# Patient Record
Sex: FEMALE | Race: WHITE | Employment: OTHER | URBAN - METROPOLITAN AREA
[De-identification: names, ages, dates, MRNs, and addresses within clinical notes are randomized per-mention and may not be internally consistent; named-entity substitution may affect disease eponyms.]

---

## 2020-01-17 ENCOUNTER — HOSPITAL ENCOUNTER (OUTPATIENT)
Age: 60
Discharge: HOME | DRG: 811 | End: 2020-01-17
Attending: INTERNAL MEDICINE | Admitting: INTERNAL MEDICINE
Payer: COMMERCIAL

## 2020-01-17 VITALS
HEIGHT: 67 IN | DIASTOLIC BLOOD PRESSURE: 64 MMHG | RESPIRATION RATE: 17 BRPM | OXYGEN SATURATION: 96 % | BODY MASS INDEX: 40.66 KG/M2 | SYSTOLIC BLOOD PRESSURE: 182 MMHG | TEMPERATURE: 97.7 F | HEART RATE: 68 BPM | WEIGHT: 259.04 LBS

## 2020-01-17 PROBLEM — D64.9 ANEMIA: Status: ACTIVE | Noted: 2020-01-17

## 2020-01-17 LAB
ABO/RH: NORMAL
ANTIBODY SCREEN: NORMAL
BLOOD BANK DISPENSE STATUS: NORMAL
BLOOD BANK PRODUCT CODE: NORMAL
BPU ID: NORMAL
DESCRIPTION BLOOD BANK: NORMAL
HCT VFR BLD CALC: 26.3 % (ref 36–48)
HEMOGLOBIN: 7.8 G/DL (ref 12–16)

## 2020-01-17 PROCEDURE — 6370000000 HC RX 637 (ALT 250 FOR IP): Performed by: INTERNAL MEDICINE

## 2020-01-17 PROCEDURE — 2580000003 HC RX 258: Performed by: INTERNAL MEDICINE

## 2020-01-17 PROCEDURE — 86901 BLOOD TYPING SEROLOGIC RH(D): CPT

## 2020-01-17 PROCEDURE — 86850 RBC ANTIBODY SCREEN: CPT

## 2020-01-17 PROCEDURE — 85018 HEMOGLOBIN: CPT

## 2020-01-17 PROCEDURE — 36415 COLL VENOUS BLD VENIPUNCTURE: CPT

## 2020-01-17 PROCEDURE — P9016 RBC LEUKOCYTES REDUCED: HCPCS

## 2020-01-17 PROCEDURE — 85014 HEMATOCRIT: CPT

## 2020-01-17 PROCEDURE — 36430 TRANSFUSION BLD/BLD COMPNT: CPT

## 2020-01-17 PROCEDURE — 86900 BLOOD TYPING SEROLOGIC ABO: CPT

## 2020-01-17 PROCEDURE — 86923 COMPATIBILITY TEST ELECTRIC: CPT

## 2020-01-17 PROCEDURE — 1200000000 HC SEMI PRIVATE

## 2020-01-17 RX ORDER — ACETAMINOPHEN 325 MG/1
650 TABLET ORAL ONCE
Status: COMPLETED | OUTPATIENT
Start: 2020-01-17 | End: 2020-01-17

## 2020-01-17 RX ORDER — 0.9 % SODIUM CHLORIDE 0.9 %
20 INTRAVENOUS SOLUTION INTRAVENOUS ONCE
Status: COMPLETED | OUTPATIENT
Start: 2020-01-17 | End: 2020-01-17

## 2020-01-17 RX ORDER — DIPHENHYDRAMINE HCL 25 MG
25 TABLET ORAL ONCE
Status: COMPLETED | OUTPATIENT
Start: 2020-01-17 | End: 2020-01-17

## 2020-01-17 RX ADMIN — ACETAMINOPHEN 650 MG: 325 TABLET ORAL at 16:36

## 2020-01-17 RX ADMIN — SODIUM CHLORIDE 20 ML: 9 INJECTION, SOLUTION INTRAVENOUS at 15:09

## 2020-01-17 RX ADMIN — DIPHENHYDRAMINE HCL 25 MG: 25 TABLET ORAL at 16:36

## 2020-01-17 ASSESSMENT — PAIN DESCRIPTION - LOCATION: LOCATION: LEG

## 2020-01-17 ASSESSMENT — PAIN DESCRIPTION - FREQUENCY: FREQUENCY: CONTINUOUS

## 2020-01-17 ASSESSMENT — PAIN SCALES - GENERAL
PAINLEVEL_OUTOF10: 0
PAINLEVEL_OUTOF10: 2

## 2020-01-17 ASSESSMENT — PAIN - FUNCTIONAL ASSESSMENT: PAIN_FUNCTIONAL_ASSESSMENT: ACTIVITIES ARE NOT PREVENTED

## 2020-01-17 ASSESSMENT — PAIN DESCRIPTION - PROGRESSION
CLINICAL_PROGRESSION: NOT CHANGED
CLINICAL_PROGRESSION: NOT CHANGED

## 2020-01-17 ASSESSMENT — PAIN DESCRIPTION - DESCRIPTORS: DESCRIPTORS: ACHING

## 2020-01-17 ASSESSMENT — PAIN DESCRIPTION - PAIN TYPE: TYPE: CHRONIC PAIN

## 2020-01-17 ASSESSMENT — PAIN DESCRIPTION - ONSET: ONSET: ON-GOING

## 2020-01-17 ASSESSMENT — PAIN DESCRIPTION - ORIENTATION: ORIENTATION: RIGHT;LEFT

## 2020-01-17 NOTE — PROGRESS NOTES
Verified allergies with patient, states her allergy to vicodin is the hydrocodone only, acetaminophen does not bother her. Blood consent signed and placed in chart.  Electronically signed by Ruben Rubio RN on 1/17/2020 at 3:40 PM

## 2020-01-17 NOTE — H&P
Recurrent anemia. Her EGD, colonoscopy, capsule endoscopy in the past have revealed small intestine AVMs. Her hemoglobin had dropped dramatically on her prior visit and recurrent iron deficiency was confirmed. She got IV iron with feraheme today in the office. Will get 2nd dose next week. Her hemoglobin is only 6 today. Discussed with Dr. Brittany Pedroza. She needs to get a unit of blood. She will be in an bedded outpatient. She is told that she needs to do limited activity. No lifting or walking far or anything exertional until she gets her blood. She expresses understanding and agreement.

## 2020-01-17 NOTE — PROGRESS NOTES
Pt arrived to room 4106. A&o x4, UAL. Has an IV in place from iron infusion done this morning. Pt states she is to get 1 unit PRBC. Perfect serve sent to Dr. Choco Burch for admission orders.  Electronically signed by Darryn Vidal RN on 1/17/2020 at 2:06 PM

## 2020-01-19 NOTE — DISCHARGE SUMMARY
[facility] [address]                               DISCHARGE SUMMARY    PATIENT NAME: Karlie Benitez                        :        1960  MED REC NO:   [id number]                          ROOM:       4106  ACCOUNT NO:   [de-identified]                           ADMIT DATE: 2020  PROVIDER:     Shantal Glaser MD                  DISCHARGE DATE:  2020      DISCHARGE DIAGNOSES:  Recurrent anemia. DISCHARGE MEDICINES:  Please see the med reconciliation form in the  chart. CONDITION AT DISCHARGE:  Stable. HISTORY OF PRESENT ILLNESS AND HOSPITAL COURSE:  The patient is a  pleasant 64-year-old female with recurrent iron-deficiency anemia likely  due to small bleeding from small intestinal AVMs who presented to our  office for followup and was found to have a hemoglobin of 6. She was  therefore admitted and given 1 unit of blood. Her followup hemoglobin  was 7.8. She was then discharged home to continue to receive IV iron at  home or in our office.           Nemo Paz MD    D: 2020 22:26:26       T: 2020 1:09:06     [email or web address]  Job#: [id number]     Doc#: [id number]    CC:

## 2021-10-11 ENCOUNTER — APPOINTMENT (OUTPATIENT)
Dept: GENERAL RADIOLOGY | Age: 61
DRG: 291 | End: 2021-10-11
Payer: MEDICARE

## 2021-10-11 ENCOUNTER — HOSPITAL ENCOUNTER (INPATIENT)
Age: 61
LOS: 2 days | Discharge: HOME HEALTH | DRG: 291 | End: 2021-10-13
Attending: EMERGENCY MEDICINE | Admitting: INTERNAL MEDICINE
Payer: MEDICARE

## 2021-10-11 DIAGNOSIS — R09.02 HYPOXIA: Primary | ICD-10-CM

## 2021-10-11 DIAGNOSIS — I50.20 SYSTOLIC CONGESTIVE HEART FAILURE, UNSPECIFIED HF CHRONICITY (HCC): ICD-10-CM

## 2021-10-11 DIAGNOSIS — R06.89 HYPERCAPNIA: ICD-10-CM

## 2021-10-11 PROBLEM — I50.23 ACUTE ON CHRONIC SYSTOLIC (CONGESTIVE) HEART FAILURE (HCC): Status: ACTIVE | Noted: 2021-10-11

## 2021-10-11 LAB
ALBUMIN SERPL-MCNC: 4 G/DL (ref 3.4–5)
ALP BLD-CCNC: 119 U/L (ref 40–129)
ALT SERPL-CCNC: 37 U/L (ref 10–40)
ANION GAP SERPL CALCULATED.3IONS-SCNC: 13 MMOL/L (ref 3–16)
AST SERPL-CCNC: 36 U/L (ref 15–37)
BASE EXCESS VENOUS: -1.4 MMOL/L
BASOPHILS ABSOLUTE: 0.1 K/UL (ref 0–0.2)
BASOPHILS RELATIVE PERCENT: 0.9 %
BILIRUB SERPL-MCNC: 0.6 MG/DL (ref 0–1)
BILIRUBIN DIRECT: <0.2 MG/DL (ref 0–0.3)
BILIRUBIN, INDIRECT: NORMAL MG/DL (ref 0–1)
BUN BLDV-MCNC: 17 MG/DL (ref 7–20)
CALCIUM SERPL-MCNC: 9.4 MG/DL (ref 8.3–10.6)
CARBOXYHEMOGLOBIN: 1.3 %
CHLORIDE BLD-SCNC: 102 MMOL/L (ref 99–110)
CO2: 23 MMOL/L (ref 21–32)
CREAT SERPL-MCNC: 1.7 MG/DL (ref 0.6–1.2)
EOSINOPHILS ABSOLUTE: 0.4 K/UL (ref 0–0.6)
EOSINOPHILS RELATIVE PERCENT: 4.3 %
GFR AFRICAN AMERICAN: 37
GFR NON-AFRICAN AMERICAN: 31
GLUCOSE BLD-MCNC: 202 MG/DL (ref 70–99)
HCO3 VENOUS: 26 MMOL/L (ref 23–29)
HCT VFR BLD CALC: 38.8 % (ref 36–48)
HEMOGLOBIN: 12.4 G/DL (ref 12–16)
LIPASE: 20 U/L (ref 13–60)
LYMPHOCYTES ABSOLUTE: 1.2 K/UL (ref 1–5.1)
LYMPHOCYTES RELATIVE PERCENT: 11.7 %
MCH RBC QN AUTO: 26.7 PG (ref 26–34)
MCHC RBC AUTO-ENTMCNC: 31.9 G/DL (ref 31–36)
MCV RBC AUTO: 83.6 FL (ref 80–100)
METHEMOGLOBIN VENOUS: 0.3 %
MONOCYTES ABSOLUTE: 0.5 K/UL (ref 0–1.3)
MONOCYTES RELATIVE PERCENT: 5 %
NEUTROPHILS ABSOLUTE: 7.9 K/UL (ref 1.7–7.7)
NEUTROPHILS RELATIVE PERCENT: 78.1 %
O2 SAT, VEN: 98 %
O2 THERAPY: ABNORMAL
PCO2, VEN: 56.6 MMHG (ref 40–50)
PDW BLD-RTO: 17.1 % (ref 12.4–15.4)
PH VENOUS: 7.28 (ref 7.35–7.45)
PLATELET # BLD: 304 K/UL (ref 135–450)
PMV BLD AUTO: 10.7 FL (ref 5–10.5)
PO2, VEN: 112 MMHG
POTASSIUM REFLEX MAGNESIUM: 4.6 MMOL/L (ref 3.5–5.1)
PRO-BNP: 8241 PG/ML (ref 0–124)
RBC # BLD: 4.65 M/UL (ref 4–5.2)
SARS-COV-2, NAAT: NOT DETECTED
SODIUM BLD-SCNC: 138 MMOL/L (ref 136–145)
TCO2 CALC VENOUS: 28 MMOL/L
TOTAL PROTEIN: 7.1 G/DL (ref 6.4–8.2)
TROPONIN: <0.01 NG/ML
WBC # BLD: 10.1 K/UL (ref 4–11)

## 2021-10-11 PROCEDURE — 94660 CPAP INITIATION&MGMT: CPT

## 2021-10-11 PROCEDURE — 94640 AIRWAY INHALATION TREATMENT: CPT

## 2021-10-11 PROCEDURE — 1200000000 HC SEMI PRIVATE

## 2021-10-11 PROCEDURE — 85025 COMPLETE CBC W/AUTO DIFF WBC: CPT

## 2021-10-11 PROCEDURE — 6370000000 HC RX 637 (ALT 250 FOR IP): Performed by: EMERGENCY MEDICINE

## 2021-10-11 PROCEDURE — 71045 X-RAY EXAM CHEST 1 VIEW: CPT

## 2021-10-11 PROCEDURE — 80076 HEPATIC FUNCTION PANEL: CPT

## 2021-10-11 PROCEDURE — 84484 ASSAY OF TROPONIN QUANT: CPT

## 2021-10-11 PROCEDURE — 82803 BLOOD GASES ANY COMBINATION: CPT

## 2021-10-11 PROCEDURE — 2700000000 HC OXYGEN THERAPY PER DAY

## 2021-10-11 PROCEDURE — 99282 EMERGENCY DEPT VISIT SF MDM: CPT

## 2021-10-11 PROCEDURE — 6360000002 HC RX W HCPCS: Performed by: EMERGENCY MEDICINE

## 2021-10-11 PROCEDURE — 93005 ELECTROCARDIOGRAM TRACING: CPT | Performed by: EMERGENCY MEDICINE

## 2021-10-11 PROCEDURE — 94761 N-INVAS EAR/PLS OXIMETRY MLT: CPT

## 2021-10-11 PROCEDURE — 83880 ASSAY OF NATRIURETIC PEPTIDE: CPT

## 2021-10-11 PROCEDURE — 80048 BASIC METABOLIC PNL TOTAL CA: CPT

## 2021-10-11 PROCEDURE — 87635 SARS-COV-2 COVID-19 AMP PRB: CPT

## 2021-10-11 PROCEDURE — 83690 ASSAY OF LIPASE: CPT

## 2021-10-11 RX ORDER — METHYLPREDNISOLONE SODIUM SUCCINATE 125 MG/2ML
60 INJECTION, POWDER, LYOPHILIZED, FOR SOLUTION INTRAMUSCULAR; INTRAVENOUS ONCE
Status: COMPLETED | OUTPATIENT
Start: 2021-10-11 | End: 2021-10-11

## 2021-10-11 RX ORDER — IPRATROPIUM BROMIDE AND ALBUTEROL SULFATE 2.5; .5 MG/3ML; MG/3ML
1 SOLUTION RESPIRATORY (INHALATION) ONCE
Status: COMPLETED | OUTPATIENT
Start: 2021-10-11 | End: 2021-10-11

## 2021-10-11 RX ORDER — FUROSEMIDE 10 MG/ML
40 INJECTION INTRAMUSCULAR; INTRAVENOUS ONCE
Status: COMPLETED | OUTPATIENT
Start: 2021-10-11 | End: 2021-10-11

## 2021-10-11 RX ADMIN — METHYLPREDNISOLONE SODIUM SUCCINATE 60 MG: 125 INJECTION, POWDER, FOR SOLUTION INTRAMUSCULAR; INTRAVENOUS at 22:49

## 2021-10-11 RX ADMIN — IPRATROPIUM BROMIDE AND ALBUTEROL SULFATE 1 AMPULE: .5; 3 SOLUTION RESPIRATORY (INHALATION) at 21:18

## 2021-10-11 RX ADMIN — FUROSEMIDE 40 MG: 10 INJECTION, SOLUTION INTRAMUSCULAR; INTRAVENOUS at 23:10

## 2021-10-12 PROBLEM — J96.01 ACUTE RESPIRATORY FAILURE WITH HYPOXIA AND HYPERCARBIA (HCC): Status: ACTIVE | Noted: 2021-10-12

## 2021-10-12 PROBLEM — E78.5 HYPERLIPIDEMIA: Status: ACTIVE | Noted: 2021-10-12

## 2021-10-12 PROBLEM — E66.01 MORBID OBESITY DUE TO EXCESS CALORIES (HCC): Status: ACTIVE | Noted: 2021-10-12

## 2021-10-12 PROBLEM — J96.02 ACUTE RESPIRATORY FAILURE WITH HYPOXIA AND HYPERCARBIA (HCC): Status: ACTIVE | Noted: 2021-10-12

## 2021-10-12 PROBLEM — J81.0 ACUTE PULMONARY EDEMA (HCC): Status: ACTIVE | Noted: 2021-10-12

## 2021-10-12 LAB
ANION GAP SERPL CALCULATED.3IONS-SCNC: 15 MMOL/L (ref 3–16)
BASOPHILS ABSOLUTE: 0 K/UL (ref 0–0.2)
BASOPHILS RELATIVE PERCENT: 0.3 %
BUN BLDV-MCNC: 20 MG/DL (ref 7–20)
CALCIUM SERPL-MCNC: 9.5 MG/DL (ref 8.3–10.6)
CHLORIDE BLD-SCNC: 100 MMOL/L (ref 99–110)
CO2: 23 MMOL/L (ref 21–32)
CREAT SERPL-MCNC: 1.5 MG/DL (ref 0.6–1.2)
EKG ATRIAL RATE: 74 BPM
EKG DIAGNOSIS: NORMAL
EKG P AXIS: 75 DEGREES
EKG P-R INTERVAL: 216 MS
EKG Q-T INTERVAL: 476 MS
EKG QRS DURATION: 186 MS
EKG QTC CALCULATION (BAZETT): 528 MS
EKG R AXIS: -73 DEGREES
EKG T AXIS: 92 DEGREES
EKG VENTRICULAR RATE: 74 BPM
EOSINOPHILS ABSOLUTE: 0 K/UL (ref 0–0.6)
EOSINOPHILS RELATIVE PERCENT: 0.2 %
GFR AFRICAN AMERICAN: 43
GFR NON-AFRICAN AMERICAN: 35
GLUCOSE BLD-MCNC: 135 MG/DL (ref 70–99)
GLUCOSE BLD-MCNC: 156 MG/DL (ref 70–99)
GLUCOSE BLD-MCNC: 161 MG/DL (ref 70–99)
GLUCOSE BLD-MCNC: 175 MG/DL (ref 70–99)
GLUCOSE BLD-MCNC: 179 MG/DL (ref 70–99)
HCT VFR BLD CALC: 36.6 % (ref 36–48)
HEMOGLOBIN: 11.9 G/DL (ref 12–16)
LV EF: 28 %
LVEF MODALITY: NORMAL
LYMPHOCYTES ABSOLUTE: 0.4 K/UL (ref 1–5.1)
LYMPHOCYTES RELATIVE PERCENT: 6.1 %
MCH RBC QN AUTO: 26.8 PG (ref 26–34)
MCHC RBC AUTO-ENTMCNC: 32.6 G/DL (ref 31–36)
MCV RBC AUTO: 82.1 FL (ref 80–100)
MONOCYTES ABSOLUTE: 0.1 K/UL (ref 0–1.3)
MONOCYTES RELATIVE PERCENT: 1.2 %
NEUTROPHILS ABSOLUTE: 6.1 K/UL (ref 1.7–7.7)
NEUTROPHILS RELATIVE PERCENT: 92.2 %
PDW BLD-RTO: 16.8 % (ref 12.4–15.4)
PERFORMED ON: ABNORMAL
PLATELET # BLD: 255 K/UL (ref 135–450)
PMV BLD AUTO: 10.7 FL (ref 5–10.5)
POTASSIUM REFLEX MAGNESIUM: 4 MMOL/L (ref 3.5–5.1)
RBC # BLD: 4.46 M/UL (ref 4–5.2)
SODIUM BLD-SCNC: 138 MMOL/L (ref 136–145)
WBC # BLD: 6.6 K/UL (ref 4–11)

## 2021-10-12 PROCEDURE — 6360000002 HC RX W HCPCS: Performed by: INTERNAL MEDICINE

## 2021-10-12 PROCEDURE — 36415 COLL VENOUS BLD VENIPUNCTURE: CPT

## 2021-10-12 PROCEDURE — 85025 COMPLETE CBC W/AUTO DIFF WBC: CPT

## 2021-10-12 PROCEDURE — 93010 ELECTROCARDIOGRAM REPORT: CPT | Performed by: INTERNAL MEDICINE

## 2021-10-12 PROCEDURE — 2700000000 HC OXYGEN THERAPY PER DAY

## 2021-10-12 PROCEDURE — 93306 TTE W/DOPPLER COMPLETE: CPT

## 2021-10-12 PROCEDURE — 1200000000 HC SEMI PRIVATE

## 2021-10-12 PROCEDURE — 6370000000 HC RX 637 (ALT 250 FOR IP): Performed by: INTERNAL MEDICINE

## 2021-10-12 PROCEDURE — 94660 CPAP INITIATION&MGMT: CPT

## 2021-10-12 PROCEDURE — 94640 AIRWAY INHALATION TREATMENT: CPT

## 2021-10-12 PROCEDURE — 2580000003 HC RX 258: Performed by: INTERNAL MEDICINE

## 2021-10-12 PROCEDURE — 94761 N-INVAS EAR/PLS OXIMETRY MLT: CPT

## 2021-10-12 PROCEDURE — 6370000000 HC RX 637 (ALT 250 FOR IP): Performed by: FAMILY MEDICINE

## 2021-10-12 PROCEDURE — 80048 BASIC METABOLIC PNL TOTAL CA: CPT

## 2021-10-12 RX ORDER — GABAPENTIN 600 MG/1
600 TABLET ORAL 3 TIMES DAILY
Status: ON HOLD | COMMUNITY
Start: 2021-05-01 | End: 2021-10-12 | Stop reason: SDUPTHER

## 2021-10-12 RX ORDER — SODIUM CHLORIDE 0.9 % (FLUSH) 0.9 %
10 SYRINGE (ML) INJECTION PRN
Status: DISCONTINUED | OUTPATIENT
Start: 2021-10-12 | End: 2021-10-13 | Stop reason: HOSPADM

## 2021-10-12 RX ORDER — FUROSEMIDE 10 MG/ML
40 INJECTION INTRAMUSCULAR; INTRAVENOUS 2 TIMES DAILY
Status: DISCONTINUED | OUTPATIENT
Start: 2021-10-12 | End: 2021-10-13 | Stop reason: HOSPADM

## 2021-10-12 RX ORDER — ACETAMINOPHEN 325 MG/1
650 TABLET ORAL EVERY 4 HOURS PRN
Status: DISCONTINUED | OUTPATIENT
Start: 2021-10-12 | End: 2021-10-13 | Stop reason: HOSPADM

## 2021-10-12 RX ORDER — CLOPIDOGREL BISULFATE 75 MG/1
75 TABLET ORAL DAILY
Status: DISCONTINUED | OUTPATIENT
Start: 2021-10-12 | End: 2021-10-12

## 2021-10-12 RX ORDER — GABAPENTIN 300 MG/1
300 CAPSULE ORAL NIGHTLY
Status: DISCONTINUED | OUTPATIENT
Start: 2021-10-12 | End: 2021-10-12

## 2021-10-12 RX ORDER — ONDANSETRON 2 MG/ML
4 INJECTION INTRAMUSCULAR; INTRAVENOUS EVERY 4 HOURS PRN
Status: DISCONTINUED | OUTPATIENT
Start: 2021-10-12 | End: 2021-10-13 | Stop reason: HOSPADM

## 2021-10-12 RX ORDER — PANTOPRAZOLE SODIUM 40 MG/1
40 TABLET, DELAYED RELEASE ORAL DAILY
Status: DISCONTINUED | OUTPATIENT
Start: 2021-10-12 | End: 2021-10-13 | Stop reason: HOSPADM

## 2021-10-12 RX ORDER — GABAPENTIN 300 MG/1
600 CAPSULE ORAL 3 TIMES DAILY
Status: DISCONTINUED | OUTPATIENT
Start: 2021-10-12 | End: 2021-10-13 | Stop reason: HOSPADM

## 2021-10-12 RX ORDER — ATORVASTATIN CALCIUM 40 MG/1
40 TABLET, FILM COATED ORAL DAILY
Status: DISCONTINUED | OUTPATIENT
Start: 2021-10-12 | End: 2021-10-13 | Stop reason: HOSPADM

## 2021-10-12 RX ORDER — SODIUM CHLORIDE 0.9 % (FLUSH) 0.9 %
10 SYRINGE (ML) INJECTION EVERY 12 HOURS SCHEDULED
Status: DISCONTINUED | OUTPATIENT
Start: 2021-10-12 | End: 2021-10-13 | Stop reason: HOSPADM

## 2021-10-12 RX ORDER — BUDESONIDE AND FORMOTEROL FUMARATE DIHYDRATE 160; 4.5 UG/1; UG/1
2 AEROSOL RESPIRATORY (INHALATION) DAILY
Status: DISCONTINUED | OUTPATIENT
Start: 2021-10-12 | End: 2021-10-12

## 2021-10-12 RX ORDER — SODIUM CHLORIDE 9 MG/ML
25 INJECTION, SOLUTION INTRAVENOUS PRN
Status: DISCONTINUED | OUTPATIENT
Start: 2021-10-12 | End: 2021-10-13 | Stop reason: HOSPADM

## 2021-10-12 RX ORDER — DEXTROSE MONOHYDRATE 50 MG/ML
100 INJECTION, SOLUTION INTRAVENOUS PRN
Status: DISCONTINUED | OUTPATIENT
Start: 2021-10-12 | End: 2021-10-13 | Stop reason: HOSPADM

## 2021-10-12 RX ORDER — CARVEDILOL 25 MG/1
25 TABLET ORAL 2 TIMES DAILY WITH MEALS
Status: DISCONTINUED | OUTPATIENT
Start: 2021-10-12 | End: 2021-10-13 | Stop reason: HOSPADM

## 2021-10-12 RX ORDER — ACETAMINOPHEN 650 MG/1
650 SUPPOSITORY RECTAL EVERY 4 HOURS PRN
Status: DISCONTINUED | OUTPATIENT
Start: 2021-10-12 | End: 2021-10-13 | Stop reason: HOSPADM

## 2021-10-12 RX ORDER — POLYETHYLENE GLYCOL 3350 17 G/17G
17 POWDER, FOR SOLUTION ORAL DAILY PRN
Status: DISCONTINUED | OUTPATIENT
Start: 2021-10-12 | End: 2021-10-13 | Stop reason: HOSPADM

## 2021-10-12 RX ORDER — NICOTINE POLACRILEX 4 MG
15 LOZENGE BUCCAL PRN
Status: DISCONTINUED | OUTPATIENT
Start: 2021-10-12 | End: 2021-10-13 | Stop reason: HOSPADM

## 2021-10-12 RX ORDER — AMIODARONE HYDROCHLORIDE 200 MG/1
200 TABLET ORAL NIGHTLY
COMMUNITY
End: 2022-05-10

## 2021-10-12 RX ORDER — ALBUTEROL SULFATE 0.63 MG/3ML
1 SOLUTION RESPIRATORY (INHALATION) EVERY 6 HOURS PRN
COMMUNITY
End: 2021-12-01

## 2021-10-12 RX ORDER — DEXTROSE MONOHYDRATE 25 G/50ML
12.5 INJECTION, SOLUTION INTRAVENOUS PRN
Status: DISCONTINUED | OUTPATIENT
Start: 2021-10-12 | End: 2021-10-13 | Stop reason: HOSPADM

## 2021-10-12 RX ORDER — ALBUTEROL SULFATE 90 UG/1
2 AEROSOL, METERED RESPIRATORY (INHALATION) 4 TIMES DAILY
Status: DISCONTINUED | OUTPATIENT
Start: 2021-10-12 | End: 2021-10-13 | Stop reason: HOSPADM

## 2021-10-12 RX ORDER — SACUBITRIL AND VALSARTAN 24; 26 MG/1; MG/1
1 TABLET, FILM COATED ORAL 2 TIMES DAILY
COMMUNITY
End: 2021-12-01 | Stop reason: ALTCHOICE

## 2021-10-12 RX ORDER — ASPIRIN 81 MG/1
81 TABLET, CHEWABLE ORAL DAILY
Status: DISCONTINUED | OUTPATIENT
Start: 2021-10-12 | End: 2021-10-13 | Stop reason: HOSPADM

## 2021-10-12 RX ADMIN — GABAPENTIN 300 MG: 300 CAPSULE ORAL at 01:37

## 2021-10-12 RX ADMIN — SODIUM CHLORIDE, PRESERVATIVE FREE 10 ML: 5 INJECTION INTRAVENOUS at 08:24

## 2021-10-12 RX ADMIN — ALBUTEROL SULFATE 2 PUFF: 90 AEROSOL, METERED RESPIRATORY (INHALATION) at 07:59

## 2021-10-12 RX ADMIN — FUROSEMIDE 40 MG: 10 INJECTION, SOLUTION INTRAMUSCULAR; INTRAVENOUS at 08:28

## 2021-10-12 RX ADMIN — ALBUTEROL SULFATE 2 PUFF: 90 AEROSOL, METERED RESPIRATORY (INHALATION) at 16:17

## 2021-10-12 RX ADMIN — INSULIN LISPRO 1 UNITS: 100 INJECTION, SOLUTION INTRAVENOUS; SUBCUTANEOUS at 20:56

## 2021-10-12 RX ADMIN — FUROSEMIDE 40 MG: 10 INJECTION, SOLUTION INTRAMUSCULAR; INTRAVENOUS at 17:28

## 2021-10-12 RX ADMIN — ASPIRIN 81 MG: 81 TABLET, CHEWABLE ORAL at 08:23

## 2021-10-12 RX ADMIN — ALBUTEROL SULFATE 2 PUFF: 90 AEROSOL, METERED RESPIRATORY (INHALATION) at 20:23

## 2021-10-12 RX ADMIN — SODIUM CHLORIDE, PRESERVATIVE FREE 10 ML: 5 INJECTION INTRAVENOUS at 20:56

## 2021-10-12 RX ADMIN — ALBUTEROL SULFATE 2 PUFF: 90 AEROSOL, METERED RESPIRATORY (INHALATION) at 12:20

## 2021-10-12 RX ADMIN — GABAPENTIN 600 MG: 300 CAPSULE ORAL at 20:56

## 2021-10-12 RX ADMIN — ENOXAPARIN SODIUM 40 MG: 100 INJECTION SUBCUTANEOUS at 08:25

## 2021-10-12 RX ADMIN — INSULIN LISPRO 2 UNITS: 100 INJECTION, SOLUTION INTRAVENOUS; SUBCUTANEOUS at 08:27

## 2021-10-12 RX ADMIN — PANTOPRAZOLE SODIUM 40 MG: 40 TABLET, DELAYED RELEASE ORAL at 05:05

## 2021-10-12 RX ADMIN — CARVEDILOL 25 MG: 25 TABLET, FILM COATED ORAL at 17:28

## 2021-10-12 RX ADMIN — TIOTROPIUM BROMIDE INHALATION SPRAY 2 PUFF: 3.12 SPRAY, METERED RESPIRATORY (INHALATION) at 07:59

## 2021-10-12 RX ADMIN — ATORVASTATIN CALCIUM 40 MG: 40 TABLET, FILM COATED ORAL at 08:23

## 2021-10-12 RX ADMIN — CARVEDILOL 25 MG: 25 TABLET, FILM COATED ORAL at 08:23

## 2021-10-12 ASSESSMENT — ENCOUNTER SYMPTOMS
CONSTIPATION: 0
VOMITING: 0
EYE DISCHARGE: 0
COUGH: 0
EYE ITCHING: 0
SHORTNESS OF BREATH: 1
COLOR CHANGE: 0
ABDOMINAL PAIN: 0

## 2021-10-12 ASSESSMENT — PAIN SCALES - GENERAL
PAINLEVEL_OUTOF10: 0

## 2021-10-12 NOTE — ED PROVIDER NOTES
[address] [facility]      Pt Name: Marge Pulido  MRN: [id number]  [place] 1960  Date of evaluation: 10/11/2021  Provider: Doe Montes MD    CHIEF COMPLAINT       Chief Complaint   Patient presents with    Shortness of Breath       HISTORY OF PRESENT ILLNESS    Marge Pulido is a 62 y.o. female who presents to the emergency department with shortness of breath. Patient endorses shortness of breath for the last 24 hours. States it started suddenly. Spontaneously. Positive for congestive heart failure and COPD. Positive for 10 pound weight gain. Positive for orthopnea and leg swelling. No chest pain. Denies fevers or chills. Dry cough. Patient presented on CPAP per EMS. Placed on BiPAP with good response. History otherwise limited secondary to patient's degree of respiratory distress on arrival.    Nursing Notes were reviewed. Including nursing noted for FM, Surgical History, Past Medical History, Social History, vitals, and allergies; agree with all. REVIEW OF SYSTEMS       Review of Systems   Constitutional: Negative for diaphoresis and unexpected weight change. HENT: Negative for congestion and dental problem. Eyes: Negative for discharge and itching. Respiratory: Positive for shortness of breath. Negative for cough. Cardiovascular: Positive for leg swelling. Negative for chest pain. Gastrointestinal: Negative for abdominal pain, constipation and vomiting. Endocrine: Negative for cold intolerance and heat intolerance. Genitourinary: Negative for vaginal bleeding, vaginal discharge and vaginal pain. Musculoskeletal: Negative for neck pain and neck stiffness. Skin: Negative for color change and pallor. Neurological: Negative for tremors and weakness. Psychiatric/Behavioral: Negative for agitation and behavioral problems. Except as noted above the remainder of the review of systems was reviewed and negative. PAST MEDICAL HISTORY     Past Medical History:   Diagnosis Date    Asthma     Cardiomyopathy ([facility]     CHF (congestive heart failure) (HCC)     COPD [facility]ch[facility]     Diabetes mellitus ([facility]     Essential hypertension     Hyperlipidemia     Obesity        SURGICAL HISTORY       Past Surgical History:   Procedure Laterality Date    CARPAL TUNNEL RELEASE      CHOLECYSTECTOMY      INCONTINENCE SURGERY         CURRENT MEDICATIONS       Previous Medications    ALBUTEROL (PROVENTIL HFA) 108 (90 BASE) MCG/ACT INHALER    Inhale 2 puffs into the lungs 4 times daily. ASPIRIN 81 MG TABLET    Take 81 mg by mouth daily. ATORVASTATIN (LIPITOR) 40 MG TABLET    Take 40 mg by mouth daily. BUDESONIDE-FORMOTEROL (SYMBICORT) 160-4.5 MCG/ACT AERO    Inhale 2 puffs into the lungs daily. CARVEDILOL (COREG) 12.5 MG TABLET    Take 1 tablet by mouth 2 times daily (with meals). CLOPIDOGREL (PLAVIX) 75 MG TABLET    Take 1 tablet by mouth daily. FERROUS SULFATE 325 (65 FE) MG TABLET    Take 325 mg by mouth 2 times daily     FUROSEMIDE (LASIX) 80 MG TABLET    Take 80 mg by mouth 2 times daily. GABAPENTIN (NEURONTIN) 300 MG CAPSULE    Take 300 mg by mouth nightly. HYDROCORTISONE (ANUMED-HC) 25 MG SUPPOSITORY    Place 25 mg rectally as needed for Hemorrhoids. MAGNESIUM OXIDE (MAG-OX) 400 MG TABLET    Take 400 mg by mouth daily. PANTOPRAZOLE (PROTONIX) 40 MG TABLET    Take 40 mg by mouth daily. POTASSIUM CHLORIDE SA (KLOR-CON M20) 20 MEQ TABLET    Take 20 mEq by mouth 2 times daily. TIOTROPIUM (SPIRIVA HANDIHALER) 18 MCG INHALATION CAPSULE    Inhale 18 mcg into the lungs daily.          ALLERGIES     Ace inhibitors and Vicodin [hydrocodone-acetaminophen]    FAMILY HISTORY        Family History   Problem Relation Age of Onset    High Blood Pressure Mother     Diabetes Mother     Cancer Mother         thyroid    Stroke Father        SOCIAL HISTORY       Social History     Socioeconomic History    Marital status:      Spouse name: Not on file    Number of children: Not on file    Years of education: Not on file    Highest education level: Not on file   Occupational History    Not on file   Tobacco Use    Smoking status: Former Smoker     Packs/day: 0.10     Types: Cigarettes    Smokeless tobacco: Never Used   Substance and Sexual Activity    Alcohol use: No     Alcohol/week: 0.0 standard drinks    Drug use: No    Sexual activity: Yes     Partners: Male     Comment:    Other Topics Concern    Not on file   Social History Narrative    Not on file     Social Determinants of Health     Financial Resource Strain:     Difficulty of Paying Living Expenses:    Food Insecurity:     Worried About Running Out of Food in the Last Year:     [address] in the Last Year:    Transportation Needs:     Lack of Transportation (Medical):  Lack of Transportation (Non-Medical):    Physical Activity:     Days of Exercise per Week:     Minutes of Exercise per Session:    Stress:     Feeling of Stress :    Social Connections:     Frequency of Communication with Friends and Family:     Frequency of Social Gatherings with Friends and Family:     Attends [employer] Services:     Active Member of Clubs or Organizations:     Attends Club or Organization Meetings:     Marital Status:    Intimate Partner Violence:     Fear of Current or Ex-Partner:     Emotionally Abused:     Physically Abused:     Sexually Abused:        PHYSICAL EXAM       Vitals:    10/11/21 2118 10/11/21 2120 10/11/21 2302 10/12/21 0047   BP:  139/75 (!) 113/58    Pulse:  84 52    Resp: 20 20 17 18   SpO2: 100% 99% 100% 100%   Weight:  271 lb 2.7 oz (123 kg)       Physical Exam  Vitals and nursing note reviewed. Constitutional:       General: She is not in acute distress. Appearance: She is well-developed. She is ill-appearing. She is not toxic-appearing or diaphoretic.    HENT: Head: Normocephalic and atraumatic. Right Ear: External ear normal.      Left Ear: External ear normal.   Eyes:      General:         Right eye: No discharge. Left eye: No discharge. Conjunctiva/sclera: Conjunctivae normal.      Pupils: Pupils are equal, round, and reactive to light. Cardiovascular:      Rate and Rhythm: Normal rate and regular rhythm. Heart sounds: No murmur heard. Pulmonary:      Effort: Respiratory distress present. Breath sounds: Rales present. No wheezing. Abdominal:      General: Bowel sounds are normal. There is no distension. Palpations: Abdomen is soft. There is no mass. Tenderness: There is no abdominal tenderness. There is no guarding or rebound. Genitourinary:     Comments: Deferred  Musculoskeletal:         General: No deformity. Normal range of motion. Cervical back: Normal range of motion and neck supple. Right lower leg: Edema present. Left lower leg: Edema present. Skin:     General: Skin is warm. Findings: No erythema or rash. Neurological:      Mental Status: She is alert and oriented to person, place, and time. She is not disoriented. Cranial Nerves: No cranial nerve deficit. Motor: No atrophy or abnormal muscle tone. Coordination: Coordination normal.   Psychiatric:         Behavior: Behavior normal.         Thought Content: Thought content normal.         DIAGNOSTIC RESULTS     RADIOLOGY:   Non-plain film images such as CT, Ultrasoundand MRI are read by the radiologist. Plain radiographic images are visualized and preliminarily interpreted by the emergency physician with the below findings:       Impression   Interstitial pulmonary edema.  Otherwise no acute abnormality.      ED BEDSIDE ULTRASOUND:   Performed by ED Physician - none    LABS:  Labs Reviewed   CBC WITH AUTO DIFFERENTIAL - Abnormal; Notable for the following components:       Result Value    RDW 17.1 (*)     MPV 10.7 (*) Neutrophils Absolute 7.9 (*)     All other components within normal limits    Narrative:     Performed at:  [facility]  [address] [facility] [place] [address]   Phone [phone]   BASIC METABOLIC PANEL W/ REFLEX TO MG FOR LOW K - Abnormal; Notable for the following components:    Glucose 202 (*)     CREATININE 1.7 (*)     GFR Non- 31 (*)     GFR  37 (*)     All other components within normal limits    Narrative:     Performed at:  [facility]  [address] [place] [facility] [place] [address]   Phone [phone]   BRAIN NATRIURETIC PEPTIDE - Abnormal; Notable for the following components:    Pro-BNP 8,241 (*)     All other components within normal limits    Narrative:     Performed at:  [facility]  [address] [place] [facility] [place] [address]   Phone [phone]   BLOOD GAS, VENOUS - Abnormal; Notable for the following components:    pH, [place] 7.276 (*)     pCO2, [place] 56.6 (*)     All other components within normal limits    Narrative:     Performed at:  [facility]  [address] [place] [facility] [place] [address]   Phone [phone] RAPID    Narrative:     Performed at:  [facility]  [address] [place] [facility] [place] [address]   Phone [phone]   LIPASE    Narrative:     Performed at:  [facility]  [address] [place] [facility] [place] [address]   Phone [phone]   HEPATIC FUNCTION PANEL    Narrative:     Performed at:  [facility]  [address] [place] [facility] [place] [address]   Phone [phone]   TROPONIN    Narrative:     Performed at:  [facility]  [address] [place] [facility] [place] [address]   Phone [phone]   BASIC METABOLIC PANEL W/ REFLEX TO MG FOR LOW K   CBC WITH AUTO DIFFERENTIAL       All other labs were withinnormal range or not returned as of this dictation. EMERGENCY DEPARTMENT COURSE and DIFFERENTIAL DIAGNOSIS/MDM:     PMH, Surgical Hx, FH, Social Hx reviewed by myself (ETOH usage, Tobacco usage, Drug usage reviewed by myself, no pertinent Hx)- No Pertinent Hx     Old records were reviewed by me    81-year-old female with acute epoxy hypercapnic respiratory distress. On BiPAP with good response. Steroids and nebs given. Lasix initiated. Admission for further inpatient evaluation. CRITICAL CARE TIME   Total Critical Caretime was 39 minutes, excluding separately reportable procedures. There was a high probability of clinically significant/life threatening deterioration in the patient's condition which required my urgent intervention. PROCEDURES:  Unlessotherwise noted below, none    FINAL IMPRESSION      1. Hypoxia    2. Systolic congestive heart failure, unspecified HF chronicity ([address]    3.  Hypercapnia          DISPOSITION/PLAN   DISPOSITION      Admission    (Please note that portions ofthis note were completed with a voice recognition program.  Efforts were made to edit the dictations but occasionally words are mis-transcribed.)    Filipe Carpio MD(electronically signed)  Attending Emergency Physician        Filipe Carpio MD  10/12/21 4433

## 2021-10-12 NOTE — CARE COORDINATION
INITIAL CASE MANAGEMENT ASSESSMENT    Reviewed chart, met with patient to assess possible discharge needs. Explained Case Management role/services. Living Situation: Updated address, lives alone in basement level apartment, 2 steps into building, 5 steps w/ railing down to apartment    ADLs: Independent, at baseline patient says she does mostly stays in bed/chair all day; daughter assists with grocery shopping, patient endorses difficulty with cleaning     DME: Cane, wheeled walker, raised toilet seat, shower chair w/ back, BIPAP    PT/OT Recs: May need eval; at baseline patient says she mostly stays put in bed/chair all day     Active Services: None     Transportation: Active , daughter can transport home     Medications: Uses [facility] on [address] -- no issues    PCP: Jannet Ibanez MD      HD/PD: n/a    PLAN/COMMENTS: Patient will return home at discharge. Agreeable to home care RN/PT/OT. Home care list given. Patient [facility], referral made. Referral made to [facility] for assistance cleaning. The Plan for Transition of Care is related to the following treatment goals: strengthening    The patient was provided with a choice of provider and agrees   with the discharge plan. [x] Yes [] No    Freedom of choice list was provided with basic dialogue that supports the patient's individualized plan of care/goals, treatment preferences and shares the quality data associated with the providers. [x] Yes [] No    CM provided contact information for patient or family to call with any questions. CM will follow and assist as needed.   Electronically signed by Gurpreet Macedo RN Case Management [phone] on 10/12/2021 at 11:05 AM

## 2021-10-12 NOTE — H&P
Hospital Medicine  History and Physical    PCP: Key Ramírez MD  Patient Name: Ginna Wen    Date of Service: Pt seen/examined on 10/11/21 and admitted to Inpatient with expected LOS greater than two midnights due to medical therapy    CHIEF COMPLAINT:  Pt c/o shortness of breath  HISTORY OF PRESENT ILLNESS: Pt is an 62y.o. year-old female with a history of hypertension, hyperlipidemia, COPD, DMII, CHF and morbid obesity. She presents to the ER for evaluation following a 24 hour period of increasing shortness of breath. She states that her shortness of breath is worse with exertion and improved with rest. She reports a recent 10 lb weight gain. On evaluation she was found to have an acute on chronic systolic CHF. She is being admitted for further evaluation and treatment. Associated signs and symptoms do not include fever or chills, nausea, vomiting, abdominal pain, hemoptysis, hematochezia, diarrhea, constipation or urinary symptoms. Past Medical History:        Diagnosis Date    Asthma     [facility]omyopathy [facility] [address]     CHF (congestive heart failure) (HCC)     COPD (chronic obstructive pulmonary disease) (HCC)     Diabetes mellitus ([facility] [address]     Essential hypertension     Hyperlipidemia     Obesity        Past Surgical History:        Procedure Laterality Date    CARPAL TUNNEL RELEASE      CHOLECYSTECTOMY      INCONTINENCE SURGERY         Allergies:  Ace inhibitors, Diclofenac, Losartan, and Vicodin [hydrocodone-acetaminophen]    Medications Prior to Admission:    Prior to Admission medications    Medication Sig Start Date End Date Taking?  Authorizing Provider   amiodarone (CORDARONE) 200 MG tablet Take 200 mg by mouth daily nightly   Yes Historical Provider, MD   sacubitril-valsartan (ENTRESTO) 24-26 MG per tablet Take 1 tablet by mouth 2 times daily   Yes Historical Provider, MD   ferrous sulfate 325 (65 FE) MG tablet Take 325 mg by mouth 3 times daily    Yes Historical Provider, MD   aspirin 81 MG tablet Take 325 mg by mouth daily    Yes Historical Provider, MD   atorvastatin (LIPITOR) 40 MG tablet Take 40 mg by mouth daily. Yes Historical Provider, MD   magnesium oxide (MAG-OX) 400 MG tablet Take 500 mg by mouth 2 times daily    Yes Historical Provider, MD   potassium chloride SA (KLOR-CON M20) 20 MEQ tablet Take 20 mEq by mouth 2 times daily. Yes Historical Provider, MD   furosemide (LASIX) 80 MG tablet Take 80 mg by mouth 2 times daily. 4/11/14  Yes Historical Provider, MD   pantoprazole (PROTONIX) 40 MG tablet Take 40 mg by mouth daily. Yes Historical Provider, MD   hydrocortisone (ANUMED-HC) 25 MG suppository Place 25 mg rectally as needed for Hemorrhoids. Yes Historical Provider, MD   carvedilol (COREG) 12.5 MG tablet Take 1 tablet by mouth 2 times daily (with meals). Patient taking differently: Take 25 mg by mouth 2 times daily (with meals). 12/13/13  Yes Marlon Chin MD   gabapentin (NEURONTIN) 600 MG tablet Take 600 mg by mouth 3 times daily. Yes Historical Provider, MD   albuterol (PROVENTIL HFA) 108 (90 BASE) MCG/ACT inhaler Inhale 2 puffs into the lungs 4 times daily. Yes Historical Provider, MD   tiotropium (SPIRIVA HANDIHALER) 18 MCG inhalation capsule Inhale 18 mcg into the lungs daily. Yes Historical Provider, MD       Family History:       Problem Relation Age of Onset    High Blood Pressure Mother     Diabetes Mother     Cancer Mother         thyroid    Stroke Father      Social History:   TOBACCO:   reports that she has quit smoking. Her smoking use included cigarettes. She smoked 0.10 packs per day. She has never used smokeless tobacco.  ETOH:   reports no history of alcohol use.   OCCUPATION:      REVIEW OF SYSTEMS:  A full review of systems was performed and is negative except for that which appears in the HPI    Physical Exam:    Vitals: /64   Pulse 58   Temp 98.4 °F (36.9 °C)   Resp 14   Wt 260 lb 2.3 oz (118 kg)   SpO2 100%   BMI 40.74 kg/m² General appearance: Morbidly Obese, ill but not toxic appearing 62y.o. year-old female who is alert, appears stated age and is cooperative  HEENT: Head: Normocephalic, no lesions, without obvious abnormality. Eye: Normal external eye, conjunctiva, lids cornea, PEERL. Ears: Normal external ears. Non-tender. Nose: Normal external nose, mucus membranes and septum. Pharynx: Dental Hygiene adequate. Normal buccal mucosa. Normal pharynx. Neck: no adenopathy, no carotid bruit, no JVD, supple, symmetrical, trachea midline and thyroid not enlarged, symmetric, no tenderness/mass/nodules  Lungs: scattered rales and rhonchi on auscultation bilaterally and no use of accessory muscles. Heart: regular rate and rhythm, S1, S2 normal, no murmur, click, rub or gallop and normal apical impulse  Abdomen: soft, non-tender; bowel sounds normal; no masses, no organomegaly  Extremities: extremities atraumatic, no cyanosis, +edema bilateral lower extremities. Homans sign is negative, no sign of DVT. Capillary Refill: Acceptable < 3 seconds   Peripheral Pulses: +3 easily felt, not easily obliterated with pressures   Skin: Skin color, texture, turgor normal. No rashes or lesions on exposed skin  Neurologic: Neurovascularly intact without any focal sensory/motor deficits. Cranial nerves: II-XII intact, grossly non-focal. Gait was not tested.   Mental Status: Alert and oriented, thought content appropriate, normal insight    CBC:   Recent Labs     10/11/21  2131   WBC 10.1   HGB 12.4        BMP:    Recent Labs     10/11/21  2131      K 4.6      CO2 23   BUN 17   CREATININE 1.7*   GLUCOSE 202*     Troponin:   Recent Labs     10/11/21  2131   Kole Dross <0.01     PT/INR:  No results found for: PTINR  U/A:    Lab Results   Component Value Date    LEUKOCYTESUR NEGATIVE 01/17/2013    RBCUA 10-15 12/18/2012    SPECGRAV 1.015 01/17/2013    UROBILINOGEN 0.2 01/17/2013    BILIRUBINUR NEGATIVE 01/17/2013    BLOODU NEGATIVE 01/17/2013    GLUCOSEU NEGATIVE 01/17/2013    PROTEINU NEGATIVE 01/17/2013         RAD:   I have independently reviewed and interpreted the imaging studies below and based my recommendations to the patient on those findings. XR CHEST PORTABLE    Result Date: 10/11/2021  EXAMINATION: ONE XRAY VIEW OF THE CHEST 10/11/2021 8:19 pm COMPARISON: 01/05/2013 HISTORY: ORDERING SYSTEM PROVIDED HISTORY: SOB TECHNOLOGIST PROVIDED HISTORY: Reason for exam:->SOB Reason for Exam: Shortness of Breath Acuity: Acute Type of Exam: Initial FINDINGS: Left chest cardiac device is present. Mild cardiomegaly. Pulmonary vascular redistribution. No lung infiltrate or consolidation. No definite pneumothorax or pleural effusion. Interstitial pulmonary edema. Otherwise no acute abnormality. Assessment:   Principal Problem:    Acute on chronic systolic (congestive) heart failure (HCC)  Active Problems:    Essential hypertension    COPD (chronic obstructive pulmonary disease) (HCC)    DMII (diabetes mellitus, type 2) (HCC)    Hyperlipidemia    Morbid obesity due to excess calories (HCC)    Acute pulmonary edema (HCC)    Acute respiratory failure with hypercarbia (HCC)  Resolved Problems:    * No resolved hospital problems. *      Plan:       CHF - Acute on chronic systolic dysfunction with Acute pulmonary edema. EF 25%. An Echocardiogram has been ordered to reassess cardiac function. Diurese with IV Lasix. Monitor strict I&Os and daily weights. A low sodium fluid restricted diet has been ordered. Pt on ACEI, as EF is <40%. COPD (chronic obstructive pulmonary disease) - without acute exacerbation. She has been placed on a BiPap. Will provide Nebulizer treatments as needed and continue home medications. Patient will be monitored closely, and deep breathing and coughing will be encouraged while awake. Acute respiratory failure with hypercarbia - Placed on BiPap.  Will provide O2 as needed to maintain an O2 saturation of 92% or greater    Diabetes mellitus II - SSI and carb control diet    Essential (primary) hypertension - continue home meds and monitor blood pressure    Hyperlipidemia - No current evidence of Rhabdomyolysis or other adverse effects. Continue statin therapy while in the hospital    Morbid obesity due to excess calories (Body mass index is 40.74 kg/m². ) - Complicating assessment and treatment. Placing patient at risk for multiple co-morbidities as well as early death and contributing to the patient's presentation.  on weight loss when appropriate. DVT Prophylaxis: Lovenox  Diet: ADULT DIET; Regular; Low Sodium (2 gm); 1500 ml  Code Status: Full Code  (Advanced care planning has been discussed with patient and/or responsible family member and is reflected in the code status.  Further orders associated with this have been entered if appropriate)    Disposition: Anticipate that patient will remain in the hospital for 2 to 3+ days depending on further evaluation and clinical course    Please note that over 50 minutes was spent in evaluating the patient, review of records and results, discussion with staff/family, etc.    Kassi Herrera MD

## 2021-10-12 NOTE — PROGRESS NOTES
Patient brought in medication list. Medication reconciliation done with RN, patient, patient's med list, and patient's chart history at bedside.

## 2021-10-12 NOTE — PROGRESS NOTES
Pt arrived via stretcher from ED to room 2232. Heart monitor connected and verified with [facility]. VS, assessment, and admission complete. 4 eyes assessment complete. Pt oriented to unit and room. Call light and bedside table in reach. All questions answered. Pt resting quietly in bed with no complaints or voiced needs at this time. Will continue to monitor.

## 2021-10-12 NOTE — ED NOTES
ED SBAR report provider to ENRIQUE Olivo. Patient to be transported to Room 5270 via stretcher by RN. Patient transported with bedside cardiac monitor and with IV medications infusing. IV site clean, dry, and intact. MEWS score and pain assessed as 0/10 and documented. Patient's score on the BATISTA Fall scale reviewed with receiving RN. Updated patient on plan of care.        Josiah Laurent RN  10/12/21 9914

## 2021-10-12 NOTE — CARE COORDINATION
[facility]    Referral received from CM to follow for home care services. I will follow for needs, and speak with patient to verify demos.           Nikki Alexandra RN, BSN CTN  [facility] [phone]

## 2021-10-12 NOTE — PROGRESS NOTES
4 Eyes Skin Assessment     NAME:  Tika Travis  YOB: 1960  MEDICAL RECORD NUMBER:  [id number]    The patient is being assess for  Admission    I agree that 2 RN's have performed a thorough Head to Toe Skin Assessment on the patient. ALL assessment sites listed below have been assessed. Areas assessed by both nurses:    Head, Face, Ears, Shoulders, Back, Chest, Arms, Elbows, Hands, Sacrum. Buttock, Coccyx, Ischium and Legs. Feet and Heels        Does the Patient have a Wound?  No noted wound(s)       Filemon Prevention initiated:  Yes   Wound Care Orders initiated:  No    Pressure Injury (Stage 3,4, Unstageable, DTI, NWPT, and Complex wounds) if present place consult order under [de-identified] No    New and Established Ostomies if present place consult order under : No      Nurse 1 eSignature: Electronically signed by Ron Gibbons RN on 10/12/21 at 1:18 AM EDT    **SHARE this note so that the co-signing nurse is able to place an eSignature**    Nurse 2 eSignature: Electronically signed by Papo Pierce RN on 10/12/21 at 6:49 AM EDT

## 2021-10-12 NOTE — ED NOTES
Bed: A-17  Expected date:   Expected time:   Means of arrival:   Comments:  Resp distress     Tali Astudillo RN  10/11/21 3868

## 2021-10-12 NOTE — PROGRESS NOTES
Hospitalist Progress Note    CC: Acute on chronic systolic (congestive) heart failure ([facility] [address]      Admit date: 10/11/2021  Days in hospital:  1    Subjective/interval history: Pt S/E. No new complaints. She is walking around her room. O2 status: .5L o2    ROS:   Pertinent items are noted in HPI. Objective:    /72   Pulse 63   Temp 97.4 °F (36.3 °C) (Oral)   Resp 18   Ht 5' 7\" (1.702 m)   Wt 257 lb 4.4 oz (116.7 kg)   SpO2 97%   BMI 40.30 kg/m²     Gen: alert, NAD ,morbidly obese  HEENT: NC/AT, moist mucous membranes, no oropharyngeal erythema or exudate  Neck: supple, trachea midline, no anterior cervical or SC LAD  Heart: Normal s1/s2, RRR, no murmurs, gallops, or rubs. Lungs: clear bilaterally, no wheezing, no rales, no rhonchi, no use of accessory muscles  Abd: bowel sounds present, soft, nontender, nondistended, no masses  Extrem: No clubbing, cyanosis, no edema  Skin: no rashes or lesions  Psych: A & O x3, affect appropriate  Neuro: grossly intact, moves all four extremities spontaneously.   Cap refill: +2 sec    Medications:  Scheduled Meds:   furosemide  40 mg IntraVENous BID    tiotropium  2 puff Inhalation Daily    pantoprazole  40 mg Oral Daily    gabapentin  300 mg Oral Nightly    carvedilol  25 mg Oral BID WC    atorvastatin  40 mg Oral Daily    aspirin  81 mg Oral Daily    albuterol sulfate HFA  2 puff Inhalation 4x daily    sodium chloride flush  10 mL IntraVENous 2 times per day    enoxaparin  40 mg SubCUTAneous Daily    insulin lispro  0-12 Units SubCUTAneous TID     insulin lispro  0-6 Units SubCUTAneous Nightly       PRN Meds:  sodium chloride flush, sodium chloride, ondansetron, polyethylene glycol, acetaminophen **OR** acetaminophen, glucose, dextrose, glucagon (rDNA), dextrose    IV:   sodium chloride      dextrose           Intake/Output Summary (Last 24 hours) at 10/12/2021 1022  Last data filed at 10/12/2021 0958  Gross per 24 hour on chronic systolic CHF exacerbation   - last ECHO: lvef 25%  - repeat echo pending  - proBNP 8200   - lasix 40 mg IV BID  - cont BB, ACEi/ARB  - daily wts  - I/Os  - consult CHF RN    Acute hypoxic respiratory failure, POA - improving  - with criteria: < 90% on RA, accessory muscle use, RR> 18, due to pulmonary edema  - supplemental oxygen as necessary to keep SaO2 > 90%    AIDA on ckd III  - crt baseline 1.2-1.3; 1.7 on admission -> 1.5  - Avoid nephrotoxins  - check: UACS, Mejia/UCrt, uric acid, TSH, U osmol  - follow renal function tests; if no improvement will get renal US     Chronic conditions - continue home meds unless otherwise stated  Asthma  Anemia   Cad  Copd  Dm  htn                                                                                                                                                                                                     Code status:  full  DVT prophylaxis: [x] Lovenox  [] SQ Heparin  [] SCDs because of  [] warfarin/oral direct thrombin inhibitor [] Encourage ambulation      Disposition:  [] Home [] Rehab [] Psych [] SNF  [] LTAC  [] Transfer to ICU  [] Transfer to PCU [] Other: in pt    Electronically signed by Cheryl Meehan DO on 10/12/2021 at 10:22 AM

## 2021-10-13 VITALS
DIASTOLIC BLOOD PRESSURE: 77 MMHG | TEMPERATURE: 97.6 F | HEART RATE: 57 BPM | SYSTOLIC BLOOD PRESSURE: 160 MMHG | BODY MASS INDEX: 40.03 KG/M2 | OXYGEN SATURATION: 97 % | HEIGHT: 67 IN | RESPIRATION RATE: 16 BRPM | WEIGHT: 255.07 LBS

## 2021-10-13 LAB
ANION GAP SERPL CALCULATED.3IONS-SCNC: 15 MMOL/L (ref 3–16)
BASOPHILS ABSOLUTE: 0 K/UL (ref 0–0.2)
BASOPHILS RELATIVE PERCENT: 0.4 %
BUN BLDV-MCNC: 24 MG/DL (ref 7–20)
CALCIUM SERPL-MCNC: 9.3 MG/DL (ref 8.3–10.6)
CHLORIDE BLD-SCNC: 100 MMOL/L (ref 99–110)
CO2: 23 MMOL/L (ref 21–32)
CREAT SERPL-MCNC: 1.5 MG/DL (ref 0.6–1.2)
EOSINOPHILS ABSOLUTE: 0.2 K/UL (ref 0–0.6)
EOSINOPHILS RELATIVE PERCENT: 2.4 %
GFR AFRICAN AMERICAN: 43
GFR NON-AFRICAN AMERICAN: 35
GLUCOSE BLD-MCNC: 115 MG/DL (ref 70–99)
GLUCOSE BLD-MCNC: 117 MG/DL (ref 70–99)
GLUCOSE BLD-MCNC: 184 MG/DL (ref 70–99)
HCT VFR BLD CALC: 39.2 % (ref 36–48)
HEMOGLOBIN: 12.1 G/DL (ref 12–16)
LYMPHOCYTES ABSOLUTE: 0.8 K/UL (ref 1–5.1)
LYMPHOCYTES RELATIVE PERCENT: 10.8 %
MCH RBC QN AUTO: 26.9 PG (ref 26–34)
MCHC RBC AUTO-ENTMCNC: 30.9 G/DL (ref 31–36)
MCV RBC AUTO: 87 FL (ref 80–100)
MONOCYTES ABSOLUTE: 0.6 K/UL (ref 0–1.3)
MONOCYTES RELATIVE PERCENT: 7.6 %
NEUTROPHILS ABSOLUTE: 6.2 K/UL (ref 1.7–7.7)
NEUTROPHILS RELATIVE PERCENT: 78.8 %
PDW BLD-RTO: 17.2 % (ref 12.4–15.4)
PERFORMED ON: ABNORMAL
PERFORMED ON: ABNORMAL
PLATELET # BLD: 200 K/UL (ref 135–450)
PMV BLD AUTO: 10.4 FL (ref 5–10.5)
POTASSIUM REFLEX MAGNESIUM: 4 MMOL/L (ref 3.5–5.1)
RBC # BLD: 4.51 M/UL (ref 4–5.2)
SODIUM BLD-SCNC: 138 MMOL/L (ref 136–145)
WBC # BLD: 7.8 K/UL (ref 4–11)

## 2021-10-13 PROCEDURE — 85025 COMPLETE CBC W/AUTO DIFF WBC: CPT

## 2021-10-13 PROCEDURE — 90686 IIV4 VACC NO PRSV 0.5 ML IM: CPT | Performed by: FAMILY MEDICINE

## 2021-10-13 PROCEDURE — 6360000002 HC RX W HCPCS: Performed by: FAMILY MEDICINE

## 2021-10-13 PROCEDURE — G0008 ADMIN INFLUENZA VIRUS VAC: HCPCS | Performed by: FAMILY MEDICINE

## 2021-10-13 PROCEDURE — 6370000000 HC RX 637 (ALT 250 FOR IP): Performed by: FAMILY MEDICINE

## 2021-10-13 PROCEDURE — 36415 COLL VENOUS BLD VENIPUNCTURE: CPT

## 2021-10-13 PROCEDURE — 94660 CPAP INITIATION&MGMT: CPT

## 2021-10-13 PROCEDURE — 80048 BASIC METABOLIC PNL TOTAL CA: CPT

## 2021-10-13 PROCEDURE — 6370000000 HC RX 637 (ALT 250 FOR IP): Performed by: INTERNAL MEDICINE

## 2021-10-13 PROCEDURE — 94640 AIRWAY INHALATION TREATMENT: CPT

## 2021-10-13 PROCEDURE — 6360000002 HC RX W HCPCS: Performed by: INTERNAL MEDICINE

## 2021-10-13 PROCEDURE — 2580000003 HC RX 258: Performed by: INTERNAL MEDICINE

## 2021-10-13 PROCEDURE — 94761 N-INVAS EAR/PLS OXIMETRY MLT: CPT

## 2021-10-13 RX ORDER — LANOLIN ALCOHOL/MO/W.PET/CERES
400 CREAM (GRAM) TOPICAL
Status: DISCONTINUED | OUTPATIENT
Start: 2021-10-13 | End: 2021-10-13 | Stop reason: HOSPADM

## 2021-10-13 RX ORDER — CARVEDILOL 25 MG/1
25 TABLET ORAL 2 TIMES DAILY
Qty: 60 TABLET | Refills: 3 | Status: SHIPPED | OUTPATIENT
Start: 2021-10-13 | End: 2021-12-01 | Stop reason: DRUGHIGH

## 2021-10-13 RX ORDER — AMIODARONE HYDROCHLORIDE 200 MG/1
200 TABLET ORAL DAILY
Status: DISCONTINUED | OUTPATIENT
Start: 2021-10-13 | End: 2021-10-13 | Stop reason: HOSPADM

## 2021-10-13 RX ORDER — POTASSIUM CHLORIDE 20 MEQ/1
40 TABLET, EXTENDED RELEASE ORAL
Status: DISCONTINUED | OUTPATIENT
Start: 2021-10-14 | End: 2021-10-13 | Stop reason: HOSPADM

## 2021-10-13 RX ORDER — CARVEDILOL 25 MG/1
25 TABLET ORAL 2 TIMES DAILY WITH MEALS
Qty: 60 TABLET | Refills: 3 | Status: SHIPPED | OUTPATIENT
Start: 2021-10-13 | End: 2021-10-13 | Stop reason: HOSPADM

## 2021-10-13 RX ADMIN — FUROSEMIDE 40 MG: 10 INJECTION, SOLUTION INTRAMUSCULAR; INTRAVENOUS at 08:49

## 2021-10-13 RX ADMIN — GABAPENTIN 600 MG: 300 CAPSULE ORAL at 13:12

## 2021-10-13 RX ADMIN — SODIUM CHLORIDE, PRESERVATIVE FREE 10 ML: 5 INJECTION INTRAVENOUS at 08:59

## 2021-10-13 RX ADMIN — INSULIN LISPRO 2 UNITS: 100 INJECTION, SOLUTION INTRAVENOUS; SUBCUTANEOUS at 12:15

## 2021-10-13 RX ADMIN — ALBUTEROL SULFATE 2 PUFF: 90 AEROSOL, METERED RESPIRATORY (INHALATION) at 00:45

## 2021-10-13 RX ADMIN — PANTOPRAZOLE SODIUM 40 MG: 40 TABLET, DELAYED RELEASE ORAL at 06:30

## 2021-10-13 RX ADMIN — CARVEDILOL 25 MG: 25 TABLET, FILM COATED ORAL at 08:49

## 2021-10-13 RX ADMIN — ENOXAPARIN SODIUM 40 MG: 100 INJECTION SUBCUTANEOUS at 08:49

## 2021-10-13 RX ADMIN — ASPIRIN 81 MG: 81 TABLET, CHEWABLE ORAL at 08:49

## 2021-10-13 RX ADMIN — ALBUTEROL SULFATE 2 PUFF: 90 AEROSOL, METERED RESPIRATORY (INHALATION) at 12:09

## 2021-10-13 RX ADMIN — GABAPENTIN 600 MG: 300 CAPSULE ORAL at 08:49

## 2021-10-13 RX ADMIN — ALBUTEROL SULFATE 2 PUFF: 90 AEROSOL, METERED RESPIRATORY (INHALATION) at 08:45

## 2021-10-13 RX ADMIN — TIOTROPIUM BROMIDE INHALATION SPRAY 2 PUFF: 3.12 SPRAY, METERED RESPIRATORY (INHALATION) at 08:45

## 2021-10-13 RX ADMIN — ALBUTEROL SULFATE 2 PUFF: 90 AEROSOL, METERED RESPIRATORY (INHALATION) at 16:32

## 2021-10-13 RX ADMIN — SACUBITRIL AND VALSARTAN 1 TABLET: 24; 26 TABLET, FILM COATED ORAL at 11:46

## 2021-10-13 RX ADMIN — Medication 400 MG: at 11:46

## 2021-10-13 RX ADMIN — AMIODARONE HYDROCHLORIDE 200 MG: 200 TABLET ORAL at 11:46

## 2021-10-13 RX ADMIN — ATORVASTATIN CALCIUM 40 MG: 40 TABLET, FILM COATED ORAL at 08:49

## 2021-10-13 RX ADMIN — INFLUENZA A VIRUS A/VICTORIA/2570/2019 IVR-215 (H1N1) ANTIGEN (PROPIOLACTONE INACTIVATED), INFLUENZA A VIRUS A/CAMBODIA/E0826360/2020 IVR-224 (H3N2) ANTIGEN (PROPIOLACTONE INACTIVATED), INFLUENZA B VIRUS B/VICTORIA/705/2018 BVR-11 ANTIGEN (PROPIOLACTONE INACTIVATED), INFLUENZA B VIRUS B/PHUKET/3073/2013 BVR-1B ANTIGEN (PROPIOLACTONE INACTIVATED) 0.5 ML: 15; 15; 15; 15 INJECTION, SUSPENSION INTRAMUSCULAR at 16:10

## 2021-10-13 ASSESSMENT — PAIN SCALES - GENERAL
PAINLEVEL_OUTOF10: 0
PAINLEVEL_OUTOF10: 0

## 2021-10-13 NOTE — PLAN OF CARE
Problem: Falls - Risk of:  Goal: Will remain free from falls  Description: Will remain free from falls  10/13/2021 0023 by Tanvir Sandoval RN  Outcome: Met This Shift  Note: Fall risk assessment completed every shift. All precautions in place. Pt has call light within reach at all times. Room clear of clutter. Pt aware to call for assistance when getting up. Bed alarm on. No falls this shift.    Goal: Absence of physical injury  Description: Absence of physical injury  10/13/2021 0023 by Tanvir Sandoval RN  Outcome: Met This Shift

## 2021-10-13 NOTE — PROGRESS NOTES
Discharge order noted. Pt will have 60 minutes of documented heart failure education. She has a follow appointment arranged with Dr. Page Conti, her usual cardiologist tomorrow, 10/14/21 [facility]. [facility] rec request sent to pharmacy.   Her weight at D/C is 255lbs

## 2021-10-13 NOTE — CARE COORDINATION
[facility]    DC order noted, all docs needed have been faxed to [facility] for home care services. Home care to see patient by 10/15/21.     Alee Sahu RN, BSN CTN  [facility] [phone]

## 2021-10-13 NOTE — PROGRESS NOTES
Data- discharge order received, pt verbalized agreement to discharge, disposition to previous residence, referral for [facility] made patient aware. Action- discharge instructions prepared and given to patient, pt verbalized understanding. Medication information packet given r/t CHANGED prescriptions emphasizing name/purpose/side effects, pt verbalized understanding. Discharge instruction summary: Diet- low sodium 1500ml FR, Activity- as tolerated, Primary Care Physician as follows: Myranda Espino -[phone] f/u appointment to be scheduled as needed,patient has cardiology appt 10/14 and is aware of appointment. immunizations reviewed and flu vaccine given see MAR, prescription medications filled at [facility]. CHF Education reviewed. Pt/ Family has had a total of 60 minutes CHF education this admission encounter. Response- Pt belongings gathered, IV removed. Disposition is home with home healthcare referral.  transported with all belongings, taken to lobby via w/c w/ staff no complications.

## 2021-10-13 NOTE — CARE COORDINATION
CASE MANAGEMENT DISCHARGE SUMMARY:  Met with patient. Patient ready to discharge today. Patient agreeable to home care. [email or web address], able to pull orders via Epic. Rn made aware of discharge plan. No additional needs to note.     DISCHARGE DATE: 10/13/2021    DISCHARGED TO: Home with home care     [address] [facility]             PHONE NUMBER: [phone]    TRANSPORTATION: Daughter              TIME: later today     [facility]             NUMBER: [phone]    Electronically signed by Devonte Mann RN Case Management [phone] on 10/13/2021 at 3:36 PM

## 2021-10-13 NOTE — PROGRESS NOTES
BiPAP taken off at this time per request after being up to BR. Is on RA. Notified Diego Setting in RT. Kevin Edward RN

## 2021-10-13 NOTE — DISCHARGE INSTR - COC
Continuity of Care Form    Patient Name: Irina Maddox   :  1960  MRN:  [id number]    Admit date:  10/11/2021  Discharge date:  10/13/2021    Code Status Order: Full Code   Advance Directives:      Admitting Physician:  Mei Maxwell MD  PCP: Amina Obregon MD    Discharging Nurse: [facility] CAMPUS Unit/Room#: T3O-8751/5270-01  Discharging Unit Phone Number: [phone]    Emergency Contact:   Extended Emergency Contact Information  Primary Emergency Contact: Alexsandraelisabeth Alexandro [address] Phone: [phone]  Relation: Child  Secondary Emergency Contact: Madelaine Fernandes  [place] Phone: [phone]  Relation: Child    Past Surgical History:  Past Surgical History:   Procedure Laterality Date    CARPAL TUNNEL RELEASE      CHOLECYSTECTOMY      INCONTINENCE SURGERY         Immunization History:   Immunization History   Administered Date(s) Administered    COVID-19, Moderna, PF, 100mcg/0.5mL 2021, 2021    Influenza Whole 2013       Active Problems:  Patient Active Problem List   Diagnosis Code    Essential hypertension I10    Cardiomyopathy ([facility] I42.9    CHF (congestive heart failure) ([facility] I50.9    COPD (chronic obstructive pulmonary disease) ([facility] J44.9    Chest pain R07.9    NEGIN (obstructive sleep apnea) G47.33    DMII (diabetes mellitus, type 2) ([facility]) E11.9    Iron deficiency anemia D50.9    Intestinal malabsorption K90.9    Iron deficiency anemia due to chronic blood loss D50.0    Treatment Z51.89    Anemia D64.9    Acute on chronic systolic (congestive) heart failure (HCC) I50.23    Hyperlipidemia E78.5    Morbid obesity due to excess calories ([facility]) E66.01    Acute pulmonary edema ([facility]) J81.0    Acute respiratory failure with hypercarbia ([facility]) J96.01, J96.02       Isolation/Infection:   Isolation            No Isolation          Patient Infection Status       Infection Onset Added Last Indicated Last Indicated By Review Planned Expiration Resolved Resolved By    None active    Resolved    COVID-19 Rule Out 10/11/21 10/11/21 10/11/21 COVID-19, Rapid (Ordered)   10/11/21 Rule-Out Test Resulted            Nurse Assessment:  Last Vital Signs: BP (!) 160/77   Pulse 57   Temp 97.6 °F (36.4 °C) (Axillary)   Resp 16   Ht 5' 7\" (1.702 m)   Wt 255 lb 1.2 oz (115.7 kg)   SpO2 98%   BMI 39.95 kg/m²     Last documented pain score (0-10 scale): Pain Level: 0  Last Weight:   Wt Readings from Last 1 Encounters:   10/13/21 255 lb 1.2 oz (115.7 kg)     Mental Status:  oriented and alert    IV Access:  - None    Nursing Mobility/ADLs:  Walking   Independent  Transfer  Independent  Bathing  assist  Dressing  Independent  [address]  Independent  Med Delivery   whole    Wound Care Documentation and Therapy:        Elimination:  Continence:   · Bowel: Yes  · Bladder: Yes  Urinary Catheter: None   Colostomy/Ileostomy/Ileal Conduit: No       Date of Last BM: 10/13/21    Intake/Output Summary (Last 24 hours) at 10/13/2021 1447  Last data filed at 10/13/2021 1416  Gross per 24 hour   Intake 1050 ml   Output 2475 ml   Net -1425 ml     I/O last 3 completed shifts: In: 1100 [P.O.:1090; I.V.:10]  Out: 2825 [Urine:2825]    Safety Concerns: At Risk for Falls    Impairments/Disabilities:      None    Nutrition Therapy:  Current Nutrition Therapy:   - Oral Diet:  Low Sodium (2gm)    Routes of Feeding: Oral  Liquids:  Thin Liquids  Daily Fluid Restriction: yes - amount 1500  Last Modified Barium Swallow with Video (Video Swallowing Test): not done    Treatments at the Time of Hospital Discharge:   Respiratory Treatments: See MAR  Oxygen Therapy:  Bipap at night   Ventilator:    - No ventilator support  - BiPAP   IPAP: 10 cmH20, CPAP/EPAP: 5 cmH2O only when sleeping and device from home    Rehab Therapies: Physical Therapy, Occupational Therapy and Nurse  Weight Bearing Status/Restrictions: No weight bearing restirctions  Other Medical Equipment (for information only, NOT a DME order):  bath bench and bedside commode  Other Treatments: [facility]: LEVEL 3 [address] to establish plan of care for patient over 60 day period   Nursing  Initial home SN evaluation visit to occur within 24-48 hours for:  1)  medication management  2)  VS and clinical assessment  3)  S&S chronic disease exacerbation education + when to contact MD/NP  4)  care coordination  Medication Reconciliation during 1st SN visit  PT/OT/Speech   Evaluations in home within 24-48 hours of discharge to include DME and home safety   Frontload therapy 5 days, then 3x a week   OT to evaluate if patient has [address] needs for personal care    evaluation within 24-48 hours to evaluate resources & insurance for potential [place], [place], LTC, and Medicaid options   Palliative Care referral within 5 days of hospital discharge   PCP Visit scheduled within 3 - 7 days of hospital discharge [facility] (If patient is agreeable and meets guidelines)    Patient's personal belongings (please select all that are sent with patient):  sent with all belongings     RN SIGNATURE:  Electronically signed by Elmer Lomax RN on 10/13/21 at 3:58 PM EDT    CASE MANAGEMENT/SOCIAL WORK SECTION    Inpatient Status Date: 10/11/2021    Readmission Risk Assessment Score:  Readmission Risk              Risk of Unplanned Readmission:  16           Discharging to Facility/ Agency   · Name:  [facility]    · Address: [address] [place] [address], [address] [place] [place] [address]  · Phone: [phone]  · Fax: [phone]       / signature: Electronically signed by Jose C Rolle RN Case Management on 10/13/2021 at 3:28 PM      PHYSICIAN SECTION    Prognosis: Fair    Condition at Discharge: Stable    Rehab Potential (if transferring to Rehab):  Fair    Recommended Labs or Other Treatments After Discharge: pt, ot, rn    Physician Certification: I certify the above information and transfer of Dirk Breen  is necessary for the continuing treatment of the diagnosis listed and that she requires Home Care for greater 30 days.      Update Admission H&P: No change in H&P    PHYSICIAN SIGNATURE:  Electronically signed by Aly Morel DO on 10/13/21 at 2:47 PM EDT

## 2021-10-13 NOTE — PLAN OF CARE
Problem: Falls - Risk of:  Goal: Will remain free from falls  Outcome: Met This Shift  Note: Fall risk assessment completed every shift. All precautions in place. Pt has call light within reach at all times. Room clear of clutter. Pt aware to call for assistance when getting up. Bed alarm on. No falls this shift.       Problem: Falls - Risk of:  Goal: Absence of physical injury  Outcome: Met This Shift     Problem: OXYGENATION/RESPIRATORY FUNCTION  Goal: Patient will maintain patent airway  Outcome: Ongoing     Problem: OXYGENATION/RESPIRATORY FUNCTION  Goal: Patient will achieve/maintain normal respiratory rate/effort  Outcome: Ongoing     Problem: HEMODYNAMIC STATUS  Goal: Patient has stable vital signs and fluid balance  Outcome: Ongoing     Problem: FLUID AND ELECTROLYTE IMBALANCE  Goal: Fluid and electrolyte balance are achieved/maintained  Outcome: Ongoing

## 2021-10-13 NOTE — CONSULTS
[facility]  HEART FAILURE PROGRAM      NAME:  [facility]i [facility] RECORD NUMBER:  [id number]  AGE: 62 y.o.    GENDER: female  : 1960  TODAY'S DATE:  10/13/2021    Subjective:     VISIT TYPE: evaluation     ADMITTING PHYSICIAN:  Tonja Bush MD    PAST MEDICAL HISTORY        Diagnosis Date    Asthma     Cardiomyopathy [facility])     [facility]     COPD (chronic obstructive pulmonary disease) [facility]     Diabetes mellitus ([facility]     Essential hypertension     Hyperlipidemia     Obesity        SOCIAL HISTORY    Social History     Tobacco Use    Smoking status: Former Smoker     Packs/day: 0.10     Types: Cigarettes    Smokeless tobacco: Never Used   Substance Use Topics    Alcohol use: No     Alcohol/week: 0.0 standard drinks    Drug use: No       ALLERGIES    Allergies   Allergen Reactions    Ace Inhibitors     Diclofenac     Losartan     Vicodin [Hydrocodone-Acetaminophen]        MEDICATIONS  Scheduled Meds:   amiodarone  200 mg Oral Daily    magnesium oxide  400 mg Oral Daily with breakfast    sacubitril-valsartan  1 tablet Oral BID    [START ON 10/14/2021] potassium chloride  40 mEq Oral Daily with breakfast    furosemide  40 mg IntraVENous BID    tiotropium  2 puff Inhalation Daily    pantoprazole  40 mg Oral Daily    carvedilol  25 mg Oral BID WC    atorvastatin  40 mg Oral Daily    aspirin  81 mg Oral Daily    albuterol sulfate HFA  2 puff Inhalation 4x daily    sodium chloride flush  10 mL IntraVENous 2 times per day    enoxaparin  40 mg SubCUTAneous Daily    insulin lispro  0-12 Units SubCUTAneous TID WC    insulin lispro  0-6 Units SubCUTAneous Nightly    gabapentin  600 mg Oral TID    influenza virus vaccine  0.5 mL IntraMUSCular Prior to discharge       ADMIT DATE: 10/11/2021      Objective:     ADMISSION DIAGNOSIS:   Hypercapnia [R06.89]  Hypoxia [R09.02]  Acute on chronic systolic (congestive) heart failure ([facility] [Z73.84]  Systolic congestive heart failure, unspecified HF chronicity (HCC) [I50.20]     BP (!) 160/77   Pulse 57   Temp 97.6 °F (36.4 °C) (Axillary)   Resp 16   Ht 5' 7\" (1.702 m)   Wt 255 lb 1.2 oz (115.7 kg)   SpO2 98%   BMI 39.95 kg/m²     ADMIT:  Weight: 271 lb 2.7 oz (123 kg)    TODAY: Weight: 255 lb 1.2 oz (115.7 kg)    Wt Readings from Last 10 Encounters:   10/13/21 255 lb 1.2 oz (115.7 kg)   01/17/20 259 lb 0.7 oz (117.5 kg)   05/26/17 281 lb (127.5 kg)   02/14/17 267 lb (121.1 kg)   02/06/17 271 lb (122.9 kg)   02/06/17 271 lb (122.9 kg)   01/25/17 270 lb (122.5 kg)   10/26/16 268 lb (121.6 kg)   06/29/16 295 lb (133.8 kg)   06/29/16 295 lb (133.8 kg)          Intake/Output Summary (Last 24 hours) at 10/13/2021 1247  Last data filed at 10/13/2021 0859  Gross per 24 hour   Intake 620 ml   Output 1275 ml   Net -655 ml       LABS  BMP:   Lab Results   Component Value Date     10/13/2021    K 4.0 10/13/2021     10/13/2021    CO2 23 10/13/2021    BUN 24 10/13/2021    LABALBU 4.0 10/11/2021    CREATININE 1.5 10/13/2021    CALCIUM 9.3 10/13/2021    GFRAA 43 10/13/2021    GFRAA >60 01/17/2013    LABGLOM 35 10/13/2021    GLUCOSE 115 10/13/2021    GLUCOSE 154 06/05/2015     CBC:   Recent Labs     10/11/21  2131 10/12/21  0540 10/13/21  0420   WBC 10.1 6.6 7.8   HGB 12.4 11.9* 12.1   HCT 38.8 36.6 39.2   MCV 83.6 82.1 87.0    255 200     BNP:   Lab Results   Component Value Date    .0 01/05/2013    .6 12/15/2012       ECHOCARDIOGRAM:      Summary   Left ventricular cavity size is severely dilated. Ejection fraction is visually estimated to be 25-30%. Grade II diastolic dysfunction with elevated LV filling pressures. Moderate to Severe mitral regurgitation. The left atrium is severely dilated. Normal right ventricular size and function. Pacemaker / ICD lead is visualized in the right atrium. There is a trivial pericardial effusion noted.       Signature ------------------------------------------------------------------   Electronically signed by Neisha Mittal MD (Interpreting   physician) on 10/12/2021 at 03:48 PM    Assessment:     CONSULTS:   [address] CONSULT TO CARDIOLOGY    Patient has a CARDIOLOGY CONSULT: Yes      Patient taking an ACEI/ARB:  Yes       Patient taking a BETA BLOCKER:  Yes    SCALE AVAILABLE:  Yes     EDUCATION STATUS: Patient   [x]  Provided both written and verbal education on Heart Failure signs/symptoms. [x]  Provided instructions on daily medications. [x]  Provided instructions to monitor and record weight daily. [x]  Provided instructions to call if weight increases 3 lbs in one day or 5 lbs in one week. [x]  Received verbal acknowledgment/understanding of Heart Failure related causes. [x]  Provided instructions on how to maintain a low sodium diet. []  Provided recommendations for smoking cessation programs  [x]  Provided recommendations on activity and exercise    [x]  Other:     Met with patient in room, introduced myself and explained my role in heart failure education. Lunch arrived shortly after I arrived, and I offered to come back, but she said to go ahead--this wouldn't take lone. She felt she has been managing her heart failure well--\"I haven't been the the hospital in a long time\"  She said she was in her usualsateof health, but had sat up at  her  desk  All day, and noticed sweeling in her legs which resolved over night, and the next day, she became sob after using aerosol deodorant. She assumed this was the problem, but did not resolve, and she came to the hospital.  She states that she does weigh herself each morning but does not record the number--I reviewed the 3/5 rule--which she stated she was familiar with.   I reviewed the \"Zones of heart failure, and remnded her that Yellow zone symptoms require action to get back into the green--if  Ignored, those symptoms would only get worse, and she's be in the Red zone and on her way back. She stated she would not let that happen. I asked her about her diet and fluid restriction--she said she  did well with that, and she seldom if ever ate out, and steamed her veggies, and baked her meat and watched her sodium intake. She was anxious to end our conversation. According to MD pt admitted to not following a fluid restriction at all, and in fact consumed well over the 64 ounce recommendation. I reminded her to watch for s/s of a flare up, I instructed her to call her [facility] cardiology office or the Heart Failure Resource line if she gets into trouble. CURRENT DIET: ADULT DIET; Regular; 5 carb choices (75 gm/meal); Low Sodium (2 gm); 1500 ml    EDUCATIONAL PACKETS PROVIDED- PRINTED FROM [employer]. Titles and material given:  Yes   [x]  What is Heart Failure? [x]  Heart Failure: Warning Signs of a Flare-Up  [x]  Heart Failure: Making Changes to Your Diet  [x]  Heart Failure: Medications to Help Your Heart   []  Other:   PATIENT/CAREGIVER TEACHING:    Level of patient/caregiver understanding able to:   [x] Verbalize understanding   [x] Demonstrate understanding       [x] Teach back        [x] Needs reinforcement     []  Other:      TEACHING TIME:  20 minutes       Plan:       DISCHARGE PLAN:  Placement for patient upon discharge: home with support   Hospice Care:  no  Code Status: Full Code  Discharge appointment scheduled: Yes     RECOMMENDATIONS:   [x]  Encourage to call Heart Failure Resource Line with any questions or concerns. [x]  Educate further Ms. Vizcarra on fluid restriction 48 oz- 64 oz during inpatient stay so she can understand how to measure intake at home. [x]  Continue to educate on S/S of Heart Failure.   [x]  Emphasize daily weights, diet, and if changes, to call Heart Failure Resource Line  []  Other:            Electronically signed by Braulio Peacock RN, BSN CHFN  on 10/13/2021 at 12:47 PM

## 2021-10-13 NOTE — DISCHARGE SUMMARY
Hospital Medicine Discharge Summary    Patient: Royce Christensen     Gender: female  : 1960   Age: 62 y.o. MRN: [id number]    Code Status: Full Code     Primary Care Provider: Sylvia Weber MD    Admit Date: 10/11/2021   Discharge Date:   10/13/2021    Admitting Physician: Sarbjit Zuleta MD  Discharge Physician: Suma Baig DO     Discharge Diagnoses: Active Hospital Problems    Diagnosis Date Noted    Hyperlipidemia [E78.5] 10/12/2021    Morbid obesity due to excess calories ([facility] [E66.01] 10/12/2021    Acute pulmonary edema ([facility] [J81.0] 10/12/2021    Acute respiratory failure with hypercarbia (HCC) [J96.01, J96.02] 10/12/2021    Acute on chronic systolic (congestive) heart failure (HCC) [I50.23] 10/11/2021    DMII (diabetes mellitus, type 2) ([facility] [E11.9] 2013    COPD (chronic obstructive pulmonary disease) ([facility] [J44.9]     Essential hypertension [I10]        Hospital Course: 62y.o. year-old female with a history of hypertension, hyperlipidemia, COPD, DMII, CHF and morbid obesity. She presented to the ER for evaluation following a 24 hour period of increasing shortness of breath. She states that her shortness of breath is worse with exertion and improved with rest. She reports a recent 10 lb weight gain. On evaluation she was found to have an acute on chronic systolic CHF    Work up completed, and improved with treatment as below. was discharged today in stable condition.      Acute on chronic systolic and diastolic CHF exacerbation   - last ECHO: lvef 25%  - repeat echo with the same lvef and ddII, mod - severe mr  - proBNP 8200   - lasix changed to po, as below  - cont BB, entresto  - -2.9 L total  - consult CHF RN  - consult cardio      Acute hypoxic respiratory failure, POA - improved, now on room air  - with criteria: < 90% on RA, accessory muscle use, RR> 18, due to pulmonary edema  - supplemental oxygen as necessary to keep SaO2 > 90%     AIDA on ckd III - improved  - .6 12/15/2012    ALKPHOS 119 10/11/2021    ALT 37 10/11/2021    AST 36 10/11/2021    BILITOT 0.6 10/11/2021    BILIDIR <0.2 10/11/2021    LABALBU 4.0 10/11/2021    LDLCALC 69 01/07/2013    TRIG 160 01/07/2013     Lab Results   Component Value Date    INR 0.9 01/05/2013    INR 0.90 12/15/2012       Radiology:  XR CHEST PORTABLE   Final Result   Interstitial pulmonary edema. Otherwise no acute abnormality. Discharge Medications:   Current Discharge Medication List        Current Discharge Medication List      CONTINUE these medications which have CHANGED    Details   carvedilol (COREG) 25 MG tablet Take 1 tablet by mouth 2 times daily (with meals)  Qty: 60 tablet, Refills: 3           Current Discharge Medication List      CONTINUE these medications which have NOT CHANGED    Details   amiodarone (CORDARONE) 200 MG tablet Take 200 mg by mouth daily nightly      sacubitril-valsartan (ENTRESTO) 24-26 MG per tablet Take 1 tablet by mouth 2 times daily      albuterol (ACCUNEB) 0.63 MG/3ML nebulizer solution Take 1 ampule by nebulization every 6 hours as needed for Wheezing      ferrous sulfate 325 (65 FE) MG tablet Take 325 mg by mouth 3 times daily     Associated Diagnoses: Iron deficiency anemia due to chronic blood loss      aspirin 81 MG tablet Take 325 mg by mouth daily       atorvastatin (LIPITOR) 40 MG tablet Take 40 mg by mouth daily. magnesium oxide (MAG-OX) 400 MG tablet Take 500 mg by mouth 2 times daily       potassium chloride SA (KLOR-CON M20) 20 MEQ tablet Take 20 mEq by mouth 2 times daily. furosemide (LASIX) 80 MG tablet Take 80 mg by mouth 2 times daily. pantoprazole (PROTONIX) 40 MG tablet Take 40 mg by mouth daily. hydrocortisone (ANUMED-HC) 25 MG suppository Place 25 mg rectally as needed for Hemorrhoids. gabapentin (NEURONTIN) 600 MG tablet Take 600 mg by mouth 3 times daily.        albuterol (PROVENTIL HFA) 108 (90 BASE) MCG/ACT inhaler Inhale 2 puffs into the lungs 4 times daily. tiotropium (SPIRIVA HANDIHALER) 18 MCG inhalation capsule Inhale 18 mcg into the lungs daily. Current Discharge Medication List      STOP taking these medications       clopidogrel (PLAVIX) 75 MG tablet Comments:   Reason for Stopping:         budesonide-formoterol (SYMBICORT) 160-4.5 MCG/ACT AERO Comments:   Reason for Stopping: Follow-up appointments:  two weeks    Provider Follow-up:    pcp    Condition at Discharge:  Stable    The patient was seen and examined on day of discharge and this discharge summary is in conjunction with any daily progress note from day of discharge. Time Spent on discharge is 45 minutes  in the examination, evaluation, counseling and review of medications and discharge plan. Signed:    Francisco Burger DO   10/13/2021      Thank you Danielle Odell MD for the opportunity to be involved in this patient's care. If you have any questions or concerns please feel free to contact me at [phone].

## 2021-10-16 NOTE — CONSULTS
Pharmacy Heart Failure Medication Reconciliation Note    Pt discharged from [facility] today after admission for heart failrue. Jose Sanchez has a diagnosis of systolic heart failure (last EF = 25-30% on 10/12/21). Pertinent Labs:  BMP:   Lab Results   Component Value Date     10/13/2021    K 4.0 10/13/2021     10/13/2021    CO2 23 10/13/2021    BUN 24 10/13/2021    CREATININE 1.5 10/13/2021     BNP:   Lab Results   Component Value Date    PROBNP 8,241 10/11/2021       Patient taking an ACEI / ARB / Entresto:  Yes     Patient taking a BETA BLOCKER:  Yes  Patient taking a LOOP DIURETIC: Yes  Patient taking a ALDOSTERONE RECEPTOR ANTAGONIST: No: AIDA, can assess as outpatient. Patient has a diagnosis of Atrial Fibrillation: No. If yes, patient is on appropriate anticoagulation: No: N/A    Patient has a diagnosis of type 2 diabetes:  Yes.  If yes, patient prescribed the following anti-hyperglycemic medication at discharge: none - controlled      Corrections to discharge medications include:  none    Discharge Medications:         Medication List      CHANGE how you take these medications    carvedilol 25 MG tablet  Commonly known as: Coreg  Take 1 tablet by mouth 2 times daily  What changed:   · medication strength  · how much to take  · when to take this        CONTINUE taking these medications    amiodarone 200 MG tablet  Commonly known as: CORDARONE     Anumed-HC 25 MG suppository  Generic drug: hydrocortisone     aspirin 81 MG tablet     atorvastatin 40 MG tablet  Commonly known as: LIPITOR     Entresto 24-26 MG per tablet  Generic drug: sacubitril-valsartan     ferrous sulfate 325 (65 Fe) MG tablet  Commonly known as: IRON 325     furosemide 80 MG tablet  Commonly known as: LASIX     gabapentin 600 MG tablet  Commonly known as: NEURONTIN     Klor-Con M20 20 MEQ extended release tablet  Generic drug: potassium chloride     magnesium oxide 400 MG tablet  Commonly known as: MAG-OX     pantoprazole 40 MG tablet  Commonly known as: PROTONIX     * albuterol 0.63 MG/3ML nebulizer solution  Commonly known as: ACCUNEB     * Proventil  (90 Base) MCG/ACT inhaler  Generic drug: albuterol sulfate HFA     Spiriva HandiHaler 18 MCG inhalation capsule  Generic drug: tiotropium         * This list has 2 medication(s) that are the same as other medications prescribed for you. Read the directions carefully, and ask your doctor or other care provider to review them with you. Where to Get Your Medications      These medications were sent to [address] [place] [address].  Rima Cadet [phone] Claribel Richardson [phone]  [address] [place] [address]    Phone: [phone]   · carvedilol 25 MG tablet         Brenda Liao, PharmD  Heart Failure Discharge Medication Reconciliation Program  [phone]

## 2021-12-01 ENCOUNTER — APPOINTMENT (OUTPATIENT)
Dept: GENERAL RADIOLOGY | Age: 61
DRG: 291 | End: 2021-12-01
Payer: MEDICARE

## 2021-12-01 ENCOUNTER — APPOINTMENT (OUTPATIENT)
Dept: ULTRASOUND IMAGING | Age: 61
DRG: 291 | End: 2021-12-01
Payer: MEDICARE

## 2021-12-01 ENCOUNTER — HOSPITAL ENCOUNTER (INPATIENT)
Age: 61
LOS: 5 days | Discharge: HOME HEALTH | DRG: 291 | End: 2021-12-06
Attending: HOSPITALIST | Admitting: HOSPITALIST
Payer: MEDICARE

## 2021-12-01 DIAGNOSIS — I50.33 ACUTE ON CHRONIC DIASTOLIC CONGESTIVE HEART FAILURE (HCC): ICD-10-CM

## 2021-12-01 DIAGNOSIS — J96.01 ACUTE RESPIRATORY FAILURE WITH HYPOXIA (HCC): Primary | ICD-10-CM

## 2021-12-01 PROBLEM — I50.9 CONGESTIVE HEART FAILURE WITH LEFT VENTRICULAR DYSFUNCTION (HCC): Status: ACTIVE | Noted: 2021-12-01

## 2021-12-01 LAB
A/G RATIO: 1.5 (ref 1.1–2.2)
ALBUMIN SERPL-MCNC: 4.3 G/DL (ref 3.4–5)
ALP BLD-CCNC: 96 U/L (ref 40–129)
ALT SERPL-CCNC: 16 U/L (ref 10–40)
ANION GAP SERPL CALCULATED.3IONS-SCNC: 15 MMOL/L (ref 3–16)
AST SERPL-CCNC: 13 U/L (ref 15–37)
BACTERIA: ABNORMAL /HPF
BASOPHILS ABSOLUTE: 0.1 K/UL (ref 0–0.2)
BASOPHILS RELATIVE PERCENT: 1.2 %
BILIRUB SERPL-MCNC: 0.8 MG/DL (ref 0–1)
BILIRUBIN URINE: NEGATIVE
BLOOD, URINE: ABNORMAL
BUN BLDV-MCNC: 12 MG/DL (ref 7–20)
CALCIUM SERPL-MCNC: 9.3 MG/DL (ref 8.3–10.6)
CHLORIDE BLD-SCNC: 93 MMOL/L (ref 99–110)
CLARITY: ABNORMAL
CO2: 22 MMOL/L (ref 21–32)
COLOR: YELLOW
CREAT SERPL-MCNC: 1.7 MG/DL (ref 0.6–1.2)
EOSINOPHILS ABSOLUTE: 0.2 K/UL (ref 0–0.6)
EOSINOPHILS RELATIVE PERCENT: 2.9 %
EPITHELIAL CELLS, UA: ABNORMAL /HPF (ref 0–5)
GFR AFRICAN AMERICAN: 37
GFR NON-AFRICAN AMERICAN: 31
GLUCOSE BLD-MCNC: 128 MG/DL (ref 70–99)
GLUCOSE BLD-MCNC: 244 MG/DL (ref 70–99)
GLUCOSE URINE: NEGATIVE MG/DL
HCT VFR BLD CALC: 37.4 % (ref 36–48)
HEMOGLOBIN: 11.9 G/DL (ref 12–16)
KETONES, URINE: NEGATIVE MG/DL
LACTIC ACID: 1.1 MMOL/L (ref 0.4–2)
LACTIC ACID: 1.6 MMOL/L (ref 0.4–2)
LEUKOCYTE ESTERASE, URINE: ABNORMAL
LYMPHOCYTES ABSOLUTE: 0.7 K/UL (ref 1–5.1)
LYMPHOCYTES RELATIVE PERCENT: 10 %
MCH RBC QN AUTO: 25.2 PG (ref 26–34)
MCHC RBC AUTO-ENTMCNC: 31.8 G/DL (ref 31–36)
MCV RBC AUTO: 79.2 FL (ref 80–100)
MICROSCOPIC EXAMINATION: YES
MONOCYTES ABSOLUTE: 0.6 K/UL (ref 0–1.3)
MONOCYTES RELATIVE PERCENT: 7.6 %
NEUTROPHILS ABSOLUTE: 5.8 K/UL (ref 1.7–7.7)
NEUTROPHILS RELATIVE PERCENT: 78.3 %
NITRITE, URINE: NEGATIVE
PDW BLD-RTO: 17.5 % (ref 12.4–15.4)
PERFORMED ON: ABNORMAL
PH UA: 7.5 (ref 5–8)
PLATELET # BLD: 427 K/UL (ref 135–450)
PMV BLD AUTO: 10.1 FL (ref 5–10.5)
POTASSIUM SERPL-SCNC: 4.7 MMOL/L (ref 3.5–5.1)
PRO-BNP: ABNORMAL PG/ML (ref 0–124)
PROTEIN UA: ABNORMAL MG/DL
RBC # BLD: 4.72 M/UL (ref 4–5.2)
RBC UA: ABNORMAL /HPF (ref 0–4)
SARS-COV-2, NAAT: NOT DETECTED
SODIUM BLD-SCNC: 130 MMOL/L (ref 136–145)
SPECIFIC GRAVITY UA: 1.01 (ref 1–1.03)
TOTAL PROTEIN: 7.2 G/DL (ref 6.4–8.2)
TROPONIN: <0.01 NG/ML
URINE REFLEX TO CULTURE: YES
URINE TYPE: ABNORMAL
UROBILINOGEN, URINE: 0.2 E.U./DL
WBC # BLD: 7.4 K/UL (ref 4–11)
WBC UA: >100 /HPF (ref 0–5)

## 2021-12-01 PROCEDURE — 51702 INSERT TEMP BLADDER CATH: CPT

## 2021-12-01 PROCEDURE — 83880 ASSAY OF NATRIURETIC PEPTIDE: CPT

## 2021-12-01 PROCEDURE — 6370000000 HC RX 637 (ALT 250 FOR IP): Performed by: HOSPITALIST

## 2021-12-01 PROCEDURE — 84484 ASSAY OF TROPONIN QUANT: CPT

## 2021-12-01 PROCEDURE — 71045 X-RAY EXAM CHEST 1 VIEW: CPT

## 2021-12-01 PROCEDURE — 76770 US EXAM ABDO BACK WALL COMP: CPT

## 2021-12-01 PROCEDURE — 83605 ASSAY OF LACTIC ACID: CPT

## 2021-12-01 PROCEDURE — 87635 SARS-COV-2 COVID-19 AMP PRB: CPT

## 2021-12-01 PROCEDURE — 36415 COLL VENOUS BLD VENIPUNCTURE: CPT

## 2021-12-01 PROCEDURE — 93005 ELECTROCARDIOGRAM TRACING: CPT | Performed by: HOSPITALIST

## 2021-12-01 PROCEDURE — 2060000000 HC ICU INTERMEDIATE R&B

## 2021-12-01 PROCEDURE — 96374 THER/PROPH/DIAG INJ IV PUSH: CPT

## 2021-12-01 PROCEDURE — 85025 COMPLETE CBC W/AUTO DIFF WBC: CPT

## 2021-12-01 PROCEDURE — 87040 BLOOD CULTURE FOR BACTERIA: CPT

## 2021-12-01 PROCEDURE — 81001 URINALYSIS AUTO W/SCOPE: CPT

## 2021-12-01 PROCEDURE — 94640 AIRWAY INHALATION TREATMENT: CPT

## 2021-12-01 PROCEDURE — 80053 COMPREHEN METABOLIC PANEL: CPT

## 2021-12-01 PROCEDURE — 6360000002 HC RX W HCPCS: Performed by: NURSE PRACTITIONER

## 2021-12-01 PROCEDURE — 99285 EMERGENCY DEPT VISIT HI MDM: CPT

## 2021-12-01 PROCEDURE — 87086 URINE CULTURE/COLONY COUNT: CPT

## 2021-12-01 PROCEDURE — 96375 TX/PRO/DX INJ NEW DRUG ADDON: CPT

## 2021-12-01 PROCEDURE — 94761 N-INVAS EAR/PLS OXIMETRY MLT: CPT

## 2021-12-01 PROCEDURE — 2700000000 HC OXYGEN THERAPY PER DAY

## 2021-12-01 PROCEDURE — 6370000000 HC RX 637 (ALT 250 FOR IP): Performed by: NURSE PRACTITIONER

## 2021-12-01 PROCEDURE — 2580000003 HC RX 258: Performed by: HOSPITALIST

## 2021-12-01 PROCEDURE — 6360000002 HC RX W HCPCS: Performed by: HOSPITALIST

## 2021-12-01 RX ORDER — CARVEDILOL 6.25 MG/1
12.5 TABLET ORAL 2 TIMES DAILY
Status: DISCONTINUED | OUTPATIENT
Start: 2021-12-01 | End: 2021-12-06 | Stop reason: HOSPADM

## 2021-12-01 RX ORDER — CARVEDILOL 6.25 MG/1
25 TABLET ORAL 2 TIMES DAILY
Status: DISCONTINUED | OUTPATIENT
Start: 2021-12-01 | End: 2021-12-01

## 2021-12-01 RX ORDER — ATORVASTATIN CALCIUM 40 MG/1
40 TABLET, FILM COATED ORAL DAILY
Status: DISCONTINUED | OUTPATIENT
Start: 2021-12-01 | End: 2021-12-06 | Stop reason: HOSPADM

## 2021-12-01 RX ORDER — SODIUM CHLORIDE 9 MG/ML
25 INJECTION, SOLUTION INTRAVENOUS PRN
Status: DISCONTINUED | OUTPATIENT
Start: 2021-12-01 | End: 2021-12-06 | Stop reason: HOSPADM

## 2021-12-01 RX ORDER — ACETAMINOPHEN 650 MG/1
650 SUPPOSITORY RECTAL EVERY 6 HOURS PRN
Status: DISCONTINUED | OUTPATIENT
Start: 2021-12-01 | End: 2021-12-06 | Stop reason: HOSPADM

## 2021-12-01 RX ORDER — SODIUM CHLORIDE 0.9 % (FLUSH) 0.9 %
5-40 SYRINGE (ML) INJECTION PRN
Status: DISCONTINUED | OUTPATIENT
Start: 2021-12-01 | End: 2021-12-06 | Stop reason: HOSPADM

## 2021-12-01 RX ORDER — SULFAMETHOXAZOLE AND TRIMETHOPRIM 800; 160 MG/1; MG/1
1 TABLET ORAL 2 TIMES DAILY
COMMUNITY
Start: 2021-11-30 | End: 2021-12-10

## 2021-12-01 RX ORDER — ACETAMINOPHEN 325 MG/1
650 TABLET ORAL EVERY 6 HOURS PRN
Status: DISCONTINUED | OUTPATIENT
Start: 2021-12-01 | End: 2021-12-06 | Stop reason: HOSPADM

## 2021-12-01 RX ORDER — ONDANSETRON 2 MG/ML
4 INJECTION INTRAMUSCULAR; INTRAVENOUS EVERY 6 HOURS PRN
Status: DISCONTINUED | OUTPATIENT
Start: 2021-12-01 | End: 2021-12-06 | Stop reason: HOSPADM

## 2021-12-01 RX ORDER — FUROSEMIDE 10 MG/ML
60 INJECTION INTRAMUSCULAR; INTRAVENOUS 3 TIMES DAILY
Status: DISCONTINUED | OUTPATIENT
Start: 2021-12-01 | End: 2021-12-01

## 2021-12-01 RX ORDER — PANTOPRAZOLE SODIUM 40 MG/1
40 TABLET, DELAYED RELEASE ORAL DAILY
Status: DISCONTINUED | OUTPATIENT
Start: 2021-12-02 | End: 2021-12-06 | Stop reason: HOSPADM

## 2021-12-01 RX ORDER — GABAPENTIN 300 MG/1
600 CAPSULE ORAL 3 TIMES DAILY
Status: DISCONTINUED | OUTPATIENT
Start: 2021-12-01 | End: 2021-12-06 | Stop reason: HOSPADM

## 2021-12-01 RX ORDER — CARVEDILOL 12.5 MG/1
12.5 TABLET ORAL 2 TIMES DAILY WITH MEALS
Status: ON HOLD | COMMUNITY
End: 2021-12-16 | Stop reason: HOSPADM

## 2021-12-01 RX ORDER — ASPIRIN 325 MG
325 TABLET ORAL DAILY
Status: DISCONTINUED | OUTPATIENT
Start: 2021-12-02 | End: 2021-12-06 | Stop reason: HOSPADM

## 2021-12-01 RX ORDER — IPRATROPIUM BROMIDE AND ALBUTEROL SULFATE 2.5; .5 MG/3ML; MG/3ML
1 SOLUTION RESPIRATORY (INHALATION) ONCE
Status: COMPLETED | OUTPATIENT
Start: 2021-12-01 | End: 2021-12-01

## 2021-12-01 RX ORDER — HYDROCORTISONE ACETATE 25 MG/1
25 SUPPOSITORY RECTAL PRN
Status: DISCONTINUED | OUTPATIENT
Start: 2021-12-01 | End: 2021-12-06 | Stop reason: HOSPADM

## 2021-12-01 RX ORDER — AMIODARONE HYDROCHLORIDE 200 MG/1
200 TABLET ORAL DAILY
Status: DISCONTINUED | OUTPATIENT
Start: 2021-12-01 | End: 2021-12-06 | Stop reason: HOSPADM

## 2021-12-01 RX ORDER — ONDANSETRON 4 MG/1
4 TABLET, ORALLY DISINTEGRATING ORAL EVERY 8 HOURS PRN
Status: DISCONTINUED | OUTPATIENT
Start: 2021-12-01 | End: 2021-12-06 | Stop reason: HOSPADM

## 2021-12-01 RX ORDER — ALBUTEROL SULFATE 2.5 MG/3ML
2.5 SOLUTION RESPIRATORY (INHALATION) EVERY 6 HOURS PRN
Status: DISCONTINUED | OUTPATIENT
Start: 2021-12-01 | End: 2021-12-02

## 2021-12-01 RX ORDER — ALBUTEROL SULFATE 90 UG/1
2 AEROSOL, METERED RESPIRATORY (INHALATION) 4 TIMES DAILY
Status: DISCONTINUED | OUTPATIENT
Start: 2021-12-01 | End: 2021-12-04

## 2021-12-01 RX ORDER — FUROSEMIDE 10 MG/ML
100 INJECTION INTRAMUSCULAR; INTRAVENOUS ONCE
Status: COMPLETED | OUTPATIENT
Start: 2021-12-01 | End: 2021-12-01

## 2021-12-01 RX ORDER — FUROSEMIDE 10 MG/ML
40 INJECTION INTRAMUSCULAR; INTRAVENOUS 3 TIMES DAILY
Status: DISCONTINUED | OUTPATIENT
Start: 2021-12-01 | End: 2021-12-02

## 2021-12-01 RX ORDER — POLYETHYLENE GLYCOL 3350 17 G/17G
17 POWDER, FOR SOLUTION ORAL DAILY PRN
Status: DISCONTINUED | OUTPATIENT
Start: 2021-12-01 | End: 2021-12-06 | Stop reason: HOSPADM

## 2021-12-01 RX ORDER — METHYLPREDNISOLONE SODIUM SUCCINATE 125 MG/2ML
125 INJECTION, POWDER, LYOPHILIZED, FOR SOLUTION INTRAMUSCULAR; INTRAVENOUS ONCE
Status: COMPLETED | OUTPATIENT
Start: 2021-12-01 | End: 2021-12-01

## 2021-12-01 RX ORDER — LANOLIN ALCOHOL/MO/W.PET/CERES
400 CREAM (GRAM) TOPICAL 2 TIMES DAILY
Status: DISCONTINUED | OUTPATIENT
Start: 2021-12-01 | End: 2021-12-06 | Stop reason: HOSPADM

## 2021-12-01 RX ORDER — SODIUM CHLORIDE 0.9 % (FLUSH) 0.9 %
5-40 SYRINGE (ML) INJECTION EVERY 12 HOURS SCHEDULED
Status: DISCONTINUED | OUTPATIENT
Start: 2021-12-01 | End: 2021-12-06 | Stop reason: HOSPADM

## 2021-12-01 RX ADMIN — Medication 400 MG: at 20:54

## 2021-12-01 RX ADMIN — METHYLPREDNISOLONE SODIUM SUCCINATE 125 MG: 125 INJECTION, POWDER, FOR SOLUTION INTRAMUSCULAR; INTRAVENOUS at 12:54

## 2021-12-01 RX ADMIN — AMIODARONE HYDROCHLORIDE 200 MG: 200 TABLET ORAL at 19:04

## 2021-12-01 RX ADMIN — CARVEDILOL 12.5 MG: 6.25 TABLET, FILM COATED ORAL at 20:54

## 2021-12-01 RX ADMIN — GABAPENTIN 600 MG: 300 CAPSULE ORAL at 20:54

## 2021-12-01 RX ADMIN — SODIUM CHLORIDE, PRESERVATIVE FREE 10 ML: 5 INJECTION INTRAVENOUS at 20:55

## 2021-12-01 RX ADMIN — ATORVASTATIN CALCIUM 40 MG: 40 TABLET, FILM COATED ORAL at 19:04

## 2021-12-01 RX ADMIN — FUROSEMIDE 40 MG: 10 INJECTION, SOLUTION INTRAMUSCULAR; INTRAVENOUS at 20:55

## 2021-12-01 RX ADMIN — IPRATROPIUM BROMIDE AND ALBUTEROL SULFATE 1 AMPULE: .5; 3 SOLUTION RESPIRATORY (INHALATION) at 10:32

## 2021-12-01 RX ADMIN — ALBUTEROL SULFATE 2 PUFF: 90 AEROSOL, METERED RESPIRATORY (INHALATION) at 16:41

## 2021-12-01 RX ADMIN — FUROSEMIDE 100 MG: 10 INJECTION, SOLUTION INTRAMUSCULAR; INTRAVENOUS at 12:53

## 2021-12-01 ASSESSMENT — PAIN SCALES - GENERAL
PAINLEVEL_OUTOF10: 8
PAINLEVEL_OUTOF10: 0

## 2021-12-01 NOTE — ED PROVIDER NOTES
[address]  [address] [address]  Dept: [phone]  Loc: [address] ENCOUNTER        This patient was not seen or evaluated by the attending physician. I evaluated this patient, the attending physician was available for consultation. CHIEF COMPLAINT    Chief Complaint   Patient presents with    Shortness of Breath    Fever       HPI    Deneen Lopez is a 62 y.o. female who presents the emergency department via EMS with complaints of feeling short of breath this morning with a temperature at home of 102. She states she took aspirin called EMS. She does not wear oxygen at home. She has a history of COPD. She stopped cigarette smoking many years ago. She also has a history of congestive heart failure. She had been on 160 mg of Lasix every day. She had a recent hospitalization at [facility] and her nephrologist took her off of that and she is only taking 20 mg of Lasix now. She states she has not skipped any doses. She is reporting a nonproductive cough. She denies chest pain, lightheadedness, dizziness, chills or myalgias. She is vaccinated with a booster against Covid. EMS reports a SPO2 in the low 80s and she was in respiratory distress on arrival into her home. On arrival to the emergency department she was on a nonrebreather. We removed the nonrebreather and placed her on nasal cannula oxygen. REVIEW OF SYSTEMS    Cardiac: no chest pain, no palpitations, no syncope  Respiratory: see HPI, + cough  Neurologic: no syncope or LOC  GI: no vomiting, no diarrhea  General: + fever  All other systems reviewed and are negative.     PAST MEDICAL & SURGICAL HISTORY    Past Medical History:   Diagnosis Date    Asthma     [facility]     [facility]congestive [facility] failure) [facility]     COPD [facility]chronic obstructive pulmonary disease) [facility]     Diabetes mellitus ([facility]     Essential hypertension     Hyperlipidemia     Obesity      Past Surgical History:   Procedure Laterality Date    CARPAL TUNNEL RELEASE      CHOLECYSTECTOMY      INCONTINENCE SURGERY         CURRENT MEDICATIONS  (may include discharge medications prescribed in the ED)  Current Outpatient Rx   Medication Sig Dispense Refill    carvedilol (COREG) 25 MG tablet Take 1 tablet by mouth 2 times daily 60 tablet 3    amiodarone (CORDARONE) 200 MG tablet Take 200 mg by mouth daily nightly      sacubitril-valsartan (ENTRESTO) 24-26 MG per tablet Take 1 tablet by mouth 2 times daily      albuterol (ACCUNEB) 0.63 MG/3ML nebulizer solution Take 1 ampule by nebulization every 6 hours as needed for Wheezing      ferrous sulfate 325 (65 FE) MG tablet Take 325 mg by mouth 3 times daily       aspirin 81 MG tablet Take 325 mg by mouth daily       atorvastatin (LIPITOR) 40 MG tablet Take 40 mg by mouth daily.  magnesium oxide (MAG-OX) 400 MG tablet Take 500 mg by mouth 2 times daily       potassium chloride SA (KLOR-CON M20) 20 MEQ tablet Take 20 mEq by mouth 2 times daily.  furosemide (LASIX) 80 MG tablet Take 80 mg by mouth 2 times daily.  pantoprazole (PROTONIX) 40 MG tablet Take 40 mg by mouth daily.  hydrocortisone (ANUMED-HC) 25 MG suppository Place 25 mg rectally as needed for Hemorrhoids.  gabapentin (NEURONTIN) 600 MG tablet Take 600 mg by mouth 3 times daily.  albuterol (PROVENTIL HFA) 108 (90 BASE) MCG/ACT inhaler Inhale 2 puffs into the lungs 4 times daily.  tiotropium (SPIRIVA HANDIHALER) 18 MCG inhalation capsule Inhale 18 mcg into the lungs daily. ALLERGIES    Allergies   Allergen Reactions    Ace Inhibitors     Diclofenac     Losartan     Vicodin [Hydrocodone-Acetaminophen]        SOCIAL & FAMILY HISTORY    Social History     Socioeconomic History    Marital status:       Spouse name: None    Number of children: None    Years of education: None    Highest education level: None   Occupational History    None   Tobacco Use    Smoking status: Former Smoker     Packs/day: 0.10     Types: Cigarettes    Smokeless tobacco: Never Used   Substance and Sexual Activity    Alcohol use: No     Alcohol/week: 0.0 standard drinks    Drug use: No    Sexual activity: Yes     Partners: Male     Comment:    Other Topics Concern    None   Social History Narrative    None     Social Determinants of Health     Financial Resource Strain:     Difficulty of Paying Living Expenses: Not on file   Food Insecurity:     Worried About Running Out of Food in the Last Year: Not on file    Yin of Food in the Last Year: Not on file   Transportation Needs:     Lack of Transportation (Medical): Not on file    Lack of Transportation (Non-Medical):  Not on file   Physical Activity:     Days of Exercise per Week: Not on file    Minutes of Exercise per Session: Not on file   Stress:     Feeling of Stress : Not on file   Social Connections:     Frequency of Communication with Friends and Family: Not on file    Frequency of Social Gatherings with Friends and Family: Not on file    Attends Congregational Services: Not on file    Active Member of [address] [place] [address] or Organizations: Not on file    Attends Club or Organization Meetings: Not on file    Marital Status: Not on file   Intimate Partner Violence:     Fear of Current or Ex-Partner: Not on file    Emotionally Abused: Not on file    Physically Abused: Not on file    Sexually Abused: Not on file   Housing Stability:     Unable to Pay for Housing in the Last Year: Not on file    Number of [place] in the Last Year: Not on file    Unstable Housing in the Last Year: Not on file     Family History   Problem Relation Age of Onset    High Blood Pressure Mother     Diabetes Mother     Cancer Mother         thyroid    Stroke Father        PHYSICAL EXAM    VITAL SIGNS: BP (!) 114/44   Pulse 59   Temp 99.3 °F (37.4 °C) (Oral)   Resp 18   Wt 244 lb 4.3 oz (110.8 kg)   SpO2 100%   BMI 38.26 kg/m²    Constitutional:  Well developed, well nourished, respiratory distress on arrival  HENT:  Atraumatic, dry, lips are cracked and bleeding, mucus membranes  Neck: supple, no JVD   Respiratory: Respiratory rate 32/min with suprasternal notch retractions and pausing to speak in between words. Cough is wet, weak and nonproductive. Crackles throughout. Occasional wheeze. Cardiovascular: Regular rhythm and rate, S1-S2. No murmur  Vascular: Radial and DP pulses 2+ and equal bilaterally  GI:  Soft, nontender, normal bowel sounds  Musculoskeletal: Trace pretibial edema bilaterally, no lower extremity asymmetry, no calf tenderness, no thigh tenderness, no acute deformities  Integument:  Skin is warm and dry, no petechiae   Neurologic:  Alert & oriented x3, no slurred speech  Psych: Pleasant affect, no hallucinations, anxious    EKG     Reviewed by myself and interpreted by Dr. Chana Marie. Ventricular rate 70 bpm, OK interval 198 ms, QRS duration 178 ms,  ms  No ST elevation or depression. Interpretation is a normal sinus rhythm with left axis deviation and left bundle branch block. Today's tracing was compared to the one on October 11, 2021 with no significant changes noted. RADIOLOGY/PROCEDURES    XR CHEST PORTABLE   Final Result   Borderline cardiomegaly with mild vascular congestion.            Labs Reviewed   CBC WITH AUTO DIFFERENTIAL - Abnormal; Notable for the following components:       Result Value    Hemoglobin 11.9 (*)     MCV 79.2 (*)     MCH 25.2 (*)     RDW 17.5 (*)     Lymphocytes Absolute 0.7 (*)     All other components within normal limits    Narrative:     Performed at:  [address] [address]   Phone [phone]   COMPREHENSIVE METABOLIC PANEL - Abnormal; Notable for the following components:    Sodium 130 (*)     Chloride 93 (*)     Glucose 128 (*) CREATININE 1.7 (*)     GFR Non- 31 (*)     GFR  37 (*)     AST 13 (*)     All other components within normal limits    Narrative:     Performed at:  [facility]  [address] [address]   Phone [phone]   BRAIN NATRIURETIC PEPTIDE - Abnormal; Notable for the following components:    Pro-BNP 37,369 (*)     All other components within normal limits    Narrative:     Performed at:  [facility]  [address] [address]   Phone [phone], RAPID    Narrative:     Performed at:  [facility]  [address] [facility] [address]   Phone [phone]   CULTURE, BLOOD 1   CULTURE, BLOOD 2   TROPONIN    Narrative:     Performed at:  [facility]  [address] [address]   Phone [phone]   LACTIC ACID, PLASMA    Narrative:     Performed at:  [facility]  [address] [address]   Phone [phone]   URINE RT REFLEX TO CULTURE   LACTIC ACID, PLASMA       ED [address]    Pertinent Labs & Imaging studies reviewed and interpreted. (See chart for details)    See chart for details of medications given during the ED stay. Vitals:    12/01/21 1006 12/01/21 1032 12/01/21 1046 12/01/21 1101   BP: 130/61  (!) 116/47 (!) 114/44   Pulse: 71  61 59   Resp: 19 18 16 18   Temp: 99.3 °F (37.4 °C)      TempSrc: Oral      SpO2: 98% 97% 97% 100%   Weight:         Medications   ipratropium-albuterol (DUONEB) nebulizer solution 1 ampule (1 ampule Inhalation Given 12/1/21 1032)   methylPREDNISolone sodium (SOLU-MEDROL) injection 125 mg (125 mg IntraVENous Given 12/1/21 1254)   furosemide (LASIX) injection 100 mg (100 mg IntraVENous Given 12/1/21 1253)     I have seen and evaluated this patient.   My attending physician was available for consultation. Differential diagnosis includes but is not limited to ACS, myocardial infarction, congestive heart failure, PE, COPD exacerbation, Covid, pneumonia, other. She arrives in respiratory distress requiring high amounts of oxygen. She is reporting a temperature at home this morning of 102. She is afebrile on arrival here. She is not tachycardic and she is normotensive. When she got here we removed her 100% nonrebreather that was applied by EMS since she has a history of COPD and we applied an nasal cannula oxygen. She was giving Solu-Medrol and a nebulizer treatment. She has a white count of 7.4 with hemoglobin of 11.9. Sodium is 130 with a potassium of 4.7, chloride 93, BUN 12 with a creatinine of 1.7. Lactic acid is 1.6.    proBNP is over 37,000. Troponin less than 0.01. No acute changes on EKG. Glucose is 128. I ordered for her to have 100 mg of IV Lasix x1 dose. At the time I did not know that the nephrologist had cut her Lasix from 180 mg to 20 mg at the time. Strict I's and O's was ordered. Blood cultures x2 were drawn. She is Covid negative. Chest x-ray is interpreted by radiology and reviewed by myself showed  Borderline cardiomegaly with mild vascular congestion. Patient was reevaluated multiple times and she is currently on 6 L of nasal cannula oxygen. She states that she is feeling much better. 10/11/2021 Echocardiogram    Summary   Left ventricular cavity size is severely dilated. Ejection fraction is visually estimated to be 25-30%. Grade II diastolic dysfunction with elevated LV filling pressures. Moderate to Severe mitral regurgitation. The left atrium is severely dilated. Normal right ventricular size and function. Pacemaker / ICD lead is visualized in the right atrium. There is a trivial pericardial effusion noted. I will recommend hospitalization for this patient. Perfect serve to the hospitalist service.   The patient agrees with the plan of care. CRITICAL CARE NOTE:  There was a high probability of clinically significant life-threatening deterioration of the patient's condition requiring my urgent intervention. Total critical care time is 35 minutes. This includes multiple reevaluations, vital sign monitoring, pulse oximetry monitoring, telemetry monitoring, clinical response to the IV medications, reviewing the nursing notes, consultation time, dictation/documentation time, and interpretation of the labwork. (This time excludes time spent performing procedures). FINAL IMPRESSION    1. Acute respiratory failure with hypoxia ([address]    2.  Acute on chronic diastolic congestive heart failure ([address]        PLAN  Admission to the hospital    (Please note that this note was completed with a voice recognition program.  Every attempt was made to edit the dictations, but inevitably there remain words that are mis-transcribed.)        AME Hines CNP  12/01/21 5771

## 2021-12-01 NOTE — CARE COORDINATION
[facility]  Patient is active with [facility], Will follow for discharge to home with orders to resume care.     Patty Funez RN, BSN CTN  [facility] [phone]

## 2021-12-01 NOTE — ED PROVIDER NOTES
ED Attending EKG Interpretation Note     Date of evaluation: 12/1/2021    This patient was seen by the advance practice provider. I have not seen or examined the patient. EKG Indication: Shortness of breath  EKG Interpretation: Rate 70, rhythm sinus with a left little branch block, Axis left. He had borderline 198, QRS prolonged 178, QTc prolonged 479. Comparison to prior EKG from October 11, 2021 shows at that time her ME was prolonged 216 with first-degree AV block noted and otherwise no significant change.       Wing Singh MD  12/01/21 1400

## 2021-12-01 NOTE — ED TRIAGE NOTES
Shortness of breath started about 2300 11/31. HX COPD and CHF. Does not wear oxygen at home Patient currently %96 on 3.5 L.

## 2021-12-01 NOTE — ED NOTES
Bed: E-41  Expected date:   Expected time:   Means of arrival: [facility]  Comments:  Resp Distress     Ashish Lanier RN  12/01/21 1000

## 2021-12-01 NOTE — ED NOTES
Report called to [facility] RN PCU. All questions answered. Waiting for bed to be cleaned.       Alena Abbott RN  12/01/21 0056

## 2021-12-02 LAB
ALBUMIN SERPL-MCNC: 3.7 G/DL (ref 3.4–5)
ALP BLD-CCNC: 76 U/L (ref 40–129)
ALT SERPL-CCNC: 13 U/L (ref 10–40)
ANION GAP SERPL CALCULATED.3IONS-SCNC: 11 MMOL/L (ref 3–16)
AST SERPL-CCNC: 10 U/L (ref 15–37)
BASOPHILS ABSOLUTE: 0 K/UL (ref 0–0.2)
BASOPHILS RELATIVE PERCENT: 0.2 %
BILIRUB SERPL-MCNC: 0.6 MG/DL (ref 0–1)
BILIRUBIN DIRECT: <0.2 MG/DL (ref 0–0.3)
BILIRUBIN, INDIRECT: ABNORMAL MG/DL (ref 0–1)
BUN BLDV-MCNC: 20 MG/DL (ref 7–20)
CALCIUM SERPL-MCNC: 9.2 MG/DL (ref 8.3–10.6)
CHLORIDE BLD-SCNC: 96 MMOL/L (ref 99–110)
CO2: 23 MMOL/L (ref 21–32)
CREAT SERPL-MCNC: 1.9 MG/DL (ref 0.6–1.2)
EKG ATRIAL RATE: 70 BPM
EKG DIAGNOSIS: NORMAL
EKG P AXIS: 25 DEGREES
EKG P-R INTERVAL: 198 MS
EKG Q-T INTERVAL: 444 MS
EKG QRS DURATION: 178 MS
EKG QTC CALCULATION (BAZETT): 479 MS
EKG R AXIS: -54 DEGREES
EKG T AXIS: 110 DEGREES
EKG VENTRICULAR RATE: 70 BPM
EOSINOPHILS ABSOLUTE: 0 K/UL (ref 0–0.6)
EOSINOPHILS RELATIVE PERCENT: 0 %
GFR AFRICAN AMERICAN: 33
GFR NON-AFRICAN AMERICAN: 27
GLUCOSE BLD-MCNC: 167 MG/DL (ref 70–99)
GLUCOSE BLD-MCNC: 170 MG/DL (ref 70–99)
HCT VFR BLD CALC: 31.6 % (ref 36–48)
HEMOGLOBIN: 10.1 G/DL (ref 12–16)
LYMPHOCYTES ABSOLUTE: 0.4 K/UL (ref 1–5.1)
LYMPHOCYTES RELATIVE PERCENT: 17.9 %
MCH RBC QN AUTO: 24.9 PG (ref 26–34)
MCHC RBC AUTO-ENTMCNC: 31.8 G/DL (ref 31–36)
MCV RBC AUTO: 78.3 FL (ref 80–100)
MONOCYTES ABSOLUTE: 0.1 K/UL (ref 0–1.3)
MONOCYTES RELATIVE PERCENT: 3.5 %
NEUTROPHILS ABSOLUTE: 1.8 K/UL (ref 1.7–7.7)
NEUTROPHILS RELATIVE PERCENT: 78.4 %
PDW BLD-RTO: 17.4 % (ref 12.4–15.4)
PERFORMED ON: ABNORMAL
PLATELET # BLD: 288 K/UL (ref 135–450)
PMV BLD AUTO: 9.6 FL (ref 5–10.5)
POTASSIUM REFLEX MAGNESIUM: 4.1 MMOL/L (ref 3.5–5.1)
RBC # BLD: 4.04 M/UL (ref 4–5.2)
SODIUM BLD-SCNC: 130 MMOL/L (ref 136–145)
TOTAL PROTEIN: 6.4 G/DL (ref 6.4–8.2)
URINE CULTURE, ROUTINE: NORMAL
WBC # BLD: 2.4 K/UL (ref 4–11)

## 2021-12-02 PROCEDURE — 2060000000 HC ICU INTERMEDIATE R&B

## 2021-12-02 PROCEDURE — 93010 ELECTROCARDIOGRAM REPORT: CPT | Performed by: INTERNAL MEDICINE

## 2021-12-02 PROCEDURE — 6360000002 HC RX W HCPCS: Performed by: HOSPITALIST

## 2021-12-02 PROCEDURE — 2580000003 HC RX 258: Performed by: STUDENT IN AN ORGANIZED HEALTH CARE EDUCATION/TRAINING PROGRAM

## 2021-12-02 PROCEDURE — 6370000000 HC RX 637 (ALT 250 FOR IP): Performed by: HOSPITALIST

## 2021-12-02 PROCEDURE — 85025 COMPLETE CBC W/AUTO DIFF WBC: CPT

## 2021-12-02 PROCEDURE — 36415 COLL VENOUS BLD VENIPUNCTURE: CPT

## 2021-12-02 PROCEDURE — 6360000002 HC RX W HCPCS: Performed by: INTERNAL MEDICINE

## 2021-12-02 PROCEDURE — 6360000002 HC RX W HCPCS

## 2021-12-02 PROCEDURE — 6370000000 HC RX 637 (ALT 250 FOR IP): Performed by: INTERNAL MEDICINE

## 2021-12-02 PROCEDURE — 80076 HEPATIC FUNCTION PANEL: CPT

## 2021-12-02 PROCEDURE — 2700000000 HC OXYGEN THERAPY PER DAY

## 2021-12-02 PROCEDURE — 2580000003 HC RX 258: Performed by: HOSPITALIST

## 2021-12-02 PROCEDURE — 6360000002 HC RX W HCPCS: Performed by: STUDENT IN AN ORGANIZED HEALTH CARE EDUCATION/TRAINING PROGRAM

## 2021-12-02 PROCEDURE — 80048 BASIC METABOLIC PNL TOTAL CA: CPT

## 2021-12-02 PROCEDURE — 94761 N-INVAS EAR/PLS OXIMETRY MLT: CPT

## 2021-12-02 PROCEDURE — 94640 AIRWAY INHALATION TREATMENT: CPT

## 2021-12-02 RX ORDER — ALBUTEROL SULFATE 2.5 MG/3ML
2.5 SOLUTION RESPIRATORY (INHALATION) EVERY 4 HOURS PRN
Status: DISCONTINUED | OUTPATIENT
Start: 2021-12-02 | End: 2021-12-04

## 2021-12-02 RX ORDER — FUROSEMIDE 10 MG/ML
40 INJECTION INTRAMUSCULAR; INTRAVENOUS 2 TIMES DAILY
Status: DISCONTINUED | OUTPATIENT
Start: 2021-12-02 | End: 2021-12-03

## 2021-12-02 RX ORDER — ALBUTEROL SULFATE 2.5 MG/3ML
SOLUTION RESPIRATORY (INHALATION)
Status: COMPLETED
Start: 2021-12-02 | End: 2021-12-02

## 2021-12-02 RX ADMIN — GABAPENTIN 600 MG: 300 CAPSULE ORAL at 09:20

## 2021-12-02 RX ADMIN — CARVEDILOL 12.5 MG: 6.25 TABLET, FILM COATED ORAL at 09:20

## 2021-12-02 RX ADMIN — FUROSEMIDE 40 MG: 10 INJECTION, SOLUTION INTRAMUSCULAR; INTRAVENOUS at 18:31

## 2021-12-02 RX ADMIN — ALBUTEROL SULFATE 2 PUFF: 90 AEROSOL, METERED RESPIRATORY (INHALATION) at 12:16

## 2021-12-02 RX ADMIN — Medication 400 MG: at 09:19

## 2021-12-02 RX ADMIN — GABAPENTIN 600 MG: 300 CAPSULE ORAL at 15:27

## 2021-12-02 RX ADMIN — ALBUTEROL SULFATE 2 PUFF: 90 AEROSOL, METERED RESPIRATORY (INHALATION) at 08:40

## 2021-12-02 RX ADMIN — ASPIRIN 325 MG ORAL TABLET 325 MG: 325 PILL ORAL at 09:20

## 2021-12-02 RX ADMIN — Medication 400 MG: at 21:29

## 2021-12-02 RX ADMIN — SODIUM CHLORIDE 25 ML: 9 INJECTION, SOLUTION INTRAVENOUS at 10:16

## 2021-12-02 RX ADMIN — ALBUTEROL SULFATE 2.5 MG: 2.5 SOLUTION RESPIRATORY (INHALATION) at 08:45

## 2021-12-02 RX ADMIN — SODIUM CHLORIDE, PRESERVATIVE FREE 10 ML: 5 INJECTION INTRAVENOUS at 21:32

## 2021-12-02 RX ADMIN — ALBUTEROL SULFATE 2 PUFF: 90 AEROSOL, METERED RESPIRATORY (INHALATION) at 15:45

## 2021-12-02 RX ADMIN — ALBUTEROL SULFATE 2 PUFF: 90 AEROSOL, METERED RESPIRATORY (INHALATION) at 01:19

## 2021-12-02 RX ADMIN — ATORVASTATIN CALCIUM 40 MG: 40 TABLET, FILM COATED ORAL at 21:28

## 2021-12-02 RX ADMIN — ALBUTEROL SULFATE 2.5 MG: 2.5 SOLUTION RESPIRATORY (INHALATION) at 20:45

## 2021-12-02 RX ADMIN — TIOTROPIUM BROMIDE INHALATION SPRAY 2 PUFF: 3.12 SPRAY, METERED RESPIRATORY (INHALATION) at 08:42

## 2021-12-02 RX ADMIN — PANTOPRAZOLE SODIUM 40 MG: 40 TABLET, DELAYED RELEASE ORAL at 09:20

## 2021-12-02 RX ADMIN — FUROSEMIDE 40 MG: 10 INJECTION, SOLUTION INTRAMUSCULAR; INTRAVENOUS at 09:20

## 2021-12-02 RX ADMIN — CEFTRIAXONE 1000 MG: 1 INJECTION, POWDER, FOR SOLUTION INTRAMUSCULAR; INTRAVENOUS at 10:17

## 2021-12-02 RX ADMIN — ENOXAPARIN SODIUM 40 MG: 100 INJECTION SUBCUTANEOUS at 09:21

## 2021-12-02 RX ADMIN — GABAPENTIN 600 MG: 300 CAPSULE ORAL at 21:29

## 2021-12-02 RX ADMIN — AMIODARONE HYDROCHLORIDE 200 MG: 200 TABLET ORAL at 21:28

## 2021-12-02 RX ADMIN — SODIUM CHLORIDE, PRESERVATIVE FREE 10 ML: 5 INJECTION INTRAVENOUS at 09:21

## 2021-12-02 RX ADMIN — CARVEDILOL 12.5 MG: 6.25 TABLET, FILM COATED ORAL at 21:29

## 2021-12-02 ASSESSMENT — PAIN SCALES - GENERAL
PAINLEVEL_OUTOF10: 0
PAINLEVEL_OUTOF10: 2
PAINLEVEL_OUTOF10: 0
PAINLEVEL_OUTOF10: 0

## 2021-12-02 ASSESSMENT — PAIN DESCRIPTION - ORIENTATION: ORIENTATION: LOWER

## 2021-12-02 ASSESSMENT — PAIN DESCRIPTION - FREQUENCY: FREQUENCY: CONTINUOUS

## 2021-12-02 ASSESSMENT — PAIN DESCRIPTION - ONSET: ONSET: ON-GOING

## 2021-12-02 ASSESSMENT — PAIN DESCRIPTION - DESCRIPTORS: DESCRIPTORS: SHARP

## 2021-12-02 ASSESSMENT — PAIN DESCRIPTION - LOCATION: LOCATION: BACK

## 2021-12-02 ASSESSMENT — PAIN DESCRIPTION - PAIN TYPE: TYPE: CHRONIC PAIN

## 2021-12-02 NOTE — H&P
Hospital Medicine History & Physical      PCP: Gurdeep Myers MD    Date of Admission: 12/1/2021    Date of Service: Pt seen/examined on 12/1/2021and Admitted to Inpatient with expected LOS greater than two midnights due to medical therapy. Chief Complaint:  SOB      History Of Present Illness:     62 y.o. female with history of chf presents after noticing increased sob first noted 2 evenings ago. She denies urinary hesitancy but notes a sensation of incomplete voiding. She denies chest pain, cough, fever, chills, lower extremity edema. She noted worsening sob at night, however denies orthopnea. She woke this am with a fever of 102, worsening sob, prompting er consultation    Past Medical History:          Diagnosis Date    Asthma     [facility]omy[facility] ([facility]     CHF [facility]congestive heart failure) ([facility]     COPD (chronic obstructive pulmonary disease) ([facility]     Diabetes mellitus ([facility]     Essential hypertension     Hyperlipidemia     Obesity        Past Surgical History:          Procedure Laterality Date    CARPAL TUNNEL RELEASE      CHOLECYSTECTOMY      INCONTINENCE SURGERY         Medications Prior to Admission:      Prior to Admission medications    Medication Sig Start Date End Date Taking?  Authorizing Provider   carvedilol (COREG) 25 MG tablet Take 12.5 mg by mouth 2 times daily (with meals)   Yes Historical Provider, MD   sulfamethoxazole-trimethoprim (BACTRIM DS;SEPTRA DS) 800-160 MG per tablet Take 1 tablet by mouth 2 times daily 11/30/21  Yes Historical Provider, MD   amiodarone (CORDARONE) 200 MG tablet Take 200 mg by mouth nightly nightly    Yes Historical Provider, MD   aspirin 325 MG tablet Take 325 mg by mouth daily    Yes Historical Provider, MD   atorvastatin (LIPITOR) 40 MG tablet Take 40 mg by mouth nightly    Yes Historical Provider, MD   magnesium oxide (MAG-OX) 400 MG tablet Take 400 mg by mouth 2 times daily    Yes Historical Provider, MD   potassium chloride SA (KLOR-CON M20) 20 MEQ tablet Take 20 mEq by mouth daily    Yes Historical Provider, MD   furosemide (LASIX) 20 MG tablet Take 20 mg by mouth daily  4/11/14  Yes Historical Provider, MD   pantoprazole (PROTONIX) 40 MG tablet Take 40 mg by mouth daily. Yes Historical Provider, MD   hydrocortisone (ANUMED-HC) 25 MG suppository Place 25 mg rectally as needed for Hemorrhoids. Yes Historical Provider, MD   gabapentin (NEURONTIN) 600 MG tablet Take 600 mg by mouth 3 times daily. Yes Historical Provider, MD   albuterol (PROVENTIL HFA) 108 (90 BASE) MCG/ACT inhaler Inhale 2 puffs into the lungs 4 times daily. Yes Historical Provider, MD   tiotropium (SPIRIVA HANDIHALER) 18 MCG inhalation capsule Inhale 18 mcg into the lungs daily. Yes Historical Provider, MD   albuterol (PROVENTIL) (5 MG/ML) 0.5% nebulizer solution Inhale 2.5 mg into the lungs 4 times daily    Historical Provider, MD       Allergies:  Ace inhibitors, Diclofenac, Losartan, and Vicodin [hydrocodone-acetaminophen]    Social History:           TOBACCO:   reports that she has quit smoking. Her smoking use included cigarettes. She smoked 0.10 packs per day. She has never used smokeless tobacco.  ETOH:   reports no history of alcohol use. Family History:               Problem Relation Age of Onset    High Blood Pressure Mother     Diabetes Mother     Cancer Mother         thyroid    Stroke Father        REVIEW OF SYSTEMS:   Pertinent positives as noted in the HPI. All other systems reviewed and negative. PHYSICAL EXAM PERFORMED:    /62   Pulse 61   Temp 98 °F (36.7 °C) (Oral)   Resp 18   Wt 244 lb 4.3 oz (110.8 kg)   SpO2 96%   BMI 38.26 kg/m²     General appearance:  No apparent distress, appears stated age and cooperative. HEENT:  Normal cephalic, atraumatic without obvious deformity. Pupils equal, round,  Extra ocular muscles intact. Conjunctivae/corneas clear. Neck: Supple, with full range of motion. No jugular venous distention. Trachea midline. Respiratory:  Normal respiratory effort. No use of accessory muscles, no intercostal retractions  Cardiovascular:  Regular rate and rhythm    Abdomen: Soft, non-tender, non-distended    Musculoskeletal:  No clubbing, cyanosis or edema bilaterally. No calf tenderness  Skin: Skin color, texture, turgor normal.   Neurologic:  grossly non-focal.  Psychiatric:  Alert and oriented, thought content appropriate, normal insight  Capillary Refill: Brisk,< 3 seconds   Peripheral Pulses: +2 palpable, equal bilaterally       Labs:     Recent Labs     12/01/21  1046   WBC 7.4   HGB 11.9*   HCT 37.4        Recent Labs     12/01/21  1046   *   K 4.7   CL 93*   CO2 22   BUN 12   CREATININE 1.7*   CALCIUM 9.3     Recent Labs     12/01/21  1046   AST 13*   ALT 16   BILITOT 0.8   ALKPHOS 96     No results for input(s): INR in the last 72 hours. Recent Labs     12/01/21  1046 12/01/21  1635   TROPONINI <0.01 <0.01       Urinalysis:      Lab Results   Component Value Date    NITRU Negative 12/01/2021    WBCUA >100 12/01/2021    BACTERIA Rare 12/01/2021    RBCUA 11-20 12/01/2021    BLOODU MODERATE 12/01/2021    SPECGRAV 1.010 12/01/2021    GLUCOSEU Negative 12/01/2021       Radiology:             XR CHEST PORTABLE   Final Result   Borderline cardiomegaly with mild vascular congestion. ASSESSMENT:    Active Hospital Problems    Diagnosis Date Noted    Congestive heart failure with left ventricular dysfunction ([facility] [I50.9] 12/01/2021         PLAN:    CHF exac  - diurese    CKD  - will obtain Nephro consult, apparently had been on up to 180mg lasix prior to recent admission at [facility]  - will check renal usn     UTI  - rocephin  DVT Prophylaxis: lovenox  Diet: ADULT DIET; Regular; Low Sodium (2 gm); 1500 ml  Code Status: Full Gillian Bhatia MD    Thank you [place] MD for the opportunity to be involved in this patient's care.  If you have any questions or concerns please feel free to contact me at [phone].

## 2021-12-02 NOTE — PLAN OF CARE
Problem: Falls - Risk of:  Goal: Will remain free from falls  Description: Will remain free from falls  Outcome: Ongoing  Goal: Absence of physical injury  Description: Absence of physical injury  Outcome: Ongoing     Problem: OXYGENATION/RESPIRATORY FUNCTION  Goal: Patient will maintain patent airway  Outcome: Ongoing  Goal: Patient will achieve/maintain normal respiratory rate/effort  Description: Respiratory rate and effort will be within normal limits for the patient  Outcome: Ongoing     Problem: HEMODYNAMIC STATUS  Goal: Patient has stable vital signs and fluid balance  Outcome: Ongoing     Problem: FLUID AND ELECTROLYTE IMBALANCE  Goal: Fluid and electrolyte balance are achieved/maintained  Outcome: Ongoing     Problem: ACTIVITY INTOLERANCE/IMPAIRED MOBILITY  Goal: Mobility/activity is maintained at optimum level for patient  Outcome: Ongoing     Problem: Pain:  Goal: Pain level will decrease  Description: Pain level will decrease  Outcome: Ongoing  Goal: Control of acute pain  Description: Control of acute pain  Outcome: Ongoing  Goal: Control of chronic pain  Description: Control of chronic pain  Outcome: Ongoing     Problem: Skin Integrity:  Goal: Will show no infection signs and symptoms  Description: Will show no infection signs and symptoms  Outcome: Ongoing  Goal: Absence of new skin breakdown  Description: Absence of new skin breakdown  Outcome: Ongoing

## 2021-12-02 NOTE — DISCHARGE INSTR - COC
Continuity of Care Form    Patient Name: Sandra Birmingham   :  1960  MRN:  [id number]    Admit date:  2021  Discharge date:  ***    Code Status Order: Full Code   Advance Directives:      Admitting Physician:  Ila Roe MD  PCP: Toño Coreas MD    Discharging Nurse: [facility] Unit/Room#: V7N-9431/0125-28  Discharging Unit Phone Number: ***    Emergency Contact:   Extended Emergency Contact Information  Primary Emergency Contact: [address] Phone: [phone]  Relation: Child  Secondary Emergency Contact: [facility] Phone: [phone]  Relation: Child    Past Surgical History:  Past Surgical History:   Procedure Laterality Date    CARPAL TUNNEL RELEASE      CHOLECYSTECTOMY      INCONTINENCE SURGERY         Immunization History:   Immunization History   Administered Date(s) Administered    Influenza Whole 2013    Influenza, Quadv, IM, PF (6 mo and older Fluzone, Flulaval, Fluarix, and 3 yrs and older Afluria) 10/13/2021       Active Problems:  Patient Active Problem List   Diagnosis Code    Essential hypertension I10    Cardiomyopathy ([facility] [address] I42.9    CHF (congestive heart failure) ([facility]) I50.9    COPD (chronic obstructive pulmonary disease) ([facility] [address] J44.9    Chest pain R07.9    NEGIN (obstructive sleep apnea) G47.33    DMII (diabetes mellitus, type 2) ([facility]) E11.9    Iron deficiency anemia D50.9    Intestinal malabsorption K90.9    Iron deficiency anemia due to chronic blood loss D50.0    Treatment Z51.89    Anemia D64.9    Acute on chronic systolic (congestive) heart failure ([facility]) I50.23    Hyperlipidemia E78.5    Morbid obesity due to excess calories ([facility]) E66.01    Acute pulmonary edema ([facility]) J81.0    Acute respiratory failure with hypercarbia ([facility]) J96.01, J96.02    Congestive heart failure with left ventricular dysfunction ([facility]) I50.9       Isolation/Infection:   Isolation            No Isolation          Patient Infection Status Infection Onset Added Last Indicated Last Indicated By Review Planned Expiration Resolved Resolved By    None active    Resolved    COVID-19 (Rule Out) 12/01/21 12/01/21 12/01/21 COVID-19, Rapid (Ordered)   12/01/21 Rule-Out Test Resulted    COVID-19 (Rule Out) 10/11/21 10/11/21 10/11/21 COVID-19, Rapid (Ordered)   10/11/21 Rule-Out Test Resulted            Nurse Assessment:  Last Vital Signs: BP (!) 124/50   Pulse 63   Temp 97.2 °F (36.2 °C) (Oral)   Resp 20   Ht 5' 7\" (1.702 m)   Wt 246 lb 4.1 oz (111.7 kg)   SpO2 93%   BMI 38.57 kg/m²     Last documented pain score (0-10 scale): Pain Level: 2  Last Weight:   Wt Readings from Last 1 Encounters:   12/02/21 246 lb 4.1 oz (111.7 kg)     Mental Status:  {IP PT MENTAL STATUS:20030}    IV Access:  { MATILDA IV ACCESS:554522616}    Nursing Mobility/ADLs:  Walking   {[facility] DME ZNXV:478820580}  Transfer  {[facility] DME KZQT:962057687}  Bathing  {[facility] DME VEYX:770129649}  Dressing  {[facility] DME GGZN:579874537}  Toileting  {[facility] DME FUEZ:657366152}  Feeding  {[facility] DME UUWB:044940152}  Med Admin  {[facility] DME KHJB:035875860}  Med Delivery   { MATILDA MED Delivery:301162412}    Wound Care Documentation and Therapy:        Elimination:  Continence:    Bowel: {YES / FO:90356}  Bladder: {YES / BY:87262}  Urinary Catheter: {Urinary Catheter:346630873}   Colostomy/Ileostomy/Ileal Conduit: {YES / BA:14857}       Date of Last BM: ***    Intake/Output Summary (Last 24 hours) at 12/2/2021 1102  Last data filed at 12/2/2021 1018  Gross per 24 hour   Intake 240 ml   Output 1600 ml   Net -1360 ml     I/O last 3 completed shifts:  In: -   Out: 1600 [Urine:1600]    Safety Concerns:     [address] MATILDA Safety Concerns:510443314}    Impairments/Disabilities:      [address] GREY Impairments/Disabilities:248947119}    Nutrition Therapy:  Current Nutrition Therapy:   [address] MATILDA Diet List:811025588}    Routes of Feeding: {CHP DME Other Feedings:683771800}  Liquids: {Slp liquid thickness:82029}  Daily Fluid Restriction: {CHP DME Yes amt example:850634728}  Last Modified Barium Swallow with Video (Video Swallowing Test): {Done Not Done [facility]:027450822}    Treatments at the Time of Hospital Discharge:   Respiratory Treatments: ***  Oxygen Therapy:  {Therapy; copd oxygen:07726}  Ventilator:    {MH CC Vent ESFV:051668635}    Rehab Therapies: Physical Therapy , Nurse, MSW  Weight Bearing Status/Restrictions: [address] [facility] Weight Bearin}  Other Medical Equipment (for information only, NOT a DME order):  {EQUIPMENT:310977220}  Other Treatments: HOME HEALTH CARE: LEVEL 3 [address] to establish plan of care for patient over 60 day period   Nursing  Initial home SN evaluation visit to occur within 24-48 hours for:  1)  medication management  2)  VS and clinical assessment  3)  S&S chronic disease exacerbation education + when to contact MD/NP  4)  care coordination  Medication Reconciliation during 1st SN visit  PT/OT/Speech   Evaluations in home within 24-48 hours of discharge to include DME and home safety   Frontload therapy 5 days, then 3x a week   OT to evaluate if patient has [address] needs for personal care    evaluation within 24-48 hours to evaluate resources & insurance for potential [place], [place], LTC, and Medicaid options   Palliative Care referral within 5 days of hospital discharge   PCP Visit scheduled within 3 - 7 days of hospital discharge    [address] (If patient is agreeable and meets guidelines)            Heart Failure Instructions for Daily Management  Patient was treated for acute on chronic systolic heart failure. she  will require the following:    Please weigh daily on the same scale and approximately the same time of day. Report weight gain of 3 pounds/day or 5 pounds/week to : [facility] [phone] and Serene Guerra [phone]. Please use hospital discharge weight as baseline reference.    Please monitor for signs and symptoms of and report to MD:  Worsening Heart Failure: sudden weight gain, shortness of breath, lower extremity or general edema/swelling, abdominal bloating/swelling, inability to lie flat, intolerance to usual activity, or cough (especially at night). Report these finding even if no increase in weight. Dehydration:  having difficulty or a decrease in urination, dizziness, worsening fatigue, or new onset/worsening of generalized weakness. Please continue a LOW SODIUM diet and LIMIT fluid intake to 48 - 64 ounces ( 1.5 - 2 liters) per day. Call [facility] [phone], Meggan Gonzalez [phone], and [facility] MD and/or [address] @ [phone] with any questions or concerns. Please continue heart failure education to patient and family/support system. See After Visit Summary for hospital follow up appointment details. Consider spiritual care referral for support and/or completion of advance directives [phone]. Consider: having the facility MD complete required 7 day follow up, [place] [address] telehealth program if patient agreeable and able to participate, and palliative care consult for ongoing goals of care, end of life, and/or chronic disease management discussions.        Patient's personal belongings (please select all that are sent with patient):  {DEVORA DME Belongings:088523831}    RN SIGNATURE:  {Esignature:338790996}    CASE MANAGEMENT/SOCIAL WORK SECTION    Inpatient Status Date: ***    Readmission Risk Assessment Score:  Readmission Risk              Risk of Unplanned Readmission:  17           Discharging to Facility/ Agency   Name:     [facility]  Address:  [address] [place] [address],  [address], [address] [address]  Phone:     [phone]  Fax:         [phone]      / signature: {Esignature:120347634}    PHYSICIAN SECTION    Prognosis: {Prognosis:0269273327}    Condition at Discharge: [address] Patient Condition:255278417}    Rehab Potential (if transferring to Rehab): {Prognosis:3379754258}    Recommended Labs or Other Treatments After Discharge: ***    Physician Certification: I certify the above information and transfer of Sherita Chan  is necessary for the continuing treatment of the diagnosis listed and that she requires {Admit to Appropriate Level of Care:88668} for {GREATER/LESS:475052194} 30 days.      Update Admission H&P: {CHP DME Changes in EEWON:541540112}    PHYSICIAN SIGNATURE:  {Esignature:632291907}

## 2021-12-02 NOTE — PROGRESS NOTES
Pt here from ED via stretcher. O2 and Heart monitor placed. SR on tele. VSS. Pt oriented to unit and room. History obtained. Orders reviewed with pt and POC disussed. Dinner tray ordered for pt. Dr Felice Blackwood at bedside to assess pt. Call light in reach. Bed alarm placed on. Denies other needs. Will monitor.    Electronically signed by Lorenzo Morin RN on 12/1/2021 at 7:32 PM

## 2021-12-02 NOTE — PROGRESS NOTES
Physician Progress Note      Willow Peacock  CSN #:                  [id number]  :                       1960  ADMIT DATE:       2021 9:59 AM  DISCH DATE:  RESPONDING  PROVIDER #:        Luiz Alvarez MD          QUERY TEXT:    Pt admitted with acute on chronic CHF. ECHO from 10/2021 shows EF 25-30%. If   possible, please document in progress notes and discharge summary further   specificity regarding the type of CHF:    The medical record reflects the following:  Risk Factors: HTN, cardiomyopathy  Clinical Indicators: BNP 37,369; CXR shows  mild vascular congestion  Treatment: Coreg, IV Lasix    Thank you,  Antoine Tovar, RN, BSN, CCDS  [email or web address]  Options provided:  -- Acute on Chronic Systolic CHF/HFrEF  -- Acute on Chronic Systolic and Diastolic CHF  -- Other - I will add my own diagnosis  -- Disagree - Not applicable / Not valid  -- Disagree - Clinically unable to determine / Unknown  -- Refer to Clinical Documentation Reviewer    PROVIDER RESPONSE TEXT:    This patient is in acute on chronic systolic and diastolic CHF. Query created by: Blaze Herbert on 2021 9:27 AM      QUERY TEXT:    Patient admitted with acute on chronic CHF. Per ED provider, sat in low 80s   on EMS arrival and placed on NRB. Changed to nasal cannula upon arrival to   hospital with sat 96% on 3.5 L. Per ED provider notes respiratory rate 32/min   with suprasternal notch retractions and pausing to speak in between words. Noted documentation of acute respiratory failure in ED notes but not in H&P.      If possible, please document in progress notes and discharge summary:    The medical record reflects the following:  Risk Factors: acute on chronic CHF  Clinical Indicators: per ED provider:  sat in low 80s on EMS arrival, placed   on NRB;  sat 96% on 3.5 L;  respiratory rate 32/min with suprasternal notch   retractions and pausing to speak in between words  Treatment: supplemental O2, monitoring O2 sat    Thank you,  Savanna Gibbons, RN, BSN, CCDS  [email or web address]  Options provided:  -- Acute respiratory failure present on admission  -- Acute respiratory failure ruled out  -- Other - I will add my own diagnosis  -- Disagree - Not applicable / Not valid  -- Disagree - Clinically unable to determine / Unknown  -- Refer to Clinical Documentation Reviewer    PROVIDER RESPONSE TEXT:    Acute respiratory failure was present on admission.     Query created by: Elio Parr on 12/2/2021 9:27 AM      Electronically signed by:  Keith Yu MD 12/2/2021 12:33 PM

## 2021-12-02 NOTE — CARE COORDINATION
DISCHARGE PLAN: Pt plans to return home upon d/c. Pt would like to resume PT, Rn and SW services through [facility]. Dgtr, Edson Dodd to transport pt home at d/c.  ___________________________________  INITIAL ASSESSMENT  Spoke w/pt to address barriers to dc. HOME: pt reported that she resides alone in an apartment. [address] the building and [address] with railing down to the apartment. Disease Specific: UTI/respiratory failure    COVID Vaccination: Yes plus booster    DME/O2: w/c, bsc, shower chair, walker and cane. Pt denied the need for any additional DME at this time. ACTIVE SERVICES: Pt reported that she remains in a w/c most of the day and reported that her dgtr assists with general cleaning, grocery shopping, emptying pts bedside commode 1x a day and will remain in the apartment while pt is bathing. Pt stated that she remains independent with bathing but will only bathe while her dgtr is in the apartment. Pt stated that she is in the process of getting set up with [employer] and is hopeful to get establish with [employer] for additional support in the home. Pt is active with [facility] and stated that she has PT,RN and SW services. Pt stated that she would like to resume services through [facility]. Pt denied the need for any additional services at this time and was adamant that she will not go to a SNF at d/c. TRANSPORTATION: Pt was driving until November of this year. Pt stated that her dgtr, Edson Dodd now transports her as needed. Edson Dodd will transport pt home at d/c. PHARMACY: Denies difficulty obtaining/taking meds. Pt has all meds filled at [facility] on [address]. PCP: Fortunato Musa    DEMOGRAPHICS: Verified address/phone number as correct    INSURANCE:  Medicare  PRESCRIPTION COVERAGE: Medicaid    HD/PD: No    THERAPY RECS Ordered-will await recommendations. Discharge planning team will remain available for needs. Please consult for any specifics not addressed in this note.     Beckie Goodman, LINDAW  [phone]  Electronically signed by Giacomo Sargent on 12/2/2021 at 11:01 AM

## 2021-12-02 NOTE — RT PROTOCOL NOTE
RT Inhaler-Nebulizer Bronchodilator Protocol Note    There is a bronchodilator order in the chart from a provider indicating to follow the RT Bronchodilator Protocol and there is an Initiate RT Inhaler-Nebulizer Bronchodilator Protocol order as well (see protocol at bottom of note). CXR Findings:  XR CHEST PORTABLE    Result Date: 12/1/2021  Borderline cardiomegaly with mild vascular congestion. The findings from the last RT Protocol Assessment were as follows:   History Pulmonary Disease: Chronic pulmonary disease  Respiratory Pattern: Dyspnea on exertion or RR 21-25 bpm  Breath Sounds: Slightly diminished and/or crackles  Cough: Strong, spontaneous, non-productive  Indication for Bronchodilator Therapy: On home bronchodilators, Decreased or absent breath sounds (home regimen )  Bronchodilator Assessment Score: 6    Aerosolized bronchodilator medication orders have been revised according to the RT Inhaler-Nebulizer Bronchodilator Protocol below. Respiratory Therapist to perform RT Therapy Protocol Assessment initially then follow the protocol. Repeat RT Therapy Protocol Assessment PRN for score 0-3 or on second treatment, BID, and PRN for scores above 3. No Indications  adjust the frequency to every 6 hours PRN wheezing or bronchospasm, if no treatments needed after 48 hours then discontinue using Per Protocol order mode. If indication present, adjust the RT bronchodilator orders based on the Bronchodilator Assessment Score as indicated below. Use Inhaler orders unless patient has one or more of the following: on home nebulizer, not able to hold breath for 10 seconds, is not alert and oriented, cannot activate and use MDI correctly, or respiratory rate 25 breaths per minute or more, then use the equivalent nebulizer order(s) with same Frequency and PRN reasons based on the score. If a patient is on this medication at home then do not decrease Frequency below that used at home.     0-3  enter or revise RT bronchodilator order(s) to equivalent RT Bronchodilator order with Frequency of every 4 hours PRN for wheezing or increased work of breathing using Per Protocol order mode. 4-6  enter or revise RT Bronchodilator order(s) to two equivalent RT bronchodilator orders with one order with BID Frequency and one order with Frequency of every 4 hours PRN wheezing or increased work of breathing using Per Protocol order mode. 7-10  enter or revise RT Bronchodilator order(s) to two equivalent RT bronchodilator orders with one order with TID Frequency and one order with Frequency of every 4 hours PRN wheezing or increased work of breathing using Per Protocol order mode. 11-13  enter or revise RT Bronchodilator order(s) to one equivalent RT bronchodilator order with QID Frequency and an Albuterol order with Frequency of every 4 hours PRN wheezing or increased work of breathing using Per Protocol order mode. Greater than 13  enter or revise RT Bronchodilator order(s) to one equivalent RT bronchodilator order with every 4 hours Frequency and an Albuterol order with Frequency of every 2 hours PRN wheezing or increased work of breathing using Per Protocol order mode. RT to enter RT Home Evaluation for COPD & MDI Assessment order using Per Protocol order mode.     Electronically signed by Cr Meek RCP on 12/2/2021 at 3:51 PM

## 2021-12-02 NOTE — CARE COORDINATION
[facility]  Patient is active with [facility], Will follow for discharge to home with orders to resume care.   Quin Olson LPN  CTN with  [facility],  [address] [place] [address], [address] [place] Cell [phone], Fax [phone]

## 2021-12-02 NOTE — CONSULTS
[address] [address]                                  CONSULTATION    PATIENT NAME: Ryley Land                        :        1960  MED REC NO:   [id number]                          ROOM:       7307  ACCOUNT NO:   [de-identified]                           ADMIT DATE: 2021  PROVIDER:     Kami Lombardi MD    CONSULT DATE:  2021    REASON FOR CONSULTATION:  Acute kidney injury. HISTORY OF PRESENTING ILLNESS:  A 71-year-old female with past medical  history significant for coronary artery disease, hypertension, chronic  kidney disease, congestive heart failure, recently discharged from the  hospital after being treated for acute kidney injury, came to [facility]  ER complaining of shortness of breath, dyspnea on exertion with  orthopnea and PND. At the time of presentation found to have a fever of  102. The patient was diagnosed with urinary tract infection and  possible congestive heart failure. Admitted for further workup and  management. Renal consultation has been called for acute-on-chronic  kidney disease. At the time of consultation, the patient is feeling weak, tired,  decreased level of energy, denies any chest pain but admits improvement  in shortness of breath and dyspnea on exertion. Admits poor appetite  and some weight loss. PAST MEDICAL HISTORY:  1.  Obesity. 2.  Hypertension. 3.  Hypercholesterolemia. 4.  Diabetes mellitus type 2.  5.  Coronary artery disease. 6.  Congestive heart failure. 7.  Chronic kidney disease stage III. PAST SURGICAL HISTORY:  1.  Status post cholecystectomy. 2.  Status post carpal tunnel surgery. OUTPATIENT MEDICATIONS:  Include Coreg, Bactrim, Cordarone, Lipitor,  potassium, Lasix, Protonix, Proventil, Neurontin. ALLERGIES:  ACE INHIBITOR, DICLOFENAC, LOSARTAN, and VICODIN. SOCIAL HISTORY:  Does not smoke or drink.   Used to smoke, quit sometime  ago. FAMILY HISTORY:  Positive for diabetes and hypertension. REVIEW OF SYSTEMS:  Denies any headache. No hearing problem. No vision  problem. Has some nausea but no vomiting or diarrhea. Denies any chest  pain but has some shortness of breath and dyspnea on exertion. Complaining of fever, chills and rigors. Feeling a bit anxious and  depressed. PHYSICAL EXAMINATION:  GENERAL:  Lying in bed, looks comfortable. VITAL SIGNS:  Blood pressure 124/50, pulse 63, temperature 97.2. HEENT:  Pupils reactive to the light. CHEST:  Decreased breath sounds in both bases. Few scattered rhonchi. CVS:  S1, S2 audible. Regular rate and rhythm. ABDOMEN:  Soft, nontender. Positive bowel sounds. EXTREMITIES:  1+ edema. CNS:  Nonfocal.    LABORATORY DATA:  Sodium 130, potassium 4.1, chloride 96, bicarb 23, BUN  20, creatinine 1.9. , hemoglobin 10.1. IMPRESSION:  1. Acute kidney injury, most likely secondary to (i) urinary tract  infection; (ii) congestive heart failure; (iii) Bactrim (?). 2.  Chronic kidney disease stage III. 3.  Diabetes mellitus type 2.  4.  Urinary tract infection. PLAN:  1. Daily weight. 2.  Strict I's and O's.  3.  Continue with antibiotic. 4.  Judicious use of diuretics, dose adjusted. 5.  Medication reviewed and adjusted. We will follow the patient from a renal standpoint. Thank you very much for the consultation.         Lenora Polo MD    D: 12/02/2021 9:30:44       T: 12/02/2021 13:35:40     [email or web address]  Job#: [id number]     Doc#: [id number]    CC:

## 2021-12-02 NOTE — PROGRESS NOTES
Department of Internal Medicine  Nephrology Progress Note    SUBJECTIVE:  We are following this patient for AIDA  . Patient progress reviewed. REVIEW OF SYSTEMS:  Improved SOB/HEATH      Physical Exam:    VITALS:  BP (!) 124/50   Pulse 63   Temp 97.2 °F (36.2 °C) (Oral)   Resp 20   Ht 5' 7\" (1.702 m)   Wt 246 lb 4.1 oz (111.7 kg)   SpO2 93%   BMI 38.57 kg/m²   24HR INTAKE/OUTPUT:    Intake/Output Summary (Last 24 hours) at 12/2/2021 1144  Last data filed at 12/2/2021 1127  Gross per 24 hour   Intake 313.75 ml   Output 1600 ml   Net -1286.25 ml       Constitutional:  Doing well  Respiratory:  Decrease BS At bases   Gastrointestinal:  No tenderness.   Normal Bowel Sounds  Cardiovascular:  S1, S2 RRR   Edema:  +  edema    DATA:    CBC:  Lab Results   Component Value Date    WBC 2.4 12/02/2021    RBC 4.04 12/02/2021    RBC 5.12 05/26/2017    HGB 10.1 12/02/2021    HCT 31.6 12/02/2021    MCV 78.3 12/02/2021    MCH 24.9 12/02/2021    MCHC 31.8 12/02/2021    RDW 17.4 12/02/2021     12/02/2021    MPV 9.6 12/02/2021     CMP:  Lab Results   Component Value Date     12/02/2021    K 4.1 12/02/2021    CL 96 12/02/2021    CO2 23 12/02/2021    BUN 20 12/02/2021    CREATININE 1.9 12/02/2021    GFRAA 33 12/02/2021    GFRAA >60 01/17/2013    AGRATIO 1.5 12/01/2021    LABGLOM 27 12/02/2021    GLUCOSE 167 12/02/2021    GLUCOSE 154 06/05/2015    PROT 6.4 12/02/2021    PROT 7.6 06/05/2015    CALCIUM 9.2 12/02/2021    BILITOT 0.6 12/02/2021    ALKPHOS 76 12/02/2021    AST 10 12/02/2021    ALT 13 12/02/2021      Hepatic Function Panel:   Lab Results   Component Value Date    ALKPHOS 76 12/02/2021    ALT 13 12/02/2021    AST 10 12/02/2021    PROT 6.4 12/02/2021    PROT 7.6 06/05/2015    BILITOT 0.6 12/02/2021    BILIDIR <0.2 12/02/2021    IBILI see below 12/02/2021      Phosphorus: No results found for: PHOS    ASSESSMENT:  Active Problems:    Congestive heart failure with left ventricular dysfunction (HCC)  Resolved Problems:    * No resolved hospital problems. *      PLAN:  1. AIDA Sec to UTI/Cradio renal.   Daily wt  ,Strict I/O advised  Off bactrim . 2. CHF  On iv  diuretics . Dose adjusted . 3. HTN controlled   4. UTI on abx    5.  Rudy Franklin MD, FACP

## 2021-12-02 NOTE — ACP (ADVANCE CARE PLANNING)
Advance Care Planning     Advance Care Planning Activator (Inpatient)  Conversation Note      Date of ACP Conversation: 12/2/2021     Conversation Conducted with: Patient with Decision Making Capacity    ACP Activator: [address] Maker:     Current Designated Health Care Decision Maker:     Primary Decision Maker: Georgia Oliveira - Andrsé - [phone]    Primary Decision Maker: Adam Green - [phone]    Today we documented Decision Maker(s) consistent with Legal Next of Kin hierarchy. Care Preferences    Ventilation: \"If you were in your present state of health and suddenly became very ill and were unable to breathe on your own, what would your preference be about the use of a ventilator (breathing machine) if it were available to you? \"      Would the patient desire the use of ventilator (breathing machine)?: yes    \"If your health worsens and it becomes clear that your chance of recovery is unlikely, what would your preference be about the use of a ventilator (breathing machine) if it were available to you? \"     Would the patient desire the use of ventilator (breathing machine)?: No      Resuscitation  \"CPR works best to restart the heart when there is a sudden event, like a heart attack, in someone who is otherwise healthy. Unfortunately, CPR does not typically restart the heart for people who have serious health conditions or who are very sick. \"    \"In the event your heart stopped as a result of an underlying serious health condition, would you want attempts to be made to restart your heart (answer \"yes\" for attempt to resuscitate) or would you prefer a natural death (answer \"no\" for do not attempt to resuscitate)? \" yes       [] Yes   [x] No   Educated Patient / [facility] regarding differences between Advance Directives and portable DNR orders.     Length of ACP Conversation in minutes:  5  Conversation Outcomes:  [x] ACP discussion completed  [] Existing advance directive reviewed with patient; no changes to patient's previously recorded wishes  [] New Advance Directive completed  [] Portable Do Not Rescitate prepared for Provider review and signature  [] POLST/POST/MOLST/MOST prepared for Provider review and signature      Follow-up plan:    [] Schedule follow-up conversation to continue planning  [x] Referred individual to Provider for additional questions/concerns   [] Advised patient/agent/surrogate to review completed ACP document and update if needed with changes in condition, patient preferences or care setting    [x] This note routed to one or more involved healthcare providers    Electronically signed by Mustapha Darden on 12/2/2021 at 11:04 AM

## 2021-12-02 NOTE — PROGRESS NOTES
Progress Note  Admit Date: 12/1/2021       CC:   Chief Complaint   Patient presents with    Shortness of Breath    Fever       Overnight Events: No acute overnight events. Pt feels better compared to yesterday. Has never noticed edema of her extremities. Today she is not dyspneic but is a bit fatigued. Endorses having minor urinary symptoms. Interval History:   62 y.o. female with history of chf presents after noticing increased sob first noted 2 evenings ago. She denies urinary hesitancy but notes a sensation of incomplete voiding. She denies chest pain, cough, fever, chills, lower extremity edema. She noted worsening sob at night, however denies orthopnea. She woke this am with a fever of 102, worsening sob, prompting er consultation    Review of Systems  General appearance: alert, appears stated age and cooperative  Skin: Skin color, texture, normal. No rashes or lesions  HEENT: No nose bleed, headache, vision problems  CV: No chest pain, tightness, pressure,   Respiratory: + SOB, HEATH. No Orthopnea, PND  GI: No abdominal pain, black stool, bloating  Limbs: No c/o edema, pain, swelling, intermittent claudication, joint pains  Neuro: No dizziness, lightheadedness, syncope, gait problems, memory problems  Psych: grossly normal. No SI/depression.     Scheduled Medications:    albuterol sulfate HFA  2 puff Inhalation 4x daily    amiodarone  200 mg Oral Daily    aspirin  325 mg Oral Daily    atorvastatin  40 mg Oral Daily    gabapentin  600 mg Oral TID    magnesium oxide  400 mg Oral BID    pantoprazole  40 mg Oral Daily    sodium chloride flush  5-40 mL IntraVENous 2 times per day    enoxaparin  40 mg SubCUTAneous Daily    furosemide  40 mg IntraVENous TID    carvedilol  12.5 mg Oral BID    tiotropium  2 puff Inhalation Daily      PRN Medications: albuterol, hydrocortisone, sodium chloride flush, sodium chloride, ondansetron **OR** ondansetron, polyethylene glycol, acetaminophen **OR** acetaminophen  Diet: ADULT DIET; Regular; Low Sodium (2 gm); 1500 ml    Continuous Infusions:   sodium chloride         PHYSICAL EXAM:  BP (!) 116/53   Pulse (!) 46   Temp 98 °F (36.7 °C) (Oral)   Resp 18   Ht 5' 7\" (1.702 m)   Wt 246 lb 4.1 oz (111.7 kg)   SpO2 96%   BMI 38.57 kg/m²   Recent Labs     12/01/21  2114   POCGLU 244*       Intake/Output Summary (Last 24 hours) at 12/2/2021 5914  Last data filed at 12/1/2021 2120  Gross per 24 hour   Intake    Output 1600 ml   Net -1600 ml       General appearance: No apparent distress, appears stated age and cooperative. HEENT: Pupils equal, round, and reactive to light. Conjunctivae/corneas clear. Neck: Supple, with full range of motion. No jugular venous distention. Trachea midline. Respiratory:  Normal respiratory effort. Minor crackles in LLL  Cardiovascular: Regular rate and rhythm with normal S1/S2 without murmurs, rubs or gallops. Abdomen: Soft, non-tender, non-distended with normal bowel sounds. Musculoskeletal: No clubbing, cyanosis or edema bilaterally. Full range of motion without deformity. Skin: Skin color, texture, turgor normal.  No rashes or lesions. Neurologic:  Neurovascularly intact without any focal sensory/motor deficits.  Cranial nerves: II-XII intact, grossly non-focal.  Psychiatric: Alert and oriented, thought content appropriate, normal insight    LABS:  Recent Labs     12/01/21  1046 12/02/21  0454   WBC 7.4 2.4*   HGB 11.9* 10.1*   HCT 37.4 31.6*    288                                                                    Recent Labs     12/01/21  1046 12/02/21  0454   * 130*   K 4.7 4.1   CL 93* 96*   CO2 22 23   BUN 12 20   CREATININE 1.7* 1.9*   GLUCOSE 128* 167*     Recent Labs     12/01/21  1046 12/02/21  0454   AST 13* 10*   ALT 16 13   BILITOT 0.8 0.6   ALKPHOS 96 76     Recent Labs     12/01/21  1046 12/01/21  1635 12/01/21  1941   TROPONINI <0.01 <0.01 <0.01     No results for input(s): BNP in the last 72 hours. No results for input(s): CHOL, HDL in the last 72 hours. Invalid input(s): LDLCALCU  No results for input(s): INR in the last 72 hours. Assessment & Plan:      Acute on chronic systolic CHF exacerbation - TTE 10/12/2021 with EF 25-30%, mod to severe MR  - Lasix 40 mg IV BID  - Continue Coreg 12.5mg BID    Cardiorenal syndrome  - Pt's baseline Cr 1.5 - 1.7. Today 1.9  - Nephrology consulted  - Continue diuresis    Acute cystitis - Started on Bactrim as outpt on 11/30.   - Started Rocephin today  - f/u UCx    Paroxysmal VT  - Treated with amiodarone  - Pt has PPM    DVT Prophylaxis: lovenox  Diet: ADULT DIET; Regular; Low Sodium (2 gm); 1500 ml  Code Status: Full Code    The patient and / or the family were informed of the results of any tests, a time was given to answer questions, a plan was proposed and they agreed with plan.     Disposition: Inpt pending clinical improvement    Full Code      Puja Fabian MD   Internal Medicine  12/2/2021 8:21 AM  Reach via [employer]

## 2021-12-03 LAB
ALBUMIN SERPL-MCNC: 3.6 G/DL (ref 3.4–5)
ANION GAP SERPL CALCULATED.3IONS-SCNC: 15 MMOL/L (ref 3–16)
BUN BLDV-MCNC: 24 MG/DL (ref 7–20)
CALCIUM SERPL-MCNC: 9.2 MG/DL (ref 8.3–10.6)
CHLORIDE BLD-SCNC: 97 MMOL/L (ref 99–110)
CO2: 24 MMOL/L (ref 21–32)
CREAT SERPL-MCNC: 1.9 MG/DL (ref 0.6–1.2)
GFR AFRICAN AMERICAN: 33
GFR NON-AFRICAN AMERICAN: 27
GLUCOSE BLD-MCNC: 126 MG/DL (ref 70–99)
HCT VFR BLD CALC: 30.3 % (ref 36–48)
HEMOGLOBIN: 9.8 G/DL (ref 12–16)
MAGNESIUM: 2.2 MG/DL (ref 1.8–2.4)
MCH RBC QN AUTO: 25.1 PG (ref 26–34)
MCHC RBC AUTO-ENTMCNC: 32.5 G/DL (ref 31–36)
MCV RBC AUTO: 77 FL (ref 80–100)
PDW BLD-RTO: 16.9 % (ref 12.4–15.4)
PHOSPHORUS: 3.9 MG/DL (ref 2.5–4.9)
PLATELET # BLD: 261 K/UL (ref 135–450)
PMV BLD AUTO: 9.4 FL (ref 5–10.5)
POTASSIUM SERPL-SCNC: 4.3 MMOL/L (ref 3.5–5.1)
PRO-BNP: ABNORMAL PG/ML (ref 0–124)
RBC # BLD: 3.93 M/UL (ref 4–5.2)
SODIUM BLD-SCNC: 136 MMOL/L (ref 136–145)
WBC # BLD: 5.8 K/UL (ref 4–11)

## 2021-12-03 PROCEDURE — 80069 RENAL FUNCTION PANEL: CPT

## 2021-12-03 PROCEDURE — 6360000002 HC RX W HCPCS

## 2021-12-03 PROCEDURE — 83735 ASSAY OF MAGNESIUM: CPT

## 2021-12-03 PROCEDURE — 2580000003 HC RX 258: Performed by: HOSPITALIST

## 2021-12-03 PROCEDURE — 85027 COMPLETE CBC AUTOMATED: CPT

## 2021-12-03 PROCEDURE — 94761 N-INVAS EAR/PLS OXIMETRY MLT: CPT

## 2021-12-03 PROCEDURE — 6360000002 HC RX W HCPCS: Performed by: HOSPITALIST

## 2021-12-03 PROCEDURE — 94640 AIRWAY INHALATION TREATMENT: CPT

## 2021-12-03 PROCEDURE — 94660 CPAP INITIATION&MGMT: CPT

## 2021-12-03 PROCEDURE — 2580000003 HC RX 258: Performed by: STUDENT IN AN ORGANIZED HEALTH CARE EDUCATION/TRAINING PROGRAM

## 2021-12-03 PROCEDURE — 6360000002 HC RX W HCPCS: Performed by: INTERNAL MEDICINE

## 2021-12-03 PROCEDURE — 83880 ASSAY OF NATRIURETIC PEPTIDE: CPT

## 2021-12-03 PROCEDURE — 2060000000 HC ICU INTERMEDIATE R&B

## 2021-12-03 PROCEDURE — 6360000002 HC RX W HCPCS: Performed by: STUDENT IN AN ORGANIZED HEALTH CARE EDUCATION/TRAINING PROGRAM

## 2021-12-03 PROCEDURE — 36415 COLL VENOUS BLD VENIPUNCTURE: CPT

## 2021-12-03 PROCEDURE — 6370000000 HC RX 637 (ALT 250 FOR IP): Performed by: INTERNAL MEDICINE

## 2021-12-03 PROCEDURE — 6370000000 HC RX 637 (ALT 250 FOR IP): Performed by: HOSPITALIST

## 2021-12-03 RX ORDER — ALBUTEROL SULFATE 2.5 MG/3ML
SOLUTION RESPIRATORY (INHALATION)
Status: COMPLETED
Start: 2021-12-03 | End: 2021-12-03

## 2021-12-03 RX ORDER — TORSEMIDE 20 MG/1
20 TABLET ORAL DAILY
Status: DISCONTINUED | OUTPATIENT
Start: 2021-12-04 | End: 2021-12-06 | Stop reason: HOSPADM

## 2021-12-03 RX ADMIN — SODIUM CHLORIDE, PRESERVATIVE FREE 10 ML: 5 INJECTION INTRAVENOUS at 20:56

## 2021-12-03 RX ADMIN — ALBUTEROL SULFATE 2 PUFF: 90 AEROSOL, METERED RESPIRATORY (INHALATION) at 15:55

## 2021-12-03 RX ADMIN — ALBUTEROL SULFATE 2.5 MG: 2.5 SOLUTION RESPIRATORY (INHALATION) at 09:31

## 2021-12-03 RX ADMIN — Medication 400 MG: at 10:04

## 2021-12-03 RX ADMIN — CEFTRIAXONE 1000 MG: 1 INJECTION, POWDER, FOR SOLUTION INTRAMUSCULAR; INTRAVENOUS at 10:10

## 2021-12-03 RX ADMIN — GABAPENTIN 600 MG: 300 CAPSULE ORAL at 10:04

## 2021-12-03 RX ADMIN — Medication 400 MG: at 20:56

## 2021-12-03 RX ADMIN — TIOTROPIUM BROMIDE INHALATION SPRAY 2 PUFF: 3.12 SPRAY, METERED RESPIRATORY (INHALATION) at 09:43

## 2021-12-03 RX ADMIN — CARVEDILOL 12.5 MG: 6.25 TABLET, FILM COATED ORAL at 10:04

## 2021-12-03 RX ADMIN — GABAPENTIN 600 MG: 300 CAPSULE ORAL at 20:56

## 2021-12-03 RX ADMIN — CARVEDILOL 12.5 MG: 6.25 TABLET, FILM COATED ORAL at 20:56

## 2021-12-03 RX ADMIN — SODIUM CHLORIDE, PRESERVATIVE FREE 10 ML: 5 INJECTION INTRAVENOUS at 10:04

## 2021-12-03 RX ADMIN — ALBUTEROL SULFATE 2.5 MG: 2.5 SOLUTION RESPIRATORY (INHALATION) at 19:42

## 2021-12-03 RX ADMIN — FUROSEMIDE 40 MG: 10 INJECTION, SOLUTION INTRAMUSCULAR; INTRAVENOUS at 10:03

## 2021-12-03 RX ADMIN — AMIODARONE HYDROCHLORIDE 200 MG: 200 TABLET ORAL at 20:56

## 2021-12-03 RX ADMIN — ASPIRIN 325 MG ORAL TABLET 325 MG: 325 PILL ORAL at 10:04

## 2021-12-03 RX ADMIN — GABAPENTIN 600 MG: 300 CAPSULE ORAL at 15:00

## 2021-12-03 RX ADMIN — ATORVASTATIN CALCIUM 40 MG: 40 TABLET, FILM COATED ORAL at 20:56

## 2021-12-03 RX ADMIN — SODIUM CHLORIDE 25 ML: 9 INJECTION, SOLUTION INTRAVENOUS at 10:10

## 2021-12-03 RX ADMIN — ALBUTEROL SULFATE 2 PUFF: 90 AEROSOL, METERED RESPIRATORY (INHALATION) at 09:41

## 2021-12-03 RX ADMIN — ENOXAPARIN SODIUM 40 MG: 100 INJECTION SUBCUTANEOUS at 10:04

## 2021-12-03 RX ADMIN — PANTOPRAZOLE SODIUM 40 MG: 40 TABLET, DELAYED RELEASE ORAL at 10:04

## 2021-12-03 RX ADMIN — ALBUTEROL SULFATE 2 PUFF: 90 AEROSOL, METERED RESPIRATORY (INHALATION) at 12:27

## 2021-12-03 ASSESSMENT — PAIN SCALES - GENERAL
PAINLEVEL_OUTOF10: 2
PAINLEVEL_OUTOF10: 0

## 2021-12-03 NOTE — PROGRESS NOTES
Progress Note  Admit Date: 12/1/2021       CC:   Chief Complaint   Patient presents with    Shortness of Breath    Fever       Overnight Events: Pt was complaining of discomfort with vann. Removed early this morning. Has not urinated yet. No c/o fevers, chills, dyspnea, chest pain, edema. Feels well. Interval History:   62 y.o. female with history of chf presents after noticing increased sob first noted 2 evenings ago. She denies urinary hesitancy but notes a sensation of incomplete voiding. She denies chest pain, cough, fever, chills, lower extremity edema. She noted worsening sob at night, however denies orthopnea. She woke this am with a fever of 102, worsening sob, prompting er consultation    Review of Systems  General appearance: alert, appears stated age and cooperative  Skin: Skin color, texture, normal. No rashes or lesions  HEENT: No nose bleed, headache, vision problems  CV: No chest pain, tightness, pressure,   Respiratory: + SOB, HEATH. No Orthopnea, PND  GI: No abdominal pain, black stool, bloating  Limbs: No c/o edema, pain, swelling, intermittent claudication, joint pains  Neuro: No dizziness, lightheadedness, syncope, gait problems, memory problems  Psych: grossly normal. No SI/depression.     Scheduled Medications:    cefTRIAXone (ROCEPHIN) IV  1,000 mg IntraVENous Q24H    furosemide  40 mg IntraVENous BID    albuterol sulfate HFA  2 puff Inhalation 4x daily    amiodarone  200 mg Oral Daily    aspirin  325 mg Oral Daily    atorvastatin  40 mg Oral Daily    gabapentin  600 mg Oral TID    magnesium oxide  400 mg Oral BID    pantoprazole  40 mg Oral Daily    sodium chloride flush  5-40 mL IntraVENous 2 times per day    enoxaparin  40 mg SubCUTAneous Daily    carvedilol  12.5 mg Oral BID    tiotropium  2 puff Inhalation Daily      PRN Medications: albuterol, hydrocortisone, sodium chloride flush, sodium chloride, ondansetron **OR** ondansetron, polyethylene glycol, acetaminophen **OR** acetaminophen  Diet: ADULT DIET; Regular; Low Sodium (2 gm); 1500 ml    Continuous Infusions:   sodium chloride 25 mL (12/03/21 1010)       PHYSICAL EXAM:  BP (!) 102/57   Pulse 61   Temp 97.6 °F (36.4 °C) (Oral)   Resp 16   Ht 5' 7\" (1.702 m)   Wt 254 lb 6.6 oz (115.4 kg)   SpO2 98%   BMI 39.85 kg/m²   Recent Labs     12/01/21  2114 12/02/21  0812   POCGLU 244* 170*       Intake/Output Summary (Last 24 hours) at 12/3/2021 1021  Last data filed at 12/3/2021 4265  Gross per 24 hour   Intake 553.75 ml   Output 3000 ml   Net -2446.25 ml       General appearance: No apparent distress, appears stated age and cooperative. HEENT: Pupils equal, round, and reactive to light. Conjunctivae/corneas clear. Neck: Supple, with full range of motion. No jugular venous distention. Trachea midline. Respiratory:  Normal respiratory effort. Soft bibasilar crackles on expiration  Cardiovascular: Regular rate and rhythm with normal S1/S2 without murmurs, rubs or gallops. Abdomen: Soft, non-tender, non-distended with normal bowel sounds. Musculoskeletal: No clubbing, cyanosis or edema bilaterally. Full range of motion without deformity. Skin: Skin color, texture, turgor normal.  No rashes or lesions. Neurologic:  Neurovascularly intact without any focal sensory/motor deficits.  Cranial nerves: II-XII intact, grossly non-focal.  Psychiatric: Alert and oriented, thought content appropriate, normal insight    LABS:  Recent Labs     12/01/21  1046 12/02/21  0454 12/03/21  0500   WBC 7.4 2.4* 5.8   HGB 11.9* 10.1* 9.8*   HCT 37.4 31.6* 30.3*    288 261                                                                    Recent Labs     12/01/21  1046 12/02/21  0454 12/03/21  0500   * 130* 136   K 4.7 4.1 4.3   CL 93* 96* 97*   CO2 22 23 24   BUN 12 20 24*   CREATININE 1.7* 1.9* 1.9*   GLUCOSE 128* 167* 126*     Recent Labs     12/01/21  1046 12/02/21  0454   AST 13* 10*   ALT 16 13   BILITOT 0.8 0.6   ALKPHOS 96 68     Recent Labs     12/01/21  1046 12/01/21  1635 12/01/21  1941   TROPONINI <0.01 <0.01 <0.01     No results for input(s): BNP in the last 72 hours. No results for input(s): CHOL, HDL in the last 72 hours. Invalid input(s): LDLCALCU  No results for input(s): INR in the last 72 hours. Assessment & Plan:      Acute on chronic systolic CHF exacerbation - TTE 10/12/2021 with EF 25-30%, mod to severe MR  - Lasix 40 mg IV BID  - Continue Coreg 12.5mg BID  - ProBNP down since admission to 31k, however still significantly above 'baseline' of around 8k. Would like to see come down a bit prior to dischage. Pt still with minor crackles at bases of lungs. She had excellent diuresis yesterday without change to her kidney profile. Will continue diuresis for today with likely discharge on oral Lasix tomorrow. - f/u Pro-BNP tomorrow morning     Cardiorenal syndrome  - Pt's baseline Cr 1.5 - 1.7. Today 1.9  - Nephrology consulted  - Continue diuresis    Acute cystitis - Started on Bactrim as outpt on 11/30.   - Continue Rocephin (Day 2/3)  - UCx grew 50k mixed simon. Will continue rocephin for today and tomorrow d/t initial symptoms and will not need abx on discharge    Paroxysmal VT  - Treated with amiodarone  - Pt has PPM    DVT Prophylaxis: lovenox  Diet: ADULT DIET; Regular; Low Sodium (2 gm); 1500 ml    The patient and / or the family were informed of the results of any tests, a time was given to answer questions, a plan was proposed and they agreed with plan.     Disposition: Likely discharge tomorrow    Full Code      Ashutosh Mendez MD   Internal Medicine  12/3/2021 10:21 AM  Reach via [employer]

## 2021-12-03 NOTE — PROGRESS NOTES
Patient complaining of burning and pressure of vann catheter. Patient currently has a UTI and hen repositioning patient states it is relieved at times. Vann care given again and patient states it helped with burning. Will continue to monitor symptoms and let day shift nurse aware. Vann possibly need to be discontinued if continues to have issues. 4120- Patient requested Vann be removed. Catheter out at this time and patient feels relief. Patient able to pivot to bedside commode and is continent.

## 2021-12-04 LAB
ALBUMIN SERPL-MCNC: 3.4 G/DL (ref 3.4–5)
ANION GAP SERPL CALCULATED.3IONS-SCNC: 14 MMOL/L (ref 3–16)
BUN BLDV-MCNC: 31 MG/DL (ref 7–20)
CALCIUM SERPL-MCNC: 8.7 MG/DL (ref 8.3–10.6)
CHLORIDE BLD-SCNC: 97 MMOL/L (ref 99–110)
CO2: 25 MMOL/L (ref 21–32)
CREAT SERPL-MCNC: 2.1 MG/DL (ref 0.6–1.2)
GFR AFRICAN AMERICAN: 29
GFR NON-AFRICAN AMERICAN: 24
GLUCOSE BLD-MCNC: 85 MG/DL (ref 70–99)
PHOSPHORUS: 4 MG/DL (ref 2.5–4.9)
POTASSIUM SERPL-SCNC: 4.3 MMOL/L (ref 3.5–5.1)
PRO-BNP: ABNORMAL PG/ML (ref 0–124)
SODIUM BLD-SCNC: 136 MMOL/L (ref 136–145)

## 2021-12-04 PROCEDURE — 6370000000 HC RX 637 (ALT 250 FOR IP): Performed by: HOSPITALIST

## 2021-12-04 PROCEDURE — 6360000002 HC RX W HCPCS: Performed by: HOSPITALIST

## 2021-12-04 PROCEDURE — 6370000000 HC RX 637 (ALT 250 FOR IP): Performed by: INTERNAL MEDICINE

## 2021-12-04 PROCEDURE — 2580000003 HC RX 258: Performed by: HOSPITALIST

## 2021-12-04 PROCEDURE — 94760 N-INVAS EAR/PLS OXIMETRY 1: CPT

## 2021-12-04 PROCEDURE — 2060000000 HC ICU INTERMEDIATE R&B

## 2021-12-04 PROCEDURE — 2580000003 HC RX 258: Performed by: STUDENT IN AN ORGANIZED HEALTH CARE EDUCATION/TRAINING PROGRAM

## 2021-12-04 PROCEDURE — 2700000000 HC OXYGEN THERAPY PER DAY

## 2021-12-04 PROCEDURE — 6360000002 HC RX W HCPCS: Performed by: STUDENT IN AN ORGANIZED HEALTH CARE EDUCATION/TRAINING PROGRAM

## 2021-12-04 PROCEDURE — 80069 RENAL FUNCTION PANEL: CPT

## 2021-12-04 PROCEDURE — 36415 COLL VENOUS BLD VENIPUNCTURE: CPT

## 2021-12-04 PROCEDURE — 94640 AIRWAY INHALATION TREATMENT: CPT

## 2021-12-04 PROCEDURE — 83880 ASSAY OF NATRIURETIC PEPTIDE: CPT

## 2021-12-04 RX ORDER — ALBUTEROL SULFATE 90 UG/1
2 AEROSOL, METERED RESPIRATORY (INHALATION) EVERY 4 HOURS PRN
Status: DISCONTINUED | OUTPATIENT
Start: 2021-12-04 | End: 2021-12-06 | Stop reason: HOSPADM

## 2021-12-04 RX ORDER — ALBUTEROL SULFATE 2.5 MG/3ML
2.5 SOLUTION RESPIRATORY (INHALATION) 4 TIMES DAILY
Status: DISCONTINUED | OUTPATIENT
Start: 2021-12-04 | End: 2021-12-06 | Stop reason: HOSPADM

## 2021-12-04 RX ADMIN — ALBUTEROL SULFATE 2.5 MG: 2.5 SOLUTION RESPIRATORY (INHALATION) at 16:06

## 2021-12-04 RX ADMIN — ENOXAPARIN SODIUM 40 MG: 100 INJECTION SUBCUTANEOUS at 09:03

## 2021-12-04 RX ADMIN — TIOTROPIUM BROMIDE INHALATION SPRAY 2 PUFF: 3.12 SPRAY, METERED RESPIRATORY (INHALATION) at 09:21

## 2021-12-04 RX ADMIN — ATORVASTATIN CALCIUM 40 MG: 40 TABLET, FILM COATED ORAL at 21:28

## 2021-12-04 RX ADMIN — ALBUTEROL SULFATE 2.5 MG: 2.5 SOLUTION RESPIRATORY (INHALATION) at 00:50

## 2021-12-04 RX ADMIN — CARVEDILOL 12.5 MG: 6.25 TABLET, FILM COATED ORAL at 09:03

## 2021-12-04 RX ADMIN — SODIUM CHLORIDE, PRESERVATIVE FREE 10 ML: 5 INJECTION INTRAVENOUS at 10:00

## 2021-12-04 RX ADMIN — CEFTRIAXONE 1000 MG: 1 INJECTION, POWDER, FOR SOLUTION INTRAMUSCULAR; INTRAVENOUS at 10:00

## 2021-12-04 RX ADMIN — GABAPENTIN 600 MG: 300 CAPSULE ORAL at 21:28

## 2021-12-04 RX ADMIN — AMIODARONE HYDROCHLORIDE 200 MG: 200 TABLET ORAL at 21:28

## 2021-12-04 RX ADMIN — SODIUM CHLORIDE, PRESERVATIVE FREE 10 ML: 5 INJECTION INTRAVENOUS at 21:28

## 2021-12-04 RX ADMIN — TORSEMIDE 20 MG: 20 TABLET ORAL at 09:03

## 2021-12-04 RX ADMIN — PANTOPRAZOLE SODIUM 40 MG: 40 TABLET, DELAYED RELEASE ORAL at 09:03

## 2021-12-04 RX ADMIN — GABAPENTIN 600 MG: 300 CAPSULE ORAL at 14:31

## 2021-12-04 RX ADMIN — ASPIRIN 325 MG ORAL TABLET 325 MG: 325 PILL ORAL at 09:03

## 2021-12-04 RX ADMIN — Medication 400 MG: at 21:28

## 2021-12-04 RX ADMIN — Medication 400 MG: at 09:03

## 2021-12-04 RX ADMIN — ALBUTEROL SULFATE 2.5 MG: 2.5 SOLUTION RESPIRATORY (INHALATION) at 09:21

## 2021-12-04 RX ADMIN — CARVEDILOL 12.5 MG: 6.25 TABLET, FILM COATED ORAL at 21:28

## 2021-12-04 RX ADMIN — GABAPENTIN 600 MG: 300 CAPSULE ORAL at 09:03

## 2021-12-04 NOTE — PROGRESS NOTES
Department of Internal Medicine  Nephrology Progress Note    HISTORY OF PRESENTING ILLNESS:  A 62-year-old female with past medical  history significant for coronary artery disease, hypertension, chronic  kidney disease, congestive heart failure, recently discharged from the  hospital after being treated for acute kidney injury, came to [facility]  ER complaining of shortness of breath, dyspnea on exertion with  orthopnea and PND. At the time of presentation found to have a fever of  102. The patient was diagnosed with urinary tract infection and  possible congestive heart failure. Admitted for further workup and  management. Renal consultation has been called for acute-on-chronic  kidney disease.     At the time of consultation, the patient is feeling weak, tired,  decreased level of energy, denies any chest pain but admits improvement  in shortness of breath and dyspnea on exertion. Admits poor appetite  and some weight loss. HPI:  Breathing stable. No CP.  ROS:  In bed. [address]:  medications reviewed. Physical Exam:    VITALS:  BP (!) 109/59   Pulse 54   Temp 97.2 °F (36.2 °C) (Oral)   Resp 18   Ht 5' 7\" (1.702 m)   Wt 252 lb 3.3 oz (114.4 kg)   SpO2 95%   BMI 39.50 kg/m²   24HR INTAKE/OUTPUT:      Intake/Output Summary (Last 24 hours) at 12/4/2021 1826  Last data filed at 12/4/2021 1400  Gross per 24 hour   Intake 336.78 ml   Output 600 ml   Net -263.22 ml       Constitutional:  Looks comfortable   Respiratory:  Decrease BS at bases   Gastrointestinal:  No  tenderness.   Normal Bowel Sounds  Cardiovascular:  S1, S2 RRR   Edema:  + edema    DATA:    CBC:  Lab Results   Component Value Date    WBC 5.8 12/03/2021    RBC 3.93 12/03/2021    RBC 5.12 05/26/2017    HGB 9.8 12/03/2021    HCT 30.3 12/03/2021    MCV 77.0 12/03/2021    MCH 25.1 12/03/2021    MCHC 32.5 12/03/2021    RDW 16.9 12/03/2021     12/03/2021    MPV 9.4 12/03/2021     CMP:  Lab Results   Component Value Date     12/04/2021    K 4.3 12/04/2021    K 4.1 12/02/2021    CL 97 12/04/2021    CO2 25 12/04/2021    BUN 31 12/04/2021    CREATININE 2.1 12/04/2021    GFRAA 29 12/04/2021    GFRAA >60 01/17/2013    AGRATIO 1.5 12/01/2021    LABGLOM 24 12/04/2021    GLUCOSE 85 12/04/2021    GLUCOSE 154 06/05/2015    PROT 6.4 12/02/2021    PROT 7.6 06/05/2015    CALCIUM 8.7 12/04/2021    BILITOT 0.6 12/02/2021    ALKPHOS 76 12/02/2021    AST 10 12/02/2021    ALT 13 12/02/2021      Hepatic Function Panel:   Lab Results   Component Value Date    ALKPHOS 76 12/02/2021    ALT 13 12/02/2021    AST 10 12/02/2021    PROT 6.4 12/02/2021    PROT 7.6 06/05/2015    BILITOT 0.6 12/02/2021    BILIDIR <0.2 12/02/2021    IBILI see below 12/02/2021      Phosphorus:   Lab Results   Component Value Date    PHOS 4.0 12/04/2021       ASSESSMENT/PLAN:    1. AIDA on CKD   - due to UTI/CRS   - off bactrim   - unclear significance of slightly higher Cr    2. ACSHF    - on demadex    3. HTN controlled     4.  UTI  - on ceftriaxone

## 2021-12-04 NOTE — PLAN OF CARE
Problem: Falls - Risk of:  Goal: Will remain free from falls  Description: Will remain free from falls  12/4/2021 0339 by Giancarlo Orozco RN  Outcome: Ongoing  12/3/2021 1817 by Rabia Lloyd RN  Outcome: Ongoing  Goal: Absence of physical injury  Description: Absence of physical injury  12/4/2021 0339 by Giancarlo Orozco RN  Outcome: Ongoing  12/3/2021 1817 by Rabia Lloyd RN  Outcome: Ongoing     Problem: OXYGENATION/RESPIRATORY FUNCTION  Goal: Patient will maintain patent airway  12/4/2021 0339 by Giancarlo Orozco RN  Outcome: Ongoing  12/3/2021 1817 by Rabia Lloyd RN  Outcome: Ongoing  Goal: Patient will achieve/maintain normal respiratory rate/effort  Description: Respiratory rate and effort will be within normal limits for the patient  12/4/2021 0339 by Giancarlo Orozco RN  Outcome: Ongoing  12/3/2021 1817 by Rabia Lloyd RN  Outcome: Ongoing     Problem: HEMODYNAMIC STATUS  Goal: Patient has stable vital signs and fluid balance  12/4/2021 0339 by Giancarlo Orozco RN  Outcome: Ongoing  12/3/2021 1817 by Rabia Lloyd RN  Outcome: Ongoing     Problem: FLUID AND ELECTROLYTE IMBALANCE  Goal: Fluid and electrolyte balance are achieved/maintained  12/4/2021 0339 by Giancarlo Orozco RN  Outcome: Ongoing  12/3/2021 1817 by Rabia Lloyd RN  Outcome: Ongoing     Problem: ACTIVITY INTOLERANCE/IMPAIRED MOBILITY  Goal: Mobility/activity is maintained at optimum level for patient  12/4/2021 8925 by Giancarlo Orozco RN  Outcome: Ongoing  12/3/2021 1817 by Rabia Lloyd RN  Outcome: Ongoing     Problem: Pain:  Goal: Pain level will decrease  Description: Pain level will decrease  12/4/2021 0339 by Giancarlo Orozco RN  Outcome: Ongoing  12/3/2021 1817 by Rabia Lloyd RN  Outcome: Ongoing  Goal: Control of acute pain  Description: Control of acute pain  12/4/2021 0339 by Giancarlo Orozco RN  Outcome: Ongoing  12/3/2021 1817 by Rabia Lloyd RN  Outcome: Ongoing  Goal: Control of chronic pain  Description: Control of chronic pain  12/4/2021 0339 by Giancarlo Orozco RN  Outcome: Ongoing  12/3/2021 1817 by Rabia Lloyd RN  Outcome: Ongoing     Problem: Skin Integrity:  Goal: Will show no infection signs and symptoms  Description: Will show no infection signs and symptoms  12/4/2021 0339 by Giancarlo Orozco RN  Outcome: Ongoing  12/3/2021 1817 by Rabia Lloyd RN  Outcome: Ongoing  Goal: Absence of new skin breakdown  Description: Absence of new skin breakdown  12/4/2021 0339 by Giancarlo Orozco RN  Outcome: Ongoing  12/3/2021 1817 by Rabia Lloyd RN  Outcome: Ongoing

## 2021-12-04 NOTE — PLAN OF CARE
Problem: Falls - Risk of:  Goal: Will remain free from falls  Description: Will remain free from falls  12/4/2021 1036 by Jackie Tai RN  Outcome: Ongoing     Problem: OXYGENATION/RESPIRATORY FUNCTION  Goal: Patient will maintain patent airway  12/4/2021 1036 by Jackie Tai RN  Outcome: Ongoing     Problem: HEMODYNAMIC STATUS  Goal: Patient has stable vital signs and fluid balance  12/4/2021 1036 by Jackie Tai RN  Outcome: Ongoing     Problem: ACTIVITY INTOLERANCE/IMPAIRED MOBILITY  Goal: Mobility/activity is maintained at optimum level for patient  12/4/2021 1036 by Jackie Tai RN  Outcome: Ongoing

## 2021-12-04 NOTE — PROGRESS NOTES
Infusions:   sodium chloride 25 mL (12/03/21 1010)       PHYSICAL EXAM:  BP (!) 144/89   Pulse 51   Temp 97.8 °F (36.6 °C) (Oral)   Resp 16   Ht 5' 7\" (1.702 m)   Wt 252 lb 3.3 oz (114.4 kg)   SpO2 95%   BMI 39.50 kg/m²   Recent Labs     12/01/21  2114 12/02/21  0812   POCGLU 244* 170*       Intake/Output Summary (Last 24 hours) at 12/4/2021 0858  Last data filed at 12/3/2021 1357  Gross per 24 hour   Intake    Output 500 ml   Net -500 ml       General appearance: Sitting up ion bed, No apparent distress, appears stated age and cooperative. HEENT: Pupils equal, round, and reactive to light. Conjunctivae/corneas clear. Neck: Supple, with full range of motion. No jugular venous distention. Trachea midline. Respiratory:  Normal respiratory effort. No crackles today  Cardiovascular: Regular rate and rhythm with normal S1/S2 without murmurs, rubs or gallops. Abdomen: Soft, non-tender, non-distended with normal bowel sounds. Musculoskeletal: No clubbing, cyanosis or edema bilaterally. Full range of motion without deformity. Skin: Skin color, texture, turgor normal.  No rashes or lesions. Neurologic:  Neurovascularly intact without any focal sensory/motor deficits.  Cranial nerves: II-XII intact, grossly non-focal.  Psychiatric: Alert and oriented, thought content appropriate, normal insight    LABS:  Recent Labs     12/01/21  1046 12/02/21  0454 12/03/21  0500   WBC 7.4 2.4* 5.8   HGB 11.9* 10.1* 9.8*   HCT 37.4 31.6* 30.3*    288 261                                                                    Recent Labs     12/02/21  0454 12/03/21  0500 12/04/21  0639   * 136 136   K 4.1 4.3 4.3   CL 96* 97* 97*   CO2 23 24 25   BUN 20 24* 31*   CREATININE 1.9* 1.9* 2.1*   GLUCOSE 167* 126* 85     Recent Labs     12/01/21  1046 12/02/21  0454   AST 13* 10*   ALT 16 13   BILITOT 0.8 0.6   ALKPHOS 96 76     Recent Labs     12/01/21  1046 12/01/21  1635 12/01/21  1941   TROPONINI <0.01 <0.01 <0.01     No results for input(s): BNP in the last 72 hours. No results for input(s): CHOL, HDL in the last 72 hours. Invalid input(s): LDLCALCU  No results for input(s): INR in the last 72 hours. Assessment & Plan:      Acute on chronic systolic CHF exacerbation - TTE 10/12/2021 with EF 25-30%, mod to severe MR  - Lasix 40 mg IV BID  - Continue Coreg 12.5mg BID  - ProBNP down since admission to 31k, however still significantly [facility] [address]. Would like to see come down a bit prior to dischage. Pt still with minor crackles at bases of lungs. Will continue diuresis for today with likely discharge on oral Lasix when cleared by Nephrology  - f/u Pro-BNP tomorrow morning     Cardiorenal syndrome  - Pt's baseline Cr 1.5 - 1.7. Today 2.1  - Nephrology consulted  - Continue diuresis    Acute cystitis - Started on Bactrim as outpt on 11/30.   - Continue Rocephin (Day 3/3)  - UCx grew 50k mixed simon. Will continue rocephin for today and tomorrow d/t initial symptoms and will not need abx on discharge    Paroxysmal VT  - Treated with amiodarone  - Pt has PPM    DVT Prophylaxis: lovenox  Diet: ADULT DIET; Regular; Low Sodium (2 gm); 1500 ml    The patient and / or the family were informed of the results of any tests, a time was given to answer questions, a plan was proposed and they agreed with plan.     Disposition: Likely discharge one more day    Full Code      Regis Baker MD   Internal Medicine  12/4/2021 8:58 AM  Reach via [employer]

## 2021-12-05 LAB
ANION GAP SERPL CALCULATED.3IONS-SCNC: 14 MMOL/L (ref 3–16)
BASOPHILS ABSOLUTE: 0.1 K/UL (ref 0–0.2)
BASOPHILS RELATIVE PERCENT: 0.8 %
BLOOD CULTURE, ROUTINE: NORMAL
BUN BLDV-MCNC: 25 MG/DL (ref 7–20)
CALCIUM SERPL-MCNC: 8.8 MG/DL (ref 8.3–10.6)
CHLORIDE BLD-SCNC: 97 MMOL/L (ref 99–110)
CO2: 23 MMOL/L (ref 21–32)
CREAT SERPL-MCNC: 1.4 MG/DL (ref 0.6–1.2)
CULTURE, BLOOD 2: NORMAL
EOSINOPHILS ABSOLUTE: 0.3 K/UL (ref 0–0.6)
EOSINOPHILS RELATIVE PERCENT: 3.9 %
GFR AFRICAN AMERICAN: 46
GFR NON-AFRICAN AMERICAN: 38
GLUCOSE BLD-MCNC: 116 MG/DL (ref 70–99)
HCT VFR BLD CALC: 32.8 % (ref 36–48)
HEMOGLOBIN: 10.3 G/DL (ref 12–16)
LYMPHOCYTES ABSOLUTE: 1.4 K/UL (ref 1–5.1)
LYMPHOCYTES RELATIVE PERCENT: 18.7 %
MCH RBC QN AUTO: 24.7 PG (ref 26–34)
MCHC RBC AUTO-ENTMCNC: 31.3 G/DL (ref 31–36)
MCV RBC AUTO: 78.7 FL (ref 80–100)
MONOCYTES ABSOLUTE: 0.5 K/UL (ref 0–1.3)
MONOCYTES RELATIVE PERCENT: 6.8 %
NEUTROPHILS ABSOLUTE: 5.1 K/UL (ref 1.7–7.7)
NEUTROPHILS RELATIVE PERCENT: 69.8 %
PDW BLD-RTO: 17.5 % (ref 12.4–15.4)
PLATELET # BLD: 343 K/UL (ref 135–450)
PMV BLD AUTO: 9.6 FL (ref 5–10.5)
POTASSIUM REFLEX MAGNESIUM: 4 MMOL/L (ref 3.5–5.1)
PRO-BNP: ABNORMAL PG/ML (ref 0–124)
RBC # BLD: 4.16 M/UL (ref 4–5.2)
SODIUM BLD-SCNC: 134 MMOL/L (ref 136–145)
WBC # BLD: 7.2 K/UL (ref 4–11)

## 2021-12-05 PROCEDURE — 6370000000 HC RX 637 (ALT 250 FOR IP): Performed by: HOSPITALIST

## 2021-12-05 PROCEDURE — 2580000003 HC RX 258: Performed by: STUDENT IN AN ORGANIZED HEALTH CARE EDUCATION/TRAINING PROGRAM

## 2021-12-05 PROCEDURE — 85025 COMPLETE CBC W/AUTO DIFF WBC: CPT

## 2021-12-05 PROCEDURE — 6370000000 HC RX 637 (ALT 250 FOR IP): Performed by: INTERNAL MEDICINE

## 2021-12-05 PROCEDURE — 6360000002 HC RX W HCPCS: Performed by: INTERNAL MEDICINE

## 2021-12-05 PROCEDURE — 36415 COLL VENOUS BLD VENIPUNCTURE: CPT

## 2021-12-05 PROCEDURE — 6360000002 HC RX W HCPCS: Performed by: STUDENT IN AN ORGANIZED HEALTH CARE EDUCATION/TRAINING PROGRAM

## 2021-12-05 PROCEDURE — 2580000003 HC RX 258: Performed by: HOSPITALIST

## 2021-12-05 PROCEDURE — 94760 N-INVAS EAR/PLS OXIMETRY 1: CPT

## 2021-12-05 PROCEDURE — 2060000000 HC ICU INTERMEDIATE R&B

## 2021-12-05 PROCEDURE — 6360000002 HC RX W HCPCS: Performed by: HOSPITALIST

## 2021-12-05 PROCEDURE — 80048 BASIC METABOLIC PNL TOTAL CA: CPT

## 2021-12-05 PROCEDURE — 94640 AIRWAY INHALATION TREATMENT: CPT

## 2021-12-05 PROCEDURE — 83880 ASSAY OF NATRIURETIC PEPTIDE: CPT

## 2021-12-05 RX ADMIN — TIOTROPIUM BROMIDE INHALATION SPRAY 2 PUFF: 3.12 SPRAY, METERED RESPIRATORY (INHALATION) at 09:43

## 2021-12-05 RX ADMIN — AMIODARONE HYDROCHLORIDE 200 MG: 200 TABLET ORAL at 20:03

## 2021-12-05 RX ADMIN — ENOXAPARIN SODIUM 40 MG: 100 INJECTION SUBCUTANEOUS at 08:29

## 2021-12-05 RX ADMIN — ASPIRIN 325 MG ORAL TABLET 325 MG: 325 PILL ORAL at 08:27

## 2021-12-05 RX ADMIN — ALBUTEROL SULFATE 2.5 MG: 2.5 SOLUTION RESPIRATORY (INHALATION) at 09:42

## 2021-12-05 RX ADMIN — CEFTRIAXONE 1000 MG: 1 INJECTION, POWDER, FOR SOLUTION INTRAMUSCULAR; INTRAVENOUS at 08:34

## 2021-12-05 RX ADMIN — SODIUM CHLORIDE, PRESERVATIVE FREE 10 ML: 5 INJECTION INTRAVENOUS at 20:03

## 2021-12-05 RX ADMIN — CARVEDILOL 12.5 MG: 6.25 TABLET, FILM COATED ORAL at 08:27

## 2021-12-05 RX ADMIN — GABAPENTIN 600 MG: 300 CAPSULE ORAL at 15:22

## 2021-12-05 RX ADMIN — ALBUTEROL SULFATE 2.5 MG: 2.5 SOLUTION RESPIRATORY (INHALATION) at 13:03

## 2021-12-05 RX ADMIN — TORSEMIDE 20 MG: 20 TABLET ORAL at 08:27

## 2021-12-05 RX ADMIN — Medication 400 MG: at 20:03

## 2021-12-05 RX ADMIN — ALBUTEROL SULFATE 2 PUFF: 90 AEROSOL, METERED RESPIRATORY (INHALATION) at 00:11

## 2021-12-05 RX ADMIN — ALBUTEROL SULFATE 2.5 MG: 2.5 SOLUTION RESPIRATORY (INHALATION) at 16:56

## 2021-12-05 RX ADMIN — CARVEDILOL 12.5 MG: 6.25 TABLET, FILM COATED ORAL at 20:03

## 2021-12-05 RX ADMIN — ALBUTEROL SULFATE 2.5 MG: 2.5 SOLUTION RESPIRATORY (INHALATION) at 21:07

## 2021-12-05 RX ADMIN — GABAPENTIN 600 MG: 300 CAPSULE ORAL at 08:26

## 2021-12-05 RX ADMIN — PANTOPRAZOLE SODIUM 40 MG: 40 TABLET, DELAYED RELEASE ORAL at 08:27

## 2021-12-05 RX ADMIN — ATORVASTATIN CALCIUM 40 MG: 40 TABLET, FILM COATED ORAL at 20:03

## 2021-12-05 RX ADMIN — Medication 400 MG: at 08:27

## 2021-12-05 RX ADMIN — SODIUM CHLORIDE, PRESERVATIVE FREE 10 ML: 5 INJECTION INTRAVENOUS at 08:31

## 2021-12-05 RX ADMIN — GABAPENTIN 600 MG: 300 CAPSULE ORAL at 20:03

## 2021-12-05 ASSESSMENT — PAIN SCALES - GENERAL
PAINLEVEL_OUTOF10: 0

## 2021-12-05 NOTE — PROGRESS NOTES
Department of Internal Medicine  Nephrology Progress Note    HISTORY OF PRESENTING ILLNESS:  A 73-year-old female with past medical  history significant for coronary artery disease, hypertension, chronic  kidney disease, congestive heart failure, recently discharged from the  hospital after being treated for acute kidney injury, came to [facility]  ER complaining of shortness of breath, dyspnea on exertion with  orthopnea and PND. At the time of presentation found to have a fever of  102. The patient was diagnosed with urinary tract infection and  possible congestive heart failure. Admitted for further workup and  management. Renal consultation has been called for acute-on-chronic  kidney disease.     At the time of consultation, the patient is feeling weak, tired,  decreased level of energy, denies any chest pain but admits improvement  in shortness of breath and dyspnea on exertion. Admits poor appetite  and some weight loss. HPI:  Breathing stable. No CP.  ROS:  In bed. [address]:  medications reviewed. Physical Exam:    VITALS:  BP (!) 111/52   Pulse 56   Temp 98.1 °F (36.7 °C) (Oral)   Resp 18   Ht 5' 7\" (1.702 m)   Wt 251 lb 1.7 oz (113.9 kg)   SpO2 91%   BMI 39.33 kg/m²   24HR INTAKE/OUTPUT:      Intake/Output Summary (Last 24 hours) at 12/5/2021 1548  Last data filed at 12/5/2021 1400  Gross per 24 hour   Intake 521.81 ml   Output 300 ml   Net 221.81 ml       Constitutional:  Looks comfortable   Respiratory:  Decrease BS at bases   Gastrointestinal:  No  tenderness.   Normal Bowel Sounds  Cardiovascular:  S1, S2 RRR   Edema:  + edema    DATA:    CBC:  Lab Results   Component Value Date    WBC 7.2 12/05/2021    RBC 4.16 12/05/2021    RBC 5.12 05/26/2017    HGB 10.3 12/05/2021    HCT 32.8 12/05/2021    MCV 78.7 12/05/2021    MCH 24.7 12/05/2021    MCHC 31.3 12/05/2021    RDW 17.5 12/05/2021     12/05/2021    MPV 9.6 12/05/2021     CMP:  Lab Results   Component Value Date     12/05/2021    K 4.0 12/05/2021    CL 97 12/05/2021    CO2 23 12/05/2021    BUN 25 12/05/2021    CREATININE 1.4 12/05/2021    GFRAA 46 12/05/2021    GFRAA >60 01/17/2013    AGRATIO 1.5 12/01/2021    LABGLOM 38 12/05/2021    GLUCOSE 116 12/05/2021    GLUCOSE 154 06/05/2015    PROT 6.4 12/02/2021    PROT 7.6 06/05/2015    CALCIUM 8.8 12/05/2021    BILITOT 0.6 12/02/2021    ALKPHOS 76 12/02/2021    AST 10 12/02/2021    ALT 13 12/02/2021      Hepatic Function Panel:   Lab Results   Component Value Date    ALKPHOS 76 12/02/2021    ALT 13 12/02/2021    AST 10 12/02/2021    PROT 6.4 12/02/2021    PROT 7.6 06/05/2015    BILITOT 0.6 12/02/2021    BILIDIR <0.2 12/02/2021    IBILI see below 12/02/2021      Phosphorus:   Lab Results   Component Value Date    PHOS 4.0 12/04/2021       ASSESSMENT/PLAN:    1. AIDA on CKD   - due to UTI/CRS   - off bactrim   - Cr lower    2. ACSHF    - on demadex    3. HTN controlled     4.  UTI  - on ceftriaxone

## 2021-12-05 NOTE — PLAN OF CARE
Ongoing     Problem: Pain:  Goal: Control of chronic pain  Description: Control of chronic pain  12/5/2021 0943 by Jah Erazo RN  Outcome: Ongoing     Problem: Skin Integrity:  Goal: Will show no infection signs and symptoms  Description: Will show no infection signs and symptoms  12/5/2021 0943 by Jah Erazo RN  Outcome: Ongoing   Assessing ramon scale Q shift. Performing skin assessments every shift. Maintaining dry and clean skin. Promoting adequate nutritional intake. Heels elevated off bed. Performing skin care q shift. Utilizing lift equipment when indicated.     Problem: Skin Integrity:  Goal: Absence of new skin breakdown  Description: Absence of new skin breakdown  12/5/2021 0943 by Jah Erazo RN  Outcome: Ongoing

## 2021-12-05 NOTE — PROGRESS NOTES
Progress Note  Admit Date: 12/1/2021       CC:   Chief Complaint   Patient presents with    Shortness of Breath    Fever       Subjective: Patient denies any symptoms, anxious to go home. I told her she will likely be discharged tomorrow if renal function continues to improve    Review of Systems  General appearance: alert, appears stated age and cooperative  Skin: Skin color, texture, normal. No rashes or lesions  HEENT: No nose bleed, headache, vision problems  CV: No chest pain, tightness, pressure,   Respiratory: + SOB, HEATH. No Orthopnea, PND  GI: No abdominal pain, black stool, bloating  Limbs: No c/o edema, pain, swelling, intermittent claudication, joint pains  Neuro: No dizziness, lightheadedness, syncope, gait problems, memory problems  Psych: grossly normal. No SI/depression. Scheduled Medications:    albuterol  2.5 mg Nebulization 4x daily    torsemide  20 mg Oral Daily    cefTRIAXone (ROCEPHIN) IV  1,000 mg IntraVENous Q24H    amiodarone  200 mg Oral Daily    aspirin  325 mg Oral Daily    atorvastatin  40 mg Oral Daily    gabapentin  600 mg Oral TID    magnesium oxide  400 mg Oral BID    pantoprazole  40 mg Oral Daily    sodium chloride flush  5-40 mL IntraVENous 2 times per day    enoxaparin  40 mg SubCUTAneous Daily    carvedilol  12.5 mg Oral BID    tiotropium  2 puff Inhalation Daily      PRN Medications: albuterol sulfate HFA, hydrocortisone, sodium chloride flush, sodium chloride, ondansetron **OR** ondansetron, polyethylene glycol, acetaminophen **OR** acetaminophen  Diet: ADULT DIET; Regular; Low Sodium (2 gm); 1500 ml    Continuous Infusions:   sodium chloride Stopped (12/03/21 1046)       PHYSICAL EXAM:  /65   Pulse 52   Temp 98 °F (36.7 °C) (Oral)   Resp 16   Ht 5' 7\" (1.702 m)   Wt 251 lb 1.7 oz (113.9 kg)   SpO2 97%   BMI 39.33 kg/m²   No results for input(s): POCGLU in the last 72 hours.     Intake/Output Summary (Last 24 hours) at 12/5/2021 1221  Last data filed at 12/5/2021 1144  Gross per 24 hour   Intake 240 ml   Output 300 ml   Net -60 ml       General appearance: Laying in bed, No apparent distress, appears stated age and cooperative. HEENT: Pupils equal, round, and reactive to light. Conjunctivae/corneas clear. Neck: Supple, with full range of motion. No jugular venous distention. Trachea midline. Respiratory:  Normal respiratory effort. No crackles today  Cardiovascular: Regular rate and rhythm with normal S1/S2 without murmurs, rubs or gallops. Abdomen: Soft, non-tender, non-distended with normal bowel sounds. Musculoskeletal: No clubbing, cyanosis or edema bilaterally. Full range of motion without deformity. Skin: Skin color, texture, turgor normal.  No rashes or lesions. Neurologic:  Neurovascularly intact without any focal sensory/motor deficits. Cranial nerves: II-XII intact, grossly non-focal.  Psychiatric: Alert and oriented, thought content appropriate, normal insight    LABS:  Recent Labs     12/03/21  0500 12/05/21  1049   WBC 5.8 7.2   HGB 9.8* 10.3*   HCT 30.3* 32.8*    343                                                                    Recent Labs     12/03/21  0500 12/04/21  0639 12/05/21  1049    136 134*   K 4.3 4.3 4.0   CL 97* 97* 97*   CO2 24 25 23   BUN 24* 31* 25*   CREATININE 1.9* 2.1* 1.4*   GLUCOSE 126* 85 116*     No results for input(s): AST, ALT, ALB, BILITOT, ALKPHOS in the last 72 hours. No results for input(s): TROPONINI in the last 72 hours. No results for input(s): BNP in the last 72 hours. No results for input(s): CHOL, HDL in the last 72 hours. Invalid input(s): LDLCALCU  No results for input(s): INR in the last 72 hours.     Assessment & Plan:      Acute on chronic systolic CHF exacerbation - Resolving  - TTE 10/12/2021 with EF 25-30%, mod to severe MR  - Lasix 40 mg IV BID  - Continue Coreg 12.5mg BID  - ProBNP down since admission to 31k, however still significantly above 'baseline' of around 8k, 32717 today. Would like to see come down a bit prior to dischage. Pt still with minor crackles at bases of lungs. Will continue diuresis for today with likely discharge on oral Lasix when cleared by Nephrology  - f/u Pro-BNP tomorrow morning     Cardiorenal syndrome  - Pt's baseline Cr 1.5 - 1.7. Today 1.4  - Nephrology consulted  - Continue diuresis    Acute cystitis - Started on Bactrim as outpt on 11/30.   - S/p Rocephin (3 days)  - UCx grew 50k mixed simon. Will continue rocephin for today and tomorrow d/t initial symptoms and will not need abx on discharge    Paroxysmal VT  - Treated with amiodarone  - Pt has PPM    DVT Prophylaxis: lovenox  Diet: ADULT DIET; Regular; Low Sodium (2 gm); 1500 ml    The patient and / or the family were informed of the results of any tests, a time was given to answer questions, a plan was proposed and they agreed with plan.     Disposition: Likely discharge in am    Full Code      Giuseppe Zuluaga MD   Internal Medicine  12/5/2021 12:21 PM  Reach via [employer]

## 2021-12-06 ENCOUNTER — TELEPHONE (OUTPATIENT)
Dept: INTERNAL MEDICINE CLINIC | Age: 61
End: 2021-12-06

## 2021-12-06 VITALS
OXYGEN SATURATION: 94 % | DIASTOLIC BLOOD PRESSURE: 68 MMHG | SYSTOLIC BLOOD PRESSURE: 119 MMHG | WEIGHT: 253.09 LBS | HEIGHT: 67 IN | TEMPERATURE: 97.9 F | RESPIRATION RATE: 16 BRPM | HEART RATE: 60 BPM | BODY MASS INDEX: 39.72 KG/M2

## 2021-12-06 LAB
ANION GAP SERPL CALCULATED.3IONS-SCNC: 11 MMOL/L (ref 3–16)
BASOPHILS ABSOLUTE: 0 K/UL (ref 0–0.2)
BASOPHILS RELATIVE PERCENT: 0.6 %
BUN BLDV-MCNC: 21 MG/DL (ref 7–20)
CALCIUM SERPL-MCNC: 8.7 MG/DL (ref 8.3–10.6)
CHLORIDE BLD-SCNC: 99 MMOL/L (ref 99–110)
CO2: 25 MMOL/L (ref 21–32)
CREAT SERPL-MCNC: 1.3 MG/DL (ref 0.6–1.2)
EOSINOPHILS ABSOLUTE: 0.3 K/UL (ref 0–0.6)
EOSINOPHILS RELATIVE PERCENT: 5.4 %
GFR AFRICAN AMERICAN: 50
GFR NON-AFRICAN AMERICAN: 42
GLUCOSE BLD-MCNC: 99 MG/DL (ref 70–99)
HCT VFR BLD CALC: 34.2 % (ref 36–48)
HEMOGLOBIN: 10.4 G/DL (ref 12–16)
LYMPHOCYTES ABSOLUTE: 1.1 K/UL (ref 1–5.1)
LYMPHOCYTES RELATIVE PERCENT: 17 %
MCH RBC QN AUTO: 24.8 PG (ref 26–34)
MCHC RBC AUTO-ENTMCNC: 30.4 G/DL (ref 31–36)
MCV RBC AUTO: 81.6 FL (ref 80–100)
MONOCYTES ABSOLUTE: 0.5 K/UL (ref 0–1.3)
MONOCYTES RELATIVE PERCENT: 8.3 %
NEUTROPHILS ABSOLUTE: 4.4 K/UL (ref 1.7–7.7)
NEUTROPHILS RELATIVE PERCENT: 68.7 %
PDW BLD-RTO: 17.7 % (ref 12.4–15.4)
PLATELET # BLD: 272 K/UL (ref 135–450)
PMV BLD AUTO: 9.1 FL (ref 5–10.5)
POTASSIUM REFLEX MAGNESIUM: 3.9 MMOL/L (ref 3.5–5.1)
PRO-BNP: ABNORMAL PG/ML (ref 0–124)
RBC # BLD: 4.19 M/UL (ref 4–5.2)
SODIUM BLD-SCNC: 135 MMOL/L (ref 136–145)
WBC # BLD: 6.4 K/UL (ref 4–11)

## 2021-12-06 PROCEDURE — 94660 CPAP INITIATION&MGMT: CPT

## 2021-12-06 PROCEDURE — 94760 N-INVAS EAR/PLS OXIMETRY 1: CPT

## 2021-12-06 PROCEDURE — 2580000003 HC RX 258: Performed by: HOSPITALIST

## 2021-12-06 PROCEDURE — 6360000002 HC RX W HCPCS: Performed by: HOSPITALIST

## 2021-12-06 PROCEDURE — 94640 AIRWAY INHALATION TREATMENT: CPT

## 2021-12-06 PROCEDURE — 6370000000 HC RX 637 (ALT 250 FOR IP): Performed by: HOSPITALIST

## 2021-12-06 PROCEDURE — 6370000000 HC RX 637 (ALT 250 FOR IP): Performed by: INTERNAL MEDICINE

## 2021-12-06 PROCEDURE — 85025 COMPLETE CBC W/AUTO DIFF WBC: CPT

## 2021-12-06 PROCEDURE — 6360000002 HC RX W HCPCS: Performed by: STUDENT IN AN ORGANIZED HEALTH CARE EDUCATION/TRAINING PROGRAM

## 2021-12-06 PROCEDURE — 36415 COLL VENOUS BLD VENIPUNCTURE: CPT

## 2021-12-06 PROCEDURE — 80048 BASIC METABOLIC PNL TOTAL CA: CPT

## 2021-12-06 PROCEDURE — 6360000002 HC RX W HCPCS: Performed by: INTERNAL MEDICINE

## 2021-12-06 PROCEDURE — 2580000003 HC RX 258: Performed by: STUDENT IN AN ORGANIZED HEALTH CARE EDUCATION/TRAINING PROGRAM

## 2021-12-06 PROCEDURE — 83880 ASSAY OF NATRIURETIC PEPTIDE: CPT

## 2021-12-06 RX ORDER — TORSEMIDE 20 MG/1
20 TABLET ORAL DAILY
Qty: 30 TABLET | Refills: 3 | Status: ON HOLD | OUTPATIENT
Start: 2021-12-07 | End: 2021-12-16 | Stop reason: HOSPADM

## 2021-12-06 RX ADMIN — ENOXAPARIN SODIUM 40 MG: 100 INJECTION SUBCUTANEOUS at 08:30

## 2021-12-06 RX ADMIN — TIOTROPIUM BROMIDE INHALATION SPRAY 2 PUFF: 3.12 SPRAY, METERED RESPIRATORY (INHALATION) at 10:17

## 2021-12-06 RX ADMIN — TORSEMIDE 20 MG: 20 TABLET ORAL at 08:30

## 2021-12-06 RX ADMIN — SODIUM CHLORIDE, PRESERVATIVE FREE 10 ML: 5 INJECTION INTRAVENOUS at 08:29

## 2021-12-06 RX ADMIN — PANTOPRAZOLE SODIUM 40 MG: 40 TABLET, DELAYED RELEASE ORAL at 08:30

## 2021-12-06 RX ADMIN — GABAPENTIN 600 MG: 300 CAPSULE ORAL at 08:30

## 2021-12-06 RX ADMIN — ALBUTEROL SULFATE 2.5 MG: 2.5 SOLUTION RESPIRATORY (INHALATION) at 10:16

## 2021-12-06 RX ADMIN — ASPIRIN 325 MG ORAL TABLET 325 MG: 325 PILL ORAL at 08:30

## 2021-12-06 RX ADMIN — CEFTRIAXONE 1000 MG: 1 INJECTION, POWDER, FOR SOLUTION INTRAMUSCULAR; INTRAVENOUS at 08:34

## 2021-12-06 RX ADMIN — Medication 400 MG: at 08:30

## 2021-12-06 RX ADMIN — CARVEDILOL 12.5 MG: 6.25 TABLET, FILM COATED ORAL at 08:30

## 2021-12-06 ASSESSMENT — PAIN SCALES - GENERAL: PAINLEVEL_OUTOF10: 0

## 2021-12-06 NOTE — CARE COORDINATION
Case Management Assessment            Discharge Note                    Date / Time of Note: 12/6/2021 11:22 AM                  Discharge Note Completed by: Jamila Lowe RN    Patient Name: Monie Panda   YOB: 1960  Diagnosis: Acute on chronic diastolic congestive heart failure ([facility] [address] [I50.33]  Acute respiratory failure with hypoxia ([facility] [address] [J96.01]  Congestive heart failure with left ventricular dysfunction ([facility] [address] [I50.9]   Date / Time: 12/1/2021  9:59 AM    Current PCP: Mello Finn MD    Advance Directives:  Code Status: Full Code    Financial:  Payor: Laura Garcia / Plan: MEDICARE PART A AND B / Product Type: *No Product type* /      Pharmacy:    [facility] [address], [address] [facility] [address] [phone]  [address] [facility]  [address] [place] [phone]  Phone: [phone] Fax: [phone]    [facility] [address] [place], [place] - [address] [facility] [address] - P [phone] Gonsalo Waddell [phone]  [phone]  Phone: [phone] Fax: [phone]    [facility] [address], [address] [facility] [phone] - F [phone]  [place] [address]  Phone: [phone] Fax: [phone] [place] [address]. Doyne Members [phone] - f [phone]  [address] [place] [address]  Phone: [phone] Fax: [phone]      Assistance purchasing medications?: Potential Assistance Purchasing Medications: No      DISCHARGE Disposition: Home- No Services Needed    IMM Completed:   Yes, Case management has presented and reviewed IMM letter #2 to the patient and/or family/ POA. Patient and/or family/POA verbalized understanding of their medicare rights and appeal process if needed. Patient and/or family/POA has signed, initialed and placed today's date (12/6/21) and time (1100) on IMM letter #2 on the the appropriate lines.  Patient and/or family/POA, copy of letter

## 2021-12-06 NOTE — PROGRESS NOTES
Discharge order noted. Pt has received 60 minutes of documented heart failure education. She has a follow up appointment in place with her primary care tomorrow, 12/7/ 21 on her AVS,  She has appropriate d/c instructions on her AVS, as well on the [address]. Her med rec has been sent to pharmacy. Her weight today 253lbs.

## 2021-12-06 NOTE — PLAN OF CARE
Problem: Falls - Risk of:  Goal: Will remain free from falls  Description: Will remain free from falls  Outcome: Ongoing  Goal: Absence of physical injury  Description: Absence of physical injury  Outcome: Ongoing     Problem: OXYGENATION/RESPIRATORY FUNCTION  Goal: Patient will maintain patent airway  Outcome: Ongoing  Goal: Patient will achieve/maintain normal respiratory rate/effort  Description: Respiratory rate and effort will be within normal limits for the patient  Outcome: Ongoing     Problem: HEMODYNAMIC STATUS  Goal: Patient has stable vital signs and fluid balance  Outcome: Ongoing     Problem: FLUID AND ELECTROLYTE IMBALANCE  Goal: Fluid and electrolyte balance are achieved/maintained  Outcome: Ongoing     Problem: ACTIVITY INTOLERANCE/IMPAIRED MOBILITY  Goal: Mobility/activity is maintained at optimum level for patient  Outcome: Ongoing     Problem: Pain:  Description: Pain management should include both nonpharmacologic and pharmacologic interventions.   Goal: Pain level will decrease  Description: Pain level will decrease  Outcome: Ongoing  Goal: Control of acute pain  Description: Control of acute pain  Outcome: Ongoing  Goal: Control of chronic pain  Description: Control of chronic pain  Outcome: Ongoing     Problem: Skin Integrity:  Goal: Will show no infection signs and symptoms  Description: Will show no infection signs and symptoms  Outcome: Ongoing  Goal: Absence of new skin breakdown  Description: Absence of new skin breakdown  Outcome: Ongoing

## 2021-12-06 NOTE — CONSULTS
Pharmacy Heart Failure Medication Reconciliation Note    Pt discharged from [facility] today. Chief Complaint   Patient presents with    Shortness of Breath    Fever       Margoth Lees has a diagnosis of systolic heart failure (last EF = 25-30% on 10/12/21). Pertinent Labs:  BMP:   Lab Results   Component Value Date     12/06/2021    K 3.9 12/06/2021    CL 99 12/06/2021    CO2 25 12/06/2021    BUN 21 12/06/2021    CREATININE 1.3 12/06/2021     BNP:   Lab Results   Component Value Date    PROBNP 19,053 12/06/2021    PROBNP 20,856 12/05/2021    PROBNP 20,181 12/04/2021       Patient taking an ACEI / ARB / Entresto for EF </= 40%:  No: AIDA - cardiology will address as outpatient. Patient taking a BETA BLOCKER (Coreg, Toprol XL or bisoprolol) for EF </= 40%:  Yes  Patient taking a LOOP DIURETIC: Yes  Patient taking a ALDOSTERONE RECEPTOR ANTAGONIST for EF </= 35%:No: AIDA - cardiology will address as outpatient. Patient taking an SGLT2I for EF </= 40%: No, Acute kidney injury and cardiology will address in the outpatient setting    Patient has a diagnosis of Atrial Fibrillation: No. If yes, patient is on appropriate anticoagulation: No: N/A    Patient has a diagnosis of type 2 diabetes:  Yes. If yes, patient prescribed the following anti-hyperglycemic medication at discharge: none, controlled      Corrections to discharge medications include:  Reached out to hospitalist to assess need for Bactrim at discharge.      Discharge Medications:         Medication List      START taking these medications    torsemide 20 MG tablet  Commonly known as: DEMADEX  Take 1 tablet by mouth daily  Start taking on: December 7, 2021        Laura  taking these medications    amiodarone 200 MG tablet  Commonly known as: CORDARONE     Anumed-HC 25 MG suppository  Generic drug: hydrocortisone     aspirin 325 MG tablet     atorvastatin 40 MG tablet  Commonly known as: LIPITOR     carvedilol 25 MG tablet  Commonly known as: COREG     gabapentin 600 MG tablet  Commonly known as: NEURONTIN     Klor-Con M20 20 MEQ extended release tablet  Generic drug: potassium chloride     magnesium oxide 400 MG tablet  Commonly known as: MAG-OX     pantoprazole 40 MG tablet  Commonly known as: PROTONIX     * albuterol (5 MG/ML) 0.5% nebulizer solution  Commonly known as: PROVENTIL     * Proventil  (90 Base) MCG/ACT inhaler  Generic drug: albuterol sulfate HFA     Spiriva HandiHaler 18 MCG inhalation capsule  Generic drug: tiotropium     sulfamethoxazole-trimethoprim 800-160 MG per tablet  Commonly known as: BACTRIM DS;SEPTRA DS         * This list has 2 medication(s) that are the same as other medications prescribed for you. Read the directions carefully, and ask your doctor or other care provider to review them with you. STOP taking these medications    furosemide 20 MG tablet  Commonly known as: LASIX           Where to Get Your Medications      These medications were sent to [address] [place] [address].  [facility] [phone] Margarito Tohatchi [phone]  [address] [place] [address]    Phone: [phone]   · torsemide 20 MG tablet         Marlon Gutierrez PharmD  Heart Failure Discharge Medication Reconciliation Program  [phone]

## 2021-12-06 NOTE — DISCHARGE SUMMARY
Hospital Medicine Discharge Summary    Patient ID: Mikey Velez      Patient's PCP: Elsie Gómez MD    Admit Date: 12/1/2021     Discharge Date:   12/06/21      Admitting Physician: Tim Barroso MD     Discharge Physician: Jaden Barrios MD     Discharge Diagnoses: Active Hospital Problems    Diagnosis     Congestive heart failure with left ventricular dysfunction ([address] [I50.9]        The patient was seen and examined on day of discharge and this discharge summary is in conjunction with any daily progress note from day of discharge. Hospital Course:     26-year-old female with past medical  history significant for coronary artery disease, hypertension, chronic  kidney disease, congestive heart failure, recently discharged from the  hospital after being treated for acute kidney injury, came to [facility]  ER complaining of shortness of breath, dyspnea on exertion with  orthopnea and PND      Acute on chronic systolic CHF exacerbation - Resolving  - TTE 10/12/2021 with EF 25-30%, mod to severe MR  - Lasix 40 mg IV BID  - Continue Coreg 12.5mg BID  - ProBNP down since admission.      Cardiorenal syndrome  - Pt's baseline Cr 1.5 - 1.7. Today 1.3  - Nephrology consulted     Acute cystitis - Started on Bactrim as outpt on 11/30.   - S/p Rocephin (5 days)     Paroxysmal VT  - Treated with amiodarone  - Pt has PPM             Physical Exam Performed:     /68   Pulse 60   Temp 97.9 °F (36.6 °C) (Oral)   Resp 16   Ht 5' 7\" (1.702 m)   Wt 253 lb 1.4 oz (114.8 kg)   SpO2 94%   BMI 39.64 kg/m²       General appearance:  No apparent distress, appears stated age and cooperative. HEENT:  Normal cephalic, atraumatic without obvious deformity. Pupils equal, round, and reactive to light. Extra ocular muscles intact. Conjunctivae/corneas clear. Neck: Supple, with full range of motion. No jugular venous distention. Trachea midline. Respiratory:  Normal respiratory effort.  Clear to auscultation, bilaterally without Rales/Wheezes/Rhonchi. Cardiovascular:  Regular rate and rhythm with normal S1/S2 without murmurs, rubs or gallops. Abdomen: Soft, non-tender, non-distended with normal bowel sounds. Musculoskeletal:  No clubbing, cyanosis or edema bilaterally. Full range of motion without deformity. Skin: Skin color, texture, turgor normal.  No rashes or lesions. Neurologic:  Neurovascularly intact without any focal sensory/motor deficits. Cranial nerves: II-XII intact, grossly non-focal.  Psychiatric:  Alert and oriented, thought content appropriate, normal insight  Capillary Refill: Brisk,< 3 seconds   Peripheral Pulses: +2 palpable, equal bilaterally       Labs: For convenience and continuity at follow-up the following most recent labs are provided:      CBC:    Lab Results   Component Value Date    WBC 6.4 12/06/2021    HGB 10.4 12/06/2021    HCT 34.2 12/06/2021     12/06/2021       Renal:    Lab Results   Component Value Date     12/06/2021    K 3.9 12/06/2021    CL 99 12/06/2021    CO2 25 12/06/2021    BUN 21 12/06/2021    CREATININE 1.3 12/06/2021    CALCIUM 8.7 12/06/2021    PHOS 4.0 12/04/2021         Significant Diagnostic Studies    Radiology:   US RETROPERITONEAL COMPLETE   Final Result   No significant findings. XR CHEST PORTABLE   Final Result   Borderline cardiomegaly with mild vascular congestion. Consults:     IP CONSULT TO HOSPITALIST  IP CONSULT TO NEPHROLOGY    Disposition:  Home. Condition at Discharge: Stable    Discharge Instructions/Follow-up:    - started on torsemide 20 mg daily. - follow up with cardiology.    - ACE/ARB will be discussed with cardiology as an outpatient     Code Status:  Full Code     Activity: activity as tolerated    Diet: cardiac diet      Discharge Medications:     Current Discharge Medication List           Details   torsemide (DEMADEX) 20 MG tablet Take 1 tablet by mouth daily  Qty: 30 tablet, Refills: 3 Details   carvedilol (COREG) 25 MG tablet Take 12.5 mg by mouth 2 times daily (with meals)      sulfamethoxazole-trimethoprim (BACTRIM DS;SEPTRA DS) 800-160 MG per tablet Take 1 tablet by mouth 2 times daily      amiodarone (CORDARONE) 200 MG tablet Take 200 mg by mouth nightly nightly       aspirin 325 MG tablet Take 325 mg by mouth daily       atorvastatin (LIPITOR) 40 MG tablet Take 40 mg by mouth nightly       magnesium oxide (MAG-OX) 400 MG tablet Take 400 mg by mouth 2 times daily       potassium chloride SA (KLOR-CON M20) 20 MEQ tablet Take 20 mEq by mouth daily       pantoprazole (PROTONIX) 40 MG tablet Take 40 mg by mouth daily. hydrocortisone (ANUMED-HC) 25 MG suppository Place 25 mg rectally as needed for Hemorrhoids. gabapentin (NEURONTIN) 600 MG tablet Take 600 mg by mouth 3 times daily. albuterol (PROVENTIL HFA) 108 (90 BASE) MCG/ACT inhaler Inhale 2 puffs into the lungs 4 times daily. tiotropium (SPIRIVA HANDIHALER) 18 MCG inhalation capsule Inhale 18 mcg into the lungs daily. albuterol (PROVENTIL) (5 MG/ML) 0.5% nebulizer solution Inhale 2.5 mg into the lungs 4 times daily             Time Spent on discharge is more than 30 minutes in the examination, evaluation, counseling and review of medications and discharge plan. Signed:    Joe Flores MD   12/6/2021      Thank you Gian Barnes MD for the opportunity to be involved in this patient's care. If you have any questions or concerns please feel free to contact me at [phone].

## 2021-12-07 ENCOUNTER — CARE COORDINATION (OUTPATIENT)
Dept: CASE MANAGEMENT | Age: 61
End: 2021-12-07

## 2021-12-07 NOTE — CARE COORDINATION
[facility] Transitions Initial Follow Up Call    Call within 2 business days of discharge: Yes    Patient: Monie Panda Patient : 1960   MRN: <[id number]>  Reason for Admission: CHF  Discharge Date: 21 RARS: Readmission Risk Score: 16.5 ( )      Last Discharge [facility]       Complaint Diagnosis Description Type Department Provider    21 Shortness of Breath; Fever Acute respiratory failure with hypoxia ([facility] [address] . .. ED to Hosp-Admission (Discharged) (ADMITTED) Artemio Mcdowell MD; Sarkis Corbin Da. .. Spoke with: shailesh    [facility]ility[facility] attempted outreach for 24 hr hospital discharge care transition follow up. Left HIPPA compliant message and contact information for call back. Care Transitions 24 Hour Call    Care Transitions Interventions         Follow Up  No future appointments.     Mariusz Conner LPN

## 2021-12-09 ENCOUNTER — TELEPHONE (OUTPATIENT)
Dept: PHARMACY | Age: 61
End: 2021-12-09

## 2021-12-09 NOTE — TELEPHONE ENCOUNTER
Outbound call from the outpatient wellness center. Following up from patient's recent heart failure discharge. Patient is not interested in scheduling a visit with the outpatient wellness center at this time. States that she has several doctors appointments coming up. Phone number given to patient for outpatient wellness center if she changes her mind in the future.

## 2021-12-10 ENCOUNTER — APPOINTMENT (OUTPATIENT)
Dept: GENERAL RADIOLOGY | Age: 61
DRG: 286 | End: 2021-12-10
Payer: MEDICARE

## 2021-12-10 ENCOUNTER — TELEPHONE (OUTPATIENT)
Dept: OTHER | Facility: CLINIC | Age: 61
End: 2021-12-10

## 2021-12-10 ENCOUNTER — HOSPITAL ENCOUNTER (INPATIENT)
Age: 61
LOS: 6 days | Discharge: HOME HEALTH | DRG: 286 | End: 2021-12-16
Attending: EMERGENCY MEDICINE | Admitting: HOSPITALIST
Payer: MEDICARE

## 2021-12-10 DIAGNOSIS — R94.31 QT PROLONGATION: ICD-10-CM

## 2021-12-10 DIAGNOSIS — N17.9 AKI (ACUTE KIDNEY INJURY) (HCC): ICD-10-CM

## 2021-12-10 DIAGNOSIS — R09.02 HYPOXIA: ICD-10-CM

## 2021-12-10 DIAGNOSIS — I50.9 ACUTE ON CHRONIC CONGESTIVE HEART FAILURE, UNSPECIFIED HEART FAILURE TYPE (HCC): Primary | ICD-10-CM

## 2021-12-10 LAB
A/G RATIO: 0.9 (ref 1.1–2.2)
ALBUMIN SERPL-MCNC: 3.4 G/DL (ref 3.4–5)
ALP BLD-CCNC: 83 U/L (ref 40–129)
ALT SERPL-CCNC: 27 U/L (ref 10–40)
ANION GAP SERPL CALCULATED.3IONS-SCNC: 14 MMOL/L (ref 3–16)
AST SERPL-CCNC: 24 U/L (ref 15–37)
BASE EXCESS VENOUS: 0.8 MMOL/L
BASOPHILS ABSOLUTE: 0.1 K/UL (ref 0–0.2)
BASOPHILS RELATIVE PERCENT: 0.6 %
BILIRUB SERPL-MCNC: 0.8 MG/DL (ref 0–1)
BUN BLDV-MCNC: 13 MG/DL (ref 7–20)
CALCIUM SERPL-MCNC: 9.1 MG/DL (ref 8.3–10.6)
CARBOXYHEMOGLOBIN: 1.4 %
CHLORIDE BLD-SCNC: 102 MMOL/L (ref 99–110)
CO2: 21 MMOL/L (ref 21–32)
CREAT SERPL-MCNC: 1.6 MG/DL (ref 0.6–1.2)
EKG ATRIAL RATE: 72 BPM
EKG DIAGNOSIS: NORMAL
EKG P AXIS: 17 DEGREES
EKG P-R INTERVAL: 182 MS
EKG Q-T INTERVAL: 474 MS
EKG QRS DURATION: 126 MS
EKG QTC CALCULATION (BAZETT): 519 MS
EKG R AXIS: -31 DEGREES
EKG T AXIS: 115 DEGREES
EKG VENTRICULAR RATE: 72 BPM
EOSINOPHILS ABSOLUTE: 0.6 K/UL (ref 0–0.6)
EOSINOPHILS RELATIVE PERCENT: 7 %
GFR AFRICAN AMERICAN: 40
GFR NON-AFRICAN AMERICAN: 33
GLUCOSE BLD-MCNC: 121 MG/DL (ref 70–99)
HCO3 VENOUS: 26 MMOL/L (ref 23–29)
HCT VFR BLD CALC: 36.8 % (ref 36–48)
HEMOGLOBIN: 11.7 G/DL (ref 12–16)
LYMPHOCYTES ABSOLUTE: 1 K/UL (ref 1–5.1)
LYMPHOCYTES RELATIVE PERCENT: 12.1 %
MCH RBC QN AUTO: 25.2 PG (ref 26–34)
MCHC RBC AUTO-ENTMCNC: 31.7 G/DL (ref 31–36)
MCV RBC AUTO: 79.5 FL (ref 80–100)
METHEMOGLOBIN VENOUS: 0.3 %
MONOCYTES ABSOLUTE: 0.6 K/UL (ref 0–1.3)
MONOCYTES RELATIVE PERCENT: 7 %
NEUTROPHILS ABSOLUTE: 5.9 K/UL (ref 1.7–7.7)
NEUTROPHILS RELATIVE PERCENT: 73.3 %
O2 SAT, VEN: 85 %
O2 THERAPY: NORMAL
PCO2, VEN: 44.9 MMHG (ref 40–50)
PDW BLD-RTO: 17.9 % (ref 12.4–15.4)
PH VENOUS: 7.38 (ref 7.35–7.45)
PLATELET # BLD: 380 K/UL (ref 135–450)
PMV BLD AUTO: 9.1 FL (ref 5–10.5)
PO2, VEN: 54 MMHG
POTASSIUM SERPL-SCNC: 5.1 MMOL/L (ref 3.5–5.1)
PRO-BNP: ABNORMAL PG/ML (ref 0–124)
RBC # BLD: 4.63 M/UL (ref 4–5.2)
SODIUM BLD-SCNC: 137 MMOL/L (ref 136–145)
TCO2 CALC VENOUS: 28 MMOL/L
TOTAL PROTEIN: 7 G/DL (ref 6.4–8.2)
TROPONIN: <0.01 NG/ML
WBC # BLD: 8.1 K/UL (ref 4–11)

## 2021-12-10 PROCEDURE — 6360000002 HC RX W HCPCS: Performed by: HOSPITALIST

## 2021-12-10 PROCEDURE — 85025 COMPLETE CBC W/AUTO DIFF WBC: CPT

## 2021-12-10 PROCEDURE — 2700000000 HC OXYGEN THERAPY PER DAY

## 2021-12-10 PROCEDURE — 1200000000 HC SEMI PRIVATE

## 2021-12-10 PROCEDURE — 2580000003 HC RX 258: Performed by: HOSPITALIST

## 2021-12-10 PROCEDURE — 87040 BLOOD CULTURE FOR BACTERIA: CPT

## 2021-12-10 PROCEDURE — 71045 X-RAY EXAM CHEST 1 VIEW: CPT

## 2021-12-10 PROCEDURE — 83880 ASSAY OF NATRIURETIC PEPTIDE: CPT

## 2021-12-10 PROCEDURE — 6370000000 HC RX 637 (ALT 250 FOR IP): Performed by: HOSPITALIST

## 2021-12-10 PROCEDURE — 6360000002 HC RX W HCPCS: Performed by: NURSE PRACTITIONER

## 2021-12-10 PROCEDURE — 93010 ELECTROCARDIOGRAM REPORT: CPT | Performed by: INTERNAL MEDICINE

## 2021-12-10 PROCEDURE — 94761 N-INVAS EAR/PLS OXIMETRY MLT: CPT

## 2021-12-10 PROCEDURE — 94762 N-INVAS EAR/PLS OXIMTRY CONT: CPT

## 2021-12-10 PROCEDURE — 6370000000 HC RX 637 (ALT 250 FOR IP): Performed by: NURSE PRACTITIONER

## 2021-12-10 PROCEDURE — 94640 AIRWAY INHALATION TREATMENT: CPT

## 2021-12-10 PROCEDURE — 82803 BLOOD GASES ANY COMBINATION: CPT

## 2021-12-10 PROCEDURE — 36415 COLL VENOUS BLD VENIPUNCTURE: CPT

## 2021-12-10 PROCEDURE — 93005 ELECTROCARDIOGRAM TRACING: CPT | Performed by: EMERGENCY MEDICINE

## 2021-12-10 PROCEDURE — 84484 ASSAY OF TROPONIN QUANT: CPT

## 2021-12-10 PROCEDURE — 80053 COMPREHEN METABOLIC PANEL: CPT

## 2021-12-10 PROCEDURE — 99284 EMERGENCY DEPT VISIT MOD MDM: CPT

## 2021-12-10 RX ORDER — SODIUM CHLORIDE 0.9 % (FLUSH) 0.9 %
5-40 SYRINGE (ML) INJECTION EVERY 12 HOURS SCHEDULED
Status: DISCONTINUED | OUTPATIENT
Start: 2021-12-10 | End: 2021-12-12

## 2021-12-10 RX ORDER — ATORVASTATIN CALCIUM 40 MG/1
40 TABLET, FILM COATED ORAL NIGHTLY
Status: DISCONTINUED | OUTPATIENT
Start: 2021-12-10 | End: 2021-12-16 | Stop reason: HOSPADM

## 2021-12-10 RX ORDER — IPRATROPIUM BROMIDE AND ALBUTEROL SULFATE 2.5; .5 MG/3ML; MG/3ML
1 SOLUTION RESPIRATORY (INHALATION) ONCE
Status: COMPLETED | OUTPATIENT
Start: 2021-12-10 | End: 2021-12-10

## 2021-12-10 RX ORDER — POLYETHYLENE GLYCOL 3350 17 G/17G
17 POWDER, FOR SOLUTION ORAL DAILY PRN
Status: DISCONTINUED | OUTPATIENT
Start: 2021-12-10 | End: 2021-12-16 | Stop reason: HOSPADM

## 2021-12-10 RX ORDER — SODIUM CHLORIDE 9 MG/ML
25 INJECTION, SOLUTION INTRAVENOUS PRN
Status: DISCONTINUED | OUTPATIENT
Start: 2021-12-10 | End: 2021-12-12

## 2021-12-10 RX ORDER — CARVEDILOL 6.25 MG/1
12.5 TABLET ORAL 2 TIMES DAILY WITH MEALS
Status: DISCONTINUED | OUTPATIENT
Start: 2021-12-10 | End: 2021-12-11

## 2021-12-10 RX ORDER — ONDANSETRON 4 MG/1
4 TABLET, ORALLY DISINTEGRATING ORAL EVERY 8 HOURS PRN
Status: DISCONTINUED | OUTPATIENT
Start: 2021-12-10 | End: 2021-12-16 | Stop reason: HOSPADM

## 2021-12-10 RX ORDER — ALBUTEROL SULFATE 2.5 MG/3ML
2.5 SOLUTION RESPIRATORY (INHALATION) EVERY 4 HOURS PRN
Status: DISCONTINUED | OUTPATIENT
Start: 2021-12-10 | End: 2021-12-16 | Stop reason: HOSPADM

## 2021-12-10 RX ORDER — AMIODARONE HYDROCHLORIDE 200 MG/1
200 TABLET ORAL NIGHTLY
Status: DISCONTINUED | OUTPATIENT
Start: 2021-12-10 | End: 2021-12-16 | Stop reason: HOSPADM

## 2021-12-10 RX ORDER — FUROSEMIDE 10 MG/ML
40 INJECTION INTRAMUSCULAR; INTRAVENOUS ONCE
Status: COMPLETED | OUTPATIENT
Start: 2021-12-10 | End: 2021-12-10

## 2021-12-10 RX ORDER — SODIUM CHLORIDE 0.9 % (FLUSH) 0.9 %
5-40 SYRINGE (ML) INJECTION PRN
Status: DISCONTINUED | OUTPATIENT
Start: 2021-12-10 | End: 2021-12-12

## 2021-12-10 RX ORDER — GABAPENTIN 300 MG/1
600 CAPSULE ORAL 3 TIMES DAILY
Status: DISCONTINUED | OUTPATIENT
Start: 2021-12-10 | End: 2021-12-11

## 2021-12-10 RX ORDER — ACETAMINOPHEN 650 MG/1
650 SUPPOSITORY RECTAL EVERY 6 HOURS PRN
Status: DISCONTINUED | OUTPATIENT
Start: 2021-12-10 | End: 2021-12-16 | Stop reason: HOSPADM

## 2021-12-10 RX ORDER — HYDROXYZINE PAMOATE 25 MG/1
50 CAPSULE ORAL ONCE
Status: DISCONTINUED | OUTPATIENT
Start: 2021-12-10 | End: 2021-12-16 | Stop reason: HOSPADM

## 2021-12-10 RX ORDER — ONDANSETRON 2 MG/ML
4 INJECTION INTRAMUSCULAR; INTRAVENOUS EVERY 6 HOURS PRN
Status: DISCONTINUED | OUTPATIENT
Start: 2021-12-10 | End: 2021-12-16 | Stop reason: HOSPADM

## 2021-12-10 RX ORDER — FUROSEMIDE 10 MG/ML
40 INJECTION INTRAMUSCULAR; INTRAVENOUS 2 TIMES DAILY
Status: DISCONTINUED | OUTPATIENT
Start: 2021-12-10 | End: 2021-12-11

## 2021-12-10 RX ORDER — ACETAMINOPHEN 325 MG/1
650 TABLET ORAL EVERY 6 HOURS PRN
Status: DISCONTINUED | OUTPATIENT
Start: 2021-12-10 | End: 2021-12-13 | Stop reason: SDUPTHER

## 2021-12-10 RX ORDER — ASPIRIN 325 MG
325 TABLET ORAL DAILY
Status: DISCONTINUED | OUTPATIENT
Start: 2021-12-10 | End: 2021-12-16

## 2021-12-10 RX ORDER — PANTOPRAZOLE SODIUM 40 MG/1
40 TABLET, DELAYED RELEASE ORAL DAILY
Status: DISCONTINUED | OUTPATIENT
Start: 2021-12-10 | End: 2021-12-16 | Stop reason: HOSPADM

## 2021-12-10 RX ADMIN — IPRATROPIUM BROMIDE AND ALBUTEROL SULFATE 1 AMPULE: .5; 2.5 SOLUTION RESPIRATORY (INHALATION) at 11:30

## 2021-12-10 RX ADMIN — AMIODARONE HYDROCHLORIDE 200 MG: 200 TABLET ORAL at 21:21

## 2021-12-10 RX ADMIN — SODIUM CHLORIDE, PRESERVATIVE FREE 10 ML: 5 INJECTION INTRAVENOUS at 23:08

## 2021-12-10 RX ADMIN — ALBUTEROL SULFATE 2.5 MG: 2.5 SOLUTION RESPIRATORY (INHALATION) at 18:41

## 2021-12-10 RX ADMIN — ATORVASTATIN CALCIUM 40 MG: 40 TABLET, FILM COATED ORAL at 21:21

## 2021-12-10 RX ADMIN — ALBUTEROL SULFATE 2.5 MG: 2.5 SOLUTION RESPIRATORY (INHALATION) at 20:56

## 2021-12-10 RX ADMIN — FUROSEMIDE 40 MG: 10 INJECTION, SOLUTION INTRAMUSCULAR; INTRAVENOUS at 18:34

## 2021-12-10 RX ADMIN — FUROSEMIDE 40 MG: 10 INJECTION, SOLUTION INTRAMUSCULAR; INTRAVENOUS at 12:56

## 2021-12-10 RX ADMIN — CARVEDILOL 12.5 MG: 6.25 TABLET, FILM COATED ORAL at 21:21

## 2021-12-10 RX ADMIN — ASPIRIN 325 MG ORAL TABLET 325 MG: 325 PILL ORAL at 21:22

## 2021-12-10 RX ADMIN — PANTOPRAZOLE SODIUM 40 MG: 40 TABLET, DELAYED RELEASE ORAL at 21:21

## 2021-12-10 RX ADMIN — ACETAMINOPHEN 650 MG: 325 TABLET ORAL at 21:22

## 2021-12-10 RX ADMIN — ALBUTEROL SULFATE 2.5 MG: 2.5 SOLUTION RESPIRATORY (INHALATION) at 16:01

## 2021-12-10 RX ADMIN — Medication 10 ML: at 18:35

## 2021-12-10 RX ADMIN — GABAPENTIN 600 MG: 300 CAPSULE ORAL at 21:22

## 2021-12-10 ASSESSMENT — PAIN SCALES - GENERAL
PAINLEVEL_OUTOF10: 0
PAINLEVEL_OUTOF10: 2
PAINLEVEL_OUTOF10: 3
PAINLEVEL_OUTOF10: 1

## 2021-12-10 ASSESSMENT — PAIN DESCRIPTION - LOCATION
LOCATION: HEAD
LOCATION: HEAD
LOCATION: BACK

## 2021-12-10 ASSESSMENT — PAIN DESCRIPTION - PAIN TYPE
TYPE: CHRONIC PAIN
TYPE: ACUTE PAIN
TYPE: ACUTE PAIN

## 2021-12-10 ASSESSMENT — PAIN DESCRIPTION - ONSET
ONSET: ON-GOING
ONSET: GRADUAL

## 2021-12-10 ASSESSMENT — PAIN DESCRIPTION - PROGRESSION
CLINICAL_PROGRESSION: GRADUALLY WORSENING
CLINICAL_PROGRESSION: GRADUALLY IMPROVING

## 2021-12-10 ASSESSMENT — PAIN DESCRIPTION - FREQUENCY
FREQUENCY: INTERMITTENT
FREQUENCY: INTERMITTENT

## 2021-12-10 ASSESSMENT — PAIN DESCRIPTION - DESCRIPTORS
DESCRIPTORS: ACHING
DESCRIPTORS: ACHING

## 2021-12-10 ASSESSMENT — PAIN DESCRIPTION - ORIENTATION
ORIENTATION: ANTERIOR
ORIENTATION: ANTERIOR

## 2021-12-10 ASSESSMENT — PAIN - FUNCTIONAL ASSESSMENT
PAIN_FUNCTIONAL_ASSESSMENT: PREVENTS OR INTERFERES SOME ACTIVE ACTIVITIES AND ADLS
PAIN_FUNCTIONAL_ASSESSMENT: PREVENTS OR INTERFERES WITH MANY ACTIVE NOT PASSIVE ACTIVITIES

## 2021-12-10 NOTE — PROGRESS NOTES
Patient to room 3116 from the ED. Patient is alert and oriented X4. Vitals: stable She is short of breath with 3L of oxygen nasal cannula. Room set up and dinner ordered. Review of floor, room, call light, phone and hospital policies complete. Patient verbalized understanding. All questions have been answered.  Will monitor

## 2021-12-10 NOTE — ED PROVIDER NOTES
I independently evaluated and obtained a history and physical on [facility]. All diagnostic, treatment, and disposition assistants were made to myself in conjunction the advanced practice provider. For further details of this patient's emergency department encounter, please see the advanced practice provider's documentation. History: Patient is a 44-year-old female with history of CHF presents with shortness of breath. Patient took a breathing treatment at home did not help. Patient thinks related to her Lasix and since she has been switched from her Lasix to another medication because in kidney injury. Patient was short of breath prior to arrival.  Patient's initial pulse ox was 89% placed on nonrebreather on arrival.  Was feeling okay and is feeling better after treatment. Patient was seen in conjunction with DONNA    Physician Exam: Patient is alert awake 44-year-old [facility] female. She is in no respiratory distress at this time. Oxygen sats 100% vitals are stable lungs were clear to auscultation patient had some low back tenderness. No swelling noted in their extremity no peripheral edema. No calf tenderness.   Labs are as follows      Labs Reviewed   CBC WITH AUTO DIFFERENTIAL - Abnormal; Notable for the following components:       Result Value    Hemoglobin 11.7 (*)     MCV 79.5 (*)     MCH 25.2 (*)     RDW 17.9 (*)     All other components within normal limits    Narrative:     Performed at:  [facility]  [address] [place], [place] [address]   Phone [phone]   COMPREHENSIVE METABOLIC PANEL - Abnormal; Notable for the following components:    Glucose 121 (*)     CREATININE 1.6 (*)     GFR Non- 33 (*)     GFR African American 40 (*)     Albumin/Globulin Ratio 0.9 (*)     All other components within normal limits    Narrative:     Performed at:  [facility]  [address] [place], [place] [address] Phone [phone]   BRAIN NATRIURETIC PEPTIDE - Abnormal; Notable for the following components:    Pro-BNP 32,463 (*)     All other components within normal limits    Narrative:     Performed at:  [facility]  [address] [place], [place] [address]   Phone [phone]   CULTURE, BLOOD 1   CULTURE, BLOOD 2   BLOOD GAS, VENOUS    Narrative:     Performed at:  [facility]  [address] [place], [place] [address]   Phone [phone]   TROPONIN    Narrative:     Performed at:  [facility]  [address] [place], [place] [address]   Phone [phone]     XR CHEST PORTABLE   Final Result   Pulmonary vascular congestion. Stable cardiomegaly. Probable small   bilateral pleural effusions. Patient is a 60-year-old history of CHF apparently Lasix has been decreased and became more short of breath. Pulse ox was 89% requiring oxygen. Patient chest x-ray is very clear with some cardiomegaly and some venous congestion. Patient has an elevated BNP. Patient will require admission for further diuresis.      Rubi Fisher MD  12/10/21 8013

## 2021-12-10 NOTE — TELEPHONE ENCOUNTER
Writer contacted ED provider ARI GREENE  to inform of 30 day readmission risk. ED provider informed writer of intention to discharge and follow up recommended. No clear decision at this time, still in the middle of workup on pt.        Call Back: If you need to call back to inform of disposition you can contact me at [phone]

## 2021-12-10 NOTE — CARE COORDINATION
[facility]  Patient is active with [facility], Will follow for discharge to home with orders to resume care.     Hannah Carey RN, BSN CTN  [facility] [phone]

## 2021-12-10 NOTE — ED NOTES
NP at bedside    Unsuccessful attempt at IV placement blood obtained     NP grecia pt for ice chips, those were given to pt      Stefanie Maki RN  12/10/21 9757

## 2021-12-10 NOTE — ED PROVIDER NOTES
[address]  [address] [address]  Dept: [phone]  Loc: [address] [place] [address] COMPLAINT    Chief Complaint   Patient presents with    Shortness of Breath     recently taken off her lasix ( last week) due to kidney injury. states she became SOB this morning about an hour PTA. did a breathing TX 40 minutes PTA. EMS found pt at 89% on RA placed on NRB for transport        HPI    Teresa Sharpe is a 62 y.o. female who presents to the emergency department via EMS after having an abrupt onset of shortness of breath as she was transferring from her bed to the bedside commode this morning. She has a history of COPD. She does not wear oxygen at home. She also has a history of heart failure. She states she was taken off of her diuretic last week because she had an AIDA. She denies lightheadedness, dizziness, chest pain. She reports a nonproductive cough. EMS found her with a low O2 sat in the 80s at home. She was placed on 100% nonrebreather during transportation. On arrival removed and placed on O2 at 2 L per nasal cannula. REVIEW OF SYSTEMS    Cardiac: no chest pain, no palpitations, no syncope  Respiratory: see HPI, + cough  Neurologic: no syncope or LOC  GI: no vomiting, no diarrhea  General: no fever  All other systems reviewed and are negative.     PAST MEDICAL & SURGICAL HISTORY    Past Medical History:   Diagnosis Date    Asthma     Cardiomyopathy ([address]     CHF (congestive heart failure) ([address]     COPD (chronic obstructive pulmonary disease) ([address]     Diabetes mellitus ([address]     Essential hypertension     Hyperlipidemia     Obesity      Past Surgical History:   Procedure Laterality Date    CARPAL TUNNEL RELEASE      CHOLECYSTECTOMY      INCONTINENCE SURGERY         CURRENT MEDICATIONS  (may include discharge medications prescribed in the ED)  Current Outpatient Rx Medication Sig Dispense Refill    torsemide (DEMADEX) 20 MG tablet Take 1 tablet by mouth daily 30 tablet 3    carvedilol (COREG) 25 MG tablet Take 12.5 mg by mouth 2 times daily (with meals)      sulfamethoxazole-trimethoprim (BACTRIM DS;SEPTRA DS) 800-160 MG per tablet Take 1 tablet by mouth 2 times daily      albuterol (PROVENTIL) (5 MG/ML) 0.5% nebulizer solution Inhale 2.5 mg into the lungs 4 times daily      amiodarone (CORDARONE) 200 MG tablet Take 200 mg by mouth nightly nightly       aspirin 325 MG tablet Take 325 mg by mouth daily       atorvastatin (LIPITOR) 40 MG tablet Take 40 mg by mouth nightly       magnesium oxide (MAG-OX) 400 MG tablet Take 400 mg by mouth 2 times daily       potassium chloride SA (KLOR-CON M20) 20 MEQ tablet Take 20 mEq by mouth daily       pantoprazole (PROTONIX) 40 MG tablet Take 40 mg by mouth daily.  hydrocortisone (ANUMED-HC) 25 MG suppository Place 25 mg rectally as needed for Hemorrhoids.  gabapentin (NEURONTIN) 600 MG tablet Take 600 mg by mouth 3 times daily.  albuterol (PROVENTIL HFA) 108 (90 BASE) MCG/ACT inhaler Inhale 2 puffs into the lungs 4 times daily.  tiotropium (SPIRIVA HANDIHALER) 18 MCG inhalation capsule Inhale 18 mcg into the lungs daily. ALLERGIES    Allergies   Allergen Reactions    Ace Inhibitors     Diclofenac     Losartan     Vicodin [Hydrocodone-Acetaminophen]        SOCIAL & FAMILY HISTORY    Social History     Socioeconomic History    Marital status:       Spouse name: None    Number of children: None    Years of education: None    Highest education level: None   Occupational History    None   Tobacco Use    Smoking status: Former Smoker     Packs/day: 0.10     Types: Cigarettes    Smokeless tobacco: Never Used   Substance and Sexual Activity    Alcohol use: No     Alcohol/week: 0.0 standard drinks    Drug use: No    Sexual activity: Yes     Partners: Male     Comment:    Other Topics Concern    None   Social History Narrative    None     Social Determinants of Health     Financial Resource Strain:     Difficulty of Paying Living Expenses: Not on file   Food Insecurity:     Worried About Running Out of Food in the Last Year: Not on file    Yin of Food in the Last Year: Not on file   Transportation Needs:     Lack of Transportation (Medical): Not on file    Lack of Transportation (Non-Medical): Not on file   Physical Activity:     Days of Exercise per Week: Not on file    Minutes of Exercise per Session: Not on file   Stress:     Feeling of Stress : Not on file   Social Connections:     Frequency of Communication with Friends and Family: Not on file    Frequency of Social Gatherings with Friends and Family: Not on file    Attends [employer] Services: Not on file    Active Member of [address] [place] [address] [employer] or Organizations: Not on file    Attends Club or Organization Meetings: Not on file    Marital Status: Not on file   Intimate Partner Violence:     Fear of Current or Ex-Partner: Not on file    Emotionally Abused: Not on file    Physically Abused: Not on file    Sexually Abused: Not on file   Housing Stability:     Unable to Pay for Housing in the Last Year: Not on file    Number of [place] in the Last Year: Not on file    Unstable Housing in the Last Year: Not on file     Family History   Problem Relation Age of Onset    High Blood Pressure Mother     Diabetes Mother     Cancer Mother         thyroid    Stroke Father        PHYSICAL EXAM    VITAL SIGNS: BP (!) 142/42   Pulse 62   Temp 98.3 °F (36.8 °C)   Resp 19   Ht 5' 7\" (1.702 m)   Wt 253 lb 15.5 oz (115.2 kg)   SpO2 100%   BMI 39.78 kg/m²    Constitutional:  Well developed, well nourished, respiratory distress  HENT:  Atraumatic, dry mucus membranes  Neck: supple, no JVD   Respiratory: Respirations are 22/min. No retractions.   Speaking in full uninterrupted sentences however the patient is leaning forward to help improve her her breathing for comfort. She has a pulse oximetry in the 90s on O2 at 2 L per nasal cannula. She has a weak wet nonproductive cough. She has crackles throughout. Cardiovascular: Regular rhythm and rate, S1-S2  Vascular: Radial and DP pulses 2+ and equal bilaterally  GI:  Soft, nontender, normal bowel sounds  Musculoskeletal:  no lower extremity edema, no lower extremity asymmetry, no calf tenderness, no thigh tenderness, no acute deformities  Integument:  Skin is warm and dry, no petechiae   Neurologic:  Alert & oriented x3, no slurred speech  Psych: Pleasant affect, no hallucinations    EKG      EKG reviewed by myself and interpreted by Dr. Nicolás Latif.  Ventricular rate 72 bpm, MN interval 132 ms, QRS duration 126 ms,  ms  No ST elevation or depression. Interpretation is a sinus rhythm with a left bundle branch block. Today's tracing was compared to the one on December 1, 2021 with no significant changes noted. RADIOLOGY/PROCEDURES    XR CHEST PORTABLE   Final Result   Pulmonary vascular congestion. Stable cardiomegaly. Probable small   bilateral pleural effusions.            Labs Reviewed   CBC WITH AUTO DIFFERENTIAL - Abnormal; Notable for the following components:       Result Value    Hemoglobin 11.7 (*)     MCV 79.5 (*)     MCH 25.2 (*)     RDW 17.9 (*)     All other components within normal limits    Narrative:     Performed at:  [facility]  [address] [facility] [address]   Phone [phone]   COMPREHENSIVE METABOLIC PANEL - Abnormal; Notable for the following components:    Glucose 121 (*)     CREATININE 1.6 (*)     GFR Non- 33 (*)     GFR African American 40 (*)     Albumin/Globulin Ratio 0.9 (*)     All other components within normal limits    Narrative:     Performed at:  [facility]  [address] [place], [place] [address]   Phone [phone]   BRAIN NATRIURETIC PEPTIDE - Abnormal; Notable for the following components:    Pro-BNP 32,463 (*)     All other components within normal limits    Narrative:     Performed at:  [facility]  [address] [facility] [place] [address]   Phone [phone]   CULTURE, BLOOD 1   CULTURE, BLOOD 2   BLOOD GAS, VENOUS    Narrative:     Performed at:  [facility]  [address] [facility] [place] [address]   Phone [phone]   TROPONIN    Narrative:     Performed at:  [facility]  [address] [place], [place] [address] [address]   Phone [phone]       ED COURSE & MEDICAL DECISION MAKING    Pertinent Labs & Imaging studies reviewed and interpreted. (See chart for details)    See chart for details of medications given during the ED stay.     Vitals:    12/10/21 1031 12/10/21 1046 12/10/21 1132 12/10/21 1151   BP: (!) 134/51 136/64  (!) 142/42   Pulse: 66 65  62   Resp: 18 16 16 19   Temp:    98.3 °F (36.8 °C)   SpO2: 92% 94% 96% 100%   Weight:       Height:         Labs Reviewed   CBC WITH AUTO DIFFERENTIAL - Abnormal; Notable for the following components:       Result Value    Hemoglobin 11.7 (*)     MCV 79.5 (*)     MCH 25.2 (*)     RDW 17.9 (*)     All other components within normal limits    Narrative:     Performed at:  [facility]  [address] [facility] [place] [address]   Phone [phone]   COMPREHENSIVE METABOLIC PANEL - Abnormal; Notable for the following components:    Glucose 121 (*)     CREATININE 1.6 (*)     GFR Non- 33 (*)     GFR African American 40 (*)     Albumin/Globulin Ratio 0.9 (*)     All other components within normal limits    Narrative:     Performed at:  [facility]  [address] [facility] [place] [address]   Phone [phone]   BRAIN NATRIURETIC PEPTIDE - Abnormal; Notable for the following components:    Pro-BNP 32,463 (*)     All other components within normal limits    Narrative:     Performed at:  [facility]  [address] [place] [address]   Phone [phone]   CULTURE, BLOOD 1   CULTURE, BLOOD 2   BLOOD GAS, VENOUS    Narrative:     Performed at:  [facility]  [address] [place] [address]   Phone [phone]   TROPONIN    Narrative:     Performed at:  [facility]  [address] [facility] [address]   Phone [phone]     This patient was seen evaluated by myself my attending physician Dr. Makenna Shell.  Differential diagnosis includes but is not limited to heart failure exacerbation, COPD exacerbation, asthma exacerbation, pneumonia, bronchitis, Covid, influenza, ACS, other. She arrives in mild respiratory distress. EMS reports a low O2 sat in the 80s at home. She arrived on 100% nonrebreather. We immediately took that off and placed on O2 at 2 L per nasal cannula. She has crackles throughout. Her cough is wet. No peripheral edema. She had a recent hospitalization where she was taken off of her diuretic because she had an AIDA. She is worsening creatinine function today. It is 1.6. Her proBNP is over 32,000. Her chest x-ray shows pulmonary vascular congestion with probably small bilateral pleural effusions. She has a white count of 8.1. Hemoglobin 11.7. Glucose is 121. Pont[place] is less than 0.01. No acute changes on EKG. And her venous pH is 7.376 with a PCO2 of 44.9. I feel this patient is needs inpatient hospitalization again unfortunately. She did receive a dose of Lasix here. She will be admitted to the hospitalist service. The patient was updated on the plan of care and she agrees. CRITICAL CARE NOTE:  There was a high probability of clinically significant life-threatening deterioration of the patient's condition requiring my urgent intervention.     Total critical care time is 35 minutes. This includes multiple reevaluations, vital sign monitoring, pulse oximetry monitoring, telemetry monitoring, clinical response to the IV medications, reviewing the nursing notes, consultation time, dictation/documentation time, and interpretation of the labwork. (This time excludes time spent performing procedures). FINAL IMPRESSION    1. Acute on chronic congestive heart failure, unspecified heart failure type ([facility]    2. Hypoxia    3.  AIDA (acute kidney injury) ([facility]    4. QT prolongation        PLAN  Admission to the hospital    (Please note that this note was completed with a voice recognition program.  Every attempt was made to edit the dictations, but inevitably there remain words that are mis-transcribed.)        Wil Allred, APRN - CNP  12/10/21 1240

## 2021-12-10 NOTE — H&P
Hospital Medicine History & Physical      PCP: Diamond Guerra MD    Date of Admission: 12/10/2021    Date of Service: Pt seen/examined on 12/10/21 and Admitted to Inpatient with expected LOS greater than two midnights due to medical therapy. Chief Complaint: Shortness of breath      History Of Present Illness:      62 y.o. female history of chronic systolic heart failure, EF 25 to 30% status post AICD, CAD s/p stent, hypertension, CKD stage III, COPD presented to emergency room with shortness of breath. .. The patient was recently hospitalized at [facility] from 12/1-12/6/21 with acute decompensated heart failure, AIDA/cardiorenal... She clinically improved and was discharged home. . She has been taking her medications as prescribed. . This morning she developed acute onset of shortness of breath and she tried to transfer from her bed to the bedside commode. .. No associated leg swelling. . No fevers, chills or rigors. . No cough or wheezing  EMS was called to her residence and she was noted to be in respiratory distress, she was hypoxic in the 80s and placed on nonrebreather mask. Jami Lyn Upon arrival to the ED, she was weaned down to 2 L nasal cannula. . She was given IV Lasix 40 mg.. Chest x-ray showed pulmonary vascular congestion small pleural effusions. .. proBNP was elevated above recent baseline at 32k         Past Medical History:          Diagnosis Date    Asthma     [facility]     CHF (congestive heart failure) ([facility])     COPD (chronic obstructive pulmonary disease) ([facility]     Diabetes mellitus ([facility]     Essential hypertension     Hyperlipidemia     Obesity        Past Surgical History:          Procedure Laterality Date    CARPAL TUNNEL RELEASE      CHOLECYSTECTOMY      INCONTINENCE SURGERY         Medications Prior to Admission:      Prior to Admission medications    Medication Sig Start Date End Date Taking?  Authorizing Provider   torsemide (DEMADEX) 20 MG tablet Take 1 tablet by mouth daily 12/7/21  Yes Renea Jennings MD   carvedilol (COREG) 12.5 MG tablet Take 12.5 mg by mouth 2 times daily (with meals)    Yes Historical Provider, MD   albuterol (PROVENTIL) (5 MG/ML) 0.5% nebulizer solution Inhale 2.5 mg into the lungs 4 times daily   Yes Historical Provider, MD   amiodarone (CORDARONE) 200 MG tablet Take 200 mg by mouth nightly nightly    Yes Historical Provider, MD   aspirin 325 MG tablet Take 325 mg by mouth daily    Yes Historical Provider, MD   atorvastatin (LIPITOR) 40 MG tablet Take 40 mg by mouth nightly    Yes Historical Provider, MD   magnesium oxide (MAG-OX) 400 MG tablet Take 400 mg by mouth 2 times daily    Yes Historical Provider, MD   potassium chloride SA (KLOR-CON M20) 20 MEQ tablet Take 20 mEq by mouth daily    Yes Historical Provider, MD   pantoprazole (PROTONIX) 40 MG tablet Take 40 mg by mouth daily. Yes Historical Provider, MD   gabapentin (NEURONTIN) 600 MG tablet Take 600 mg by mouth 3 times daily. Yes Historical Provider, MD   albuterol (PROVENTIL HFA) 108 (90 BASE) MCG/ACT inhaler Inhale 2 puffs into the lungs every 4 hours as needed    Yes Historical Provider, MD   tiotropium (SPIRIVA HANDIHALER) 18 MCG inhalation capsule Inhale 18 mcg into the lungs daily. Yes Historical Provider, MD       Allergies:  Ace inhibitors, Diclofenac, Losartan, and Vicodin [hydrocodone-acetaminophen]    Social History:      The patient currently lives     TOBACCO:   reports that she has quit smoking. Her smoking use included cigarettes. She smoked 0.10 packs per day. She has never used smokeless tobacco.  ETOH:   reports no history of alcohol use. Family History:       Reviewed in detail and negative for DM, CAD, Cancer, CVA. Positive as follows:        Problem Relation Age of Onset    High Blood Pressure Mother     Diabetes Mother     Cancer Mother         thyroid    Stroke Father        REVIEW OF SYSTEMS:   Pertinent positives as noted in the HPI.  All other systems reviewed and negative. PHYSICAL EXAM:    BP (!) 124/49   Pulse 62   Temp 98.3 °F (36.8 °C)   Resp 17   Ht 5' 7\" (1.702 m)   Wt 253 lb 15.5 oz (115.2 kg)   SpO2 99%   BMI 39.78 kg/m²     General appearance:  No apparent distress, appears stated age and cooperative. HEENT:  Normal cephalic, atraumatic without obvious deformity. Pupils equal, round, and reactive to light. Extra ocular muscles intact. Conjunctivae/corneas clear. Neck: Supple, with full range of motion. No jugular venous distention. Trachea midline. Respiratory:  Normal respiratory effort. Clear to auscultation, bilaterally without Rales/Wheezes/Rhonchi. Cardiovascular:  Regular rate and rhythm with normal S1/S2 without murmurs, rubs or gallops. Abdomen: Soft, non-tender, non-distended with normal bowel sounds. Musculoskeletal:  No clubbing, cyanosis or edema bilaterally. Full range of motion without deformity. Skin: Skin color, texture, turgor normal.  No rashes or lesions. Neurologic:  Neurovascularly intact without any focal sensory/motor deficits. Cranial nerves: II-XII intact, grossly non-focal.  Psychiatric:  Alert and oriented, thought content appropriate, normal insight  Capillary Refill: Brisk,< 3 seconds   Peripheral Pulses: +2 palpable, equal bilaterally       CXR:  I have reviewed the CXR with the following interpretation:   EKG:  I have reviewed the EKG with the following interpretation:     Labs:     Recent Labs     12/10/21  1005   WBC 8.1   HGB 11.7*   HCT 36.8        Recent Labs     12/10/21  1005      K 5.1      CO2 21   BUN 13   CREATININE 1.6*   CALCIUM 9.1     Recent Labs     12/10/21  1005   AST 24   ALT 27   BILITOT 0.8   ALKPHOS 83     No results for input(s): INR in the last 72 hours.   Recent Labs     12/10/21  1005   TROPONINI <0.01       Urinalysis:      Lab Results   Component Value Date    NITRU Negative 12/01/2021    WBCUA >100 12/01/2021    BACTERIA Rare 12/01/2021 RBCUA 11-20 12/01/2021    BLOODU MODERATE 12/01/2021    SPECGRAV 1.010 12/01/2021    GLUCOSEU Negative 12/01/2021         ASSESSMENT:    --Acute on chronic combined systolic and diastolic heart failure, status post AICD EF 25 to 02%/FQDEW 2 diastolic dysfunction on echo 10/11/21----recurrent admission. .. Continue IV Lasix, fluid restriction, strict I's and O's. Continue current. Not on ACE/ARB/Aldactone due to renal failure. . Patient's primary cardiologist is at [address] [place] [address]--consult cardiology    -Acute respiratory failure with hypoxia due to CHF exacerbation. . Was 86% on room air with respiratory distress by EMS. .. Weaned down to 2 L nasal cannula in the ED. Lamont Yesica Continue supplemental oxygen and wean as tolerated    --Moderate to severe mitral vegetation--follow-up with cards    -CAD s/p PCI/stent--continue statin, beta-blocker    -History of nonsustained V. Tach--continue amiodarone    --AIDA on CKD stage III--likely hepatorenal--- continue diuretic therapy, follow-up with nephrology    --Asthma/COPDstablecontinue inhaled steroids and bronchodilators    --Essential hypertension--- stablecontinue Coreg and diuretics    --Hyperlipidemiacontinue statin    -Morbid obesityBMI 40--continue dietary restriction            DVT Prophylaxis: Lovenox  Diet: Cardiac/fluid restriction  Code Status: Full code     Abhilash Reyna MD    Thank you Irene Phillips MD for the opportunity to be involved in this patient's care. If you have any questions or concerns please feel free to contact me at [phone].

## 2021-12-10 NOTE — ED NOTES
Pharmacy Medication Reconciliation Note     List of medications patient is currently taking is complete. Source of information:   1. Patient  2. EMR      Notes regarding home medications:   1. Patient states she did not take her medications at home today prior to presenting to the Emergency Department.       Armen Moctezuma PharmD, BCPS  12/10/2021  2:06 PM

## 2021-12-11 LAB
ANION GAP SERPL CALCULATED.3IONS-SCNC: 13 MMOL/L (ref 3–16)
BILIRUBIN URINE: NEGATIVE
BLOOD, URINE: NEGATIVE
BUN BLDV-MCNC: 16 MG/DL (ref 7–20)
CALCIUM SERPL-MCNC: 8.9 MG/DL (ref 8.3–10.6)
CHLORIDE BLD-SCNC: 102 MMOL/L (ref 99–110)
CHOLESTEROL, TOTAL: 77 MG/DL (ref 0–199)
CLARITY: CLEAR
CO2: 22 MMOL/L (ref 21–32)
COLOR: YELLOW
CREAT SERPL-MCNC: 1.9 MG/DL (ref 0.6–1.2)
GFR AFRICAN AMERICAN: 33
GFR NON-AFRICAN AMERICAN: 27
GLUCOSE BLD-MCNC: 97 MG/DL (ref 70–99)
GLUCOSE URINE: NEGATIVE MG/DL
HDLC SERPL-MCNC: 25 MG/DL (ref 40–60)
KETONES, URINE: NEGATIVE MG/DL
LDL CHOLESTEROL CALCULATED: 36 MG/DL
LEUKOCYTE ESTERASE, URINE: NEGATIVE
MAGNESIUM: 2.3 MG/DL (ref 1.8–2.4)
MICROSCOPIC EXAMINATION: NORMAL
NITRITE, URINE: NEGATIVE
PH UA: 6.5 (ref 5–8)
POTASSIUM SERPL-SCNC: 4.2 MMOL/L (ref 3.5–5.1)
PROTEIN UA: NEGATIVE MG/DL
SODIUM BLD-SCNC: 137 MMOL/L (ref 136–145)
SPECIFIC GRAVITY UA: 1.01 (ref 1–1.03)
TRIGL SERPL-MCNC: 82 MG/DL (ref 0–150)
URINE TYPE: NORMAL
UROBILINOGEN, URINE: 0.2 E.U./DL
VLDLC SERPL CALC-MCNC: 16 MG/DL

## 2021-12-11 PROCEDURE — 6370000000 HC RX 637 (ALT 250 FOR IP): Performed by: HOSPITALIST

## 2021-12-11 PROCEDURE — 2700000000 HC OXYGEN THERAPY PER DAY

## 2021-12-11 PROCEDURE — 80048 BASIC METABOLIC PNL TOTAL CA: CPT

## 2021-12-11 PROCEDURE — 94660 CPAP INITIATION&MGMT: CPT

## 2021-12-11 PROCEDURE — 36415 COLL VENOUS BLD VENIPUNCTURE: CPT

## 2021-12-11 PROCEDURE — 6360000002 HC RX W HCPCS: Performed by: HOSPITALIST

## 2021-12-11 PROCEDURE — 94640 AIRWAY INHALATION TREATMENT: CPT

## 2021-12-11 PROCEDURE — 81003 URINALYSIS AUTO W/O SCOPE: CPT

## 2021-12-11 PROCEDURE — 2580000003 HC RX 258: Performed by: HOSPITALIST

## 2021-12-11 PROCEDURE — 83735 ASSAY OF MAGNESIUM: CPT

## 2021-12-11 PROCEDURE — 80061 LIPID PANEL: CPT

## 2021-12-11 PROCEDURE — 1200000000 HC SEMI PRIVATE

## 2021-12-11 PROCEDURE — 99222 1ST HOSP IP/OBS MODERATE 55: CPT | Performed by: INTERNAL MEDICINE

## 2021-12-11 PROCEDURE — 94761 N-INVAS EAR/PLS OXIMETRY MLT: CPT

## 2021-12-11 RX ORDER — CARVEDILOL 3.12 MG/1
3.12 TABLET ORAL 2 TIMES DAILY WITH MEALS
Status: DISCONTINUED | OUTPATIENT
Start: 2021-12-11 | End: 2021-12-12

## 2021-12-11 RX ORDER — ALBUTEROL SULFATE 2.5 MG/3ML
2.5 SOLUTION RESPIRATORY (INHALATION) 4 TIMES DAILY
Status: DISCONTINUED | OUTPATIENT
Start: 2021-12-11 | End: 2021-12-16 | Stop reason: HOSPADM

## 2021-12-11 RX ADMIN — SODIUM CHLORIDE, PRESERVATIVE FREE 5 ML: 5 INJECTION INTRAVENOUS at 20:57

## 2021-12-11 RX ADMIN — ALBUTEROL SULFATE 2.5 MG: 2.5 SOLUTION RESPIRATORY (INHALATION) at 20:51

## 2021-12-11 RX ADMIN — CARVEDILOL 12.5 MG: 6.25 TABLET, FILM COATED ORAL at 09:25

## 2021-12-11 RX ADMIN — PANTOPRAZOLE SODIUM 40 MG: 40 TABLET, DELAYED RELEASE ORAL at 09:25

## 2021-12-11 RX ADMIN — ALBUTEROL SULFATE 2.5 MG: 2.5 SOLUTION RESPIRATORY (INHALATION) at 12:16

## 2021-12-11 RX ADMIN — ATORVASTATIN CALCIUM 40 MG: 40 TABLET, FILM COATED ORAL at 20:57

## 2021-12-11 RX ADMIN — FUROSEMIDE 40 MG: 10 INJECTION, SOLUTION INTRAMUSCULAR; INTRAVENOUS at 09:26

## 2021-12-11 RX ADMIN — TIOTROPIUM BROMIDE INHALATION SPRAY 2 PUFF: 3.12 SPRAY, METERED RESPIRATORY (INHALATION) at 08:42

## 2021-12-11 RX ADMIN — ENOXAPARIN SODIUM 40 MG: 100 INJECTION SUBCUTANEOUS at 09:26

## 2021-12-11 RX ADMIN — GABAPENTIN 600 MG: 300 CAPSULE ORAL at 09:25

## 2021-12-11 RX ADMIN — ALBUTEROL SULFATE 2.5 MG: 2.5 SOLUTION RESPIRATORY (INHALATION) at 08:42

## 2021-12-11 RX ADMIN — ASPIRIN 325 MG ORAL TABLET 325 MG: 325 PILL ORAL at 09:25

## 2021-12-11 RX ADMIN — ALBUTEROL SULFATE 2.5 MG: 2.5 SOLUTION RESPIRATORY (INHALATION) at 16:45

## 2021-12-11 ASSESSMENT — PAIN SCALES - GENERAL: PAINLEVEL_OUTOF10: 0

## 2021-12-11 NOTE — CONSULTS
Brief Nutrition Note     Consult for diet education \"HF diet guidelines\". Pt with established hx of HF. Reports she has received education before on diet and is familiar with guidelines. Does report some issues with compliance with fluid restriction. Did reinforce recommended amount of fluid and importance of tracking amount.      Electronically signed by Diana Stone RD, LD on 12/11/2021 at 12:44 PM

## 2021-12-11 NOTE — PROGRESS NOTES
Respiratory therapy at pt bedside for breathing tx. Pt still complain of sob. Pt becoming anxious. NP kelsey served to update on pt condition.

## 2021-12-11 NOTE — CONSULTS
Nephrology Consult Note   [facility]. [employer]      Chief Complaint: Sudden onset shortness of breath    History of Present Illness: Ms Miguel Zamora is a 63 yo morbidly obese female with a past medical history significant for CAD, hypertension, CKD III (baseline creatinine in mid 1s), HFrEF (25-30% on echo done on 10/11/21, Grade II diastolic CHF, Moderate to severe MR), recently discharged from the hospital after being treated for AIDA occurring in the setting of decompensated HF (?CRS), came to [facility] with sudden onset f Shortness of breath. CXR consistent with pulmonary edema. Patient has been getting IVP Lasix and her symptoms have completely resolved. Creatinine trending up and BP has dropped to 90s    Subjective:    Resting in bed; No shortness of breath, no lower extremity edema    ROS: Sudden onset of shortness of breath; no chest pain, o lower extremity edema. All other ROS negative    Scheduled Meds:   albuterol  2.5 mg Nebulization 4x daily    carvedilol  3.125 mg Oral BID WC    amiodarone  200 mg Oral Nightly    atorvastatin  40 mg Oral Nightly    pantoprazole  40 mg Oral Daily    tiotropium  2 puff Inhalation Daily    aspirin  325 mg Oral Daily    sodium chloride flush  5-40 mL IntraVENous 2 times per day    enoxaparin  40 mg SubCUTAneous Daily    hydrOXYzine  50 mg Oral Once        sodium chloride         PRN Meds:.sodium chloride flush, sodium chloride, ondansetron **OR** ondansetron, polyethylene glycol, acetaminophen **OR** acetaminophen, albuterol    Physical Exam:    TEMPERATURE:  Current - Temp: 97.9 °F (36.6 °C);  Max - Temp  Av.1 °F (36.7 °C)  Min: 97.4 °F (36.3 °C)  Max: 100 °F (37.8 °C)  RESPIRATIONS RANGE: Resp  Av.4  Min: 15  Max: 26  PULSE RANGE: Pulse  Av.9  Min: 47  Max: 83  BLOOD PRESSURE RANGE:  Systolic (05SDR), HDD:193 , Min:99 , FCS:310   ; Diastolic (68VHU), MSL:03, Min:45, Max:73    24HR INTAKE/OUTPUT:      Intake/Output Summary (Last 24 hours) at 2021 1421  Last data filed at 12/11/2021 1022  Gross per 24 hour   Intake 570 ml   Output 600 ml   Net -30 ml         Patient Vitals for the past 96 hrs (Last 3 readings):   Weight   12/11/21 0630 241 lb 13.5 oz (109.7 kg)   12/10/21 0939 253 lb 15.5 oz (115.2 kg)       General: Alert, Awake, NAD, Morbidly Obese  HEENT: Normocephalic, atraumatic, Nose and ears appear externally without deformity, MMM  Neck: No Thyromegaly, Trachea is midline, No Carotid bruit  Eyes: EOMI, IGLESIA  Chest: crackles bibasilar, no intercostal retractions  CVS: RRR, no murmur, no rub  Abdomen: soft, non tender, no organomegaly, no bruit appreciated  Extremities: no edema, no cyanosis. Skin: normal texture, normal skin turgor, no rash  Musculoskeletal: normal ROM, no joint swelling, no visible deformity  Neurological: CN intact, no focal motor neurological deficit  Psych: normal affect; AAO x 3      Allergies: Allergies   Allergen Reactions    Ace Inhibitors     Diclofenac     Losartan     Vicodin [Hydrocodone-Acetaminophen]       Past Medical History:   Diagnosis Date    Asthma     [facility]     CHF (congestive heart failure) (HCC)     COPD [facility]chronic obstructive pulmonary disease) [facility]     [facility]     Essential hypertension     Hyperlipidemia     Obesity      Past Surgical History:   Procedure Laterality Date    CARPAL TUNNEL RELEASE      CHOLECYSTECTOMY      INCONTINENCE SURGERY       Social History     Socioeconomic History    Marital status:       Spouse name: Not on file    Number of children: Not on file    Years of education: Not on file    Highest education level: Not on file   Occupational History    Not on file   Tobacco Use    Smoking status: Former Smoker     Packs/day: 0.10     Types: Cigarettes    Smokeless tobacco: Never Used   Substance and Sexual Activity    Alcohol use: No     Alcohol/week: 0.0 standard drinks    Drug use: No    Sexual activity: Yes     Partners: Male     Comment:    Other Topics Concern    Not on file   Social History Narrative    Not on file     Social Determinants of Health     Financial Resource Strain:     Difficulty of Paying Living Expenses: Not on file   Food Insecurity:     Worried About Running Out of Food in the Last Year: Not on file    Yin of Food in the Last Year: Not on file   Transportation Needs:     Lack of Transportation (Medical): Not on file    Lack of Transportation (Non-Medical):  Not on file   Physical Activity:     Days of Exercise per Week: Not on file    Minutes of Exercise per Session: Not on file   Stress:     Feeling of Stress : Not on file   Social Connections:     Frequency of Communication with Friends and Family: Not on file    Frequency of Social Gatherings with Friends and Family: Not on file    Attends Bahai Services: Not on file    Active Member of [address] [place] [address] [employer] or Organizations: Not on file    Attends Club or Organization Meetings: Not on file    Marital Status: Not on file   Intimate Partner Violence:     Fear of Current or Ex-Partner: Not on file    Emotionally Abused: Not on file    Physically Abused: Not on file    Sexually Abused: Not on file   Housing Stability:     Unable to Pay for Housing in the Last Year: Not on file    Number of [place] in the Last Year: Not on file    Unstable Housing in the Last Year: Not on file     Family History   Problem Relation Age of Onset    High Blood Pressure Mother     Diabetes Mother     Cancer Mother         thyroid    Stroke Father          LAB DATA:    CBC:   Lab Results   Component Value Date    WBC 8.1 12/10/2021    RBC 4.63 12/10/2021    RBC 5.12 05/26/2017    HGB 11.7 12/10/2021    HCT 36.8 12/10/2021    MCV 79.5 12/10/2021    MCH 25.2 12/10/2021    MCHC 31.7 12/10/2021    RDW 17.9 12/10/2021     12/10/2021    MPV 9.1 12/10/2021     BMP:    Lab Results   Component Value Date     12/11/2021    K 4.2 12/11/2021    K 3.9 12/06/2021     12/11/2021    CO2 22 12/11/2021    BUN 16 12/11/2021    CREATININE 1.9 12/11/2021    CALCIUM 8.9 12/11/2021    GFRAA 33 12/11/2021    GFRAA >60 01/17/2013    LABGLOM 27 12/11/2021    GLUCOSE 97 12/11/2021    GLUCOSE 154 06/05/2015     Ionized Calcium:  No results found for: IONCA  Magnesium:    Lab Results   Component Value Date    MG 2.30 12/11/2021     Phosphorus:    Lab Results   Component Value Date    PHOS 4.0 12/04/2021     U/A:    Lab Results   Component Value Date    COLORU Yellow 12/01/2021    PHUR 7.5 12/01/2021    WBCUA >100 12/01/2021    RBCUA 11-20 12/01/2021    MUCUS 1+ 12/15/2012    BACTERIA Rare 12/01/2021    CLARITYU SL CLOUDY 12/01/2021    SPECGRAV 1.010 12/01/2021    LEUKOCYTESUR LARGE 12/01/2021    UROBILINOGEN 0.2 12/01/2021    BILIRUBINUR Negative 12/01/2021    BLOODU MODERATE 12/01/2021    GLUCOSEU Negative 12/01/2021    AMORPHOUS 2+ 12/18/2012         IMPRESSION/RECOMMENDATIONS:      Active Problems:    Acute decompensated heart failure ([address]  Resolved Problems:    * No resolved hospital problems. *    1. AIDA - likely due to relative hypotension SBP dropped from 153 to 99 mmHg  - Reduced coreg dose since she is also bradycardic  - Held lasix which will be restarted once BP improved likely in the form of a Lasix gtt at 5 mg/hr  - Held Gabapentin since it causes fluid retention and has neurological SE in setting of AIDA  - Monitor kidney function closely  - Avoid exposure to Nephrotoxic agents    2. CKD - Baseline ~ 1.5 mg/dl    3. Morbid Obesity    4. Bradycardia - reduced Coreg dose    5. HTN - currently hypotensive    6.  CHF - shortness of breath has resolved   - hold Lasix for now   - Once hemodynamically more stable and creatinine down can resume loop diuretic  -

## 2021-12-11 NOTE — PROGRESS NOTES
Corwin Staton, CORNELIA perfect served. Pt placed on hospital bipap. Pt sob resolved. Pt breathing easier. Will continue to monitor.

## 2021-12-11 NOTE — FLOWSHEET NOTE
12/11/21 1737   Readmission Assessment   Number of Days since last admission? 1-7 days   Previous Disposition Home with Home Health   Who is being Interviewed Patient   What was the patient's/caregiver's perception as to why they think they needed to return back to the hospital? Other (Comment)  (my kidney doctor changed my water pill and it really affected me; my body is not use to this dose.)   Did you visit your Primary Care Physician after you left the hospital, before you returned this time? Yes   Did you see a specialist, such as Cardiac, Pulmonary, Orthopedic Physician, etc. after you left the hospital? No   Who advised the patient to return to the hospital? Self-referral   Does the patient report anything that got in the way of taking their medications? No   In our efforts to provide the best possible care to you and others like you, can you think of anything that we could have done to help you after you left the hospital the first time, so that you might not have needed to return so soon?  Other (Comment)  (my kidney doctor changed my water pill and it really affected me; my body is not use to this dose.)

## 2021-12-11 NOTE — CARE COORDINATION
INITIAL CASE MANAGEMENT ASSESSMENT    Reviewed chart, met with patient to assess possible discharge needs. Explained Case Management role/services. Living Situation: patient lives alone with 7 ARIS. ADLs: manages on her own; daughter assists with housekeeping and groceries     DME: WC, BSC, RTS, walker, cane, bipap    PT/OT Recs: not ordered yet     Active Services: [facility]--Pt/RN. Just approved for COA services--life alert and aide 3 times/week for 3 hours/week every other week     Transportation: daughter     Medications: takes on her own    PCP: Sandra Mcmillan MD    PLAN/COMMENTS: Patient reported that she plans to return home and resume home care services. Patient reported her \"water pill\" was changed, it has affected her-her body is not use to this change, and she has been in/out of hospitals this fall. Patient agreeable to speak with CHF Rn--left message. SW/CM provided contact information for patient or family to call with any questions. SW/CM will follow and assist as needed.

## 2021-12-11 NOTE — CONSULTS
[facility]    Mervat Dias  1960 December 11, 2021    Reason for Consult: CHF    CC: Shortness of breath    HPI:  The patient is 62 y.o. female with a past medical history significant for essential hypertension, type 2 DM, COPD and chronic systolic CHF who presented to [facility] with shortness of breath. She was apparently taken off of her diuretic and ACEI/ARB therapy a week ago due to acute kidney injury. Peterson Russ states that she became acutely short of breath yesterday. She has not been feeling great since her discharge. She feels that her home Bipap settings are not adequate. She thinks that the Bipap here is what resolved her shortness of breath on presentation. She denies any chest pain or tightness. Review of Systems:  Constitutional: No fatigue, weakness, night sweats or fever. HEENT: No new vision difficulties or ringing in the ears. Respiratory: Positive for New on chronic SOB, PND, orthopnea. No cough. Cardiovascular: See HPI   GI: No n/v, diarrhea, constipation, abdominal pain or changes in bowel habits. No melena, no hematochezia  : No urinary frequency, urgency, incontinence, hematuria or dysuria. Skin: No cyanosis or skin lesions. Musculoskeletal: No new muscle or joint pain. Neurological: No syncope or TIA-like symptoms.   Psychiatric: No anxiety, insomnia or depression     Past Medical History:   Diagnosis Date    Asthma     Cardiomyopathy ([address]     CHF (congestive heart failure) (HCC)     COPD (chronic obstructive pulmonary disease) ([address]     Diabetes mellitus [facility]     Essential hypertension     Hyperlipidemia     Obesity      Past Surgical History:   Procedure Laterality Date    CARPAL TUNNEL RELEASE      CHOLECYSTECTOMY      INCONTINENCE SURGERY       Family History   Problem Relation Age of Onset    High Blood Pressure Mother     Diabetes Mother     Cancer Mother         thyroid    Stroke Father      Social History Tobacco Use    Smoking status: Former Smoker     Packs/day: 0.10     Types: Cigarettes    Smokeless tobacco: Never Used   Substance Use Topics    Alcohol use: No     Alcohol/week: 0.0 standard drinks    Drug use: No       Allergies   Allergen Reactions    Ace Inhibitors     Diclofenac     Losartan     Vicodin [Hydrocodone-Acetaminophen]      Current Facility-Administered Medications   Medication Dose Route Frequency Provider Last Rate Last Admin    albuterol (PROVENTIL) nebulizer solution 2.5 mg  2.5 mg Nebulization 4x daily Clovis Plasencia MD   2.5 mg at 12/11/21 5364    amiodarone (CORDARONE) tablet 200 mg  200 mg Oral Nightly Clovis Plasencia MD   200 mg at 12/10/21 2121    atorvastatin (LIPITOR) tablet 40 mg  40 mg Oral Nightly Clovis Plasencia MD   40 mg at 12/10/21 2121    carvedilol (COREG) tablet 12.5 mg  12.5 mg Oral BID WC Clovis Plasencia MD   12.5 mg at 12/10/21 2121    gabapentin (NEURONTIN) capsule 600 mg  600 mg Oral TID Clovis Plasencia MD   600 mg at 12/10/21 2122    pantoprazole (PROTONIX) tablet 40 mg  40 mg Oral Daily Clovis Plasencia MD   40 mg at 12/10/21 2121    tiotropium (SPIRIVA RESPIMAT) 2.5 MCG/ACT inhaler 2 puff  2 puff Inhalation Daily Clovis Plasencia MD   2 puff at 12/11/21 4438    aspirin tablet 325 mg  325 mg Oral Daily Clovis Plasencia MD   325 mg at 12/10/21 2122    sodium chloride flush 0.9 % injection 5-40 mL  5-40 mL IntraVENous 2 times per day Clovis Plasencia MD   10 mL at 12/10/21 2308    sodium chloride flush 0.9 % injection 5-40 mL  5-40 mL IntraVENous PRN Clovis Plasencia MD   10 mL at 12/10/21 1835    0.9 % sodium chloride infusion  25 mL IntraVENous PRN Clovis Plaesncia MD        ondansetron (ZOFRAN-ODT) disintegrating tablet 4 mg  4 mg Oral Q8H PRN Clovis Plasencia MD        Or    ondansetron [facility]) injection 4 mg  4 mg IntraVENous Q6H PRN Clovis Plasencia MD        polyethylene glycol [facility]) packet 17 g  17 g Oral Daily PRN Clovis Plasencia MD        acetaminophen (TYLENOL) tablet 650 mg  650 mg Oral Q6H PRN Clovis Plasencia MD   650 mg at 12/10/21 2122    Or    acetaminophen (TYLENOL) suppository 650 mg  650 mg Rectal Q6H PRN Clovis Plasencia MD        enoxaparin (LOVENOX) injection 40 mg  40 mg SubCUTAneous Daily Clovis Plasencia MD        furosemide (LASIX) injection 40 mg  40 mg IntraVENous BID Clovis Plasencia MD   40 mg at 12/10/21 1834    albuterol (PROVENTIL) nebulizer solution 2.5 mg  2.5 mg Nebulization Q4H PRN Clovis Plasencia MD   2.5 mg at 12/10/21 2056    hydrOXYzine (VISTARIL) capsule 50 mg  50 mg Oral Once Bianka Rodrigues APRN - CNP           Physical Exam:   /73   Pulse (!) 48   Temp 97.4 °F (36.3 °C) (Axillary)   Resp 20   Ht 5' 7\" (1.702 m)   Wt 241 lb 13.5 oz (109.7 kg)   SpO2 97%   BMI 37.88 kg/m²     Intake/Output Summary (Last 24 hours) at 12/11/2021 4954  Last data filed at 12/11/2021 0600  Gross per 24 hour   Intake 90 ml   Output 600 ml   Net -510 ml     Wt Readings from Last 2 Encounters:   12/11/21 241 lb 13.5 oz (109.7 kg)   12/06/21 253 lb 1.4 oz (114.8 kg)     Constitutional: She is oriented to person, place, and time. She appears well-developed and well-nourished. In no acute distress. Head: Normocephalic and atraumatic. Neck: Neck supple. No JVD present. Carotid bruit is not present. No mass and no thyromegaly present. No lymphadenopathy present. Cardiovascular: Normal rate, regular rhythm, normal heart sounds and intact distal pulses. Exam reveals no gallop and no friction rub. No murmur heard. Pulmonary/Chest: Effort normal and breath sounds normal. No respiratory distress. She has no wheezes, rhonchi or rales. Abdominal: Soft, non-tender. Bowel sounds and aorta are normal. She exhibits no organomegaly, mass or bruit. Extremities: No edema, cyanosis, or clubbing. Pulses are 2+ radial/carotid/dorsalis pedis and posterior tibial bilaterally.   Neurological: She is alert and oriented to person, place, and time. She has normal reflexes. No cranial nerve deficit. Coordination normal.   Skin: Skin is warm and dry. There is no rash or diaphoresis. Psychiatric: She has a normal mood and affect. Her speech is normal and behavior is normal.     Personally reviewed and interpreted   EKG Interpretation: Normal sinus rhythm with LBBB    Lab Review:   Lab Results   Component Value Date    TRIG 82 12/11/2021    HDL 25 12/11/2021    LDLCALC 36 12/11/2021    LABVLDL 16 12/11/2021     Lab Results   Component Value Date     12/11/2021    K 4.2 12/11/2021    K 3.9 12/06/2021    BUN 16 12/11/2021    CREATININE 1.9 12/11/2021     Recent Labs     12/10/21  1005   WBC 8.1   HGB 11.7*   HCT 36.8        Echo 10/22/2021:  Left ventricular cavity size is severely dilated. Ejection fraction is visually estimated to be 25-30%. Grade II diastolic dysfunction with elevated LV filling pressures. Moderate to Severe mitral regurgitation. The left atrium is severely dilated. Normal right ventricular size and function. Pacemaker / ICD lead is visualized in the right atrium. There is a trivial pericardial effusion noted. Assessment / Plan:    1. Acute on chronic systolic CHF, class 4  Continue BIpap therapy as she feels this has benefited her the most.   Continue IV lasix. Hold ACEI/ARB or aldactone therapy given her acute on chronic kidney disease. She needs biventricular pacing at home. 2. Acute on chronic kidney disease, stage 3  I think this is stable. He baseline appears to be around 1.7 or thereabouts. I would continue the IV lasix despite her creatinine elevation and tolerate this.      3. NEGIN  Continue Bipap

## 2021-12-11 NOTE — PROGRESS NOTES
Pt less anxious once hospital bipap in place and she  Is feeling better refused med ordered by NP for anxiety. Will continue to monitor.

## 2021-12-11 NOTE — PROGRESS NOTES
nerves: II-XII intact, grossly non-focal.  Psychiatric: Alert and oriented, thought content appropriate, normal insight  Capillary Refill: Brisk,< 3 seconds   Peripheral Pulses: +2 palpable, equal bilaterally       Labs:   Recent Labs     12/10/21  1005   WBC 8.1   HGB 11.7*   HCT 36.8        Recent Labs     12/10/21  1005 12/11/21  0514    137   K 5.1 4.2    102   CO2 21 22   BUN 13 16   CREATININE 1.6* 1.9*   CALCIUM 9.1 8.9     Recent Labs     12/10/21  1005   AST 24   ALT 27   BILITOT 0.8   ALKPHOS 83     No results for input(s): INR in the last 72 hours. Recent Labs     12/10/21  1005   TROPONINI <0.01       Urinalysis:      Lab Results   Component Value Date    NITRU Negative 12/01/2021    WBCUA >100 12/01/2021    BACTERIA Rare 12/01/2021    RBCUA 11-20 12/01/2021    BLOODU MODERATE 12/01/2021    SPECGRAV 1.010 12/01/2021    GLUCOSEU Negative 12/01/2021       Radiology:  XR CHEST PORTABLE   Final Result   Pulmonary vascular congestion. Stable cardiomegaly. Probable small   bilateral pleural effusions. Assessment/Plan:    Active Hospital Problems    Diagnosis Date Noted    Acute decompensated heart failure ([facility] [I50.9] 12/10/2021     --Acute on chronic combined systolic and diastolic heart failure, status post AICD EF 25 to 75%/QKWXK 2 diastolic dysfunction on echo 10/11/21  ----recurrent admission  - Nephro consulted:  Holding lasix due to low BP  Not on ACE/ARB/Aldactone due to renal failure. .      --Bradycardia:  - reduced Coreg dose    -Acute respiratory failure with hypoxia due to CHF exacerbation. . Was 86% on room air with respiratory distress by EMS. .. Weaned down to 2 L nasal cannula in the ED. Bradley Trimble Continue supplemental oxygen and wean as tolerated     --Moderate to severe mitral vegetation--follow-up with cards     -CAD s/p PCI/stent--continue statin, beta-blocker     -History of nonsustained V.  Tach--continue amiodarone     --AIDA on CKD stage III--likely hepatorenal--- continue diuretic therapy, follow-up with nephrology     --Asthma/COPDstablecontinue inhaled steroids and bronchodilators     --Essential hypertension--- stablecontinue Coreg and diuretics     --Hyperlipidemiacontinue statin     -Morbid obesityBMI 40--continue dietary restriction    DVT Prophylaxis: lovenox + full dose ASA  Diet: ADULT DIET; Regular;  Low Sodium (2 gm); 1500 ml  Code Status: Full Code    PT/OT Eval Status: N/A    Dispo - Poncho Espino MD

## 2021-12-11 NOTE — PROGRESS NOTES
Patient wearing her home bipap. She complaining of SOB. Her o2 sats on bipap 94-97%. Temp 100.0 the rest of vitals wnl. resp therapy called to see pt.

## 2021-12-11 NOTE — PLAN OF CARE
Problem: OXYGENATION/RESPIRATORY FUNCTION  Goal: Patient will maintain patent airway  Outcome: Ongoing  Note: Patient has maintained a patent airway, repositions self, lung sounds bilaterally, no cough noted, RT is involved in patient care and is providing care, patient has had adequate fluid intake and is on a 1500 ml fluid restriction, no need to suction airway at this time, educated patient to turn and cough and deep breathe every two hours and as needed. Will continue to monitor and reassess. Problem: HEMODYNAMIC STATUS  Goal: Patient has stable vital signs and fluid balance  Outcome: Ongoing  Note: Vital signs stable, respiratory rate normal, heart rate is WNLs and regular on cardiac monitor, +2 pulses and less than 3 second cap refill noted in all extremities, body color appropriate for ethnicity and temperature warm, no edema noted, patient repositions  self, and daily weights are ordered. Will continue to monitor and reassess. Problem: FLUID AND ELECTROLYTE IMBALANCE  Goal: Fluid and electrolyte balance are achieved/maintained  Outcome: Ongoing  Note: Educated to drink fluids within fluid restriction guidelines and to adequately hydrate, medications administered as ordered, skin turgor, mucus membranes, and respiratory status assess this shift and charted. Patient was included in plan of care. Will continue to monitor and reassess. Problem: Skin Integrity:  Goal: Will show no infection signs and symptoms  Description: Will show no infection signs and symptoms  Outcome: Ongoing  Note: Patient shows no signs or symptoms of urinary tract infection at this time. Will continue to monitor throughout shift. Problem: Skin Integrity:  Goal: Absence of new skin breakdown  Description: Absence of new skin breakdown  Outcome: Ongoing  Note: Skin assessment complete. No new signs of skin breakdown noted. Assistance provided with repositioning while in bed.       Problem: Falls - Risk of:  Goal: Will remain free from falls  Description: Will remain free from falls  Outcome: Ongoing  Note: Fall risk assessment completed . Fall precautions in place, bed/ chair alarm on, side rails 2/4 up, call light in reach, educated pt on calling for assistance when needed, room clear of clutter. Pt verbalized understanding. Problem: Falls - Risk of:  Goal: Absence of physical injury  Description: Absence of physical injury  Outcome: Ongoing  Note: Patient remains free from physical injury. Patient educated on safety precautions. Will continue to monitor to ensure patient remains free from physical injury throughout remainder of shift. Problem: Pain:  Goal: Pain level will decrease  Description: Pain level will decrease  Outcome: Ongoing  Note: Pt educated to attempt non-phagological method of pain control, but it it becomes too strong use PRN analgesics. Pain and discomfort being managed PRN analgesics per MD orders. Pt able to express presence of pain. Problem: Pain:  Goal: Control of acute pain  Description: Control of acute pain  Outcome: Ongoing  Note: Patient educated on acute pain. Taught patient to use call light to ask for pain medication. PRN pain medication given for acute pain. Will continue to monitor pain per unit protocol. Problem: Pain:  Goal: Control of chronic pain  Description: Control of chronic pain  Outcome: Ongoing  Note: Patient educated on chronic pain. Taught patient to use call light to ask for pain medication. PRN pain medication given for chronic pain. Will continue to monitor pain per unit protocol. Problem: ACTIVITY INTOLERANCE/IMPAIRED MOBILITY  Goal: Mobility/activity is maintained at optimum level for patient  Note: Patient has remained in bed, pt states she is mostly bed ridden at home. She uses a wheelchair to get around if need. Pt has sob and was placed on bipap. Pt on bedrest at this time.

## 2021-12-11 NOTE — DISCHARGE INSTR - COC
Continuity of Care Form    Patient Name: Gisell Tirado   :  1960  MRN:  [id number]    Admit date:  12/10/2021  Discharge date:  21    Code Status Order: Full Code   Advance Directives:      Admitting Physician:  Boo Villegas MD  PCP: Yanelis Elaine MD    Discharging Nurse: MILES DELEON [facility] Unit/Room#: [facility]  Discharging Unit Phone Number: [phone]    Emergency Contact:   Extended Emergency Contact Information  Primary Emergency Contact: Yady Kincaid [address] Phone: [phone]  Relation: Child  Secondary Emergency Contact: Rafaela Severance  [place] Phone: [phone]  Relation: Child    Past Surgical History:  Past Surgical History:   Procedure Laterality Date    CARPAL TUNNEL RELEASE      CHOLECYSTECTOMY      INCONTINENCE SURGERY         Immunization History:   Immunization History   Administered Date(s) Administered    Influenza Whole 2013    Influenza, Quadv, IM, PF (6 mo and older Fluzone, Flulaval, Fluarix, and 3 yrs and older Afluria) 10/13/2021       Active Problems:  Patient Active Problem List   Diagnosis Code    Essential hypertension I10    Cardiomyopathy ([facility] [address] I42.9    CHF (congestive heart failure) ([facility]) I50.9    COPD (chronic obstructive pulmonary disease) ([facility] [address] J44.9    Chest pain R07.9    NEGIN (obstructive sleep apnea) G47.33    DMII (diabetes mellitus, type 2) ([facility]) E11.9    Iron deficiency anemia D50.9    Intestinal malabsorption K90.9    Iron deficiency anemia due to chronic blood loss D50.0    Treatment Z51.89    Anemia D64.9    Acute on chronic systolic (congestive) heart failure ([facility]) I50.23    Hyperlipidemia E78.5    Morbid obesity due to excess calories ([facility]) E66.01    Acute pulmonary edema ([facility]) J81.0    Acute respiratory failure with hypercarbia ([facility]) J96.01, J96.02    Congestive heart failure with left ventricular dysfunction ([facility]) I50.9    Acute decompensated heart failure ([facility]) I50.9       Isolation/Infection: Isolation            No Isolation          Patient Infection Status       Infection Onset Added Last Indicated Last Indicated By Review Planned Expiration Resolved Resolved By    None active    Resolved    COVID-19 (Rule Out) 12/01/21 12/01/21 12/01/21 COVID-19, Rapid (Ordered)   12/01/21 Rule-Out Test Resulted    COVID-19 (Rule Out) 10/11/21 10/11/21 10/11/21 COVID-19, Rapid (Ordered)   10/11/21 Rule-Out Test Resulted            Nurse Assessment:  Last Vital Signs: /73   Pulse (!) 48   Temp 97.4 °F (36.3 °C) (Axillary)   Resp 20   Ht 5' 7\" (1.702 m)   Wt 241 lb 13.5 oz (109.7 kg)   SpO2 97%   BMI 37.88 kg/m²     Last documented pain score (0-10 scale): Pain Level: 1  Last Weight:   Wt Readings from Last 1 Encounters:   12/11/21 241 lb 13.5 oz (109.7 kg)     Mental Status:  oriented and alert    IV Access:  - None    Nursing Mobility/ADLs:  Walking   Independent  Transfer  Independent  Bathing  Independent  Dressing  Independent  Toileting  Independent  Feeding  [address]  Independent  Med Delivery   whole    Wound Care Documentation and Therapy:        Elimination:  Continence: Bowel: Yes  Bladder: Yes  Urinary Catheter: None   Colostomy/Ileostomy/Ileal Conduit: No       Date of Last BM: 12/15/21    Intake/Output Summary (Last 24 hours) at 12/11/2021 1005  Last data filed at 12/11/2021 0600  Gross per 24 hour   Intake 90 ml   Output 600 ml   Net -510 ml     I/O last 3 completed shifts: In: 80 [P.O.:80; I.V.:10]  Out: 600 [Urine:600]    Safety Concerns:     None    Impairments/Disabilities:      None    Nutrition Therapy:  Current Nutrition Therapy:   - Oral Diet:  General    Routes of Feeding: Oral  Liquids: Thin Liquids  Daily Fluid Restriction: no  Last Modified Barium Swallow with Video (Video Swallowing Test): not done    Treatments at the Time of Hospital Discharge:   Respiratory Treatments: nebs  Oxygen Therapy:  is not on home oxygen therapy.   Ventilator:    - No ventilator support    Rehab Therapies: Physical Therapy,  Occupational Therapy, Nurse  Weight Bearing Status/Restrictions: No weight bearing restirctions  Other Medical Equipment (for information only, NOT a DME order):  {EQUIPMENT:[id number]}  Other Treatments: HOME HEALTH CARE: LEVEL 3 [address] to establish plan of care for patient over 60 day period   Nursing  Initial home SN evaluation visit to occur within 24-48 hours for:  1)  medication management  2)  VS and clinical assessment  3)  S&S chronic disease exacerbation education + when to contact MD/NP  4)  care coordination  Medication Reconciliation during 1st SN visit  PT/OT/Speech   Evaluations in home within 24-48 hours of discharge to include DME and home safety   Frontload therapy 5 days, then 3x a week   OT to evaluate if patient has [id number] needs for personal care    evaluation within 24-48 hours to evaluate resources & insurance for potential AL, [place], LTC, and Medicaid options   Palliative Care referral within 5 days of hospital discharge   PCP Visit scheduled within 3 - 7 days of hospital discharge    [address] (If patient is agreeable and meets guidelines)      Patient's personal belongings (please select all that are sent with patient):  None    RN SIGNATURE:  Electronically signed by Desirae Paris RN on 12/16/21 at 9:37 AM EST    CASE MANAGEMENT/SOCIAL WORK SECTION    Inpatient Status Date: 12/10/21    Readmission Risk Assessment Score:  Readmission Risk              Risk of Unplanned Readmission:  19         Discharging to Facility/ Agency   Name:  [facility] care    Address: [address] [place] [address], [place] [address]  Phone: [phone]  Fax: [phone]     / signature: Electronically signed by Marie Cobos RN on 12/16/21 at 9:35 AM EST    PHYSICIAN SECTION    Prognosis: Good    Condition at Discharge: Stable    Rehab Potential (if transferring to Rehab): Good    Recommended Labs or Other Treatments After Discharge: Home PT and OT:  Evaluate and treat. Home nursing:  vital sign checks. Physician Certification: I certify the above information and transfer of Guillermo Bamberger  is necessary for the continuing treatment of the diagnosis listed and that she requires Home Care for less 30 days.      Update Admission H&P: No change in H&P    PHYSICIAN SIGNATURE:  Electronically signed by Eliot Rojas MD on 12/16/21 at 10:35 AM EST

## 2021-12-12 LAB
ALBUMIN SERPL-MCNC: 2.9 G/DL (ref 3.4–5)
ANION GAP SERPL CALCULATED.3IONS-SCNC: 14 MMOL/L (ref 3–16)
BUN BLDV-MCNC: 17 MG/DL (ref 7–20)
CALCIUM SERPL-MCNC: 8.8 MG/DL (ref 8.3–10.6)
CHLORIDE BLD-SCNC: 101 MMOL/L (ref 99–110)
CO2: 20 MMOL/L (ref 21–32)
CREAT SERPL-MCNC: 1.7 MG/DL (ref 0.6–1.2)
GFR AFRICAN AMERICAN: 37
GFR NON-AFRICAN AMERICAN: 31
GLUCOSE BLD-MCNC: 93 MG/DL (ref 70–99)
MAGNESIUM: 2.5 MG/DL (ref 1.8–2.4)
PHOSPHORUS: 4.2 MG/DL (ref 2.5–4.9)
POTASSIUM SERPL-SCNC: 4.3 MMOL/L (ref 3.5–5.1)
SODIUM BLD-SCNC: 135 MMOL/L (ref 136–145)
URIC ACID, SERUM: 9.5 MG/DL (ref 2.6–6)

## 2021-12-12 PROCEDURE — 80069 RENAL FUNCTION PANEL: CPT

## 2021-12-12 PROCEDURE — 6360000002 HC RX W HCPCS: Performed by: HOSPITALIST

## 2021-12-12 PROCEDURE — 94660 CPAP INITIATION&MGMT: CPT

## 2021-12-12 PROCEDURE — 36415 COLL VENOUS BLD VENIPUNCTURE: CPT

## 2021-12-12 PROCEDURE — 6370000000 HC RX 637 (ALT 250 FOR IP): Performed by: HOSPITALIST

## 2021-12-12 PROCEDURE — 6360000002 HC RX W HCPCS: Performed by: INTERNAL MEDICINE

## 2021-12-12 PROCEDURE — 94640 AIRWAY INHALATION TREATMENT: CPT

## 2021-12-12 PROCEDURE — 84550 ASSAY OF BLOOD/URIC ACID: CPT

## 2021-12-12 PROCEDURE — 2580000003 HC RX 258: Performed by: INTERNAL MEDICINE

## 2021-12-12 PROCEDURE — 6370000000 HC RX 637 (ALT 250 FOR IP): Performed by: INTERNAL MEDICINE

## 2021-12-12 PROCEDURE — 94760 N-INVAS EAR/PLS OXIMETRY 1: CPT

## 2021-12-12 PROCEDURE — 2700000000 HC OXYGEN THERAPY PER DAY

## 2021-12-12 PROCEDURE — 83735 ASSAY OF MAGNESIUM: CPT

## 2021-12-12 PROCEDURE — 1200000000 HC SEMI PRIVATE

## 2021-12-12 PROCEDURE — 99233 SBSQ HOSP IP/OBS HIGH 50: CPT | Performed by: INTERNAL MEDICINE

## 2021-12-12 RX ORDER — SODIUM CHLORIDE 0.9 % (FLUSH) 0.9 %
5-40 SYRINGE (ML) INJECTION EVERY 12 HOURS SCHEDULED
Status: DISCONTINUED | OUTPATIENT
Start: 2021-12-12 | End: 2021-12-13 | Stop reason: SDUPTHER

## 2021-12-12 RX ORDER — METOPROLOL SUCCINATE 25 MG/1
12.5 TABLET, EXTENDED RELEASE ORAL DAILY
Status: DISCONTINUED | OUTPATIENT
Start: 2021-12-12 | End: 2021-12-16 | Stop reason: HOSPADM

## 2021-12-12 RX ORDER — SODIUM CHLORIDE 0.9 % (FLUSH) 0.9 %
5-40 SYRINGE (ML) INJECTION PRN
Status: DISCONTINUED | OUTPATIENT
Start: 2021-12-12 | End: 2021-12-16 | Stop reason: HOSPADM

## 2021-12-12 RX ORDER — SODIUM CHLORIDE 9 MG/ML
25 INJECTION, SOLUTION INTRAVENOUS PRN
Status: DISCONTINUED | OUTPATIENT
Start: 2021-12-12 | End: 2021-12-16 | Stop reason: HOSPADM

## 2021-12-12 RX ADMIN — PANTOPRAZOLE SODIUM 40 MG: 40 TABLET, DELAYED RELEASE ORAL at 10:36

## 2021-12-12 RX ADMIN — ALBUTEROL SULFATE 2.5 MG: 2.5 SOLUTION RESPIRATORY (INHALATION) at 20:38

## 2021-12-12 RX ADMIN — AMIODARONE HYDROCHLORIDE 200 MG: 200 TABLET ORAL at 21:20

## 2021-12-12 RX ADMIN — METOPROLOL SUCCINATE 12.5 MG: 25 TABLET, EXTENDED RELEASE ORAL at 10:36

## 2021-12-12 RX ADMIN — ALBUTEROL SULFATE 2.5 MG: 2.5 SOLUTION RESPIRATORY (INHALATION) at 09:07

## 2021-12-12 RX ADMIN — ALBUTEROL SULFATE 2.5 MG: 2.5 SOLUTION RESPIRATORY (INHALATION) at 16:33

## 2021-12-12 RX ADMIN — ASPIRIN 325 MG ORAL TABLET 325 MG: 325 PILL ORAL at 10:36

## 2021-12-12 RX ADMIN — ALBUTEROL SULFATE 2.5 MG: 2.5 SOLUTION RESPIRATORY (INHALATION) at 13:02

## 2021-12-12 RX ADMIN — FUROSEMIDE 5 MG/HR: 10 INJECTION, SOLUTION INTRAMUSCULAR; INTRAVENOUS at 18:43

## 2021-12-12 RX ADMIN — TIOTROPIUM BROMIDE INHALATION SPRAY 2 PUFF: 3.12 SPRAY, METERED RESPIRATORY (INHALATION) at 09:07

## 2021-12-12 RX ADMIN — ENOXAPARIN SODIUM 40 MG: 100 INJECTION SUBCUTANEOUS at 10:39

## 2021-12-12 RX ADMIN — ATORVASTATIN CALCIUM 40 MG: 40 TABLET, FILM COATED ORAL at 21:20

## 2021-12-12 ASSESSMENT — PAIN SCALES - GENERAL
PAINLEVEL_OUTOF10: 0

## 2021-12-12 NOTE — PROGRESS NOTES
[facility]   Daily Progress Note      Admit Date:  12/10/2021      Subjective:   Ms. Miguel Zamora is 62 y.o. female with a past medical history significant for essential hypertension, type 2 DM, COPD and chronic systolic CHF who presented to [facility] with shortness of breath. She was apparently taken off of her diuretic and ACEI/ARB therapy a week ago due to acute kidney injury.     Sonia Adair states that she became acutely short of breath yesterday. She has not been feeling great since her discharge. She feels that her home Bipap settings are not adequate. She thinks that the Bipap here is what resolved her shortness of breath on presentation.     She denies any chest pain or tightness. Interval History:  Sonia Adair report sthat she had some significant shortness of breath getting up to the bedside commode earlier. She denies any chest pain with this. Currently wearing Bipap on room air.        Objective:     /64   Pulse 65   Temp 97.6 °F (36.4 °C) (Oral)   Resp 18   Ht 5' 7\" (1.702 m)   Wt 260 lb 2.3 oz (118 kg)   SpO2 97%   BMI 40.74 kg/m²      Intake/Output Summary (Last 24 hours) at 12/12/2021 9700  Last data filed at 12/12/2021 0816  Gross per 24 hour   Intake 1140 ml   Output 900 ml   Net 240 ml       Physical Exam:  General:  Awake, alert, NAD  Skin:  Warm and dry  Neck:  JVD<8, no carotid bruits  Chest:  Clear to auscultation, no wheezes/rhonchi/rales  Cardiovascular:  RRR, normal S1/S2, 2/6 hs murmur  Abdomen:  Soft, nontender, +bowel sounds  Extremities:  No edema  Pulses: 2+ bilat carotid    2+ bilat radial    2+ bilat femoral        Medications:    albuterol  2.5 mg Nebulization 4x daily    carvedilol  3.125 mg Oral BID WC    amiodarone  200 mg Oral Nightly    atorvastatin  40 mg Oral Nightly    pantoprazole  40 mg Oral Daily    tiotropium  2 puff Inhalation Daily    aspirin  325 mg Oral Daily    sodium chloride flush  5-40 mL IntraVENous 2 times per day    enoxaparin  40 mg regurgitation. My concern is that, prior to exhausting systolic CHF therapies (I.e. BiV pacing) her LV function would drop with a Mitraclip for the valve. Plan for RHC and KAYLEIGH on 12/13/21. I would scal back her IV lasix to just once daily in preparation for possible contrast dye load tomorrow. I will certainly minimize this. Hold ACEI/ARB therapy given her CKD. Change carvedilol to Toprol XL 12.5mg daily given her bradycardia. It seems her bachup pacing rate may be 40bpm. Will evaluate the device tomorrow.      2. Acute on chronic kidney disease, stage 3  I think this is stable. He baseline appears to be around 1.7 or thereabouts. I would continue the IV lasix despite her creatinine elevation and tolerate this.      3. NEGIN  Continue Bipap    4. CAD of native coronary arteries without angina  She needs a repeat LHC. Plan for this tomorrow with minimal contrast dye. She had an abnormal stress last year but no LHC at that time, possibly because of CKD.      Signed:  August Raymundo MD

## 2021-12-12 NOTE — PROGRESS NOTES
Patient resting in bed, amiodarone held due to low HR and low b/p, Webb Fall Risk: High (45 and higher), Pain Level: 0, vitals stable, assisted to position of comfort, call light and belongings in reach, encouraged to call with needs, will continue to monitor.   Vitals:    12/11/21 2343   BP: (!) 102/54   Pulse: 62   Resp: 16   Temp: 97.5 °F (36.4 °C)   SpO2: 98%

## 2021-12-12 NOTE — PROGRESS NOTES
Hospitalist Progress Note      PCP: Diamond Guerra MD    Date of Admission: 12/10/2021    Chief Complaint: SOB    Hospital Course:     Subjective: Breathing comfortably. Waiting for RHC tomorrow      Medications:  Reviewed    Infusion Medications    sodium chloride       Scheduled Medications    sodium chloride flush  5-40 mL IntraVENous 2 times per day    metoprolol succinate  12.5 mg Oral Daily    albuterol  2.5 mg Nebulization 4x daily    amiodarone  200 mg Oral Nightly    atorvastatin  40 mg Oral Nightly    pantoprazole  40 mg Oral Daily    tiotropium  2 puff Inhalation Daily    aspirin  325 mg Oral Daily    enoxaparin  40 mg SubCUTAneous Daily    hydrOXYzine  50 mg Oral Once     PRN Meds: sodium chloride flush, sodium chloride, ondansetron **OR** ondansetron, polyethylene glycol, acetaminophen **OR** acetaminophen, albuterol      Intake/Output Summary (Last 24 hours) at 12/12/2021 1054  Last data filed at 12/12/2021 0816  Gross per 24 hour   Intake 660 ml   Output 900 ml   Net -240 ml       Exam:    /64   Pulse 65   Temp 97.6 °F (36.4 °C) (Oral)   Resp 18   Ht 5' 7\" (1.702 m)   Wt 260 lb 2.3 oz (118 kg)   SpO2 99%   BMI 40.74 kg/m²     General appearance: No apparent distress, appears stated age and cooperative. HEENT: Pupils equal, round, and reactive to light. Conjunctivae/corneas clear. Neck: Supple, with full range of motion. No jugular venous distention. Trachea midline. Respiratory:  Normal respiratory effort. Clear to auscultation, bilaterally without Rales/Wheezes/Rhonchi. Cardiovascular: Regular rate and rhythm with normal S1/S2 without murmurs, rubs or gallops. Abdomen: Soft, non-tender, non-distended with normal bowel sounds. Musculoskeletal: No clubbing, cyanosis or edema bilaterally. Full range of motion without deformity. Skin: Skin color, texture, turgor normal.  No rashes or lesions.   Neurologic:  Neurovascularly intact without any focal sensory/motor deficits. Cranial nerves: II-XII intact, grossly non-focal.  Psychiatric: Alert and oriented, thought content appropriate, normal insight  Capillary Refill: Brisk,< 3 seconds   Peripheral Pulses: +2 palpable, equal bilaterally       Labs:   Recent Labs     12/10/21  1005   WBC 8.1   HGB 11.7*   HCT 36.8        Recent Labs     12/10/21  1005 12/11/21  0514 12/12/21  0629    137 135*   K 5.1 4.2 4.3    102 101   CO2 21 22 20*   BUN 13 16 17   CREATININE 1.6* 1.9* 1.7*   CALCIUM 9.1 8.9 8.8   PHOS  --   --  4.2     Recent Labs     12/10/21  1005   AST 24   ALT 27   BILITOT 0.8   ALKPHOS 83     No results for input(s): INR in the last 72 hours. Recent Labs     12/10/21  1005   TROPONINI <0.01       Urinalysis:      Lab Results   Component Value Date    NITRU Negative 12/11/2021    WBCUA >100 12/01/2021    BACTERIA Rare 12/01/2021    RBCUA 11-20 12/01/2021    BLOODU Negative 12/11/2021    SPECGRAV 1.008 12/11/2021    GLUCOSEU Negative 12/11/2021       Radiology:  XR CHEST PORTABLE   Final Result   Pulmonary vascular congestion. Stable cardiomegaly. Probable small   bilateral pleural effusions. Assessment/Plan:    Active Hospital Problems    Diagnosis Date Noted    Acute decompensated heart failure ([facility] [address] [I50.9] 12/10/2021     --Acute on chronic combined systolic and diastolic heart failure, status post AICD EF 25 to 50%/HIDXO 2 diastolic dysfunction on echo 10/11/21  ----recurrent admission  - Nephro consulted:  Restarted lasix 12/12 (held due to low BP)  Not on ACE/ARB/Aldactone due to renal failure. .      --Bradycardia:  - reduced Coreg dose    -Acute respiratory failure with hypoxia due to CHF exacerbation. . Was 86% on room air with respiratory distress by EMS. .. Weaned down to 2 L nasal cannula in the ED. Symone Lomax  Continue supplemental oxygen and wean as tolerated     --Moderate to severe mitral regurgitation--  - cardiology to do [facility]FF[facility] 12/13     -CAD s/p PCI/stent--continue statin, beta-blocker     -History of nonsustained V. 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

## 2021-12-12 NOTE — PLAN OF CARE
Problem: HEMODYNAMIC STATUS  Goal: Patient has stable vital signs and fluid balance  Note:   Vitals:    12/12/21 0352   BP: 121/76   Pulse: 65   Resp: 16   Temp: 97.8 °F (36.6 °C)   SpO2: 96%          Problem: Falls - Risk of:  Goal: Will remain free from falls  Description: Will remain free from falls  Note: Pt free from injury or falls at this time, fall precautions in place, bed in low position, side rail up x2, Webb Fall Risk: High (45 and higher), bed alarm on, reoriented to room and call light, reminded not to get up without assistance, call light in reach, will continue to monitor. Pt verbalizes understanding of fall risk procedures.

## 2021-12-12 NOTE — PROGRESS NOTES
Nephrology Progress Note   [facility]. [facility]      Chief Complaint: Sudden onset shortness of breath    History of Present Illness: Ms Arianna Johnson is a 61 yo morbidly obese female with a past medical history significant for CAD, hypertension, CKD III (baseline creatinine in mid 1s), HFrEF (25-30% on echo done on 10/11/21, Grade II diastolic CHF, Moderate to severe MR), recently discharged from the hospital after being treated for AIDA occurring in the setting of decompensated HF (?CRS), came to [facility] with sudden onset f Shortness of breath. CXR consistent with pulmonary edema. Patient has been getting IVP Lasix and her symptoms have completely resolved. Creatinine trending up and BP has dropped to 90s    Subjective:    Resting in bed; shortness of breath with activity, no lower extremity edema    ROS: Sudden onset of shortness of breath; no chest pain, no lower extremity edema. All other ROS negative    Scheduled Meds:   sodium chloride flush  5-40 mL IntraVENous 2 times per day    metoprolol succinate  12.5 mg Oral Daily    albuterol  2.5 mg Nebulization 4x daily    amiodarone  200 mg Oral Nightly    atorvastatin  40 mg Oral Nightly    pantoprazole  40 mg Oral Daily    tiotropium  2 puff Inhalation Daily    aspirin  325 mg Oral Daily    enoxaparin  40 mg SubCUTAneous Daily    hydrOXYzine  50 mg Oral Once        sodium chloride      furosemide (LASIX) 1mg/ml infusion         PRN Meds:.sodium chloride flush, sodium chloride, ondansetron **OR** ondansetron, polyethylene glycol, acetaminophen **OR** acetaminophen, albuterol    Physical Exam:    TEMPERATURE:  Current - Temp: 98 °F (36.7 °C);  Max - Temp  Av.8 °F (36.6 °C)  Min: 97.5 °F (36.4 °C)  Max: 98 °F (36.7 °C)  RESPIRATIONS RANGE: Resp  Av.4  Min: 15  Max: 18  PULSE RANGE: Pulse  Av.5  Min: 48  Max: 65  BLOOD PRESSURE RANGE:  Systolic (30YOE), IWS:494 , Min:87 , JGN:229   ; Diastolic (18YXQ), RTW:05, Min:44, Max:76    24HR INTAKE/OUTPUT: Intake/Output Summary (Last 24 hours) at 12/12/2021 1433  Last data filed at 12/12/2021 0816  Gross per 24 hour   Intake 660 ml   Output 900 ml   Net -240 ml         Patient Vitals for the past 96 hrs (Last 3 readings):   Weight   12/12/21 0556 260 lb 2.3 oz (118 kg)   12/11/21 0630 241 lb 13.5 oz (109.7 kg)   12/10/21 0939 253 lb 15.5 oz (115.2 kg)       General: Alert, Awake, NAD, Morbidly Obese  HEENT: Normocephalic, atraumatic, Nose and ears appear externally without deformity, MMM  Neck: No Thyromegaly, Trachea is midline, No Carotid bruit  Eyes: EOMI, IGLESIA  Chest: crackles bibasilar, no intercostal retractions  CVS: RRR, no murmur, no rub  Abdomen: soft, non tender, no organomegaly, no bruit appreciated  Extremities: no edema, no cyanosis. Skin: normal texture, normal skin turgor, no rash  Musculoskeletal: normal ROM, no joint swelling, no visible deformity  Neurological: CN intact, no focal motor neurological deficit  Psych: normal affect; AAO x 3      Allergies: Allergies   Allergen Reactions    Ace Inhibitors     Diclofenac     Losartan     Vicodin [Hydrocodone-Acetaminophen]       Past Medical History:   Diagnosis Date    Asthma     [facility]     CHF (congestive heart failure) (HCC)     COPD [facility] disease) [facility]     Diabetes mellitus ([facility]     Essential hypertension     Hyperlipidemia     Obesity      Past Surgical History:   Procedure Laterality Date    CARPAL TUNNEL RELEASE      CHOLECYSTECTOMY      INCONTINENCE SURGERY       Social History     Socioeconomic History    Marital status:      Spouse name: Not on file    Number of children: Not on file    Years of education: Not on file    Highest education level: Not on file   Occupational History    Not on file   Tobacco Use    Smoking status: Former Smoker     Packs/day: 0.10     Types: Cigarettes    Smokeless tobacco: Never Used   Substance and Sexual Activity    Alcohol use:  No Alcohol/week: 0.0 standard drinks    Drug use: No    Sexual activity: Yes     Partners: Male     Comment:    Other Topics Concern    Not on file   Social History Narrative    Not on file     Social Determinants of Health     Financial Resource Strain:     Difficulty of Paying Living Expenses: Not on file   Food Insecurity:     Worried About Running Out of Food in the Last Year: Not on file    Yin of Food in the Last Year: Not on file   Transportation Needs:     Lack of Transportation (Medical): Not on file    Lack of Transportation (Non-Medical):  Not on file   Physical Activity:     Days of Exercise per Week: Not on file    Minutes of Exercise per Session: Not on file   Stress:     Feeling of Stress : Not on file   Social Connections:     Frequency of Communication with Friends and Family: Not on file    Frequency of Social Gatherings with Friends and Family: Not on file    Attends J[employer] Services: Not on file    Active Member of [address] [place] [address] or Organizations: Not on file    Attends Club or Organization Meetings: Not on file    Marital Status: Not on file   Intimate Partner Violence:     Fear of Current or Ex-Partner: Not on file    Emotionally Abused: Not on file    Physically Abused: Not on file    Sexually Abused: Not on file   Housing Stability:     Unable to Pay for Housing in the Last Year: Not on file    Number of [place] in the Last Year: Not on file    Unstable Housing in the Last Year: Not on file     Family History   Problem Relation Age of Onset    High Blood Pressure Mother     Diabetes Mother     Cancer Mother         thyroid    Stroke Father          LAB DATA:    CBC:   Lab Results   Component Value Date    WBC 8.1 12/10/2021    RBC 4.63 12/10/2021    RBC 5.12 05/26/2017    HGB 11.7 12/10/2021    HCT 36.8 12/10/2021    MCV 79.5 12/10/2021    MCH 25.2 12/10/2021    MCHC 31.7 12/10/2021    RDW 17.9 12/10/2021     12/10/2021    MPV 9.1 12/10/2021     BMP: Lab Results   Component Value Date     12/12/2021    K 4.3 12/12/2021    K 3.9 12/06/2021     12/12/2021    CO2 20 12/12/2021    BUN 17 12/12/2021    CREATININE 1.7 12/12/2021    CALCIUM 8.8 12/12/2021    GFRAA 37 12/12/2021    GFRAA >60 01/17/2013    LABGLOM 31 12/12/2021    GLUCOSE 93 12/12/2021    GLUCOSE 154 06/05/2015     Ionized Calcium:  No results found for: IONCA  Magnesium:    Lab Results   Component Value Date    MG 2.50 12/12/2021     Phosphorus:    Lab Results   Component Value Date    PHOS 4.2 12/12/2021     U/A:    Lab Results   Component Value Date    COLORU YELLOW 12/11/2021    PHUR 6.5 12/11/2021    WBCUA >100 12/01/2021    RBCUA 11-20 12/01/2021    MUCUS 1+ 12/15/2012    BACTERIA Rare 12/01/2021    CLARITYU Clear 12/11/2021    SPECGRAV 1.008 12/11/2021    LEUKOCYTESUR Negative 12/11/2021    UROBILINOGEN 0.2 12/11/2021    BILIRUBINUR Negative 12/11/2021    BLOODU Negative 12/11/2021    GLUCOSEU Negative 12/11/2021    AMORPHOUS 2+ 12/18/2012         IMPRESSION/RECOMMENDATIONS:      Active Problems:    Acute decompensated heart failure ([address]  Resolved Problems:    * No resolved hospital problems. *    1. AIDA - likely due to relative hypotension SBP dropped from 153 to 99 mmHg  - Reduced coreg dose - BP improved and bradycardia resolved. Switched to Metoprolol today  - Now that creatinine improved - restarted Lasix as a gtt  - Patient going for [facility] tomorrow discussed risk of REBEKAH - she understands and agrees to proceed  - Held Gabapentin since it causes fluid retention and has neurological SE in setting of AIDA  - Monitor kidney function closely  - Avoid exposure to Nephrotoxic agents    2. CKD - Baseline ~ 1.5 mg/dl    3. Morbid Obesity    4. Bradycardia - resolved    5. HTN - controlled    6.  CHF - shortness of breath recurred off Lasix - restarted

## 2021-12-13 ENCOUNTER — APPOINTMENT (OUTPATIENT)
Dept: CARDIAC CATH/INVASIVE PROCEDURES | Age: 61
DRG: 286 | End: 2021-12-13
Payer: MEDICARE

## 2021-12-13 LAB
ALBUMIN SERPL-MCNC: 3.5 G/DL (ref 3.4–5)
ANION GAP SERPL CALCULATED.3IONS-SCNC: 15 MMOL/L (ref 3–16)
BUN BLDV-MCNC: 13 MG/DL (ref 7–20)
CALCIUM SERPL-MCNC: 9.2 MG/DL (ref 8.3–10.6)
CHLORIDE BLD-SCNC: 103 MMOL/L (ref 99–110)
CO2: 21 MMOL/L (ref 21–32)
CREAT SERPL-MCNC: 1.4 MG/DL (ref 0.6–1.2)
GFR AFRICAN AMERICAN: 46
GFR NON-AFRICAN AMERICAN: 38
GLUCOSE BLD-MCNC: 93 MG/DL (ref 70–99)
LV EF: 28 %
LVEF MODALITY: NORMAL
MAGNESIUM: 2.4 MG/DL (ref 1.8–2.4)
PHOSPHORUS: 3.8 MG/DL (ref 2.5–4.9)
POTASSIUM SERPL-SCNC: 4.1 MMOL/L (ref 3.5–5.1)
PRO-BNP: ABNORMAL PG/ML (ref 0–124)
SARS-COV-2, NAAT: NOT DETECTED
SODIUM BLD-SCNC: 139 MMOL/L (ref 136–145)
URIC ACID, SERUM: 9.1 MG/DL (ref 2.6–6)

## 2021-12-13 PROCEDURE — 83880 ASSAY OF NATRIURETIC PEPTIDE: CPT

## 2021-12-13 PROCEDURE — C1769 GUIDE WIRE: HCPCS

## 2021-12-13 PROCEDURE — 2580000003 HC RX 258: Performed by: INTERNAL MEDICINE

## 2021-12-13 PROCEDURE — 6360000002 HC RX W HCPCS: Performed by: INTERNAL MEDICINE

## 2021-12-13 PROCEDURE — 99153 MOD SED SAME PHYS/QHP EA: CPT

## 2021-12-13 PROCEDURE — 80069 RENAL FUNCTION PANEL: CPT

## 2021-12-13 PROCEDURE — B2151ZZ FLUOROSCOPY OF LEFT HEART USING LOW OSMOLAR CONTRAST: ICD-10-PCS | Performed by: FAMILY MEDICINE

## 2021-12-13 PROCEDURE — 93325 DOPPLER ECHO COLOR FLOW MAPG: CPT

## 2021-12-13 PROCEDURE — 94660 CPAP INITIATION&MGMT: CPT

## 2021-12-13 PROCEDURE — 6360000002 HC RX W HCPCS: Performed by: FAMILY MEDICINE

## 2021-12-13 PROCEDURE — B2111ZZ FLUOROSCOPY OF MULTIPLE CORONARY ARTERIES USING LOW OSMOLAR CONTRAST: ICD-10-PCS | Performed by: FAMILY MEDICINE

## 2021-12-13 PROCEDURE — 2700000000 HC OXYGEN THERAPY PER DAY

## 2021-12-13 PROCEDURE — 2709999900 HC NON-CHARGEABLE SUPPLY

## 2021-12-13 PROCEDURE — C1751 CATH, INF, PER/CENT/MIDLINE: HCPCS

## 2021-12-13 PROCEDURE — 2060000000 HC ICU INTERMEDIATE R&B

## 2021-12-13 PROCEDURE — 93460 R&L HRT ART/VENTRICLE ANGIO: CPT | Performed by: INTERNAL MEDICINE

## 2021-12-13 PROCEDURE — 94761 N-INVAS EAR/PLS OXIMETRY MLT: CPT

## 2021-12-13 PROCEDURE — 6370000000 HC RX 637 (ALT 250 FOR IP)

## 2021-12-13 PROCEDURE — 6370000000 HC RX 637 (ALT 250 FOR IP): Performed by: INTERNAL MEDICINE

## 2021-12-13 PROCEDURE — 75630 X-RAY AORTA LEG ARTERIES: CPT | Performed by: INTERNAL MEDICINE

## 2021-12-13 PROCEDURE — 6360000004 HC RX CONTRAST MEDICATION: Performed by: FAMILY MEDICINE

## 2021-12-13 PROCEDURE — 93460 R&L HRT ART/VENTRICLE ANGIO: CPT

## 2021-12-13 PROCEDURE — 99152 MOD SED SAME PHYS/QHP 5/>YRS: CPT

## 2021-12-13 PROCEDURE — 94640 AIRWAY INHALATION TREATMENT: CPT

## 2021-12-13 PROCEDURE — 75625 CONTRAST EXAM ABDOMINL AORTA: CPT

## 2021-12-13 PROCEDURE — 93320 DOPPLER ECHO COMPLETE: CPT

## 2021-12-13 PROCEDURE — 6360000002 HC RX W HCPCS: Performed by: HOSPITALIST

## 2021-12-13 PROCEDURE — 6370000000 HC RX 637 (ALT 250 FOR IP): Performed by: HOSPITALIST

## 2021-12-13 PROCEDURE — 6360000002 HC RX W HCPCS

## 2021-12-13 PROCEDURE — 99152 MOD SED SAME PHYS/QHP 5/>YRS: CPT | Performed by: INTERNAL MEDICINE

## 2021-12-13 PROCEDURE — 4A023N7 MEASUREMENT OF CARDIAC SAMPLING AND PRESSURE, LEFT HEART, PERCUTANEOUS APPROACH: ICD-10-PCS | Performed by: FAMILY MEDICINE

## 2021-12-13 PROCEDURE — C1894 INTRO/SHEATH, NON-LASER: HCPCS

## 2021-12-13 PROCEDURE — 93312 ECHO TRANSESOPHAGEAL: CPT

## 2021-12-13 PROCEDURE — 84550 ASSAY OF BLOOD/URIC ACID: CPT

## 2021-12-13 PROCEDURE — 36415 COLL VENOUS BLD VENIPUNCTURE: CPT

## 2021-12-13 PROCEDURE — 83735 ASSAY OF MAGNESIUM: CPT

## 2021-12-13 PROCEDURE — 75716 ARTERY X-RAYS ARMS/LEGS: CPT

## 2021-12-13 PROCEDURE — 87635 SARS-COV-2 COVID-19 AMP PRB: CPT

## 2021-12-13 RX ORDER — SODIUM CHLORIDE 9 MG/ML
25 INJECTION, SOLUTION INTRAVENOUS PRN
Status: DISCONTINUED | OUTPATIENT
Start: 2021-12-13 | End: 2021-12-13 | Stop reason: SDUPTHER

## 2021-12-13 RX ORDER — SODIUM CHLORIDE 0.9 % (FLUSH) 0.9 %
5-40 SYRINGE (ML) INJECTION PRN
Status: DISCONTINUED | OUTPATIENT
Start: 2021-12-13 | End: 2021-12-13 | Stop reason: SDUPTHER

## 2021-12-13 RX ORDER — ACETAMINOPHEN 325 MG/1
650 TABLET ORAL EVERY 4 HOURS PRN
Status: DISCONTINUED | OUTPATIENT
Start: 2021-12-13 | End: 2021-12-16 | Stop reason: HOSPADM

## 2021-12-13 RX ORDER — MILRINONE LACTATE 0.2 MG/ML
0.2 INJECTION, SOLUTION INTRAVENOUS CONTINUOUS
Status: DISPENSED | OUTPATIENT
Start: 2021-12-13 | End: 2021-12-15

## 2021-12-13 RX ORDER — SODIUM CHLORIDE 0.9 % (FLUSH) 0.9 %
5-40 SYRINGE (ML) INJECTION EVERY 12 HOURS SCHEDULED
Status: DISCONTINUED | OUTPATIENT
Start: 2021-12-13 | End: 2021-12-16 | Stop reason: HOSPADM

## 2021-12-13 RX ADMIN — ALBUTEROL SULFATE 2.5 MG: 2.5 SOLUTION RESPIRATORY (INHALATION) at 08:59

## 2021-12-13 RX ADMIN — Medication 10 ML: at 22:21

## 2021-12-13 RX ADMIN — ATORVASTATIN CALCIUM 40 MG: 40 TABLET, FILM COATED ORAL at 22:09

## 2021-12-13 RX ADMIN — IRON SUCROSE 200 MG: 20 INJECTION, SOLUTION INTRAVENOUS at 12:23

## 2021-12-13 RX ADMIN — AMIODARONE HYDROCHLORIDE 200 MG: 200 TABLET ORAL at 22:09

## 2021-12-13 RX ADMIN — METOPROLOL SUCCINATE 12.5 MG: 25 TABLET, EXTENDED RELEASE ORAL at 08:31

## 2021-12-13 RX ADMIN — ALBUTEROL SULFATE 2.5 MG: 2.5 SOLUTION RESPIRATORY (INHALATION) at 18:20

## 2021-12-13 RX ADMIN — MILRINONE LACTATE IN DEXTROSE 0.2 MCG/KG/MIN: 200 INJECTION, SOLUTION INTRAVENOUS at 23:38

## 2021-12-13 RX ADMIN — ENOXAPARIN SODIUM 40 MG: 100 INJECTION SUBCUTANEOUS at 08:31

## 2021-12-13 RX ADMIN — TIOTROPIUM BROMIDE INHALATION SPRAY 2 PUFF: 3.12 SPRAY, METERED RESPIRATORY (INHALATION) at 08:59

## 2021-12-13 RX ADMIN — ALBUTEROL SULFATE 2.5 MG: 2.5 SOLUTION RESPIRATORY (INHALATION) at 12:35

## 2021-12-13 RX ADMIN — IOPAMIDOL 60 ML: 755 INJECTION, SOLUTION INTRAVENOUS at 16:43

## 2021-12-13 RX ADMIN — ASPIRIN 325 MG ORAL TABLET 325 MG: 325 PILL ORAL at 08:31

## 2021-12-13 RX ADMIN — PANTOPRAZOLE SODIUM 40 MG: 40 TABLET, DELAYED RELEASE ORAL at 08:31

## 2021-12-13 ASSESSMENT — PAIN SCALES - GENERAL
PAINLEVEL_OUTOF10: 0

## 2021-12-13 ASSESSMENT — ENCOUNTER SYMPTOMS
EYES NEGATIVE: 1
SHORTNESS OF BREATH: 1
GASTROINTESTINAL NEGATIVE: 1

## 2021-12-13 NOTE — PROGRESS NOTES
Pt off floor to go down to cath lab.   Electronically signed by Cristin Blackwood RN on 12/13/2021 at 2:51 PM

## 2021-12-13 NOTE — PLAN OF CARE
Problem: OXYGENATION/RESPIRATORY FUNCTION  Goal: Patient will maintain patent airway  Note: Airway patent      Problem: HEMODYNAMIC STATUS  Goal: Patient has stable vital signs and fluid balance  Note:   Vitals:    12/13/21 0420   BP: (!) 148/68   Pulse: 65   Resp: 22   Temp: 97.8 °F (36.6 °C)   SpO2: 96%           Problem: Falls - Risk of:  Goal: Will remain free from falls  Description: Will remain free from falls  Note: Pt free from injury or falls at this time, fall precautions in place, bed in low position, side rail up x2, Webb Fall Risk: High (45 and higher), bed alarm on, reoriented to room and call light, reminded not to get up without assistance, call light in reach, will continue to monitor. Pt verbalizes understanding of fall risk procedures.

## 2021-12-13 NOTE — PROGRESS NOTES
Hospitalist Progress Note      PCP: Yvonne Trevino MD    Date of Admission: 12/10/2021    Chief Complaint: SOB    Hospital Course:     Subjective: RHC today. No new complaints      Medications:  Reviewed    Infusion Medications    sodium chloride      furosemide (LASIX) 1mg/ml infusion Stopped (12/13/21 1235)     Scheduled Medications    sodium chloride flush  5-40 mL IntraVENous 2 times per day    metoprolol succinate  12.5 mg Oral Daily    albuterol  2.5 mg Nebulization 4x daily    amiodarone  200 mg Oral Nightly    atorvastatin  40 mg Oral Nightly    pantoprazole  40 mg Oral Daily    tiotropium  2 puff Inhalation Daily    aspirin  325 mg Oral Daily    enoxaparin  40 mg SubCUTAneous Daily    hydrOXYzine  50 mg Oral Once     PRN Meds: acetaminophen, sodium chloride flush, sodium chloride, ondansetron **OR** ondansetron, polyethylene glycol, [DISCONTINUED] acetaminophen **OR** acetaminophen, albuterol      Intake/Output Summary (Last 24 hours) at 12/13/2021 1804  Last data filed at 12/13/2021 1235  Gross per 24 hour   Intake 298.27 ml   Output 1700 ml   Net -1401.73 ml       Exam:    /63   Pulse 64   Temp 97.9 °F (36.6 °C) (Oral)   Resp 16   Ht 5' 7\" (1.702 m)   Wt 255 lb 8.2 oz (115.9 kg)   SpO2 92%   BMI 40.02 kg/m²     General appearance: No apparent distress, appears stated age and cooperative. HEENT: Pupils equal, round, and reactive to light. Conjunctivae/corneas clear. Neck: Supple, with full range of motion. No jugular venous distention. Trachea midline. Respiratory:  Normal respiratory effort. Clear to auscultation, bilaterally without Rales/Wheezes/Rhonchi. Cardiovascular: Regular rate and rhythm with normal S1/S2 without murmurs, rubs or gallops. Abdomen: Soft, non-tender, non-distended with normal bowel sounds. Musculoskeletal: No clubbing, cyanosis or edema bilaterally. Full range of motion without deformity.   Skin: Skin color, texture, turgor normal.  No rashes or lesions. Neurologic:  Neurovascularly intact without any focal sensory/motor deficits. Cranial nerves: II-XII intact, grossly non-focal.  Psychiatric: Alert and oriented, thought content appropriate, normal insight  Capillary Refill: Brisk,< 3 seconds   Peripheral Pulses: +2 palpable, equal bilaterally       Labs:   No results for input(s): WBC, HGB, HCT, PLT in the last 72 hours. Recent Labs     12/11/21  0514 12/12/21  0629 12/13/21  0514    135* 139   K 4.2 4.3 4.1    101 103   CO2 22 20* 21   BUN 16 17 13   CREATININE 1.9* 1.7* 1.4*   CALCIUM 8.9 8.8 9.2   PHOS  --  4.2 3.8     No results for input(s): AST, ALT, BILIDIR, BILITOT, ALKPHOS in the last 72 hours. No results for input(s): INR in the last 72 hours. No results for input(s): Cassie Ends in the last 72 hours. Urinalysis:      Lab Results   Component Value Date    NITRU Negative 12/11/2021    WBCUA >100 12/01/2021    BACTERIA Rare 12/01/2021    RBCUA 11-20 12/01/2021    BLOODU Negative 12/11/2021    SPECGRAV 1.008 12/11/2021    GLUCOSEU Negative 12/11/2021       Radiology:  XR CHEST PORTABLE   Final Result   Pulmonary vascular congestion. Stable cardiomegaly. Probable small   bilateral pleural effusions. Assessment/Plan:    Active Hospital Problems    Diagnosis Date Noted    Acute kidney injury superimposed on chronic kidney disease ([facility] [N17.9, N18.9]     Acute decompensated heart failure ([facility] [I50.9] 12/10/2021    Acute on chronic systolic CHF (congestive heart failure), NYHA class 3 ([facility] [I50.23] 10/11/2021    NEGIN treated with BiPAP [G47.33] 01/06/2013     --Acute on chronic combined systolic and diastolic heart failure, status post AICD EF 25 to 24%/KXPYL 2 diastolic dysfunction on echo 10/11/21  ----recurrent admission  - Nephro consulted:  Restarted lasix 12/12 (held due to low BP)  Not on ACE/ARB/Aldactone due to renal failure. .   - right heart cath today, results pending     --Bradycardia:  - reduced Coreg dose    -Acute respiratory failure with hypoxia due to CHF exacerbation. . Was 86% on room air with respiratory distress by EMS. .. Weaned down to 2 L nasal cannula in the ED. Jena Alan Continue supplemental oxygen and wean as tolerated     --Moderate to severe mitral regurgitation--  - [facility]     -CAD s/p PCI/stent--continue statin, beta-blocker     -History of nonsustained V. Tach--continue amiodarone     --AIDA on CKD stage III--likely hepatorenal--- continue diuretic therapy, follow-up with nephrology    H/o Iron deficiency anemia:  - patient states she is due for IV iron infusion today. Venofer x1 ordered     --Asthma/COPDstablecontinue inhaled steroids and bronchodilators     --Essential hypertension--- stablecontinue Coreg and diuretics     --Hyperlipidemiacontinue statin     -Morbid obesityBMI 40--continue dietary restriction    DVT Prophylaxis: lovenox + full dose ASA  Diet: ADULT DIET; Regular;  No Added Salt (3-4 gm)  Code Status: Full Code    PT/OT Eval Status: N/A    Dispo - Ravi Landers MD

## 2021-12-13 NOTE — PROGRESS NOTES
Lasix drip stopped at this time per nephro nursing communication order. Will restart after cardiac cath.    Electronically signed by Jared Caceres RN on 12/13/2021 at 12:36 PM

## 2021-12-13 NOTE — PROGRESS NOTES
Patient is alert & oriented x4, SBA, 2/4 bed rails up, bed in lowest position, fall precautions in place, call light within reach. Morning assessment complete. Morning medications given. Pt is NPO. Aware that procedure will be this afternoon- cath lab unable to give an exact time. Pt resting in bed with bipap on. Will cont to monitor and reassess.   Electronically signed by Marie Freeman RN on 12/13/2021 at 8:38 AM

## 2021-12-13 NOTE — PROGRESS NOTES
Max:91    24HR INTAKE/OUTPUT:      Intake/Output Summary (Last 24 hours) at 12/13/2021 0952  Last data filed at 12/13/2021 0944  Gross per 24 hour   Intake 298.27 ml   Output 1300 ml   Net -1001.73 ml         Patient Vitals for the past 96 hrs (Last 3 readings):   Weight   12/13/21 0759 255 lb 8.2 oz (115.9 kg)   12/12/21 0556 260 lb 2.3 oz (118 kg)   12/11/21 0630 241 lb 13.5 oz (109.7 kg)       General: Alert, Awake, NAD, Morbidly Obese  HEENT: Normocephalic, atraumatic, Nose and ears appear externally without deformity, MMM  Neck: No Thyromegaly, Trachea is midline, No Carotid bruit  Eyes: EOMI, IGLESIA  Chest: crackles bibasilar, no intercostal retractions  CVS: RRR, no murmur, no rub  Abdomen: soft, non tender, no organomegaly, no bruit appreciated  Extremities: no edema, no cyanosis. Skin: normal texture, normal skin turgor, no rash  Musculoskeletal: normal ROM, no joint swelling, no visible deformity  Neurological: CN intact, no focal motor neurological deficit  Psych: normal affect; AAO x 3      Allergies: Allergies   Allergen Reactions    Ace Inhibitors     Diclofenac     Losartan     Vicodin [Hydrocodone-Acetaminophen]       Past Medical History:   Diagnosis Date    Asthma     [facility]     CHF (congestive heart failure) (HCC)     COPD (chronic obstructive pulmonary disease) ([facility]     Diabetes mellitus ([facility]     Essential hypertension     Hyperlipidemia     Obesity      Past Surgical History:   Procedure Laterality Date    CARPAL TUNNEL RELEASE      CHOLECYSTECTOMY      INCONTINENCE SURGERY       Social History     Socioeconomic History    Marital status:       Spouse name: Not on file    Number of children: Not on file    Years of education: Not on file    Highest education level: Not on file   Occupational History    Not on file   Tobacco Use    Smoking status: Former Smoker     Packs/day: 0.10     Types: Cigarettes    Smokeless tobacco: Never Used   Substance and Sexual Activity    Alcohol use: No     Alcohol/week: 0.0 standard drinks    Drug use: No    Sexual activity: Yes     Partners: Male     Comment:    Other Topics Concern    Not on file   Social History Narrative    Not on file     Social Determinants of Health     Financial Resource Strain:     Difficulty of Paying Living Expenses: Not on file   Food Insecurity:     Worried About Running Out of Food in the Last Year: Not on file    Yin of Food in the Last Year: Not on file   Transportation Needs:     Lack of Transportation (Medical): Not on file    Lack of Transportation (Non-Medical):  Not on file   Physical Activity:     Days of Exercise per Week: Not on file    Minutes of Exercise per Session: Not on file   Stress:     Feeling of Stress : Not on file   Social Connections:     Frequency of Communication with Friends and Family: Not on file    Frequency of Social Gatherings with Friends and Family: Not on file    Attends [employer] Services: Not on file    Active Member of [address] [place] [address] or Organizations: Not on file    Attends Club or Organization Meetings: Not on file    Marital Status: Not on file   Intimate Partner Violence:     Fear of Current or Ex-Partner: Not on file    Emotionally Abused: Not on file    Physically Abused: Not on file    Sexually Abused: Not on file   Housing Stability:     Unable to Pay for Housing in the Last Year: Not on file    Number of [place] in the Last Year: Not on file    Unstable Housing in the Last Year: Not on file     Family History   Problem Relation Age of Onset    High Blood Pressure Mother     Diabetes Mother     Cancer Mother         thyroid    Stroke Father          LAB DATA:    CBC:   Lab Results   Component Value Date    WBC 8.1 12/10/2021    RBC 4.63 12/10/2021    RBC 5.12 05/26/2017    HGB 11.7 12/10/2021    HCT 36.8 12/10/2021    MCV 79.5 12/10/2021    MCH 25.2 12/10/2021    MCHC 31.7 12/10/2021    RDW 17.9 12/10/2021     12/10/2021    MPV 9.1 12/10/2021     BMP:    Lab Results   Component Value Date     12/13/2021    K 4.1 12/13/2021    K 3.9 12/06/2021     12/13/2021    CO2 21 12/13/2021    BUN 13 12/13/2021    CREATININE 1.4 12/13/2021    CALCIUM 9.2 12/13/2021    GFRAA 46 12/13/2021    GFRAA >60 01/17/2013    LABGLOM 38 12/13/2021    GLUCOSE 93 12/13/2021    GLUCOSE 154 06/05/2015     Ionized Calcium:  No results found for: IONCA  Magnesium:    Lab Results   Component Value Date    MG 2.40 12/13/2021     Phosphorus:    Lab Results   Component Value Date    PHOS 3.8 12/13/2021     U/A:    Lab Results   Component Value Date    COLORU YELLOW 12/11/2021    PHUR 6.5 12/11/2021    WBCUA >100 12/01/2021    RBCUA 11-20 12/01/2021    MUCUS 1+ 12/15/2012    BACTERIA Rare 12/01/2021    CLARITYU Clear 12/11/2021    SPECGRAV 1.008 12/11/2021    LEUKOCYTESUR Negative 12/11/2021    UROBILINOGEN 0.2 12/11/2021    BILIRUBINUR Negative 12/11/2021    BLOODU Negative 12/11/2021    GLUCOSEU Negative 12/11/2021    AMORPHOUS 2+ 12/18/2012         IMPRESSION/RECOMMENDATIONS:      Active Problems:    Acute decompensated heart failure ([address]  Resolved Problems:    * No resolved hospital problems. *    1. AIDA - likely due to relative hypotension SBP dropped from 153 to 99 mmHg  - Reduced coreg dose - BP improved and bradycardia resolved. Switched to Metoprolol 12/12/21  - CReatinine trending down on Lasix gtt and with BP improvement  - Patient going [facility] tomorrow discussed risk of REBEKAH - she understands and agrees to proceed  - Held Gabapentin since it causes fluid retention and has neurological SE in setting of AIDA  - Monitor kidney function closely  - Avoid exposure to Nephrotoxic agents    2. CKD - Baseline ~ 1.5 mg/dl    3. Morbid Obesity    4. Bradycardia - resolved    5. HTN - controlled    6.  CHF - shortness of breath recurred off Lasix - restarted 12/12/21

## 2021-12-13 NOTE — RT PROTOCOL NOTE
RT Inhaler-Nebulizer Bronchodilator Protocol Note    There is a bronchodilator order in the chart from a provider indicating to follow the RT Bronchodilator Protocol and there is an Initiate RT Inhaler-Nebulizer Bronchodilator Protocol order as well (see protocol at bottom of note). CXR Findings:  No results found. The findings from the last RT Protocol Assessment were as follows:   History Pulmonary Disease: Chronic pulmonary disease  Respiratory Pattern: Dyspnea on exertion or RR 21-25 bpm  Breath Sounds: Slightly diminished and/or crackles  Cough: Strong, spontaneous, non-productive  Indication for Bronchodilator Therapy: On home bronchodilators, Decreased or absent breath sounds  Bronchodilator Assessment Score: 6    Aerosolized bronchodilator medication orders have been revised according to the RT Inhaler-Nebulizer Bronchodilator Protocol below. Respiratory Therapist to perform RT Therapy Protocol Assessment initially then follow the protocol. Repeat RT Therapy Protocol Assessment PRN for score 0-3 or on second treatment, BID, and PRN for scores above 3. No Indications  adjust the frequency to every 6 hours PRN wheezing or bronchospasm, if no treatments needed after 48 hours then discontinue using Per Protocol order mode. If indication present, adjust the RT bronchodilator orders based on the Bronchodilator Assessment Score as indicated below. Use Inhaler orders unless patient has one or more of the following: on home nebulizer, not able to hold breath for 10 seconds, is not alert and oriented, cannot activate and use MDI correctly, or respiratory rate 25 breaths per minute or more, then use the equivalent nebulizer order(s) with same Frequency and PRN reasons based on the score. If a patient is on this medication at home then do not decrease Frequency below that used at home.     0-3  enter or revise RT bronchodilator order(s) to equivalent RT Bronchodilator order with Frequency of every 4 hours PRN for wheezing or increased work of breathing using Per Protocol order mode. 4-6  enter or revise RT Bronchodilator order(s) to two equivalent RT bronchodilator orders with one order with BID Frequency and one order with Frequency of every 4 hours PRN wheezing or increased work of breathing using Per Protocol order mode. 7-10  enter or revise RT Bronchodilator order(s) to two equivalent RT bronchodilator orders with one order with TID Frequency and one order with Frequency of every 4 hours PRN wheezing or increased work of breathing using Per Protocol order mode. 11-13  enter or revise RT Bronchodilator order(s) to one equivalent RT bronchodilator order with QID Frequency and an Albuterol order with Frequency of every 4 hours PRN wheezing or increased work of breathing using Per Protocol order mode. Greater than 13  enter or revise RT Bronchodilator order(s) to one equivalent RT bronchodilator order with every 4 hours Frequency and an Albuterol order with Frequency of every 2 hours PRN wheezing or increased work of breathing using Per Protocol order mode. RT to enter RT Home Evaluation for COPD & MDI Assessment order using Per Protocol order mode.     Electronically signed by Aneta Osorio RCP on 12/13/2021 at 9:03 AM

## 2021-12-13 NOTE — CONSULTS
PALLIATIVE MEDICINE CONSULTATION     Patient name:Diane ALANIS Va   MBV:[id number]    :1960  Room/Bed:U0P-0480/3116-01   LOS: 3 days         Date of consult:2021    Consult Information    Inpatient consult to Palliative Care  Consult performed by: AME Jay CNP  Consult ordered by: Xiao Maddox MD         Reason for consult: Goals of Care, Code Status, 30 day readmit     Number of admissions past 12 months: 3    ASSESSMENT/RECOMMENDATIONS     62 y.o. female with hypoxia and debility. Symptom Management:  1. Hypoxia: Patient on 2 liters of oxygen via a nasal cannula. 2. Debility: Patient sitting up in bed during my visit today. Patient with generalized weakness and shortness of breath (with speech). Patient requiring some assistance with her ADL's.     3. Goals of Care: Met patient at her bedside. Patient oriented x 4 and decisional during my visit today. Reviewed patient's current health status, goals of care and code status during my visit today. Patient indicated she would like to continue with her current level of aggressive therapy. She would also like to remain a Full Code at this time. Patient is a 30 day readmit. Patient indicated she would like increased assistance with her in home care and she would like to meet with [facility] post her hospital stay. Patient/Family Goals of Care :    Met patient at her bedside. Patient oriented x 4 and decisional during my visit today. Reviewed patient's current health status, goals of care and code status during my visit today. Patient indicated she would like to continue with her current level of aggressive therapy. She would also like to remain a Full Code at this time. Patient is a 30 day readmit. Patient indicated she would like increased assistance with her in home care and she would like to meet with [facility] post her hospital stay.      Disposition/Discharge Plan:   An outpatient [facility] referral was ordered for post-discharge to followup with the patient once they return home. Advance Directives:  · Surrogate Decision Maker: Arnol Richey (patient's daughters). Patient was decisional today. · Code status:  Full Code    Case discussed with: patient, floor RN  Thank you for allowing us to participate in the care of this patient. HISTORY     CC: Hypoxia. HPI: The patient is a 62 y.o. female with a past medical history of chronic systolic heart failure, EF 25 to 30% status post AICD, CAD s/p stent, hypertension, CKD stage III, COPD who presented to emergency room with shortness of breath. Patient admitted with acute on chronic congestive heart failure. Palliative Medicine SymptomScreening/ROS:    Review of Systems   Constitutional: Positive for fatigue. HENT: Negative. Eyes: Negative. Respiratory: Positive for shortness of breath. Cardiovascular: Positive for leg swelling. Gastrointestinal: Negative. Musculoskeletal: Negative. Skin: Positive for pallor. Neurological: Positive for weakness. Psychiatric/Behavioral: Negative. All other systems reviewed and are negative. A complete 10 count ROS was obtained. Pertinent positives mentioned above in HPI/ROS. All others if not mentioned are negative. Pain:  Patient denied having any pain during my visit. Home med list and hospital medications reviewed in chart as of 12/13/2021     EXAM     Vitals:    12/13/21 1117   BP: 132/63   Pulse: 64   Resp: 16   Temp: 97.9 °F (36.6 °C)   SpO2: 92%       Physical Exam  Vitals reviewed. Constitutional:       Appearance: She is ill-appearing. Interventions: Nasal cannula in place. HENT:      Head: Normocephalic and atraumatic. Nose: Nose normal.   Eyes:      Extraocular Movements: Extraocular movements intact. Pupils: Pupils are equal, round, and reactive to light. Cardiovascular:      Rate and Rhythm: Normal rate. Pulses: Normal pulses. Pulmonary:      Comments: Clear but diminished bilaterally  Abdominal:      General: Bowel sounds are normal.      Palpations: Abdomen is soft. Tenderness: There is no guarding. Musculoskeletal:      Cervical back: Neck supple. Right lower leg: Edema (1+) present. Left lower leg: Edema (1+) present. Skin:     General: Skin is warm and dry. Coloration: Skin is pale. Neurological:      Mental Status: She is alert. Comments: Oriented x 4    Psychiatric:         Behavior: Behavior normal.         Thought Content:  Thought content normal.         Judgment: Judgment normal.            Current labs in the epic chart reviewed as of 12/13/2021   Review of previous notes, admits, labs, radiology and testing relevant to this consult done in this chart today 12/13/2021      Total time: 87 minutes  >50% of time spent counseling patient at bedside or POA/family member if applicable , reviewing information and discussing care, coordinating with care team  Signed By: Electronically signed by AME Walker CNP on 12/13/2021 at 12:14 PM  Palliative Medicine   [phone]    December 13, 2021

## 2021-12-13 NOTE — ANESTHESIA PRE-OP
Brief Pre-Op Note/Sedation Assessment      Judy Olson  1960  [id number]  3:07 PM    Planned Procedure: Cardiac Catheterization Procedure  Post Procedure Plan: Return to same level of care  Consent: I have discussed with the patient and/or the patient representative the indication, alternatives, and the possible risks and/or complications of the planned procedure and the anesthesia methods. The patient and/or patient representative appear to understand and agree to proceed. Chief Complaint:   Dyspnea on Exertion      Indications for Cath Procedure:  1. Presentation:  Valvular Disease - Mitral Regurgitation; Regurgitation Severity: Moderately Severe (3+)  2. Anginal Classification within 2 weeks:  CCS III - Symptoms with everyday living activities, i.e., moderate limitation  3. Angina Symptoms Assessment:  Non-anginal Chest Pain  4. Heart Failure Class within last 2 weeks:  Yes:  Heart Failure Type: Systolic Severity:  Class IV - Symptoms of HF at rest  5. Cardiovascular Instability:  No    Prior Ischemic Workup/Eval:  1. Pre-Procedural Medications: Yes: Aspirin, Beta Blockers and STATIN  2. Stress Test Completed? No    Does Patient need surgery? Cath Valve Surgery:  No    Pre-Procedure Medical History:  Vital Signs:  /63   Pulse 64   Temp 97.9 °F (36.6 °C) (Oral)   Resp 16   Ht 5' 7\" (1.702 m)   Wt 255 lb 8.2 oz (115.9 kg)   SpO2 92%   BMI 40.02 kg/m²     Allergies:     Allergies   Allergen Reactions    Ace Inhibitors     Diclofenac     Losartan     Vicodin [Hydrocodone-Acetaminophen]      Medications:    Current Facility-Administered Medications   Medication Dose Route Frequency Provider Last Rate Last Admin    sodium chloride flush 0.9 % injection 5-40 mL  5-40 mL IntraVENous 2 times per day Kalpana Garcia MD        sodium chloride flush 0.9 % injection 5-40 mL  5-40 mL IntraVENous PRN Kalpana Garcia MD        0.9 % sodium chloride infusion  25 mL IntraVENous PRN Bessy Clement MD        metoprolol succinate (TOPROL XL) extended release tablet 12.5 mg  12.5 mg Oral Daily Bessy Clement MD   12.5 mg at 12/13/21 0831    furosemide (LASIX) 100 mg in dextrose 5 % 100 mL infusion  5 mg/hr IntraVENous Continuous Amr Chari Martinez MD   Stopped at 12/13/21 1235    albuterol (PROVENTIL) nebulizer solution 2.5 mg  2.5 mg Nebulization 4x daily Francis Meyer MD   2.5 mg at 12/13/21 1235    amiodarone (CORDARONE) tablet 200 mg  200 mg Oral Nightly Francis Meyer MD   200 mg at 12/12/21 2120    atorvastatin (LIPITOR) tablet 40 mg  40 mg Oral Nightly Francis Meyer MD   40 mg at 12/12/21 2120    pantoprazole (PROTONIX) tablet 40 mg  40 mg Oral Daily Francis Meyer MD   40 mg at 12/13/21 0831    tiotropium (SPIRIVA RESPIMAT) 2.5 MCG/ACT inhaler 2 puff  2 puff Inhalation Daily Francis Meyer MD   2 puff at 12/13/21 0859    aspirin tablet 325 mg  325 mg Oral Daily Francis Meyer MD   325 mg at 12/13/21 0831    ondansetron (ZOFRAN-ODT) disintegrating tablet 4 mg  4 mg Oral Q8H PRN Francis Meyer MD        Or    [facility]dansetron [facility]) injection 4 mg  4 mg IntraVENous Q6H PRN Francis Meyer MD        polyethylene glycol [facility]) packet 17 g  17 g Oral Daily PRN Francis Meyer MD        acetaminophen (TYLENOL) tablet 650 mg  650 mg Oral Q6H PRN Francis Meyer MD   650 mg at 12/10/21 2122    Or    acetaminophen (TYLENOL) suppository 650 mg  650 mg Rectal Q6H PRN Francis Meyer MD        enoxaparin (LOVENOX) injection 40 mg  40 mg SubCUTAneous Daily Francis Meyer MD   40 mg at 12/13/21 0831    albuterol (PROVENTIL) nebulizer solution 2.5 mg  2.5 mg Nebulization Q4H PRN Francis Meyer MD   2.5 mg at 12/10/21 2056    hydrOXYzine (VISTARIL) capsule 50 mg  50 mg Oral Once Anoop Marie APRN - CNP           Past Medical History:    Past Medical History:   Diagnosis Date    Asthma     Cardiomy[facility]     CHF (congestive heart failure) (HCC)     [facility]) [facility]     Diabetes mellitus ([facility]     Essential hypertension     Hyperlipidemia     Obesity        Surgical History:    Past Surgical History:   Procedure Laterality Date    CARPAL TUNNEL RELEASE      CHOLECYSTECTOMY      CORONARY ANGIOPLASTY WITH STENT PLACEMENT  01/07/2013    Stent LAD    INCONTINENCE SURGERY               Pre-Sedation:  Pre-Sedation Documentation and Exam:  I have personally completed a history, physical exam & review of systems for this patient (see notes). Prior History of Anesthesia Complications:   none    Modified Mallampati:  II (soft palate, uvula, fauces visible)    ASA Classification:  Class 3 - A patient with severe systemic disease that limits activity but is not incapacitating    Corey Scale: Activity:  2 - Able to move 4 extremities voluntarily on command  Respiration:  2 - Able to breathe deeply and cough freely  Circulation:  2 - BP+/- 20mmHg of normal  Consciousness:  2 - Fully awake  Oxygen Saturation (color):  2 - Able to maintain oxygen saturation >92% on room air    Sedation/Anesthesia Plan:  Guard the patient's safety and welfare. Minimize physical discomfort and pain. Minimize negative psychological responses to treatment by providing sedation and analgesia and maximize the potential amnesia. Patient to meet pre-procedure discharge plan.     Medication Planned:  midazolam intravenously and fentanyl intravenously    Patient is an appropriate candidate for plan of sedation:   yes      Electronically signed by Dora Chan MD on 12/13/2021 at 3:07 PM

## 2021-12-13 NOTE — PROGRESS NOTES
Patient resting in bed, states she feels short of breath on room air, 02 level 96% on room air, lungs clear with decreased bases, patient placed back on 2L for comfort, respiratory called to give scheduled treatment, Tracey Fall Risk: High (45 and higher), Pain Level: 0, vitals stable, assisted to position of comfort, call light and belongings in reach, encouraged to call with needs, will continue to monitor.

## 2021-12-13 NOTE — PLAN OF CARE
Problem: OXYGENATION/RESPIRATORY FUNCTION  Goal: Patient will maintain patent airway  12/13/2021 0745 by Shamika Preston RN  Outcome: Ongoing  Note: Patient will maintain patent airway      Problem: OXYGENATION/RESPIRATORY FUNCTION  Goal: Patient will achieve/maintain normal respiratory rate/effort  Description: Respiratory rate and effort will be within normal limits for the patient  Outcome: Ongoing  Note: Patient will achieve normal respiratory rate/effort     Problem: HEMODYNAMIC STATUS  Goal: Patient has stable vital signs and fluid balance  12/13/2021 0745 by Shamika Preston RN  Outcome: Ongoing  Note: Pt will have stable vital signs and fluid balance on this shift      Problem: FLUID AND ELECTROLYTE IMBALANCE  Goal: Fluid and electrolyte balance are achieved/maintained  Outcome: Ongoing  Note: Fluid and electrolyte balance will be achieved/maintained      Problem: ACTIVITY INTOLERANCE/IMPAIRED MOBILITY  Goal: Mobility/activity is maintained at optimum level for patient  Outcome: Ongoing  Note: Mobility will be maintain at optimum level for pt      Problem: Skin Integrity:  Goal: Will show no infection signs and symptoms  Description: Will show no infection signs and symptoms  Outcome: Ongoing  Note: Monitoring pt for signs and symptoms of infection. Will observe for any redness, warmth, or pus. Problem: Skin Integrity:  Goal: Absence of new skin breakdown  Description: Absence of new skin breakdown  Outcome: Ongoing  Note: Pt will be absent from new skin breakdown on this shift      Problem: Falls - Risk of:  Goal: Will remain free from falls  Description: Will remain free from falls  12/13/2021 0745 by Shamika Preston RN  Outcome: Ongoing  Note: Fall risk assessment completed. Fall precautions in place. Bed in lowest position, wheels locked, bed/chair exit alarm in place, call light within reach, and non skid footwear on. Walkway free of clutter.  Pt alert and oriented and able to make needs known. Pt educated to use call light when needing to get up, and pt utilizes call light to make needs known. Will continue to monitor. Problem: Falls - Risk of:  Goal: Absence of physical injury  Description: Absence of physical injury  Outcome: Ongoing  Note: Pt absent from physical injury      Problem: Pain:  Goal: Pain level will decrease  Description: Pain level will decrease  Outcome: Ongoing  Note: Assessing and monitoring pt's pain. PRN pain medication being given if ordered. Educating pt on nonpharmacologic interventions for pain control. Will continue to monitor and reassess. Problem: Pain:  Goal: Control of acute pain  Description: Control of acute pain  Outcome: Ongoing  Note: Assessing and monitoring pt's pain. PRN pain medication being given if ordered. Educating pt on nonpharmacologic interventions for pain control. Will continue to monitor and reassess. Problem: Pain:  Goal: Control of chronic pain  Description: Control of chronic pain  Outcome: Ongoing  Note: Assessing and monitoring pt's pain. PRN pain medication being given if ordered. Educating pt on nonpharmacologic interventions for pain control. Will continue to monitor and reassess.

## 2021-12-14 LAB
ALBUMIN SERPL-MCNC: 3.3 G/DL (ref 3.4–5)
ANION GAP SERPL CALCULATED.3IONS-SCNC: 15 MMOL/L (ref 3–16)
BASOPHILS ABSOLUTE: 0.1 K/UL (ref 0–0.2)
BASOPHILS RELATIVE PERCENT: 0.9 %
BLOOD CULTURE, ROUTINE: NORMAL
BUN BLDV-MCNC: 13 MG/DL (ref 7–20)
CALCIUM SERPL-MCNC: 8.8 MG/DL (ref 8.3–10.6)
CHLORIDE BLD-SCNC: 101 MMOL/L (ref 99–110)
CO2: 23 MMOL/L (ref 21–32)
CREAT SERPL-MCNC: 1.4 MG/DL (ref 0.6–1.2)
CULTURE, BLOOD 2: NORMAL
EOSINOPHILS ABSOLUTE: 0.4 K/UL (ref 0–0.6)
EOSINOPHILS RELATIVE PERCENT: 6.4 %
GFR AFRICAN AMERICAN: 46
GFR NON-AFRICAN AMERICAN: 38
GLUCOSE BLD-MCNC: 98 MG/DL (ref 70–99)
HCT VFR BLD CALC: 30.5 % (ref 36–48)
HEMOGLOBIN: 9.9 G/DL (ref 12–16)
LYMPHOCYTES ABSOLUTE: 0.8 K/UL (ref 1–5.1)
LYMPHOCYTES RELATIVE PERCENT: 13.4 %
MAGNESIUM: 2 MG/DL (ref 1.8–2.4)
MCH RBC QN AUTO: 24.9 PG (ref 26–34)
MCHC RBC AUTO-ENTMCNC: 32.4 G/DL (ref 31–36)
MCV RBC AUTO: 76.9 FL (ref 80–100)
MONOCYTES ABSOLUTE: 0.5 K/UL (ref 0–1.3)
MONOCYTES RELATIVE PERCENT: 8.1 %
NEUTROPHILS ABSOLUTE: 4.4 K/UL (ref 1.7–7.7)
NEUTROPHILS RELATIVE PERCENT: 71.2 %
PDW BLD-RTO: 17.7 % (ref 12.4–15.4)
PHOSPHORUS: 3.3 MG/DL (ref 2.5–4.9)
PLATELET # BLD: 377 K/UL (ref 135–450)
PMV BLD AUTO: 8.6 FL (ref 5–10.5)
POTASSIUM SERPL-SCNC: 3.5 MMOL/L (ref 3.5–5.1)
RBC # BLD: 3.96 M/UL (ref 4–5.2)
SODIUM BLD-SCNC: 139 MMOL/L (ref 136–145)
URIC ACID, SERUM: 9.5 MG/DL (ref 2.6–6)
WBC # BLD: 6.2 K/UL (ref 4–11)

## 2021-12-14 PROCEDURE — 6360000002 HC RX W HCPCS: Performed by: INTERNAL MEDICINE

## 2021-12-14 PROCEDURE — 85025 COMPLETE CBC W/AUTO DIFF WBC: CPT

## 2021-12-14 PROCEDURE — 94761 N-INVAS EAR/PLS OXIMETRY MLT: CPT

## 2021-12-14 PROCEDURE — 2580000003 HC RX 258: Performed by: INTERNAL MEDICINE

## 2021-12-14 PROCEDURE — 80069 RENAL FUNCTION PANEL: CPT

## 2021-12-14 PROCEDURE — 94640 AIRWAY INHALATION TREATMENT: CPT

## 2021-12-14 PROCEDURE — 84550 ASSAY OF BLOOD/URIC ACID: CPT

## 2021-12-14 PROCEDURE — 99233 SBSQ HOSP IP/OBS HIGH 50: CPT | Performed by: NURSE PRACTITIONER

## 2021-12-14 PROCEDURE — 6370000000 HC RX 637 (ALT 250 FOR IP): Performed by: INTERNAL MEDICINE

## 2021-12-14 PROCEDURE — 2060000000 HC ICU INTERMEDIATE R&B

## 2021-12-14 PROCEDURE — 36415 COLL VENOUS BLD VENIPUNCTURE: CPT

## 2021-12-14 PROCEDURE — 83735 ASSAY OF MAGNESIUM: CPT

## 2021-12-14 PROCEDURE — 94660 CPAP INITIATION&MGMT: CPT

## 2021-12-14 RX ORDER — TORSEMIDE 100 MG/1
100 TABLET ORAL DAILY
Status: DISCONTINUED | OUTPATIENT
Start: 2021-12-15 | End: 2021-12-16 | Stop reason: HOSPADM

## 2021-12-14 RX ADMIN — ALBUTEROL SULFATE 2.5 MG: 2.5 SOLUTION RESPIRATORY (INHALATION) at 11:56

## 2021-12-14 RX ADMIN — AMIODARONE HYDROCHLORIDE 200 MG: 200 TABLET ORAL at 20:12

## 2021-12-14 RX ADMIN — FUROSEMIDE 7.5 MG/HR: 10 INJECTION, SOLUTION INTRAMUSCULAR; INTRAVENOUS at 12:01

## 2021-12-14 RX ADMIN — PANTOPRAZOLE SODIUM 40 MG: 40 TABLET, DELAYED RELEASE ORAL at 06:16

## 2021-12-14 RX ADMIN — ALBUTEROL SULFATE 2.5 MG: 2.5 SOLUTION RESPIRATORY (INHALATION) at 15:48

## 2021-12-14 RX ADMIN — METOPROLOL SUCCINATE 12.5 MG: 25 TABLET, EXTENDED RELEASE ORAL at 08:30

## 2021-12-14 RX ADMIN — ALBUTEROL SULFATE 2.5 MG: 2.5 SOLUTION RESPIRATORY (INHALATION) at 20:26

## 2021-12-14 RX ADMIN — ASPIRIN 325 MG ORAL TABLET 325 MG: 325 PILL ORAL at 08:30

## 2021-12-14 RX ADMIN — ALBUTEROL SULFATE 2.5 MG: 2.5 SOLUTION RESPIRATORY (INHALATION) at 08:26

## 2021-12-14 RX ADMIN — MILRINONE LACTATE IN DEXTROSE 0.2 MCG/KG/MIN: 200 INJECTION, SOLUTION INTRAVENOUS at 12:06

## 2021-12-14 RX ADMIN — ATORVASTATIN CALCIUM 40 MG: 40 TABLET, FILM COATED ORAL at 20:12

## 2021-12-14 RX ADMIN — ENOXAPARIN SODIUM 40 MG: 100 INJECTION SUBCUTANEOUS at 08:30

## 2021-12-14 RX ADMIN — TIOTROPIUM BROMIDE INHALATION SPRAY 2 PUFF: 3.12 SPRAY, METERED RESPIRATORY (INHALATION) at 08:36

## 2021-12-14 RX ADMIN — Medication 10 ML: at 20:13

## 2021-12-14 ASSESSMENT — PAIN SCALES - GENERAL: PAINLEVEL_OUTOF10: 0

## 2021-12-14 NOTE — CONSULTS
[facility]  HEART FAILURE PROGRAM      NAME:  Marci [facility] RECORD NUMBER:  [id number]  AGE: 62 y.o.    GENDER: female  : 1960  TODAY'S DATE:  2021    Subjective:     VISIT TYPE: evaluation     ADMITTING PHYSICIAN:  Vitaly Irby MD    PAST MEDICAL HISTORY        Diagnosis Date    Asthma     Cardiomyopathy ([facility]     CHF (congestive heart failure) (HCC)     COPD (chronic obstructive pulmonary disease) (HCC)     Diabetes mellitus ([facility]     Essential hypertension     Hyperlipidemia     Obesity        SOCIAL HISTORY    Social History     Tobacco Use    Smoking status: Former Smoker     Packs/day: 0.10     Types: Cigarettes    Smokeless tobacco: Never Used   Substance Use Topics    Alcohol use: No     Alcohol/week: 0.0 standard drinks    Drug use: No       ALLERGIES    Allergies   Allergen Reactions    Ace Inhibitors     Diclofenac     Losartan     Vicodin [Hydrocodone-Acetaminophen]        MEDICATIONS  Scheduled Meds:   [START ON 12/15/2021] torsemide  100 mg Oral Daily    sodium chloride flush  5-40 mL IntraVENous 2 times per day    metoprolol succinate  12.5 mg Oral Daily    albuterol  2.5 mg Nebulization 4x daily    amiodarone  200 mg Oral Nightly    atorvastatin  40 mg Oral Nightly    pantoprazole  40 mg Oral Daily    tiotropium  2 puff Inhalation Daily    aspirin  325 mg Oral Daily    enoxaparin  40 mg SubCUTAneous Daily    hydrOXYzine  50 mg Oral Once       ADMIT DATE: 12/10/2021      Objective:     ADMISSION DIAGNOSIS:   Hypoxia [R09.02]  AIDA (acute kidney injury) ([facility] [N17.9]  QT prolongation [R94.31]  Acute decompensated heart failure (HCC) [I50.9]  Acute on chronic congestive heart failure, unspecified heart failure type (HCC) [I50.9]     BP (!) 129/54   Pulse 63   Temp 98 °F (36.7 °C) (Axillary)   Resp 18   Ht 5' 7\" (1.702 m)   Wt 247 lb 12.8 oz (112.4 kg)   SpO2 92%   BMI 38.81 kg/m²     ADMIT:  Weight: 253 lb 15.5 oz (115.2 kg)    TODAY: Weight: 247 lb 12.8 oz (112.4 kg)    Wt Readings from Last 10 Encounters:   12/14/21 247 lb 12.8 oz (112.4 kg)   12/06/21 253 lb 1.4 oz (114.8 kg)   10/13/21 255 lb 1.2 oz (115.7 kg)   01/17/20 259 lb 0.7 oz (117.5 kg)   05/26/17 281 lb (127.5 kg)   02/14/17 267 lb (121.1 kg)   02/06/17 271 lb (122.9 kg)   02/06/17 271 lb (122.9 kg)   01/25/17 270 lb (122.5 kg)   10/26/16 268 lb (121.6 kg)          Intake/Output Summary (Last 24 hours) at 12/14/2021 1634  Last data filed at 12/14/2021 1533  Gross per 24 hour   Intake 1051.69 ml   Output 1400 ml   Net -348.31 ml       LABS  BMP:   Lab Results   Component Value Date     12/14/2021    K 3.5 12/14/2021    K 3.9 12/06/2021     12/14/2021    CO2 23 12/14/2021    BUN 13 12/14/2021    LABALBU 3.3 12/14/2021    CREATININE 1.4 12/14/2021    CALCIUM 8.8 12/14/2021    GFRAA 46 12/14/2021    GFRAA >60 01/17/2013    LABGLOM 38 12/14/2021    GLUCOSE 98 12/14/2021    GLUCOSE 154 06/05/2015     CBC:   Recent Labs     12/14/21  0700   WBC 6.2   HGB 9.9*   HCT 30.5*   MCV 76.9*        BNP:   Lab Results   Component Value Date    .0 01/05/2013    .6 12/15/2012       ECHOCARDIOGRAM:    Summary   The left ventricle is dilated with normal wall thickness and severely   reduced global systolic function. Ejection fraction is visually estimated to be 25-30%. Pacer / ICD wire is visualized in the right ventricle. Normal right ventricular size and function. Moderately dilated left atrium. LIOR imaged with no evidence of thrombus. Calcification of the leaflets of the mitral valve. Moderately severe mitral regurgitation. Mild tricuspid regurgitation. Calcification of the leaflets of the mitral valve. Moderately severe mitral regurgitation. Vena Contracta= 0.64cm   There is no right to left shunt visualized by agitated saline bubble study.       Signature      ------------------------------------------------------------------ Electronically signed by Mary Hardy MD   (Interpreting physician) on 12/13/2021 at 06:45 PM    Assessment:     CONSULTS:   IP CONSULT TO HOSPITALIST  IP CONSULT TO HEART FAILURE NURSE/COORDINATOR  IP CONSULT TO DIETITIAN  IP CONSULT TO CARDIOLOGY  IP CONSULT TO NEPHROLOGY  IP CONSULT TO PALLIATIVE CARE    Patient has a CARDIOLOGY CONSULT: Yes      Patient taking an ACEI/ARB:  No: (renal failure)        Patient taking a BETA BLOCKER:  Yes    SCALE AVAILABLE:  Yes     EDUCATION STATUS: Patient   [x]  Provided both written and verbal education on Heart Failure signs/symptoms. [x]  Provided instructions on daily medications. [x]  Provided instructions to monitor and record weight daily. [x]  Provided instructions to call if weight increases 3 lbs in one day or 5 lbs in one week. [x]  Received verbal acknowledgment/understanding of Heart Failure related causes. [x]  Provided instructions on how to maintain a low sodium diet. [x]  Provided recommendations for smoking cessation programs  [x]  Provided recommendations on activity and exercise    []  Other:   Met with [facility] in her room. She was awake, pleasant, on room air, lasix/milrinone infusing. I introduced myself and my role in heart failure education. [facility] has been in and out of the hospital several times of late-both here at [facility], and [facility]. Yesterday she underwent a KAYLEIGH/RHC/LHC and was found to have moderately severe mitral regurg, which ultimately seems to be the source of her exacerbations of Heart Failure. We reviewed the basic anatomy of heart failure and what it means to have a weak heart. She said is familiar with this information as she heard it before. I reviewed the zones of heart failure. She said she doesn't get those symptoms,  She said her weight does not change , I reminded her that it is good to recognize the yellow zone s/s as early as possible.   She stated that everytime she has had to come back to the hospital, she had a visiting nurse out that day or the day before. I told her that is very frustrating to try your hardest, and because of that valve problem, she will continue to have problems. She is hoping to get a home health aid, and meals on wheels. She has become very weak from being in the hospital.  Prior to getting sick in October, she was still driving. She does live alone, and her daughter is close by if she needs help. She felt that following her low sodium diet wasn't too much of a problem, but keeping track of the fluids is more of a challenge. She states she does the best she can, but she can't keep track of every drop. I told her close follow ups help catch early s/s--she said someone keeps making her doctors appointments--I confessed that it was me--we are required to have a follow up visit on the chart at the time of discharge. She was not happy about this, but I told her that those were our guidelines that we follow. She has been a patient of Dr Vitaly Vann at [facility] for many years but she is wanting to transfer to [facility] since she lives so close. She denies further questions. We will continue to follow along. CURRENT DIET: ADULT DIET; Regular; No Added Salt (3-4 gm)    EDUCATIONAL PACKETS PROVIDED- PRINTED FROM [employer]. Titles and material given:  Yes   [x]  What is Heart Failure?   [x]  Heart Failure: Warning Signs of a Flare-Up  [x]  Heart Failure: Making Changes to Your Diet  [x]  Heart Failure: Medications to Help Your Heart   []  Other:     PATIENT/CAREGIVER TEACHING:   Level of patient/caregiver understanding able to:   [x] Verbalize understanding   [x] Demonstrate understanding       [x] Teach back        [x] Needs reinforcement     []  Other:      TEACHING TIME:  30 minutes       Plan:       DISCHARGE PLAN:  Placement for patient upon discharge: home with support   Hospice Care:  no  Code Status: Full Code  Discharge appointment scheduled: Yes     RECOMMENDATIONS:   []  Encourage to call Heart Failure Resource Line with any questions or concerns. [x]  Educate further Ms. Sandhu's on fluid restriction 48 oz- 64 oz during inpatient stay so she can understand how to measure intake at home. [x]  Continue to educate on S/S of Heart Failure.   [x]  Emphasize daily weights, diet, and if changes, to call Heart Failure Resource Line  []  Other:            Electronically signed by Giovanny Lambert RN, BSN CHFN  on 12/14/2021 at 4:34 PM

## 2021-12-14 NOTE — PLAN OF CARE
Problem: OXYGENATION/RESPIRATORY FUNCTION  Goal: Patient will maintain patent airway  12/14/2021 0927 by Lynda Walker RN  Outcome: Ongoing     Problem: OXYGENATION/RESPIRATORY FUNCTION  Goal: Patient will achieve/maintain normal respiratory rate/effort  Description: Respiratory rate and effort will be within normal limits for the patient  12/14/2021 0927 by Lynda Walker RN  Outcome: Ongoing     Problem: HEMODYNAMIC STATUS  Goal: Patient has stable vital signs and fluid balance  12/14/2021 0927 by Lynda Walker RN  Outcome: Ongoing     Problem: FLUID AND ELECTROLYTE IMBALANCE  Goal: Fluid and electrolyte balance are achieved/maintained  12/14/2021 0927 by Lynda Walker RN  Outcome: Ongoing     Problem: ACTIVITY INTOLERANCE/IMPAIRED MOBILITY  Goal: Mobility/activity is maintained at optimum level for patient  12/14/2021 0927 by Lynda Walker RN  Outcome: Ongoing  Maintaining low sodium diet, strict intake and output, daily weights and fluid restriction. Providing at least 15 minutes of CHF education per shift. Assessing vitals q4 hours. Assessing heart and lung sounds. Monitoring labs q shift and as resulted. Maintaining oxygenation. Administering medications as ordered. Reporting abnormal findings to MD.     Problem: Skin Integrity:  Goal: Will show no infection signs and symptoms  Description: Will show no infection signs and symptoms  12/14/2021 0927 by Lynda Walker RN  Outcome: Ongoing     Problem: Skin Integrity:  Goal: Absence of new skin breakdown  Description: Absence of new skin breakdown  12/14/2021 0927 by Lynda Walker RN  Outcome: Ongoing  Assessing ramon scale Q shift. Performing skin assessments every shift. Maintaining dry and clean skin. Promoting adequate nutritional intake. Heels elevated off bed. Performing skin care q shift. Utilizing lift equipment when indicated.       Problem: Falls - Risk of:  Goal: Will remain free from falls  Description: Will remain free from falls  12/14/2021 0927 by Smitha France RN  Outcome: Ongoing     Problem: Falls - Risk of:  Goal: Absence of physical injury  Description: Absence of physical injury  12/14/2021 0927 by Smitha France RN  Outcome: Ongoing  Bed alarm on, bed in lowest position, wheels locked. Fall risk assessment completed every shift. Nonskid socks on, fall sign posted. Pt educated on use of call light.

## 2021-12-14 NOTE — PROGRESS NOTES
Hospitalist Progress Note      PCP: Briana Watkins MD    Date of Admission: 12/10/2021    Chief Complaint: SOB    Hospital Course:     Subjective: on milrinone and lasix drips. No complaints      Medications:  Reviewed    Infusion Medications    milrinone 0.2 mcg/kg/min (12/14/21 1206)    sodium chloride       Scheduled Medications    [START ON 12/15/2021] torsemide  100 mg Oral Daily    sodium chloride flush  5-40 mL IntraVENous 2 times per day    metoprolol succinate  12.5 mg Oral Daily    albuterol  2.5 mg Nebulization 4x daily    amiodarone  200 mg Oral Nightly    atorvastatin  40 mg Oral Nightly    pantoprazole  40 mg Oral Daily    tiotropium  2 puff Inhalation Daily    aspirin  325 mg Oral Daily    enoxaparin  40 mg SubCUTAneous Daily    hydrOXYzine  50 mg Oral Once     PRN Meds: acetaminophen, sodium chloride flush, sodium chloride, ondansetron **OR** ondansetron, polyethylene glycol, [DISCONTINUED] acetaminophen **OR** acetaminophen, albuterol      Intake/Output Summary (Last 24 hours) at 12/14/2021 1747  Last data filed at 12/14/2021 1702  Gross per 24 hour   Intake 1051.69 ml   Output 1700 ml   Net -648. 31 ml       Exam:    BP (!) 129/54   Pulse 63   Temp 98 °F (36.7 °C) (Axillary)   Resp 18   Ht 5' 7\" (1.702 m)   Wt 247 lb 12.8 oz (112.4 kg)   SpO2 92%   BMI 38.81 kg/m²     General appearance: No apparent distress, appears stated age and cooperative. HEENT: Pupils equal, round, and reactive to light. Conjunctivae/corneas clear. Neck: Supple, with full range of motion. No jugular venous distention. Trachea midline. Respiratory:  Normal respiratory effort. Clear to auscultation, bilaterally without Rales/Wheezes/Rhonchi. Cardiovascular: Regular rate and rhythm with normal S1/S2 without murmurs, rubs or gallops. Abdomen: Soft, non-tender, non-distended with normal bowel sounds. Musculoskeletal: No clubbing, cyanosis or edema bilaterally.   Full range of motion without deformity. Skin: Skin color, texture, turgor normal.  No rashes or lesions. Neurologic:  Neurovascularly intact without any focal sensory/motor deficits. Cranial nerves: II-XII intact, grossly non-focal.  Psychiatric: Alert and oriented, thought content appropriate, normal insight  Capillary Refill: Brisk,< 3 seconds   Peripheral Pulses: +2 palpable, equal bilaterally       Labs:   Recent Labs     12/14/21  0700   WBC 6.2   HGB 9.9*   HCT 30.5*        Recent Labs     12/12/21  0629 12/13/21  0514 12/14/21  0700   * 139 139   K 4.3 4.1 3.5    103 101   CO2 20* 21 23   BUN 17 13 13   CREATININE 1.7* 1.4* 1.4*   CALCIUM 8.8 9.2 8.8   PHOS 4.2 3.8 3.3     No results for input(s): AST, ALT, BILIDIR, BILITOT, ALKPHOS in the last 72 hours. No results for input(s): INR in the last 72 hours. No results for input(s): Irasema Grant in the last 72 hours. Urinalysis:      Lab Results   Component Value Date    NITRU Negative 12/11/2021    WBCUA >100 12/01/2021    BACTERIA Rare 12/01/2021    RBCUA 11-20 12/01/2021    BLOODU Negative 12/11/2021    SPECGRAV 1.008 12/11/2021    GLUCOSEU Negative 12/11/2021       Radiology:  XR CHEST PORTABLE   Final Result   Pulmonary vascular congestion. Stable cardiomegaly. Probable small   bilateral pleural effusions. Assessment/Plan:    Active Hospital Problems    Diagnosis Date Noted    Acute kidney injury superimposed on chronic kidney disease ([facility] [address] [N17.9, N18.9]     Acute decompensated heart failure ([facility] [address] [I50.9] 12/10/2021    Acute on chronic systolic CHF (congestive heart failure), NYHA class 3 ([address] [I50.23] 10/11/2021    NEGIN treated with BiPAP [G47.33] 01/06/2013     --Acute on chronic combined systolic and diastolic heart failure, status post AICD EF 25 to 04%/ZNKOM 2 diastolic dysfunction on echo 10/11/21  ---Moderate to severe mitral regurgitation  - Nephro consulted:    Not on ACE/ARB/Aldactone due to renal failure. .   - s/p RHC

## 2021-12-14 NOTE — PROGRESS NOTES
[address]   Daily Progress Note      Admit Date:  12/10/2021      Subjective:   Ms. Nusrat Mathur is a 60-year-old female with past medical history significant for asthma, COPD, DM type 2, HTN, HLD, CHF who presented to ED on 12/10 with complaint of shortness of breath. She arrived in mild respiratory distress with sats in 80's and was admitted for acute on chronic CHF, AIDA, and hypoxia. Patient's diuretic and ACEi/ARB therapy was discontinued last week due to AIDA. She underwent KAYLEIGH yesterday revealing moderately severe mitral regurgitation and EF of 25-30%. RHC revealed sight-sided volume overload with pulmonary hypertension. She feels that BiPAP, most effectively manages her SOB. Interval history: Today Good Coleman is doing okay. She was on BiPAP when I arrived to her room. She says when she is at rest, her breathing is okay. She slept flat with her BiPAP last night free of orthopnea and PND. Does have difficulty ambulating to the bathroom. She is having numbness down bilateral LE at rest.  Denies chest pain, tightness, palpitations. Objective:     BP (!) 117/42   Pulse 67   Temp 98.5 °F (36.9 °C) (Oral)   Resp 22   Ht 5' 7\" (1.702 m)   Wt 247 lb 12.8 oz (112.4 kg)   SpO2 93%   BMI 38.81 kg/m²      Intake/Output Summary (Last 24 hours) at 12/14/2021 0943  Last data filed at 12/14/2021 0655  Gross per 24 hour   Intake 331.69 ml   Output 900 ml   Net -568. 31 ml       Physical Exam:  General:  Awake, alert, NAD  Skin:  Warm and dry  Neck:  JVD<8, no carotid bruits  Chest:  Clear to auscultation, no wheezes/rhonchi/rales  Cardiovascular:  RRR, normal S1/S2, no R/G. Systolic murmur related to MR.    Abdomen:  Soft, nontender, +bowel sounds  Extremities:  No edema  Pulses: 2+ bilat carotid    2+ bilat radial    Decreased LE pulses        Medications:    sodium chloride flush  5-40 mL IntraVENous 2 times per day    metoprolol succinate  12.5 mg Oral Daily    albuterol  2.5 mg Nebulization 4x daily    amiodarone  200 mg Oral Nightly    atorvastatin  40 mg Oral Nightly    pantoprazole  40 mg Oral Daily    tiotropium  2 puff Inhalation Daily    aspirin  325 mg Oral Daily    enoxaparin  40 mg SubCUTAneous Daily    hydrOXYzine  50 mg Oral Once      milrinone 0.2 mcg/kg/min (12/14/21 0655)    sodium chloride      furosemide (LASIX) 1mg/ml infusion 7.5 mg/hr (12/13/21 2224)       Lab Data:  CBC:   Recent Labs     12/14/21  0700   WBC 6.2   HGB 9.9*        BMP:    Recent Labs     12/12/21  0629 12/13/21  0514 12/14/21  0700   * 139 139   K 4.3 4.1 3.5   CO2 20* 21 23   BUN 17 13 13   CREATININE 1.7* 1.4* 1.4*     LIVR: No results for input(s): AST, ALT in the last 72 hours. PT/INR: No results for input(s): PROT, INR in the last 72 hours. APTT: No results for input(s): APTT in the last 72 hours. BNP:  No results for input(s): BNP in the last 72 hours. CARDIAC ENZYMES:No results for input(s): CKMB, CKMBINDEX, TROPONINI in the last 72 hours. Invalid input(s): CKTOTAL;3  FASTING LIPID PANEL:  Lab Results   Component Value Date    CHOL 77 12/11/2021    HDL 25 12/11/2021    TRIG 82 12/11/2021         Diagnostics:      KAYLEIGH 12/10/21:   Summary   The left ventricle is dilated with normal wall thickness and severely   reduced global systolic function. Ejection fraction is visually estimated to be 25-30%. Pacer / ICD wire is visualized in the right ventricle. Normal right ventricular size and function. Moderately dilated left atrium. LIOR imaged with no evidence of thrombus. Calcification of the leaflets of the mitral valve. Moderately severe mitral regurgitation. Mild tricuspid regurgitation. Calcification of the leaflets of the mitral valve. Moderately severe mitral regurgitation. Vena Contracta= 0.64cm   There is no right to left shunt visualized by agitated saline bubble study.     Echo 10/22/2021:  Left ventricular cavity size is severely dilated. Ejection fraction is visually estimated to be 25-30%. Grade II diastolic dysfunction with elevated LV filling pressures. Moderate to Severe mitral regurgitation. The left atrium is severely dilated. Normal right ventricular size and function. Pacemaker / ICD lead is visualized in the right atrium. There is a trivial pericardial effusion noted. Stress 2020 ([facility]): The LV EF is 34%   There is moderate global hypokinesis of the left ventricle. There is a medium sized, reversible defect in the inferior wall consistent   with moderate ischemia. There is a small size, partially reversible defect in the anterior wall   consistent with mild keysha-infarct ischemia. There is a small size, partially reversible defect in the inferolateral wall   consistent with mild keysha-infarct ischemia. Procedures:     [facility] 12/13/21:  FINDINGS:  1.  Evidence of right-sided volume overload with a right atrial pressure  of 10 mmHg and a pulmonary capillary wedge pressure of 29 mmHg. The  left ventricular end-diastolic pressure is also 30 mmHg. 2.  Pulmonary hypertension with an instantaneous pressure of 50/24 for a  mean pulmonary arterial pressure of 37 mmHg. 3.  Low cardiac output by thermodilution at 3.68 liters per minute with  a cardiac index of 1.64 L/kg per minute. 4.  Pulmonary vascular resistance 113 dynes per second. 5. Low mixed venous oxygen saturations at 54% in the right atrium and  55% in the pulmonary artery. 87% for systemic aortic and systemic  arterial saturation. 6.  Right dominant coronary arterial system with an 80 to 90% mid RCA  lesion. In the left system, there is trivial plaque disease and  calcification of the left main. The circumflex has 25% plaque disease. In the left anterior descending artery, there is a 75% lesion with a  focal calcification in the midportion. There is YESICA 3 flow here in the  vessel.   7.  No gradient across the aortic valve on pullback to suggest aortic  stenosis. Assessment / Plan:       1. Acute on Chronic Systolic CHF, Class IV  RHC yesterday revealed right-sided volume overload with pulmonary hypertension. KAYLEIGH revealed an EF of 25-30% with moderately severe mitral regurgitation. We believe her mitral regurgitation is likely causing her acute CHF decompensations. Due to significantly decreased EF of 25-30% and failure of HF medical management, patient is candidate for Biventricular pacing to improve cardiac output. Continue BiPAP therapy. BP stable. Continue IV Lasix gtt. Discussed with renal.  Will discontinue IV Lasix this evening and begin PO diuretic in AM.   HR stable with transition to Toprol XL. Continue. Creatinine trending down, but continue to hold ACEi/ARB and Aldactone therapy due to AIDA. Will plan on d/c milrinone tomorrow. 2.  Acute on Chronic Kidney Disease, Stage III  Baseline creatinine approximately 1.7. Today creatinine 1.4. Continue IV Lasix. Change to PO diuretic in AM.     3. NEGIN  Continue BiPAP. 4. CAD of Native Coronary Arteries without Angina  LHC performed yesterday revealing right dominant coronary arterial system with an 80 to 90% mid RCA lesion. Trivial plaque disease and calcification of the left main. The circumflex has 25% plaque disease. 75% lesion with a focal calcification in the midportion in LAD. There is YESICA 3 flow here in the vessel. No intervention at this time. Continue ASA and statin therapy. 5. Peripheral Vascular Disease  -bilateral iliac disease: R BELLO occluded; L BELLO with stenosis (noted on cath)  Will need outpatient follow up for peripheral vascular intervention. Attestation:  I have independently examined the patient and reviewed the assessment and plan with Marifer Rodriguez. Mrs. Sandhu is doing better. She currently is laying flat in bed and without c/o dyspnea. She remains on IV lasix and Milrinone. MR murmur on exam. Lungs clear.  Discussed with Dr. Hasmukh Muñiz and Dr. Montes Media: will stop IV lasix later today and add po diuretic starting in the AM. Will continue Milrinone overnight and dc in the AM. Will d/w Dr. Renzo Vidal regarding timing of PCI and peripheral intervention as an outpatient. Consider upgrade to BiV ICD     Discussed with Dr. Renzo Vidal: he injected renals on cath yesterday and renal arteries okay. Will diurese and follow closely as an outpatient.      Margarito Dean, AME, [address] [employer]           Signed:  Mohit HOGUE

## 2021-12-14 NOTE — PLAN OF CARE
Problem: OXYGENATION/RESPIRATORY FUNCTION  Goal: Patient will maintain patent airway  Outcome: Ongoing     Problem: OXYGENATION/RESPIRATORY FUNCTION  Goal: Patient will achieve/maintain normal respiratory rate/effort  Description: Respiratory rate and effort will be within normal limits for the patient  Outcome: Ongoing     Problem: HEMODYNAMIC STATUS  Goal: Patient has stable vital signs and fluid balance  Outcome: Ongoing     Problem: FLUID AND ELECTROLYTE IMBALANCE  Goal: Fluid and electrolyte balance are achieved/maintained  Outcome: Ongoing     Problem: ACTIVITY INTOLERANCE/IMPAIRED MOBILITY  Goal: Mobility/activity is maintained at optimum level for patient  Outcome: Ongoing     Problem: Skin Integrity:  Goal: Will show no infection signs and symptoms  Description: Will show no infection signs and symptoms  Outcome: Ongoing     Problem: Skin Integrity:  Goal: Absence of new skin breakdown  Description: Absence of new skin breakdown  Outcome: Ongoing     Problem: Falls - Risk of:  Goal: Will remain free from falls  Description: Will remain free from falls  Outcome: Ongoing     Problem: Falls - Risk of:  Goal: Absence of physical injury  Description: Absence of physical injury  Outcome: Ongoing     Problem: Pain:  Goal: Pain level will decrease  Description: Pain level will decrease  Outcome: Ongoing     Problem: Pain:  Goal: Control of acute pain  Description: Control of acute pain  Outcome: Ongoing     Problem: Pain:  Goal: Control of chronic pain  Description: Control of chronic pain  Outcome: Ongoing

## 2021-12-14 NOTE — PROGRESS NOTES
[address] [place] [address]  Heart Failure Service    NAME: [facility] RECORD NUMBER:  [id number]  AGE: 62 y.o. GENDER: female  : 1960  TODAY'S DATE:  12/10/2021      I met with Arin France today to discuss the [address] I introduced myself and Arin France agreed to meet with me. A description of our services was provided. []    Arin France is interested in attending our [address] [place] [address], a referral will be obtained and an appointment was scheduled. [x]    Patient declines [address] [place] [address] follow up at this time. States that she has \"enough doctors and appointments\" and doesn't want any additional involvement. A pamphlet was provided that includes a description of our services as well as my direct contact information.     Sherie Cullen MSN, FNP-C    [address] [place] [address]  Heart Failure Service  Phone: [phone]  Fax [phone]

## 2021-12-14 NOTE — PROCEDURES
[address] [address]                            CARDIAC CATHETERIZATION    PATIENT NAME: Zhao Murillo                        :        1960  MED REC NO:   [id number]                          ROOM:       [id number]  ACCOUNT NO:   [de-identified]                           ADMIT DATE: 12/10/2021  PROVIDER:     Mary Avilez MD    DATE OF PROCEDURE:  2021    INDICATION FOR PROCEDURE:  Acute-on-chronic systolic CHF. The risks and benefits of the procedure were explained to the patient. Informed consent was obtained. She was placed on the cardiac  catheterization table and prepped and draped in sterile fashion. Anesthesia was provided in the right groin with 1% lidocaine injected  subcutaneously and deep. The right femoral vein was accessed and  cannulated and a 7-Estonian sheath inserted using Seldinger technique. The right femoral artery was also accessed and cannulated and a 5-Estonian  sheath inserted using Seldinger technique. The pulmonary artery catheter was then advanced up through the femoral  vein into the inferior vena cava where an oxygen saturation was  obtained. Catheter was then advanced in the right atrium for an oxygen  saturation. Catheter was flushed and placed on pressure and then right  atrial pressure measurements were recorded. Catheter was then advanced  sequentially for pressure tracings into the right ventricle, pulmonary  artery and pulmonary capillary wedge positions. Balloon was deflated  and a pulmonary arterial oxygen saturation was obtained. Cardiac  outputs were performed by thermodilution. At the conclusion, the  catheter was withdrawn out through the femoral venous sheath. Next, my attention was turned towards performing the left heart  catheterization. An attempt was made to pass the JL-4 catheter through  the right femoral arterial sheath.   However, there was resistance in the  common iliac artery. It was felt to be a possible dissection plane. An  angiogram was performed at this level and did show on occlusion. We then removed this catheter and then turned our attention towards the  left groin. Access was obtained using ultrasound guidance in the left  femoral artery. Over the 0.035 J-wire, the JL-4 catheter was advanced  to the ostium of the left main coronary artery. Coronary angiography  was performed to this system. Catheter was then removed over the wire. Next, the JR-4 catheter was advanced to the ostium of the right coronary  artery and coronary angiography was performed to this system. The  catheter was removed over the wire. Lastly, the pigtail catheter was  advanced over the wire into the left ventricle. Left ventricular  end-diastolic pressure measurements were obtained but no left  ventriculogram was performed due to contrast concerns. The catheter was  then pulled back across the aortic valve to assess for a gradient. Catheter was then pulled all the way down to the distal aorta to perform  an angiogram of the bilateral iliofemoral vessels. On pulling of the  catheter back, there was a significant pressure drop in the distal  aorta. This was of over 40 mmHg. We placed the catheter back up until  the pressure gradient improved. We then performed a bilateral runoff at  this level. This demonstrated an occlusion in the right common iliac  artery and high-grade stenosis of greater than 75% in the left common  iliac artery. There are collaterals via the internal iliac on the left  over to the right. The catheters were removed. No closure devices were used for the catheters but they were removed and  manual pressure applied for hemostasis. There were no complications  from the procedures and estimated blood loss was less than 20 mL. Moderate sedation was performed for the procedure.   The patient received  1 mg of IV Versed and 100 mcg of fentanyl. Total duration of sedation  was 80 minutes. The patient had an ASA grade of III and a Mallampati  score of II. Vital signs were monitored throughout the sedation period  and remained stable. Sedation was administered by an independent agent  at my direction and supervision and I was present the entire time during  the case. FINDINGS:  1.  Evidence of right-sided volume overload with a right atrial pressure  of 10 mmHg and a pulmonary capillary wedge pressure of 29 mmHg. The  left ventricular end-diastolic pressure is also 30 mmHg. 2.  Pulmonary hypertension with an instantaneous pressure of 50/24 for a  mean pulmonary arterial pressure of 37 mmHg. 3.  Low cardiac output by thermodilution at 3.68 liters per minute with  a cardiac index of 1.64 L/kg per minute. 4.  Pulmonary vascular resistance 113 dynes per second. 5. Low mixed venous oxygen saturations at 54% in the right atrium and  55% in the pulmonary artery. 87% for systemic aortic and systemic  arterial saturation. 6.  Right dominant coronary arterial system with an 80 to 90% mid RCA  lesion. In the left system, there is trivial plaque disease and  calcification of the left main. The circumflex has 25% plaque disease. In the left anterior descending artery, there is a 75% lesion with a  focal calcification in the midportion. There is YESICA 3 flow here in the  vessel. 7.  No gradient across the aortic valve on pullback to suggest aortic  stenosis.         Alexa Birmingham MD    D: 12/13/2021 18:38:37       T: 12/13/2021 18:42:19     [email or web address]  Job#: [id number]     Doc#: [id number]    CC:

## 2021-12-14 NOTE — PROGRESS NOTES
Max: 93  BLOOD PRESSURE RANGE:  Systolic (58VME), FV , Min:106 , MJY:795   ; Diastolic (39LLO), GNL:87, Min:42, Max:82    24HR INTAKE/OUTPUT:      Intake/Output Summary (Last 24 hours) at 2021 1309  Last data filed at 2021 1041  Gross per 24 hour   Intake 571.69 ml   Output    Net 571.69 ml         Patient Vitals for the past 96 hrs (Last 3 readings):   Weight   21 0400 247 lb 12.8 oz (112.4 kg)   21 0759 255 lb 8.2 oz (115.9 kg)   21 0556 260 lb 2.3 oz (118 kg)       General: Alert, Awake, NAD, Morbidly Obese  HEENT: Normocephalic, atraumatic, Nose and ears appear externally without deformity, MMM  Neck: No Thyromegaly, Trachea is midline, No Carotid bruit  Eyes: EOMI, IGLESIA  Chest: CTA, no intercostal retractions  CVS: RRR, no murmur, no rub  Abdomen: soft, non tender, no organomegaly, no bruit appreciated  Extremities: no edema, no cyanosis. Skin: normal texture, normal skin turgor, no rash  Musculoskeletal: normal ROM, no joint swelling, no visible deformity  Neurological: CN intact, no focal motor neurological deficit  Psych: normal affect; AAO x 3      Allergies: Allergies   Allergen Reactions    Ace Inhibitors     Diclofenac     Losartan     Vicodin [Hydrocodone-Acetaminophen]       Past Medical History:   Diagnosis Date    Asthma     Cardiomyopathy ([facility]     CHF (congestive heart failure) (HCC)     COPD (chronic obstructive pulmonary disease) ([facility]     Diabetes mellitus ([facility]     Essential hypertension     Hyperlipidemia     Obesity      Past Surgical History:   Procedure Laterality Date    CARPAL TUNNEL RELEASE      CHOLECYSTECTOMY      CORONARY ANGIOPLASTY WITH STENT PLACEMENT  2013    Stent LAD    INCONTINENCE SURGERY       Social History     Socioeconomic History    Marital status:       Spouse name: Not on file    Number of children: Not on file    Years of education: Not on file    Highest education level: Not on file   Occupational History    Not on file   Tobacco Use    Smoking status: Former Smoker     Packs/day: 0.10     Types: Cigarettes    Smokeless tobacco: Never Used   Substance and Sexual Activity    Alcohol use: No     Alcohol/week: 0.0 standard drinks    Drug use: No    Sexual activity: Yes     Partners: Male     Comment:    Other Topics Concern    Not on file   Social History Narrative    Not on file     Social Determinants of Health     Financial Resource Strain:     Difficulty of Paying Living Expenses: Not on file   Food Insecurity:     Worried About Running Out of Food in the Last Year: Not on file    Yin of Food in the Last Year: Not on file   Transportation Needs:     Lack of Transportation (Medical): Not on file    Lack of Transportation (Non-Medical):  Not on file   Physical Activity:     Days of Exercise per Week: Not on file    Minutes of Exercise per Session: Not on file   Stress:     Feeling of Stress : Not on file   Social Connections:     Frequency of Communication with Friends and Family: Not on file    Frequency of Social Gatherings with Friends and Family: Not on file    Attends Yazdanism Services: Not on file    Active Member of [address] [place] [address] or Organizations: Not on file    Attends Club or Organization Meetings: Not on file    Marital Status: Not on file   Intimate Partner Violence:     Fear of Current or Ex-Partner: Not on file    Emotionally Abused: Not on file    Physically Abused: Not on file    Sexually Abused: Not on file   Housing Stability:     Unable to Pay for Housing in the Last Year: Not on file    Number of [place] in the Last Year: Not on file    Unstable Housing in the Last Year: Not on file     Family History   Problem Relation Age of Onset    High Blood Pressure Mother     Diabetes Mother     Cancer Mother         thyroid    Stroke Father          LAB DATA:    CBC:   Lab Results   Component Value Date    WBC 6.2 12/14/2021    RBC 3.96 12/14/2021    RBC 5.12 05/26/2017    HGB 9.9 12/14/2021    HCT 30.5 12/14/2021    MCV 76.9 12/14/2021    MCH 24.9 12/14/2021    MCHC 32.4 12/14/2021    RDW 17.7 12/14/2021     12/14/2021    MPV 8.6 12/14/2021     BMP:    Lab Results   Component Value Date     12/14/2021    K 3.5 12/14/2021    K 3.9 12/06/2021     12/14/2021    CO2 23 12/14/2021    BUN 13 12/14/2021    CREATININE 1.4 12/14/2021    CALCIUM 8.8 12/14/2021    GFRAA 46 12/14/2021    GFRAA >60 01/17/2013    LABGLOM 38 12/14/2021    GLUCOSE 98 12/14/2021    GLUCOSE 154 06/05/2015     Ionized Calcium:  No results found for: IONCA  Magnesium:    Lab Results   Component Value Date    MG 2.00 12/14/2021     Phosphorus:    Lab Results   Component Value Date    PHOS 3.3 12/14/2021     U/A:    Lab Results   Component Value Date    COLORU YELLOW 12/11/2021    PHUR 6.5 12/11/2021    WBCUA >100 12/01/2021    RBCUA 11-20 12/01/2021    MUCUS 1+ 12/15/2012    BACTERIA Rare 12/01/2021    CLARITYU Clear 12/11/2021    SPECGRAV 1.008 12/11/2021    LEUKOCYTESUR Negative 12/11/2021    UROBILINOGEN 0.2 12/11/2021    BILIRUBINUR Negative 12/11/2021    BLOODU Negative 12/11/2021    GLUCOSEU Negative 12/11/2021    AMORPHOUS 2+ 12/18/2012         IMPRESSION/RECOMMENDATIONS:      Active Problems:    NEGIN treated with BiPAP    Acute on chronic systolic CHF (congestive heart failure), NYHA class 3 (HCC)    Acute decompensated heart failure ([facility]    Acute kidney injury superimposed on chronic kidney disease ([facility]  Resolved Problems:    * No resolved hospital problems. *    1. AIDA - likely due to relative hypotension SBP dropped from 153 to 99 mmHg  - Reduced coreg dose - BP improved and bradycardia resolved.  Switched to Metoprolol 12/12/21  - Creatinine trending down on Lasix gtt and with BP improvement  - s/p LHC 12/13/21 and creatinine remains stable  - Held Gabapentin since it causes fluid retention and has neurological SE in setting of AIDA  - Monitor kidney function closely  - Avoid exposure to Nephrotoxic agents    2. CKD - Baseline ~ 1.5 mg/dl    3. Morbid Obesity    4. Bradycardia - resolved    5. HTN - controlled    6.  CHF - Improved on lasix gtt - switch to oral Torsemide starting 12/15/21

## 2021-12-15 LAB
ALBUMIN SERPL-MCNC: 3.2 G/DL (ref 3.4–5)
ANION GAP SERPL CALCULATED.3IONS-SCNC: 15 MMOL/L (ref 3–16)
BUN BLDV-MCNC: 9 MG/DL (ref 7–20)
CALCIUM SERPL-MCNC: 8.7 MG/DL (ref 8.3–10.6)
CHLORIDE BLD-SCNC: 100 MMOL/L (ref 99–110)
CO2: 21 MMOL/L (ref 21–32)
CREAT SERPL-MCNC: 1.2 MG/DL (ref 0.6–1.2)
GFR AFRICAN AMERICAN: 55
GFR NON-AFRICAN AMERICAN: 46
GLUCOSE BLD-MCNC: 102 MG/DL (ref 70–99)
MAGNESIUM: 1.8 MG/DL (ref 1.8–2.4)
OCCULT BLOOD SCREENING: NORMAL
PHOSPHORUS: 3.2 MG/DL (ref 2.5–4.9)
POTASSIUM SERPL-SCNC: 3.5 MMOL/L (ref 3.5–5.1)
SODIUM BLD-SCNC: 136 MMOL/L (ref 136–145)
URIC ACID, SERUM: 9.3 MG/DL (ref 2.6–6)

## 2021-12-15 PROCEDURE — 6360000002 HC RX W HCPCS: Performed by: NURSE PRACTITIONER

## 2021-12-15 PROCEDURE — 84550 ASSAY OF BLOOD/URIC ACID: CPT

## 2021-12-15 PROCEDURE — 82270 OCCULT BLOOD FECES: CPT

## 2021-12-15 PROCEDURE — 6370000000 HC RX 637 (ALT 250 FOR IP): Performed by: INTERNAL MEDICINE

## 2021-12-15 PROCEDURE — 83735 ASSAY OF MAGNESIUM: CPT

## 2021-12-15 PROCEDURE — 2580000003 HC RX 258: Performed by: INTERNAL MEDICINE

## 2021-12-15 PROCEDURE — 94640 AIRWAY INHALATION TREATMENT: CPT

## 2021-12-15 PROCEDURE — 94761 N-INVAS EAR/PLS OXIMETRY MLT: CPT

## 2021-12-15 PROCEDURE — 6360000002 HC RX W HCPCS: Performed by: INTERNAL MEDICINE

## 2021-12-15 PROCEDURE — 80069 RENAL FUNCTION PANEL: CPT

## 2021-12-15 PROCEDURE — 94660 CPAP INITIATION&MGMT: CPT

## 2021-12-15 PROCEDURE — 2060000000 HC ICU INTERMEDIATE R&B

## 2021-12-15 PROCEDURE — 36415 COLL VENOUS BLD VENIPUNCTURE: CPT

## 2021-12-15 RX ADMIN — PANTOPRAZOLE SODIUM 40 MG: 40 TABLET, DELAYED RELEASE ORAL at 06:09

## 2021-12-15 RX ADMIN — TIOTROPIUM BROMIDE INHALATION SPRAY 2 PUFF: 3.12 SPRAY, METERED RESPIRATORY (INHALATION) at 08:48

## 2021-12-15 RX ADMIN — Medication 10 ML: at 21:06

## 2021-12-15 RX ADMIN — MILRINONE LACTATE IN DEXTROSE 0.2 MCG/KG/MIN: 200 INJECTION, SOLUTION INTRAVENOUS at 02:41

## 2021-12-15 RX ADMIN — ALBUTEROL SULFATE 2.5 MG: 2.5 SOLUTION RESPIRATORY (INHALATION) at 19:57

## 2021-12-15 RX ADMIN — TORSEMIDE 100 MG: 100 TABLET ORAL at 08:19

## 2021-12-15 RX ADMIN — ASPIRIN 325 MG ORAL TABLET 325 MG: 325 PILL ORAL at 08:19

## 2021-12-15 RX ADMIN — METOPROLOL SUCCINATE 12.5 MG: 25 TABLET, EXTENDED RELEASE ORAL at 08:19

## 2021-12-15 RX ADMIN — ATORVASTATIN CALCIUM 40 MG: 40 TABLET, FILM COATED ORAL at 21:05

## 2021-12-15 RX ADMIN — ENOXAPARIN SODIUM 40 MG: 100 INJECTION SUBCUTANEOUS at 08:19

## 2021-12-15 RX ADMIN — AMIODARONE HYDROCHLORIDE 200 MG: 200 TABLET ORAL at 21:05

## 2021-12-15 RX ADMIN — ALBUTEROL SULFATE 2.5 MG: 2.5 SOLUTION RESPIRATORY (INHALATION) at 08:42

## 2021-12-15 RX ADMIN — ALBUTEROL SULFATE 2.5 MG: 2.5 SOLUTION RESPIRATORY (INHALATION) at 12:15

## 2021-12-15 RX ADMIN — Medication 10 ML: at 08:20

## 2021-12-15 RX ADMIN — ALBUTEROL SULFATE 2.5 MG: 2.5 SOLUTION RESPIRATORY (INHALATION) at 15:57

## 2021-12-15 ASSESSMENT — PAIN - FUNCTIONAL ASSESSMENT: PAIN_FUNCTIONAL_ASSESSMENT: ACTIVITIES ARE NOT PREVENTED

## 2021-12-15 ASSESSMENT — PAIN SCALES - GENERAL
PAINLEVEL_OUTOF10: 3
PAINLEVEL_OUTOF10: 0

## 2021-12-15 ASSESSMENT — PAIN DESCRIPTION - PAIN TYPE: TYPE: ACUTE PAIN

## 2021-12-15 ASSESSMENT — PAIN DESCRIPTION - ONSET: ONSET: ON-GOING

## 2021-12-15 ASSESSMENT — PAIN DESCRIPTION - FREQUENCY: FREQUENCY: CONTINUOUS

## 2021-12-15 ASSESSMENT — PAIN DESCRIPTION - ORIENTATION: ORIENTATION: LOWER;MID

## 2021-12-15 ASSESSMENT — PAIN DESCRIPTION - DESCRIPTORS: DESCRIPTORS: ACHING

## 2021-12-15 ASSESSMENT — PAIN DESCRIPTION - LOCATION: LOCATION: BACK

## 2021-12-15 ASSESSMENT — PAIN DESCRIPTION - PROGRESSION: CLINICAL_PROGRESSION: NOT CHANGED

## 2021-12-15 NOTE — PROGRESS NOTES
Nephrology Progress Note   [facility]      Chief Complaint: Sudden onset shortness of breath    History of Present Illness: Ms Nicki Robert is a 63 yo morbidly obese female with a past medical history significant for CAD, hypertension, CKD III (baseline creatinine in mid 1s), HFrEF (25-30% on echo done on 10/11/21, Grade II diastolic CHF, Moderate to severe MR), recently discharged from the hospital after being treated for AIDA occurring in the setting of decompensated HF (?CRS), came to [facility] with sudden onset f Shortness of breath. CXR consistent with pulmonary edema. Patient has been getting IVP Lasix and her symptoms have completely resolved. Creatinine trending up and BP has dropped to 90s    Subjective:    Resting in bed; shortness of breath with activity, no lower extremity edema    ROS: Sudden onset of shortness of breath; no chest pain, no lower extremity edema. All other ROS negative    Scheduled Meds:   torsemide  100 mg Oral Daily    sodium chloride flush  5-40 mL IntraVENous 2 times per day    metoprolol succinate  12.5 mg Oral Daily    albuterol  2.5 mg Nebulization 4x daily    amiodarone  200 mg Oral Nightly    atorvastatin  40 mg Oral Nightly    pantoprazole  40 mg Oral Daily    tiotropium  2 puff Inhalation Daily    aspirin  325 mg Oral Daily    enoxaparin  40 mg SubCUTAneous Daily    hydrOXYzine  50 mg Oral Once        sodium chloride         PRN Meds:.acetaminophen, sodium chloride flush, sodium chloride, ondansetron **OR** ondansetron, polyethylene glycol, [DISCONTINUED] acetaminophen **OR** acetaminophen, albuterol    Physical Exam:    TEMPERATURE:  Current - Temp: 97.7 °F (36.5 °C);  Max - Temp  Av.1 °F (36.7 °C)  Min: 97.7 °F (36.5 °C)  Max: 98.5 °F (36.9 °C)  RESPIRATIONS RANGE: Resp  Av.6  Min: 16  Max: 22  PULSE RANGE: Pulse  Av.3  Min: 63  Max: 77  BLOOD PRESSURE RANGE:  Systolic (63RJK), NEW:976 , Min:115 , KHB:088   ; Diastolic (74ITN), OZZY:11, Min:54, Max:75    24HR INTAKE/OUTPUT:      Intake/Output Summary (Last 24 hours) at 12/15/2021 1533  Last data filed at 12/15/2021 1432  Gross per 24 hour   Intake 120 ml   Output 1200 ml   Net -1080 ml         Patient Vitals for the past 96 hrs (Last 3 readings):   Weight   12/15/21 0303 252 lb 13.9 oz (114.7 kg)   12/14/21 0400 247 lb 12.8 oz (112.4 kg)   12/13/21 0759 255 lb 8.2 oz (115.9 kg)       General: Alert, Awake, NAD, Morbidly Obese  HEENT: Normocephalic, atraumatic, Nose and ears appear externally without deformity, MMM  Neck: No Thyromegaly, Trachea is midline, No Carotid bruit  Eyes: EOMI, IGLESIA  Chest: CTA, no intercostal retractions  CVS: RRR, no murmur, no rub  Abdomen: soft, non tender, no organomegaly, no bruit appreciated  Extremities: no edema, no cyanosis. Skin: normal texture, normal skin turgor, no rash  Musculoskeletal: normal ROM, no joint swelling, no visible deformity  Neurological: CN intact, no focal motor neurological deficit  Psych: normal affect; AAO x 3      Allergies: Allergies   Allergen Reactions    Ace Inhibitors     Diclofenac     Losartan     Vicodin [Hydrocodone-Acetaminophen]       Past Medical History:   Diagnosis Date    Asthma     Cardiomyopathy ([facility]     CHF (congestive heart failure) (HCC)     COPD (chronic obstructive pulmonary disease) ([facility]     [facility]     Essential hypertension     Hyperlipidemia     Obesity      Past Surgical History:   Procedure Laterality Date    CARPAL TUNNEL RELEASE      CHOLECYSTECTOMY      CORONARY ANGIOPLASTY WITH STENT PLACEMENT  01/07/2013    Stent LAD    INCONTINENCE SURGERY       Social History     Socioeconomic History    Marital status:       Spouse name: Not on file    Number of children: Not on file    Years of education: Not on file    Highest education level: Not on file   Occupational History    Not on file   Tobacco Use    Smoking status: Former Smoker     Packs/day: 0.10     Types: Cigarettes  Smokeless tobacco: Never Used   Substance and Sexual Activity    Alcohol use: No     Alcohol/week: 0.0 standard drinks    Drug use: No    Sexual activity: Yes     Partners: Male     Comment:    Other Topics Concern    Not on file   Social History Narrative    Not on file     Social Determinants of Health     Financial Resource Strain:     Difficulty of Paying Living Expenses: Not on file   Food Insecurity:     Worried About Running Out of Food in the Last Year: Not on file    Yin of Food in the Last Year: Not on file   Transportation Needs:     Lack of Transportation (Medical): Not on file    Lack of Transportation (Non-Medical):  Not on file   Physical Activity:     Days of Exercise per Week: Not on file    Minutes of Exercise per Session: Not on file   Stress:     Feeling of Stress : Not on file   Social Connections:     Frequency of Communication with Friends and Family: Not on file    Frequency of Social Gatherings with Friends and Family: Not on file    Attends [employer] Services: Not on file    Active Member of [address] [place] [address] or Organizations: Not on file    Attends Club or Organization Meetings: Not on file    Marital Status: Not on file   Intimate Partner Violence:     Fear of Current or Ex-Partner: Not on file    Emotionally Abused: Not on file    Physically Abused: Not on file    Sexually Abused: Not on file   Housing Stability:     Unable to Pay for Housing in the Last Year: Not on file    Number of [place] in the Last Year: Not on file    Unstable Housing in the Last Year: Not on file     Family History   Problem Relation Age of Onset    High Blood Pressure Mother     Diabetes Mother     Cancer Mother         thyroid    Stroke Father          LAB DATA:    CBC:   Lab Results   Component Value Date    WBC 6.2 12/14/2021    RBC 3.96 12/14/2021    RBC 5.12 05/26/2017    HGB 9.9 12/14/2021    HCT 30.5 12/14/2021    MCV 76.9 12/14/2021    MCH 24.9 12/14/2021    MCHC 32.4 12/14/2021    RDW 17.7 12/14/2021     12/14/2021    MPV 8.6 12/14/2021     BMP:    Lab Results   Component Value Date     12/15/2021    K 3.5 12/15/2021    K 3.9 12/06/2021     12/15/2021    CO2 21 12/15/2021    BUN 9 12/15/2021    CREATININE 1.2 12/15/2021    CALCIUM 8.7 12/15/2021    GFRAA 55 12/15/2021    GFRAA >60 01/17/2013    LABGLOM 46 12/15/2021    GLUCOSE 102 12/15/2021    GLUCOSE 154 06/05/2015     Ionized Calcium:  No results found for: IONCA  Magnesium:    Lab Results   Component Value Date    MG 1.80 12/15/2021     Phosphorus:    Lab Results   Component Value Date    PHOS 3.2 12/15/2021     U/A:    Lab Results   Component Value Date    COLORU YELLOW 12/11/2021    PHUR 6.5 12/11/2021    WBCUA >100 12/01/2021    RBCUA 11-20 12/01/2021    MUCUS 1+ 12/15/2012    BACTERIA Rare 12/01/2021    CLARITYU Clear 12/11/2021    SPECGRAV 1.008 12/11/2021    LEUKOCYTESUR Negative 12/11/2021    UROBILINOGEN 0.2 12/11/2021    BILIRUBINUR Negative 12/11/2021    BLOODU Negative 12/11/2021    GLUCOSEU Negative 12/11/2021    AMORPHOUS 2+ 12/18/2012         IMPRESSION/RECOMMENDATIONS:      Active Problems:    NEGIN treated with BiPAP    Acute on chronic systolic CHF (congestive heart failure), NYHA class 3 (HCC)    Acute decompensated heart failure ([facility]    Acute kidney injury superimposed on chronic kidney disease ([facility]  Resolved Problems:    * No resolved hospital problems. *    1. AIDA - likely due to relative hypotension SBP dropped from 153 to 99 mmHg  - Reduced coreg dose - BP improved and bradycardia resolved. Switched to Metoprolol 12/12/21  - Creatinine trending down to lowest level compared to recent past  - s/p LHC 12/13/21 and creatinine remains stable  - Held Gabapentin since it causes fluid retention and has neurological SE in setting of AIDA  - Monitor kidney function closely  - Avoid exposure to Nephrotoxic agents    2. CKD - Baseline ~ 1.5 mg/dl    3. Morbid Obesity    4.  Bradycardia - resolved    5. HTN - controlled    6.  CHF - Improved on lasix gtt - switch to oral Torsemide starting 12/15/21 - with good response    Ok to d/c from renal standpoint

## 2021-12-15 NOTE — PROGRESS NOTES
Hospitalist Progress Note      PCP: Juan Antonio Coles MD    Date of Admission: 12/10/2021    Chief Complaint: SOB    Hospital Course:     Subjective: stopped milrinone and lasix today      Medications:  Reviewed    Infusion Medications    sodium chloride       Scheduled Medications    torsemide  100 mg Oral Daily    sodium chloride flush  5-40 mL IntraVENous 2 times per day    metoprolol succinate  12.5 mg Oral Daily    albuterol  2.5 mg Nebulization 4x daily    amiodarone  200 mg Oral Nightly    atorvastatin  40 mg Oral Nightly    pantoprazole  40 mg Oral Daily    tiotropium  2 puff Inhalation Daily    aspirin  325 mg Oral Daily    enoxaparin  40 mg SubCUTAneous Daily    hydrOXYzine  50 mg Oral Once     PRN Meds: acetaminophen, sodium chloride flush, sodium chloride, ondansetron **OR** ondansetron, polyethylene glycol, [DISCONTINUED] acetaminophen **OR** acetaminophen, albuterol      Intake/Output Summary (Last 24 hours) at 12/15/2021 5576  Last data filed at 12/14/2021 1702  Gross per 24 hour   Intake 720 ml   Output 1700 ml   Net -980 ml       Exam:    /74   Pulse 77   Temp 97.7 °F (36.5 °C) (Oral)   Resp 22   Ht 5' 7\" (1.702 m)   Wt 252 lb 13.9 oz (114.7 kg)   SpO2 94%   BMI 39.60 kg/m²     General appearance: No apparent distress, appears stated age and cooperative. HEENT: Pupils equal, round, and reactive to light. Conjunctivae/corneas clear. Neck: Supple, with full range of motion. No jugular venous distention. Trachea midline. Respiratory:  Normal respiratory effort. Clear to auscultation, bilaterally without Rales/Wheezes/Rhonchi. Cardiovascular: Regular rate and rhythm with normal S1/S2 without murmurs, rubs or gallops. Abdomen: Soft, non-tender, non-distended with normal bowel sounds. Musculoskeletal: No clubbing, cyanosis or edema bilaterally. Full range of motion without deformity. Skin: Skin color, texture, turgor normal.  No rashes or lesions.   Neurologic: Neurovascularly intact without any focal sensory/motor deficits. Cranial nerves: II-XII intact, grossly non-focal.  Psychiatric: Alert and oriented, thought content appropriate, normal insight  Capillary Refill: Brisk,< 3 seconds   Peripheral Pulses: +2 palpable, equal bilaterally       Labs:   Recent Labs     12/14/21  0700   WBC 6.2   HGB 9.9*   HCT 30.5*        Recent Labs     12/13/21  0514 12/14/21  0700 12/15/21  0448    139 136   K 4.1 3.5 3.5    101 100   CO2 21 23 21   BUN 13 13 9   CREATININE 1.4* 1.4* 1.2   CALCIUM 9.2 8.8 8.7   PHOS 3.8 3.3 3.2     No results for input(s): AST, ALT, BILIDIR, BILITOT, ALKPHOS in the last 72 hours. No results for input(s): INR in the last 72 hours. No results for input(s): Jason Doniphan in the last 72 hours. Urinalysis:      Lab Results   Component Value Date    NITRU Negative 12/11/2021    WBCUA >100 12/01/2021    BACTERIA Rare 12/01/2021    RBCUA 11-20 12/01/2021    BLOODU Negative 12/11/2021    SPECGRAV 1.008 12/11/2021    GLUCOSEU Negative 12/11/2021       Radiology:  XR CHEST PORTABLE   Final Result   Pulmonary vascular congestion. Stable cardiomegaly. Probable small   bilateral pleural effusions. Assessment/Plan:    Active Hospital Problems    Diagnosis Date Noted    Acute kidney injury superimposed on chronic kidney disease ([facility] [N17.9, N18.9]     Acute decompensated heart failure ([facility] [I50.9] 12/10/2021    Acute on chronic systolic CHF (congestive heart failure), NYHA class 3 ([facility] [I50.23] 10/11/2021    NEGIN treated with BiPAP [G47.33] 01/06/2013     --Acute on chronic combined systolic and diastolic heart failure, status post AICD EF 25 to 25%/CSBKY 2 diastolic dysfunction on echo 10/11/21  ---Moderate to severe mitral regurgitation  - Nephro consulted:    Not on ACE/ARB/Aldactone due to renal failure. .   - s/p RHC 12/13:   - milrinone and lasix drips stopped 12/15    -CAD s/p PCI/stent previously--continue statin, beta-blocker   - cardiology planning further stenting this hospitalization   - consult hematology to clear for DAPT   - stool occult ordered given iron deficiency anemia     --Bradycardia:  - reduced Coreg dose    -Acute respiratory failure with hypoxia due to CHF exacerbation. . Was 86% on room air with respiratory distress by EMS. .. Weaned down to 2 L nasal cannula in the ED. Benedict Whatley Continue supplemental oxygen and wean as tolerated     -History of nonsustained V. Tach--continue amiodarone     --AIDA on CKD stage III--likely hepatorenal--- continue diuretic therapy, follow-up with nephrology    H/o Iron deficiency anemia:  - patient states she is due for IV iron infusion today. Venofer x1 ordered     --Asthma/COPDstablecontinue inhaled steroids and bronchodilators     --Essential hypertension--- stablecontinue Coreg and diuretics     --Hyperlipidemiacontinue statin     -Morbid obesityBMI 40--continue dietary restriction    DVT Prophylaxis: lovenox + full dose ASA  Diet: ADULT DIET; Regular;  No Added Salt (3-4 gm)  Code Status: Full Code    PT/OT Eval Status: N/A    Dispo - MedSurg, can D/C tomorrow if kidneys remain improved    Reid Latif MD

## 2021-12-16 VITALS
SYSTOLIC BLOOD PRESSURE: 150 MMHG | RESPIRATION RATE: 18 BRPM | DIASTOLIC BLOOD PRESSURE: 82 MMHG | HEIGHT: 67 IN | OXYGEN SATURATION: 96 % | HEART RATE: 75 BPM | WEIGHT: 236.99 LBS | TEMPERATURE: 97.4 F | BODY MASS INDEX: 37.2 KG/M2

## 2021-12-16 LAB
ALBUMIN SERPL-MCNC: 3.5 G/DL (ref 3.4–5)
ANION GAP SERPL CALCULATED.3IONS-SCNC: 17 MMOL/L (ref 3–16)
BUN BLDV-MCNC: 5 MG/DL (ref 7–20)
CALCIUM SERPL-MCNC: 9.1 MG/DL (ref 8.3–10.6)
CHLORIDE BLD-SCNC: 101 MMOL/L (ref 99–110)
CO2: 24 MMOL/L (ref 21–32)
CREAT SERPL-MCNC: 1.2 MG/DL (ref 0.6–1.2)
GFR AFRICAN AMERICAN: 55
GFR NON-AFRICAN AMERICAN: 46
GLUCOSE BLD-MCNC: 88 MG/DL (ref 70–99)
PHOSPHORUS: 3.8 MG/DL (ref 2.5–4.9)
POTASSIUM SERPL-SCNC: 3.3 MMOL/L (ref 3.5–5.1)
PRO-BNP: ABNORMAL PG/ML (ref 0–124)
SODIUM BLD-SCNC: 142 MMOL/L (ref 136–145)
URIC ACID, SERUM: 9.5 MG/DL (ref 2.6–6)

## 2021-12-16 PROCEDURE — 97166 OT EVAL MOD COMPLEX 45 MIN: CPT

## 2021-12-16 PROCEDURE — 6370000000 HC RX 637 (ALT 250 FOR IP): Performed by: INTERNAL MEDICINE

## 2021-12-16 PROCEDURE — 99233 SBSQ HOSP IP/OBS HIGH 50: CPT | Performed by: INTERNAL MEDICINE

## 2021-12-16 PROCEDURE — 80069 RENAL FUNCTION PANEL: CPT

## 2021-12-16 PROCEDURE — 97530 THERAPEUTIC ACTIVITIES: CPT | Performed by: PHYSICAL THERAPIST

## 2021-12-16 PROCEDURE — 97535 SELF CARE MNGMENT TRAINING: CPT

## 2021-12-16 PROCEDURE — 94761 N-INVAS EAR/PLS OXIMETRY MLT: CPT

## 2021-12-16 PROCEDURE — 2580000003 HC RX 258: Performed by: INTERNAL MEDICINE

## 2021-12-16 PROCEDURE — 6370000000 HC RX 637 (ALT 250 FOR IP): Performed by: FAMILY MEDICINE

## 2021-12-16 PROCEDURE — 84550 ASSAY OF BLOOD/URIC ACID: CPT

## 2021-12-16 PROCEDURE — 97161 PT EVAL LOW COMPLEX 20 MIN: CPT | Performed by: PHYSICAL THERAPIST

## 2021-12-16 PROCEDURE — 97116 GAIT TRAINING THERAPY: CPT | Performed by: PHYSICAL THERAPIST

## 2021-12-16 PROCEDURE — 6360000002 HC RX W HCPCS: Performed by: INTERNAL MEDICINE

## 2021-12-16 PROCEDURE — 36415 COLL VENOUS BLD VENIPUNCTURE: CPT

## 2021-12-16 PROCEDURE — 94640 AIRWAY INHALATION TREATMENT: CPT

## 2021-12-16 PROCEDURE — 97530 THERAPEUTIC ACTIVITIES: CPT

## 2021-12-16 PROCEDURE — 83880 ASSAY OF NATRIURETIC PEPTIDE: CPT

## 2021-12-16 RX ORDER — ASPIRIN 81 MG/1
81 TABLET, CHEWABLE ORAL DAILY
Status: DISCONTINUED | OUTPATIENT
Start: 2021-12-17 | End: 2021-12-16 | Stop reason: HOSPADM

## 2021-12-16 RX ORDER — POTASSIUM CHLORIDE 750 MG/1
40 TABLET, FILM COATED, EXTENDED RELEASE ORAL ONCE
Status: COMPLETED | OUTPATIENT
Start: 2021-12-16 | End: 2021-12-16

## 2021-12-16 RX ORDER — METOPROLOL SUCCINATE 25 MG/1
12.5 TABLET, EXTENDED RELEASE ORAL DAILY
Qty: 30 TABLET | Refills: 3 | Status: SHIPPED | OUTPATIENT
Start: 2021-12-17 | End: 2022-05-10

## 2021-12-16 RX ORDER — CLOPIDOGREL BISULFATE 75 MG/1
75 TABLET ORAL DAILY
Status: DISCONTINUED | OUTPATIENT
Start: 2021-12-17 | End: 2021-12-16 | Stop reason: HOSPADM

## 2021-12-16 RX ORDER — CLOPIDOGREL BISULFATE 75 MG/1
300 TABLET ORAL ONCE
Status: COMPLETED | OUTPATIENT
Start: 2021-12-16 | End: 2021-12-16

## 2021-12-16 RX ORDER — TORSEMIDE 100 MG/1
100 TABLET ORAL DAILY
Qty: 30 TABLET | Refills: 3 | Status: SHIPPED | OUTPATIENT
Start: 2021-12-17 | End: 2022-05-10

## 2021-12-16 RX ORDER — CLOPIDOGREL BISULFATE 75 MG/1
75 TABLET ORAL DAILY
Qty: 30 TABLET | Refills: 3 | Status: ON HOLD | OUTPATIENT
Start: 2021-12-17 | End: 2022-06-16

## 2021-12-16 RX ORDER — ASPIRIN 81 MG/1
81 TABLET, CHEWABLE ORAL DAILY
Qty: 30 TABLET | Refills: 3 | Status: SHIPPED | OUTPATIENT
Start: 2021-12-17 | End: 2022-05-10

## 2021-12-16 RX ADMIN — ASPIRIN 325 MG ORAL TABLET 325 MG: 325 PILL ORAL at 08:48

## 2021-12-16 RX ADMIN — ALBUTEROL SULFATE 2.5 MG: 2.5 SOLUTION RESPIRATORY (INHALATION) at 08:57

## 2021-12-16 RX ADMIN — METOPROLOL SUCCINATE 12.5 MG: 25 TABLET, EXTENDED RELEASE ORAL at 08:48

## 2021-12-16 RX ADMIN — CLOPIDOGREL BISULFATE 300 MG: 75 TABLET ORAL at 11:16

## 2021-12-16 RX ADMIN — ALBUTEROL SULFATE 2.5 MG: 2.5 SOLUTION RESPIRATORY (INHALATION) at 12:31

## 2021-12-16 RX ADMIN — SACUBITRIL AND VALSARTAN 1 TABLET: 24; 26 TABLET, FILM COATED ORAL at 10:48

## 2021-12-16 RX ADMIN — TORSEMIDE 100 MG: 100 TABLET ORAL at 08:48

## 2021-12-16 RX ADMIN — POTASSIUM CHLORIDE 40 MEQ: 750 TABLET, FILM COATED, EXTENDED RELEASE ORAL at 09:00

## 2021-12-16 RX ADMIN — ENOXAPARIN SODIUM 40 MG: 100 INJECTION SUBCUTANEOUS at 08:49

## 2021-12-16 RX ADMIN — PANTOPRAZOLE SODIUM 40 MG: 40 TABLET, DELAYED RELEASE ORAL at 06:34

## 2021-12-16 RX ADMIN — TIOTROPIUM BROMIDE INHALATION SPRAY 2 PUFF: 3.12 SPRAY, METERED RESPIRATORY (INHALATION) at 08:57

## 2021-12-16 RX ADMIN — Medication 10 ML: at 08:49

## 2021-12-16 ASSESSMENT — PAIN SCALES - GENERAL: PAINLEVEL_OUTOF10: 0

## 2021-12-16 NOTE — PROGRESS NOTES
Pt has been taken to front lobby where daughter transported home.     Electronically signed by Ting Pereira RN on 12/16/2021 at 1:30 PM

## 2021-12-16 NOTE — PLAN OF CARE
Problem: OXYGENATION/RESPIRATORY FUNCTION  Goal: Patient will maintain patent airway  12/16/2021 0816 by Mary Villasenor RN  Outcome: Ongoing     Problem: FLUID AND ELECTROLYTE IMBALANCE  Goal: Fluid and electrolyte balance are achieved/maintained  12/16/2021 0816 by Mary Villasenor RN  Outcome: Ongoing     Problem: Pain:  Goal: Pain level will decrease  Description: Pain level will decrease  12/16/2021 0816 by Mary Villasenor RN  Outcome: Ongoing     Problem: Pain:  Goal: Control of chronic pain  Description: Control of chronic pain  12/16/2021 0816 by Mary Villasenor RN  Outcome: Ongoing

## 2021-12-16 NOTE — PROGRESS NOTES
Discharge order noted. Pt has received over 60 minutes of documented heart failure education. She has appropriate heart failure instructions on her AVS, her med rec has been sent to pharmacy. She has an appointment arranged with her PCP office within 7 days, although she does prefer to make her own appointments.

## 2021-12-16 NOTE — DISCHARGE SUMMARY
steroids and bronchodilators     --Essential hypertension--- stablecontinue Coreg and diuretics     --Hyperlipidemiacontinue statin     -Morbid obesityBMI 40--continue dietary restriction      Exam:     BP (!) 150/82   Pulse 75   Temp 97.4 °F (36.3 °C) (Oral)   Resp 18   Ht 5' 7\" (1.702 m)   Wt 236 lb 15.9 oz (107.5 kg)   SpO2 96%   BMI 37.12 kg/m²     General appearance: No apparent distress, appears stated age and cooperative. HEENT: Pupils equal, round, and reactive to light. Conjunctivae/corneas clear. Neck: Supple, with full range of motion. No jugular venous distention. Trachea midline. Respiratory:  Normal respiratory effort. Clear to auscultation, bilaterally without Rales/Wheezes/Rhonchi. Cardiovascular: Regular rate and rhythm with normal S1/S2 without murmurs, rubs or gallops. Abdomen: Soft, non-tender, non-distended with normal bowel sounds. Musculoskelatal: No clubbing, cyanosis or edema bilaterally. Full range of motion without deformity. Skin: Skin color, texture, turgor normal.  No rashes or lesions. Neurologic:  Neurovascularly intact without any focal sensory/motor deficits. Cranial nerves: II-XII intact, grossly non-focal.  Psychiatric: Alert and oriented, thought content appropriate, normal insight      Consults:     IP CONSULT TO HOSPITALIST  IP CONSULT TO HEART FAILURE NURSE/COORDINATOR  IP CONSULT TO DIETITIAN  IP CONSULT TO CARDIOLOGY  IP CONSULT TO NEPHROLOGY  IP CONSULT TO PALLIATIVE CARE  IP CONSULT TO HEM/ONC  IP CONSULT TO HOME CARE NEEDS    Significant Diagnostic Studies:          Radiology:  XR CHEST PORTABLE   Final Result   Pulmonary vascular congestion. Stable cardiomegaly. Probable small   bilateral pleural effusions.         Left and Right heart cath:  FINDINGS:  1.  Evidence of right-sided volume overload with a right atrial pressure  of 10 mmHg and a pulmonary capillary wedge pressure of 29 mmHg.   The  left ventricular end-diastolic pressure is also 30 mmHg.  2.  Pulmonary hypertension with an instantaneous pressure of 50/24 for a  mean pulmonary arterial pressure of 37 mmHg. 3.  Low cardiac output by thermodilution at 3.68 liters per minute with  a cardiac index of 1.64 L/kg per minute. 4.  Pulmonary vascular resistance 113 dynes per second. 5. Low mixed venous oxygen saturations at 54% in the right atrium and  55% in the pulmonary artery. 87% for systemic aortic and systemic  arterial saturation. 6.  Right dominant coronary arterial system with an 80 to 90% mid RCA  lesion. In the left system, there is trivial plaque disease and  calcification of the left main. The circumflex has 25% plaque disease. In the left anterior descending artery, there is a 75% lesion with a  focal calcification in the midportion. There is YESICA 3 flow here in the  vessel. 7.  No gradient across the aortic valve on pullback to suggest aortic  stenosis. PCP/SNF to follow up: Needs outpatient cardiac cath as well as peripheral arterial studies for stenting    Disposition:  Home    Condition on D/C:  Stable     Discharge Instructions/Follow-up:  F/u with with cardiology within 2 weeks    Code Status:  Prior     Activity: activity as tolerated    Diet: cardiac diet    Labs:  For convenience and continuity at follow-up the following most recent labs are provided:      CBC:    Lab Results   Component Value Date    WBC 6.2 12/14/2021    HGB 9.9 12/14/2021    HCT 30.5 12/14/2021     12/14/2021       Renal:    Lab Results   Component Value Date     12/16/2021    K 3.3 12/16/2021    K 3.9 12/06/2021     12/16/2021    CO2 24 12/16/2021    BUN 5 12/16/2021    CREATININE 1.2 12/16/2021    CALCIUM 9.1 12/16/2021    PHOS 3.8 12/16/2021       Discharge Medications:     Discharge Medication List as of 12/16/2021  1:14 PM           Details   sacubitril-valsartan (ENTRESTO) 24-26 MG per tablet Take 1 tablet by mouth 2 times daily, Disp-60 tablet, R-1Normal      metoprolol succinate (TOPROL XL) 25 MG extended release tablet Take 0.5 tablets by mouth daily, Disp-30 tablet, R-3Normal      clopidogrel (PLAVIX) 75 MG tablet Take 1 tablet by mouth daily, Disp-30 tablet, R-3Normal      aspirin 81 MG chewable tablet Take 1 tablet by mouth daily, Disp-30 tablet, R-3Normal              Details   torsemide (DEMADEX) 100 MG tablet Take 1 tablet by mouth daily, Disp-30 tablet, R-3Normal              Details   albuterol (PROVENTIL) (5 MG/ML) 0.5% nebulizer solution Inhale 2.5 mg into the lungs 4 times dailyHistorical Med      amiodarone (CORDARONE) 200 MG tablet Take 200 mg by mouth nightly nightly Historical Med      atorvastatin (LIPITOR) 40 MG tablet Take 40 mg by mouth nightly Historical Med      magnesium oxide (MAG-OX) 400 MG tablet Take 400 mg by mouth 2 times daily Historical Med      potassium chloride SA (KLOR-CON M20) 20 MEQ tablet Take 20 mEq by mouth daily Historical Med      pantoprazole (PROTONIX) 40 MG tablet Take 40 mg by mouth daily. gabapentin (NEURONTIN) 600 MG tablet Take 600 mg by mouth 3 times daily. Historical Med      albuterol (PROVENTIL HFA) 108 (90 BASE) MCG/ACT inhaler Inhale 2 puffs into the lungs every 4 hours as needed Historical Med      tiotropium (SPIRIVA HANDIHALER) 18 MCG inhalation capsule Inhale 18 mcg into the lungs daily. Time Spent on discharge is more than 45 minutes in the examination, evaluation, counseling and review of medications and discharge plan. Signed: Eliot Rojas MD   12/16/2021      Thank you Juan Antonio Coles MD for the opportunity to be involved in this patient's care. If you have any questions or concerns please feel free to contact me at [phone].

## 2021-12-16 NOTE — PROGRESS NOTES
Pt has been discharged. Pts IV has been removed and heart monitor is off and returned to [address]. Pts daughter is here to transport.      Electronically signed by Ev Scott RN on 12/16/2021 at 1:12 PM

## 2021-12-16 NOTE — PLAN OF CARE
Problem: OXYGENATION/RESPIRATORY FUNCTION  Goal: Patient will maintain patent airway  Outcome: Ongoing  Goal: Patient will achieve/maintain normal respiratory rate/effort  Description: Respiratory rate and effort will be within normal limits for the patient  Outcome: Ongoing     Problem: HEMODYNAMIC STATUS  Goal: Patient has stable vital signs and fluid balance  Outcome: Ongoing     Problem: FLUID AND ELECTROLYTE IMBALANCE  Goal: Fluid and electrolyte balance are achieved/maintained  Outcome: Ongoing     Problem: ACTIVITY INTOLERANCE/IMPAIRED MOBILITY  Goal: Mobility/activity is maintained at optimum level for patient  Outcome: Ongoing     Problem: Skin Integrity:  Goal: Will show no infection signs and symptoms  Description: Will show no infection signs and symptoms  Outcome: Ongoing  Goal: Absence of new skin breakdown  Description: Absence of new skin breakdown  Outcome: Ongoing     Problem: Falls - Risk of:  Goal: Will remain free from falls  Description: Will remain free from falls  Outcome: Ongoing  Goal: Absence of physical injury  Description: Absence of physical injury  Outcome: Ongoing     Problem: Pain:  Goal: Pain level will decrease  Description: Pain level will decrease  Outcome: Ongoing  Goal: Control of acute pain  Description: Control of acute pain  Outcome: Ongoing  Goal: Control of chronic pain  Description: Control of chronic pain  Outcome: Ongoing

## 2021-12-16 NOTE — PROGRESS NOTES
Occupational Therapy   Occupational Therapy Initial Assessment  Date: 2021   Patient Name: Jose Vaz  MRN: [id number]     : 1960    Date of Service: 2021    Discharge Recommendations:  Home with assist PRN, Home with Home health OT  OT Equipment Recommendations  Equipment Needed: No    Jose Vaz scored a 21/24 on the AM-PAC ADL Inpatient form. Current research shows that an AM-PAC score of 18 or greater is typically associated with a discharge to the patient's home setting. Based on the patient's AM-PAC score, and their current ADL deficits, it is recommended that the patient have 2-3 sessions per week of Occupational Therapy at d/c to increase the patient's independence. At this time, this patient demonstrates the endurance and safety to discharge home with home therapy and a follow up treatment frequency of 2-3x/wk. Please see assessment section for further patient specific details. If patient discharges prior to next session this note will serve as a discharge summary. Please see below for the latest assessment towards goals. Assessment   Performance deficits / Impairments: Decreased functional mobility ; Decreased endurance; Decreased ADL status; Decreased high-level IADLs  Assessment: 61 y/o female admitted 12/10/2021 with CHF. PTA pt lives at home alone in basement level apartment. Pt able to ambulate very short distances with cane and primarily uses w/c around apartment. Today, pt completed full body dressing with supervision in prep for discharge. Pt stood and ambulated 5 ft x 2 with cane and SBA. Pt reports feeling close to baseline and safe to return home. Recommend home OT.   Prognosis: Good  Decision Making: Medium Complexity  OT Education: OT Role; Transfer Training; Plan of Care  REQUIRES OT FOLLOW UP: No  Activity Tolerance  Activity Tolerance: Patient limited by fatigue  Activity Tolerance: SOB with activity but reports baseline  Safety Devices  Safety Devices in place: Yes  Type of devices: Nurse notified; Call light within reach; Left in bed           Patient Diagnosis(es): The primary encounter diagnosis was Acute on chronic congestive heart failure, unspecified heart failure type ([facility] Diagnoses of Hypoxia, AIDA (acute kidney injury) ([facility] and QT prolongation were also pertinent to this visit. has a past medical history of Asthma, Cardiomyopathy ([facility] CHF (congestive heart failure) ([facility] COPD (chronic obstructive pulmonary disease) ([facility] Diabetes mellitus [facility] Essential hypertension, Hyperlipidemia, and Obesity. has a past surgical history that includes Cholecystectomy; Carpal tunnel release; Incontinence surgery; and Coronary angioplasty with stent (01/07/2013). Restrictions  Restrictions/Precautions  Restrictions/Precautions: Fall Risk    Subjective   General  Chart Reviewed: Yes  Patient assessed for rehabilitation services?: Yes  Additional Pertinent Hx: Per H&P: \"62 y.o. female history of chronic systolic heart failure, EF 25 to 30% status post AICD, CAD s/p stent, hypertension, CKD stage III, COPD presented to emergency room with shortness of breath. .. The patient was recently hospitalized at [facility] in [place] AT [place] from 12/1-12/6/21 with acute decompensated heart failure, AIDA/cardiorenal... She clinically improved and was discharged home. . She has been taking her medications as prescribed. . This morning she developed acute onset of shortness of breath and she tried to transfer from her bed to the bedside commode. .. No associated leg swelling. . No fevers, chills or rigors. . No cough or wheezingEMS was called to her residence and she was noted to be in respiratory distress, she was hypoxic in the 80s and placed on nonrebreather mask. Padmini Thibodeaux Upon arrival to the ED, she was weaned down to 2 L nasal cannula. . She was given IV Lasix 40 mg.. Chest x-ray showed pulmonary vascular congestion small pleural effusions. ..  proBNP was elevated above recent baseline at 32k\"  Family / Caregiver Present: Yes (daughter)  Referring Practitioner: Dr. Shana Gerard: Pt seen bedside and agreeable to therapy. General Comment  Comments: Per RN ok for therapy    Social/Functional History  Social/Functional History  Lives With: Alone  Type of Home: Apartment  Home Access: Stairs to enter with rails  Entrance Stairs - Number of Steps: 2 ARIS (uses wall and cane) into building + 5 steps with rail down to basement level  Bathroom Shower/Tub: Tub/Shower unit  Bathroom Toilet:  (RTS without handles or uses BSC)  Bathroom Equipment: Tub transfer bench, Hand-held shower  Home Equipment: [employer], 4 wheeled walker, Reacher  Receives Help From: Home health (home OT/PT and RN)  ADL Assistance:  (Daughter comes by to supervise showers;)  Ambulation Assistance:  (does not ambulate much; primarily uses w/c and propels around apt)  Transfer Assistance: Independent (pivot transfers primarily to/from w/c and [facility])  Active : No  Occupation: On disability  Additional Comments: Pt reports she has been in/out of hospital every months since October. Objective        Orientation  Overall Orientation Status: Within Functional Limits     Balance  Sitting Balance: Independent  Standing Balance: Supervision  Standing Balance  Time: stood briefly to pull pants over hips  Functional Mobility  Functional Mobility Comments: 5 ft x 2 with standing rest break in between each bout. Use of cane in R hand and reaching for items in room with L hand. ADL  UE Dressing: Supervision  LE Dressing: Supervision (able to janel shoes and pants sitting EOB)  Additional Comments: Pt completed full body dressing in prep for discharge home.         Bed mobility  Supine to Sit: Modified independent  Sit to Supine: Modified independent  Transfers  Sit to stand: Supervision  Stand to sit: Supervision     Cognition  Overall Cognitive Status: WFL       LUE AROM (degrees)  LUE AROM : WFL  Left Hand AROM (degrees)  Left Hand AROM: WFL  RUE AROM (degrees)  RUE AROM : WFL  Right Hand AROM (degrees)  Right Hand AROM: WFL      Plan   Plan  Times per week: DC acute OT since pt discharging from hospital    AM-PAC Score  AM-PAC Inpatient Daily Activity Raw Score: 21 (12/16/21 1125)  AM-PAC Inpatient ADL T-Scale Score : 44.27 (12/16/21 1125)  ADL Inpatient CMS 0-100% Score: 32.79 (12/16/21 1125)  ADL Inpatient CMS G-Code Modifier : Mitesh Larkin (12/16/21 1125)    Goals  Short term goals  Time Frame for Short term goals: Pt with plans to discharge within the hour; if remains at hospital will set goals  Patient Goals   Patient goals : to go home today       Therapy Time   Individual Concurrent Group Co-treatment   Time In 1049         Time Out 1127         Minutes 38         Timed Code Treatment Minutes: 23 Minutes     This note to serve as OT d/c summary if pt is d/c-ed prior to next therapy session.     Jose Velasquez, OTR/L

## 2021-12-16 NOTE — PROGRESS NOTES
[address]   Daily Progress Note      Admit Date:  12/10/2021      Subjective:   Ms. Rachael Garnica is 62 y.o. female with a past medical history significant for essential hypertension, type 2 DM, COPD and chronic systolic CHF who presented to [facility] with shortness of breath. She was apparently taken off of her diuretic and ACEI/ARB therapy a week ago due to acute kidney injury.     Bhavna York states that she became acutely short of breath yesterday. She has not been feeling great since her discharge. She feels that her home Bipap settings are not adequate. She thinks that the Bipap here is what resolved her shortness of breath on presentation.     She denies any chest pain or tightness. Interval History:  Bhavna York reports that her breathing is better today. She denies any chest pain. She denies any chest pain with this. Currently wearing Bipap on room air.        Objective:     BP (!) 150/82   Pulse 75   Temp 97.4 °F (36.3 °C) (Oral)   Resp 18   Ht 5' 7\" (1.702 m)   Wt 236 lb 15.9 oz (107.5 kg)   SpO2 96%   BMI 37.12 kg/m²      Intake/Output Summary (Last 24 hours) at 12/16/2021 0956  Last data filed at 12/16/2021 0753  Gross per 24 hour   Intake 840 ml   Output 600 ml   Net 240 ml       Physical Exam:  General:  Awake, alert, NAD  Skin:  Warm and dry  Neck:  JVD<8, no carotid bruits  Chest:  Clear to auscultation, no wheezes/rhonchi/rales  Cardiovascular:  RRR, normal S1/S2, 2/6 hs murmur  Abdomen:  Soft, nontender, +bowel sounds  Extremities:  No edema  Pulses: 2+ bilat carotid    2+ bilat radial    2+ bilat femoral        Medications:    torsemide  100 mg Oral Daily    sodium chloride flush  5-40 mL IntraVENous 2 times per day    metoprolol succinate  12.5 mg Oral Daily    albuterol  2.5 mg Nebulization 4x daily    amiodarone  200 mg Oral Nightly    atorvastatin  40 mg Oral Nightly    pantoprazole  40 mg Oral Daily    tiotropium  2 puff Inhalation Daily    aspirin  325 mg Oral Daily    enoxaparin  40 mg SubCUTAneous Daily    hydrOXYzine  50 mg Oral Once      sodium chloride         Lab Data:  CBC:   Recent Labs     12/14/21  0700   WBC 6.2   HGB 9.9*        BMP:    Recent Labs     12/14/21  0700 12/15/21  0448 12/16/21  0444    136 142   K 3.5 3.5 3.3*   CO2 23 21 24   BUN 13 9 5*   CREATININE 1.4* 1.2 1.2     LIVR:   No results for input(s): AST, ALT in the last 72 hours. PT/INR:   No results for input(s): PROT, INR in the last 72 hours. No results for input(s): CKMB, CKMBINDEX, TROPONINI in the last 72 hours. Invalid input(s): CKTOTAL;3  FASTING LIPID PANEL:  Lab Results   Component Value Date    CHOL 77 12/11/2021    HDL 25 12/11/2021    TRIG 82 12/11/2021         Echo 10/22/2021:  Left ventricular cavity size is severely dilated. Ejection fraction is visually estimated to be 25-30%. Grade II diastolic dysfunction with elevated LV filling pressures. Moderate to Severe mitral regurgitation. The left atrium is severely dilated. Normal right ventricular size and function. Pacemaker / ICD lead is visualized in the right atrium. There is a trivial pericardial effusion noted.       Stress 2020 ([facility] [address] [facility]): The LV EF is 34%   There is moderate global hypokinesis of the left ventricle. There is a medium sized, reversible defect in the inferior wall consistent   with moderate ischemia. There is a small size, partially reversible defect in the anterior wall   consistent with mild keysha-infarct ischemia. There is a small size, partially reversible defect in the inferolateral wall   consistent with mild keysha-infarct ischemia. Assessment / Plan:     1. Acute on chronic systolic CHF, class 4  Continue BIpap therapy as she feels this has benefited her the most.   Continue IV lasix. Hold ACEI/ARB or aldactone therapy given her acute on chronic kidney disease. She needs biventricular pacing to attempt to improve her cardiac output.    It seems that her mitral regurgitation is probably causing her acute decompensations. CRT may improve her mitral regurgitation. My concern is that, prior to exhausting systolic CHF therapies (I.e. BiV pacing) her LV function would drop with a Mitraclip for the valve. asix to just once daily in preparation for possible contrast dye load tomorrow. I will certainly minimize this. We will restart her Entresto therapy. Change carvedilol to Toprol XL 12.5mg daily given her bradycardia.      2. Acute on chronic kidney disease, stage 3  I think this is improved. He baseline appears to be around 1.7 or thereabouts.      3. NEGIN  Continue Bipap    4. CAD of native coronary arteries without angina  Add clopidogrel 75mg daily with loading dose. Plan to intervene as an outpatient.      Signed:  Lynette Goodell, MD

## 2021-12-16 NOTE — CONSULTS
Pharmacy Heart Failure Medication Reconciliation Note    Pt discharged from [facility] today. Chief Complaint   Patient presents with    Shortness of Breath     recently taken off her lasix ( last week) due to kidney injury. states she became SOB this morning about an hour PTA. did a breathing TX 40 minutes PTA. EMS found pt at 89% on RA placed on NRB for transport        Meg Marin has a diagnosis of systolic heart failure (last EF = 25-30% on 12/13/21). Pertinent Labs:  BMP:   Lab Results   Component Value Date     12/16/2021    K 3.3 12/16/2021    K 3.9 12/06/2021     12/16/2021    CO2 24 12/16/2021    BUN 5 12/16/2021    CREATININE 1.2 12/16/2021     BNP:   Lab Results   Component Value Date    PROBNP 30,140 12/16/2021    PROBNP 32,232 12/13/2021    PROBNP 32,463 12/10/2021       Patient taking an ACEI / ARB / Entresto for EF </= 40%:  Yes     Patient taking a BETA BLOCKER (Coreg, Toprol XL or bisoprolol) for EF </= 40%:  Yes  Patient taking a LOOP DIURETIC: Yes  Patient taking a ALDOSTERONE RECEPTOR ANTAGONIST for EF </= 35%: No: CKD per cardiology  Patient taking an SGLT2I for EF </= 40%: No, Chronic kidney disease  and cardiology will address in the outpatient setting    Patient has a diagnosis of Atrial Fibrillation: No. If yes, patient is on appropriate anticoagulation: No: N/A    Patient has a diagnosis of type 2 diabetes:  Yes.  If yes, patient prescribed the following anti-hyperglycemic medication at discharge: none, controlled      Corrections to discharge medications include:  none    Discharge Medications:         Medication List      START taking these medications    aspirin 81 MG chewable tablet  Take 1 tablet by mouth daily  Start taking on: December 17, 2021  Replaces: aspirin 325 MG tablet     clopidogrel 75 MG tablet  Commonly known as: PLAVIX  Take 1 tablet by mouth daily  Start taking on: December 17, 2021     metoprolol succinate 25 MG extended release tablet  Commonly known Reconciliation Program  [phone]

## 2021-12-16 NOTE — CARE COORDINATION
Pending sale to [facility]    DC order noted, all docs needed have been faxed to [facility] for home care services.     Home care to see patient by 12/18/21    Maritza Norwood RN, BSN CTN  Pending sale to [facility] [phone]

## 2021-12-16 NOTE — PROGRESS NOTES
Nephrology Progress Note   [facility]      Chief Complaint: Sudden onset shortness of breath    History of Present Illness: Ms Nusrat Mathur is a 61 yo morbidly obese female with a past medical history significant for CAD, hypertension, CKD III (baseline creatinine in mid 1s), HFrEF (25-30% on echo done on 10/11/21, Grade II diastolic CHF, Moderate to severe MR), recently discharged from the hospital after being treated for AIDA occurring in the setting of decompensated HF (?CRS), [facility] with sudden onset f Shortness of breath. CXR consistent with pulmonary edema. Patient has been getting IVP Lasix and her symptoms have completely resolved. Creatinine trending up and BP has dropped to 90s    Subjective:    Resting in bed; shortness of breath has resolved, no lower extremity edema    ROS: Sudden onset of shortness of breath; no chest pain, no lower extremity edema. All other ROS negative    Scheduled Meds:   sacubitril-valsartan  1 tablet Oral BID    [START ON 2021] clopidogrel  75 mg Oral Daily    [START ON 2021] aspirin  81 mg Oral Daily    torsemide  100 mg Oral Daily    sodium chloride flush  5-40 mL IntraVENous 2 times per day    metoprolol succinate  12.5 mg Oral Daily    albuterol  2.5 mg Nebulization 4x daily    amiodarone  200 mg Oral Nightly    atorvastatin  40 mg Oral Nightly    pantoprazole  40 mg Oral Daily    tiotropium  2 puff Inhalation Daily    enoxaparin  40 mg SubCUTAneous Daily    hydrOXYzine  50 mg Oral Once        sodium chloride         PRN Meds:.acetaminophen, sodium chloride flush, sodium chloride, ondansetron **OR** ondansetron, polyethylene glycol, [DISCONTINUED] acetaminophen **OR** acetaminophen, albuterol    Physical Exam:    TEMPERATURE:  Current - Temp: 97.4 °F (36.3 °C);  Max - Temp  Av.7 °F (36.5 °C)  Min: 97.4 °F (36.3 °C)  Max: 98.6 °F (37 °C)  RESPIRATIONS RANGE: Resp  Av.1  Min: 15  Max: 20  PULSE RANGE: Pulse  Av.2  Min: 71  Max: 82  BLOOD PRESSURE RANGE:  Systolic (32QVP), MARVIN:168 , Min:113 , MFN:684   ; Diastolic (51AFF), SCT:13, Min:56, Max:82    24HR INTAKE/OUTPUT:      Intake/Output Summary (Last 24 hours) at 12/16/2021 1155  Last data filed at 12/16/2021 0852  Gross per 24 hour   Intake 840 ml   Output 600 ml   Net 240 ml         Patient Vitals for the past 96 hrs (Last 3 readings):   Weight   12/16/21 0509 236 lb 15.9 oz (107.5 kg)   12/15/21 1530 238 lb 8.6 oz (108.2 kg)   12/15/21 0303 252 lb 13.9 oz (114.7 kg)       General: Alert, Awake, NAD, Morbidly Obese  HEENT: Normocephalic, atraumatic, Nose and ears appear externally without deformity, MMM  Neck: No Thyromegaly, Trachea is midline, No Carotid bruit  Eyes: EOMI, IGLESIA  Chest: CTA, no intercostal retractions  CVS: RRR, no murmur, no rub  Abdomen: soft, non tender, no organomegaly, no bruit appreciated  Extremities: no edema, no cyanosis. Skin: normal texture, normal skin turgor, no rash  Musculoskeletal: normal ROM, no joint swelling, no visible deformity  Neurological: CN intact, no focal motor neurological deficit  Psych: normal affect; AAO x 3      Allergies: Allergies   Allergen Reactions    Ace Inhibitors     Diclofenac     Losartan     Vicodin [Hydrocodone-Acetaminophen]       Past Medical History:   Diagnosis Date    Asthma     [facility]     CHF (congestive heart failure) (HCC)     COPD [facility]chronic obstructive pulmonary disease) [facility]     Diabetes mellitus [facility]     Essential hypertension     Hyperlipidemia     Obesity      Past Surgical History:   Procedure Laterality Date    CARPAL TUNNEL RELEASE      CHOLECYSTECTOMY      CORONARY ANGIOPLASTY WITH STENT PLACEMENT  01/07/2013    Stent LAD    INCONTINENCE SURGERY       Social History     Socioeconomic History    Marital status:       Spouse name: Not on file    Number of children: Not on file    Years of education: Not on file    Highest education level: Not on file   Occupational History    Not on file   Tobacco Use    Smoking status: Former Smoker     Packs/day: 0.10     Types: Cigarettes    Smokeless tobacco: Never Used   Substance and Sexual Activity    Alcohol use: No     Alcohol/week: 0.0 standard drinks    Drug use: No    Sexual activity: Yes     Partners: Male     Comment:    Other Topics Concern    Not on file   Social History Narrative    Not on file     Social Determinants of Health     Financial Resource Strain:     Difficulty of Paying Living Expenses: Not on file   Food Insecurity:     Worried About Running Out of Food in the Last Year: Not on file    Yin of Food in the Last Year: Not on file   Transportation Needs:     Lack of Transportation (Medical): Not on file    Lack of Transportation (Non-Medical):  Not on file   Physical Activity:     Days of Exercise per Week: Not on file    Minutes of Exercise per Session: Not on file   Stress:     Feeling of Stress : Not on file   Social Connections:     Frequency of Communication with Friends and Family: Not on file    Frequency of Social Gatherings with Friends and Family: Not on file    Attends Muslim Services: Not on file    Active Member of [address] [place] [address] or Organizations: Not on file    Attends Club or Organization Meetings: Not on file    Marital Status: Not on file   Intimate Partner Violence:     Fear of Current or Ex-Partner: Not on file    Emotionally Abused: Not on file    Physically Abused: Not on file    Sexually Abused: Not on file   Housing Stability:     Unable to Pay for Housing in the Last Year: Not on file    Number of [place] in the Last Year: Not on file    Unstable Housing in the Last Year: Not on file     Family History   Problem Relation Age of Onset    High Blood Pressure Mother     Diabetes Mother     Cancer Mother         thyroid    Stroke Father          LAB DATA:    CBC:   Lab Results   Component Value Date    WBC 6.2 12/14/2021    RBC 3.96 12/14/2021    RBC 5.12 05/26/2017    HGB 9.9 12/14/2021    HCT 30.5 12/14/2021    MCV 76.9 12/14/2021    MCH 24.9 12/14/2021    MCHC 32.4 12/14/2021    RDW 17.7 12/14/2021     12/14/2021    MPV 8.6 12/14/2021     BMP:    Lab Results   Component Value Date     12/16/2021    K 3.3 12/16/2021    K 3.9 12/06/2021     12/16/2021    CO2 24 12/16/2021    BUN 5 12/16/2021    CREATININE 1.2 12/16/2021    CALCIUM 9.1 12/16/2021    GFRAA 55 12/16/2021    GFRAA >60 01/17/2013    LABGLOM 46 12/16/2021    GLUCOSE 88 12/16/2021    GLUCOSE 154 06/05/2015     Ionized Calcium:  No results found for: IONCA  Magnesium:    Lab Results   Component Value Date    MG 1.80 12/15/2021     Phosphorus:    Lab Results   Component Value Date    PHOS 3.8 12/16/2021     U/A:    Lab Results   Component Value Date    COLORU YELLOW 12/11/2021    PHUR 6.5 12/11/2021    WBCUA >100 12/01/2021    RBCUA 11-20 12/01/2021    MUCUS 1+ 12/15/2012    BACTERIA Rare 12/01/2021    CLARITYU Clear 12/11/2021    SPECGRAV 1.008 12/11/2021    LEUKOCYTESUR Negative 12/11/2021    UROBILINOGEN 0.2 12/11/2021    BILIRUBINUR Negative 12/11/2021    BLOODU Negative 12/11/2021    GLUCOSEU Negative 12/11/2021    AMORPHOUS 2+ 12/18/2012         IMPRESSION/RECOMMENDATIONS:      Active Problems:    NEGIN treated with BiPAP    Acute on chronic systolic CHF (congestive heart failure), NYHA class 3 (HCC)    Acute decompensated heart failure ([facility]    Acute kidney injury superimposed on chronic kidney disease ([facility]  Resolved Problems:    * No resolved hospital problems. *    1. AIDA - likely due to relative hypotension SBP dropped from 153 to 99 mmHg  - Reduced coreg dose - BP improved and bradycardia resolved. Switched to Metoprolol 12/12/21  - Creatinine trending down to lowest level compared to recent past  - s/p [facility] 12/13/21 and creatinine remains stable  - Avoid exposure to Nephrotoxic agents    2. CKD - Baseline ~ 1.5 mg/dl    3. Morbid Obesity    4. Bradycardia - resolved    5.  HTN - controlled    6.  CHF - Improved on lasix gtt - switch to oral Torsemide starting 12/15/21 - with good response  - Started on Entresto 12/16/21    Ok to d/c from renal standpoint

## 2021-12-16 NOTE — PROGRESS NOTES
Physical Therapy    Facility/Department: [address] PROGRESSIVE CARE  Initial Assessment    NAME: Jefferson Monique  : 1960  MRN: [id number]    Date of Service: 2021    Discharge Recommendations:  Home with assist PRN, 2-3 sessions per week   PT Equipment Recommendations  Equipment Needed: No  [place] scored a 21/24 on the AM-PAC short mobility form. Current research shows that an AM-PAC score of 18 or greater is typically associated with a discharge to the patient's home setting. Based on the patient's AM-PAC score and their current functional mobility deficits, it is recommended that the patient have 2-3 sessions per week of Physical Therapy at d/c to increase the patient's independence. At this time, this patient demonstrates the endurance and safety to discharge home with home PT and a follow up treatment frequency of 2-3x/wk. Please see assessment section for further patient specific details. If patient discharges prior to next session this note will serve as a discharge summary. Please see below for the latest assessment towards goals.      Assessment   Body structures, Functions, Activity limitations: Decreased functional mobility   Assessment: pt is a 63 yo female who was admitting to hosp with SOB; pt at baseline lives alone and is w/c dep in her apt but able to amb short distances and negotiate stairs into her apt with her SPC and holding onto rail; pt appears close to her recent baseline; she appears to be safe to return home with assist from her daughter and home PT  Prognosis: Fair  Decision Making: Low Complexity  PT Education: PT Role  Patient Education: discussed safety issues at home and possibility of getting a more handicapped apt due to her limited endurance and strength deficits; pt reports home care  is getting her a list of resources  Barriers to Learning: none  REQUIRES PT FOLLOW UP: No (pt has a discharge order)  Activity Tolerance  Activity Tolerance: Patient limited by endurance  Activity Tolerance: limited by overall strength deficits       Patient Diagnosis(es): The primary encounter diagnosis was Acute on chronic congestive heart failure, unspecified heart failure type ([facility] Diagnoses of Hypoxia, AIDA (acute kidney injury) ([facility] and QT prolongation were also pertinent to this visit. has a past medical history of Asthma, Cardiomyopathy ([facility] CHF (congestive heart failure) ([facility] COPD (chronic obstructive pulmonary disease) ([facility] Diabetes mellitus ([facility] Essential hypertension, Hyperlipidemia, and Obesity. has a past surgical history that includes Cholecystectomy; Carpal tunnel release; Incontinence surgery; and Coronary angioplasty with stent (01/07/2013).     Restrictions  Restrictions/Precautions  Restrictions/Precautions: Fall Risk  Vision/Hearing  Vision: Within Functional Limits  Hearing: Within functional limits     Subjective  General  Chart Reviewed: Yes  Patient assessed for rehabilitation services?: Yes  Additional Pertinent Hx: pt is a 61 yo female who was adm to hosp with SOB  Response To Previous Treatment: Not applicable  Family / Caregiver Present: Yes (daughter Johnny Basilio" in room)  Referring Practitioner: Dr Alice Hagen  Referral Date : 12/16/21  Follows Commands: Within Functional Limits  Subjective  Subjective: pt eager to get home as she has a discharge order; agreeable to working with therapy  Pain Screening  Patient Currently in Pain: Denies          Orientation  Orientation  Overall Orientation Status: Within Functional Limits  Social/Functional History  Social/Functional History  Lives With: Alone  Type of Home: Apartment  Home Access: Stairs to enter with rails  Entrance Stairs - Number of Steps: 2 ARIS (uses wall and cane) into building + 5 steps with rail down to basement level  Bathroom Shower/Tub: Tub/Shower unit  Bathroom Toilet:  (RTS without handles or uses BSC)  Bathroom Equipment: Tub transfer bench, Hand-held shower  Home Equipment: [place], 4 wheeled walker, Reacher  Receives Help From: Home health (home OT/PT and RN)  ADL Assistance:  (Daughter comes by to supervise showers;)  Ambulation Assistance:  (does not ambulate much; primarily uses w/c and propels around apt)  Transfer Assistance: Independent (pivot transfers primarily to/from w/c and [facility])  Active : No  Occupation: On disability  Additional Comments: Pt reports she has been in/out of hospital every months since October. Cognition   Cognition  Overall Cognitive Status: WFL    Objective          AROM RLE (degrees)  RLE AROM: WFL  AROM LLE (degrees)  LLE AROM : WFL  Strength RLE  Strength RLE: WFL  Strength LLE  Strength LLE: WFL        Bed mobility  Supine to Sit: Modified independent  Sit to Supine: Modified independent  Transfers  Sit to Stand: Modified independent  Stand to sit: Modified independent  Ambulation  Ambulation?: Yes  Ambulation 1  Surface: level tile  Device: Single point cane (and holding onto furniture with other hand)  Assistance: Stand by assistance  Quality of Gait: antalgic with increased lateral sway due to body habitus and chronic back pain  Distance: 5' x2 with stand rest break between  Comments: pt able to manage her clothes and shoes to get dressed during session     Balance  Comments:  Ind for sitting balance; SBA for standing        Plan   Plan  Plan Comment: pt being discharged home; will be followed by home PT  Safety Devices  Type of devices: Left in bed, Call light within reach, Nurse notified, All fall risk precautions in place    G-Code       OutComes Score                                                  AM-PAC Score  AM-PAC Inpatient Mobility Raw Score : 21 (12/16/21 1127)  AM-PAC Inpatient T-Scale Score : 50.25 (12/16/21 1127)  Mobility Inpatient CMS 0-100% Score: 28.97 (12/16/21 1127)  Mobility Inpatient CMS G-Code Modifier : CJ (12/16/21 1127)          Goals          Therapy Time   Individual Concurrent Group Co-treatment Time In 1048         Time Out 1126         Minutes 38                 KAROL AMBROSIO, PT    Electronically signed by KAROL AMBROSIO PT on 12/16/2021 at 11:28 AM

## 2021-12-16 NOTE — CARE COORDINATION
CASE MANAGEMENT DISCHARGE SUMMARY: Discharge order noted. RN notified MD for homecare order for Lillie N Loretta Xiong, PT/OT/RN/Social Work. Will discharge with her bipap machine from home, no home oxygen needs. Patient stated the aide services and life alert via COA were not completed due to transfer and admission to inpatient at [facility]. [facility], denied need or offer for  at [facility] to contact the COA/. DISCHARGE DATE: 12/16/21    DISCHARGED TO: Home w/home healthcare services.        HOME CARE AGENCY:       · Name:  [facility]    · Address: [address] [place] [address], [address] [place] [address]  · Phone: [phone]  Fax: [phone]     TRANSPORTATION: Thom Barfield [phone]             TIME: 12/16/21 Morning     PREFERRED PHARMACY: [facility]              NUMBER: [phone]     Yes MATILDA Updated   Yes Case Management   Yes Physician   Yes Nurse    Yes Destination updated (SNF/HHC)    Yes Whiteboard Note Updated with above    Yes  Home Care Order Verified     Shayy COUGHLIN, RN  [phone]  Electronically signed by Shayy Herrera RN on 12/16/2021 at 9:50 AM

## 2021-12-17 ENCOUNTER — CARE COORDINATION (OUTPATIENT)
Dept: CASE MANAGEMENT | Age: 61
End: 2021-12-17

## 2021-12-17 NOTE — CARE COORDINATION
documents    Care Transitions 24 Hour Call    Do you have any ongoing symptoms?: No  Do you have a copy of your discharge instructions?: Yes  Do you have all of your prescriptions and are they filled?: Yes  Have you been contacted by a [address] [place] [address] Avenue?: No  Have you scheduled your follow up appointment?: Yes  Were you discharged with any Home Care or Post Acute Services: Yes  Post Acute Services: Home Health (Comment: [facility])  Do you feel like you have everything you need to keep you well at home?: Yes  Care Transitions Interventions       Altagracia Gutierrez states she is doing \"fine\". She states she has heard from Andrew Hugo. Her start of care will be today or tomorrow. Altagracia Gutierrez states she has a Bipap machine at home and uses it on a regular basis. She states the COA delivered and installed her Life Alert. She is awaiting information regarding the aide services and meals on wheels. Altagracia Gutierrez states her weight today = 235lbs. She denies any SOB,chest pain, or edema. Altagracia Gutierrez states she follows a no added salt/low sodium diet. She states she tries to follow the fluid restriction - that is where she struggles. Discussed the heart failure zones. Altagracia Gutierrez states she is in the \"green\" zone at this time. She states she has a hand held nebulizer at home and uses this daily. Altagracia Gutierrez states her B/P this morning = 116/67. Her O2 sat = 93% on room air. Her temp = 97.7. Her BG level = 135. Altagracia Gutierrez denies any needs at this time. She has a follow up appointment with her PCP on 12/23/2022. She will contact  to schedule a hospital follow up. No additional CTN outreach - Non [facility] PCP. Follow Up  No future appointments.     Hyacinth Cruz RN BSN  Care Transition Nurse  [phone]

## 2021-12-19 NOTE — PROGRESS NOTES
Physician Progress Note      Julián Berg  CSN #:                  [id number]  :                       1960  ADMIT DATE:       12/10/2021 9:29 AM  DISCH DATE:        2021 1:53 PM  RESPONDING  PROVIDER #:        Parker Dash MD          QUERY TEXT:    Patient admitted with SOB. Noted documentation of Acute on chronic combined   systolic and diastolic heart failure in H&P on 12/10/21 and Acute on chronic   systolic CHF in Cardiology consult note on 21. If possible, please   document in progress notes and discharge summary if you are evaluating and /or   treating any of the following: The medical record reflects the following:  Risk Factors: Hx- CHF, DM2, HTN, CKD 3, Cardiomyopathy. Recently hospitalized   at [facility] AT [place] from -21 with acute decompensated heart   failure  Clinical Indicators: , BNP 32,463. Pooja Vallejo EF 25-30%, CRT 1.6, GFR   33,. .. Thurl Yoni CXR-Pulmonary vascular congestion. Stable cardiomegaly. Probable   small bilateral pleural effusions. .Per H&P- Acute on chronic combined systolic   and diastolic heart failure    Per Cardiology consult note-Acute on chronic   systolic CHF  Treatment: Lasix IV, Cardiology and Nephrology consults, fluid restriction,   Strict I&O, Daily wts    Thank Lynnette Vazquez RN BSN CDS CRCR  [email or web address]  Options provided:  -- Acute on chronic combined systolic and diastolic heart failure confirmed   and Acute on chronic systolic CHF  ruled out  -- Acute on chronic systolic CHF confirmed and Acute on chronic combined   systolic and diastolic heart failure  ruled out  -- Acute on chronic combined systolic and diastolic heart failure and Acute on   chronic systolic CHF confirmed  -- Acute on chronic combined systolic and diastolic heart failure and Acute on   chronic systolic CHF ruled out  -- Other - I will add my own diagnosis  -- Disagree - Not applicable / Not valid  -- Disagree - Clinically unable to determine / Unknown  -- Refer to Clinical Documentation Reviewer    PROVIDER RESPONSE TEXT:    After study, Acute on chronic combined systolic and diastolic heart failure   and Acute on chronic systolic CHF confirmed.     Query created by: Burl Spurling on 12/14/2021 11:34 AM      Electronically signed by:  Loan Nolasco MD 12/19/2021 12:32 AM

## 2022-03-30 ENCOUNTER — HOSPITAL ENCOUNTER (INPATIENT)
Age: 62
LOS: 3 days | Discharge: HOME | DRG: 189 | End: 2022-04-02
Attending: STUDENT IN AN ORGANIZED HEALTH CARE EDUCATION/TRAINING PROGRAM | Admitting: INTERNAL MEDICINE
Payer: MEDICARE

## 2022-03-30 ENCOUNTER — APPOINTMENT (OUTPATIENT)
Dept: GENERAL RADIOLOGY | Age: 62
DRG: 189 | End: 2022-03-30
Payer: MEDICARE

## 2022-03-30 DIAGNOSIS — J44.1 COPD EXACERBATION (HCC): Primary | ICD-10-CM

## 2022-03-30 LAB
A/G RATIO: 1.3 (ref 1.1–2.2)
ALBUMIN SERPL-MCNC: 4.2 G/DL (ref 3.4–5)
ALP BLD-CCNC: 87 U/L (ref 40–129)
ALT SERPL-CCNC: 22 U/L (ref 10–40)
ANION GAP SERPL CALCULATED.3IONS-SCNC: 13 MMOL/L (ref 3–16)
AST SERPL-CCNC: 25 U/L (ref 15–37)
BACTERIA: ABNORMAL /HPF
BASE EXCESS VENOUS: 0.4 MMOL/L
BASOPHILS ABSOLUTE: 0.1 K/UL (ref 0–0.2)
BASOPHILS RELATIVE PERCENT: 0.8 %
BILIRUB SERPL-MCNC: 0.6 MG/DL (ref 0–1)
BILIRUBIN URINE: NEGATIVE
BLOOD, URINE: NEGATIVE
BUN BLDV-MCNC: 32 MG/DL (ref 7–20)
CALCIUM SERPL-MCNC: 9.8 MG/DL (ref 8.3–10.6)
CARBOXYHEMOGLOBIN: 1.5 %
CHLORIDE BLD-SCNC: 101 MMOL/L (ref 99–110)
CLARITY: CLEAR
CO2: 26 MMOL/L (ref 21–32)
COLOR: ABNORMAL
CREAT SERPL-MCNC: 1.7 MG/DL (ref 0.6–1.2)
EOSINOPHILS ABSOLUTE: 0.5 K/UL (ref 0–0.6)
EOSINOPHILS RELATIVE PERCENT: 5.4 %
EPITHELIAL CELLS, UA: 6 /HPF (ref 0–5)
GFR AFRICAN AMERICAN: 37
GFR NON-AFRICAN AMERICAN: 31
GLUCOSE BLD-MCNC: 182 MG/DL (ref 70–99)
GLUCOSE URINE: 250 MG/DL
HCO3 VENOUS: 29 MMOL/L (ref 23–29)
HCT VFR BLD CALC: 41.3 % (ref 36–48)
HEMOGLOBIN: 13 G/DL (ref 12–16)
HYALINE CASTS: 4 /LPF (ref 0–8)
KETONES, URINE: NEGATIVE MG/DL
LEUKOCYTE ESTERASE, URINE: ABNORMAL
LYMPHOCYTES ABSOLUTE: 1.4 K/UL (ref 1–5.1)
LYMPHOCYTES RELATIVE PERCENT: 16 %
MCH RBC QN AUTO: 25.4 PG (ref 26–34)
MCHC RBC AUTO-ENTMCNC: 31.5 G/DL (ref 31–36)
MCV RBC AUTO: 80.5 FL (ref 80–100)
METHEMOGLOBIN VENOUS: 0.2 %
MICROSCOPIC EXAMINATION: YES
MONOCYTES ABSOLUTE: 0.6 K/UL (ref 0–1.3)
MONOCYTES RELATIVE PERCENT: 6.9 %
NEUTROPHILS ABSOLUTE: 6.3 K/UL (ref 1.7–7.7)
NEUTROPHILS RELATIVE PERCENT: 70.9 %
NITRITE, URINE: NEGATIVE
O2 SAT, VEN: 81 %
O2 THERAPY: ABNORMAL
PCO2, VEN: 63.1 MMHG (ref 40–50)
PDW BLD-RTO: 17.8 % (ref 12.4–15.4)
PH UA: 6 (ref 5–8)
PH VENOUS: 7.27 (ref 7.35–7.45)
PLATELET # BLD: 368 K/UL (ref 135–450)
PMV BLD AUTO: 10.1 FL (ref 5–10.5)
PO2, VEN: 56 MMHG
POTASSIUM REFLEX MAGNESIUM: 4.3 MMOL/L (ref 3.5–5.1)
PRO-BNP: ABNORMAL PG/ML (ref 0–124)
PROTEIN UA: NEGATIVE MG/DL
RBC # BLD: 5.13 M/UL (ref 4–5.2)
RBC UA: 1 /HPF (ref 0–4)
SODIUM BLD-SCNC: 140 MMOL/L (ref 136–145)
SPECIFIC GRAVITY UA: 1.01 (ref 1–1.03)
TCO2 CALC VENOUS: 31 MMOL/L
TOTAL PROTEIN: 7.4 G/DL (ref 6.4–8.2)
TROPONIN: 0.03 NG/ML
URINE REFLEX TO CULTURE: ABNORMAL
URINE TYPE: ABNORMAL
UROBILINOGEN, URINE: 0.2 E.U./DL
WBC # BLD: 8.9 K/UL (ref 4–11)
WBC UA: 6 /HPF (ref 0–5)

## 2022-03-30 PROCEDURE — 2580000003 HC RX 258: Performed by: STUDENT IN AN ORGANIZED HEALTH CARE EDUCATION/TRAINING PROGRAM

## 2022-03-30 PROCEDURE — 94660 CPAP INITIATION&MGMT: CPT

## 2022-03-30 PROCEDURE — 82803 BLOOD GASES ANY COMBINATION: CPT

## 2022-03-30 PROCEDURE — 85025 COMPLETE CBC W/AUTO DIFF WBC: CPT

## 2022-03-30 PROCEDURE — 94640 AIRWAY INHALATION TREATMENT: CPT

## 2022-03-30 PROCEDURE — 2700000000 HC OXYGEN THERAPY PER DAY

## 2022-03-30 PROCEDURE — 83880 ASSAY OF NATRIURETIC PEPTIDE: CPT

## 2022-03-30 PROCEDURE — 2060000000 HC ICU INTERMEDIATE R&B

## 2022-03-30 PROCEDURE — 71045 X-RAY EXAM CHEST 1 VIEW: CPT

## 2022-03-30 PROCEDURE — 94760 N-INVAS EAR/PLS OXIMETRY 1: CPT

## 2022-03-30 PROCEDURE — 6370000000 HC RX 637 (ALT 250 FOR IP): Performed by: STUDENT IN AN ORGANIZED HEALTH CARE EDUCATION/TRAINING PROGRAM

## 2022-03-30 PROCEDURE — 84484 ASSAY OF TROPONIN QUANT: CPT

## 2022-03-30 PROCEDURE — 99284 EMERGENCY DEPT VISIT MOD MDM: CPT

## 2022-03-30 PROCEDURE — 93005 ELECTROCARDIOGRAM TRACING: CPT | Performed by: STUDENT IN AN ORGANIZED HEALTH CARE EDUCATION/TRAINING PROGRAM

## 2022-03-30 PROCEDURE — 80053 COMPREHEN METABOLIC PANEL: CPT

## 2022-03-30 PROCEDURE — 81001 URINALYSIS AUTO W/SCOPE: CPT

## 2022-03-30 RX ORDER — 0.9 % SODIUM CHLORIDE 0.9 %
500 INTRAVENOUS SOLUTION INTRAVENOUS ONCE
Status: COMPLETED | OUTPATIENT
Start: 2022-03-30 | End: 2022-03-31

## 2022-03-30 RX ORDER — IPRATROPIUM BROMIDE AND ALBUTEROL SULFATE 2.5; .5 MG/3ML; MG/3ML
1 SOLUTION RESPIRATORY (INHALATION) ONCE
Status: COMPLETED | OUTPATIENT
Start: 2022-03-30 | End: 2022-03-30

## 2022-03-30 RX ADMIN — IPRATROPIUM BROMIDE AND ALBUTEROL SULFATE 1 AMPULE: .5; 3 SOLUTION RESPIRATORY (INHALATION) at 21:29

## 2022-03-30 RX ADMIN — SODIUM CHLORIDE 500 ML: 9 INJECTION, SOLUTION INTRAVENOUS at 22:10

## 2022-03-31 PROBLEM — E11.9 DMII (DIABETES MELLITUS, TYPE 2) (HCC): Status: ACTIVE | Noted: 2022-03-31

## 2022-03-31 PROBLEM — E66.09 NON MORBID OBESITY DUE TO EXCESS CALORIES: Status: ACTIVE | Noted: 2022-03-31

## 2022-03-31 PROBLEM — I24.89 DEMAND ISCHEMIA OF MYOCARDIUM: Status: ACTIVE | Noted: 2022-03-31

## 2022-03-31 PROBLEM — R79.89 ELEVATED TROPONIN: Status: ACTIVE | Noted: 2022-03-31

## 2022-03-31 PROBLEM — I25.10 CAD (CORONARY ARTERY DISEASE): Status: ACTIVE | Noted: 2022-03-31

## 2022-03-31 PROBLEM — I24.8 DEMAND ISCHEMIA OF MYOCARDIUM (HCC): Status: ACTIVE | Noted: 2022-03-31

## 2022-03-31 PROBLEM — J96.01 ACUTE RESPIRATORY FAILURE WITH HYPOXIA AND HYPERCAPNIA (HCC): Status: ACTIVE | Noted: 2022-03-31

## 2022-03-31 PROBLEM — I10 ESSENTIAL HYPERTENSION: Status: ACTIVE | Noted: 2022-03-31

## 2022-03-31 PROBLEM — E78.5 HYPERLIPIDEMIA: Status: ACTIVE | Noted: 2022-03-31

## 2022-03-31 PROBLEM — R77.8 ELEVATED TROPONIN: Status: ACTIVE | Noted: 2022-03-31

## 2022-03-31 PROBLEM — J96.02 ACUTE RESPIRATORY FAILURE WITH HYPOXIA AND HYPERCAPNIA (HCC): Status: ACTIVE | Noted: 2022-03-31

## 2022-03-31 LAB
ANION GAP SERPL CALCULATED.3IONS-SCNC: 13 MMOL/L (ref 3–16)
BASOPHILS ABSOLUTE: 0 K/UL (ref 0–0.2)
BASOPHILS RELATIVE PERCENT: 0.6 %
BILIRUBIN URINE: NEGATIVE
BLOOD, URINE: NEGATIVE
BUN BLDV-MCNC: 30 MG/DL (ref 7–20)
CALCIUM SERPL-MCNC: 9.1 MG/DL (ref 8.3–10.6)
CHLORIDE BLD-SCNC: 101 MMOL/L (ref 99–110)
CLARITY: CLEAR
CO2: 28 MMOL/L (ref 21–32)
COLOR: YELLOW
CREAT SERPL-MCNC: 1.8 MG/DL (ref 0.6–1.2)
EKG ATRIAL RATE: 69 BPM
EKG DIAGNOSIS: NORMAL
EKG P AXIS: 60 DEGREES
EKG P-R INTERVAL: 104 MS
EKG Q-T INTERVAL: 534 MS
EKG QRS DURATION: 186 MS
EKG QTC CALCULATION (BAZETT): 572 MS
EKG R AXIS: -69 DEGREES
EKG T AXIS: 117 DEGREES
EKG VENTRICULAR RATE: 69 BPM
EOSINOPHILS ABSOLUTE: 0.1 K/UL (ref 0–0.6)
EOSINOPHILS RELATIVE PERCENT: 1.7 %
GFR AFRICAN AMERICAN: 35
GFR NON-AFRICAN AMERICAN: 29
GLUCOSE BLD-MCNC: 130 MG/DL (ref 70–99)
GLUCOSE BLD-MCNC: 179 MG/DL (ref 70–99)
GLUCOSE BLD-MCNC: 203 MG/DL (ref 70–99)
GLUCOSE BLD-MCNC: 204 MG/DL (ref 70–99)
GLUCOSE BLD-MCNC: 213 MG/DL (ref 70–99)
GLUCOSE URINE: >=1000 MG/DL
HCT VFR BLD CALC: 37 % (ref 36–48)
HEMOGLOBIN: 11.9 G/DL (ref 12–16)
KETONES, URINE: NEGATIVE MG/DL
LACTIC ACID, SEPSIS: 1.7 MMOL/L (ref 0.4–1.9)
LEUKOCYTE ESTERASE, URINE: NEGATIVE
LYMPHOCYTES ABSOLUTE: 0.5 K/UL (ref 1–5.1)
LYMPHOCYTES RELATIVE PERCENT: 6.5 %
MAGNESIUM: 2.8 MG/DL (ref 1.8–2.4)
MCH RBC QN AUTO: 25.5 PG (ref 26–34)
MCHC RBC AUTO-ENTMCNC: 32.3 G/DL (ref 31–36)
MCV RBC AUTO: 79 FL (ref 80–100)
MICROSCOPIC EXAMINATION: ABNORMAL
MONOCYTES ABSOLUTE: 0.1 K/UL (ref 0–1.3)
MONOCYTES RELATIVE PERCENT: 2.1 %
NEUTROPHILS ABSOLUTE: 6.3 K/UL (ref 1.7–7.7)
NEUTROPHILS RELATIVE PERCENT: 89.1 %
NITRITE, URINE: NEGATIVE
PDW BLD-RTO: 17.5 % (ref 12.4–15.4)
PERFORMED ON: ABNORMAL
PH UA: 6 (ref 5–8)
PLATELET # BLD: 289 K/UL (ref 135–450)
PMV BLD AUTO: 9.6 FL (ref 5–10.5)
POTASSIUM REFLEX MAGNESIUM: 3.8 MMOL/L (ref 3.5–5.1)
PROTEIN UA: NEGATIVE MG/DL
RBC # BLD: 4.68 M/UL (ref 4–5.2)
SODIUM BLD-SCNC: 142 MMOL/L (ref 136–145)
SODIUM URINE: <20 MMOL/L
SPECIFIC GRAVITY UA: 1.01 (ref 1–1.03)
TROPONIN: 0.01 NG/ML
TROPONIN: 0.02 NG/ML
URIC ACID, UR: 12 MG/DL (ref 37–92)
URINE TYPE: ABNORMAL
UROBILINOGEN, URINE: 0.2 E.U./DL
WBC # BLD: 7.1 K/UL (ref 4–11)

## 2022-03-31 PROCEDURE — 6360000002 HC RX W HCPCS: Performed by: FAMILY MEDICINE

## 2022-03-31 PROCEDURE — 80048 BASIC METABOLIC PNL TOTAL CA: CPT

## 2022-03-31 PROCEDURE — 81003 URINALYSIS AUTO W/O SCOPE: CPT

## 2022-03-31 PROCEDURE — 94640 AIRWAY INHALATION TREATMENT: CPT

## 2022-03-31 PROCEDURE — 6370000000 HC RX 637 (ALT 250 FOR IP): Performed by: INTERNAL MEDICINE

## 2022-03-31 PROCEDURE — 36415 COLL VENOUS BLD VENIPUNCTURE: CPT

## 2022-03-31 PROCEDURE — 2580000003 HC RX 258: Performed by: INTERNAL MEDICINE

## 2022-03-31 PROCEDURE — 85025 COMPLETE CBC W/AUTO DIFF WBC: CPT

## 2022-03-31 PROCEDURE — 84560 ASSAY OF URINE/URIC ACID: CPT

## 2022-03-31 PROCEDURE — 84300 ASSAY OF URINE SODIUM: CPT

## 2022-03-31 PROCEDURE — 2700000000 HC OXYGEN THERAPY PER DAY

## 2022-03-31 PROCEDURE — 83605 ASSAY OF LACTIC ACID: CPT

## 2022-03-31 PROCEDURE — 6360000002 HC RX W HCPCS: Performed by: INTERNAL MEDICINE

## 2022-03-31 PROCEDURE — 1200000000 HC SEMI PRIVATE

## 2022-03-31 PROCEDURE — 84484 ASSAY OF TROPONIN QUANT: CPT

## 2022-03-31 PROCEDURE — 94660 CPAP INITIATION&MGMT: CPT

## 2022-03-31 PROCEDURE — 2580000003 HC RX 258: Performed by: FAMILY MEDICINE

## 2022-03-31 PROCEDURE — 94761 N-INVAS EAR/PLS OXIMETRY MLT: CPT

## 2022-03-31 PROCEDURE — 83735 ASSAY OF MAGNESIUM: CPT

## 2022-03-31 PROCEDURE — 93010 ELECTROCARDIOGRAM REPORT: CPT | Performed by: INTERNAL MEDICINE

## 2022-03-31 RX ORDER — ACETAMINOPHEN 650 MG/1
650 SUPPOSITORY RECTAL EVERY 4 HOURS PRN
Status: DISCONTINUED | OUTPATIENT
Start: 2022-03-31 | End: 2022-04-02 | Stop reason: HOSPADM

## 2022-03-31 RX ORDER — POTASSIUM CHLORIDE 1500 MG/1
TABLET, EXTENDED RELEASE ORAL
COMMUNITY
Start: 2021-12-29 | End: 2022-05-10

## 2022-03-31 RX ORDER — POLYETHYLENE GLYCOL 3350 17 G/17G
17 POWDER, FOR SOLUTION ORAL DAILY PRN
Status: DISCONTINUED | OUTPATIENT
Start: 2022-03-31 | End: 2022-04-02 | Stop reason: HOSPADM

## 2022-03-31 RX ORDER — 0.9 % SODIUM CHLORIDE 0.9 %
1000 INTRAVENOUS SOLUTION INTRAVENOUS ONCE
Status: DISCONTINUED | OUTPATIENT
Start: 2022-03-31 | End: 2022-03-31

## 2022-03-31 RX ORDER — ASPIRIN 81 MG/1
81 TABLET, COATED ORAL DAILY
COMMUNITY

## 2022-03-31 RX ORDER — SODIUM CHLORIDE 0.9 % (FLUSH) 0.9 %
10 SYRINGE (ML) INJECTION EVERY 12 HOURS SCHEDULED
Status: DISCONTINUED | OUTPATIENT
Start: 2022-03-31 | End: 2022-04-02 | Stop reason: HOSPADM

## 2022-03-31 RX ORDER — PANTOPRAZOLE SODIUM 40 MG/1
40 TABLET, DELAYED RELEASE ORAL DAILY
COMMUNITY

## 2022-03-31 RX ORDER — AMIODARONE HYDROCHLORIDE 200 MG/1
200 TABLET ORAL DAILY
COMMUNITY

## 2022-03-31 RX ORDER — ASPIRIN 81 MG/1
81 TABLET ORAL DAILY
Status: DISCONTINUED | OUTPATIENT
Start: 2022-03-31 | End: 2022-04-02 | Stop reason: HOSPADM

## 2022-03-31 RX ORDER — CARVEDILOL 6.25 MG/1
6.25 TABLET ORAL 2 TIMES DAILY
COMMUNITY

## 2022-03-31 RX ORDER — ATORVASTATIN CALCIUM 40 MG/1
TABLET, FILM COATED ORAL
COMMUNITY
Start: 2022-02-16 | End: 2022-05-10

## 2022-03-31 RX ORDER — NICOTINE POLACRILEX 4 MG
15 LOZENGE BUCCAL PRN
Status: DISCONTINUED | OUTPATIENT
Start: 2022-03-31 | End: 2022-04-02 | Stop reason: HOSPADM

## 2022-03-31 RX ORDER — IPRATROPIUM BROMIDE AND ALBUTEROL SULFATE 2.5; .5 MG/3ML; MG/3ML
1 SOLUTION RESPIRATORY (INHALATION)
Status: DISCONTINUED | OUTPATIENT
Start: 2022-03-31 | End: 2022-04-02 | Stop reason: HOSPADM

## 2022-03-31 RX ORDER — METHYLPREDNISOLONE SODIUM SUCCINATE 40 MG/ML
40 INJECTION, POWDER, LYOPHILIZED, FOR SOLUTION INTRAMUSCULAR; INTRAVENOUS EVERY 6 HOURS
Status: DISCONTINUED | OUTPATIENT
Start: 2022-03-31 | End: 2022-04-01

## 2022-03-31 RX ORDER — ONDANSETRON 2 MG/ML
4 INJECTION INTRAMUSCULAR; INTRAVENOUS EVERY 4 HOURS PRN
Status: DISCONTINUED | OUTPATIENT
Start: 2022-03-31 | End: 2022-04-02 | Stop reason: HOSPADM

## 2022-03-31 RX ORDER — EMPAGLIFLOZIN 10 MG/1
TABLET, FILM COATED ORAL
Status: ON HOLD | COMMUNITY
Start: 2022-02-22 | End: 2022-04-02 | Stop reason: HOSPADM

## 2022-03-31 RX ORDER — ACETAMINOPHEN 325 MG/1
650 TABLET ORAL EVERY 4 HOURS PRN
Status: DISCONTINUED | OUTPATIENT
Start: 2022-03-31 | End: 2022-04-02 | Stop reason: HOSPADM

## 2022-03-31 RX ORDER — TORSEMIDE 20 MG/1
40 TABLET ORAL 2 TIMES DAILY
Status: ON HOLD | COMMUNITY
Start: 2022-01-06 | End: 2022-05-19 | Stop reason: SDUPTHER

## 2022-03-31 RX ORDER — MAGNESIUM OXIDE 400 MG/1
400 TABLET ORAL 2 TIMES DAILY
COMMUNITY
End: 2022-05-10

## 2022-03-31 RX ORDER — SODIUM CHLORIDE 9 MG/ML
INJECTION, SOLUTION INTRAVENOUS PRN
Status: DISCONTINUED | OUTPATIENT
Start: 2022-03-31 | End: 2022-04-02 | Stop reason: HOSPADM

## 2022-03-31 RX ORDER — GABAPENTIN 600 MG/1
600 TABLET ORAL DAILY
Status: DISCONTINUED | OUTPATIENT
Start: 2022-03-31 | End: 2022-03-31 | Stop reason: DRUGHIGH

## 2022-03-31 RX ORDER — PANTOPRAZOLE SODIUM 40 MG/1
40 TABLET, DELAYED RELEASE ORAL DAILY
Status: DISCONTINUED | OUTPATIENT
Start: 2022-03-31 | End: 2022-04-02 | Stop reason: HOSPADM

## 2022-03-31 RX ORDER — GABAPENTIN 300 MG/1
600 CAPSULE ORAL 3 TIMES DAILY
Status: DISCONTINUED | OUTPATIENT
Start: 2022-03-31 | End: 2022-04-02 | Stop reason: HOSPADM

## 2022-03-31 RX ORDER — AMIODARONE HYDROCHLORIDE 200 MG/1
200 TABLET ORAL DAILY
Status: DISCONTINUED | OUTPATIENT
Start: 2022-03-31 | End: 2022-04-02 | Stop reason: HOSPADM

## 2022-03-31 RX ORDER — ALBUTEROL SULFATE 2.5 MG/3ML
2.5 SOLUTION RESPIRATORY (INHALATION) 4 TIMES DAILY
COMMUNITY

## 2022-03-31 RX ORDER — CARVEDILOL 6.25 MG/1
6.25 TABLET ORAL 2 TIMES DAILY WITH MEALS
Status: DISCONTINUED | OUTPATIENT
Start: 2022-03-31 | End: 2022-04-02 | Stop reason: HOSPADM

## 2022-03-31 RX ORDER — ATORVASTATIN CALCIUM 40 MG/1
40 TABLET, FILM COATED ORAL NIGHTLY
Status: DISCONTINUED | OUTPATIENT
Start: 2022-03-31 | End: 2022-04-02 | Stop reason: HOSPADM

## 2022-03-31 RX ORDER — SODIUM CHLORIDE 0.9 % (FLUSH) 0.9 %
10 SYRINGE (ML) INJECTION PRN
Status: DISCONTINUED | OUTPATIENT
Start: 2022-03-31 | End: 2022-04-02 | Stop reason: HOSPADM

## 2022-03-31 RX ORDER — TIOTROPIUM BROMIDE 18 UG/1
CAPSULE ORAL; RESPIRATORY (INHALATION)
COMMUNITY
Start: 2022-03-20 | End: 2022-05-10

## 2022-03-31 RX ORDER — GABAPENTIN 600 MG/1
600 TABLET ORAL 3 TIMES DAILY
COMMUNITY
Start: 2022-03-28 | End: 2022-05-10

## 2022-03-31 RX ORDER — SODIUM CHLORIDE 9 MG/ML
INJECTION, SOLUTION INTRAVENOUS CONTINUOUS
Status: DISCONTINUED | OUTPATIENT
Start: 2022-03-31 | End: 2022-03-31

## 2022-03-31 RX ORDER — CLOPIDOGREL BISULFATE 75 MG/1
75 TABLET ORAL DAILY
Status: DISCONTINUED | OUTPATIENT
Start: 2022-03-31 | End: 2022-04-02 | Stop reason: HOSPADM

## 2022-03-31 RX ORDER — DEXTROSE MONOHYDRATE 50 MG/ML
100 INJECTION, SOLUTION INTRAVENOUS PRN
Status: DISCONTINUED | OUTPATIENT
Start: 2022-03-31 | End: 2022-04-02 | Stop reason: HOSPADM

## 2022-03-31 RX ORDER — DEXTROSE MONOHYDRATE 25 G/50ML
12.5 INJECTION, SOLUTION INTRAVENOUS PRN
Status: DISCONTINUED | OUTPATIENT
Start: 2022-03-31 | End: 2022-04-02 | Stop reason: HOSPADM

## 2022-03-31 RX ORDER — CLOPIDOGREL BISULFATE 75 MG/1
TABLET ORAL
COMMUNITY
Start: 2022-01-17 | End: 2022-05-10

## 2022-03-31 RX ORDER — HEPARIN SODIUM 5000 [USP'U]/ML
5000 INJECTION, SOLUTION INTRAVENOUS; SUBCUTANEOUS EVERY 8 HOURS SCHEDULED
Status: DISCONTINUED | OUTPATIENT
Start: 2022-03-31 | End: 2022-04-02 | Stop reason: HOSPADM

## 2022-03-31 RX ORDER — TORSEMIDE 100 MG/1
50 TABLET ORAL DAILY
Status: DISCONTINUED | OUTPATIENT
Start: 2022-03-31 | End: 2022-04-01

## 2022-03-31 RX ADMIN — INSULIN LISPRO 2 UNITS: 100 INJECTION, SOLUTION INTRAVENOUS; SUBCUTANEOUS at 08:11

## 2022-03-31 RX ADMIN — AMIODARONE HYDROCHLORIDE 200 MG: 200 TABLET ORAL at 08:11

## 2022-03-31 RX ADMIN — IPRATROPIUM BROMIDE AND ALBUTEROL SULFATE 1 AMPULE: .5; 3 SOLUTION RESPIRATORY (INHALATION) at 19:53

## 2022-03-31 RX ADMIN — INSULIN LISPRO 4 UNITS: 100 INJECTION, SOLUTION INTRAVENOUS; SUBCUTANEOUS at 12:06

## 2022-03-31 RX ADMIN — ASPIRIN 81 MG: 81 TABLET, COATED ORAL at 08:11

## 2022-03-31 RX ADMIN — INSULIN LISPRO 2 UNITS: 100 INJECTION, SOLUTION INTRAVENOUS; SUBCUTANEOUS at 20:20

## 2022-03-31 RX ADMIN — CARVEDILOL 6.25 MG: 6.25 TABLET, FILM COATED ORAL at 17:02

## 2022-03-31 RX ADMIN — IPRATROPIUM BROMIDE AND ALBUTEROL SULFATE 1 AMPULE: .5; 3 SOLUTION RESPIRATORY (INHALATION) at 09:02

## 2022-03-31 RX ADMIN — CARVEDILOL 6.25 MG: 6.25 TABLET, FILM COATED ORAL at 08:10

## 2022-03-31 RX ADMIN — IPRATROPIUM BROMIDE AND ALBUTEROL SULFATE 1 AMPULE: .5; 3 SOLUTION RESPIRATORY (INHALATION) at 17:05

## 2022-03-31 RX ADMIN — HEPARIN SODIUM 5000 UNITS: 5000 INJECTION INTRAVENOUS; SUBCUTANEOUS at 21:31

## 2022-03-31 RX ADMIN — SODIUM CHLORIDE, PRESERVATIVE FREE 10 ML: 5 INJECTION INTRAVENOUS at 20:20

## 2022-03-31 RX ADMIN — METHYLPREDNISOLONE SODIUM SUCCINATE 40 MG: 40 INJECTION, POWDER, FOR SOLUTION INTRAMUSCULAR; INTRAVENOUS at 06:30

## 2022-03-31 RX ADMIN — SODIUM CHLORIDE, PRESERVATIVE FREE 10 ML: 5 INJECTION INTRAVENOUS at 08:11

## 2022-03-31 RX ADMIN — AZITHROMYCIN MONOHYDRATE 500 MG: 500 INJECTION, POWDER, LYOPHILIZED, FOR SOLUTION INTRAVENOUS at 01:32

## 2022-03-31 RX ADMIN — GABAPENTIN 600 MG: 300 CAPSULE ORAL at 08:10

## 2022-03-31 RX ADMIN — CLOPIDOGREL BISULFATE 75 MG: 75 TABLET ORAL at 08:10

## 2022-03-31 RX ADMIN — HEPARIN SODIUM 5000 UNITS: 5000 INJECTION INTRAVENOUS; SUBCUTANEOUS at 08:52

## 2022-03-31 RX ADMIN — METHYLPREDNISOLONE SODIUM SUCCINATE 40 MG: 40 INJECTION, POWDER, FOR SOLUTION INTRAMUSCULAR; INTRAVENOUS at 14:09

## 2022-03-31 RX ADMIN — METHYLPREDNISOLONE SODIUM SUCCINATE 40 MG: 40 INJECTION, POWDER, FOR SOLUTION INTRAMUSCULAR; INTRAVENOUS at 21:39

## 2022-03-31 RX ADMIN — HEPARIN SODIUM 5000 UNITS: 5000 INJECTION INTRAVENOUS; SUBCUTANEOUS at 14:09

## 2022-03-31 RX ADMIN — SODIUM CHLORIDE 1000 ML: 9 INJECTION, SOLUTION INTRAVENOUS at 08:55

## 2022-03-31 RX ADMIN — IPRATROPIUM BROMIDE AND ALBUTEROL SULFATE 1 AMPULE: .5; 3 SOLUTION RESPIRATORY (INHALATION) at 13:25

## 2022-03-31 RX ADMIN — GABAPENTIN 600 MG: 300 CAPSULE ORAL at 14:09

## 2022-03-31 RX ADMIN — GABAPENTIN 600 MG: 300 CAPSULE ORAL at 20:19

## 2022-03-31 RX ADMIN — METHYLPREDNISOLONE SODIUM SUCCINATE 40 MG: 40 INJECTION, POWDER, FOR SOLUTION INTRAMUSCULAR; INTRAVENOUS at 01:22

## 2022-03-31 RX ADMIN — PANTOPRAZOLE SODIUM 40 MG: 40 TABLET, DELAYED RELEASE ORAL at 08:10

## 2022-03-31 RX ADMIN — INSULIN LISPRO 4 UNITS: 100 INJECTION, SOLUTION INTRAVENOUS; SUBCUTANEOUS at 17:02

## 2022-03-31 RX ADMIN — ATORVASTATIN CALCIUM 40 MG: 40 TABLET, FILM COATED ORAL at 20:20

## 2022-03-31 ASSESSMENT — PAIN SCALES - GENERAL
PAINLEVEL_OUTOF10: 0

## 2022-03-31 NOTE — PROGRESS NOTES
Pt arrived via stretcher from ED to room 5110. Heart monitor connected and verified with [facility]. VS, assessment, and admission complete. 4 eyes assessment complete. Pt oriented to unit and room. Call light and bedside table in reach. All questions answered. Pt resting quietly in bed with no complaints or voiced needs at this time. Will continue to monitor. Fall risk precautions in place. Bed in lowest position with wheels locked,bed alarm in place and activated, call light in reach, pt sedated, will continue to monitor. Pt refusing non-skid socks at this time.

## 2022-03-31 NOTE — PROGRESS NOTES
Hospitalist Progress Note    CC: COPD exacerbation ([facility] [address]      Admit date: 3/30/2022  Days in hospital:  1    Subjective/interval history: Pt S/E. Pt reports feeling better today. She is walking with a walker in her room. O2 status: 5 L    ROS:   Pertinent items are noted in HPI. Objective:    BP (!) 135/51   Pulse 60   Temp 97.6 °F (36.4 °C) (Axillary)   Resp 17   Ht 5' 7\" (1.702 m)   Wt 219 lb 9.3 oz (99.6 kg) Comment: standing scale  SpO2 99%   BMI 34.39 kg/m²     Gen: alert, NAD  HEENT: NC/AT, moist mucous membranes, no oropharyngeal erythema or exudate  Neck: supple, trachea midline, no anterior cervical or SC LAD  Heart: Normal s1/s2, RRR, no murmurs, gallops, or rubs. Lungs: diminished bl,  no wheezing, no rales, no rhonchi, no use of accessory muscles  Abd: bowel sounds present, soft, nontender, nondistended, no masses  Extrem: No clubbing, cyanosis, no edema in bl le  Skin: no rashes or lesions  Psych: A & O x3, affect appropriate  Neuro: grossly intact, moves all four extremities spontaneously.   Cap refill: +2 sec    Medications:  Scheduled Meds:   sodium chloride flush  10 mL IntraVENous 2 times per day    azithromycin  500 mg IntraVENous Q24H    ipratropium-albuterol  1 ampule Inhalation Q4H WA    methylPREDNISolone  40 mg IntraVENous Q6H    enoxaparin  40 mg SubCUTAneous Daily    insulin lispro  0-12 Units SubCUTAneous TID     insulin lispro  0-6 Units SubCUTAneous Nightly    torsemide  50 mg Oral Daily    pantoprazole  40 mg Oral Daily    clopidogrel  75 mg Oral Daily    carvedilol  6.25 mg Oral BID WC    atorvastatin  40 mg Oral Nightly    aspirin  81 mg Oral Daily    amiodarone  200 mg Oral Daily    gabapentin  600 mg Oral TID       PRN Meds:  sodium chloride flush, sodium chloride, ondansetron, polyethylene glycol, acetaminophen **OR** acetaminophen, ipratropium-albuterol, glucose, dextrose, glucagon (rDNA), dextrose    IV:   sodium chloride      dextrose         No intake or output data in the 24 hours ending 03/31/22 0757    Results:  CBC:   Recent Labs     03/30/22 2128 03/31/22 0423   WBC 8.9 7.1   HGB 13.0 11.9*   HCT 41.3 37.0   MCV 80.5 79.0*    289     BMP:   Recent Labs     03/30/22 2128 03/31/22  0423    142   K 4.3 3.8    101   CO2 26 28   BUN 32* 30*   CREATININE 1.7* 1.8*     Mag: No results for input(s): MAG in the last 72 hours. Phos: No results found for: PHOS  No results found for: GLU    LIVER PROFILE:   Recent Labs     03/30/22 2128   AST 25   ALT 22   BILITOT 0.6   ALKPHOS 87     PT/INR: No results for input(s): PROTIME, INR in the last 72 hours. APTT: No results for input(s): APTT in the last 72 hours. UA:  Recent Labs     03/30/22 2128   COLORU Straw   PHUR 6.0   WBCUA 6*   RBCUA 1   BACTERIA 4+*   CLARITYU Clear   SPECGRAV 1.010   LEUKOCYTESUR SMALL*   UROBILINOGEN 0.2   BILIRUBINUR Negative   BLOODU Negative   GLUCOSEU 250*       Invalid input(s): ABG  Lab Results   Component Value Date    CALCIUM 9.1 03/31/2022       Assessment:    Principal Problem:    COPD exacerbation (HCC)  Active Problems:    Elevated troponin    Demand ischemia of myocardium (HCC)    Non morbid obesity due to excess calories    Acute respiratory failure with hypoxia and hypercapnia (HCC)    Essential hypertension    Hyperlipidemia    DMII (diabetes mellitus, type 2) (HCC)    CAD (coronary artery disease)  Resolved Problems:    * No resolved hospital problems. [facility] course:   an 65y.o. year-old female with a history of hypertension, hyperlipidemia, type II diabetes mellitus, CAD and COPD. She presents to the emergency room for evaluation following an acute onset of shortness of breath and respiratory distress. She arrived in the hospital ER via EMS and was on CPAP at the time of her arrival but was also in respiratory distress. She was changed immediately to BiPAP and improved.   In the emergency room she was found to have

## 2022-03-31 NOTE — H&P
Hospital Medicine  History and Physical    PCP: Marisela Maxwell MD  Patient Name: Prosper Perkins    Date of Service: Pt seen/examined on 3/30/22 and admitted to Inpatient with expected LOS greater than two midnights due to medical therapy    CHIEF COMPLAINT:  Pt c/o shortness of breath, in respiratory distress  HISTORY OF PRESENT ILLNESS: Pt is an 65y.o. year-old female with a history of hypertension, hyperlipidemia, type II diabetes mellitus, CAD and COPD. She presents to the emergency room for evaluation following an acute onset of shortness of breath and respiratory distress. She arrived in the hospital ER via EMS and was on CPAP at the time of her arrival but was also in respiratory distress. She was changed immediately to BiPAP and improved. In the emergency room she was found to have a COPD exacerbation, acute respiratory failure and an elevated troponin level. She is being admitted for further evaluation and treatment Associated signs and symptoms do not include chest pain, lightheaded, dizziness, diaphoresis, edema, orthopnea, paroxysmal nocturnal dyspnea, fever or chills. No recent medication changes. Past Medical History:        Diagnosis Date    COPD [facility]chronic obstructive pulmonary disease) ([facility] [address]     Diabetes mellitus ([facility] [address]     Hypertension        Past Surgical History:    History reviewed. No pertinent surgical history. Allergies:  Ace inhibitors, Diclofenac, Losartan, Spironolactone, and Vicodin hp [hydrocodone-acetaminophen]    Medications Prior to Admission:    Prior to Admission medications    Medication Sig Start Date End Date Taking?  Authorizing Provider   albuterol (PROVENTIL) (2.5 MG/3ML) 0.083% nebulizer solution USE 1 VIAL VIA NEBULIZER EVERY 4 HOURS AS NEEDED DX J44.9 1/13/22   Historical Provider, MD   amiodarone (CORDARONE) 200 MG tablet TAKE 1 TABLET BY MOUTH EVERY DAY 3/20/22   Historical Provider, MD   ASPIRIN LOW DOSE 81 MG EC tablet TAKE 1 TABLET BY MOUTH EVERY DAY 1/17/22   Historical Provider, MD   atorvastatin (LIPITOR) 40 MG tablet TAKE ONE TABLET BY MOUTH ONCE NIGHTLY 2/16/22   Historical Provider, MD   carvedilol (COREG) 6.25 MG tablet TAKE 1 TABLET BY MOUTH TWICE A DAY WITH MEALS 1/24/22   Historical Provider, MD   clopidogrel (PLAVIX) 75 MG tablet TAKE 1 TABLET BY MOUTH EVERY DAY 1/17/22   Historical Provider, MD   JARDIANCE 10 MG tablet TAKE 1 TABLET BY MOUTH EVERY DAY 2/22/22   Historical Provider, MD   gabapentin (NEURONTIN) 600 MG tablet  3/28/22   Historical Provider, MD   pantoprazole (PROTONIX) 40 MG tablet TAKE 1 TABLET BY MOUTH EVERY DAY 3/20/22   Historical Provider, MD   KLOR-CON M20 20 MEQ extended release tablet TAKE 1 TABLET BY MOUTH DAILY. INDICATIONS: ONE DAILY 12/29/21   Historical Provider, MD   torsemide (DEMADEX) 100 MG tablet TAKE 1/2 TABLET BY MOUTH EVERY DAY 1/6/22   Historical Provider, MD Naz Pereira 18 MCG inhalation capsule INHALE THE ENTIRE CONTENTS OF 1 CAPSULE ONCE A DAY USING Loaded Pocket 179 DEVICE 3/20/22   Historical Provider, MD       Family History:   Family history is negative for accelerated coronary artery disease, diabetes or malignancies. Social History:   TOBACCO:   reports that she has never smoked. She has never used smokeless tobacco.  ETOH:   reports previous alcohol use. OCCUPATION:      REVIEW OF SYSTEMS:  A full review of systems was performed and is negative except for that which appears in the HPI    Physical Exam:    Vitals: /66   Pulse 73   Temp 97.9 °F (36.6 °C) (Oral)   Resp 18   Ht 5' 7\" (1.702 m)   Wt 219 lb 9.3 oz (99.6 kg) Comment: standing scale  SpO2 100%   BMI 34.39 kg/m²   General appearance: Obese 65y.o. year-old female who is alert, appears stated age and is cooperative  HEENT: Head: Normocephalic, no lesions, without obvious abnormality. Eye: Normal external eye, conjunctiva, lids cornea, PEERL. Ears: Normal external ears. Non-tender.   Nose: Normal external nose, mucus membranes and septum. Pharynx: Dental Hygiene adequate. Normal buccal mucosa. Normal pharynx. Neck: no adenopathy, no carotid bruit, no JVD, supple, symmetrical, trachea midline and thyroid not enlarged, symmetric, no tenderness/mass/nodules  Lungs: restricted air movement on auscultation bilaterally. + respiratory distress, air hunger, + use of accessory muscles, 4-5 word dyspnea, tripoding  Heart: regular rate and rhythm, S1, S2 normal, no murmur, click, rub or gallop and normal apical impulse  Abdomen: soft, non-tender; bowel sounds normal; no masses, no organomegaly  Extremities: extremities atraumatic, no cyanosis or edema and Homans sign is negative, no sign of DVT. Capillary Refill: Acceptable < 3 seconds   Peripheral Pulses: +3 easily felt, not easily obliterated with pressures   Skin: Skin color, texture, turgor normal. No rashes or lesions on exposed skin  Neurologic: Neurovascularly intact without any focal sensory/motor deficits. Cranial nerves: II-XII intact, grossly non-focal. Gait was not tested. Mental Status: Alert and oriented, thought content appropriate, normal insight    CBC:   Recent Labs     03/30/22 2128   WBC 8.9   HGB 13.0        BMP:    Recent Labs     03/30/22 2128      K 4.3      CO2 26   BUN 32*   CREATININE 1.7*   GLUCOSE 182*     Troponin:   Recent Labs     03/30/22 2128   TROPONINI 0.03*     PT/INR:  No results found for: PTINR  U/A:    Lab Results   Component Value Date    LEUKOCYTESUR SMALL 03/30/2022    RBCUA 1 03/30/2022    SPECGRAV 1.010 03/30/2022    UROBILINOGEN 0.2 03/30/2022    BILIRUBINUR Negative 03/30/2022    BLOODU Negative 03/30/2022    GLUCOSEU 250 03/30/2022    PROTEINU Negative 03/30/2022         RAD:   I have independently reviewed and interpreted the imaging studies below and based my recommendations to the patient on those findings.     XR CHEST PORTABLE    Result Date: 3/30/2022  EXAMINATION: ONE XRAY VIEW OF THE CHEST 3/30/2022 9:27 pm COMPARISON: None. HISTORY: ORDERING SYSTEM PROVIDED HISTORY: r/o PNA TECHNOLOGIST PROVIDED HISTORY: Reason for exam:->r/o PNA Reason for Exam: Respiratory Distress FINDINGS: The cardiomediastinal mildly enlarged with perihilar congestion/edema. Left-sided pacemaker device. .  The lungs are clear with minimal bibasilar atelectasis. No pleural effusion or pneumothorax is present. No acute cardiopulmonary process       Assessment:   Principal Problem:    COPD exacerbation (HCC)  Active Problems:    Elevated troponin    Demand ischemia of myocardium (HCC)    Non morbid obesity due to excess calories    Acute respiratory failure with hypoxia and hypercapnia (HCC)    Essential hypertension    Hyperlipidemia    DMII (diabetes mellitus, type 2) (HCC)    CAD (coronary artery disease)  Resolved Problems:    * No resolved hospital problems. *      Plan:       COPD (acute exacerbation) - Patient has been started on Nebulizer treatments to be given on a scheduled basis every 4 hours, and on an as-needed basis every 2 hours based upon needs identified through close monitoring. In addition, Solumedrol and Antibiotics have been prescribed. The Solumedrol will be tapered as the patient improves. Patient will be monitored closely, and deep breathing and coughing will be encouraged while awake. Acute respiratory failure with hypoxia and hypercapnea   - as evidenced by respiratory distress, air hunger, + use of accessory muscles, 4-5 word dyspnea, tripoding and an O2 saturation an oxygen saturation of less than 90% on room air  - Pt has been started on BiPap and will be monitored closely.  If her condition deteriorates she may need to be intubated to facilitate Mechanical Ventilation.  - Due to COPD exacerbaton  - We will provide oxygen as necessary to maintain an oxygen saturation of 92% or higher on room air    Elevated troponin - appears to be secondary to demand ischemia of myocardium ([address] -we will monitor on telemetry and follow serial cardiac enzymes. Expect improvement with treatment of underlying COPD exacerbation. CAD (coronary artery disease) - Continue beta blocker, statin, Plavix and aspirin. Monitor on telemetry. Diabetes mellitus II - SSI and carb control diet    Essential (primary) hypertension - continue home meds and monitor blood pressure    Hyperlipidemia - No current evidence of Rhabdomyolysis or other adverse effects. Continue statin therapy while in the hospital    Non-morbid obesity due to excess calories (Body mass index is 34.39 kg/m². ) - Complicating assessment and treatment. Placing patient at risk for multiple co-morbidities as well as early death and contributing to the patient's presentation              DVT Prophylaxis: Lovenox  Diet: ADULT DIET; Regular; 5 carb choices (75 gm/meal)  Code Status: Full Code  (Advanced care planning has been discussed with patient and/or responsible family member and is reflected in the code status.  Further orders associated with this have been entered if appropriate)    Disposition: Anticipate that patient will remain in the hospital for 2 to 3 days depending on further evaluation and clinical course    Please note that over 50 minutes was spent in evaluating the patient, review of records and results, discussion with staff/family, etc.    Marifer Ibanez MD

## 2022-03-31 NOTE — ED NOTES
Enrrique Garza RN unable to obtain a lactic; notified Dr. Sandra Hoyt.       Chencho Guillaume RN  03/30/22 0070

## 2022-03-31 NOTE — ED NOTES
Handoff report given to Magdalena Hawthorne RN to assume care. No further questions at this time. I will transport the pt to room 5110. If any questions arise, please call .       Denton Le RN  03/31/22 2678

## 2022-03-31 NOTE — ED PROVIDER NOTES
Primary Care Physician: Lex Wisdom MD   Attending Physician: No att. providers found     History   Chief Complaint   Patient presents with    Respiratory Distress     per EMS SOB x1 hr; hx COPD          HPI   Reina Bacon  is a 65 y.o. female history of COPD who presents by EMS in respiratory distress. CPAP when she presented. She was immediately transferred to [facility]. Upon arrival patient was using accessory muscles to breathe and appeared to be in respiratory distress. . She denied any fever chest pain but complained of shortness of breath with wheezing. Patient had been admitted in the past with similar presentation. She denied any edema or chills. No other acute complaints. Interval started a few days ago. Symptoms have not gotten worse forcing her to seek care. Past Medical History:   Diagnosis Date    CHF (congestive heart failure) (HCC)     COPD (chronic obstructive pulmonary disease) (HCC)     Diabetes mellitus ([facility] [address]     Hypertension         History reviewed. No pertinent surgical history. History reviewed. No pertinent family history. Social History     Socioeconomic History    Marital status: Single     Spouse name: None    Number of children: None    Years of education: None    Highest education level: None   Occupational History    None   Tobacco Use    Smoking status: Never Smoker    Smokeless tobacco: Never Used   Vaping Use    Vaping Use: Never used   Substance and Sexual Activity    Alcohol use: Not Currently    Drug use: Not Currently    Sexual activity: Not Currently   Other Topics Concern    None   Social History Narrative    None     Social Determinants of Health     Financial Resource Strain:     Difficulty of Paying Living Expenses: Not on file   Food Insecurity:     Worried About Running Out of Food in the Last Year: Not on file    Yin of Food in the Last Year: Not on file   Transportation Needs:     Lack of Transportation (Medical):  Not on file    Lack of Transportation (Non-Medical): Not on file   Physical Activity:     Days of Exercise per Week: Not on file    Minutes of Exercise per Session: Not on file   Stress:     Feeling of Stress : Not on file   Social Connections:     Frequency of Communication with Friends and Family: Not on file    Frequency of Social Gatherings with Friends and Family: Not on file    Attends Yazidism Services: Not on file    Active Member of [address] [place] [address] or Organizations: Not on file    Attends Club or Organization Meetings: Not on file    Marital Status: Not on file   Intimate Partner Violence:     Fear of Current or Ex-Partner: Not on file    Emotionally Abused: Not on file    Physically Abused: Not on file    Sexually Abused: Not on file   Housing Stability:     Unable to Pay for Housing in the Last Year: Not on file    Number of [place] in the Last Year: Not on file    Unstable Housing in the Last Year: Not on file        Review of Systems   10 total systems reviewed and found to be negative unless otherwise noted in HPI     Physical Exam   /85   Pulse 73   Temp 97.3 °F (36.3 °C) (Axillary)   Resp 20   Ht 5' 7\" (1.702 m)   Wt 225 lb 12 oz (102.4 kg)   SpO2 98%   BMI 35.36 kg/m²      CONSTITUTIONAL: ill appearing, in no acute distress   HEAD: atraumatic, normocephalic   EYES: PERRL, No injection, discharge or scleral icterus. ENT: Moist mucous membranes. NECK: Normal ROM, NO LAD   CARDIOVASCULAR: Regular rate and rhythm. No murmurs or gallop. PULMONARY/CHEST: In respiratory distress and wheezing  ABDOMEN: Soft, Non-distended and non-tender, without guarding or rebound. SKIN: Acyanotic, warm, dry   MUSCULOSKELETAL: No swelling, tenderness or deformity   NEUROLOGICAL: Awake and oriented x 3. Pulses intact. Grossly nonfocal   Nursing note and vitals reviewed.      ED Course & Medical Decision Making   Medications   0.9 % sodium chloride bolus (0 mLs IntraVENous Stopped 3/31/22 0013) (*)     All other components within normal limits   CBC WITH AUTO DIFFERENTIAL - Abnormal; Notable for the following components:    Hemoglobin 11.9 (*)     MCV 79.0 (*)     MCH 25.5 (*)     RDW 17.5 (*)     Lymphocytes Absolute 0.5 (*)     All other components within normal limits   MAGNESIUM - Abnormal; Notable for the following components:    Magnesium 2.80 (*)     All other components within normal limits   URIC ACID, URINE RANDOM - Abnormal; Notable for the following components:    Uric Acid, Ur 12.0 (*)     All other components within normal limits   URINALYSIS - Abnormal; Notable for the following components:    Glucose, Ur >=1000 (*)     All other components within normal limits   BASIC METABOLIC PANEL W/ REFLEX TO MG FOR LOW K - Abnormal; Notable for the following components:    Potassium reflex Magnesium 3.4 (*)     Glucose 176 (*)     BUN 35 (*)     CREATININE 1.5 (*)     GFR Non- 35 (*)     GFR  43 (*)     All other components within normal limits   CBC WITH AUTO DIFFERENTIAL - Abnormal; Notable for the following components:    Hemoglobin 10.7 (*)     Hematocrit 33.0 (*)     MCV 79.2 (*)     MCH 25.6 (*)     RDW 17.5 (*)     Lymphocytes Absolute 0.4 (*)     All other components within normal limits   URIC ACID - Abnormal; Notable for the following components:    Uric Acid, Serum 8.1 (*)     All other components within normal limits   BASIC METABOLIC PANEL W/ REFLEX TO MG FOR LOW K - Abnormal; Notable for the following components:    Glucose 153 (*)     BUN 44 (*)     CREATININE 1.5 (*)     GFR Non- 35 (*)     GFR  43 (*)     All other components within normal limits   CBC WITH AUTO DIFFERENTIAL - Abnormal; Notable for the following components:    Hemoglobin 10.5 (*)     Hematocrit 32.3 (*)     MCV 78.5 (*)     MCH 25.5 (*)     RDW 17.7 (*)     Lymphocytes Absolute 0.4 (*)     All other components within normal limits   MAGNESIUM - Abnormal; Notable for the following components:    Magnesium 2.90 (*)     All other components within normal limits   IRON AND TIBC - Abnormal; Notable for the following components:    Iron 31 (*)     Iron Saturation 10 (*)     All other components within normal limits   POCT GLUCOSE - Abnormal; Notable for the following components:    POC Glucose 179 (*)     All other components within normal limits   POCT GLUCOSE - Abnormal; Notable for the following components:    POC Glucose 204 (*)     All other components within normal limits   POCT GLUCOSE - Abnormal; Notable for the following components:    POC Glucose 203 (*)     All other components within normal limits   POCT GLUCOSE - Abnormal; Notable for the following components:    POC Glucose 213 (*)     All other components within normal limits   POCT GLUCOSE - Abnormal; Notable for the following components:    POC Glucose 142 (*)     All other components within normal limits   POCT GLUCOSE - Abnormal; Notable for the following components:    POC Glucose 208 (*)     All other components within normal limits   POCT GLUCOSE - Abnormal; Notable for the following components:    POC Glucose 171 (*)     All other components within normal limits   POCT GLUCOSE - Abnormal; Notable for the following components:    POC Glucose 190 (*)     All other components within normal limits   POCT GLUCOSE - Abnormal; Notable for the following components:    POC Glucose 157 (*)     All other components within normal limits   POCT GLUCOSE - Abnormal; Notable for the following components:    POC Glucose 177 (*)     All other components within normal limits   TROPONIN   LACTATE, SEPSIS   SODIUM, URINE, RANDOM   MAGNESIUM   FERRITIN   CREATININE, RANDOM URINE   POCT GLUCOSE   POCT GLUCOSE   POCT GLUCOSE   POCT GLUCOSE   POCT GLUCOSE   POCT GLUCOSE   POCT GLUCOSE   POCT GLUCOSE   POCT GLUCOSE   POCT GLUCOSE      XR CHEST PORTABLE   Final Result   No acute cardiopulmonary process            No results found.     EKG INTERPRETATION:  EKG by my preliminary interpretation shows paced rhythm with a rate of 69, normal axis, normal intervals, with no ST changes indicative of ischemia at this time. PROCEDURES:   Procedures    ASSESSMENT AND PLAN:  [id number], Anisha Aj is a 65 y.o. female  history of COPD who presents by EMS in respiratory distress. CPAP when she presented. She was immediately transferred to [facility]. Upon arrival patient was using accessory muscles to breathe and appeared to be in respiratory distress. She was given breathing treatments and now has actually turned around. Her labs so far unremarkable for low pH and elevated PCO2 which I believe is secondary to COPD exacerbation with hypercapnia. Troponin and creatinine is mildly elevated which I believe is secondary to her respiratory issues and the fact that his EKG showed no ischemic changes. With this findings and presentation I am recommending the patient be admitted for COPD exacerbation. Hospitalist has been consulted pending admission. ClINICAL IMPRESSION:  1. COPD exacerbation ([address]        DISPOSITION    Admit   -Findings and recommendations explained to patient. She expressed understanding and agreed with the plan.    ___________________________________________________________________________________  CRITICAL CARE NOTE:  There was a high probability of clinically significant life-threatening deterioration of the patient's condition requiring my urgent intervention. This includes multiple reevaluations, vital sign monitoring, pulse oximetry monitoring, telemetry monitoring, clinical response to the IV medications, reviewing the nursing notes, consultation time, dictation/documentation time, and interpretation of the labwork. (This time excludes time spent performing procedures).      I personally saw the patient and independently provided 35 minutes of non-concurrent critical care out of the total shared critical care time provided. _________________________________________________________________________________________  This record is transcribed utilizing voice recognition technology. There are inherent limitations in this technology. In addition, there may be limitations in editing of this report. If there are any discrepancies, please contact me directly.         Pilar Tapia MD  04/03/22 8398

## 2022-03-31 NOTE — CONSULTS
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 She presents to the hospital with SOB and has been admitted with COPD exacerbation. She has CKD and follows with Dr. Nica Lo. CKD has been attributed to HTN/DM and cardiorenal factors in the past. Baseline serum creatinine appears to be 1.2-1.4 in late 2021. She has had AIDA from relative hypotension in the past with a serum creatinine up to 2.1. Her serum creatinine is 1.8 today. She started Jardiance this month. Past Medical History:   Diagnosis Date    CHF (congestive heart failure) (HCC)     COPD (chronic obstructive pulmonary disease) (HCC)     Diabetes mellitus ([facility] [address]     Hypertension        History reviewed. No pertinent family history. Social History     Socioeconomic History    Marital status: Single     Spouse name: Not on file    Number of children: Not on file    Years of education: Not on file    Highest education level: Not on file   Occupational History    Not on file   Tobacco Use    Smoking status: Never Smoker    Smokeless tobacco: Never Used   Vaping Use    Vaping Use: Never used   Substance and Sexual Activity    Alcohol use: Not Currently    Drug use: Not Currently    Sexual activity: Not Currently   Other Topics Concern    Not on file   Social History Narrative    Not on file     Social Determinants of Health     Financial Resource Strain:     Difficulty of Paying Living Expenses: Not on file   Food Insecurity:     Worried About Running Out of Food in the Last Year: Not on file    Yin of Food in the Last Year: Not on file   Transportation Needs:     Lack of Transportation (Medical): Not on file    Lack of Transportation (Non-Medical):  Not on file   Physical Activity:     Days of Exercise per Week: Not on file    Minutes of Exercise per Session: Not on file   Stress:     Systolic (33AJE), FAX:523 , Min:102 , NYZ:982   ; Diastolic (18CDU), NRX:46, Min:51, Max:74    PULSE OXIMETRY RANGE: SpO2  Av.3 %  Min: 96 %  Max: 100 %  24HR INTAKE/OUTPUT:  No intake or output data in the 24 hours ending 22 1030    GEN: no distress. obese  HEENT: no icterus  NECK: Trachea midline. JVP normal  CHEST: L sided pacer  CV: RRR  LUNGS: diminished bilaterally, unlabored  ABD: + bowel sounds. No distension. No  tenderness  EXT: no edema.    SKIN: no rash  NEURO: no tremor    Data      Lab Results   Component Value Date    CREATININE 1.8 (H) 2022    BUN 30 (H) 2022     2022    K 3.8 2022     2022    CO2 28 2022     Lab Results   Component Value Date    WBC 7.1 2022    HGB 11.9 (L) 2022    HCT 37.0 2022    MCV 79.0 (L) 2022     2022       ASSESSMENT     Patient Active Problem List   Diagnosis    COPD exacerbation (HCC)    Elevated troponin    Demand ischemia of myocardium (HCC)    Non morbid obesity due to excess calories    Acute respiratory failure with hypoxia and hypercapnia (HCC)    Essential hypertension    Hyperlipidemia    DMII (diabetes mellitus, type 2) ([facility])    CAD (coronary artery disease)       ASSESSMENT/PLAN:    # 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 Carissa Raines  - CKD attributed to DM/HTNCHF per Dr. 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

## 2022-03-31 NOTE — CARE COORDINATION
INITIAL CASE MANAGEMENT ASSESSMENT    Reviewed chart, met with patient to assess possible discharge needs. Explained Case Management role/services. Living Situation: Confirmed address, lives alone in a basement level apartment, 5-6 steps down w/ railing, 2 steps to enter building    ADLs: Independent     DME: [employer], Rollator, wheeled walker, shower chair, bedside commode, wheelchair    PT/OT Recs: Not ordered     Active Services: [employer] emergency services, [employer] working on getting some aide services for homemaking duties     Transportation: Rarely drives, family transports PRN & will transport home     Medications: Uses [facility] on [address] -- no issues affording/obtaining medications    PCP: Shauna Goyal MD      HD/PD: n/a    PLAN/COMMENTS: Patient will return home. Denies home care needs. Watch for home oxygen, currently on 3L O2.    SW/CM provided contact information for patient or family to call with any questions. SW/CM will follow and assist as needed.   Electronically signed by Nanci Jenkins RN Case Management [phone] on 3/31/2022 at 2:10 PM

## 2022-03-31 NOTE — ED NOTES
3 attempts to obtain a lactic acid; all unsuccessful. Asked Shawn Callahan ED tech to attempt.      Quique Francis RN  03/30/22 2995

## 2022-03-31 NOTE — PROGRESS NOTES
Patient began complaining of SOB. Placed back on bipap. Oxygen sats at 96%. VSS. Will continue to assess for s/s of SOB or distress.

## 2022-03-31 NOTE — PROGRESS NOTES
Results for Fred Charles (MRN [id number]) as of 3/30/2022 21:47   Ref.  Range 3/30/2022 21:28   pH, Casper Latest Ref Range: 7.350 - 7.450  7.269 (L)   pCO2, Casper Latest Ref Range: 40.0 - 50.0 mmHg 63.1 (H)   pO2, Casper Latest Ref Range: Not Established mmHg 56   HCO3, Venous Latest Ref Range: 23 - 29 mmol/L 29     Pt placed on bipap

## 2022-03-31 NOTE — PROGRESS NOTES
4 Eyes Skin Assessment     NAME:  Wayne Martinez  YOB: 1960  MEDICAL RECORD NUMBER:  [id number]    The patient is being assess for  Admission    I agree that 2 RN's have performed a thorough Head to Toe Skin Assessment on the patient. ALL assessment sites listed below have been assessed. Areas assessed by both nurses:    Head, Face, Ears, Shoulders, Back, Chest, Arms, Elbows, Hands, Sacrum. Buttock, Coccyx, Ischium and Legs. Feet and Heels        Does the Patient have a Wound?  No noted wound(s)       Filemon Prevention initiated:  No   Wound Care Orders initiated:  No    Pressure Injury (Stage 3,4, Unstageable, DTI, NWPT, and Complex wounds) if present place consult order under [de-identified] No    New and Established Ostomies if present place consult order under : No      Nurse 1 eSignature: Electronically signed by Arianne Arechiga RN on 3/31/22 at 2:15 AM EDT    **SHARE this note so that the co-signing nurse is able to place an eSignature**    Nurse 2 eSignature: Electronically signed by Zoila Cronin RN on 3/31/22 at 3:09 AM EDT

## 2022-03-31 NOTE — PROGRESS NOTES
Pt arrived on squad Cpap in moderate distress. Pt was awake but unable to talk in full sentences. Pt changed to bipap. Pt within a couple of minutes did improve respiratory status wise on the bipap. Pt still does have increased WOB and retractions.

## 2022-03-31 NOTE — PLAN OF CARE
Absence of angina  Outcome: Ongoing  Goal: Circulation will improve to fullest extent possible  Description: Circulation will improve to fullest extent possible  Outcome: Ongoing  Goal: Hemodynamic stability will improve  Description: Hemodynamic stability will improve  Outcome: Ongoing     Problem: Tobacco Use:  Goal: Will participate in inpatient tobacco-use cessation counseling  Description: Will participate in inpatient tobacco-use cessation counseling  Outcome: Ongoing     Problem: Venous Thromboembolism:  Goal: Will show no signs or symptoms of venous thromboembolism  Description: Will show no signs or symptoms of venous thromboembolism  Outcome: Ongoing  Goal: Absence of signs or symptoms of impaired coagulation  Description: Absence of signs or symptoms of impaired coagulation  Outcome: Ongoing     Problem: Airway Clearance - Ineffective:  Goal: Ability to maintain a clear airway will improve  Description: Ability to maintain a clear airway will improve  Outcome: Ongoing     Problem: Breathing Pattern - Ineffective:  Goal: Ability to achieve and maintain a regular respiratory rate will improve  Description: Ability to achieve and maintain a regular respiratory rate will improve  Outcome: Ongoing

## 2022-03-31 NOTE — ACP (ADVANCE CARE PLANNING)
Advance Care Planning     Advance Care Planning Activator (Inpatient)  Conversation Note      Date of ACP Conversation: 3/31/2022     Conversation Conducted with: Patient with Decision Making Capacity    ACP Activator: Brady Bae RN    Health Care Decision Maker:     Current Designated Health Care Decision Maker:     Primary Decision Maker: Ml Stack - Child - [phone]    Primary Decision Maker: Renaldo Holloway - Child - [phone]    Care Preferences    Ventilation: \"If you were in your present state of health and suddenly became very ill and were unable to breathe on your own, what would your preference be about the use of a ventilator (breathing machine) if it were available to you? \"      Would the patient desire the use of ventilator (breathing machine)?: yes    \"If your health worsens and it becomes clear that your chance of recovery is unlikely, what would your preference be about the use of a ventilator (breathing machine) if it were available to you? \"     Would the patient desire the use of ventilator (breathing machine)?: Yes, for 2 weeks only then leave up to family      Resuscitation  \"CPR works best to restart the heart when there is a sudden event, like a heart attack, in someone who is otherwise healthy. Unfortunately, CPR does not typically restart the heart for people who have serious health conditions or who are very sick. \"    \"In the event your heart stopped as a result of an underlying serious health condition, would you want attempts to be made to restart your heart (answer \"yes\" for attempt to resuscitate) or would you prefer a natural death (answer \"no\" for do not attempt to resuscitate)? \" yes       [] Yes   [x] No   Educated Patient / Patti Mayer regarding differences between Advance Directives and portable DNR orders.     Length of ACP Conversation in minutes:  5 minutes    Conversation Outcomes:  [x] ACP discussion completed  [] Existing advance directive reviewed with patient; no changes to patient's previously recorded wishes  [] New Advance Directive completed  [] Portable Do Not Rescitate prepared for Provider review and signature  [] POLST/POST/MOLST/MOST prepared for Provider review and signature      Follow-up plan:    [] Schedule follow-up conversation to continue planning  [] Referred individual to Provider for additional questions/concerns   [] Advised patient/agent/surrogate to review completed ACP document and update if needed with changes in condition, patient preferences or care setting    [x] This note routed to one or more involved healthcare providers  Electronically signed by Rylan Wright RN Case Management [phone] on 3/31/2022 at 2:11 PM

## 2022-03-31 NOTE — CARE COORDINATION
[facility]  Patient is active with [facility], Will follow for discharge to home with orders to resume care.       Madison Jerome RN, BSN CTN  [facility] [phone]

## 2022-03-31 NOTE — PLAN OF CARE
Problem: Falls - Risk of:  Goal: Will remain free from falls  Description: Will remain free from falls  Outcome: Ongoing  Goal: Absence of physical injury  Description: Absence of physical injury  Outcome: Ongoing     Problem: Infection:  Goal: Will remain free from infection  Description: Will remain free from infection  Outcome: Ongoing     Problem: Safety:  Goal: Free from accidental physical injury  Description: Free from accidental physical injury  Outcome: Ongoing  Goal: Free from intentional harm  Description: Free from intentional harm  Outcome: Ongoing     Problem: Daily Care:  Goal: Daily care needs are met  Description: Daily care needs are met  Outcome: Ongoing     Problem: Pain:  Goal: Patient's pain/discomfort is manageable  Description: Patient's pain/discomfort is manageable  Outcome: Ongoing     Problem: Skin Integrity:  Goal: Skin integrity will stabilize  Description: Skin integrity will stabilize  Outcome: Ongoing     Problem: Discharge Planning:  Goal: Patients continuum of care needs are met  Description: Patients continuum of care needs are met  Outcome: Ongoing     Problem: Discharge Planning:  Goal: Discharged to appropriate level of care  Description: Discharged to appropriate level of care  Outcome: Ongoing     Problem:  Activity Intolerance:  Goal: Ability to tolerate increased activity will improve  Description: Ability to tolerate increased activity will improve  Outcome: Ongoing     Problem: Cardiac Output - Decreased:  Goal: Hemodynamic stability will improve  Description: Hemodynamic stability will improve  Outcome: Ongoing     Problem: Fluid Volume - Excess:  Goal: Control of fluid volume excess will improve  Description: Control of fluid volume excess will improve  Outcome: Ongoing     Problem: Gas Exchange - Impaired:  Goal: Levels of oxygenation will improve  Description: Levels of oxygenation will improve  Outcome: Ongoing     Problem: Mood - Altered:  Goal: Mood stable  Description: Mood stable  Outcome: Ongoing     Problem: Tissue Perfusion - Cardiopulmonary, Altered:  Goal: Absence of angina  Description: Absence of angina  Outcome: Ongoing  Goal: Circulation will improve to fullest extent possible  Description: Circulation will improve to fullest extent possible  Outcome: Ongoing  Goal: Hemodynamic stability will improve  Description: Hemodynamic stability will improve  Outcome: Ongoing     Problem: Tobacco Use:  Goal: Will participate in inpatient tobacco-use cessation counseling  Description: Will participate in inpatient tobacco-use cessation counseling  Outcome: Ongoing     Problem: Venous Thromboembolism:  Goal: Will show no signs or symptoms of venous thromboembolism  Description: Will show no signs or symptoms of venous thromboembolism  Outcome: Ongoing  Goal: Absence of signs or symptoms of impaired coagulation  Description: Absence of signs or symptoms of impaired coagulation  Outcome: Ongoing     Problem: Airway Clearance - Ineffective:  Goal: Ability to maintain a clear airway will improve  Description: Ability to maintain a clear airway will improve  Outcome: Ongoing     Problem: Breathing Pattern - Ineffective:  Goal: Ability to achieve and maintain a regular respiratory rate will improve  Description: Ability to achieve and maintain a regular respiratory rate will improve  Outcome: Ongoing

## 2022-03-31 NOTE — PROGRESS NOTES
Pharmacy Medication Reconciliation Note     List of medications patient is currently taking is complete. Source of information:   1. Rx dispense history  2. Patient interview    Notes regarding home medications:   1. Changed Torsemide to 40mg bid  2.  Added Mag Ox 400mg bid    Denies taking any other OTC or herbal medications    Joaquín Pittman, [address] [facility] 3/31/2022 10:36 AM

## 2022-04-01 LAB
ANION GAP SERPL CALCULATED.3IONS-SCNC: 16 MMOL/L (ref 3–16)
BASOPHILS ABSOLUTE: 0 K/UL (ref 0–0.2)
BASOPHILS RELATIVE PERCENT: 0 %
BUN BLDV-MCNC: 35 MG/DL (ref 7–20)
CALCIUM SERPL-MCNC: 9.3 MG/DL (ref 8.3–10.6)
CHLORIDE BLD-SCNC: 104 MMOL/L (ref 99–110)
CO2: 21 MMOL/L (ref 21–32)
CREAT SERPL-MCNC: 1.5 MG/DL (ref 0.6–1.2)
EOSINOPHILS ABSOLUTE: 0 K/UL (ref 0–0.6)
EOSINOPHILS RELATIVE PERCENT: 0 %
GFR AFRICAN AMERICAN: 43
GFR NON-AFRICAN AMERICAN: 35
GLUCOSE BLD-MCNC: 142 MG/DL (ref 70–99)
GLUCOSE BLD-MCNC: 171 MG/DL (ref 70–99)
GLUCOSE BLD-MCNC: 176 MG/DL (ref 70–99)
GLUCOSE BLD-MCNC: 190 MG/DL (ref 70–99)
GLUCOSE BLD-MCNC: 208 MG/DL (ref 70–99)
HCT VFR BLD CALC: 33 % (ref 36–48)
HEMOGLOBIN: 10.7 G/DL (ref 12–16)
LYMPHOCYTES ABSOLUTE: 0.4 K/UL (ref 1–5.1)
LYMPHOCYTES RELATIVE PERCENT: 7.9 %
MAGNESIUM: 2.4 MG/DL (ref 1.8–2.4)
MCH RBC QN AUTO: 25.6 PG (ref 26–34)
MCHC RBC AUTO-ENTMCNC: 32.4 G/DL (ref 31–36)
MCV RBC AUTO: 79.2 FL (ref 80–100)
MONOCYTES ABSOLUTE: 0.1 K/UL (ref 0–1.3)
MONOCYTES RELATIVE PERCENT: 2.5 %
NEUTROPHILS ABSOLUTE: 4.3 K/UL (ref 1.7–7.7)
NEUTROPHILS RELATIVE PERCENT: 89.6 %
PDW BLD-RTO: 17.5 % (ref 12.4–15.4)
PERFORMED ON: ABNORMAL
PLATELET # BLD: 260 K/UL (ref 135–450)
PMV BLD AUTO: 10 FL (ref 5–10.5)
POTASSIUM REFLEX MAGNESIUM: 3.4 MMOL/L (ref 3.5–5.1)
RBC # BLD: 4.17 M/UL (ref 4–5.2)
SODIUM BLD-SCNC: 141 MMOL/L (ref 136–145)
URIC ACID, SERUM: 8.1 MG/DL (ref 2.6–6)
WBC # BLD: 4.8 K/UL (ref 4–11)

## 2022-04-01 PROCEDURE — 6370000000 HC RX 637 (ALT 250 FOR IP): Performed by: INTERNAL MEDICINE

## 2022-04-01 PROCEDURE — 94660 CPAP INITIATION&MGMT: CPT

## 2022-04-01 PROCEDURE — 6360000002 HC RX W HCPCS: Performed by: INTERNAL MEDICINE

## 2022-04-01 PROCEDURE — 6360000002 HC RX W HCPCS: Performed by: FAMILY MEDICINE

## 2022-04-01 PROCEDURE — 36415 COLL VENOUS BLD VENIPUNCTURE: CPT

## 2022-04-01 PROCEDURE — 85025 COMPLETE CBC W/AUTO DIFF WBC: CPT

## 2022-04-01 PROCEDURE — 2700000000 HC OXYGEN THERAPY PER DAY

## 2022-04-01 PROCEDURE — 94761 N-INVAS EAR/PLS OXIMETRY MLT: CPT

## 2022-04-01 PROCEDURE — 83735 ASSAY OF MAGNESIUM: CPT

## 2022-04-01 PROCEDURE — 6370000000 HC RX 637 (ALT 250 FOR IP): Performed by: FAMILY MEDICINE

## 2022-04-01 PROCEDURE — 84550 ASSAY OF BLOOD/URIC ACID: CPT

## 2022-04-01 PROCEDURE — 80048 BASIC METABOLIC PNL TOTAL CA: CPT

## 2022-04-01 PROCEDURE — 1200000000 HC SEMI PRIVATE

## 2022-04-01 PROCEDURE — 2580000003 HC RX 258: Performed by: INTERNAL MEDICINE

## 2022-04-01 PROCEDURE — 94640 AIRWAY INHALATION TREATMENT: CPT

## 2022-04-01 RX ORDER — METHYLPREDNISOLONE SODIUM SUCCINATE 40 MG/ML
40 INJECTION, POWDER, LYOPHILIZED, FOR SOLUTION INTRAMUSCULAR; INTRAVENOUS EVERY 12 HOURS
Status: DISCONTINUED | OUTPATIENT
Start: 2022-04-02 | End: 2022-04-02 | Stop reason: HOSPADM

## 2022-04-01 RX ORDER — AZITHROMYCIN 500 MG/1
500 TABLET, FILM COATED ORAL DAILY
Status: DISCONTINUED | OUTPATIENT
Start: 2022-04-01 | End: 2022-04-01

## 2022-04-01 RX ORDER — TORSEMIDE 20 MG/1
40 TABLET ORAL DAILY
Status: DISCONTINUED | OUTPATIENT
Start: 2022-04-01 | End: 2022-04-02

## 2022-04-01 RX ORDER — POTASSIUM CHLORIDE 750 MG/1
20 TABLET, FILM COATED, EXTENDED RELEASE ORAL ONCE
Status: COMPLETED | OUTPATIENT
Start: 2022-04-01 | End: 2022-04-01

## 2022-04-01 RX ORDER — POTASSIUM CHLORIDE 20 MEQ/1
20 TABLET, EXTENDED RELEASE ORAL
Status: DISCONTINUED | OUTPATIENT
Start: 2022-04-01 | End: 2022-04-02 | Stop reason: HOSPADM

## 2022-04-01 RX ORDER — TORSEMIDE 20 MG/1
20 TABLET ORAL DAILY
Status: DISCONTINUED | OUTPATIENT
Start: 2022-04-01 | End: 2022-04-01

## 2022-04-01 RX ADMIN — SODIUM CHLORIDE, PRESERVATIVE FREE 10 ML: 5 INJECTION INTRAVENOUS at 08:54

## 2022-04-01 RX ADMIN — HEPARIN SODIUM 5000 UNITS: 5000 INJECTION INTRAVENOUS; SUBCUTANEOUS at 22:06

## 2022-04-01 RX ADMIN — GABAPENTIN 600 MG: 300 CAPSULE ORAL at 08:55

## 2022-04-01 RX ADMIN — HEPARIN SODIUM 5000 UNITS: 5000 INJECTION INTRAVENOUS; SUBCUTANEOUS at 06:19

## 2022-04-01 RX ADMIN — AZITHROMYCIN MONOHYDRATE 500 MG: 500 INJECTION, POWDER, LYOPHILIZED, FOR SOLUTION INTRAVENOUS at 01:02

## 2022-04-01 RX ADMIN — GABAPENTIN 600 MG: 300 CAPSULE ORAL at 14:34

## 2022-04-01 RX ADMIN — POTASSIUM CHLORIDE 20 MEQ: 750 TABLET, EXTENDED RELEASE ORAL at 08:54

## 2022-04-01 RX ADMIN — AMIODARONE HYDROCHLORIDE 200 MG: 200 TABLET ORAL at 08:54

## 2022-04-01 RX ADMIN — METHYLPREDNISOLONE SODIUM SUCCINATE 40 MG: 40 INJECTION, POWDER, FOR SOLUTION INTRAMUSCULAR; INTRAVENOUS at 09:52

## 2022-04-01 RX ADMIN — ATORVASTATIN CALCIUM 40 MG: 40 TABLET, FILM COATED ORAL at 20:16

## 2022-04-01 RX ADMIN — ASPIRIN 81 MG: 81 TABLET, COATED ORAL at 08:55

## 2022-04-01 RX ADMIN — INSULIN LISPRO 1 UNITS: 100 INJECTION, SOLUTION INTRAVENOUS; SUBCUTANEOUS at 20:16

## 2022-04-01 RX ADMIN — PANTOPRAZOLE SODIUM 40 MG: 40 TABLET, DELAYED RELEASE ORAL at 08:54

## 2022-04-01 RX ADMIN — INSULIN LISPRO 2 UNITS: 100 INJECTION, SOLUTION INTRAVENOUS; SUBCUTANEOUS at 17:18

## 2022-04-01 RX ADMIN — HEPARIN SODIUM 5000 UNITS: 5000 INJECTION INTRAVENOUS; SUBCUTANEOUS at 14:34

## 2022-04-01 RX ADMIN — CARVEDILOL 6.25 MG: 6.25 TABLET, FILM COATED ORAL at 17:18

## 2022-04-01 RX ADMIN — IPRATROPIUM BROMIDE AND ALBUTEROL SULFATE 1 AMPULE: .5; 3 SOLUTION RESPIRATORY (INHALATION) at 07:47

## 2022-04-01 RX ADMIN — INSULIN LISPRO 4 UNITS: 100 INJECTION, SOLUTION INTRAVENOUS; SUBCUTANEOUS at 12:20

## 2022-04-01 RX ADMIN — GABAPENTIN 600 MG: 300 CAPSULE ORAL at 20:16

## 2022-04-01 RX ADMIN — IPRATROPIUM BROMIDE AND ALBUTEROL SULFATE 1 AMPULE: .5; 3 SOLUTION RESPIRATORY (INHALATION) at 20:10

## 2022-04-01 RX ADMIN — TORSEMIDE 40 MG: 20 TABLET ORAL at 09:34

## 2022-04-01 RX ADMIN — IPRATROPIUM BROMIDE AND ALBUTEROL SULFATE 1 AMPULE: .5; 3 SOLUTION RESPIRATORY (INHALATION) at 11:38

## 2022-04-01 RX ADMIN — METHYLPREDNISOLONE SODIUM SUCCINATE 40 MG: 40 INJECTION, POWDER, FOR SOLUTION INTRAMUSCULAR; INTRAVENOUS at 17:17

## 2022-04-01 RX ADMIN — SODIUM CHLORIDE, PRESERVATIVE FREE 10 ML: 5 INJECTION INTRAVENOUS at 20:16

## 2022-04-01 RX ADMIN — METHYLPREDNISOLONE SODIUM SUCCINATE 40 MG: 40 INJECTION, POWDER, FOR SOLUTION INTRAMUSCULAR; INTRAVENOUS at 04:10

## 2022-04-01 RX ADMIN — IPRATROPIUM BROMIDE AND ALBUTEROL SULFATE 1 AMPULE: .5; 3 SOLUTION RESPIRATORY (INHALATION) at 16:12

## 2022-04-01 RX ADMIN — CARVEDILOL 6.25 MG: 6.25 TABLET, FILM COATED ORAL at 08:54

## 2022-04-01 RX ADMIN — CLOPIDOGREL BISULFATE 75 MG: 75 TABLET ORAL at 08:54

## 2022-04-01 RX ADMIN — INSULIN LISPRO 2 UNITS: 100 INJECTION, SOLUTION INTRAVENOUS; SUBCUTANEOUS at 08:56

## 2022-04-01 ASSESSMENT — PAIN SCALES - GENERAL
PAINLEVEL_OUTOF10: 0

## 2022-04-01 NOTE — PROGRESS NOTES
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 BP adequate  Soc Hx: no family present  ROS: less SOB. Remains on O2      Medications     potassium chloride  20 mEq Oral Once    sodium chloride flush  10 mL IntraVENous 2 times per day    azithromycin  500 mg IntraVENous Q24H    ipratropium-albuterol  1 ampule Inhalation Q4H WA    methylPREDNISolone  40 mg IntraVENous Q6H    insulin lispro  0-12 Units SubCUTAneous TID WC    insulin lispro  0-6 Units SubCUTAneous Nightly    [Held by provider] torsemide  50 mg Oral Daily    pantoprazole  40 mg Oral Daily    clopidogrel  75 mg Oral Daily    carvedilol  6.25 mg Oral BID WC    atorvastatin  40 mg Oral Nightly    aspirin  81 mg Oral Daily    amiodarone  200 mg Oral Daily    gabapentin  600 mg Oral TID    heparin (porcine)  5,000 Units SubCUTAneous 3 times per day         OBJECTIVE      Physical    TEMPERATURE:  Current - Temp: 97.9 °F (36.6 °C); Max - Temp  Av.8 °F (36.6 °C)  Min: 97.5 °F (36.4 °C)  Max: 98.2 °F (36.8 °C)  RESPIRATIONS RANGE: Resp  Av.7  Min: 13  Max: 24  PULSE RANGE: Pulse  Av  Min: 65  Max: 78  BLOOD PRESSURE RANGE:  Systolic (05ROW), TA , Min:116 , JFP:518   ; Diastolic (31JRQ), LBU:18, Min:45, Max:77    PULSE OXIMETRY RANGE: SpO2  Av.2 %  Min: 93 %  Max: 100 %  24HR INTAKE/OUTPUT:      Intake/Output Summary (Last 24 hours) at 2022 0834  Last data filed at 3/31/2022 2347  Gross per 24 hour   Intake 1080 ml   Output 2200 ml   Net -1120 ml       GEN: no distress. obese  HEENT: no icterus  NECK: Trachea midline. JVP normal  CHEST: L sided pacer  CV: RRR  LUNGS: diminished bilaterally, unlabored  ABD: + bowel sounds. No distension. No  tenderness  EXT: no edema.    SKIN: no rash  NEURO: no tremor    Data      Lab Results   Component Value Date    CREATININE 1.5 (H) 2022    BUN 35 (H) 2022     04/01/2022    K 3.4 (L) 04/01/2022     04/01/2022    CO2 21 04/01/2022     Lab Results   Component Value Date    WBC 4.8 04/01/2022    HGB 10.7 (L) 04/01/2022    HCT 33.0 (L) 04/01/2022    MCV 79.2 (L) 04/01/2022     04/01/2022       ASSESSMENT     Patient Active Problem List   Diagnosis    COPD exacerbation (HCC)    Elevated troponin    Demand ischemia of myocardium (HCC)    Non morbid obesity due to excess calories    Acute respiratory failure with hypoxia and hypercapnia (HCC)    Essential hypertension    Hyperlipidemia    DMII (diabetes mellitus, type 2) (HCC)    CAD (coronary artery disease)       ASSESSMENT/PLAN:    # 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 Zaire Colorado  - CKD attributed to DM/HTNCHF per Dr. 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  - baseline serum creatinine 1.2-1.4  - if new baseline in 1.7-1.8 range on Jardiance, this would be acceptable  - preserving R arm (has L sided pacer)    # CHF  - compensated  - resume torsemide 40 mg daily   - goal to return to home dose of 40 mg BID   - pt is very reluctant to decrease dose of diuretic as outpt    # Hypokalemia  - resume KCL 20 meq daily     # COPD  - on O2, nebs, steroids and antibiotics per IM    # Elevated Troponin  - peak at 0.03  - improving  - monitoring     # HTN  - relative hypotension prior to admission has improved  - holding jardiance  - hold parameter on Coreg            Available via [employer] secure messaging

## 2022-04-02 VITALS
DIASTOLIC BLOOD PRESSURE: 85 MMHG | HEIGHT: 67 IN | TEMPERATURE: 97.3 F | SYSTOLIC BLOOD PRESSURE: 112 MMHG | BODY MASS INDEX: 35.43 KG/M2 | OXYGEN SATURATION: 98 % | RESPIRATION RATE: 20 BRPM | WEIGHT: 225.75 LBS | HEART RATE: 73 BPM

## 2022-04-02 LAB
ANION GAP SERPL CALCULATED.3IONS-SCNC: 14 MMOL/L (ref 3–16)
BASOPHILS ABSOLUTE: 0 K/UL (ref 0–0.2)
BASOPHILS RELATIVE PERCENT: 0.1 %
BUN BLDV-MCNC: 44 MG/DL (ref 7–20)
CALCIUM SERPL-MCNC: 9.4 MG/DL (ref 8.3–10.6)
CHLORIDE BLD-SCNC: 101 MMOL/L (ref 99–110)
CO2: 24 MMOL/L (ref 21–32)
CREAT SERPL-MCNC: 1.5 MG/DL (ref 0.6–1.2)
EOSINOPHILS ABSOLUTE: 0 K/UL (ref 0–0.6)
EOSINOPHILS RELATIVE PERCENT: 0 %
FERRITIN: 50 NG/ML (ref 15–150)
GFR AFRICAN AMERICAN: 43
GFR NON-AFRICAN AMERICAN: 35
GLUCOSE BLD-MCNC: 153 MG/DL (ref 70–99)
GLUCOSE BLD-MCNC: 157 MG/DL (ref 70–99)
GLUCOSE BLD-MCNC: 177 MG/DL (ref 70–99)
HCT VFR BLD CALC: 32.3 % (ref 36–48)
HEMOGLOBIN: 10.5 G/DL (ref 12–16)
IRON SATURATION: 10 % (ref 15–50)
IRON: 31 UG/DL (ref 37–145)
LYMPHOCYTES ABSOLUTE: 0.4 K/UL (ref 1–5.1)
LYMPHOCYTES RELATIVE PERCENT: 7.1 %
MAGNESIUM: 2.9 MG/DL (ref 1.8–2.4)
MCH RBC QN AUTO: 25.5 PG (ref 26–34)
MCHC RBC AUTO-ENTMCNC: 32.6 G/DL (ref 31–36)
MCV RBC AUTO: 78.5 FL (ref 80–100)
MONOCYTES ABSOLUTE: 0.2 K/UL (ref 0–1.3)
MONOCYTES RELATIVE PERCENT: 3.3 %
NEUTROPHILS ABSOLUTE: 4.4 K/UL (ref 1.7–7.7)
NEUTROPHILS RELATIVE PERCENT: 89.5 %
PDW BLD-RTO: 17.7 % (ref 12.4–15.4)
PERFORMED ON: ABNORMAL
PERFORMED ON: ABNORMAL
PLATELET # BLD: 248 K/UL (ref 135–450)
PMV BLD AUTO: 9.8 FL (ref 5–10.5)
POTASSIUM REFLEX MAGNESIUM: 3.6 MMOL/L (ref 3.5–5.1)
RBC # BLD: 4.12 M/UL (ref 4–5.2)
SODIUM BLD-SCNC: 139 MMOL/L (ref 136–145)
TOTAL IRON BINDING CAPACITY: 310 UG/DL (ref 260–445)
WBC # BLD: 4.9 K/UL (ref 4–11)

## 2022-04-02 PROCEDURE — 80048 BASIC METABOLIC PNL TOTAL CA: CPT

## 2022-04-02 PROCEDURE — 85025 COMPLETE CBC W/AUTO DIFF WBC: CPT

## 2022-04-02 PROCEDURE — 6370000000 HC RX 637 (ALT 250 FOR IP): Performed by: INTERNAL MEDICINE

## 2022-04-02 PROCEDURE — 94640 AIRWAY INHALATION TREATMENT: CPT

## 2022-04-02 PROCEDURE — 82728 ASSAY OF FERRITIN: CPT

## 2022-04-02 PROCEDURE — 2580000003 HC RX 258: Performed by: INTERNAL MEDICINE

## 2022-04-02 PROCEDURE — 83550 IRON BINDING TEST: CPT

## 2022-04-02 PROCEDURE — 36415 COLL VENOUS BLD VENIPUNCTURE: CPT

## 2022-04-02 PROCEDURE — 83735 ASSAY OF MAGNESIUM: CPT

## 2022-04-02 PROCEDURE — 94761 N-INVAS EAR/PLS OXIMETRY MLT: CPT

## 2022-04-02 PROCEDURE — 6360000002 HC RX W HCPCS: Performed by: FAMILY MEDICINE

## 2022-04-02 PROCEDURE — 83540 ASSAY OF IRON: CPT

## 2022-04-02 RX ORDER — PREDNISONE 20 MG/1
40 TABLET ORAL DAILY
Qty: 6 TABLET | Refills: 0 | Status: SHIPPED | OUTPATIENT
Start: 2022-04-03 | End: 2022-04-06

## 2022-04-02 RX ORDER — TORSEMIDE 20 MG/1
40 TABLET ORAL 2 TIMES DAILY
Status: DISCONTINUED | OUTPATIENT
Start: 2022-04-02 | End: 2022-04-02 | Stop reason: HOSPADM

## 2022-04-02 RX ADMIN — CARVEDILOL 6.25 MG: 6.25 TABLET, FILM COATED ORAL at 08:34

## 2022-04-02 RX ADMIN — METHYLPREDNISOLONE SODIUM SUCCINATE 40 MG: 40 INJECTION, POWDER, FOR SOLUTION INTRAMUSCULAR; INTRAVENOUS at 05:36

## 2022-04-02 RX ADMIN — POTASSIUM CHLORIDE 20 MEQ: 20 TABLET, EXTENDED RELEASE ORAL at 08:34

## 2022-04-02 RX ADMIN — TORSEMIDE 40 MG: 20 TABLET ORAL at 08:34

## 2022-04-02 RX ADMIN — IPRATROPIUM BROMIDE AND ALBUTEROL SULFATE 1 AMPULE: .5; 3 SOLUTION RESPIRATORY (INHALATION) at 07:57

## 2022-04-02 RX ADMIN — PANTOPRAZOLE SODIUM 40 MG: 40 TABLET, DELAYED RELEASE ORAL at 08:34

## 2022-04-02 RX ADMIN — SODIUM CHLORIDE, PRESERVATIVE FREE 10 ML: 5 INJECTION INTRAVENOUS at 08:34

## 2022-04-02 RX ADMIN — IPRATROPIUM BROMIDE AND ALBUTEROL SULFATE 1 AMPULE: .5; 3 SOLUTION RESPIRATORY (INHALATION) at 11:57

## 2022-04-02 RX ADMIN — ASPIRIN 81 MG: 81 TABLET, COATED ORAL at 08:34

## 2022-04-02 RX ADMIN — HEPARIN SODIUM 5000 UNITS: 5000 INJECTION INTRAVENOUS; SUBCUTANEOUS at 05:36

## 2022-04-02 RX ADMIN — INSULIN LISPRO 2 UNITS: 100 INJECTION, SOLUTION INTRAVENOUS; SUBCUTANEOUS at 08:34

## 2022-04-02 RX ADMIN — AMIODARONE HYDROCHLORIDE 200 MG: 200 TABLET ORAL at 08:34

## 2022-04-02 RX ADMIN — CLOPIDOGREL BISULFATE 75 MG: 75 TABLET ORAL at 08:34

## 2022-04-02 RX ADMIN — GABAPENTIN 600 MG: 300 CAPSULE ORAL at 08:33

## 2022-04-02 ASSESSMENT — PAIN SCALES - GENERAL
PAINLEVEL_OUTOF10: 0
PAINLEVEL_OUTOF10: 0

## 2022-04-02 NOTE — PROGRESS NOTES
Patient educated on discharge instructions and she verbalized understanding. Written prescription given to patient and IV and telemetry were removed without difficulty. Patient discharging home with daughter, Fabienne Pyle via wheelchair. She is in good spirits about returning home.

## 2022-04-02 NOTE — DISCHARGE INSTR - DIET

## 2022-04-02 NOTE — DISCHARGE SUMMARY
Hospital Medicine Discharge Summary    Patient: Bhavna Bustillos     Gender: female  : 1960   Age: 65 y.o. MRN: [id number]    Code Status: Full Code     Primary Care Provider: Aden Jon MD    Admit Date: 3/30/2022   Discharge Date:   2022    Admitting Physician: Vanessa Mcclain MD  Discharge Physician: Jaime Cristina DO     Discharge Diagnoses: Active Hospital Problems    Diagnosis Date Noted    Elevated troponin [R77.8] 2022    Demand ischemia of myocardium (HCC) [I24.8] 2022    Non morbid obesity due to excess calories [E66.09] 2022    Acute respiratory failure with hypoxia and hypercapnia (HCC) [J96.01, J96.02] 2022    Essential hypertension [I10] 2022    Hyperlipidemia [E78.5] 2022    DMII (diabetes mellitus, type 2) ([facility] [address] [E11.9] 2022    CAD (coronary artery disease) [I25.10] 2022    COPD exacerbation ([facility] [address] [J44.1] 2022       Hospital Course:   an 65y.o. year-old female with a history of hypertension, hyperlipidemia, type II diabetes mellitus, CAD and COPD,  presented to the emergency room for evaluation following an acute onset of shortness of breath and respiratory distress.  She arrived in the hospital ER via EMS and was on CPAP at the time of her arrival but was also in respiratory distress.  She was changed immediately to BiPAP and improved.  In the emergency room she was found to have a COPD exacerbation, acute respiratory failure and an elevated troponin level    Work up completed, and improved with treatment as below. was discharged today in stable condition.      aecopd   Acute hypoxic respiratory failure, POA - improved ,now on room air  - with criteria: < 90% on RA, RR> 18, due to aecopd vs chf  - supplemental oxygen as necessary to keep SaO2 > 90%; not on o2 at baseline  - nebs  - wean steroids, dc with 3 days of prednisone   - cont home inhalers     AIDA  - improved  - due to jardiance?   - crt baseline 1.4-1.5, now 1.8 -> 1.4  - Avoid nephrotoxins  - consulted nephrology     Acute on chronic mixed diastolic systolic CHF with unlikely exacerbation   Cardiomyopathy, icd in place  - last ECHO: ECHO 12/27/2021: EF < 20 % , Moderate eccentric MR, LV is moderately Dilated,G3DD, normal RV  - bnp 17K, was 30 K at last admission   - on torsemide, restarted per nephro  - cont BB  - daily wts  - I/Os - -3.2 L      Chronic conditions - continue home meds unless otherwise stated  Dm  htn  Cad, s/p nstremi, pci to lad,     Disposition:  Home    Exam:     /85   Pulse 73   Temp 97.3 °F (36.3 °C) (Axillary)   Resp 11   Ht 5' 7\" (1.702 m)   Wt 225 lb 12 oz (102.4 kg)   SpO2 92%   BMI 35.36 kg/m²     General appearance:  No apparent distress, appears stated age and cooperative. HEENT:  Normal cephalic, atraumatic without obvious deformity. Pupils equal, round, and reactive to light. Extra ocular muscles intact. Conjunctivae/corneas clear. Neck: Supple, with full range of motion. No jugular venous distention. Trachea midline. Respiratory:  Normal respiratory effort. Clear to auscultation, bilaterally without Rales/Wheezes/Rhonchi. Cardiovascular:  Regular rate and rhythm with normal S1/S2 without murmurs, rubs or gallops. Abdomen: Soft, non-tender, non-distended with normal bowel sounds. Musculoskeletal:  No clubbing, cyanosis or edema bilaterally. Full range of motion without deformity. Skin: Skin color, texture, turgor normal.  No rashes or lesions. Neurologic:  Neurovascularly intact without any focal sensory/motor deficits. Cranial nerves: II-XII intact, grossly non-focal.  Psychiatric:  Alert and oriented, thought content appropriate, normal insight    Consults:     IP CONSULT TO NEPHROLOGY    Labs:  For convenience and continuity at follow-up the following most recent labs are provided:    Lab Results   Component Value Date    WBC 4.9 04/02/2022    HGB 10.5 04/02/2022    HCT 32.3 04/02/2022    MCV 78.5 04/02/2022     04/02/2022     04/02/2022    K 3.6 04/02/2022     04/02/2022    CO2 24 04/02/2022    BUN 44 04/02/2022    CREATININE 1.5 04/02/2022    CALCIUM 9.4 04/02/2022    ALKPHOS 87 03/30/2022    ALT 22 03/30/2022    AST 25 03/30/2022    BILITOT 0.6 03/30/2022    LABALBU 4.2 03/30/2022     No results found for: INR    Radiology:  XR CHEST PORTABLE   Final Result   No acute cardiopulmonary process             Discharge Medications:   Current Discharge Medication List      START taking these medications    Details   predniSONE (DELTASONE) 20 MG tablet Take 2 tablets by mouth daily for 3 days  Qty: 6 tablet, Refills: 0           Current Discharge Medication List        Current Discharge Medication List      CONTINUE these medications which have NOT CHANGED    Details   magnesium oxide (MAG-OX) 400 MG tablet Take 400 mg by mouth 2 times daily      albuterol (PROVENTIL) (2.5 MG/3ML) 0.083% nebulizer solution USE 1 VIAL VIA NEBULIZER EVERY 4 HOURS AS NEEDED DX J44.9      amiodarone (CORDARONE) 200 MG tablet TAKE 1 TABLET BY MOUTH EVERY DAY      ASPIRIN LOW DOSE 81 MG EC tablet TAKE 1 TABLET BY MOUTH EVERY DAY      atorvastatin (LIPITOR) 40 MG tablet TAKE ONE TABLET BY MOUTH ONCE NIGHTLY      carvedilol (COREG) 6.25 MG tablet TAKE 1 TABLET BY MOUTH TWICE A DAY WITH MEALS      clopidogrel (PLAVIX) 75 MG tablet TAKE 1 TABLET BY MOUTH EVERY DAY      gabapentin (NEURONTIN) 600 MG tablet 600 mg 3 times daily. pantoprazole (PROTONIX) 40 MG tablet TAKE 1 TABLET BY MOUTH EVERY DAY      KLOR-CON M20 20 MEQ extended release tablet TAKE 1 TABLET BY MOUTH DAILY.  INDICATIONS: ONE DAILY      torsemide (DEMADEX) 20 MG tablet Take 40 mg by mouth in the morning and at bedtime       SPIRIVA HANDIHALER 18 MCG inhalation capsule INHALE THE ENTIRE CONTENTS OF 1 CAPSULE ONCE A DAY USING HANDIHALER DEVICE           Current Discharge Medication List      STOP taking these medications       JARDIANCE 10 MG tablet Comments:   Reason for Stopping: Follow-up appointments:  one week    Provider Follow-up:    pcp    Condition at Discharge:  Stable    The patient was seen and examined on day of discharge and this discharge summary is in conjunction with any daily progress note from day of discharge. Time Spent on discharge is 1 hour  in the examination, evaluation, counseling and review of medications and discharge plan. Signed:    Chema Munoz DO   4/2/2022      Thank you Zulay Sanchez MD for the opportunity to be involved in this patient's care. If you have any questions or concerns please feel free to contact me at [phone].

## 2022-04-02 NOTE — DISCHARGE INSTR - COC
Continuity of Care Form    Patient Name: Patricia Paul   :  1960  MRN:  [id number]    Admit date:  3/30/2022  Discharge date:  22    Code Status Order: Full Code   Advance Directives:      Admitting Physician:  Kim Pathak MD  PCP: Fidencio Madsen MD    Discharging Nurse: [facility] Unit/Room#: [id number]  Discharging Unit Phone Number: Dilma Mccallum    Emergency Contact:   Extended Emergency Contact Information  Primary Emergency Contact: LUIS FELIPE Baker 1 Phone: [phone]  Relation: Child  Preferred language: sougou  Secondary Emergency Contact: Tank Sheriff  Home Phone: [phone]  Relation: Child  Preferred language: English    Past Surgical History:  History reviewed. No pertinent surgical history. Immunization History: There is no immunization history on file for this patient.     Active Problems:  Patient Active Problem List   Diagnosis Code    COPD exacerbation ([facility]) J44.1    Elevated troponin R77.8    Demand ischemia of myocardium ([facility]) I24.8    Non morbid obesity due to excess calories E66.09    Acute respiratory failure with hypoxia and hypercapnia ([facility]) J96.01, J96.02    Essential hypertension I10    Hyperlipidemia E78.5    DMII (diabetes mellitus, type 2) ([facility]) E11.9    CAD (coronary artery disease) I25.10       Isolation/Infection:   Isolation            No Isolation          Patient Infection Status       None to display            Nurse Assessment:  Last Vital Signs: /85   Pulse 73   Temp 97.3 °F (36.3 °C) (Axillary)   Resp 11   Ht 5' 7\" (1.702 m)   Wt 225 lb 12 oz (102.4 kg)   SpO2 92%   BMI 35.36 kg/m²     Last documented pain score (0-10 scale): Pain Level: 0  Last Weight:   Wt Readings from Last 1 Encounters:   22 225 lb 12 oz (102.4 kg)     Mental Status:  oriented and alert    IV Access:  - None    Nursing Mobility/ADLs:  Walking   Independent  Transfer  Independent  Bathing  Independent  Dressing [facility]  Independent  Med Delivery   whole    Wound Care Documentation and Therapy:        Elimination:  Continence: Bowel: Yes  Bladder: Yes  Urinary Catheter: None   Colostomy/Ileostomy/Ileal Conduit: No       Date of Last BM: 4/1/2022    Intake/Output Summary (Last 24 hours) at 4/2/2022 1136  Last data filed at 4/2/2022 0845  Gross per 24 hour   Intake 1360 ml   Output 2600 ml   Net -1240 ml     I/O last 3 completed shifts: In: 3159 [P.O.:1540]  Out: [address] [Urine:3750]    Safety Concerns:     None    Impairments/Disabilities:      None    Nutrition Therapy:  Current Nutrition Therapy:   - Oral Diet:  General    Routes of Feeding: Oral  Liquids: Thin Liquids  Daily Fluid Restriction: yes - amount 2000  Last Modified Barium Swallow with Video (Video Swallowing Test): not done    Treatments at the Time of Hospital Discharge:   Respiratory Treatments: inhalers, bipap  Oxygen Therapy:  is not on home oxygen therapy.   Ventilator:    - BiPAP   IPAP: 20 cmH20, CPAP/EPAP: 5 cmH2O only when sleeping    Rehab Therapies: Physical Therapy and Occupational Therapy, Nurse  Weight Bearing Status/Restrictions: No weight bearing restrictions  Other Medical Equipment (for information only, NOT a DME order):  none  Other Treatments: HOME HEALTH CARE: LEVEL 1 STANDARD    Home health agency to establish plan of care for patient over 60 day period   Nursing  Initial home SN evaluation visit to occur within 24-48 hours for:  medication management  VS and clinical assessment  S&S chronic disease exacerbation education + when to contact MD / NP  care coordination  Medication Reconciliation during 1st SN visit       PT/OT   Evaluate with goal of regaining prior level of functioning   OT to evaluate if patient has [address] needs for personal care      PCP Visit scheduled within 7 days of hospital discharge       Patient's personal belongings (please select all that are sent with patient):  Bennie    RN SIGNATURE:  Electronically signed by Heriberto Saunders RN on 4/2/22 at 1:41 PM EDT    CASE MANAGEMENT/SOCIAL WORK SECTION    Inpatient Status Date: 3/30/22    Readmission Risk Assessment Score:  Readmission Risk              Risk of Unplanned Readmission:  16           Discharging to Facility/ Agency   Name: [facility]  Address:  Phone: [phone]  Fax:    / signature: Electronically signed by YARON Luis on 4/2/22 at 1:33 PM EDT    PHYSICIAN SECTION    Prognosis: Fair    Condition at Discharge: Stable    Rehab Potential (if transferring to Rehab): Good    Recommended Labs or Other Treatments After Discharge: pt, ot, nurse    Physician Certification: I certify the above information and transfer of Mello Lowe  is necessary for the continuing treatment of the diagnosis listed and that she requires Home Care for greater 30 days.      Update Admission H&P: No change in H&P    PHYSICIAN SIGNATURE:  Electronically signed by Johnny Serrano DO on 4/2/22 at 11:37 AM EDT

## 2022-04-02 NOTE — PROGRESS NOTES
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 Wants to go home   Soc Hx: no family present  ROS: less SOB. Remains on O2      Medications     torsemide  40 mg Oral Daily    potassium chloride  20 mEq Oral Daily with breakfast    methylPREDNISolone  40 mg IntraVENous Q12H    sodium chloride flush  10 mL IntraVENous 2 times per day    ipratropium-albuterol  1 ampule Inhalation Q4H WA    insulin lispro  0-12 Units SubCUTAneous TID WC    insulin lispro  0-6 Units SubCUTAneous Nightly    pantoprazole  40 mg Oral Daily    clopidogrel  75 mg Oral Daily    carvedilol  6.25 mg Oral BID WC    atorvastatin  40 mg Oral Nightly    aspirin  81 mg Oral Daily    amiodarone  200 mg Oral Daily    gabapentin  600 mg Oral TID    heparin (porcine)  5,000 Units SubCUTAneous 3 times per day         OBJECTIVE      Physical    TEMPERATURE:  Current - Temp: 97.3 °F (36.3 °C); Max - Temp  Av.6 °F (36.4 °C)  Min: 97.2 °F (36.2 °C)  Max: 98 °F (36.7 °C)  RESPIRATIONS RANGE: Resp  Av.8  Min: 11  Max: 27  PULSE RANGE: Pulse  Av.5  Min: 65  Max: 75  BLOOD PRESSURE RANGE:  Systolic (68BPD), VYC:549 , Min:110 , FUR:583   ; Diastolic (73FEI), KTV:22, Min:60, Max:96    PULSE OXIMETRY RANGE: SpO2  Av.8 %  Min: 92 %  Max: 96 %  24HR INTAKE/OUTPUT:      Intake/Output Summary (Last 24 hours) at 2022 1016  Last data filed at 2022 0845  Gross per 24 hour   Intake 1360 ml   Output 3000 ml   Net -1640 ml       GEN: no distress. obese  HEENT: no icterus  NECK: Trachea midline. JVP normal  CHEST: L sided pacer  CV: RRR  LUNGS: diminished bilaterally, unlabored  ABD: + bowel sounds. No distension. No  tenderness  EXT: no edema.    SKIN: no rash  NEURO: no tremor    Data      Lab Results   Component Value Date    CREATININE 1.5 (H) 2022    BUN 44 (H) 2022     2022    K 3.6 2022     04/02/2022    CO2 24 04/02/2022     Lab Results   Component Value Date    WBC 4.9 04/02/2022    HGB 10.5 (L) 04/02/2022    HCT 32.3 (L) 04/02/2022    MCV 78.5 (L) 04/02/2022     04/02/2022       ASSESSMENT     Patient Active Problem List   Diagnosis    COPD exacerbation (HCC)    Elevated troponin    Demand ischemia of myocardium (HCC)    Non morbid obesity due to excess calories    Acute respiratory failure with hypoxia and hypercapnia (HCC)    Essential hypertension    Hyperlipidemia    DMII (diabetes mellitus, type 2) (HCC)    CAD (coronary artery disease)       ASSESSMENT/PLAN:    # 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  I will make arrangements for closer follow up with Dr. Rohan Levin    # CKD Stage 3  - follows with Dr. Rohan Levin  - CKD attributed to DM/HTNCHF per Dr. 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  - baseline serum creatinine 1.2-1.4  - if new baseline in 1.7-1.8 range on Jardiance, this would be acceptable  - preserving R arm (has L sided pacer)    # CHF  - compensated  - increase to home dose of 40 mg BID   - pt is very reluctant to decrease dose of diuretic as outpt    # Hypokalemia  - resume KCL 20 meq daily     # COPD  - on O2, nebs, steroids and antibiotics per IM    # Elevated Troponin  - peak at 0.03  - improving  - monitoring     # HTN  - relative hypotension prior to admission has improved  - hold parameter on Coreg            Available via [employer] secure messaging

## 2022-04-04 NOTE — CARE COORDINATION
[facility]    DC order noted, all docs needed have been faxed to [facility] for home care services.     Home care to see patient within 24-48 hrs    Maria Victoria Pagan RN, BSN CTN  [facility] [phone]

## 2022-04-12 ENCOUNTER — HOSPITAL ENCOUNTER (OUTPATIENT)
Dept: PET IMAGING | Age: 62
Discharge: HOME | End: 2022-04-12
Payer: MEDICARE

## 2022-04-12 DIAGNOSIS — R91.8 LUNG MASS: ICD-10-CM

## 2022-04-12 PROCEDURE — 78815 PET IMAGE W/CT SKULL-THIGH: CPT

## 2022-04-12 PROCEDURE — 3430000000 HC RX DIAGNOSTIC RADIOPHARMACEUTICAL: Performed by: INTERNAL MEDICINE

## 2022-04-12 PROCEDURE — A9552 F18 FDG: HCPCS | Performed by: INTERNAL MEDICINE

## 2022-04-12 RX ORDER — FLUDEOXYGLUCOSE F 18 200 MCI/ML
12.73 INJECTION, SOLUTION INTRAVENOUS
Status: DISCONTINUED | OUTPATIENT
Start: 2022-04-12 | End: 2022-04-12

## 2022-04-12 RX ORDER — FLUDEOXYGLUCOSE F 18 200 MCI/ML
12.73 INJECTION, SOLUTION INTRAVENOUS
Status: COMPLETED | OUTPATIENT
Start: 2022-04-12 | End: 2022-04-12

## 2022-04-12 RX ADMIN — FLUDEOXYGLUCOSE F 18 12.73 MILLICURIE: 200 INJECTION, SOLUTION INTRAVENOUS at 12:39

## 2022-04-29 ENCOUNTER — TELEPHONE (OUTPATIENT)
Dept: INTERVENTIONAL RADIOLOGY/VASCULAR | Age: 62
End: 2022-04-29

## 2022-04-29 DIAGNOSIS — D50.0 IRON DEFICIENCY ANEMIA SECONDARY TO BLOOD LOSS (CHRONIC): Primary | ICD-10-CM

## 2022-04-30 PROBLEM — R79.89 ELEVATED TROPONIN: Status: RESOLVED | Noted: 2022-03-31 | Resolved: 2022-04-30

## 2022-04-30 PROBLEM — R77.8 ELEVATED TROPONIN: Status: RESOLVED | Noted: 2022-03-31 | Resolved: 2022-04-30

## 2022-05-02 ENCOUNTER — HOSPITAL ENCOUNTER (OUTPATIENT)
Dept: CT IMAGING | Age: 62
Discharge: HOME | End: 2022-05-02
Payer: MEDICARE

## 2022-05-02 DIAGNOSIS — C34.32 PRIMARY MALIGNANT NEOPLASM OF BRONCHUS OF LEFT LOWER LOBE (HCC): ICD-10-CM

## 2022-05-02 PROCEDURE — 70450 CT HEAD/BRAIN W/O DYE: CPT

## 2022-05-03 ENCOUNTER — HOSPITAL ENCOUNTER (OUTPATIENT)
Dept: INTERVENTIONAL RADIOLOGY/VASCULAR | Age: 62
Discharge: HOME | End: 2022-05-03
Payer: MEDICARE

## 2022-05-03 VITALS
OXYGEN SATURATION: 97 % | HEIGHT: 67 IN | RESPIRATION RATE: 14 BRPM | BODY MASS INDEX: 33.9 KG/M2 | WEIGHT: 216 LBS | DIASTOLIC BLOOD PRESSURE: 56 MMHG | TEMPERATURE: 97.3 F | SYSTOLIC BLOOD PRESSURE: 160 MMHG | HEART RATE: 56 BPM

## 2022-05-03 DIAGNOSIS — D50.0 IRON DEFICIENCY ANEMIA SECONDARY TO BLOOD LOSS (CHRONIC): ICD-10-CM

## 2022-05-03 LAB
GLUCOSE BLD-MCNC: 109 MG/DL (ref 70–99)
HCT VFR BLD CALC: 36 % (ref 36–48)
HEMOGLOBIN: 11.2 G/DL (ref 12–16)
INR BLD: 1.14 (ref 0.88–1.12)
MCH RBC QN AUTO: 23.9 PG (ref 26–34)
MCHC RBC AUTO-ENTMCNC: 31.2 G/DL (ref 31–36)
MCV RBC AUTO: 76.6 FL (ref 80–100)
PDW BLD-RTO: 18 % (ref 12.4–15.4)
PERFORMED ON: ABNORMAL
PLATELET # BLD: 399 K/UL (ref 135–450)
PLATELET SLIDE REVIEW: ADEQUATE
PMV BLD AUTO: 9.6 FL (ref 5–10.5)
PROTHROMBIN TIME: 12.9 SEC (ref 9.9–12.7)
RBC # BLD: 4.69 M/UL (ref 4–5.2)
SLIDE REVIEW: ABNORMAL
WBC # BLD: 4.5 K/UL (ref 4–11)

## 2022-05-03 PROCEDURE — 36415 COLL VENOUS BLD VENIPUNCTURE: CPT

## 2022-05-03 PROCEDURE — 2500000003 HC RX 250 WO HCPCS

## 2022-05-03 PROCEDURE — 7100000011 HC PHASE II RECOVERY - ADDTL 15 MIN

## 2022-05-03 PROCEDURE — 85027 COMPLETE CBC AUTOMATED: CPT

## 2022-05-03 PROCEDURE — 99152 MOD SED SAME PHYS/QHP 5/>YRS: CPT

## 2022-05-03 PROCEDURE — 7100000010 HC PHASE II RECOVERY - FIRST 15 MIN

## 2022-05-03 PROCEDURE — 85610 PROTHROMBIN TIME: CPT

## 2022-05-03 PROCEDURE — 2709999900 IR PORT PLACEMENT > 5 YEARS

## 2022-05-03 PROCEDURE — 36561 INSERT TUNNELED CV CATH: CPT

## 2022-05-03 PROCEDURE — 2580000003 HC RX 258: Performed by: STUDENT IN AN ORGANIZED HEALTH CARE EDUCATION/TRAINING PROGRAM

## 2022-05-03 PROCEDURE — 6360000002 HC RX W HCPCS: Performed by: STUDENT IN AN ORGANIZED HEALTH CARE EDUCATION/TRAINING PROGRAM

## 2022-05-03 PROCEDURE — 76937 US GUIDE VASCULAR ACCESS: CPT

## 2022-05-03 PROCEDURE — 6360000002 HC RX W HCPCS: Performed by: RADIOLOGY

## 2022-05-03 RX ORDER — SODIUM CHLORIDE 9 MG/ML
INJECTION, SOLUTION INTRAVENOUS ONCE
Status: COMPLETED | OUTPATIENT
Start: 2022-05-03 | End: 2022-05-03

## 2022-05-03 RX ORDER — MIDAZOLAM HYDROCHLORIDE 1 MG/ML
INJECTION INTRAMUSCULAR; INTRAVENOUS DAILY PRN
Status: COMPLETED | OUTPATIENT
Start: 2022-05-03 | End: 2022-05-03

## 2022-05-03 RX ORDER — FENTANYL CITRATE 50 UG/ML
INJECTION, SOLUTION INTRAMUSCULAR; INTRAVENOUS DAILY PRN
Status: COMPLETED | OUTPATIENT
Start: 2022-05-03 | End: 2022-05-03

## 2022-05-03 RX ADMIN — CEFAZOLIN SODIUM 2000 MG: 10 INJECTION, POWDER, FOR SOLUTION INTRAVENOUS at 10:31

## 2022-05-03 RX ADMIN — MIDAZOLAM 1 MG: 1 INJECTION INTRAMUSCULAR; INTRAVENOUS at 11:00

## 2022-05-03 RX ADMIN — FENTANYL CITRATE 50 MCG: 50 INJECTION INTRAMUSCULAR; INTRAVENOUS at 11:00

## 2022-05-03 RX ADMIN — SODIUM CHLORIDE: 9 INJECTION, SOLUTION INTRAVENOUS at 10:52

## 2022-05-03 ASSESSMENT — PAIN - FUNCTIONAL ASSESSMENT: PAIN_FUNCTIONAL_ASSESSMENT: 0-10

## 2022-05-03 NOTE — PRE SEDATION
Sedation Pre-Procedure Note    Patient Name: Shreyas Person   YOB: 1960  Room/Bed: Room/bed info not found  Medical Record Number: [id number]  Date: 5/3/2022   Time: 11:24 AM       Indication:  Anemia here for port placement. Consent: I have discussed with the patient and/or the patient representative the indication, alternatives, and the possible risks and/or complications of the planned procedure and the anesthesia methods. The patient and/or patient representative appear to understand and agree to proceed. Vital Signs:   Vitals:    05/03/22 1120   BP: (!) 110/45   Pulse:    Resp: 18   Temp:    SpO2: 95%       Past Medical History:   has a past medical history of Asthma, Cardiomyopathy ([facility] [address] CHF (congestive heart failure) ([facility] [address] COPD (chronic obstructive pulmonary disease) ([facility] [address] Diabetes mellitus ([facility] [address] Essential hypertension, Hyperlipidemia, Hypertension, and Obesity. Past Surgical History:   has a past surgical history that includes Cholecystectomy; Carpal tunnel release; Incontinence surgery; Coronary angioplasty with stent (01/07/2013); and IR PORT PLACEMENT > 5 YEARS (Right, 05/03/2022). Medications:   Scheduled Meds:   Continuous Infusions:   PRN Meds:   Home Meds:   Prior to Admission medications    Medication Sig Start Date End Date Taking?  Authorizing Provider   albuterol (PROVENTIL) (2.5 MG/3ML) 0.083% nebulizer solution USE 1 VIAL VIA NEBULIZER EVERY 4 HOURS AS NEEDED DX J44.9 1/13/22   Historical Provider, MD   amiodarone (CORDARONE) 200 MG tablet TAKE 1 TABLET BY MOUTH EVERY DAY 3/20/22   Historical Provider, MD   ASPIRIN LOW DOSE 81 MG EC tablet TAKE 1 TABLET BY MOUTH EVERY DAY 1/17/22   Historical Provider, MD   atorvastatin (LIPITOR) 40 MG tablet TAKE ONE TABLET BY MOUTH ONCE NIGHTLY 2/16/22   Historical Provider, MD   carvedilol (COREG) 6.25 MG tablet TAKE 1 TABLET BY MOUTH TWICE A DAY WITH MEALS 1/24/22   Historical Provider, MD   clopidogrel (PLAVIX) 75 MG tablet TAKE 1 TABLET BY MOUTH EVERY DAY 1/17/22   Historical Provider, MD   gabapentin (NEURONTIN) 600 MG tablet 600 mg 3 times daily. 3/28/22   Historical Provider, MD   pantoprazole (PROTONIX) 40 MG tablet TAKE 1 TABLET BY MOUTH EVERY DAY 3/20/22   Historical Provider, MD   KLOR-CON M20 20 MEQ extended release tablet TAKE 1 TABLET BY MOUTH DAILY. INDICATIONS: ONE DAILY 12/29/21   Historical Provider, MD   torsemide (DEMADEX) 20 MG tablet Take 40 mg by mouth in the morning and at bedtime  1/6/22   Historical Provider, MD Bowman Has 18 MCG inhalation capsule INHALE Love 28 OF C/Casia 10 3/20/22   Historical Provider, MD   magnesium oxide (MAG-OX) 400 MG tablet Take 400 mg by mouth 2 times daily    Historical Provider, MD   sacubitril-valsartan (ENTRESTO) 24-26 MG per tablet Take 1 tablet by mouth 2 times daily 12/16/21   Ino Monique MD   metoprolol succinate (TOPROL XL) 25 MG extended release tablet Take 0.5 tablets by mouth daily 12/17/21   Ino Monique MD   torsemide [facility]) 100 MG tablet Take 1 tablet by mouth daily 12/17/21   Ino Monique MD   clopidogrel (PLAVIX) 75 MG tablet Take 1 tablet by mouth daily 12/17/21   Ino Monique MD   aspirin 81 MG chewable tablet Take 1 tablet by mouth daily 12/17/21   Ino Monique MD   albuterol (PROVENTIL) (5 MG/ML) 0.5% nebulizer solution Inhale 2.5 mg into the lungs 4 times daily    Historical Provider, MD   amiodarone (CORDARONE) 200 MG tablet Take 200 mg by mouth nightly     Historical Provider, MD   atorvastatin (LIPITOR) 40 MG tablet Take 40 mg by mouth nightly     Historical Provider, MD   magnesium oxide (MAG-OX) 400 MG tablet Take 400 mg by mouth 2 times daily     Historical Provider, MD   potassium chloride SA (KLOR-CON M20) 20 MEQ tablet Take 20 mEq by mouth daily     Historical Provider, MD   pantoprazole (PROTONIX) 40 MG tablet Take 40 mg by mouth daily.     Historical Provider, MD   gabapentin (NEURONTIN) 600 MG tablet Take 600 mg by mouth 3 times daily. Historical Provider, MD   albuterol (PROVENTIL HFA) 108 (90 BASE) MCG/ACT inhaler Inhale 2 puffs into the lungs every 4 hours as needed for Wheezing or Shortness of Breath     Historical Provider, MD   tiotropium (SPIRIVA HANDIHALER) 18 MCG inhalation capsule Inhale 18 mcg into the lungs daily. Historical Provider, MD     Coumadin Use Last 7 Days:  no  Antiplatelet drug therapy use last 7 days: no  Other anticoagulant use last 7 days: no  Additional Medication Information:  n/a      Pre-Sedation Documentation and Exam:   I have reviewed the patient's history and review of systems.     Mallampati Airway Assessment:  Mallampati Class II - (soft palate, fauces & uvula are visible)    Prior History of Anesthesia Complications:   none    ASA Classification:  Class 2 - A normal healthy patient with mild systemic disease    Sedation/ Anesthesia Plan:   intravenous sedation    Medications Planned:   midazolam (Versed) intravenously and fentanyl intravenously    Patient is an appropriate candidate for plan of sedation: yes    Electronically signed by Serene Bowling MD on 5/3/2022 at 11:25 AM

## 2022-05-03 NOTE — PROGRESS NOTES
Pt tolerates PO and sitting up on stretcher. VSS. Denies pain. Discharge instructions and port a cath card reviewed with pt and daughter. Both express an understanding of instructions. Pt dressed with daughter's assistance. Called [employer] for car. Wheeled pt to car.

## 2022-05-03 NOTE — BRIEF OP NOTE
Brief Postoperative Note    Dara Reyes  YOB: 1960  [id number]    Pre-operative Diagnosis: anemia    Post-operative Diagnosis: Same    Procedure: Port placement    Anesthesia: Moderate Sedation    Surgeons: Gorge Caputo MD    Estimated Blood Loss: Less than 5 mL    Complications: None    Specimens: Was Not Obtained    Findings: Successful placement of a right IJ port.     Electronically signed by Gorge Caputo MD on 5/3/2022 at 11:26 AM

## 2022-05-03 NOTE — PROGRESS NOTES
Report received from [facility]. Pt resting with daughter present in room. Ice pack to right subclavian PAC site.

## 2022-05-03 NOTE — PROGRESS NOTES
Resting quietly without complaints. Ice placed to [facility] site. Daughter at bedside.

## 2022-05-03 NOTE — PROGRESS NOTES
Received from Radiology post [facility] insertion. Admitted to Phase 2 care. Awake and alert, respirations easy and even. Oriented to room and surroundings. No complaints.

## 2022-05-10 ENCOUNTER — APPOINTMENT (OUTPATIENT)
Dept: GENERAL RADIOLOGY | Age: 62
DRG: 193 | End: 2022-05-10
Payer: MEDICARE

## 2022-05-10 ENCOUNTER — HOSPITAL ENCOUNTER (INPATIENT)
Age: 62
LOS: 2 days | Discharge: HOME | DRG: 193 | End: 2022-05-13
Attending: EMERGENCY MEDICINE | Admitting: PEDIATRICS
Payer: MEDICARE

## 2022-05-10 ENCOUNTER — APPOINTMENT (OUTPATIENT)
Dept: CT IMAGING | Age: 62
DRG: 193 | End: 2022-05-10
Payer: MEDICARE

## 2022-05-10 DIAGNOSIS — R77.8 ELEVATED TROPONIN: ICD-10-CM

## 2022-05-10 DIAGNOSIS — R06.03 RESPIRATORY DISTRESS: Primary | ICD-10-CM

## 2022-05-10 DIAGNOSIS — R79.89 ELEVATED BRAIN NATRIURETIC PEPTIDE (BNP) LEVEL: ICD-10-CM

## 2022-05-10 DIAGNOSIS — J43.2 CENTRILOBULAR EMPHYSEMA (HCC): ICD-10-CM

## 2022-05-10 DIAGNOSIS — Z99.81 ON SUPPLEMENTAL OXYGEN THERAPY: ICD-10-CM

## 2022-05-10 DIAGNOSIS — R91.8 MASS OF LOWER LOBE OF LEFT LUNG: ICD-10-CM

## 2022-05-10 DIAGNOSIS — J96.01 ACUTE RESPIRATORY FAILURE WITH HYPOXIA (HCC): ICD-10-CM

## 2022-05-10 DIAGNOSIS — J18.9 PNEUMONIA OF LEFT LOWER LOBE DUE TO INFECTIOUS ORGANISM: ICD-10-CM

## 2022-05-10 DIAGNOSIS — C34.90 MALIGNANT NEOPLASM OF LUNG, UNSPECIFIED LATERALITY, UNSPECIFIED PART OF LUNG (HCC): ICD-10-CM

## 2022-05-10 LAB
ANION GAP SERPL CALCULATED.3IONS-SCNC: 18 MMOL/L (ref 3–16)
ANISOCYTOSIS: ABNORMAL
BASE EXCESS VENOUS: -2.6 MMOL/L
BASOPHILS ABSOLUTE: 0 K/UL (ref 0–0.2)
BASOPHILS RELATIVE PERCENT: 0.1 %
BUN BLDV-MCNC: 35 MG/DL (ref 7–20)
BURR CELLS: ABNORMAL
CALCIUM SERPL-MCNC: 9.7 MG/DL (ref 8.3–10.6)
CARBOXYHEMOGLOBIN: 1.4 %
CHLORIDE BLD-SCNC: 100 MMOL/L (ref 99–110)
CO2: 19 MMOL/L (ref 21–32)
CREAT SERPL-MCNC: 1.7 MG/DL (ref 0.6–1.2)
EOSINOPHILS ABSOLUTE: 0 K/UL (ref 0–0.6)
EOSINOPHILS RELATIVE PERCENT: 0 %
GFR AFRICAN AMERICAN: 37
GFR NON-AFRICAN AMERICAN: 31
GLUCOSE BLD-MCNC: 181 MG/DL (ref 70–99)
HCO3 VENOUS: 24 MMOL/L (ref 23–29)
HCT VFR BLD CALC: 35.2 % (ref 36–48)
HEMOGLOBIN: 11 G/DL (ref 12–16)
INR BLD: 1.03 (ref 0.88–1.12)
LYMPHOCYTES ABSOLUTE: 0.4 K/UL (ref 1–5.1)
LYMPHOCYTES RELATIVE PERCENT: 7 %
MACROCYTES: ABNORMAL
MCH RBC QN AUTO: 23.7 PG (ref 26–34)
MCHC RBC AUTO-ENTMCNC: 31.3 G/DL (ref 31–36)
MCV RBC AUTO: 75.9 FL (ref 80–100)
METHEMOGLOBIN VENOUS: 0.6 %
MICROCYTES: ABNORMAL
MONOCYTES ABSOLUTE: 0.2 K/UL (ref 0–1.3)
MONOCYTES RELATIVE PERCENT: 3 %
NEUTROPHILS ABSOLUTE: 5.3 K/UL (ref 1.7–7.7)
NEUTROPHILS RELATIVE PERCENT: 89.9 %
O2 SAT, VEN: 89 %
O2 THERAPY: ABNORMAL
OVALOCYTES: ABNORMAL
PCO2, VEN: 51.2 MMHG (ref 40–50)
PDW BLD-RTO: 18 % (ref 12.4–15.4)
PH VENOUS: 7.29 (ref 7.35–7.45)
PLATELET # BLD: 457 K/UL (ref 135–450)
PLATELET SLIDE REVIEW: ADEQUATE
PMV BLD AUTO: 9.4 FL (ref 5–10.5)
PO2, VEN: 66 MMHG
POTASSIUM REFLEX MAGNESIUM: 4.6 MMOL/L (ref 3.5–5.1)
PRO-BNP: ABNORMAL PG/ML (ref 0–124)
PROTHROMBIN TIME: 11.7 SEC (ref 9.9–12.7)
RBC # BLD: 4.64 M/UL (ref 4–5.2)
SLIDE REVIEW: ABNORMAL
SODIUM BLD-SCNC: 137 MMOL/L (ref 136–145)
TCO2 CALC VENOUS: 26 MMOL/L
TROPONIN: 0.04 NG/ML
WBC # BLD: 5.9 K/UL (ref 4–11)

## 2022-05-10 PROCEDURE — 82803 BLOOD GASES ANY COMBINATION: CPT

## 2022-05-10 PROCEDURE — 2580000003 HC RX 258: Performed by: EMERGENCY MEDICINE

## 2022-05-10 PROCEDURE — 85025 COMPLETE CBC W/AUTO DIFF WBC: CPT

## 2022-05-10 PROCEDURE — 80048 BASIC METABOLIC PNL TOTAL CA: CPT

## 2022-05-10 PROCEDURE — 96365 THER/PROPH/DIAG IV INF INIT: CPT

## 2022-05-10 PROCEDURE — 71045 X-RAY EXAM CHEST 1 VIEW: CPT

## 2022-05-10 PROCEDURE — 99285 EMERGENCY DEPT VISIT HI MDM: CPT

## 2022-05-10 PROCEDURE — 85610 PROTHROMBIN TIME: CPT

## 2022-05-10 PROCEDURE — 6370000000 HC RX 637 (ALT 250 FOR IP): Performed by: EMERGENCY MEDICINE

## 2022-05-10 PROCEDURE — 6360000002 HC RX W HCPCS: Performed by: EMERGENCY MEDICINE

## 2022-05-10 PROCEDURE — 36415 COLL VENOUS BLD VENIPUNCTURE: CPT

## 2022-05-10 PROCEDURE — 94660 CPAP INITIATION&MGMT: CPT

## 2022-05-10 PROCEDURE — 71260 CT THORAX DX C+: CPT

## 2022-05-10 PROCEDURE — 94640 AIRWAY INHALATION TREATMENT: CPT

## 2022-05-10 PROCEDURE — 83880 ASSAY OF NATRIURETIC PEPTIDE: CPT

## 2022-05-10 PROCEDURE — 94761 N-INVAS EAR/PLS OXIMETRY MLT: CPT

## 2022-05-10 PROCEDURE — 6360000004 HC RX CONTRAST MEDICATION: Performed by: EMERGENCY MEDICINE

## 2022-05-10 PROCEDURE — 93005 ELECTROCARDIOGRAM TRACING: CPT | Performed by: EMERGENCY MEDICINE

## 2022-05-10 PROCEDURE — 2700000000 HC OXYGEN THERAPY PER DAY

## 2022-05-10 PROCEDURE — 84484 ASSAY OF TROPONIN QUANT: CPT

## 2022-05-10 RX ORDER — IPRATROPIUM BROMIDE AND ALBUTEROL SULFATE 2.5; .5 MG/3ML; MG/3ML
1 SOLUTION RESPIRATORY (INHALATION) ONCE
Status: COMPLETED | OUTPATIENT
Start: 2022-05-10 | End: 2022-05-10

## 2022-05-10 RX ORDER — VERICIGUAT 5 MG/1
5 TABLET, FILM COATED ORAL EVERY MORNING
COMMUNITY

## 2022-05-10 RX ORDER — POTASSIUM CHLORIDE 750 MG/1
10 CAPSULE, EXTENDED RELEASE ORAL DAILY
Status: ON HOLD | COMMUNITY
End: 2022-05-19 | Stop reason: HOSPADM

## 2022-05-10 RX ORDER — UMECLIDINIUM BROMIDE AND VILANTEROL TRIFENATATE 62.5; 25 UG/1; UG/1
1 POWDER RESPIRATORY (INHALATION) DAILY
COMMUNITY

## 2022-05-10 RX ORDER — PROCHLORPERAZINE MALEATE 10 MG
10 TABLET ORAL EVERY 8 HOURS PRN
COMMUNITY
End: 2022-07-14 | Stop reason: CLARIF

## 2022-05-10 RX ORDER — ONDANSETRON HYDROCHLORIDE 8 MG/1
8 TABLET, FILM COATED ORAL EVERY 8 HOURS PRN
COMMUNITY
End: 2022-07-14 | Stop reason: CLARIF

## 2022-05-10 RX ADMIN — CEFTRIAXONE 1000 MG: 1 INJECTION, POWDER, FOR SOLUTION INTRAMUSCULAR; INTRAVENOUS at 22:39

## 2022-05-10 RX ADMIN — IOPAMIDOL 75 ML: 755 INJECTION, SOLUTION INTRAVENOUS at 20:57

## 2022-05-10 RX ADMIN — IPRATROPIUM BROMIDE AND ALBUTEROL SULFATE 1 AMPULE: 2.5; .5 SOLUTION RESPIRATORY (INHALATION) at 19:29

## 2022-05-10 NOTE — ED NOTES
Patient presents to ED with sudden onset of SOB 30 min PTA. Hx of CHF, lung CA, and just got chemo today and yesterday. Patient tripoding upon arrival, she does have CPAP on from squad. RT at the bedside to place BIPAP.       Petrona Noonan RN  05/10/22 2272

## 2022-05-10 NOTE — ED PROVIDER NOTES
[address] [facility]      Pt Name: Priscila Tirado  MRN: [id number]  [place] 1960  Date of evaluation: 5/10/2022  Provider: Ar Cooper MD    CHIEF COMPLAINT     Shortness of breath  HISTORY OF PRESENT ILLNESS  (Location/Symptom, Timing/Onset,Context/Setting, Quality, Duration, Modifying Factors, Severity). Note limiting factors. Chief Complaint   Patient presents with    Respiratory Distress      Priscila Tirado is a 65 y.o. female who presents to the emergency department secondary to concern for sudden onset of shortness of breath that started about 30 to 45 minutes prior to coming in. She reports she is currently on chemo (got it today and yesterday) for cancer. EMS also reports a history of heart failure, COPD. She denies any history of blood clots. She does states she is on aspirin but the Plavix was discontinued. She is able to nod and shake her head in regards to stating she has been eating and drinking well, denies any nausea, vomiting, fevers, chills. She did have a port placed on May 3, she states they used it today and yesterday for chemo. She reports her dry weight is 215 pounds. She states she has been taking her medication as prescribed with the exception of her evening dose today. She is acutely short of breath on arrival on BiPAP and history is limited due to acuity of condition. Past medical history noted below. Denies smoking. Aside from what is stated above denies any other symptoms or modifying factors. Nursing Notes reviewed. REVIEW OF SYSTEMS  (2-9 systems for level 4, 10 or more for level 5)   Review of Systems as per HPI otherwise limited secondary to acuity of condition.   PAST MEDICAL HISTORY     Past Medical History:   Diagnosis Date    Asthma     Cardiomyopathy ([address]     CHF (congestive heart failure) ([address]     COPD (chronic obstructive pulmonary disease) ([address]     Diabetes mellitus ([address]     Essential hypertension     Hyperlipidemia     Hypertension     Obesity        SURGICALHISTORY       Past Surgical History:   Procedure Laterality Date    CARPAL TUNNEL RELEASE      CHOLECYSTECTOMY      CORONARY ANGIOPLASTY WITH STENT PLACEMENT  01/07/2013    Stent LAD    INCONTINENCE SURGERY      IR PORT PLACEMENT EQUAL OR GREATER THAN 5 YEARS Right 05/03/2022    Power port, inserted by Dr. Evelyn Martinez, cut at 22    IR [place] [facility] [address] 5 YEARS  5/3/2022    IR PORT PLACEMENT EQUAL OR GREATER THAN 5 YEARS 5/3/2022 [facility] SPECIAL PROCEDURES     CURRENT MEDICATIONS       Previous Medications    ALBUTEROL (PROVENTIL HFA) 108 (90 BASE) MCG/ACT INHALER    Inhale 2 puffs into the lungs every 4 hours as needed for Wheezing or Shortness of Breath     ALBUTEROL (PROVENTIL) (2.5 MG/3ML) 0.083% NEBULIZER SOLUTION    USE 1 VIAL VIA NEBULIZER EVERY 4 HOURS AS NEEDED DX J44.9    ALBUTEROL (PROVENTIL) (5 MG/ML) 0.5% NEBULIZER SOLUTION    Inhale 2.5 mg into the lungs 4 times daily    AMIODARONE (CORDARONE) 200 MG TABLET    Take 200 mg by mouth nightly     AMIODARONE (CORDARONE) 200 MG TABLET    TAKE 1 TABLET BY MOUTH EVERY DAY    ASPIRIN 81 MG CHEWABLE TABLET    Take 1 tablet by mouth daily    ASPIRIN LOW DOSE 81 MG EC TABLET    TAKE 1 TABLET BY MOUTH EVERY DAY    ATORVASTATIN (LIPITOR) 40 MG TABLET    Take 40 mg by mouth nightly     ATORVASTATIN (LIPITOR) 40 MG TABLET    TAKE ONE TABLET BY MOUTH ONCE NIGHTLY    CARVEDILOL (COREG) 6.25 MG TABLET    TAKE 1 TABLET BY MOUTH TWICE A DAY WITH MEALS    CLOPIDOGREL (PLAVIX) 75 MG TABLET    Take 1 tablet by mouth daily    CLOPIDOGREL (PLAVIX) 75 MG TABLET    TAKE 1 TABLET BY MOUTH EVERY DAY    GABAPENTIN (NEURONTIN) 600 MG TABLET    Take 600 mg by mouth 3 times daily. GABAPENTIN (NEURONTIN) 600 MG TABLET    600 mg 3 times daily. KLOR-CON M20 20 MEQ EXTENDED RELEASE TABLET    TAKE 1 TABLET BY MOUTH DAILY.  INDICATIONS: ONE DAILY    MAGNESIUM OXIDE (MAG-OX) 400 MG TABLET    Take 400 mg by mouth 2 times daily     MAGNESIUM OXIDE (MAG-OX) 400 MG TABLET    Take 400 mg by mouth 2 times daily    METOPROLOL SUCCINATE (TOPROL XL) 25 MG EXTENDED RELEASE TABLET    Take 0.5 tablets by mouth daily    PANTOPRAZOLE (PROTONIX) 40 MG TABLET    Take 40 mg by mouth daily. PANTOPRAZOLE (PROTONIX) 40 MG TABLET    TAKE 1 TABLET BY MOUTH EVERY DAY    POTASSIUM CHLORIDE SA (KLOR-CON M20) 20 MEQ TABLET    Take 20 mEq by mouth daily     SACUBITRIL-VALSARTAN (ENTRESTO) 24-26 MG PER TABLET    Take 1 tablet by mouth 2 times daily    SPIRIVA HANDIHALER 18 MCG INHALATION CAPSULE    INHALE THE ENTIRE CONTENTS OF 1 CAPSULE ONCE A DAY USING HANDIHALER DEVICE    TIOTROPIUM (SPIRIVA HANDIHALER) 18 MCG INHALATION CAPSULE    Inhale 18 mcg into the lungs daily. TORSEMIDE (DEMADEX) 100 MG TABLET    Take 1 tablet by mouth daily    TORSEMIDE (DEMADEX) 20 MG TABLET    Take 40 mg by mouth in the morning and at bedtime       ALLERGIES     Ace inhibitors, Diclofenac, Losartan, Spironolactone, Vicodin hp [hydrocodone-acetaminophen], and Vicodin [hydrocodone-acetaminophen]  FAMILY HISTORY       Family History   Problem Relation Age of Onset    High Blood Pressure Mother     Diabetes Mother     Cancer Mother         thyroid    Stroke Father      SOCIAL HISTORY       Social History     Socioeconomic History    Marital status:       Spouse name: None    Number of children: None    Years of education: None    Highest education level: None   Occupational History    None   Tobacco Use    Smoking status: Never Smoker    Smokeless tobacco: Never Used   Vaping Use    Vaping Use: Never used   Substance and Sexual Activity    Alcohol use: Not Currently    Drug use: Not Currently    Sexual activity: Not Currently     Partners: Male     Comment:    Other Topics Concern    None   Social History Narrative    ** Merged History Encounter **          Social Determinants of Health     Financial Resource Strain:     Difficulty of Paying Living Expenses: Not on file   Food Insecurity:     Worried About Running Out of Food in the Last Year: Not on file    Yin of Food in the Last Year: Not on file   Transportation Needs:     Lack of Transportation (Medical): Not on file    Lack of Transportation (Non-Medical): Not on file   Physical Activity:     Days of Exercise per Week: Not on file    Minutes of Exercise per Session: Not on file   Stress:     Feeling of Stress : Not on file   Social Connections:     Frequency of Communication with Friends and Family: Not on file    Frequency of Social Gatherings with Friends and Family: Not on file    Attends Sikhism Services: Not on file    Active Member of [address] [place] [address] [employer] or Organizations: Not on file    Attends Club or Organization Meetings: Not on file    Marital Status: Not on file   Intimate Partner Violence:     Fear of Current or Ex-Partner: Not on file    Emotionally Abused: Not on file    Physically Abused: Not on file    Sexually Abused: Not on file   Housing Stability:     Unable to Pay for Housing in the Last Year: Not on file    Number of [place] in the Last Year: Not on file    Unstable Housing in the Last Year: Not on file     SCREENINGS    [place] Coma Scale  Eye Opening: Spontaneous  Best Verbal Response: Oriented  Best Motor Response: Obeys commands  [place] Coma Scale Score: 15    PHYSICAL EXAM  (up to 7 for level 4, 8 or more for level 5)   INITIAL VITALS: BP: (!) 119/92, Temp: 97.9 °F (36.6 °C), Pulse: 90, Resp: 16, SpO2: 90 %   Physical Exam  Vitals reviewed. Constitutional:       General: She is in acute distress. Appearance: She is not toxic-appearing or diaphoretic. HENT:      Head: Normocephalic and atraumatic. Right Ear: External ear normal.      Left Ear: External ear normal.      Nose: Nose normal. No rhinorrhea. Eyes:      General: No scleral icterus. Right eye: No discharge. Left eye: No discharge. Conjunctiva/sclera: Conjunctivae normal.   Neck:      Trachea: No tracheal deviation. Cardiovascular:      Rate and Rhythm: Normal rate. Pulmonary:      Effort: Pulmonary effort is normal. No respiratory distress. Breath sounds: Wheezing, rhonchi and rales present. Chest:      Chest wall: No tenderness. Abdominal:      Tenderness: There is no abdominal tenderness. There is no guarding or rebound. Musculoskeletal:      Cervical back: No rigidity. Right lower leg: No edema. Left lower leg: No edema. Skin:     General: Skin is dry. Capillary Refill: Capillary refill takes less than 2 seconds. Neurological:      Mental Status: She is alert. Comments: Face is grossly symmetric. Moves all extremities to command. Nods and shakes head appropriately. DIAGNOSTIC RESULTS   EKG: interpreted by the Emergency Department Physician who either signs or Co-signs this chart in the absence of a cardiologist.  Indication: Shortness of breath  Interpretation: Rate 76, she is ventricularly paced, comparison to prior EKG from March 30, 2022 shows similar morphology with no acute ischemic changes noted. RADIOLOGY:   Interpretation per Radiologist below, if available at the time of this note:  CT CHEST PULMONARY EMBOLISM W CONTRAST   Final Result   No evidence of pulmonary embolism. 3.2 cm mass in the inferior left lower lobe with surrounding area of patchy   consolidation which could represent disease extension versus superimposed   airspace disease process. Small bilateral pleural effusions. Left hilar   adenopathy as well as multiple enlarged upper mediastinal lymph nodes. XR CHEST PORTABLE   Final Result      1. Degree of vascular congestion without a definite focal pulmonary   infiltrate. 2.  Mild cardiomegaly, unchanged.            LABS:  Labs Reviewed   CBC WITH AUTO DIFFERENTIAL - Abnormal; Notable for the following components:       Result Value    Hemoglobin 11.0 (*)     Hematocrit 35.2 (*)     MCV 75.9 (*)     MCH 23.7 (*)     RDW 18.0 (*)     Platelets 120 (*)     Lymphocytes Absolute 0.4 (*)     Anisocytosis 1+ (*)     Macrocytes Occasional (*)     Microcytes Occasional (*)     Sarbjit Cells Occasional (*)     Ovalocytes 2+ (*)     All other components within normal limits   BASIC METABOLIC PANEL W/ REFLEX TO MG FOR LOW K - Abnormal; Notable for the following components:    CO2 19 (*)     Anion Gap 18 (*)     Glucose 181 (*)     BUN 35 (*)     CREATININE 1.7 (*)     GFR Non- 31 (*)     GFR  37 (*)     All other components within normal limits   BLOOD GAS, VENOUS - Abnormal; Notable for the following components:    pH, [place] 7.288 (*)     pCO2, [place] 51.2 (*)     All other components within normal limits   TROPONIN - Abnormal; Notable for the following components:    Troponin 0.04 (*)     All other components within normal limits   BRAIN NATRIURETIC PEPTIDE - Abnormal; Notable for the following components:    Pro-BNP 25,491 (*)     All other components within normal limits   PROTIME-INR       EMERGENCY DEPARTMENT COURSE and DIFFERENTIAL DIAGNOSIS/MDM:   Patient was given the following medications:  Orders Placed This Encounter   Medications    ipratropium-albuterol (DUONEB) nebulizer solution 1 ampule     Order Specific Question:   Initiate RT Bronchodilator Protocol     Answer:    Yes    iopamidol (ISOVUE-370) 76 % injection 75 mL    AND Linked Order Group     cefTRIAXone (ROCEPHIN) 1000 mg IVPB in 50 mL D5W minibag      Order Specific Question:   Antimicrobial Indications      Answer:   Pneumonia (CAP)     azithromycin (ZITHROMAX) 500 mg in D5W 250ml addavial      Order Specific Question:   Antimicrobial Indications      Answer:   Pneumonia (CAP)     CONSULTS:  None    INITIAL VITALS: BP: (!) 119/92, Temp: 97.9 °F (36.6 °C), Pulse: 90, Resp: 16, SpO2: 90 %   RECENT VITALS:  BP: 131/69,Temp: 97.9 °F (36.6 °C), Pulse: 66, Resp: 17, SpO2: [place] %     Ranjit Fortune is a 65 y.o. female who presents to the emergency department secondary to concern for symptoms as noted in HPI. On arrival she is awake, alert, oriented. She does appear to be in acute respiratory distress on arrival.  Despite this she is able to nod and shake her head appropriately when asked questions though this does limit her history. She was ordered a breathing treatment on arrival, her weight was noted to be 224 pounds which is an increase from what she reports is a dry weight of 215lb. A peripheral IV was placed, labs were ordered along with EKG, portable chest x-ray, and a CT scan to rule out PE.    EKG without acute ischemic changes noted. Her chest x-ray shows vascular congestion without a definite pulmonary infiltrate noted, mild cardiomegaly which is unchanged. Labs show multiple abnormalities including elevated troponin likely secondary to demand ischemia, elevated BNP which is increased compared to her most recent but decreased compared to the 2 before that. CT scan shows the known lung mass with a surrounding area of patchy consolidation which could represent disease extension versus superimposed infection. She has small bilateral pleural effusions noted. She was ordered ceftriaxone and azithromycin. On reassessment she has been able to wean down from the BiPAP to 6 L nasal cannula. Her lung sounds had not significantly changed after the breathing treatment though she stated she was feeling a little better. We discussed the results of her labs and imaging and EKG. We discussed potential for continuing to monitor her in the emergency department and continuing to wean her off of oxygen with potential to be discharged home on antibiotics if she was doing well versus admission.   Unfortunately about 30 minutes later after being on the nasal cannula she had begun to feel more short of breath and had been placed back on the BiPAP at which time admission was initiated. I did discuss goals of care with her, she is a full code and would want both intubation as well as CPR if needed. Her daughter was present with her at the bedside at that time. Consulted hospitalist, Dr. Franco Branham, for admission. He kindly excepted patient. CRITICAL CARE TIME   Due to the immediate potential for life-threatening deterioration due to pneumonia requiring supplemental oxygen, I spent 46 minutes providing critical care. There was a high probability of clinically significant/life threatening deterioration in the patient's condition which required my urgent intervention. This time excludes time spent performing procedures but includes time spent on direct patient care, history retrieval, review of the chart, and discussions with patient, family, and consultant(s). FINAL IMPRESSION      1. Respiratory distress    2. Elevated brain natriuretic peptide (BNP) level    3. Elevated troponin    4. On supplemental oxygen therapy    5. Mass of lower lobe of left lung    6.  Pneumonia of left lower lobe due to infectious organism        DISPOSITION/PLAN   DISPOSITION admission             (Please note that portions of this note were completed with a voice recognition program. Efforts were made to edit the dictations but occasionally words are mis-transcribed.)    Tatiana Santiago MD (electronically signed)  Attending Emergency Physician        Tatiana Santiago MD  05/11/22 1511

## 2022-05-11 PROBLEM — J96.90 RESPIRATORY FAILURE (HCC): Status: ACTIVE | Noted: 2022-05-11

## 2022-05-11 LAB
ANION GAP SERPL CALCULATED.3IONS-SCNC: 20 MMOL/L (ref 3–16)
BUN BLDV-MCNC: 40 MG/DL (ref 7–20)
CALCIUM SERPL-MCNC: 9.4 MG/DL (ref 8.3–10.6)
CHLORIDE BLD-SCNC: 100 MMOL/L (ref 99–110)
CO2: 18 MMOL/L (ref 21–32)
CREAT SERPL-MCNC: 1.7 MG/DL (ref 0.6–1.2)
EKG ATRIAL RATE: 76 BPM
EKG DIAGNOSIS: NORMAL
EKG P AXIS: 50 DEGREES
EKG Q-T INTERVAL: 516 MS
EKG QRS DURATION: 182 MS
EKG QTC CALCULATION (BAZETT): 580 MS
EKG R AXIS: -63 DEGREES
EKG T AXIS: 120 DEGREES
EKG VENTRICULAR RATE: 76 BPM
GFR AFRICAN AMERICAN: 37
GFR NON-AFRICAN AMERICAN: 31
GLUCOSE BLD-MCNC: 127 MG/DL (ref 70–99)
GLUCOSE BLD-MCNC: 141 MG/DL (ref 70–99)
GLUCOSE BLD-MCNC: 145 MG/DL (ref 70–99)
GLUCOSE BLD-MCNC: 159 MG/DL (ref 70–99)
GLUCOSE BLD-MCNC: 161 MG/DL (ref 70–99)
GLUCOSE BLD-MCNC: 174 MG/DL (ref 70–99)
PERFORMED ON: ABNORMAL
POTASSIUM REFLEX MAGNESIUM: 5.3 MMOL/L (ref 3.5–5.1)
SODIUM BLD-SCNC: 138 MMOL/L (ref 136–145)

## 2022-05-11 PROCEDURE — 6360000002 HC RX W HCPCS: Performed by: PEDIATRICS

## 2022-05-11 PROCEDURE — 2580000003 HC RX 258: Performed by: PEDIATRICS

## 2022-05-11 PROCEDURE — 2060000000 HC ICU INTERMEDIATE R&B

## 2022-05-11 PROCEDURE — 93010 ELECTROCARDIOGRAM REPORT: CPT | Performed by: INTERNAL MEDICINE

## 2022-05-11 PROCEDURE — 6360000002 HC RX W HCPCS: Performed by: EMERGENCY MEDICINE

## 2022-05-11 PROCEDURE — 94660 CPAP INITIATION&MGMT: CPT

## 2022-05-11 PROCEDURE — 6370000000 HC RX 637 (ALT 250 FOR IP): Performed by: PEDIATRICS

## 2022-05-11 PROCEDURE — 94761 N-INVAS EAR/PLS OXIMETRY MLT: CPT

## 2022-05-11 PROCEDURE — 2580000003 HC RX 258: Performed by: EMERGENCY MEDICINE

## 2022-05-11 PROCEDURE — 36415 COLL VENOUS BLD VENIPUNCTURE: CPT

## 2022-05-11 PROCEDURE — 2700000000 HC OXYGEN THERAPY PER DAY

## 2022-05-11 PROCEDURE — 94640 AIRWAY INHALATION TREATMENT: CPT

## 2022-05-11 PROCEDURE — 80048 BASIC METABOLIC PNL TOTAL CA: CPT

## 2022-05-11 RX ORDER — SODIUM CHLORIDE 0.9 % (FLUSH) 0.9 %
5-40 SYRINGE (ML) INJECTION EVERY 12 HOURS SCHEDULED
Status: DISCONTINUED | OUTPATIENT
Start: 2022-05-11 | End: 2022-05-13 | Stop reason: HOSPADM

## 2022-05-11 RX ORDER — PANTOPRAZOLE SODIUM 40 MG/1
40 TABLET, DELAYED RELEASE ORAL DAILY
Status: DISCONTINUED | OUTPATIENT
Start: 2022-05-11 | End: 2022-05-13 | Stop reason: HOSPADM

## 2022-05-11 RX ORDER — TORSEMIDE 20 MG/1
40 TABLET ORAL 2 TIMES DAILY
Status: DISCONTINUED | OUTPATIENT
Start: 2022-05-11 | End: 2022-05-13 | Stop reason: HOSPADM

## 2022-05-11 RX ORDER — INSULIN LISPRO 100 [IU]/ML
0-6 INJECTION, SOLUTION INTRAVENOUS; SUBCUTANEOUS
Status: DISCONTINUED | OUTPATIENT
Start: 2022-05-11 | End: 2022-05-11

## 2022-05-11 RX ORDER — ATORVASTATIN CALCIUM 40 MG/1
40 TABLET, FILM COATED ORAL NIGHTLY
Status: DISCONTINUED | OUTPATIENT
Start: 2022-05-11 | End: 2022-05-13 | Stop reason: HOSPADM

## 2022-05-11 RX ORDER — LANOLIN ALCOHOL/MO/W.PET/CERES
400 CREAM (GRAM) TOPICAL 2 TIMES DAILY
Status: DISCONTINUED | OUTPATIENT
Start: 2022-05-11 | End: 2022-05-13 | Stop reason: HOSPADM

## 2022-05-11 RX ORDER — CARVEDILOL 6.25 MG/1
6.25 TABLET ORAL 2 TIMES DAILY WITH MEALS
Status: DISCONTINUED | OUTPATIENT
Start: 2022-05-11 | End: 2022-05-13 | Stop reason: HOSPADM

## 2022-05-11 RX ORDER — NICOTINE POLACRILEX 4 MG
15 LOZENGE BUCCAL PRN
Status: DISCONTINUED | OUTPATIENT
Start: 2022-05-11 | End: 2022-05-13 | Stop reason: HOSPADM

## 2022-05-11 RX ORDER — INSULIN LISPRO 100 [IU]/ML
0-3 INJECTION, SOLUTION INTRAVENOUS; SUBCUTANEOUS NIGHTLY
Status: DISCONTINUED | OUTPATIENT
Start: 2022-05-11 | End: 2022-05-11

## 2022-05-11 RX ORDER — ENOXAPARIN SODIUM 100 MG/ML
30 INJECTION SUBCUTANEOUS 2 TIMES DAILY
Status: DISCONTINUED | OUTPATIENT
Start: 2022-05-11 | End: 2022-05-13 | Stop reason: HOSPADM

## 2022-05-11 RX ORDER — POLYETHYLENE GLYCOL 3350 17 G/17G
17 POWDER, FOR SOLUTION ORAL DAILY PRN
Status: DISCONTINUED | OUTPATIENT
Start: 2022-05-11 | End: 2022-05-13 | Stop reason: HOSPADM

## 2022-05-11 RX ORDER — ACETAMINOPHEN 650 MG/1
650 SUPPOSITORY RECTAL EVERY 6 HOURS PRN
Status: DISCONTINUED | OUTPATIENT
Start: 2022-05-11 | End: 2022-05-13 | Stop reason: HOSPADM

## 2022-05-11 RX ORDER — ONDANSETRON 2 MG/ML
4 INJECTION INTRAMUSCULAR; INTRAVENOUS EVERY 6 HOURS PRN
Status: DISCONTINUED | OUTPATIENT
Start: 2022-05-11 | End: 2022-05-13 | Stop reason: HOSPADM

## 2022-05-11 RX ORDER — PROCHLORPERAZINE MALEATE 5 MG/1
10 TABLET ORAL EVERY 8 HOURS PRN
Status: DISCONTINUED | OUTPATIENT
Start: 2022-05-11 | End: 2022-05-13 | Stop reason: HOSPADM

## 2022-05-11 RX ORDER — ONDANSETRON 4 MG/1
4 TABLET, ORALLY DISINTEGRATING ORAL EVERY 8 HOURS PRN
Status: DISCONTINUED | OUTPATIENT
Start: 2022-05-11 | End: 2022-05-13 | Stop reason: HOSPADM

## 2022-05-11 RX ORDER — GABAPENTIN 300 MG/1
600 CAPSULE ORAL 3 TIMES DAILY
Status: DISCONTINUED | OUTPATIENT
Start: 2022-05-11 | End: 2022-05-13 | Stop reason: HOSPADM

## 2022-05-11 RX ORDER — INSULIN LISPRO 100 [IU]/ML
0-6 INJECTION, SOLUTION INTRAVENOUS; SUBCUTANEOUS EVERY 4 HOURS
Status: DISCONTINUED | OUTPATIENT
Start: 2022-05-11 | End: 2022-05-11

## 2022-05-11 RX ORDER — ACETAMINOPHEN 325 MG/1
650 TABLET ORAL EVERY 6 HOURS PRN
Status: DISCONTINUED | OUTPATIENT
Start: 2022-05-11 | End: 2022-05-13 | Stop reason: HOSPADM

## 2022-05-11 RX ORDER — IPRATROPIUM BROMIDE AND ALBUTEROL SULFATE 2.5; .5 MG/3ML; MG/3ML
1 SOLUTION RESPIRATORY (INHALATION)
Status: DISCONTINUED | OUTPATIENT
Start: 2022-05-11 | End: 2022-05-13 | Stop reason: HOSPADM

## 2022-05-11 RX ORDER — ASPIRIN 81 MG/1
81 TABLET ORAL DAILY
Status: DISCONTINUED | OUTPATIENT
Start: 2022-05-11 | End: 2022-05-13 | Stop reason: HOSPADM

## 2022-05-11 RX ORDER — SODIUM CHLORIDE 0.9 % (FLUSH) 0.9 %
5-40 SYRINGE (ML) INJECTION PRN
Status: DISCONTINUED | OUTPATIENT
Start: 2022-05-11 | End: 2022-05-13 | Stop reason: HOSPADM

## 2022-05-11 RX ORDER — AMIODARONE HYDROCHLORIDE 200 MG/1
200 TABLET ORAL DAILY
Status: DISCONTINUED | OUTPATIENT
Start: 2022-05-11 | End: 2022-05-13 | Stop reason: HOSPADM

## 2022-05-11 RX ORDER — IPRATROPIUM BROMIDE AND ALBUTEROL SULFATE 2.5; .5 MG/3ML; MG/3ML
1 SOLUTION RESPIRATORY (INHALATION) ONCE
Status: COMPLETED | OUTPATIENT
Start: 2022-05-11 | End: 2022-05-11

## 2022-05-11 RX ORDER — POTASSIUM CHLORIDE 750 MG/1
10 TABLET, FILM COATED, EXTENDED RELEASE ORAL DAILY
Status: DISCONTINUED | OUTPATIENT
Start: 2022-05-11 | End: 2022-05-13 | Stop reason: HOSPADM

## 2022-05-11 RX ORDER — SODIUM CHLORIDE 9 MG/ML
INJECTION, SOLUTION INTRAVENOUS PRN
Status: DISCONTINUED | OUTPATIENT
Start: 2022-05-11 | End: 2022-05-13 | Stop reason: HOSPADM

## 2022-05-11 RX ORDER — DEXTROSE MONOHYDRATE 50 MG/ML
100 INJECTION, SOLUTION INTRAVENOUS PRN
Status: DISCONTINUED | OUTPATIENT
Start: 2022-05-11 | End: 2022-05-13 | Stop reason: HOSPADM

## 2022-05-11 RX ORDER — INSULIN LISPRO 100 [IU]/ML
0-6 INJECTION, SOLUTION INTRAVENOUS; SUBCUTANEOUS
Status: DISCONTINUED | OUTPATIENT
Start: 2022-05-11 | End: 2022-05-13 | Stop reason: HOSPADM

## 2022-05-11 RX ADMIN — ALBUTEROL SULFATE 2.5 MG: 2.5 SOLUTION RESPIRATORY (INHALATION) at 00:46

## 2022-05-11 RX ADMIN — CARVEDILOL 6.25 MG: 6.25 TABLET, FILM COATED ORAL at 09:01

## 2022-05-11 RX ADMIN — ATORVASTATIN CALCIUM 40 MG: 40 TABLET, FILM COATED ORAL at 21:46

## 2022-05-11 RX ADMIN — INSULIN LISPRO 1 UNITS: 100 INJECTION, SOLUTION INTRAVENOUS; SUBCUTANEOUS at 04:30

## 2022-05-11 RX ADMIN — IPRATROPIUM BROMIDE AND ALBUTEROL SULFATE 1 AMPULE: .5; 3 SOLUTION RESPIRATORY (INHALATION) at 04:07

## 2022-05-11 RX ADMIN — IPRATROPIUM BROMIDE AND ALBUTEROL SULFATE 1 AMPULE: .5; 3 SOLUTION RESPIRATORY (INHALATION) at 11:38

## 2022-05-11 RX ADMIN — GABAPENTIN 600 MG: 300 CAPSULE ORAL at 21:46

## 2022-05-11 RX ADMIN — INSULIN LISPRO 1 UNITS: 100 INJECTION, SOLUTION INTRAVENOUS; SUBCUTANEOUS at 09:02

## 2022-05-11 RX ADMIN — CARVEDILOL 6.25 MG: 6.25 TABLET, FILM COATED ORAL at 17:48

## 2022-05-11 RX ADMIN — IPRATROPIUM BROMIDE AND ALBUTEROL SULFATE 1 AMPULE: .5; 3 SOLUTION RESPIRATORY (INHALATION) at 19:48

## 2022-05-11 RX ADMIN — TORSEMIDE 40 MG: 20 TABLET ORAL at 09:01

## 2022-05-11 RX ADMIN — Medication 400 MG: at 09:01

## 2022-05-11 RX ADMIN — AMIODARONE HYDROCHLORIDE 200 MG: 200 TABLET ORAL at 09:01

## 2022-05-11 RX ADMIN — ENOXAPARIN SODIUM 30 MG: 100 INJECTION SUBCUTANEOUS at 21:46

## 2022-05-11 RX ADMIN — ENOXAPARIN SODIUM 30 MG: 100 INJECTION SUBCUTANEOUS at 09:01

## 2022-05-11 RX ADMIN — Medication 10 ML: at 21:47

## 2022-05-11 RX ADMIN — CEFEPIME HYDROCHLORIDE 2000 MG: 2 INJECTION, POWDER, FOR SOLUTION INTRAVENOUS at 17:52

## 2022-05-11 RX ADMIN — TORSEMIDE 40 MG: 20 TABLET ORAL at 17:49

## 2022-05-11 RX ADMIN — AZITHROMYCIN MONOHYDRATE 500 MG: 500 INJECTION, POWDER, LYOPHILIZED, FOR SOLUTION INTRAVENOUS at 03:11

## 2022-05-11 RX ADMIN — POTASSIUM CHLORIDE 10 MEQ: 750 TABLET, EXTENDED RELEASE ORAL at 09:01

## 2022-05-11 RX ADMIN — GABAPENTIN 600 MG: 300 CAPSULE ORAL at 09:01

## 2022-05-11 RX ADMIN — GABAPENTIN 600 MG: 300 CAPSULE ORAL at 15:01

## 2022-05-11 RX ADMIN — CEFEPIME HYDROCHLORIDE 2000 MG: 2 INJECTION, POWDER, FOR SOLUTION INTRAVENOUS at 06:32

## 2022-05-11 RX ADMIN — Medication 400 MG: at 21:46

## 2022-05-11 RX ADMIN — PANTOPRAZOLE SODIUM 40 MG: 40 TABLET, DELAYED RELEASE ORAL at 06:34

## 2022-05-11 RX ADMIN — IPRATROPIUM BROMIDE AND ALBUTEROL SULFATE 1 AMPULE: .5; 3 SOLUTION RESPIRATORY (INHALATION) at 15:30

## 2022-05-11 RX ADMIN — IPRATROPIUM BROMIDE AND ALBUTEROL SULFATE 1 AMPULE: .5; 3 SOLUTION RESPIRATORY (INHALATION) at 08:11

## 2022-05-11 RX ADMIN — ASPIRIN 81 MG: 81 TABLET, COATED ORAL at 09:01

## 2022-05-11 RX ADMIN — TORSEMIDE 40 MG: 20 TABLET ORAL at 01:29

## 2022-05-11 ASSESSMENT — PAIN DESCRIPTION - LOCATION: LOCATION: BACK

## 2022-05-11 ASSESSMENT — PAIN SCALES - GENERAL: PAINLEVEL_OUTOF10: 2

## 2022-05-11 ASSESSMENT — PAIN DESCRIPTION - ORIENTATION: ORIENTATION: LOWER

## 2022-05-11 NOTE — CARE COORDINATION
[facility]  Patient is active with [facility], Will follow for discharge to home with orders to resume care.       Maria Victoria Pagan RN, BSN CTN  [facility] [phone]

## 2022-05-11 NOTE — H&P
Hospital Medicine History & Physical      PCP: Aden Jon MD    Date of Admission: 5/10/2022    Date of Service: Pt seen/examined on 05/11/22  and Admitted to Inpatient with expected LOS greater than two midnights due to medical therapy. Chief Complaint:  Dyspnea       History Of Present Illness:      65 y.o. female who presented to [facility] with hx of COPD, apparent pneumonia, and small cell lung cancer - started treatment with chemotherapy 5/9/22. She presents with report of shortness of breath that started ~6 PM on 5/10/22. She reports mild cough. She reports productive cough just tonight. Not aware of any hemoptysis. No fevers. Communication is presently limited as he is on BIPAP. SocHx:   - quit smoking in 2014   - no alcohol   -    - has 2 children   - disabled     Past Medical History:          Diagnosis Date    Asthma     Cardiomyopathy ([address]     CHF (congestive heart failure) (HCC)     [facility]ch[facility]     Diabetes mellitus [facility]     Essential hypertension     Hyperlipidemia     Hypertension     Obesity        Past Surgical History:          Procedure Laterality Date    CARPAL TUNNEL RELEASE      CHOLECYSTECTOMY      CORONARY ANGIOPLASTY WITH STENT PLACEMENT  01/07/2013    Stent LAD    INCONTINENCE SURGERY      IR PORT PLACEMENT EQUAL OR GREATER THAN 5 YEARS Right 05/03/2022    Power port, inserted by Dr. Shira Greene, cut at 22    IR PORT PLACEMENT EQUAL OR GREATER THAN 5 YEARS  5/3/2022    IR PORT PLACEMENT EQUAL OR GREATER THAN 5 YEARS 5/3/2022 [facility] SPECIAL PROCEDURES       Medications Prior to Admission:      Prior to Admission medications    Medication Sig Start Date End Date Taking?  Authorizing Provider   potassium chloride (MICRO-K) 10 MEQ extended release capsule Take 10 mEq by mouth daily   Yes Historical Provider, MD   empagliflozin (JARDIANCE) 10 MG tablet Take 10 mg by mouth daily   Yes Historical Provider, MD umeclidinium-vilanterol (ANORO ELLIPTA) 62.5-25 MCG/INH AEPB inhaler Inhale 1 puff into the lungs daily   Yes Historical Provider, MD   Vericiguat (VERQUVO) 5 MG TABS Take 5 mg by mouth every morning   Yes Historical Provider, MD   ondansetron (ZOFRAN) 8 MG tablet Take 8 mg by mouth every 8 hours as needed for Nausea or Vomiting (POST CHEMO NAUSEA)   Yes Historical Provider, MD   prochlorperazine (COMPAZINE) 10 MG tablet Take 10 mg by mouth every 8 hours as needed (POST CHEMO NAUSEA)   Yes Historical Provider, MD   albuterol (PROVENTIL) (2.5 MG/3ML) 0.083% nebulizer solution USE 1 VIAL VIA NEBULIZER EVERY 4 HOURS AS NEEDED DX J44.9 1/13/22   Historical Provider, MD   amiodarone (CORDARONE) 200 MG tablet TAKE 1 TABLET BY MOUTH EVERY DAY 3/20/22   Historical Provider, MD   ASPIRIN LOW DOSE 81 MG EC tablet TAKE 1 TABLET BY MOUTH EVERY DAY 1/17/22   Historical Provider, MD   carvedilol (COREG) 6.25 MG tablet TAKE 1 TABLET BY MOUTH TWICE A DAY WITH MEALS 1/24/22   Historical Provider, MD   pantoprazole (PROTONIX) 40 MG tablet TAKE 1 TABLET BY MOUTH EVERY DAY 3/20/22   Historical Provider, MD   torsemide (DEMADEX) 20 MG tablet Take 40 mg by mouth in the morning and at bedtime  1/6/22   Historical Provider, MD   clopidogrel (PLAVIX) 75 MG tablet Take 1 tablet by mouth daily  Patient not taking: Reported on 5/10/2022 12/17/21   Sera Gonzalez MD   atorvastatin (LIPITOR) 40 MG tablet Take 40 mg by mouth nightly     Historical Provider, MD   magnesium oxide (MAG-OX) 400 MG tablet Take 400 mg by mouth 2 times daily     Historical Provider, MD   gabapentin (NEURONTIN) 600 MG tablet Take 600 mg by mouth 3 times daily.      Historical Provider, MD   albuterol (PROVENTIL HFA) 108 (90 BASE) MCG/ACT inhaler Inhale 2 puffs into the lungs every 4 hours as needed for Wheezing or Shortness of Breath     Historical Provider, MD       Allergies:  Ace inhibitors, Diclofenac, Losartan, Spironolactone, Vicodin hp [hydrocodone-acetaminophen], and Vicodin [hydrocodone-acetaminophen]    Social History:      The patient currently lives at home alone but daughter lives nearby     TOBACCO:   reports that she has never smoked. She has never used smokeless tobacco.  ETOH:   reports previous alcohol use. E-Cigarettes/Vaping Use     Questions Responses    E-Cigarette/Vaping Use Never User    Start Date     Passive Exposure     Quit Date     Counseling Given     Comments             Family History:              Problem Relation Age of Onset    High Blood Pressure Mother     Diabetes Mother     Cancer Mother         thyroid    Stroke Father        REVIEW OF SYSTEMS COMPLETED:   Pertinent positives as noted in the HPI. All other systems reviewed and negative. PHYSICAL EXAM PERFORMED:    BP (!) 157/60   Pulse 72   Temp 97.9 °F (36.6 °C) (Axillary)   Resp 16   Wt 224 lb (101.6 kg)   SpO2 99%   BMI 35.08 kg/m²     General appearance:  No apparent distress, appears stated age and cooperative. HEENT:  Normal cephalic, atraumatic without obvious deformity. Pupils equal, round, and reactive to light. Extra ocular muscles intact. Conjunctivae/corneas clear. Neck: Supple, with full range of motion. No jugular venous distention. Trachea midline. Respiratory:  Normal respiratory effort. Clear to auscultation, bilaterally without Rales/Wheezes/Rhonchi.  - on bipap   - prolonged exhalation   Cardiovascular:  Regular rate and rhythm with normal S1/S2 without murmurs, rubs or gallops. Abdomen: Soft, non-tender, non-distended    - obese   Musculoskeletal:  No clubbing, cyanosis or edema bilaterally. Skin: Skin color, texture, turgor normal.  No rashes or lesions.   Neurologic:    Speech clear   Moves ext x 4   No facial droop (but limited as on bipap)   Psychiatric:  Alert and oriented   Capillary Refill: Brisk,3 seconds, normal  Peripheral Pulses: +2 palpable, equal bilaterally       Labs:     Recent Labs     05/10/22  1916   WBC 5.9   HGB 11.0*   HCT 35.2* *     Recent Labs     05/10/22  1916      K 4.6      CO2 19*   BUN 35*   CREATININE 1.7*   CALCIUM 9.7     No results for input(s): AST, ALT, BILIDIR, BILITOT, ALKPHOS in the last 72 hours. Recent Labs     05/10/22  1916   INR 1.03     Recent Labs     05/10/22  1916   TROPONINI 0.04*       Urinalysis:      Lab Results   Component Value Date    NITRU Negative 03/31/2022    WBCUA 6 03/30/2022    BACTERIA 4+ 03/30/2022    RBCUA 1 03/30/2022    BLOODU Negative 03/31/2022    SPECGRAV 1.010 03/31/2022    GLUCOSEU >=1000 03/31/2022       Radiology:     CXR: I have reviewed the CXR with the following interpretation:   EKG:  I have reviewed the EKG with the following interpretation:     CT CHEST PULMONARY EMBOLISM W CONTRAST   Final Result   No evidence of pulmonary embolism. 3.2 cm mass in the inferior left lower lobe with surrounding area of patchy   consolidation which could represent disease extension versus superimposed   airspace disease process. Small bilateral pleural effusions. Left hilar   adenopathy as well as multiple enlarged upper mediastinal lymph nodes. XR CHEST PORTABLE   Final Result      1. Degree of vascular congestion without a definite focal pulmonary   infiltrate. 2.  Mild cardiomegaly, unchanged.              ASSESSMENT:    Active Hospital Problems    Diagnosis Date Noted    Respiratory failure ([facility] [J96.90] 05/11/2022     Priority: Jay Art is a 65 y.o. female     Acute on chronic hypoxic respiratory failure, with hypercapnic respiratory failure, COPD by hx, with pneumonia:   - cefepime IV (5/10-present)   - azithromycin PO (5/11-present)   - duonebs q4 hrs   - BIPAP   - no wheezing on exam -hold off on steroids for now     Malignant neoplasm of the left lower lobe left lung - extensive stage small cell lung cancer:  - 3.2 cm LLL mass on CT scan 5/10/22, with previously documented satellite nodules and mediastinal and hilar LAD  - started chemo with carboplatin, plus -16, plus tecentriq (cycle 1 day 1 was 5/9/22)     NEGIN:   - on BIPAP as noted above     DM2:   - empaglifozin 10 mg po daily   - SSI   - she reports receiving steroids with chemotherapy - so she is worried that her sugars will be high     Cardiomyopathy, chronic systolic CHF, CAD s/p stent:   - amiodarone 200 mg po daily   - carvedilol 6.25 mg po BID   - torsemide 40 mg po BID   - vericiguat 5 mg po qAM   - echo - 25-30% on echo with moderately severe MR 12/13/21   - weight seems stable from prior   - daily weights   - I/Os     CKD:   - presenting creatinine of 1.7 (apparent baseline) on 5/10/22   - follow   - avoid nephrotoxins as able     Microcytic anemia:   - hx iron def anemia and gastric AVM     Obesity:   - presenting BMI of 35   - weight loss encouraged (she reports being down from 252 lbs)     Dyslipidemia:   - atorvastatin 40 mg po daily     Pain:   - gabapentin 600 mg po TID     Abnormal TNI:   - likely stress response   - denies having chest pain     Port:   - access attempted 5/10 but it leaked - so PIVs placed instead   - monitor site       DVT Prophylaxis: enoxaparin   Diet: No diet orders on file  Code Status: - FULL CODE   Alternative decision maker - daughter Shraddha Byron     PT/OT Eval Status: not consulted     Yetta Linker - pending improvement        Da Carmona MD    Thank you Radha Paredes MD for the opportunity to be involved in this patient's care.

## 2022-05-11 NOTE — PROGRESS NOTES
Physician Progress Note      PATIENTLawrence Brambila  CSN #:                  [id number]  :                       1960  ADMIT DATE:       5/10/2022 7:02 PM  [address] [place] DATE:  RESPONDING  PROVIDER #:        Rachel Manjarrez MD          QUERY TEXT:    Patient with COPD and lung cancer on chemo admitted with pneumonia and acute   on chronic respiratory failure. If possible, please document in the progress   notes and discharge summary if you are evaluating and/or treating any of the   following:[[Treating as gram negative pneumonia::Treating as gram negative   pneumonia]    Note: CAP and HCAP indicate where the pneumonia was acquired, not a specific   type. The medical record reflects the following:  Risk Factors: COPD, lung cancer on chemo, recent hospitalization  Clinical Indicators: CT shows \"3.2 cm mass in the inferior left lower lobe   with surrounding area of patchy consolidation which could represent disease   extension versus superimposed airspace disease process\"  Treatment: Cefepime    Thank you,  Symone Alfred, RN, BSN, CCDS  [email or web address]  Options provided:  -- Respond - Create new note now  -- Disagree - Not applicable / Not valid  -- Disagree - Clinically unable to determine / Unknown  -- Refer to Clinical Documentation Reviewer    PROVIDER RESPONSE TEXT:    Provider is clinically unable to determine a response to this query.     Query created by: Vianca Everett on 2022 8:56 AM      Electronically signed by:  Rachel Manjarrez MD 2022 9:02 AM

## 2022-05-11 NOTE — RT PROTOCOL NOTE
RT Inhaler-Nebulizer Bronchodilator Protocol Note    There is a bronchodilator order in the chart from a provider indicating to follow the RT Bronchodilator Protocol and there is an Initiate RT Inhaler-Nebulizer Bronchodilator Protocol order as well (see protocol at bottom of note). CXR Findings:  XR CHEST PORTABLE    Result Date: 5/10/2022  1. Degree of vascular congestion without a definite focal pulmonary infiltrate. 2.  Mild cardiomegaly, unchanged. The findings from the last RT Protocol Assessment were as follows:   History Pulmonary Disease: Chronic pulmonary disease  Respiratory Pattern: Mild dyspnea at rest, irregular pattern, or RR 21-25 bpm  Breath Sounds: Slightly diminished and/or crackles  Cough: Strong, spontaneous, non-productive  Indication for Bronchodilator Therapy: On home bronchodilators,Decreased or absent breath sounds  Bronchodilator Assessment Score: 8    Aerosolized bronchodilator medication orders have been revised according to the RT Inhaler-Nebulizer Bronchodilator Protocol below. Respiratory Therapist to perform RT Therapy Protocol Assessment initially then follow the protocol. Repeat RT Therapy Protocol Assessment PRN for score 0-3 or on second treatment, BID, and PRN for scores above 3. No Indications - adjust the frequency to every 6 hours PRN wheezing or bronchospasm, if no treatments needed after 48 hours then discontinue using Per Protocol order mode. If indication present, adjust the RT bronchodilator orders based on the Bronchodilator Assessment Score as indicated below. Use Inhaler orders unless patient has one or more of the following: on home nebulizer, not able to hold breath for 10 seconds, is not alert and oriented, cannot activate and use MDI correctly, or respiratory rate 25 breaths per minute or more, then use the equivalent nebulizer order(s) with same Frequency and PRN reasons based on the score.   If a patient is on this medication at home then do not decrease Frequency below that used at home. 0-3 - enter or revise RT bronchodilator order(s) to equivalent RT Bronchodilator order with Frequency of every 4 hours PRN for wheezing or increased work of breathing using Per Protocol order mode. 4-6 - enter or revise RT Bronchodilator order(s) to two equivalent RT bronchodilator orders with one order with BID Frequency and one order with Frequency of every 4 hours PRN wheezing or increased work of breathing using Per Protocol order mode. 7-10 - enter or revise RT Bronchodilator order(s) to two equivalent RT bronchodilator orders with one order with TID Frequency and one order with Frequency of every 4 hours PRN wheezing or increased work of breathing using Per Protocol order mode. 11-13 - enter or revise RT Bronchodilator order(s) to one equivalent RT bronchodilator order with QID Frequency and an Albuterol order with Frequency of every 4 hours PRN wheezing or increased work of breathing using Per Protocol order mode. Greater than 13 - enter or revise RT Bronchodilator order(s) to one equivalent RT bronchodilator order with every 4 hours Frequency and an Albuterol order with Frequency of every 2 hours PRN wheezing or increased work of breathing using Per Protocol order mode. RT to enter RT Home Evaluation for COPD & MDI Assessment order using Per Protocol order mode.     Electronically signed by Quinton Anaya RCP on 5/11/2022 at 11:34 AM

## 2022-05-11 NOTE — ED NOTES
Patient returned from [address]. BIPAP replaced.       Anupam Ozuna RN  05/10/22 8014

## 2022-05-11 NOTE — PROGRESS NOTES
Pt arrived to floor via stretcher from ED and transfered to bed. Telemetry activated and confirmed with [facility]. BiPAP placed on patient by respiratory. Purewick placed on patient. Patient oriented to room and use of call light. Call light and personal items within reach. Admission and assessment initiated. POC and education initiated and reviewed with patient. Denied further needs or questions at this time. Will continue to monitor.

## 2022-05-11 NOTE — ED NOTES
BIPAP removed and patient placed on 4 L nasal cannula per RT. Dr. Luz Elena Dumont ok with this.       Kalpana Levine RN  05/10/22 2286

## 2022-05-11 NOTE — ED NOTES
Patient requesting another BT. RT notified of need for [address].       Elias Joyce RN  05/11/22 8032

## 2022-05-11 NOTE — PLAN OF CARE
Problem: Discharge Planning  Goal: Discharge to home or other facility with appropriate resources  Outcome: Progressing  Flowsheets  Taken 5/11/2022 0335  Discharge to home or other facility with appropriate resources:   Identify barriers to discharge with patient and caregiver   Identify discharge learning needs (meds, wound care, etc)  Taken 5/11/2022 0312  Discharge to home or other facility with appropriate resources: Identify barriers to discharge with patient and caregiver     Problem: Skin/Tissue Integrity  Goal: Absence of new skin breakdown  Description: 1. Monitor for areas of redness and/or skin breakdown  2. Assess vascular access sites hourly  3. Every 4-6 hours minimum:  Change oxygen saturation probe site  4. Every 4-6 hours:  If on nasal continuous positive airway pressure, respiratory therapy assess nares and determine need for appliance change or resting period. Outcome: Progressing  Note: Skin assessment completed every shift. Pt assessed for incontinence. Pt encouraged to turn/rotate every 2 hours. Assistance provided if pt unable to do so themselves. Problem: ABCDS Injury Assessment  Goal: Absence of physical injury  Outcome: Progressing  Flowsheets (Taken 5/11/2022 0616)  Absence of Physical Injury: Implement safety measures based on patient assessment  Note: No physical injury this shift.

## 2022-05-11 NOTE — PROGRESS NOTES
Admitted by my partner this morning. Patient has lung cancer. Possible postobstructive pneumonia. Continue antibiotics for now. Chemotherapy is on hold. Dr. Lauren Roy consult much appreciated.     Electronically signed by Eda Zamarripa MD on 5/11/2022 at 2:59 PM

## 2022-05-11 NOTE — CONSULTS
[address]                                  CONSULTATION    PATIENT NAME: [place]                     :        1960  MED REC NO:   [id number]                          ROOM:       5010  ACCOUNT NO:   [de-identified]                           ADMIT DATE: 05/10/2022  PROVIDER:     Hany Raymundo MD    ONCOLOGY CONSULTATION    CONSULT DATE:  2022    REASON FOR CONSULTATION:  Small cell lung cancer, in with pneumonia. CONSULTING PROVIDER:  Eloy Peterson MD    HISTORY OF PRESENT ILLNESS:  The patient is a pleasant 25-year-old  female that I follow for extensive stage small cell lung cancer, who  presented to the hospital yesterday with increasing shortness of breath. A CT pulmonary embolus study did not show a PE but did show possible  pneumonia. She was started on antibiotics. She denies any chest pain. She is feeling a bit better. She denies any recent fevers. PAST MEDICAL HISTORY:  1. Recurrent iron deficiency anemia. 2.  COPD. 3.  Diabetes. 4.  Coronary artery disease status post stent. 5.  Congestive heart failure. 6.  Extensive stage small cell lung cancer. This was diagnosed in  2022. She has a left upper lobe mass with extensive marques disease in  the supraclavicular region, mediastinum, axilla, et  [place]. She was  started on carboplatin plus -16 plus Tecentriq on Monday and got two  days of treatment. MEDICATION LIST:  Please see the list in her chart. SOCIAL HISTORY:  She is . She is a former heavy smoker but does  not smoke or drink currently. She is retired. FAMILY HISTORY:  Noncontributory.     REVIEW OF SYSTEMS:  She denies any recent fever, chills, sweats, nausea,  vomiting, abdominal pain, chest pain, headaches, any new bone aches,  dysphagia, odynophagia, diarrhea, constipation, hemoptysis, hematemesis,  change in vision/hearing/smell/taste, weakness, neuropathy, skin rashes,  productive cough, urinary or bowel prolapse or incontinence, petechiae,  purpura, skin rashes, pruritus, hallucinations, nasal congestion or  drainage, depression, anxiety, suicidal ideations, melena, or  hematochezia. She has mild to moderate fatigue and mild to moderate  dyspnea on exertion. Her 10-system review of systems is otherwise  negative. PHYSICAL EXAMINATION:  VITAL SIGNS:  She is afebrile with normal vital signs. GENERAL:  She is in no acute distress. HEENT:  Her pupils are round and reactive to light and accommodation. Extraocular muscles are intact. NECK:  She has no jugular venous distention. No thyromegaly. Oropharynx is clear. She has no carotid bruits. She has no palpable  lymphadenopathy. HEART:  Regular rate and rhythm. LUNGS:  Clear to auscultation bilaterally. ABDOMEN:  Nondistended, nontender with bowel sounds x4. No  hepatosplenomegaly. EXTREMITIES:  She has no peripheral clubbing, cyanosis, or edema. NEUROLOGIC:  Exam is nonfocal.    LABORATORY DATA:  White blood cell count is 5.9, hemoglobin 11,  platelets of 873. ASSESSMENT AND PLAN:  1. Extensive stage small cell lung cancer. She got two out of her  three days of chemo. I will hold the final day today. She will resume  her next cycle in three weeks. 2.  Pneumonia. Continue antibiotics. 3.  Recurrent iron deficiency anemia. I will check her iron studies and  give IV iron if low. Thank you for the consultation. I will follow closely.         Rickard Prader, MD    D: 05/11/2022 13:13:36       T: 05/11/2022 15:32:12     [email or web address]  Job#: [id number]     Doc#: [id number]    CC:  MD Raymond Farfan MD

## 2022-05-11 NOTE — ED NOTES
Patient requesting to be placed back on the BIPAP. RT notified. RN replaced this.       Aruna Francisco RN  05/11/22 1020

## 2022-05-11 NOTE — ED NOTES
Patient placed on 6 L nasal cannula to be transported to CT per RT.       Aruna Francisco RN  05/10/22 2050

## 2022-05-11 NOTE — PROGRESS NOTES
Pharmacy Medication Reconciliation Note     List of medications Cedric Jerome is currently taking is complete. Source of information:   1. Conversation with patient and family at bedside  2. EMR  3. Med list     Notes regarding home medications:   1. Patient able to report all meds and all AM and afternoon meds taken PTA in the ED  2. Reports clopidogrel stopped per MD Khan during port placement for lung cancer. 3. Patient started triple therapy--carbo, etopo, and atezolizumab yesterday 05/09/22  4.  Patient will bring in [facility]. home supply in tomorrow to continue therapy for HF    Patient denies taking any OTC or herbal medications    Sarah Clifton, Pharmacy Intern  5/10/2022  10:39 PM

## 2022-05-12 LAB
BASOPHILS ABSOLUTE: 0 K/UL (ref 0–0.2)
BASOPHILS RELATIVE PERCENT: 0.2 %
EOSINOPHILS ABSOLUTE: 0 K/UL (ref 0–0.6)
EOSINOPHILS RELATIVE PERCENT: 0 %
FERRITIN: 132.5 NG/ML (ref 15–150)
GLUCOSE BLD-MCNC: 114 MG/DL (ref 70–99)
GLUCOSE BLD-MCNC: 118 MG/DL (ref 70–99)
GLUCOSE BLD-MCNC: 122 MG/DL (ref 70–99)
GLUCOSE BLD-MCNC: 135 MG/DL (ref 70–99)
HCT VFR BLD CALC: 29.3 % (ref 36–48)
HEMOGLOBIN: 9.4 G/DL (ref 12–16)
IRON SATURATION: 45 % (ref 15–50)
IRON: 131 UG/DL (ref 37–145)
LYMPHOCYTES ABSOLUTE: 0.2 K/UL (ref 1–5.1)
LYMPHOCYTES RELATIVE PERCENT: 6.1 %
MCH RBC QN AUTO: 23.9 PG (ref 26–34)
MCHC RBC AUTO-ENTMCNC: 31.9 G/DL (ref 31–36)
MCV RBC AUTO: 75 FL (ref 80–100)
MONOCYTES ABSOLUTE: 0.1 K/UL (ref 0–1.3)
MONOCYTES RELATIVE PERCENT: 2.7 %
NEUTROPHILS ABSOLUTE: 3.4 K/UL (ref 1.7–7.7)
NEUTROPHILS RELATIVE PERCENT: 91 %
PDW BLD-RTO: 18 % (ref 12.4–15.4)
PERFORMED ON: ABNORMAL
PLATELET # BLD: 250 K/UL (ref 135–450)
PMV BLD AUTO: 9.5 FL (ref 5–10.5)
RBC # BLD: 3.91 M/UL (ref 4–5.2)
TOTAL IRON BINDING CAPACITY: 292 UG/DL (ref 260–445)
WBC # BLD: 3.8 K/UL (ref 4–11)

## 2022-05-12 PROCEDURE — 83550 IRON BINDING TEST: CPT

## 2022-05-12 PROCEDURE — 2580000003 HC RX 258: Performed by: PEDIATRICS

## 2022-05-12 PROCEDURE — 94640 AIRWAY INHALATION TREATMENT: CPT

## 2022-05-12 PROCEDURE — 6370000000 HC RX 637 (ALT 250 FOR IP): Performed by: INTERNAL MEDICINE

## 2022-05-12 PROCEDURE — 6360000002 HC RX W HCPCS: Performed by: INTERNAL MEDICINE

## 2022-05-12 PROCEDURE — 85025 COMPLETE CBC W/AUTO DIFF WBC: CPT

## 2022-05-12 PROCEDURE — 97530 THERAPEUTIC ACTIVITIES: CPT

## 2022-05-12 PROCEDURE — 94760 N-INVAS EAR/PLS OXIMETRY 1: CPT

## 2022-05-12 PROCEDURE — 6360000002 HC RX W HCPCS: Performed by: PEDIATRICS

## 2022-05-12 PROCEDURE — 36415 COLL VENOUS BLD VENIPUNCTURE: CPT

## 2022-05-12 PROCEDURE — 6370000000 HC RX 637 (ALT 250 FOR IP): Performed by: PEDIATRICS

## 2022-05-12 PROCEDURE — 2060000000 HC ICU INTERMEDIATE R&B

## 2022-05-12 PROCEDURE — 94660 CPAP INITIATION&MGMT: CPT

## 2022-05-12 PROCEDURE — 97165 OT EVAL LOW COMPLEX 30 MIN: CPT

## 2022-05-12 PROCEDURE — 82728 ASSAY OF FERRITIN: CPT

## 2022-05-12 PROCEDURE — 83540 ASSAY OF IRON: CPT

## 2022-05-12 PROCEDURE — 97535 SELF CARE MNGMENT TRAINING: CPT

## 2022-05-12 PROCEDURE — 2700000000 HC OXYGEN THERAPY PER DAY

## 2022-05-12 PROCEDURE — 97161 PT EVAL LOW COMPLEX 20 MIN: CPT

## 2022-05-12 RX ORDER — DOCUSATE SODIUM 100 MG/1
100 CAPSULE, LIQUID FILLED ORAL 2 TIMES DAILY
Status: DISCONTINUED | OUTPATIENT
Start: 2022-05-12 | End: 2022-05-13 | Stop reason: HOSPADM

## 2022-05-12 RX ADMIN — Medication 400 MG: at 08:34

## 2022-05-12 RX ADMIN — IPRATROPIUM BROMIDE AND ALBUTEROL SULFATE 1 AMPULE: .5; 3 SOLUTION RESPIRATORY (INHALATION) at 16:26

## 2022-05-12 RX ADMIN — POTASSIUM CHLORIDE 10 MEQ: 750 TABLET, EXTENDED RELEASE ORAL at 08:35

## 2022-05-12 RX ADMIN — DOCUSATE SODIUM 100 MG: 100 CAPSULE, LIQUID FILLED ORAL at 20:45

## 2022-05-12 RX ADMIN — TIOTROPIUM BROMIDE AND OLODATEROL 2 PUFF: 3.124; 2.736 SPRAY, METERED RESPIRATORY (INHALATION) at 12:09

## 2022-05-12 RX ADMIN — Medication 400 MG: at 20:45

## 2022-05-12 RX ADMIN — GABAPENTIN 600 MG: 300 CAPSULE ORAL at 12:59

## 2022-05-12 RX ADMIN — EPOETIN ALFA-EPBX 40000 UNITS: 40000 INJECTION, SOLUTION INTRAVENOUS; SUBCUTANEOUS at 11:51

## 2022-05-12 RX ADMIN — PANTOPRAZOLE SODIUM 40 MG: 40 TABLET, DELAYED RELEASE ORAL at 05:32

## 2022-05-12 RX ADMIN — GABAPENTIN 600 MG: 300 CAPSULE ORAL at 08:34

## 2022-05-12 RX ADMIN — DOCUSATE SODIUM 100 MG: 100 CAPSULE, LIQUID FILLED ORAL at 11:51

## 2022-05-12 RX ADMIN — ASPIRIN 81 MG: 81 TABLET, COATED ORAL at 08:35

## 2022-05-12 RX ADMIN — Medication 10 ML: at 20:46

## 2022-05-12 RX ADMIN — GABAPENTIN 600 MG: 300 CAPSULE ORAL at 20:45

## 2022-05-12 RX ADMIN — ENOXAPARIN SODIUM 30 MG: 100 INJECTION SUBCUTANEOUS at 08:35

## 2022-05-12 RX ADMIN — AMIODARONE HYDROCHLORIDE 200 MG: 200 TABLET ORAL at 08:35

## 2022-05-12 RX ADMIN — TORSEMIDE 40 MG: 20 TABLET ORAL at 08:34

## 2022-05-12 RX ADMIN — IPRATROPIUM BROMIDE AND ALBUTEROL SULFATE 1 AMPULE: .5; 3 SOLUTION RESPIRATORY (INHALATION) at 21:03

## 2022-05-12 RX ADMIN — TORSEMIDE 40 MG: 20 TABLET ORAL at 17:16

## 2022-05-12 RX ADMIN — IPRATROPIUM BROMIDE AND ALBUTEROL SULFATE 1 AMPULE: .5; 3 SOLUTION RESPIRATORY (INHALATION) at 08:56

## 2022-05-12 RX ADMIN — ENOXAPARIN SODIUM 30 MG: 100 INJECTION SUBCUTANEOUS at 20:46

## 2022-05-12 RX ADMIN — Medication 10 ML: at 08:35

## 2022-05-12 RX ADMIN — CEFEPIME HYDROCHLORIDE 2000 MG: 2 INJECTION, POWDER, FOR SOLUTION INTRAVENOUS at 17:17

## 2022-05-12 RX ADMIN — CARVEDILOL 6.25 MG: 6.25 TABLET, FILM COATED ORAL at 08:34

## 2022-05-12 RX ADMIN — ATORVASTATIN CALCIUM 40 MG: 40 TABLET, FILM COATED ORAL at 20:45

## 2022-05-12 RX ADMIN — IPRATROPIUM BROMIDE AND ALBUTEROL SULFATE 1 AMPULE: .5; 3 SOLUTION RESPIRATORY (INHALATION) at 12:07

## 2022-05-12 RX ADMIN — CARVEDILOL 6.25 MG: 6.25 TABLET, FILM COATED ORAL at 17:15

## 2022-05-12 RX ADMIN — CEFEPIME HYDROCHLORIDE 2000 MG: 2 INJECTION, POWDER, FOR SOLUTION INTRAVENOUS at 05:31

## 2022-05-12 ASSESSMENT — PAIN SCALES - GENERAL
PAINLEVEL_OUTOF10: 3
PAINLEVEL_OUTOF10: 2

## 2022-05-12 ASSESSMENT — PAIN DESCRIPTION - DESCRIPTORS: DESCRIPTORS: ACHING

## 2022-05-12 ASSESSMENT — PAIN DESCRIPTION - LOCATION: LOCATION: BACK

## 2022-05-12 NOTE — PLAN OF CARE
Problem: Discharge Planning  Goal: Discharge to home or other facility with appropriate resources  5/12/2022 0221 by Kelsey Vasquez RN  Outcome: Progressing  Flowsheets  Taken 5/11/2022 2332 by Kelsey Vasquez RN  Discharge to home or other facility with appropriate resources: Identify barriers to discharge with patient and caregiver  Taken 5/11/2022 2016 by Rosa Webster RN  Discharge to home or other facility with appropriate resources:   Identify barriers to discharge with patient and caregiver   Identify discharge learning needs (meds, wound care, etc)     Problem: Skin/Tissue Integrity  Goal: Absence of new skin breakdown  Description: 1. Monitor for areas of redness and/or skin breakdown  2. Assess vascular access sites hourly  3. Every 4-6 hours minimum:  Change oxygen saturation probe site  4. Every 4-6 hours:  If on nasal continuous positive airway pressure, respiratory therapy assess nares and determine need for appliance change or resting period.   5/12/2022 0221 by Kelsey Vasquez RN  Outcome: Progressing     Problem: ABCDS Injury Assessment  Goal: Absence of physical injury  5/12/2022 0221 by Kelsey Vasquez RN  Outcome: Progressing     Problem: Pain  Goal: Verbalizes/displays adequate comfort level or baseline comfort level  5/12/2022 0221 by Kelsey Vasquez RN  Outcome: Progressing     Problem: Safety - Adult  Goal: Free from fall injury  5/12/2022 0221 by Kelsey Vasquez RN  Outcome: Progressing

## 2022-05-12 NOTE — PROGRESS NOTES
Physical Therapy  Facility/Department: [facility]N PROGRESSIVE CARE  Physical Therapy Initial Assessment    Name: Tremayne Melendez  : 1960  MRN: [id number]  Date of Service: 2022    Discharge Recommendations:  Home with assist PRN   PT Equipment Recommendations  Equipment Needed: No      Patient Diagnosis(es): The primary encounter diagnosis was Respiratory distress. Diagnoses of Elevated brain natriuretic peptide (BNP) level, Elevated troponin, On supplemental oxygen therapy, Mass of lower lobe of left lung, and Pneumonia of left lower lobe due to infectious organism were also pertinent to this visit. Past Medical History:  has a past medical history of Asthma, Cardiomyopathy ([facility] [address] CHF (congestive heart failure) ([facility] [address] COPD (chronic obstructive pulmonary disease) ([facility] [address] Diabetes mellitus ([facility] [address] Essential hypertension, Hyperlipidemia, Hypertension, and Obesity. Past Surgical History:  has a past surgical history that includes Cholecystectomy; Carpal tunnel release; Incontinence surgery; Coronary angioplasty with stent (2013); IR PORT PLACEMENT > 5 YEARS (Right, 2022); and IR PORT PLACEMENT > 5 YEARS (5/3/2022). Assessment   Body Structures, Functions, Activity Limitations Requiring Skilled Therapeutic Intervention: Decreased functional mobility ; Decreased endurance  Assessment: Pt is a 65 y.o. F. admitted 5/10 for lung Ca, SOB. She presents pleasant and agreeable to evaluation, able to complete mobility tasks in room with supervision, walker support. PT noted pt was SOB with activity, but SPO2 was stable on 3 L. O2. She would benefit from continued therapy to improve her endurance. Anticipate safe return home with prn assist.  She declined home PT. Rob Bashir Va scored a 20/24 on the AM-PAC short mobility form. If patient discharges prior to next session this note will serve as a discharge summary. Please see below for the latest assessment towards goals.      Specific Instructions for Next Treatment: Improve endurance  Therapy Prognosis: Fair  Decision Making: Low Complexity  History: See below  Exam: Strength; ROM; Balance; Ambulation  Clinical Presentation: Stable  Barriers to Learning: fatigue     Plan   Plan  Plan: 2-3 times per week  Specific Instructions for Next Treatment: Improve endurance  Current Treatment Recommendations: Balance training,Endurance training,Home exercise program,Safety education & training,Patient/Caregiver education & training,Equipment evaluation, education, & procurement  Safety Devices  Type of Devices: All fall risk precautions in place,Call light within reach,Bed alarm in place,Gait belt,Left in bed,Nurse notified  Restraints  Restraints Initially in Place: No     Restrictions  Restrictions/Precautions  Restrictions/Precautions: Fall Risk  Position Activity Restriction  Other position/activity restrictions: 3L O2     Subjective   General  Chart Reviewed: Yes  Patient assessed for rehabilitation services?: Yes  Additional Pertinent Hx: Will Nunez is a 65 y.o. female who presents to the emergency department secondary to concern for sudden onset of shortness of breath that started about 30 to 45 minutes prior to coming in. She reports she is currently on chemo (got it today and yesterday) for cancer. EMS also reports a history of heart failure, COPD. She denies any history of blood clots. She does states she is on aspirin but the Plavix was discontinued. She is able to nod and shake her head in regards to stating she has been eating and drinking well, denies any nausea, vomiting, fevers, chills. She did have a port placed on May 3, she states they used it today and yesterday for chemo. She reports her dry weight is 215 pounds. She states she has been taking her medication as prescribed with the exception of her evening dose today. She is acutely short of breath on arrival on BiPAP and history is limited due to acuity of condition.  PTA pt from home alone where pt was Ind with mobility and ADLs with use of RW. Pt currently requires SBA/supervision and use of RW for mobility and transfers. Pt on 3L O2 throughout with SpO2 >90% throughout session. Pt with no LOB or noted SOB. Anticipate pt needing up to supervision for ADLs. Pt will benefit from skilled OT services at this time. Anticipate pt safe to return home with PRN assist.  Response To Previous Treatment: Not applicable  Referring Practitioner: Dr. Madi Farias  Referral Date : 05/11/22  Diagnosis: Lung Ca; SOB; Elevated troponin  Follows Commands: Within Functional Limits  Subjective  Subjective: Pt agreeable to evaluation.          Social/Functional History  Social/Functional History  Lives With: Alone  Type of Home: Apartment  Home Layout: One level  Home Access: Stairs to enter with rails  Entrance Stairs - Number of Steps: 2 ARIS (uses wall and cane) into building + 5 steps with rail down to basement level  Bathroom Shower/Tub: Tub/Shower unit  Bathroom Toilet:  (RTS without handles or uses BSC)  Bathroom Equipment: Tub transfer bench,Hand-held shower  Home Equipment: [employer], 4 wheeled,Reacher  ADL Assistance: Independent  Ambulation Assistance: Independent (RW)  Transfer Assistance: Independent  Active : No  Occupation: On disability    Objective     AROM RLE (degrees)  RLE AROM: WFL  AROM LLE (degrees)  LLE AROM : WFL  AROM RUE (degrees)  RUE AROM : WFL  AROM LUE (degrees)  LUE AROM : WFL     Strength RLE  Strength RLE: WFL  Strength LLE  Strength LLE: WFL  Strength RUE  Strength RUE: WFL  Strength LUE  Strength LUE: WFL        Bed Mobility Training  Bed Mobility Training: Yes  Supine to Sit: Independent  Scooting: Independent    Balance  Sitting: Intact  Standing: Intact (RW)    Transfer Training  Transfer Training: Yes  Overall Level of Assistance: Supervision (RW)  Sit to Stand: Supervision (RW)  Stand to Sit: Supervision  Bed to Chair: Supervision (RW)  Toilet Transfer: Supervision (RW; grab bars)    Gait  Overall Level of Assistance: Stand-by assistance (RW; no LOB; no noted SOB; SpO2 >90% throughout on 3L O2)  Distance (ft): 15 Feet (15ft, 25ft)  Assistive Device: Walker, rolling  Bed mobility  Sit to Supine: Supervision     Transfers  Sit to Stand: Supervision  Stand to sit: Supervision     Ambulation  Surface: level tile  Device: Rolling Walker  Assistance: Supervision  Quality of Gait: Slow speed, kyphotic posture, mild SOB, SPO2 in 90s. Distance: 15', 10' x 2     Balance  Comments: Able to maintain standing balance with walker support, supervision. Able to maintain sitting balance on commode with supervision. Able to complete keysha-care after urinating, no assist.  Able to stand  4 min. with walker support, supervision, no LOB, SPO2 in 90s.     AM-PAC Score  AM-PAC Inpatient Mobility Raw Score : 20 (05/12/22 0831)  AM-PAC Inpatient T-Scale Score : 47.67 (05/12/22 0831)  Mobility Inpatient CMS 0-100% Score: 35.83 (05/12/22 0831)  Mobility Inpatient CMS G-Code Modifier : CJ (05/12/22 0831)          Goals  Short Term Goals  Time Frame for Short term goals: 2-3 days  Patient Goals   Patient goals : to return directly home       Therapy Time   Individual Concurrent Group Co-treatment   Time In 0809         Time Out 0832         Minutes 23         Timed Code Treatment Minutes: 8 Minutes   Low Complexity Evaluation    Tressa Thornton PT    Electronically signed by Tressa Thornton, GLO [id number] on 5/12/2022 at 8:39 AM

## 2022-05-12 NOTE — PROGRESS NOTES
Occupational Therapy  Facility/Department: [address] PROGRESSIVE CARE  Occupational Therapy Initial Assessment and Tentative D/C      Name: Juan M Do  : 1960  MRN: [id number]  Date of Service: 2022    Discharge Recommendations: Juan M Do scored a 19/24 on the AM-PAC ADL Inpatient form. Current research shows that an AM-PAC score of 18 or greater is typically associated with a discharge to the patient's home setting. If patient discharges prior to next session this note will serve as a discharge summary. Please see below for the latest assessment towards goals. Continue to assess pending progress,Home with assist PRN  OT Equipment Recommendations  Equipment Needed: No       Patient Diagnosis(es): The primary encounter diagnosis was Respiratory distress. Diagnoses of Elevated brain natriuretic peptide (BNP) level, Elevated troponin, On supplemental oxygen therapy, Mass of lower lobe of left lung, and Pneumonia of left lower lobe due to infectious organism were also pertinent to this visit. Past Medical History:  has a past medical history of Asthma, Cardiomyopathy ([facility] [address] CHF (congestive heart failure) ([facility] [address] COPD (chronic obstructive pulmonary disease) ([facility] [address] Diabetes mellitus ([facility] [address] Essential hypertension, Hyperlipidemia, Hypertension, and Obesity. Past Surgical History:  has a past surgical history that includes Cholecystectomy; Carpal tunnel release; Incontinence surgery; Coronary angioplasty with stent (2013); IR PORT PLACEMENT > 5 YEARS (Right, 2022); and IR PORT PLACEMENT > 5 YEARS (5/3/2022). Assessment   Performance deficits / Impairments: Decreased functional mobility ; Decreased balance;Decreased ADL status; Decreased high-level IADLs;Decreased endurance  Assessment: Juan M Do is a 65 y.o. female who presents to the emergency department secondary to concern for sudden onset of shortness of breath that started about 30 to 45 minutes prior to coming in. She reports she is currently on chemo (got it today and yesterday) for cancer. EMS also reports a history of heart failure, COPD. She denies any history of blood clots. She does states she is on aspirin but the Plavix was discontinued. She is able to nod and shake her head in regards to stating she has been eating and drinking well, denies any nausea, vomiting, fevers, chills. She did have a port placed on May 3, she states they used it today and yesterday for chemo. She reports her dry weight is 215 pounds. She states she has been taking her medication as prescribed with the exception of her evening dose today. She is acutely short of breath on arrival on BiPAP and history is limited due to acuity of condition. PTA pt from home alone where pt was Ind with mobility and ADLs with use of RW. Pt currently requires SBA/supervision and use of RW for mobility and transfers. Pt on 3L O2 throughout with SpO2 >90% throughout session. Pt with no LOB or noted SOB. Anticipate pt needing up to supervision for ADLs. Pt will benefit from skilled OT services at this time.  Anticipate pt safe to return home with PRN assist.  Prognosis: Good  Decision Making: Low Complexity  Exam: see above  Assistance / Modification: RW  REQUIRES OT FOLLOW-UP: Yes  Activity Tolerance  Activity Tolerance: Patient Tolerated treatment well        Plan   Plan  Times per Week: 3-5x  Current Treatment Recommendations: Balance training,Functional mobility training,Endurance training,Safety education & training,Patient/Caregiver education & training,Self-Care / ADL     Restrictions  Restrictions/Precautions  Restrictions/Precautions: Fall Risk  Position Activity Restriction  Other position/activity restrictions: 3L O2    Subjective   General  Chart Reviewed: Yes  Patient assessed for rehabilitation services?: Yes  Additional Pertinent Hx: per ED note, Warner Jean is a 65 y.o. female who presents to the emergency department secondary to concern for sudden onset of shortness of breath that started about 30 to 45 minutes prior to coming in. She reports she is currently on chemo (got it today and yesterday) for cancer. EMS also reports a history of heart failure, COPD. She denies any history of blood clots. She does states she is on aspirin but the Plavix was discontinued. She is able to nod and shake her head in regards to stating she has been eating and drinking well, denies any nausea, vomiting, fevers, chills. She did have a port placed on May 3, she states they used it today and yesterday for chemo. She reports her dry weight is 215 pounds. She states she has been taking her medication as prescribed with the exception of her evening dose today. She is acutely short of breath on arrival on BiPAP and history is limited due to acuity of condition. Family / Caregiver Present: No  Referring Practitioner: Chaya Whatley MD  Subjective  Subjective: Pt agreeable to OT evaluation. Pt reports chronic back pain with no number stated. Pt on 3L O2 throughout.      Social/Functional History  Social/Functional History  Lives With: Alone  Type of Home: Apartment  Home Layout: One level  Home Access: Stairs to enter with rails  Entrance Stairs - Number of Steps: 2 ARIS (uses wall and cane) into building + 5 steps with rail down to basement level  Bathroom Shower/Tub: Tub/Shower unit  Bathroom Toilet:  (RTS without handles or uses BSC)  Bathroom Equipment: Tub transfer bench,Hand-held shower  Home Equipment: [employer], 4 wheeled,Reacher  ADL Assistance: Independent  Ambulation Assistance: Independent (RW)  Transfer Assistance: Independent  Active : No  Occupation: On disability       Objective   Pulse: 60  Heart Rate Source: Telemetry  BP: (!) 112/59  BP Location: Left upper arm  MAP (Calculated): 76.67  Resp: 12  SpO2: 98 %  O2 Device: PAP (positive airway pressure)  Hearing: Within functional limits          Safety Devices  Type of Devices: Call light within reach;Nurse notified; Left in chair  Bed Mobility Training  Bed Mobility Training: Yes  Supine to Sit: Independent  Scooting: Independent  Balance  Sitting: Intact  Standing: Intact (RW)  Transfer Training  Transfer Training: Yes  Overall Level of Assistance: Supervision (RW)  Sit to Stand: Supervision (RW)  Stand to Sit: Supervision  Bed to Chair: Supervision (RW)  Toilet Transfer: Supervision (RW; grab bars)  Gait  Overall Level of Assistance: Stand-by assistance (RW; no LOB; no noted SOB; SpO2 >90% throughout on 3L O2)  Distance (ft): 15 Feet (15ft, 25ft)  Assistive Device: Walker, rolling     AROM: Within functional limits  PROM: Within functional limits  Strength:  Within functional limits  Coordination: Within functional limits  Tone: Normal  Sensation: Intact  ADL  Grooming: Supervision (in stance at sink to wash hands)  Toileting: Supervision (supervision for ADLs; pt able to complete pericare per self)  Additional Comments: Anticipate pt needing up to supervision for ADLs based on ROM, strength, and balance                 Cognition  Overall Cognitive Status: WFL                  Education Given To: Patient  Education Provided: Role of Therapy;Plan of Care;Transfer Training;ADL Adaptive Strategies  Education Method: Demonstration;Verbal  Barriers to Learning: None  Education Outcome: Verbalized understanding;Demonstrated understanding          AM-PAC Score        AM-PAC Inpatient Daily Activity Raw Score: 19 (05/12/22 0737)  AM-PAC Inpatient ADL T-Scale Score : 40.22 (05/12/22 0737)  ADL Inpatient CMS 0-100% Score: 42.8 (05/12/22 0737)  ADL Inpatient CMS G-Code Modifier : CK (05/12/22 0737)    Goals  Short Term Goals  Time Frame for Short term goals: prior to D/C  Short Term Goal 1: complete functional mobility and transfers Mod Ind  Short Term Goal 2: complete bathing and dressing Mod Ind  Short Term Goal 3: complete toileting Mod Ind  Short Term Goal 4: complete grooming in stance at sink Mod Ind  Long Term Goals  Time Frame for Long term goals : STG=LTG  Patient Goals   Patient goals : return home       Therapy Time   Individual Concurrent Group Co-treatment   Time In 0700         Time Out 0740         Minutes 40         Timed Code Treatment Minutes: 25 Minutes (15 minute eval)       Sonia Rivera OTR/L

## 2022-05-12 NOTE — PROGRESS NOTES
Physician Progress Note      Shoaib Zuluaga  [facility] #:                  [id number]  :                       1960  ADMIT DATE:       5/10/2022 7:02 PM  [address] [place] DATE:  RESPONDING  PROVIDER #:        EDDIE ZIMMERMAN MD          QUERY TEXT:    Patient with COPD and lung cancer on chemo admitted with pneumonia and acute   on chronic respiratory failure. If possible, please document in the progress   notes and discharge summary if you are evaluating and/or treating any of the   following:[[Treating as gram negative pneumonia::Treating as gram negative   pneumonia]    Note: CAP and HCAP indicate where the pneumonia was acquired, not a specific   type. The medical record reflects the following:  Risk Factors: COPD, lung cancer on chemo, recent hospitalization  Clinical Indicators: CT shows \"3.2 cm mass in the inferior left lower lobe   with surrounding area of patchy consolidation which could represent disease   extension versus superimposed airspace disease process\"  Treatment: Cefepime    Thank you,  Alon Tinsley RN, BSN, CCDS  [email or web address]  Options provided:  -- Respond - Create new note now  -- Disagree - Not applicable / Not valid  -- Disagree - Clinically unable to determine / Unknown  -- Refer to Clinical Documentation Reviewer    PROVIDER RESPONSE TEXT:    Provider is clinically unable to determine a response to this query.     Query created by: Ibrahima Florentino on 2022 3:57 AM      Electronically signed by:  EDDIE ZIMMERMAN MD 2022 8:07 AM

## 2022-05-12 NOTE — PLAN OF CARE
Problem: Discharge Planning  Goal: Discharge to home or other facility with appropriate resources  5/12/2022 0947 by Piero Odom RN  Outcome: Progressing     Problem: Skin/Tissue Integrity  Goal: Absence of new skin breakdown  Description: 1. Monitor for areas of redness and/or skin breakdown  2. Assess vascular access sites hourly  3. Every 4-6 hours minimum:  Change oxygen saturation probe site  4. Every 4-6 hours:  If on nasal continuous positive airway pressure, respiratory therapy assess nares and determine need for appliance change or resting period.   5/12/2022 0947 by Piero Odom RN  Outcome: Progressing     Problem: ABCDS Injury Assessment  Goal: Absence of physical injury  5/12/2022 0947 by Piero Odom RN  Outcome: Progressing     Problem: Pain  Goal: Verbalizes/displays adequate comfort level or baseline comfort level  5/12/2022 0947 by Piero Odom RN  Outcome: Progressing     Problem: Safety - Adult  Goal: Free from fall injury  5/12/2022 0947 by Piero Odom RN  Outcome: Progressing

## 2022-05-12 NOTE — PROGRESS NOTES
Hematology Oncology Daily Progress Note    Admit Date: 5/10/2022  Hospital day a few    Subjective:     Patient has complaints of improved HEATH/SOB--denies CP   Medication side effects: none    Scheduled Meds:   amiodarone  200 mg Oral Daily    aspirin  81 mg Oral Daily    atorvastatin  40 mg Oral Nightly    carvedilol  6.25 mg Oral BID WC    Vericiguat  5 mg Oral QAM    torsemide  40 mg Oral BID    potassium chloride  10 mEq Oral Daily    pantoprazole  40 mg Oral Daily    magnesium oxide  400 mg Oral BID    gabapentin  600 mg Oral TID    empagliflozin  10 mg Oral Daily    sodium chloride flush  5-40 mL IntraVENous 2 times per day    enoxaparin  30 mg SubCUTAneous BID    cefepime  2,000 mg IntraVENous Q12H    ipratropium-albuterol  1 ampule Inhalation Q4H WA    insulin lispro  0-6 Units SubCUTAneous 4x Daily AC & HS     Continuous Infusions:   sodium chloride      dextrose       PRN Meds:prochlorperazine, sodium chloride flush, sodium chloride, ondansetron **OR** ondansetron, polyethylene glycol, acetaminophen **OR** acetaminophen, dextrose bolus **OR** dextrose bolus, glucagon (rDNA), dextrose, glucose    Review of Systems  Pertinent items are noted in HPI. REVIEW OF SYSTEMS:         · Constitutional: Denies fever, sweats, weight loss     · Eyes: No visual changes or diplopia. No scleral icterus. · ENT: No Headaches, hearing loss or vertigo. No mouth sores or sore throat. · Cardiovascular: No chest pain, dyspnea on exertion, palpitations or loss of consciousness. · Respiratory: No cough or wheezing, no sputum production. No hemoptysis. .    · Gastrointestinal: No abdominal pain, appetite loss, blood in stools. No change in bowel habits. · Genitourinary: No dysuria, trouble voiding, or hematuria. · Musculoskeletal:  Generalized weakness. No joint complaints. · Integumentary: No rash or pruritis. · Neurological: No headache, diplopia. No change in gait, balance, or coordination.  No paresthesias. · Endocrine: No temperature intolerance. No excessive thirst, fluid intake, or urination. · Hematologic/Lymphatic: No abnormal bruising or ecchymoses, blood clots or swollen lymph nodes. · Allergic/Immunologic: No nasal congestion or hives. ·     Objective:     Patient Vitals for the past 8 hrs:   BP Temp Temp src Pulse Resp SpO2 Weight   05/12/22 0613 -- -- -- -- -- -- 217 lb 13 oz (98.8 kg)   05/12/22 0325 -- -- -- -- 12 98 % --   05/12/22 0315 (!) 112/59 97.1 °F (36.2 °C) Axillary 60 13 98 % --     I/O last 3 completed shifts: In: 755 [P.O.:755]  Out: 3250 [Urine:3250]  No intake/output data recorded.     BP (!) 112/59   Pulse 60   Temp 97.1 °F (36.2 °C) (Axillary)   Resp 12   Ht 5' 7\" (1.702 m)   Wt 217 lb 13 oz (98.8 kg)   SpO2 98%   BMI 34.11 kg/m²     General Appearance:    Alert, cooperative, no distress, appears stated age   Head:    Normocephalic, without obvious abnormality, atraumatic   Eyes:    PERRL, conjunctiva/corneas clear, EOM's intact, fundi     benign, both eyes        Ears:    Normal TM's and external ear canals, both ears   Nose:   Nares normal, septum midline, mucosa normal, no drainage    or sinus tenderness   Throat:   Lips, mucosa, and tongue normal; teeth and gums normal   Neck:   Supple, symmetrical, trachea midline, no adenopathy;        thyroid:  No enlargement/tenderness/nodules; no carotid    bruit or JVD   Back:     Symmetric, no curvature, ROM normal, no CVA tenderness   Lungs:     Clear to auscultation bilaterally, respirations unlabored   Chest wall:    No tenderness or deformity   Heart:    Regular rate and rhythm, S1 and S2 normal, no murmur, rub   or gallop   Abdomen:     Soft, non-tender, bowel sounds active all four quadrants,     no masses, no organomegaly           Extremities:   Extremities normal, atraumatic, no cyanosis or edema   Pulses:   2+ and symmetric all extremities   Skin:   Skin color, texture, turgor normal, no rashes or lesions Lymph nodes:   Cervical, supraclavicular, and axillary nodes normal   Neurologic:   CNII-XII intact. Normal strength, sensation and reflexes       throughout       Data Review  CBC:   Lab Results   Component Value Date    WBC 3.8 05/12/2022    RBC 3.91 05/12/2022    RBC 5.12 05/26/2017       Assessment:     Principal Problem:    Respiratory failure ([address]  Resolved Problems:    * No resolved hospital problems. *      Plan:     1. Small cell lung cancer. She is cycle 1 day 4 of carboplatin and -16 and Tecentriq. 2.  Probable pneumonia. She is clinically improving with antibiotics. 3.  Anemia. Her iron studies are normal.  I will give a dose of Procrit due to her chronic kidney disease. I explained the potential side effects including slightly higher risk of strokes, heart attacks, blood clots, cancer progression. She is willing to proceed.         Electronically signed by Catrachito Kaye MD on 5/12/2022 at 8:13 AM

## 2022-05-13 ENCOUNTER — HOSPITAL ENCOUNTER (INPATIENT)
Age: 62
LOS: 6 days | Discharge: HOME | DRG: 193 | End: 2022-05-19
Attending: EMERGENCY MEDICINE | Admitting: FAMILY MEDICINE
Payer: MEDICARE

## 2022-05-13 ENCOUNTER — TELEPHONE (OUTPATIENT)
Dept: OTHER | Facility: CLINIC | Age: 62
End: 2022-05-13

## 2022-05-13 ENCOUNTER — APPOINTMENT (OUTPATIENT)
Dept: GENERAL RADIOLOGY | Age: 62
DRG: 193 | End: 2022-05-13
Payer: MEDICARE

## 2022-05-13 VITALS
HEIGHT: 67 IN | BODY MASS INDEX: 34.33 KG/M2 | WEIGHT: 218.7 LBS | HEART RATE: 63 BPM | TEMPERATURE: 97.4 F | OXYGEN SATURATION: 95 % | SYSTOLIC BLOOD PRESSURE: 101 MMHG | DIASTOLIC BLOOD PRESSURE: 63 MMHG | RESPIRATION RATE: 18 BRPM

## 2022-05-13 DIAGNOSIS — R06.03 RESPIRATORY DISTRESS: Primary | ICD-10-CM

## 2022-05-13 DIAGNOSIS — C34.90 MALIGNANT NEOPLASM OF LUNG, UNSPECIFIED LATERALITY, UNSPECIFIED PART OF LUNG (HCC): ICD-10-CM

## 2022-05-13 PROBLEM — J18.9 PNEUMONIA: Status: ACTIVE | Noted: 2022-05-13

## 2022-05-13 LAB
A/G RATIO: 1.4 (ref 1.1–2.2)
A/G RATIO: 1.5 (ref 1.1–2.2)
ALBUMIN SERPL-MCNC: 3.7 G/DL (ref 3.4–5)
ALBUMIN SERPL-MCNC: 3.7 G/DL (ref 3.4–5)
ALP BLD-CCNC: 109 U/L (ref 40–129)
ALP BLD-CCNC: 72 U/L (ref 40–129)
ALT SERPL-CCNC: 26 U/L (ref 10–40)
ALT SERPL-CCNC: 65 U/L (ref 10–40)
ANION GAP SERPL CALCULATED.3IONS-SCNC: 13 MMOL/L (ref 3–16)
ANION GAP SERPL CALCULATED.3IONS-SCNC: 14 MMOL/L (ref 3–16)
AST SERPL-CCNC: 22 U/L (ref 15–37)
AST SERPL-CCNC: 68 U/L (ref 15–37)
BASE EXCESS ARTERIAL: 1.1 MMOL/L (ref -3–3)
BASOPHILS ABSOLUTE: 0 K/UL (ref 0–0.2)
BASOPHILS ABSOLUTE: 0.1 K/UL (ref 0–0.2)
BASOPHILS RELATIVE PERCENT: 0.1 %
BASOPHILS RELATIVE PERCENT: 0.3 %
BILIRUB SERPL-MCNC: 0.4 MG/DL (ref 0–1)
BILIRUB SERPL-MCNC: 0.5 MG/DL (ref 0–1)
BUN BLDV-MCNC: 30 MG/DL (ref 7–20)
BUN BLDV-MCNC: 33 MG/DL (ref 7–20)
CALCIUM SERPL-MCNC: 8.3 MG/DL (ref 8.3–10.6)
CALCIUM SERPL-MCNC: 9 MG/DL (ref 8.3–10.6)
CARBOXYHEMOGLOBIN ARTERIAL: 1.4 % (ref 0–1.5)
CHLORIDE BLD-SCNC: 101 MMOL/L (ref 99–110)
CHLORIDE BLD-SCNC: 97 MMOL/L (ref 99–110)
CO2: 24 MMOL/L (ref 21–32)
CO2: 26 MMOL/L (ref 21–32)
CREAT SERPL-MCNC: 1.4 MG/DL (ref 0.6–1.2)
CREAT SERPL-MCNC: 1.4 MG/DL (ref 0.6–1.2)
EOSINOPHILS ABSOLUTE: 0 K/UL (ref 0–0.6)
EOSINOPHILS ABSOLUTE: 0.2 K/UL (ref 0–0.6)
EOSINOPHILS RELATIVE PERCENT: 0.2 %
EOSINOPHILS RELATIVE PERCENT: 0.6 %
GFR AFRICAN AMERICAN: 46
GFR AFRICAN AMERICAN: 46
GFR NON-AFRICAN AMERICAN: 38
GFR NON-AFRICAN AMERICAN: 38
GLUCOSE BLD-MCNC: 170 MG/DL (ref 70–99)
GLUCOSE BLD-MCNC: 81 MG/DL (ref 70–99)
GLUCOSE BLD-MCNC: 88 MG/DL (ref 70–99)
GLUCOSE BLD-MCNC: 96 MG/DL (ref 70–99)
HCO3 ARTERIAL: 29.1 MMOL/L (ref 21–29)
HCT VFR BLD CALC: 31.9 % (ref 36–48)
HCT VFR BLD CALC: 33.7 % (ref 36–48)
HEMOGLOBIN, ART, EXTENDED: 10.6 G/DL (ref 12–16)
HEMOGLOBIN: 10 G/DL (ref 12–16)
HEMOGLOBIN: 10.6 G/DL (ref 12–16)
LYMPHOCYTES ABSOLUTE: 0.5 K/UL (ref 1–5.1)
LYMPHOCYTES ABSOLUTE: 0.5 K/UL (ref 1–5.1)
LYMPHOCYTES RELATIVE PERCENT: 1.6 %
LYMPHOCYTES RELATIVE PERCENT: 10.1 %
MCH RBC QN AUTO: 23.6 PG (ref 26–34)
MCH RBC QN AUTO: 23.7 PG (ref 26–34)
MCHC RBC AUTO-ENTMCNC: 31.2 G/DL (ref 31–36)
MCHC RBC AUTO-ENTMCNC: 31.5 G/DL (ref 31–36)
MCV RBC AUTO: 75 FL (ref 80–100)
MCV RBC AUTO: 76.1 FL (ref 80–100)
METHEMOGLOBIN ARTERIAL: 0.5 %
MONOCYTES ABSOLUTE: 0.1 K/UL (ref 0–1.3)
MONOCYTES ABSOLUTE: 0.1 K/UL (ref 0–1.3)
MONOCYTES RELATIVE PERCENT: 0.2 %
MONOCYTES RELATIVE PERCENT: 1.2 %
NEUTROPHILS ABSOLUTE: 27.8 K/UL (ref 1.7–7.7)
NEUTROPHILS ABSOLUTE: 4.2 K/UL (ref 1.7–7.7)
NEUTROPHILS RELATIVE PERCENT: 88.4 %
NEUTROPHILS RELATIVE PERCENT: 97.3 %
O2 SAT, ARTERIAL: 72.9 %
O2 THERAPY: ABNORMAL
PCO2 ARTERIAL: 64.3 MMHG (ref 35–45)
PDW BLD-RTO: 18.2 % (ref 12.4–15.4)
PDW BLD-RTO: 18.3 % (ref 12.4–15.4)
PERFORMED ON: NORMAL
PERFORMED ON: NORMAL
PH ARTERIAL: 7.26 (ref 7.35–7.45)
PLATELET # BLD: 245 K/UL (ref 135–450)
PLATELET # BLD: 285 K/UL (ref 135–450)
PMV BLD AUTO: 9.8 FL (ref 5–10.5)
PMV BLD AUTO: 9.9 FL (ref 5–10.5)
PO2 ARTERIAL: 46.8 MMHG (ref 75–108)
POTASSIUM REFLEX MAGNESIUM: 3.8 MMOL/L (ref 3.5–5.1)
POTASSIUM SERPL-SCNC: 3.7 MMOL/L (ref 3.5–5.1)
PRO-BNP: ABNORMAL PG/ML (ref 0–124)
RBC # BLD: 4.2 M/UL (ref 4–5.2)
RBC # BLD: 4.5 M/UL (ref 4–5.2)
SODIUM BLD-SCNC: 135 MMOL/L (ref 136–145)
SODIUM BLD-SCNC: 140 MMOL/L (ref 136–145)
TCO2 ARTERIAL: 31.1 MMOL/L
TOTAL PROTEIN: 6.2 G/DL (ref 6.4–8.2)
TOTAL PROTEIN: 6.4 G/DL (ref 6.4–8.2)
TROPONIN: 0.07 NG/ML
WBC # BLD: 28.6 K/UL (ref 4–11)
WBC # BLD: 4.8 K/UL (ref 4–11)

## 2022-05-13 PROCEDURE — 36415 COLL VENOUS BLD VENIPUNCTURE: CPT

## 2022-05-13 PROCEDURE — 94761 N-INVAS EAR/PLS OXIMETRY MLT: CPT

## 2022-05-13 PROCEDURE — 6360000002 HC RX W HCPCS: Performed by: EMERGENCY MEDICINE

## 2022-05-13 PROCEDURE — 85025 COMPLETE CBC W/AUTO DIFF WBC: CPT

## 2022-05-13 PROCEDURE — 2580000003 HC RX 258: Performed by: PEDIATRICS

## 2022-05-13 PROCEDURE — 82803 BLOOD GASES ANY COMBINATION: CPT

## 2022-05-13 PROCEDURE — 71045 X-RAY EXAM CHEST 1 VIEW: CPT

## 2022-05-13 PROCEDURE — 93005 ELECTROCARDIOGRAM TRACING: CPT | Performed by: EMERGENCY MEDICINE

## 2022-05-13 PROCEDURE — 83880 ASSAY OF NATRIURETIC PEPTIDE: CPT

## 2022-05-13 PROCEDURE — 94640 AIRWAY INHALATION TREATMENT: CPT

## 2022-05-13 PROCEDURE — 6370000000 HC RX 637 (ALT 250 FOR IP): Performed by: PEDIATRICS

## 2022-05-13 PROCEDURE — 94660 CPAP INITIATION&MGMT: CPT

## 2022-05-13 PROCEDURE — 6370000000 HC RX 637 (ALT 250 FOR IP): Performed by: INTERNAL MEDICINE

## 2022-05-13 PROCEDURE — 80053 COMPREHEN METABOLIC PANEL: CPT

## 2022-05-13 PROCEDURE — 94680 O2 UPTK RST&XERS DIR SIMPLE: CPT

## 2022-05-13 PROCEDURE — 1200000000 HC SEMI PRIVATE

## 2022-05-13 PROCEDURE — 2700000000 HC OXYGEN THERAPY PER DAY

## 2022-05-13 PROCEDURE — 97530 THERAPEUTIC ACTIVITIES: CPT

## 2022-05-13 PROCEDURE — 6370000000 HC RX 637 (ALT 250 FOR IP): Performed by: EMERGENCY MEDICINE

## 2022-05-13 PROCEDURE — 99285 EMERGENCY DEPT VISIT HI MDM: CPT

## 2022-05-13 PROCEDURE — 84484 ASSAY OF TROPONIN QUANT: CPT

## 2022-05-13 PROCEDURE — 6360000002 HC RX W HCPCS: Performed by: INTERNAL MEDICINE

## 2022-05-13 PROCEDURE — 6360000002 HC RX W HCPCS: Performed by: PEDIATRICS

## 2022-05-13 PROCEDURE — 96374 THER/PROPH/DIAG INJ IV PUSH: CPT

## 2022-05-13 RX ORDER — SODIUM CHLORIDE 9 MG/ML
INJECTION, SOLUTION INTRAVENOUS PRN
Status: DISCONTINUED | OUTPATIENT
Start: 2022-05-13 | End: 2022-05-19 | Stop reason: HOSPADM

## 2022-05-13 RX ORDER — POLYETHYLENE GLYCOL 3350 17 G/17G
17 POWDER, FOR SOLUTION ORAL DAILY PRN
Status: DISCONTINUED | OUTPATIENT
Start: 2022-05-13 | End: 2022-05-19 | Stop reason: HOSPADM

## 2022-05-13 RX ORDER — AZITHROMYCIN 500 MG/1
500 TABLET, FILM COATED ORAL EVERY 24 HOURS
Status: DISCONTINUED | OUTPATIENT
Start: 2022-05-14 | End: 2022-05-14

## 2022-05-13 RX ORDER — ENOXAPARIN SODIUM 100 MG/ML
40 INJECTION SUBCUTANEOUS DAILY
Status: DISCONTINUED | OUTPATIENT
Start: 2022-05-14 | End: 2022-05-13 | Stop reason: DRUGHIGH

## 2022-05-13 RX ORDER — IPRATROPIUM BROMIDE AND ALBUTEROL SULFATE 2.5; .5 MG/3ML; MG/3ML
1 SOLUTION RESPIRATORY (INHALATION) ONCE
Status: COMPLETED | OUTPATIENT
Start: 2022-05-13 | End: 2022-05-13

## 2022-05-13 RX ORDER — ACETAMINOPHEN 325 MG/1
650 TABLET ORAL EVERY 6 HOURS PRN
Status: DISCONTINUED | OUTPATIENT
Start: 2022-05-13 | End: 2022-05-19 | Stop reason: HOSPADM

## 2022-05-13 RX ORDER — PREDNISONE 20 MG/1
40 TABLET ORAL DAILY
Status: DISCONTINUED | OUTPATIENT
Start: 2022-05-16 | End: 2022-05-14

## 2022-05-13 RX ORDER — METHYLPREDNISOLONE SODIUM SUCCINATE 125 MG/2ML
125 INJECTION, POWDER, LYOPHILIZED, FOR SOLUTION INTRAMUSCULAR; INTRAVENOUS ONCE
Status: COMPLETED | OUTPATIENT
Start: 2022-05-13 | End: 2022-05-13

## 2022-05-13 RX ORDER — CEFUROXIME AXETIL 250 MG/1
250 TABLET ORAL 2 TIMES DAILY
Qty: 14 TABLET | Refills: 0 | Status: ON HOLD | OUTPATIENT
Start: 2022-05-13 | End: 2022-05-19 | Stop reason: HOSPADM

## 2022-05-13 RX ORDER — ENOXAPARIN SODIUM 100 MG/ML
30 INJECTION SUBCUTANEOUS 2 TIMES DAILY
Status: DISCONTINUED | OUTPATIENT
Start: 2022-05-14 | End: 2022-05-19

## 2022-05-13 RX ORDER — METHYLPREDNISOLONE SODIUM SUCCINATE 40 MG/ML
40 INJECTION, POWDER, LYOPHILIZED, FOR SOLUTION INTRAMUSCULAR; INTRAVENOUS EVERY 12 HOURS
Status: DISCONTINUED | OUTPATIENT
Start: 2022-05-14 | End: 2022-05-14

## 2022-05-13 RX ORDER — IPRATROPIUM BROMIDE AND ALBUTEROL SULFATE 2.5; .5 MG/3ML; MG/3ML
1 SOLUTION RESPIRATORY (INHALATION) EVERY 4 HOURS PRN
Status: DISCONTINUED | OUTPATIENT
Start: 2022-05-13 | End: 2022-05-14

## 2022-05-13 RX ORDER — SODIUM CHLORIDE 0.9 % (FLUSH) 0.9 %
5-40 SYRINGE (ML) INJECTION EVERY 12 HOURS SCHEDULED
Status: DISCONTINUED | OUTPATIENT
Start: 2022-05-13 | End: 2022-05-19 | Stop reason: HOSPADM

## 2022-05-13 RX ORDER — ONDANSETRON 4 MG/1
4 TABLET, ORALLY DISINTEGRATING ORAL EVERY 8 HOURS PRN
Status: DISCONTINUED | OUTPATIENT
Start: 2022-05-13 | End: 2022-05-14 | Stop reason: SDUPTHER

## 2022-05-13 RX ORDER — TRAMADOL HYDROCHLORIDE 50 MG/1
50 TABLET ORAL EVERY 6 HOURS PRN
Status: DISCONTINUED | OUTPATIENT
Start: 2022-05-13 | End: 2022-05-19 | Stop reason: HOSPADM

## 2022-05-13 RX ORDER — ACETAMINOPHEN 650 MG/1
650 SUPPOSITORY RECTAL EVERY 6 HOURS PRN
Status: DISCONTINUED | OUTPATIENT
Start: 2022-05-13 | End: 2022-05-19 | Stop reason: HOSPADM

## 2022-05-13 RX ORDER — SODIUM CHLORIDE 0.9 % (FLUSH) 0.9 %
5-40 SYRINGE (ML) INJECTION PRN
Status: DISCONTINUED | OUTPATIENT
Start: 2022-05-13 | End: 2022-05-19 | Stop reason: HOSPADM

## 2022-05-13 RX ORDER — ONDANSETRON 2 MG/ML
4 INJECTION INTRAMUSCULAR; INTRAVENOUS EVERY 6 HOURS PRN
Status: DISCONTINUED | OUTPATIENT
Start: 2022-05-13 | End: 2022-05-19 | Stop reason: HOSPADM

## 2022-05-13 RX ADMIN — TORSEMIDE 40 MG: 20 TABLET ORAL at 08:47

## 2022-05-13 RX ADMIN — TIOTROPIUM BROMIDE AND OLODATEROL 2 PUFF: 3.124; 2.736 SPRAY, METERED RESPIRATORY (INHALATION) at 07:30

## 2022-05-13 RX ADMIN — POTASSIUM CHLORIDE 10 MEQ: 750 TABLET, EXTENDED RELEASE ORAL at 08:47

## 2022-05-13 RX ADMIN — DOCUSATE SODIUM 100 MG: 100 CAPSULE, LIQUID FILLED ORAL at 08:47

## 2022-05-13 RX ADMIN — IPRATROPIUM BROMIDE AND ALBUTEROL SULFATE 1 AMPULE: .5; 3 SOLUTION RESPIRATORY (INHALATION) at 07:30

## 2022-05-13 RX ADMIN — Medication 400 MG: at 08:47

## 2022-05-13 RX ADMIN — IPRATROPIUM BROMIDE AND ALBUTEROL SULFATE 1 AMPULE: .5; 3 SOLUTION RESPIRATORY (INHALATION) at 15:51

## 2022-05-13 RX ADMIN — ASPIRIN 81 MG: 81 TABLET, COATED ORAL at 08:47

## 2022-05-13 RX ADMIN — IPRATROPIUM BROMIDE AND ALBUTEROL SULFATE 1 AMPULE: .5; 3 SOLUTION RESPIRATORY (INHALATION) at 11:56

## 2022-05-13 RX ADMIN — FILGRASTIM-AAFI 480 MCG: 480 INJECTION, SOLUTION SUBCUTANEOUS at 10:57

## 2022-05-13 RX ADMIN — GABAPENTIN 600 MG: 300 CAPSULE ORAL at 08:47

## 2022-05-13 RX ADMIN — IPRATROPIUM BROMIDE AND ALBUTEROL SULFATE 1 AMPULE: .5; 3 SOLUTION RESPIRATORY (INHALATION) at 21:19

## 2022-05-13 RX ADMIN — GABAPENTIN 600 MG: 300 CAPSULE ORAL at 14:17

## 2022-05-13 RX ADMIN — CEFEPIME HYDROCHLORIDE 2000 MG: 2 INJECTION, POWDER, FOR SOLUTION INTRAVENOUS at 06:28

## 2022-05-13 RX ADMIN — AMIODARONE HYDROCHLORIDE 200 MG: 200 TABLET ORAL at 08:47

## 2022-05-13 RX ADMIN — ENOXAPARIN SODIUM 30 MG: 100 INJECTION SUBCUTANEOUS at 08:47

## 2022-05-13 RX ADMIN — CARVEDILOL 6.25 MG: 6.25 TABLET, FILM COATED ORAL at 08:47

## 2022-05-13 RX ADMIN — METHYLPREDNISOLONE SODIUM SUCCINATE 125 MG: 125 INJECTION, POWDER, FOR SOLUTION INTRAMUSCULAR; INTRAVENOUS at 22:02

## 2022-05-13 RX ADMIN — PANTOPRAZOLE SODIUM 40 MG: 40 TABLET, DELAYED RELEASE ORAL at 06:29

## 2022-05-13 ASSESSMENT — PAIN SCALES - GENERAL: PAINLEVEL_OUTOF10: 2

## 2022-05-13 ASSESSMENT — PAIN DESCRIPTION - ORIENTATION: ORIENTATION: RIGHT

## 2022-05-13 ASSESSMENT — PAIN DESCRIPTION - LOCATION: LOCATION: HIP

## 2022-05-13 ASSESSMENT — PAIN - FUNCTIONAL ASSESSMENT
PAIN_FUNCTIONAL_ASSESSMENT: 0-10
PAIN_FUNCTIONAL_ASSESSMENT: ACTIVITIES ARE NOT PREVENTED

## 2022-05-13 ASSESSMENT — PAIN DESCRIPTION - DESCRIPTORS: DESCRIPTORS: DISCOMFORT

## 2022-05-13 ASSESSMENT — ENCOUNTER SYMPTOMS: SHORTNESS OF BREATH: 1

## 2022-05-13 ASSESSMENT — PAIN DESCRIPTION - PAIN TYPE: TYPE: CHRONIC PAIN

## 2022-05-13 NOTE — PROGRESS NOTES
[facility]    Respiratory Therapy   Home Oxygen Evaluation        Name: Leighann Mckay  Medical Record Number: [id number]  Age: 65 y.o.   Gender:  female   : 1960  Today's date: 2022  Room: B3Y-1484/5258-01      Assessment        BP (!) 119/53   Pulse 65   Temp 97.4 °F (36.3 °C) (Oral)   Resp 14   Ht 5' 7\" (1.702 m)   Wt 218 lb 11.1 oz (99.2 kg)   SpO2 95%   BMI 34.25 kg/m²     Patient Active Problem List   Diagnosis    Essential hypertension    Cardiomyopathy ([facility]    CHF (congestive heart failure) (HCC)    COPD (chronic obstructive pulmonary disease) (HCC)    Chest pain    NEGIN treated with BiPAP    DMII (diabetes mellitus, type 2) (HCC)    Iron deficiency anemia    Intestinal malabsorption    Iron deficiency anemia due to chronic blood loss    Treatment    Anemia    Acute on chronic systolic CHF (congestive heart failure), NYHA class 3 (HCC)    Hyperlipidemia    Morbid obesity due to excess calories (HCC)    Acute pulmonary edema (HCC)    Acute respiratory failure with hypercarbia (HCC)    Congestive heart failure with left ventricular dysfunction (HCC)    Acute decompensated heart failure (HCC)    Acute kidney injury superimposed on chronic kidney disease (HCC)    COPD exacerbation (HCC)    Demand ischemia of myocardium (HCC)    Non morbid obesity due to excess calories    Acute respiratory failure with hypoxia and hypercapnia (HCC)    Essential hypertension    Hyperlipidemia    DMII (diabetes mellitus, type 2) ([facility]    CAD (coronary artery disease)    Respiratory failure (HCC)       Social History:  Social History     Tobacco Use    Smoking status: Never Smoker    Smokeless tobacco: Never Used   Vaping Use    Vaping Use: Never used   Substance Use Topics    Alcohol use: Not Currently    Drug use: Not Currently       Patient Room Air saturation at rest 96%  Patient Room Air saturation upon ambulation 88%    Oxygen saturations of 88% or less on RA qualifies patient for Home Oxygen    Patient ambulated on 2 lpm with an oxygen saturation of 94%    Qualifying patient for home oxygen: 2 lpm with ambulation. In your clinical assessment does the Patient Require Portable Oxygen Tanks?     Yes              [facility] contacted to follow care of patients home oxygen needs on 5/13/2022 at 12:11 PM    Patient/caregiver was educated on Home Oxygen process:  Yes      Level of patient/caregiver understanding able to:   [] Verbalize understanding   [] Demonstrate understanding       [] Teach back        [] Needs reinforcement        []  No available caregiver               []  Other:     Response to education:  Good     Time Spent with Home O2 Set Up:  10  minutes     linda tobias RCP on 5/13/2022 at 12:11 PM

## 2022-05-13 NOTE — PROGRESS NOTES
Physician Progress Note      Ida De La Cruz  CSN #:                  [id number]  :                       1960  ADMIT DATE:       5/10/2022 7:02 PM  DISCH DATE:  RESPONDING  PROVIDER #:        KATERIN SIMMONS JR        QUERY TEXT:    Stage of Chronic Kidney Disease: Please provide further specificity, if known. Clinical indicators include: chronic kidney disease  Options provided:  -- Chronic kidney disease stage 1  -- Chronic kidney disease stage 2  -- Chronic kidney disease stage 3  -- Chronic kidney disease stage 3a  -- Chronic kidney disease stage 3b  -- Chronic kidney disease stage 4  -- Chronic kidney disease stage 5  -- Chronic kidney disease stage 5, requiring dialysis  -- End stage renal disease  -- Other - I will add my own diagnosis  -- Disagree - Not applicable / Not valid  -- Disagree - Clinically Unable to determine / Unknown        PROVIDER RESPONSE TEXT:    The patient has chronic kidney disease stage 3.       Electronically signed by:  Jean Claude Farmer 2022 7:16 AM

## 2022-05-13 NOTE — PLAN OF CARE
Problem: Skin/Tissue Integrity  Goal: Absence of new skin breakdown  Description: 1. Monitor for areas of redness and/or skin breakdown  2. Assess vascular access sites hourly  3. Every 4-6 hours minimum:  Change oxygen saturation probe site  4. Every 4-6 hours:  If on nasal continuous positive airway pressure, respiratory therapy assess nares and determine need for appliance change or resting period.   Outcome: Progressing     Problem: ABCDS Injury Assessment  Goal: Absence of physical injury  Outcome: Progressing     Problem: Pain  Goal: Verbalizes/displays adequate comfort level or baseline comfort level  Outcome: Progressing

## 2022-05-13 NOTE — DISCHARGE INSTR - COC
Continuity of Care Form    Patient Name: Marc King   :  1960  MRN:  [id number]    Admit date:  5/10/2022  Discharge date:  2022    Code Status Order: Full Code   Advance Directives:      Admitting Physician:  Suzy Austin MD  PCP: Mone Chacko MD    Discharging Nurse: [address]/Room#: I9V-8164/5924-20  Discharging Unit Phone Number: [phone]    Emergency Contact:   Extended Emergency Contact Information  Primary Emergency Contact: Claudell Mare [address] Phone: [phone]  Relation: Child  Secondary Emergency Contact: Nohemy Zuniga  [place] Phone: [phone]  Relation: Child    Past Surgical History:  Past Surgical History:   Procedure Laterality Date    CARPAL TUNNEL RELEASE      CHOLECYSTECTOMY      CORONARY ANGIOPLASTY WITH STENT PLACEMENT  2013    Stent LAD    INCONTINENCE SURGERY      IR PORT PLACEMENT EQUAL OR GREATER THAN 5 YEARS Right 2022    Power port, inserted by Dr. Jayne Simon, cut at 22    IR [place] [address] 5 YEARS  5/3/2022    IR PORT PLACEMENT EQUAL OR GREATER THAN 5 YEARS 5/3/2022 [facility] SPECIAL PROCEDURES       Immunization History:   Immunization History   Administered Date(s) Administered    Influenza Whole 2013    Influenza, Quadv, IM, PF (6 mo and older Fluzone, Flulaval, Fluarix, and 3 yrs and older Afluria) 10/13/2021       Active Problems:  Patient Active Problem List   Diagnosis Code    Essential hypertension I10    Cardiomyopathy ([address] I42.9    CHF (congestive heart failure) ([address] I50.9    COPD (chronic obstructive pulmonary disease) ([address] J44.9    Chest pain R07.9    NEGIN treated with BiPAP G47.33    DMII (diabetes mellitus, type 2) ([address] E11.9    Iron deficiency anemia D50.9    Intestinal malabsorption K90.9    Iron deficiency anemia due to chronic blood loss D50.0    Treatment Z51.89    Anemia D64.9    Acute on chronic systolic CHF (congestive heart failure), NYHA class 3 ([facility] I50.23    Hyperlipidemia E78.5    Morbid obesity due to excess calories ([facility]) E66.01    Acute pulmonary edema (HCC) J81.0    Acute respiratory failure with hypercarbia (HCC) J96.01, J96.02    Congestive heart failure with left ventricular dysfunction (HCC) I50.9    Acute decompensated heart failure (HCC) I50.9    Acute kidney injury superimposed on chronic kidney disease (HCC) N17.9, N18.9    COPD exacerbation (HCC) J44.1    Demand ischemia of myocardium ([facility]) I24.8    Non morbid obesity due to excess calories E66.09    Acute respiratory failure with hypoxia and hypercapnia (HCC) J96.01, J96.02    Essential hypertension I10    Hyperlipidemia E78.5    DMII (diabetes mellitus, type 2) (HCC) E11.9    CAD (coronary artery disease) I25.10    Respiratory failure ([facility]) J96.90       Isolation/Infection:   Isolation            No Isolation          Patient Infection Status       Infection Onset Added Last Indicated Last Indicated By Review Planned Expiration Resolved Resolved By    None active    Resolved    COVID-19 (Rule Out) 12/01/21 12/01/21 12/01/21 COVID-19, Rapid (Ordered)   12/01/21 Rule-Out Test Resulted    COVID-19 (Rule Out) 10/11/21 10/11/21 10/11/21 COVID-19, Rapid (Ordered)   10/11/21 Rule-Out Test Resulted            Nurse Assessment:  Last Vital Signs: /63   Pulse 63   Temp 97.4 °F (36.3 °C) (Oral)   Resp 19   Ht 5' 7\" (1.702 m)   Wt 218 lb 11.1 oz (99.2 kg)   SpO2 95%   BMI 34.25 kg/m²     Last documented pain score (0-10 scale): Pain Level: 2  Last Weight:   Wt Readings from Last 1 Encounters:   05/13/22 218 lb 11.1 oz (99.2 kg)     Mental Status:  oriented, alert, coherent, logical, thought processes intact, and able to concentrate and follow conversation    IV Access:  - None    Nursing Mobility/ADLs:  Walking   Assisted  Transfer  Assisted  Bathing  Assisted  Dressing  Assisted  Toileting  Assisted  Feeding  Assisted  Med Admin  Assisted  Med Delivery   whole    Wound Care Documentation and Therapy:        Elimination:  Continence: Bowel: Yes  Bladder: Yes  Urinary Catheter: None   Colostomy/Ileostomy/Ileal Conduit: No       Date of Last BM:     Intake/Output Summary (Last 24 hours) at 5/13/2022 1402  Last data filed at 5/13/2022 0848  Gross per 24 hour   Intake 918.62 ml   Output --   Net 918.62 ml     I/O last 3 completed shifts: In: 1758.6 [P.O.:1560; IV Piggyback:198.6]  Out: 1300 [Urine:1300]    Safety Concerns: At Risk for Falls    Impairments/Disabilities:      None    Nutrition Therapy:  Current Nutrition Therapy:   - Oral Diet:  General and low fat, low cholesterol, high fiber     Routes of Feeding: Oral  Liquids: No Restrictions  Daily Fluid Restriction: no  Last Modified Barium Swallow with Video (Video Swallowing Test): not done    Treatments at the Time of Hospital Discharge:   Respiratory Treatments: nebs, inhalers   Oxygen Therapy:  is on oxygen at 2 L/min per nasal cannula.   Ventilator:    - CPAP   only when sleeping    Rehab Therapies: Nurse  Weight Bearing Status/Restrictions: No weight bearing restrictions  Other Medical Equipment (for information only, NOT a DME order):  walker  Other Treatments: [facility]: LEVEL 1 [address] [place] [address] to establish plan of care for patient over 60 day period   Nursing  Initial home SN evaluation visit to occur within 24-48 hours for:  medication management  VS and clinical assessment  S&S chronic disease exacerbation education + when to contact MD / NP  care coordination  Medication Reconciliation during 1st SN visit       PT/OT   Evaluate with goal of regaining prior level of functioning   OT to evaluate if patient has [address] needs for personal care      PCP Visit scheduled within 7 days of hospital discharge       Patient's personal belongings (please select all that are sent with patient):  Glasses    RN SIGNATURE:  Electronically signed by Marcella Vargas RN on 5/13/22 at 2:43 PM EDT    CASE MANAGEMENT/SOCIAL WORK SECTION    Inpatient Status Date: 5/11/22    Readmission Risk Assessment Score:  Readmission Risk              Risk of Unplanned Readmission:  36           Discharging to Facility/ Agency   Name:  [facility] care    Address: [address] [place] [address] [address]  Phone: [phone]  Fax: [phone]      Dialysis Facility (if applicable)   Name:  Address:  Dialysis Schedule:  Phone:  Fax:    / signature: Electronically signed by RAMSEY Kay LSW on 5/13/22 at 3:21 PM EDT    PHYSICIAN SECTION    Prognosis: Good    Condition at Discharge: Stable    Rehab Potential (if transferring to Rehab): Fair    Recommended Labs or Other Treatments After Discharge:     Physician Certification: I certify the above information and transfer of Will Backers  is necessary for the continuing treatment of the diagnosis listed and that she requires Home Care for less 30 days.      Update Admission H&P: No change in H&P    PHYSICIAN SIGNATURE:  Electronically signed by Ayden Dennis MD on 5/13/22 at 4:30 PM EDT

## 2022-05-13 NOTE — PLAN OF CARE
Problem: Discharge Planning  Goal: Discharge to home or other facility with appropriate resources  Outcome: Progressing  SW involved in d/c planning      Problem: Skin/Tissue Integrity  Goal: Absence of new skin breakdown  Description: 1. Monitor for areas of redness and/or skin breakdown  2. Assess vascular access sites hourly  3. Every 4-6 hours minimum:  Change oxygen saturation probe site  4. Every 4-6 hours:  If on nasal continuous positive airway pressure, respiratory therapy assess nares and determine need for appliance change or resting period. 5/13/2022 0943 by Jacob Matute RN  Outcome: Progressing  Skin assessment completed every shift per protocol. Pt assessed for incontinence, appropriate barrier cream applied as needed. Pt encouraged to turn/rotate every 2 hours. Assistance provided if pt unable to do so themselves. Will continue to monitor. Problem: ABCDS Injury Assessment  Goal: Absence of physical injury  5/13/2022 0943 by Jacob Matute RN  Outcome: Progressing  ID band in place. Call light within reach. Problem: Pain  Goal: Verbalizes/displays adequate comfort level or baseline comfort level  5/13/2022 0943 by Jacob Matute RN  Outcome: Progressing  Assessing pain using appropriate pain rating scale q shift and PRN. Medicating pt as prescribed and indicated. Offering pt non-pharmacologic pain interventions. Reassessing pain and pts response to pain intervention.     Problem: Safety - Adult  Goal: Free from fall injury  Outcome: Progressing  Calls out appropriately

## 2022-05-13 NOTE — CARE COORDINATION
[facility]    DC order noted, all docs needed have been faxed to [facility] for home care services.     Home care to see patient within 24-48 hrs    Maco Pelayo RN, BSN CTN  [facility] [phone]

## 2022-05-13 NOTE — PROGRESS NOTES
Patient was evaluated today for the diagnosis of COPD. I entered a DME order for home oxygen because the diagnosis and testing requires the patient to have supplemental oxygen. Condition will improve or be benefited by oxygen use. The patient is  able to perform good mobility in a home setting and therefore does require the use of a portable oxygen system. The need for this equipment was discussed with the patient and she understands and is in agreement.     Electronically signed by Madonna Calvillo MD on 5/13/22 at 1:25 PM EDT

## 2022-05-13 NOTE — DISCHARGE INSTR - DIET

## 2022-05-13 NOTE — CARE COORDINATION
INITIAL CASE MANAGEMENT ASSESSMENT     Reviewed chart, met with patient to assess possible discharge needs. Explained Case Management role/services. SW interviewed patient alone at bedside today.      Living Situation: Patient reports that she resides alone in a basement level apartment. There are five stairs leading downstairs to the front door and two stairs to enter the building. Prior to medical admission she reports that she had not trouble getting in and out of the property.      ADLs: Prior to medical admission patient received assistance with cooking and laundry from her daughter. She stated that doesn't anticipate any needs at discharge.      DME: Prior to medical admission patient reports that she had a life alert emergency response system, Rollator, wheeled walker, shower chair, bedside commode, manual wheelchair, electric wheelchair, nebulizer and a CPAP machine. She stated that she doesn't anticipate any needs at discharge.      PT/OT Recs: Not ordered     Active Services: Prior to medical admission patient reports that she was active with [facility]. She has a life alert emergency response system. She stated that they are working to find her a home health aide.       Transportation: Prior to medical admission patient reports that family transports her to and from medical appointments and errands. At this time there are no issues with access or affordability.      Medications: Patient receives medications from [facility]. At this time there are issues with access or affordability.      PCP: Rosalia Baker [phone] (phone) and [phone] (fax number). Patient reports that her last appointment with this provider was April of 2022.       HD/PD: N/A     PLAN/COMMENTS:   1) Monitor for home oxygen needs  2) Discharge to home with family     SW provided contact information for patient or family to call with any questions. SW will follow and assist as needed.     Respectfully submitted,    CYRUS Alaniz  [facility]   [phone]    Electronically signed by CYRUS Coffey on 5/13/2022 at 12:49 PM

## 2022-05-13 NOTE — PROGRESS NOTES
Occupational Therapy  [facility]  Occupational Therapy Treatment and Tentative D/C      Name: Cinda Servin  : 1960  MRN: [id number]  Date of Service: 2022    Discharge Recommendations: Cinda Servin scored a 21/24 on the AM-PAC ADL Inpatient form. Current research shows that an AM-PAC score of 18 or greater is typically associated with a discharge to the patient's home setting. If patient discharges prior to next session this note will serve as a discharge summary. Please see below for the latest assessment towards goals. Continue to assess pending progress,Home with assist PRN  OT Equipment Recommendations  Equipment Needed: No       Patient Diagnosis(es): The primary encounter diagnosis was Respiratory distress. Diagnoses of Elevated brain natriuretic peptide (BNP) level, Elevated troponin, On supplemental oxygen therapy, Mass of lower lobe of left lung, and Pneumonia of left lower lobe due to infectious organism were also pertinent to this visit. Past Medical History:  has a past medical history of Asthma, Cardiomyopathy ([facility] CHF (congestive heart failure) ([facility] COPD (chronic obstructive pulmonary disease) ([facility] Diabetes mellitus ([facility] Essential hypertension, Hyperlipidemia, Hypertension, and Obesity. Past Surgical History:  has a past surgical history that includes Cholecystectomy; Carpal tunnel release; Incontinence surgery; Coronary angioplasty with stent (2013); IR PORT PLACEMENT > 5 YEARS (Right, 2022); and IR PORT PLACEMENT > 5 YEARS (5/3/2022). Assessment   Performance deficits / Impairments: Decreased functional mobility ; Decreased balance;Decreased ADL status; Decreased high-level IADLs;Decreased endurance  Assessment: Pt tolerated session well. Pt completes functional mobility and transfers with supervision and use of RW. Pt on RA throughout with SpO2 >90% throughout. No noted SOB with mobility and transfers.  Pt with no LOB ambulate household distances in room. Pt reports no concerns returning home at this time. Continue with POC. Prognosis: Good  Assistance / Modification: RW  REQUIRES OT FOLLOW-UP: Yes  Activity Tolerance  Activity Tolerance: Patient Tolerated treatment well        Plan   Plan  Times per Week: 3-5x  Current Treatment Recommendations: Balance training,Functional mobility training,Endurance training,Safety education & training,Patient/Caregiver education & training,Self-Care / ADL     Restrictions  Restrictions/Precautions  Restrictions/Precautions: Fall Risk  Position Activity Restriction  Other position/activity restrictions: RA    Subjective   General  Chart Reviewed: Yes  Patient assessed for rehabilitation services?: Yes  Additional Pertinent Hx: per ED note, Destiny Art is a 65 y.o. female who presents to the emergency department secondary to concern for sudden onset of shortness of breath that started about 30 to 45 minutes prior to coming in. She reports she is currently on chemo (got it today and yesterday) for cancer. EMS also reports a history of heart failure, COPD. She denies any history of blood clots. She does states she is on aspirin but the Plavix was discontinued. She is able to nod and shake her head in regards to stating she has been eating and drinking well, denies any nausea, vomiting, fevers, chills. She did have a port placed on May 3, she states they used it today and yesterday for chemo. She reports her dry weight is 215 pounds. She states she has been taking her medication as prescribed with the exception of her evening dose today. She is acutely short of breath on arrival on BiPAP and history is limited due to acuity of condition. Family / Caregiver Present: No  Referring Practitioner: Chaya Whatley MD  Subjective  Subjective: Pt agreeable to OT treatment. Pt on RA throughout with SpO2 >90% throughout.      Social/Functional History  Social/Functional History  Lives With: Alone  Type of Home: Apartment  Home Layout: One level  Home Access: Stairs to enter with rails  Entrance Stairs - Number of Steps: 2 ARIS (uses wall and cane) into building + 5 steps with rail down to basement level  Bathroom Shower/Tub: Tub/Shower unit  Bathroom Toilet:  (RTS without handles or uses BSC)  Bathroom Equipment: Tub transfer bench,Hand-held shower  Home Equipment: [employer], 4 wheeled,Reacher  ADL Assistance: Independent  Ambulation Assistance: Independent (RW)  Transfer Assistance: Independent  Active : No  Occupation: On disability       Objective   Pulse: 65  Heart Rate Source: Monitor  BP: (!) 119/53  BP Location: Left upper arm  Patient Position: Sitting  MAP (Calculated): 75  Resp: 18  SpO2: 94 %  O2 Device: None (Room air)  Hearing: Within functional limits          Safety Devices  Type of Devices: Call light within reach;Nurse notified; Left in bed  Restraints  Restraints Initially in Place: No  Bed Mobility Training  Bed Mobility Training: Yes  Overall Level of Assistance: Independent  Supine to Sit: Independent  Sit to Supine: Independent  Scooting: Independent  Balance  Sitting: Intact  Standing: Intact (RW)  Transfer Training  Transfer Training: Yes  Overall Level of Assistance: Supervision (RW)  Sit to Stand: Supervision (RW)  Stand to Sit: Supervision  Gait  Overall Level of Assistance: Supervision (no LOB; Pt's SpO2 >90% throughout on RA)  Distance (ft): 50 Feet (50ft x2, 25)  Assistive Device: Walker, rolling     AROM: Within functional limits  PROM: Within functional limits  Strength:  Within functional limits  Coordination: Within functional limits  Tone: Normal  Sensation: Intact  ADL  Additional Comments: Pt declines ADL needs at this time                 Cognition  Overall Cognitive Status: [facility]                  Education Given To: Patient  Education Provided: Role of Therapy;Plan of Care;Transfer Training  Education Method: Demonstration;Verbal  Barriers to Learning: None  Education Outcome: Verbalized understanding;Demonstrated understanding       AM-PAC Score        AM-PAC Inpatient Daily Activity Raw Score: 21 (05/13/22 1058)  AM-PAC Inpatient ADL T-Scale Score : 44.27 (05/13/22 1058)  ADL Inpatient CMS 0-100% Score: 32.79 (05/13/22 1058)  ADL Inpatient CMS G-Code Modifier : CJ (05/13/22 1058)    Goals  Short Term Goals  Time Frame for Short term goals: prior to D/C; ongoing 5/13  Short Term Goal 1: complete functional mobility and transfers Mod Ind  Short Term Goal 2: complete bathing and dressing Mod Ind  Short Term Goal 3: complete toileting Mod Ind  Short Term Goal 4: complete grooming in stance at sink [address] Term Goals  Time Frame for Long term goals : STG=LTG  Patient Goals   Patient goals : return home       Therapy Time   Individual Concurrent Group Co-treatment   Time In 1028         Time Out 1052         Minutes 24         Timed Code Treatment Minutes: 24 Minutes       Vertell Gottron, OTR/L

## 2022-05-13 NOTE — PROGRESS NOTES
Hematology Oncology Daily Progress Note    Admit Date: 5/10/2022  Hospital day several    Subjective:     Patient has complaints of improving HEATH/SOB--denies CP. Medication side effects: none    Scheduled Meds:   filgrastim-aafi  480 mcg SubCUTAneous Once    tiotropium-olodaterol  2 puff Inhalation Daily    docusate sodium  100 mg Oral BID    amiodarone  200 mg Oral Daily    aspirin  81 mg Oral Daily    atorvastatin  40 mg Oral Nightly    carvedilol  6.25 mg Oral BID WC    Vericiguat  5 mg Oral QAM    torsemide  40 mg Oral BID    potassium chloride  10 mEq Oral Daily    pantoprazole  40 mg Oral Daily    magnesium oxide  400 mg Oral BID    gabapentin  600 mg Oral TID    empagliflozin  10 mg Oral Daily    sodium chloride flush  5-40 mL IntraVENous 2 times per day    enoxaparin  30 mg SubCUTAneous BID    cefepime  2,000 mg IntraVENous Q12H    ipratropium-albuterol  1 ampule Inhalation Q4H WA    insulin lispro  0-6 Units SubCUTAneous 4x Daily AC & HS     Continuous Infusions:   sodium chloride      dextrose       PRN Meds:prochlorperazine, sodium chloride flush, sodium chloride, ondansetron **OR** ondansetron, polyethylene glycol, acetaminophen **OR** acetaminophen, dextrose bolus **OR** dextrose bolus, glucagon (rDNA), dextrose, glucose    Review of Systems  Pertinent items are noted in HPI. REVIEW OF SYSTEMS:         · Constitutional: Denies fever, sweats, weight loss     · Eyes: No visual changes or diplopia. No scleral icterus. · ENT: No Headaches, hearing loss or vertigo. No mouth sores or sore throat. · Cardiovascular: No chest pain, dyspnea on exertion, palpitations or loss of consciousness. · Respiratory: No cough or wheezing, no sputum production. No hemoptysis. .    · Gastrointestinal: No abdominal pain, appetite loss, blood in stools. No change in bowel habits. · Genitourinary: No dysuria, trouble voiding, or hematuria. · Musculoskeletal:  Generalized weakness.  No joint complaints. · Integumentary: No rash or pruritis. · Neurological: No headache, diplopia. No change in gait, balance, or coordination. No paresthesias. · Endocrine: No temperature intolerance. No excessive thirst, fluid intake, or urination. · Hematologic/Lymphatic: No abnormal bruising or ecchymoses, blood clots or swollen lymph nodes. · Allergic/Immunologic: No nasal congestion or hives. ·     Objective:     Patient Vitals for the past 8 hrs:   BP Temp Temp src Pulse Resp SpO2 Weight   05/13/22 0757 (!) 119/53 97.4 °F (36.3 °C) Oral 65 18 94 % --   05/13/22 0744 -- -- -- -- 20 93 % --   05/13/22 0736 -- -- -- -- 10 -- --   05/13/22 0733 -- -- -- -- 15 98 % --   05/13/22 0427 -- -- -- -- -- -- 218 lb 11.1 oz (99.2 kg)   05/13/22 0348 -- -- -- -- 23 -- --   05/13/22 0345 109/62 -- -- 66 19 100 % --   05/13/22 0015 (!) 110/59 98.2 °F (36.8 °C) Axillary 62 14 96 % --     I/O last 3 completed shifts: In: 1758.6 [P.O.:1560; IV Piggyback:198.6]  Out: 1300 [Urine:1300]  No intake/output data recorded.     BP (!) 119/53   Pulse 65   Temp 97.4 °F (36.3 °C) (Oral)   Resp 18   Ht 5' 7\" (1.702 m)   Wt 218 lb 11.1 oz (99.2 kg)   SpO2 94%   BMI 34.25 kg/m²     General Appearance:    Alert, cooperative, no distress, appears stated age   Head:    Normocephalic, without obvious abnormality, atraumatic   Eyes:    PERRL, conjunctiva/corneas clear, EOM's intact, fundi     benign, both eyes        Ears:    Normal TM's and external ear canals, both ears   Nose:   Nares normal, septum midline, mucosa normal, no drainage    or sinus tenderness   Throat:   Lips, mucosa, and tongue normal; teeth and gums normal   Neck:   Supple, symmetrical, trachea midline, no adenopathy;        thyroid:  No enlargement/tenderness/nodules; no carotid    bruit or JVD   Back:     Symmetric, no curvature, ROM normal, no CVA tenderness   Lungs:     Clear to auscultation bilaterally, respirations unlabored   Chest wall:    No tenderness or deformity   Heart:    Regular rate and rhythm, S1 and S2 normal, no murmur, rub   or gallop   Abdomen:     Soft, non-tender, bowel sounds active all four quadrants,     no masses, no organomegaly           Extremities:   Extremities normal, atraumatic, no cyanosis or edema   Pulses:   2+ and symmetric all extremities   Skin:   Skin color, texture, turgor normal, no rashes or lesions   Lymph nodes:   Cervical, supraclavicular, and axillary nodes normal   Neurologic:   Stable         Data Review  CBC:   Lab Results   Component Value Date    WBC 3.8 05/12/2022    RBC 3.91 05/12/2022    RBC 5.12 05/26/2017       Assessment:     Principal Problem:    Respiratory failure (HCC)  Resolved Problems:    * No resolved hospital problems. *      Plan:     1. Extensive stage small cell lung cancer. She is cycle 1 day 5 of carboplatin\-16. \Tecentriq. 2.  Pneumonia. Continue antibiotics. 3.  Leukopenia. This is partially due to chemotherapy. I will give a dose of Granix today. 4.  Anemia. I gave a dose of Procrit yesterday.     It is okay to discharge today from my standpoint        Electronically signed by Ludy Yañez MD on 5/13/2022 at 8:06 AM

## 2022-05-13 NOTE — PROGRESS NOTES
Data- discharge order received, pt verbalized agreement to discharge, disposition to previous residence [address] Action- discharge instructions prepared and given to PATIENT, pt verbalized understanding. Medication information packet given r/t NEW and/or CHANGED prescriptions emphasizing name/purpose/side effects, pt verbalized understanding. Discharge instruction summary: Diet- GENERAL, Activity- AS TOLERATED, Primary Care Physician as follows: Rohit Beckett -[phone] f/u appointment TO BE MADE, immunizations reviewed and UP TO DATE, prescription medications filled AT [facility]. Inpatient surgical procedure precautions reviewed: CHF Education reviewed. Pt/ Family has had a total of 60 minutes CHF education this admission encounter. Response- Pt belongings gathered, IV removed. Disposition is home [facility], transported with STAFF.

## 2022-05-13 NOTE — ACP (ADVANCE CARE PLANNING)
Advance Care Planning     Advance Care Planning Activator (Inpatient)  Conversation Note      Date of ACP Conversation: 5/13/2022     Conversation Conducted with: Patient with Decision Making Capacity    ACP Activator: CYRUS Shah    Health Care Decision Maker:     Current Designated Health Care Decision Maker:     Primary Decision Maker: Lisa Bowen - [phone]    Primary Decision Maker: Kristofer Bowen - [phone]    Primary Decision Maker: Lisa Bowen - [phone]    Primary Decision Maker: Kristofer Bowen - [phone]    Care Preferences    Ventilation: \"If you were in your present state of health and suddenly became very ill and were unable to breathe on your own, what would your preference be about the use of a ventilator (breathing machine) if it were available to you? \"      Would the patient desire the use of ventilator (breathing machine)?: yes    \"If your health worsens and it becomes clear that your chance of recovery is unlikely, what would your preference be about the use of a ventilator (breathing machine) if it were available to you? \"     Would the patient desire the use of ventilator (breathing machine)?: No      Resuscitation  \"CPR works best to restart the heart when there is a sudden event, like a heart attack, in someone who is otherwise healthy. Unfortunately, CPR does not typically restart the heart for people who have serious health conditions or who are very sick. \"    \"In the event your heart stopped as a result of an underlying serious health condition, would you want attempts to be made to restart your heart (answer \"yes\" for attempt to resuscitate) or would you prefer a natural death (answer \"no\" for do not attempt to resuscitate)? \" yes       [] Yes   [] No   Educated Patient / Leothmary Pryor regarding differences between Advance Directives and portable DNR orders.     Length of ACP Conversation in minutes:  2    Conversation Outcomes:  [x] ACP discussion completed  [] Existing advance directive reviewed with patient; no changes to patient's previously recorded wishes  [] New Advance Directive completed  [] Portable Do Not Rescitate prepared for Provider review and signature  [] POLST/POST/MOLST/MOST prepared for Provider review and signature      Follow-up plan:    [] Schedule follow-up conversation to continue planning  [] Referred individual to Provider for additional questions/concerns   [] Advised patient/agent/surrogate to review completed ACP document and update if needed with changes in condition, patient preferences or care setting    [x] This note routed to one or more involved healthcare providers Alla Tripp MD primary care physician. Respectfully submitted,    SHERRI Nolen-S  [facility]   [phone]    Electronically signed by CYRUS Hackett on 5/13/2022 at 12:59 PM

## 2022-05-14 LAB
A/G RATIO: 2.2 (ref 1.1–2.2)
ALBUMIN SERPL-MCNC: 3.9 G/DL (ref 3.4–5)
ALP BLD-CCNC: 93 U/L (ref 40–129)
ALT SERPL-CCNC: 59 U/L (ref 10–40)
ANION GAP SERPL CALCULATED.3IONS-SCNC: 15 MMOL/L (ref 3–16)
AST SERPL-CCNC: 42 U/L (ref 15–37)
BASOPHILS ABSOLUTE: 0 K/UL (ref 0–0.2)
BASOPHILS RELATIVE PERCENT: 0.1 %
BILIRUB SERPL-MCNC: 0.3 MG/DL (ref 0–1)
BUN BLDV-MCNC: 28 MG/DL (ref 7–20)
CALCIUM SERPL-MCNC: 9 MG/DL (ref 8.3–10.6)
CHLORIDE BLD-SCNC: 102 MMOL/L (ref 99–110)
CO2: 25 MMOL/L (ref 21–32)
CREAT SERPL-MCNC: 1.3 MG/DL (ref 0.6–1.2)
EKG ATRIAL RATE: 89 BPM
EKG DIAGNOSIS: NORMAL
EKG P AXIS: 104 DEGREES
EKG Q-T INTERVAL: 486 MS
EKG QRS DURATION: 196 MS
EKG QTC CALCULATION (BAZETT): 591 MS
EKG R AXIS: -62 DEGREES
EKG T AXIS: 118 DEGREES
EKG VENTRICULAR RATE: 89 BPM
EOSINOPHILS ABSOLUTE: 0 K/UL (ref 0–0.6)
EOSINOPHILS RELATIVE PERCENT: 0.1 %
GFR AFRICAN AMERICAN: 50
GFR NON-AFRICAN AMERICAN: 42
GLUCOSE BLD-MCNC: 144 MG/DL (ref 70–99)
GLUCOSE BLD-MCNC: 152 MG/DL (ref 70–99)
HCT VFR BLD CALC: 31.1 % (ref 36–48)
HEMOGLOBIN: 9.5 G/DL (ref 12–16)
LACTIC ACID: 1.4 MMOL/L (ref 0.4–2)
LYMPHOCYTES ABSOLUTE: 0.2 K/UL (ref 1–5.1)
LYMPHOCYTES RELATIVE PERCENT: 0.8 %
MCH RBC QN AUTO: 23.1 PG (ref 26–34)
MCHC RBC AUTO-ENTMCNC: 30.6 G/DL (ref 31–36)
MCV RBC AUTO: 75.5 FL (ref 80–100)
MONOCYTES ABSOLUTE: 0 K/UL (ref 0–1.3)
MONOCYTES RELATIVE PERCENT: 0.2 %
NEUTROPHILS ABSOLUTE: 23.2 K/UL (ref 1.7–7.7)
NEUTROPHILS RELATIVE PERCENT: 98.8 %
PDW BLD-RTO: 17.9 % (ref 12.4–15.4)
PERFORMED ON: ABNORMAL
PLATELET # BLD: 230 K/UL (ref 135–450)
PMV BLD AUTO: 9.8 FL (ref 5–10.5)
POTASSIUM REFLEX MAGNESIUM: 3.9 MMOL/L (ref 3.5–5.1)
PROCALCITONIN: 0.14 NG/ML (ref 0–0.15)
RBC # BLD: 4.11 M/UL (ref 4–5.2)
SODIUM BLD-SCNC: 142 MMOL/L (ref 136–145)
TOTAL PROTEIN: 5.7 G/DL (ref 6.4–8.2)
WBC # BLD: 23.5 K/UL (ref 4–11)

## 2022-05-14 PROCEDURE — 2580000003 HC RX 258: Performed by: FAMILY MEDICINE

## 2022-05-14 PROCEDURE — 6360000002 HC RX W HCPCS: Performed by: FAMILY MEDICINE

## 2022-05-14 PROCEDURE — 87040 BLOOD CULTURE FOR BACTERIA: CPT

## 2022-05-14 PROCEDURE — 80053 COMPREHEN METABOLIC PANEL: CPT

## 2022-05-14 PROCEDURE — 6370000000 HC RX 637 (ALT 250 FOR IP): Performed by: INTERNAL MEDICINE

## 2022-05-14 PROCEDURE — 85025 COMPLETE CBC W/AUTO DIFF WBC: CPT

## 2022-05-14 PROCEDURE — 2700000000 HC OXYGEN THERAPY PER DAY

## 2022-05-14 PROCEDURE — 83605 ASSAY OF LACTIC ACID: CPT

## 2022-05-14 PROCEDURE — 93010 ELECTROCARDIOGRAM REPORT: CPT | Performed by: INTERNAL MEDICINE

## 2022-05-14 PROCEDURE — 6360000002 HC RX W HCPCS: Performed by: INTERNAL MEDICINE

## 2022-05-14 PROCEDURE — 94761 N-INVAS EAR/PLS OXIMETRY MLT: CPT

## 2022-05-14 PROCEDURE — 6370000000 HC RX 637 (ALT 250 FOR IP): Performed by: FAMILY MEDICINE

## 2022-05-14 PROCEDURE — 94640 AIRWAY INHALATION TREATMENT: CPT

## 2022-05-14 PROCEDURE — 84145 PROCALCITONIN (PCT): CPT

## 2022-05-14 PROCEDURE — 97161 PT EVAL LOW COMPLEX 20 MIN: CPT

## 2022-05-14 PROCEDURE — 94660 CPAP INITIATION&MGMT: CPT

## 2022-05-14 PROCEDURE — 2500000003 HC RX 250 WO HCPCS: Performed by: FAMILY MEDICINE

## 2022-05-14 PROCEDURE — 1200000000 HC SEMI PRIVATE

## 2022-05-14 PROCEDURE — 36415 COLL VENOUS BLD VENIPUNCTURE: CPT

## 2022-05-14 PROCEDURE — 97530 THERAPEUTIC ACTIVITIES: CPT

## 2022-05-14 RX ORDER — POTASSIUM CHLORIDE 750 MG/1
10 TABLET, FILM COATED, EXTENDED RELEASE ORAL DAILY
Status: DISCONTINUED | OUTPATIENT
Start: 2022-05-14 | End: 2022-05-19 | Stop reason: HOSPADM

## 2022-05-14 RX ORDER — ASPIRIN 81 MG/1
81 TABLET ORAL DAILY
Status: DISCONTINUED | OUTPATIENT
Start: 2022-05-14 | End: 2022-05-19 | Stop reason: HOSPADM

## 2022-05-14 RX ORDER — ALBUTEROL SULFATE 90 UG/1
2 AEROSOL, METERED RESPIRATORY (INHALATION) EVERY 4 HOURS PRN
Status: DISCONTINUED | OUTPATIENT
Start: 2022-05-14 | End: 2022-05-19 | Stop reason: HOSPADM

## 2022-05-14 RX ORDER — IPRATROPIUM BROMIDE AND ALBUTEROL SULFATE 2.5; .5 MG/3ML; MG/3ML
1 SOLUTION RESPIRATORY (INHALATION) EVERY 4 HOURS
Status: DISCONTINUED | OUTPATIENT
Start: 2022-05-14 | End: 2022-05-19 | Stop reason: HOSPADM

## 2022-05-14 RX ORDER — FUROSEMIDE 10 MG/ML
40 INJECTION INTRAMUSCULAR; INTRAVENOUS EVERY 8 HOURS
Status: DISCONTINUED | OUTPATIENT
Start: 2022-05-14 | End: 2022-05-16

## 2022-05-14 RX ORDER — CEFUROXIME AXETIL 250 MG/1
500 TABLET ORAL EVERY 12 HOURS SCHEDULED
Status: DISCONTINUED | OUTPATIENT
Start: 2022-05-14 | End: 2022-05-19 | Stop reason: HOSPADM

## 2022-05-14 RX ORDER — LANOLIN ALCOHOL/MO/W.PET/CERES
400 CREAM (GRAM) TOPICAL 2 TIMES DAILY
Status: DISCONTINUED | OUTPATIENT
Start: 2022-05-14 | End: 2022-05-19 | Stop reason: HOSPADM

## 2022-05-14 RX ORDER — BUMETANIDE 0.25 MG/ML
2 INJECTION, SOLUTION INTRAMUSCULAR; INTRAVENOUS EVERY 8 HOURS
Status: DISCONTINUED | OUTPATIENT
Start: 2022-05-14 | End: 2022-05-14

## 2022-05-14 RX ORDER — ATORVASTATIN CALCIUM 40 MG/1
40 TABLET, FILM COATED ORAL NIGHTLY
Status: DISCONTINUED | OUTPATIENT
Start: 2022-05-14 | End: 2022-05-19 | Stop reason: HOSPADM

## 2022-05-14 RX ORDER — ALBUTEROL SULFATE 2.5 MG/3ML
15 SOLUTION RESPIRATORY (INHALATION) EVERY 4 HOURS PRN
Status: DISCONTINUED | OUTPATIENT
Start: 2022-05-14 | End: 2022-05-14 | Stop reason: SDUPTHER

## 2022-05-14 RX ORDER — AMIODARONE HYDROCHLORIDE 200 MG/1
200 TABLET ORAL DAILY
Status: DISCONTINUED | OUTPATIENT
Start: 2022-05-14 | End: 2022-05-19 | Stop reason: HOSPADM

## 2022-05-14 RX ORDER — CARVEDILOL 6.25 MG/1
6.25 TABLET ORAL 2 TIMES DAILY
Status: DISCONTINUED | OUTPATIENT
Start: 2022-05-14 | End: 2022-05-16

## 2022-05-14 RX ORDER — PANTOPRAZOLE SODIUM 40 MG/1
40 TABLET, DELAYED RELEASE ORAL DAILY
Status: DISCONTINUED | OUTPATIENT
Start: 2022-05-14 | End: 2022-05-19 | Stop reason: HOSPADM

## 2022-05-14 RX ORDER — GABAPENTIN 300 MG/1
600 CAPSULE ORAL 3 TIMES DAILY
Status: DISCONTINUED | OUTPATIENT
Start: 2022-05-14 | End: 2022-05-19 | Stop reason: HOSPADM

## 2022-05-14 RX ORDER — CLOPIDOGREL BISULFATE 75 MG/1
75 TABLET ORAL DAILY
Status: DISCONTINUED | OUTPATIENT
Start: 2022-05-14 | End: 2022-05-19 | Stop reason: HOSPADM

## 2022-05-14 RX ORDER — ONDANSETRON 4 MG/1
8 TABLET, FILM COATED ORAL EVERY 8 HOURS PRN
Status: DISCONTINUED | OUTPATIENT
Start: 2022-05-14 | End: 2022-05-19 | Stop reason: HOSPADM

## 2022-05-14 RX ORDER — PROCHLORPERAZINE MALEATE 5 MG/1
10 TABLET ORAL EVERY 8 HOURS PRN
Status: DISCONTINUED | OUTPATIENT
Start: 2022-05-14 | End: 2022-05-19 | Stop reason: HOSPADM

## 2022-05-14 RX ADMIN — SODIUM CHLORIDE, PRESERVATIVE FREE 10 ML: 5 INJECTION INTRAVENOUS at 02:13

## 2022-05-14 RX ADMIN — GABAPENTIN 600 MG: 300 CAPSULE ORAL at 10:03

## 2022-05-14 RX ADMIN — AZITHROMYCIN 500 MG: 500 TABLET, FILM COATED ORAL at 02:15

## 2022-05-14 RX ADMIN — CEFTRIAXONE 1000 MG: 1 INJECTION, POWDER, FOR SOLUTION INTRAMUSCULAR; INTRAVENOUS at 02:30

## 2022-05-14 RX ADMIN — METHYLPREDNISOLONE SODIUM SUCCINATE 40 MG: 40 INJECTION, POWDER, FOR SOLUTION INTRAMUSCULAR; INTRAVENOUS at 10:04

## 2022-05-14 RX ADMIN — ASPIRIN 81 MG: 81 TABLET, COATED ORAL at 10:04

## 2022-05-14 RX ADMIN — ENOXAPARIN SODIUM 30 MG: 100 INJECTION SUBCUTANEOUS at 10:05

## 2022-05-14 RX ADMIN — BUMETANIDE 2 MG: 0.25 INJECTION INTRAMUSCULAR; INTRAVENOUS at 05:26

## 2022-05-14 RX ADMIN — IPRATROPIUM BROMIDE AND ALBUTEROL SULFATE 1 AMPULE: 2.5; .5 SOLUTION RESPIRATORY (INHALATION) at 20:10

## 2022-05-14 RX ADMIN — Medication 400 MG: at 10:04

## 2022-05-14 RX ADMIN — AMIODARONE HYDROCHLORIDE 200 MG: 200 TABLET ORAL at 10:04

## 2022-05-14 RX ADMIN — POLYETHYLENE GLYCOL 3350 17 G: 17 POWDER, FOR SOLUTION ORAL at 21:21

## 2022-05-14 RX ADMIN — IPRATROPIUM BROMIDE AND ALBUTEROL SULFATE 1 AMPULE: .5; 3 SOLUTION RESPIRATORY (INHALATION) at 09:12

## 2022-05-14 RX ADMIN — POTASSIUM CHLORIDE 10 MEQ: 750 TABLET, EXTENDED RELEASE ORAL at 10:04

## 2022-05-14 RX ADMIN — CEFUROXIME AXETIL 500 MG: 250 TABLET ORAL at 21:10

## 2022-05-14 RX ADMIN — GABAPENTIN 600 MG: 300 CAPSULE ORAL at 21:10

## 2022-05-14 RX ADMIN — TIOTROPIUM BROMIDE AND OLODATEROL 2 PUFF: 3.124; 2.736 SPRAY, METERED RESPIRATORY (INHALATION) at 09:12

## 2022-05-14 RX ADMIN — ENOXAPARIN SODIUM 30 MG: 100 INJECTION SUBCUTANEOUS at 21:10

## 2022-05-14 RX ADMIN — ENOXAPARIN SODIUM 30 MG: 100 INJECTION SUBCUTANEOUS at 02:30

## 2022-05-14 RX ADMIN — IPRATROPIUM BROMIDE AND ALBUTEROL SULFATE 1 AMPULE: 2.5; .5 SOLUTION RESPIRATORY (INHALATION) at 23:30

## 2022-05-14 RX ADMIN — Medication 400 MG: at 21:10

## 2022-05-14 RX ADMIN — SODIUM CHLORIDE, PRESERVATIVE FREE 10 ML: 5 INJECTION INTRAVENOUS at 21:12

## 2022-05-14 RX ADMIN — PANTOPRAZOLE SODIUM 40 MG: 40 TABLET, DELAYED RELEASE ORAL at 10:04

## 2022-05-14 RX ADMIN — CARVEDILOL 6.25 MG: 6.25 TABLET, FILM COATED ORAL at 10:04

## 2022-05-14 RX ADMIN — FUROSEMIDE 40 MG: 10 INJECTION, SOLUTION INTRAMUSCULAR; INTRAVENOUS at 14:34

## 2022-05-14 RX ADMIN — ATORVASTATIN CALCIUM 40 MG: 40 TABLET, FILM COATED ORAL at 21:10

## 2022-05-14 RX ADMIN — IPRATROPIUM BROMIDE AND ALBUTEROL SULFATE 1 AMPULE: 2.5; .5 SOLUTION RESPIRATORY (INHALATION) at 12:30

## 2022-05-14 RX ADMIN — BUMETANIDE 2 MG: 0.25 INJECTION INTRAMUSCULAR; INTRAVENOUS at 10:04

## 2022-05-14 RX ADMIN — IPRATROPIUM BROMIDE AND ALBUTEROL SULFATE 1 AMPULE: 2.5; .5 SOLUTION RESPIRATORY (INHALATION) at 16:21

## 2022-05-14 RX ADMIN — CARVEDILOL 6.25 MG: 6.25 TABLET, FILM COATED ORAL at 21:09

## 2022-05-14 RX ADMIN — FUROSEMIDE 40 MG: 10 INJECTION, SOLUTION INTRAMUSCULAR; INTRAVENOUS at 21:10

## 2022-05-14 RX ADMIN — GABAPENTIN 600 MG: 300 CAPSULE ORAL at 14:34

## 2022-05-14 ASSESSMENT — PAIN SCALES - GENERAL: PAINLEVEL_OUTOF10: 2

## 2022-05-14 ASSESSMENT — LIFESTYLE VARIABLES: HOW OFTEN DO YOU HAVE A DRINK CONTAINING ALCOHOL: NEVER

## 2022-05-14 NOTE — PROGRESS NOTES
Hospitalist Progress Note      PCP: April William MD    Date of Admission: 5/13/2022    Chief Complaint: [facility]: 64 yo with recently diagnosed small cell lung cancer and post obstructive pna. Was dced 5/13 to home and several hours later became acutely sob. Came back to hospital and readmitted for resp failure    Subjective: on bipap .  Resting comfortabley      Medications:  Reviewed    Infusion Medications    sodium chloride       Scheduled Medications    gabapentin  600 mg Oral TID    atorvastatin  40 mg Oral Nightly    magnesium oxide  400 mg Oral BID    [Held by provider] clopidogrel  75 mg Oral Daily    amiodarone  200 mg Oral Daily    aspirin  81 mg Oral Daily    carvedilol  6.25 mg Oral BID    pantoprazole  40 mg Oral Daily    potassium chloride  10 mEq Oral Daily    empagliflozin  10 mg Oral Daily    tiotropium-olodaterol  2 puff Inhalation Daily    Vericiguat  5 mg Oral QAM    bumetanide  2 mg IntraVENous q8h    ipratropium-albuterol  1 ampule Inhalation Q4H    sodium chloride flush  5-40 mL IntraVENous 2 times per day    methylPREDNISolone  40 mg IntraVENous Q12H    Followed by   Kathy Le ON 5/16/2022] predniSONE  40 mg Oral Daily    cefTRIAXone (ROCEPHIN) IV  1,000 mg IntraVENous Q24H    azithromycin  500 mg Oral Q24H    enoxaparin  30 mg SubCUTAneous BID     PRN Meds: albuterol sulfate HFA, ondansetron, prochlorperazine, HYDROmorphone, traMADol, sodium chloride flush, sodium chloride, [DISCONTINUED] ondansetron **OR** ondansetron, polyethylene glycol, acetaminophen **OR** acetaminophen      Intake/Output Summary (Last 24 hours) at 5/14/2022 1157  Last data filed at 5/14/2022 0958  Gross per 24 hour   Intake 250 ml   Output 1300 ml   Net -1050 ml       Physical Exam Performed:    /65   Pulse 61   Temp 97.7 °F (36.5 °C) (Oral)   Resp 16   Wt 230 lb 3.2 oz (104.4 kg)   SpO2 100%   BMI 36.05 kg/m²     General appearance: No apparent distress, appears stated age and cooperative. HEENT: Pupils equal, round, and reactive to light. Conjunctivae/corneas clear. Neck: Supple, with full range of motion. No jugular venous distention. Trachea midline. Respiratory:  Diffuse wheezes. Cardiovascular: Regular rate and rhythm with normal S1/S2 without murmurs, rubs or gallops. Abdomen: Soft, non-tender, non-distended with normal bowel sounds. Musculoskeletal: No clubbing, cyanosis or edema bilaterally. Full range of motion without deformity. Skin: Skin color, texture, turgor normal.  No rashes or lesions. Neurologic:  Neurovascularly intact without any focal sensory/motor deficits. Cranial nerves: II-XII intact, grossly non-focal.  Psychiatric: Alert and oriented, thought content appropriate, normal insight  Capillary Refill: Brisk,3 seconds, normal   Peripheral Pulses: +2 palpable, equal bilaterally       Labs:   Recent Labs     05/13/22 0752 05/13/22 2100 05/14/22  0711   WBC 4.8 28.6* 23.5*   HGB 10.6* 10.0* 9.5*   HCT 33.7* 31.9* 31.1*    285 230     Recent Labs     05/13/22  0752 05/13/22 2100 05/14/22  0711    135* 142   K 3.7 3.8 3.9    97* 102   CO2 26 24 25   BUN 33* 30* 28*   CREATININE 1.4* 1.4* 1.3*   CALCIUM 9.0 8.3 9.0     Recent Labs     05/13/22  0752 05/13/22 2100 05/14/22  0711   AST 22 68* 42*   ALT 26 65* 59*   BILITOT 0.5 0.4 0.3   ALKPHOS 72 109 93     No results for input(s): INR in the last 72 hours. Recent Labs     05/13/22 2100   TROPONINI 0.07*       Urinalysis:      Lab Results   Component Value Date    NITRU Negative 03/31/2022    WBCUA 6 03/30/2022    BACTERIA 4+ 03/30/2022    RBCUA 1 03/30/2022    BLOODU Negative 03/31/2022    SPECGRAV 1.010 03/31/2022    GLUCOSEU >=1000 03/31/2022       Radiology:  XR CHEST PORTABLE   Final Result   Moderate increasing left lower lobe airspace disease, concern for pneumonia   possibly postobstructive given CT scan findings. .      Vascular congestion and suspected early edema. Assessment/Plan:    1  Acute hypoxic hypercarbic resp failure      More likely flash pulmonary edema from acute on chr systolic hf      Continue iv lasix       2  Post obstructive pna-doubt its worsening pna      Did well at dc and feel ok to continue ceftin      procalcitonin is low. Leukocytosis likely from steroids        3  Small cell lung cancer-will consult [facility]. Already got chemo last      Admit    4  Acute on chronic systolic hf      Continue iv lasix      b-blocker. No ace or arb due to renal failure    DVT Prophylaxis: lovenox  Diet: ADULT DIET;  Regular; Low Fat/Low Chol/High Fiber/2 gm Na  Code Status: Full Code        Dispo - 1-2 d    Sukhjinder Bañuelos MD

## 2022-05-14 NOTE — PROGRESS NOTES
Physical Therapy  Facility/Department: [address] MED SURG  Physical Therapy Initial Assessment    Name: Cedric Jerome  : 1960  MRN: [id number]  Date of Service: 2022    Discharge Recommendations:  Home with assist PRN   PT Equipment Recommendations  Equipment Needed: No   [facility] scored a  on the AM-PAC short mobility form. .  Please see assessment section for further patient specific details. If patient discharges prior to next session this note will serve as a discharge summary. Please see below for the latest assessment towards goals. Patient Diagnosis(es): The primary encounter diagnosis was Respiratory distress. A diagnosis of Malignant neoplasm of lung, unspecified laterality, unspecified part of lung ([address] was also pertinent to this visit. Past Medical History:  has a past medical history of Asthma, Cardiomyopathy ([address] CHF (congestive heart failure) ([address] COPD (chronic obstructive pulmonary disease) ([address] Diabetes mellitus ([facility] [address] Essential hypertension, Hyperlipidemia, Hypertension, and Obesity. Past Surgical History:  has a past surgical history that includes Cholecystectomy; Carpal tunnel release; Incontinence surgery; Coronary angioplasty with stent (2013); IR PORT PLACEMENT > 5 YEARS (Right, 2022); and IR PORT PLACEMENT > 5 YEARS (5/3/2022). Assessment   Body Structures, Functions, Activity Limitations Requiring Skilled Therapeutic Intervention: Decreased functional mobility ; Decreased endurance  Assessment: Pt is a 65 y.o. F. admitted  due to SOB. Pt dc'ed from [facility] on  and came back ~ 5 hours later due to difficulty breathing with short distance amb. Pt now admitted due to acute respiratory failure with hypoxia secondary to community-acquired pneumonia. Has hx of lung CA as well. Today, , pt on 4L O2 via NC and presents only slightly below her baseline function.  Pt able to complete bed mobility Gaby, transfers with supervision and short distance amb supervision. Pt's SpO2 remained above 90% throughout session but was mildly SOB following amb. She would benefit from continued therapy to improve her endurance. Anticipate safe return home with prn assist.  She declined home PT. Treatment Diagnosis: Decreased endurance  Specific Instructions for Next Treatment: Improve endurance  Therapy Prognosis: Fair  Decision Making: Low Complexity  History: See below  Exam: Strength; ROM; Balance; Ambulation  Clinical Presentation: Stable  Barriers to Learning: fatigue  Requires PT Follow-Up: Yes  Activity Tolerance  Activity Tolerance: Patient tolerated evaluation without incident  Activity Tolerance Comments: SpO2 100% prior to session, desated to 94% following long amb     Plan   Plan  Plan: 2-3 times per week  Specific Instructions for Next Treatment: Improve endurance  Current Treatment Recommendations: Balance training,Endurance training,Home exercise program,Safety education & training,Patient/Caregiver education & training,Equipment evaluation, education, & procurement  Safety Devices  Type of Devices: Call light within reach,Nurse notified,Left in bed,All fall risk precautions in place,Gait belt,Patient at risk for falls  Restraints  Restraints Initially in Place: No     Restrictions  Position Activity Restriction  Other position/activity restrictions: on 4 L O2 v ia NC     Subjective   General  Additional Pertinent Hx: 65 y.o. yo female who  has a past medical history of Asthma, Cardiomyopathy ([address] CHF (congestive heart failure) ([address] COPD (chronic obstructive pulmonary disease) ([address] Diabetes mellitus ([address] Essential hypertension, Hyperlipidemia, Hypertension, and Obesity. presents to [facility] complaining of shortness of breath worsening over the last 2 days. She notes that she was recently diagnosed with lung cancer. Normally wears 3 L of oxygen at home.   No inciting event that she knows of, has been constant getting Assistance: Independent  Supine to Sit: Independent  Sit to Supine: Independent  Scooting: Independent  Balance  Sitting: Intact  Standing: Intact (RW)  Transfer Training  Transfer Training: Yes  Overall Level of Assistance: Supervision (RW)  Sit to Stand: Supervision (RW)  Stand to Sit: Supervision  Gait  Overall Level of Assistance: Supervision (no LOB; Pt's SpO2 >90% throughout on RA)  Distance (ft):  (15' x2, 30')  Assistive Device: Walker, rolling        AM-PAC Score  AM-PAC Inpatient Mobility Raw Score : 21 (05/14/22 1540)  AM-PAC Inpatient T-Scale Score : 50.25 (05/14/22 1540)  Mobility Inpatient CMS 0-100% Score: 28.97 (05/14/22 1540)  Mobility Inpatient CMS G-Code Modifier : CJ (05/14/22 1540)          Goals  Short Term Goals  Time Frame for Short term goals: 2-3 days  Short term goal 1: amb 48' with RW with SpO2 >90% following  Patient Goals   Patient goals : to return directly home       Education  Patient Education  Education Given To: Patient  Education Provided: Role of Therapy;Plan of Care;Transfer Training  Education Method: Verbal  Barriers to Learning: None  Education Outcome: Verbalized understanding      Therapy Time   Individual Concurrent Group Co-treatment   Time In 1500         Time Out 1530         Minutes 30               Electronically signed by Dorina Weller PT [id number] on 5/14/2022 at 3:40 PM

## 2022-05-14 NOTE — CARE COORDINATION
05/14/22 0836   Readmission Assessment   Number of Days since last admission? 1-7 days   Previous Disposition Home with Home Health   Who is being Interviewed   (per chart review)   What was the patient's/caregiver's perception as to why they think they needed to return back to the hospital? Did not understand their medication regimen  (recently discharged home with new oxygen)   Did you visit your Primary Care Physician after you left the hospital, before you returned this time? No   Why weren't you able to visit your PCP? Did not have an appointment   Did you see a specialist, such as Cardiac, Pulmonary, Orthopedic Physician, etc. after you left the hospital? No   Who advised the patient to return to the hospital? Self-referral   Does the patient report anything that got in the way of taking their medications? No   In our efforts to provide the best possible care to you and others like you, can you think of anything that we could have done to help you after you left the hospital the first time, so that you might not have needed to return so soon?  Teaching during hospitalization regarding your illness

## 2022-05-14 NOTE — TELEPHONE ENCOUNTER
Writer contacted Dr. Margarito Hollingsworth to inform of 30 day readmission risk. Dr. Margarito Hollingsworth informed writer of potential readmission.

## 2022-05-14 NOTE — ED PROVIDER NOTES
[facility]  eMERGENCY dEPARTMENT eNCOUnter        Pt Name: Cesario Azevedo  MRN: [id number]  [place] 1960  Date of evaluation: 5/13/2022  Provider: Cornelius Nye MD  PCP: Ron Dougherty MD      [address] [place] [address]       Chief Complaint   Patient presents with    Shortness of Breath     patient presents with SOB x 3 hours, she gave herself 2 BT at home without relief. Patient recently dc'd from hospital and diagnosed with lunng cancer. HISTORY OFPRESENT ILLNESS   (Location/Symptom, Timing/Onset, Context/Setting, Quality, Duration, Modifying Factors,Severity)  Note limiting factors. Cesario Azevedo is a 65 y.o. female   who is brought in by EMS for respiratory distress. Patient just recently diagnosed with lung cancer. She has had 2 breathing treatments. She has a history of asthma cardiomyopathy congestive heart failure COPD diabetes and hypertension. Patient's been having increasing respiratory distress over the past 2 days. She is on home oxygen 3 L. She comes in by paramedics on CPAP. Further history limited at the moment due to respiratory distress and CPAP device    Nursing Noteswere all reviewed and agreed with or any disagreements were addressed  in the HPI. REVIEW OF SYSTEMS    (2-9 systems for level 4, 10 or more for level 5)     Review of Systems   Unable to perform ROS: Acuity of condition   Respiratory: Positive for shortness of breath.           PAST MEDICAL HISTORY     Past Medical History:   Diagnosis Date    Asthma     Cardiomyopathy ([address]     CHF (congestive heart failure) ([address]     COPD (chronic obstructive pulmonary disease) ([address]     Diabetes mellitus ([address]     Essential hypertension     Hyperlipidemia     Hypertension     Obesity          SURGICAL HISTORY     Past Surgical History:   Procedure Laterality Date    CARPAL TUNNEL RELEASE      CHOLECYSTECTOMY      CORONARY ANGIOPLASTY WITH STENT PLACEMENT  01/07/2013 Stent LAD    INCONTINENCE SURGERY      IR PORT PLACEMENT EQUAL OR GREATER THAN 5 YEARS Right 05/03/2022    Power port, inserted by Dr. Ksenia Sarmiento, cut at [address] 5 YEARS  5/3/2022    IR PORT PLACEMENT EQUAL OR GREATER THAN 5 YEARS 5/3/2022 [facility] SPECIAL PROCEDURES         CURRENTMEDICATIONS       Previous Medications    ALBUTEROL (PROVENTIL HFA) 108 (90 BASE) MCG/ACT INHALER    Inhale 2 puffs into the lungs every 4 hours as needed for Wheezing or Shortness of Breath     ALBUTEROL (PROVENTIL) (2.5 MG/3ML) 0.083% NEBULIZER SOLUTION    USE 1 VIAL VIA NEBULIZER EVERY 4 HOURS AS NEEDED DX J44.9    AMIODARONE (CORDARONE) 200 MG TABLET    TAKE 1 TABLET BY MOUTH EVERY DAY    ASPIRIN LOW DOSE 81 MG EC TABLET    TAKE 1 TABLET BY MOUTH EVERY DAY    ATORVASTATIN (LIPITOR) 40 MG TABLET    Take 40 mg by mouth nightly     CARVEDILOL (COREG) 6.25 MG TABLET    TAKE 1 TABLET BY MOUTH TWICE A DAY WITH MEALS    CEFUROXIME (CEFTIN) 250 MG TABLET    Take 1 tablet by mouth 2 times daily for 7 days    CLOPIDOGREL (PLAVIX) 75 MG TABLET    Take 1 tablet by mouth daily    EMPAGLIFLOZIN (JARDIANCE) 10 MG TABLET    Take 10 mg by mouth daily    GABAPENTIN (NEURONTIN) 600 MG TABLET    Take 600 mg by mouth 3 times daily.      MAGNESIUM OXIDE (MAG-OX) 400 MG TABLET    Take 400 mg by mouth 2 times daily     ONDANSETRON (ZOFRAN) 8 MG TABLET    Take 8 mg by mouth every 8 hours as needed for Nausea or Vomiting (POST CHEMO NAUSEA)    PANTOPRAZOLE (PROTONIX) 40 MG TABLET    TAKE 1 TABLET BY MOUTH EVERY DAY    POTASSIUM CHLORIDE (MICRO-K) 10 MEQ EXTENDED RELEASE CAPSULE    Take 10 mEq by mouth daily    PROCHLORPERAZINE (COMPAZINE) 10 MG TABLET    Take 10 mg by mouth every 8 hours as needed (POST CHEMO NAUSEA)    TORSEMIDE (DEMADEX) 20 MG TABLET    Take 40 mg by mouth in the morning and at bedtime     UMECLIDINIUM-VILANTEROL (ANORO ELLIPTA) 62.5-25 MCG/INH AEPB INHALER    Inhale 1 puff into the lungs daily VERICIGUAT (VERQUVO) 5 MG TABS    Take 5 mg by mouth every morning       ALLERGIES     Ace inhibitors, Diclofenac, Losartan, Spironolactone, Vicodin hp [hydrocodone-acetaminophen], and Vicodin [hydrocodone-acetaminophen]    FAMILY HISTORY       Family History   Problem Relation Age of Onset    High Blood Pressure Mother     Diabetes Mother     Cancer Mother         thyroid    Stroke Father           SOCIAL HISTORY       Social History     Socioeconomic History    Marital status:      Spouse name: None    Number of children: None    Years of education: None    Highest education level: None   Occupational History    None   Tobacco Use    Smoking status: Never Smoker    Smokeless tobacco: Never Used   Vaping Use    Vaping Use: Never used   Substance and Sexual Activity    Alcohol use: Not Currently    Drug use: Not Currently    Sexual activity: Not Currently     Partners: Male     Comment:    Other Topics Concern    None   Social History Narrative    ** Merged History Encounter **          Social Determinants of Health     Financial Resource Strain:     Difficulty of Paying Living Expenses: Not on file   Food Insecurity:     Worried About Running Out of Food in the Last Year: Not on file    Yin of Food in the Last Year: Not on file   Transportation Needs:     Lack of Transportation (Medical): Not on file    Lack of Transportation (Non-Medical):  Not on file   Physical Activity:     Days of Exercise per Week: Not on file    Minutes of Exercise per Session: Not on file   Stress:     Feeling of Stress : Not on file   Social Connections:     Frequency of Communication with Friends and Family: Not on file    Frequency of Social Gatherings with Friends and Family: Not on file    Attends Amish Services: Not on file    Active Member of Clubs or Organizations: Not on file    Attends Club or Organization Meetings: Not on file    Marital Status: Not on file   Intimate Partner Violence:     Fear of Current or Ex-Partner: Not on file    Emotionally Abused: Not on file    Physically Abused: Not on file    Sexually Abused: Not on file   Housing Stability:     Unable to Pay for Housing in the Last Year: Not on file    Number of Places Lived in the Last Year: Not on file    Unstable Housing in the Last Year: Not on file       SCREENINGS    [place] Coma Scale  Eye Opening: Spontaneous  Best Verbal Response: Oriented  Best Motor Response: Obeys commands  [place] Coma Scale Score: 15        PHYSICAL EXAM    (up to 7 for level 4, 8 or more for level 5)     ED Triage Vitals   BP Temp Temp src Pulse Resp SpO2 Height Weight   -- -- -- -- -- -- -- --      weight is 230 lb 3.2 oz (104.4 kg). Her oral temperature is 97.6 °F (36.4 °C). Her blood pressure is 144/60 (abnormal) and her pulse is 60. Her respiration is 18 and oxygen saturation is 100%. Physical Exam  Constitutional:       General: She is in acute distress. Appearance: She is well-developed. She is ill-appearing. She is not diaphoretic. HENT:      Head: Normocephalic and atraumatic. Right Ear: External ear normal.      Left Ear: External ear normal.   Eyes:      General: No scleral icterus. Right eye: No discharge. Left eye: No discharge. Neck:      Thyroid: No thyromegaly. Vascular: No JVD. Trachea: No tracheal deviation. Cardiovascular:      Rate and Rhythm: Normal rate and regular rhythm. Heart sounds: No murmur heard. No friction rub. No gallop. Pulmonary:      Effort: Accessory muscle usage and respiratory distress present. Breath sounds: Decreased air movement present. No stridor. Decreased breath sounds present. No wheezing or rales. Abdominal:      General: There is no distension. Palpations: Abdomen is soft. Tenderness: There is no abdominal tenderness. There is no guarding or rebound. Musculoskeletal:         General: No tenderness.       Cervical back: Normal range of motion. Skin:     General: Skin is warm and dry. Findings: No rash (On exposed body surfaces). Neurological:      Mental Status: She is alert and oriented to person, place, and time. Coordination: Coordination normal.   Psychiatric:         Behavior: Behavior normal.         Thought Content:  Thought content normal.         DIAGNOSTIC RESULTS   LABS:    Results for orders placed or performed during the hospital encounter of 05/13/22   CBC with Auto Differential   Result Value Ref Range    WBC 28.6 (H) 4.0 - 11.0 K/uL    RBC 4.20 4.00 - 5.20 M/uL    Hemoglobin 10.0 (L) 12.0 - 16.0 g/dL    Hematocrit 31.9 (L) 36.0 - 48.0 %    MCV 76.1 (L) 80.0 - 100.0 fL    MCH 23.7 (L) 26.0 - 34.0 pg    MCHC 31.2 31.0 - 36.0 g/dL    RDW 18.2 (H) 12.4 - 15.4 %    Platelets 590 939 - 407 K/uL    MPV 9.9 5.0 - 10.5 fL    Neutrophils % 97.3 %    Lymphocytes % 1.6 %    Monocytes % 0.2 %    Eosinophils % 0.6 %    Basophils % 0.3 %    Neutrophils Absolute 27.8 (H) 1.7 - 7.7 K/uL    Lymphocytes Absolute 0.5 (L) 1.0 - 5.1 K/uL    Monocytes Absolute 0.1 0.0 - 1.3 K/uL    Eosinophils Absolute 0.2 0.0 - 0.6 K/uL    Basophils Absolute 0.1 0.0 - 0.2 K/uL   Comprehensive Metabolic Panel w/ Reflex to MG   Result Value Ref Range    Sodium 135 (L) 136 - 145 mmol/L    Potassium reflex Magnesium 3.8 3.5 - 5.1 mmol/L    Chloride 97 (L) 99 - 110 mmol/L    CO2 24 21 - 32 mmol/L    Anion Gap 14 3 - 16    Glucose 170 (H) 70 - 99 mg/dL    BUN 30 (H) 7 - 20 mg/dL    CREATININE 1.4 (H) 0.6 - 1.2 mg/dL    GFR Non- 38 (A) >60    GFR  46 (A) >60    Calcium 8.3 8.3 - 10.6 mg/dL    Total Protein 6.2 (L) 6.4 - 8.2 g/dL    Albumin 3.7 3.4 - 5.0 g/dL    Albumin/Globulin Ratio 1.5 1.1 - 2.2    Total Bilirubin 0.4 0.0 - 1.0 mg/dL    Alkaline Phosphatase 109 40 - 129 U/L    ALT 65 (H) 10 - 40 U/L    AST 68 (H) 15 - 37 U/L   Troponin   Result Value Ref Range    Troponin 0.07 (H) <0.01 ng/mL   Brain Natriuretic Peptide   Result Value Ref Range    Pro-BNP 28,135 (H) 0 - 124 pg/mL   Blood Gas, Arterial   Result Value Ref Range    pH, Arterial 7.265 (L) 7.350 - 7.450    pCO2, Arterial 64.3 (H) 35.0 - 45.0 mmHg    pO2, Arterial 46.8 (L) 75.0 - 108.0 mmHg    HCO3, Arterial 29.1 (H) 21.0 - 29.0 mmol/L    Base Excess, Arterial 1.1 -3.0 - 3.0 mmol/L    Hemoglobin, Art, Extended 10.6 (L) 12.0 - 16.0 g/dL    O2 Sat, Arterial 72.9 (L) >92 %    Carboxyhgb, Arterial 1.4 0.0 - 1.5 %    Methemoglobin, Arterial 0.5 <1.5 %    TCO2, Arterial 31.1 Not Established mmol/L    O2 Therapy Unknown        All other labs were within normal range or not returned as of this dictation. EKG: All EKG's are interpreted by the Emergency Department Physician who either signs orCo-signs this chart in the absence of a cardiologist.    EKG visualized preliminarily interpreted by myself. This is a ventricular paced EKG at a rate of 89. Left axis deviation of -62 and wide QRS complex. RADIOLOGY:   Non-plain film images such as CT, Ultrasound and MRI are read by the radiologist. Bulmaro Cleverly radiographic images are visualized and preliminarily interpreted by the  EDProvider with the below findings:    XR CHEST PORTABLE    Result Date: 5/13/2022  EXAMINATION: ONE XRAY VIEW OF THE CHEST 5/13/2022 8:56 pm COMPARISON: Prior study(s) most recent 05/10/2022. HISTORY: ORDERING SYSTEM PROVIDED HISTORY: COPD/Lung CA TECHNOLOGIST PROVIDED HISTORY: Reason for exam:->COPD/Lung CA Reason for Exam: COPD/Lung CA, sob FINDINGS: There is increasing airspace disease left lower lobe entirely obscuring the left diaphragm and retrocardiac lung. Pleural effusion is also noted on recent CT scan. The heart is enlarged. Size is somewhat limited in evaluation due to moderate rotation to the left. Pulmonary vessels are congested. There is mild interstitial edema. 3 lead ICD and indwelling port catheter unchanged. No pneumothorax.      Moderate increasing left lower lobe airspace disease, concern for pneumonia possibly postobstructive given CT scan findings. . Vascular congestion and suspected early edema. PROCEDURES   Unless otherwise noted below, none     Procedures    CRITICAL CARE TIME   CRITICAL CARE: There was a high probability of clinically significant/life threatening deterioration in this patient's condition which required my urgent intervention. Total critical care time was 30 minutes. This excludes any time for separately reportable procedures.        CONSULTS:  None    EMERGENCY DEPARTMENT COURSE and DIFFERENTIAL DIAGNOSIS/MDM:   Vitals:    Vitals:    05/13/22 2315 05/13/22 2330 05/13/22 2345 05/14/22 0000   BP: (!) 144/51 (!) 146/35  (!) 144/60   Pulse:   88 60   Resp:   18 18   Temp:    97.6 °F (36.4 °C)   TempSrc:    Oral   SpO2: 100% 100% 100% 100%   Weight:           Patient was given the following medications:  Medications   HYDROmorphone (DILAUDID) injection 0.5 mg (has no administration in time range)   traMADol (ULTRAM) tablet 50 mg (has no administration in time range)   sodium chloride flush 0.9 % injection 5-40 mL (has no administration in time range)   sodium chloride flush 0.9 % injection 5-40 mL (has no administration in time range)   0.9 % sodium chloride infusion (has no administration in time range)   ondansetron (ZOFRAN-ODT) disintegrating tablet 4 mg (has no administration in time range)     Or   ondansetron (ZOFRAN) injection 4 mg (has no administration in time range)   polyethylene glycol (GLYCOLAX) packet 17 g (has no administration in time range)   acetaminophen (TYLENOL) tablet 650 mg (has no administration in time range)     Or   acetaminophen (TYLENOL) suppository 650 mg (has no administration in time range)   ipratropium-albuterol (DUONEB) nebulizer solution 1 ampule (has no administration in time range)   methylPREDNISolone sodium (SOLU-MEDROL) injection 40 mg (has no administration in time range)     Followed by   predniSONE (DELTASONE) tablet 40 mg (has no administration in time range)   cefTRIAXone (ROCEPHIN) 1000 mg IVPB in 50 mL D5W minibag (has no administration in time range)   azithromycin (ZITHROMAX) tablet 500 mg (has no administration in time range)   enoxaparin Sodium (LOVENOX) injection 30 mg (has no administration in time range)   methylPREDNISolone sodium (SOLU-MEDROL) injection 125 mg (125 mg IntraVENous Given 5/13/22 2202)   ipratropium-albuterol (DUONEB) nebulizer solution 1 ampule (1 ampule Inhalation Given 5/13/22 2119)       First I had concerned that she had pneumonia and possible evidence of seizures. However it turns out she got a shot of Neupogen this morning so that would explain the white count going up from 4-28. X-ray also had to be concerning for possible developing pneumonia but again in retrospect I think this might be related to obstructive process process related to her lung cancer. She definitely did improve it was much better after being on the BiPAP for a while. At this point though I have to recommend readmission      FINAL IMPRESSION      1. Respiratory distress    2. Malignant neoplasm of lung, unspecified laterality, unspecified part of lung ([facility]          DISPOSITION/PLAN    DISPOSITION Admitted 05/13/2022 11:22:25 PM      PATIENT REFERRED TO:  No follow-up provider specified.     DISCHARGE MEDICATIONS:  New Prescriptions    No medications on file       DISCONTINUED MEDICATIONS:  Discontinued Medications    No medications on file              (Please note that portions of this note were completed with a voice recognition program.  Efforts were made to editthe dictations but occasionally words are mis-transcribed.)    Eula Mas MD (electronically signed)            Eula Mas MD  05/14/22 9642

## 2022-05-14 NOTE — PROGRESS NOTES
0055:PT to floor via stretcher from ED. Bipap in room, pt transported on NC. PT oriented to room,call light system and plan of care. Respiratory notified of continuous bi-pap order.      Respiratory bedside ,pt placed back on bi-pap

## 2022-05-14 NOTE — PROGRESS NOTES
LOV: 1/7/20  LF:5/1/20 Patient requested BRENNEN Hardwick and asks that they be on a Q4 scheduled basis (this was what she was getting before recent discharge from [facility]).

## 2022-05-14 NOTE — H&P
[facility] Emergency Department ([facility]) Internal Medicine History and Physical    Chief Complaint   Patient presents with    Shortness of Breath     patient presents with SOB x 3 hours, she gave herself 2 BT at home without relief. Patient recently dc'd from hospital and diagnosed with lunng cancer. HPI:  65 y.o. yo female who  has a past medical history of Asthma, Cardiomyopathy ([address] CHF (congestive heart failure) ([address] COPD (chronic obstructive pulmonary disease) ([address] Diabetes mellitus ([address] Essential hypertension, Hyperlipidemia, Hypertension, and Obesity. presents to [facility] complaining of shortness of breath worsening over the last 2 days. She notes that she was recently diagnosed with lung cancer. Normally wears 3 L of oxygen at home. No inciting event that she knows of, has been constant getting progressively worse, severe in intensity. Denies any fevers or chills, notes nonproductive cough, no sputum production. Denies any abdominal pain, nausea vomiting chest pain. In the emergency department she is found to have a left lower lobe pneumonia, hypoxic on her home 3 L, improved with CPAP. We will admit her for acute respiratory failure with hypoxia secondary to community-acquired pneumonia. ROS:  A complete 14 point review of systems performed and is negative except as noted above. Past medical, surgical, family hx reviewed. Past Medical History:  Past Medical History:   Diagnosis Date    Asthma     Cardiomyopathy ([address]     CHF (congestive heart failure) (HCC)     COPD (chronic obstructive pulmonary disease) ([address]     Diabetes mellitus ([address]     Essential hypertension     Hyperlipidemia     Hypertension     Obesity        Surgical History:  Social History     Socioeconomic History    Marital status:       Spouse name: Not on file    Number of children: Not on file    Years of education: Not on file    Highest education level: Not on file   Occupational History    Not on file   Tobacco Use    Smoking status: Never Smoker    Smokeless tobacco: Never Used   Vaping Use    Vaping Use: Never used   Substance and Sexual Activity    Alcohol use: Not Currently    Drug use: Not Currently    Sexual activity: Not Currently     Partners: Male     Comment:    Other Topics Concern    Not on file   Social History Narrative    ** Merged History Encounter **          Social Determinants of Health     Financial Resource Strain:     Difficulty of Paying Living Expenses: Not on file   Food Insecurity:     Worried About Running Out of Food in the Last Year: Not on file    Yin of Food in the Last Year: Not on file   Transportation Needs:     Lack of Transportation (Medical): Not on file    Lack of Transportation (Non-Medical): Not on file   Physical Activity:     Days of Exercise per Week: Not on file    Minutes of Exercise per Session: Not on file   Stress:     Feeling of Stress : Not on file   Social Connections:     Frequency of Communication with Friends and Family: Not on file    Frequency of Social Gatherings with Friends and Family: Not on file    Attends Scientology Services: Not on file    Active Member of [address] [place] [address] [employer] or Organizations: Not on file    Attends Club or Organization Meetings: Not on file    Marital Status: Not on file   Intimate Partner Violence:     Fear of Current or Ex-Partner: Not on file    Emotionally Abused: Not on file    Physically Abused: Not on file    Sexually Abused: Not on file   Housing Stability:     Unable to Pay for Housing in the Last Year: Not on file    Number of [place] in the Last Year: Not on file    Unstable Housing in the Last Year: Not on file       Family History:  Family History   Problem Relation Age of Onset    High Blood Pressure Mother     Diabetes Mother     Cancer Mother         thyroid    Stroke Father        Home Meds:  No current facility-administered medications on file prior to encounter.      Current Outpatient Medications on File Prior to Encounter   Medication Sig Dispense Refill    cefUROXime (CEFTIN) 250 MG tablet Take 1 tablet by mouth 2 times daily for 7 days 14 tablet 0    potassium chloride (MICRO-K) 10 MEQ extended release capsule Take 10 mEq by mouth daily      empagliflozin (JARDIANCE) 10 MG tablet Take 10 mg by mouth daily      umeclidinium-vilanterol (ANORO ELLIPTA) 62.5-25 MCG/INH AEPB inhaler Inhale 1 puff into the lungs daily      Vericiguat (VERQUVO) 5 MG TABS Take 5 mg by mouth every morning      ondansetron (ZOFRAN) 8 MG tablet Take 8 mg by mouth every 8 hours as needed for Nausea or Vomiting (POST CHEMO NAUSEA)      prochlorperazine (COMPAZINE) 10 MG tablet Take 10 mg by mouth every 8 hours as needed (POST CHEMO NAUSEA)      albuterol (PROVENTIL) (2.5 MG/3ML) 0.083% nebulizer solution USE 1 VIAL VIA NEBULIZER EVERY 4 HOURS AS NEEDED DX J44.9      amiodarone (CORDARONE) 200 MG tablet TAKE 1 TABLET BY MOUTH EVERY DAY      ASPIRIN LOW DOSE 81 MG EC tablet TAKE 1 TABLET BY MOUTH EVERY DAY      carvedilol (COREG) 6.25 MG tablet TAKE 1 TABLET BY MOUTH TWICE A DAY WITH MEALS      pantoprazole (PROTONIX) 40 MG tablet TAKE 1 TABLET BY MOUTH EVERY DAY      torsemide (DEMADEX) 20 MG tablet Take 40 mg by mouth in the morning and at bedtime       clopidogrel (PLAVIX) 75 MG tablet Take 1 tablet by mouth daily (Patient not taking: Reported on 5/10/2022) 30 tablet 3    atorvastatin (LIPITOR) 40 MG tablet Take 40 mg by mouth nightly       magnesium oxide (MAG-OX) 400 MG tablet Take 400 mg by mouth 2 times daily       gabapentin (NEURONTIN) 600 MG tablet Take 600 mg by mouth 3 times daily.        albuterol (PROVENTIL HFA) 108 (90 BASE) MCG/ACT inhaler Inhale 2 puffs into the lungs every 4 hours as needed for Wheezing or Shortness of Breath          Physical Exam:  Vitals:    05/13/22 2300   BP: (!) 132/50   Pulse:    Resp:    Temp:    SpO2: 100%         Based on new provisions and guidance offered in setting of COVID 23 outbreak and in order to preserve personal protective equipment in accordance with the flexibilities announced by CMS limited examination is performed. General: Ill appearing, not diaphoretic, moderate acute distress  Head: Normocephalic, atraumatic  Eyes: No ocular discharge, no scleral injection, no icterus  Ears: Normal external auricles  Nose: No rhinorrhea, no epistaxis  Throat: Not examined, no difficulty swallowing  Pulm: Moderate respiratory distress  Cardio: Normal rate, regular rhythm, no peripheral edema  GI: non-distended  Neuro: Alert and oriented, cn2-12 grossly intact, strength 5/5 bilaterally. Psych: Cooperative  Skin: no jaundice    Assessment and Plan:     #Acute on chronic respiratory failure with hypoxia  #Acute on chronic HFrEF  #Community-acquired pneumonia  #COPD  #Diabetes  #Hypertension  #CKD III    -Antibiotics, bronchodilators, steroids  -HFrEF, acute exacerbation. Last known LVEF 25% 2021. Pulmonary vascular congestion on imaging, proBNP greater than 20,000. Will increase IV diuresis and monitor renal function which appears to be at baseline at this time  -Strict I's/O  -Wean down CPAP and O2 requirement as tolerated  -As needed antihypertensives continue home meds  -Insulin for glucose control  -As needed analgesics, opiates for severe pain  - Continue to monitor electrolytes and replete as appropriate  - Pt high risk for vte, lmwh for vte ppx.  - Continue medications for chronic problems    I have personally reviewed pertinent laboratory, ekg and imaging results, as well as documentation in the patient's emr. Laboratory and imaging orders per the above plan have been addressed, see orders above.  Patient's case, assessment and plan have been discussed on this date with ed provider

## 2022-05-15 LAB
A/G RATIO: 1.9 (ref 1.1–2.2)
ALBUMIN SERPL-MCNC: 3.8 G/DL (ref 3.4–5)
ALP BLD-CCNC: 85 U/L (ref 40–129)
ALT SERPL-CCNC: 52 U/L (ref 10–40)
ANION GAP SERPL CALCULATED.3IONS-SCNC: 12 MMOL/L (ref 3–16)
AST SERPL-CCNC: 26 U/L (ref 15–37)
BILIRUB SERPL-MCNC: <0.2 MG/DL (ref 0–1)
BUN BLDV-MCNC: 31 MG/DL (ref 7–20)
CALCIUM SERPL-MCNC: 8.7 MG/DL (ref 8.3–10.6)
CHLORIDE BLD-SCNC: 97 MMOL/L (ref 99–110)
CO2: 28 MMOL/L (ref 21–32)
CREAT SERPL-MCNC: 1.3 MG/DL (ref 0.6–1.2)
GFR AFRICAN AMERICAN: 50
GFR NON-AFRICAN AMERICAN: 42
GLUCOSE BLD-MCNC: 121 MG/DL (ref 70–99)
HCT VFR BLD CALC: 28.3 % (ref 36–48)
HEMOGLOBIN: 9.1 G/DL (ref 12–16)
MCH RBC QN AUTO: 23.7 PG (ref 26–34)
MCHC RBC AUTO-ENTMCNC: 32 G/DL (ref 31–36)
MCV RBC AUTO: 74 FL (ref 80–100)
PDW BLD-RTO: 17.9 % (ref 12.4–15.4)
PLATELET # BLD: 235 K/UL (ref 135–450)
PMV BLD AUTO: 9.9 FL (ref 5–10.5)
POTASSIUM REFLEX MAGNESIUM: 4.3 MMOL/L (ref 3.5–5.1)
PRO-BNP: ABNORMAL PG/ML (ref 0–124)
RBC # BLD: 3.82 M/UL (ref 4–5.2)
SODIUM BLD-SCNC: 137 MMOL/L (ref 136–145)
TOTAL PROTEIN: 5.8 G/DL (ref 6.4–8.2)
WBC # BLD: 15.4 K/UL (ref 4–11)

## 2022-05-15 PROCEDURE — 6360000002 HC RX W HCPCS: Performed by: FAMILY MEDICINE

## 2022-05-15 PROCEDURE — 6370000000 HC RX 637 (ALT 250 FOR IP): Performed by: FAMILY MEDICINE

## 2022-05-15 PROCEDURE — 80053 COMPREHEN METABOLIC PANEL: CPT

## 2022-05-15 PROCEDURE — 2700000000 HC OXYGEN THERAPY PER DAY

## 2022-05-15 PROCEDURE — 94640 AIRWAY INHALATION TREATMENT: CPT

## 2022-05-15 PROCEDURE — 6360000002 HC RX W HCPCS: Performed by: INTERNAL MEDICINE

## 2022-05-15 PROCEDURE — 2580000003 HC RX 258: Performed by: FAMILY MEDICINE

## 2022-05-15 PROCEDURE — 85027 COMPLETE CBC AUTOMATED: CPT

## 2022-05-15 PROCEDURE — 1200000000 HC SEMI PRIVATE

## 2022-05-15 PROCEDURE — 6370000000 HC RX 637 (ALT 250 FOR IP): Performed by: INTERNAL MEDICINE

## 2022-05-15 PROCEDURE — 36415 COLL VENOUS BLD VENIPUNCTURE: CPT

## 2022-05-15 PROCEDURE — 94761 N-INVAS EAR/PLS OXIMETRY MLT: CPT

## 2022-05-15 PROCEDURE — 83880 ASSAY OF NATRIURETIC PEPTIDE: CPT

## 2022-05-15 RX ADMIN — FUROSEMIDE 40 MG: 10 INJECTION, SOLUTION INTRAMUSCULAR; INTRAVENOUS at 20:43

## 2022-05-15 RX ADMIN — CEFUROXIME AXETIL 500 MG: 250 TABLET ORAL at 20:43

## 2022-05-15 RX ADMIN — PANTOPRAZOLE SODIUM 40 MG: 40 TABLET, DELAYED RELEASE ORAL at 08:32

## 2022-05-15 RX ADMIN — GABAPENTIN 600 MG: 300 CAPSULE ORAL at 14:35

## 2022-05-15 RX ADMIN — ENOXAPARIN SODIUM 30 MG: 100 INJECTION SUBCUTANEOUS at 08:32

## 2022-05-15 RX ADMIN — FUROSEMIDE 40 MG: 10 INJECTION, SOLUTION INTRAMUSCULAR; INTRAVENOUS at 14:35

## 2022-05-15 RX ADMIN — ASPIRIN 81 MG: 81 TABLET, COATED ORAL at 08:32

## 2022-05-15 RX ADMIN — Medication 400 MG: at 08:32

## 2022-05-15 RX ADMIN — IPRATROPIUM BROMIDE AND ALBUTEROL SULFATE 1 AMPULE: 2.5; .5 SOLUTION RESPIRATORY (INHALATION) at 20:23

## 2022-05-15 RX ADMIN — ATORVASTATIN CALCIUM 40 MG: 40 TABLET, FILM COATED ORAL at 20:43

## 2022-05-15 RX ADMIN — IPRATROPIUM BROMIDE AND ALBUTEROL SULFATE 1 AMPULE: 2.5; .5 SOLUTION RESPIRATORY (INHALATION) at 16:34

## 2022-05-15 RX ADMIN — TIOTROPIUM BROMIDE AND OLODATEROL 2 PUFF: 3.124; 2.736 SPRAY, METERED RESPIRATORY (INHALATION) at 09:25

## 2022-05-15 RX ADMIN — AMIODARONE HYDROCHLORIDE 200 MG: 200 TABLET ORAL at 08:32

## 2022-05-15 RX ADMIN — GABAPENTIN 600 MG: 300 CAPSULE ORAL at 20:43

## 2022-05-15 RX ADMIN — FUROSEMIDE 40 MG: 10 INJECTION, SOLUTION INTRAMUSCULAR; INTRAVENOUS at 05:30

## 2022-05-15 RX ADMIN — ALTEPLASE 1 MG: 2.2 INJECTION, POWDER, LYOPHILIZED, FOR SOLUTION INTRAVENOUS at 18:00

## 2022-05-15 RX ADMIN — IPRATROPIUM BROMIDE AND ALBUTEROL SULFATE 1 AMPULE: 2.5; .5 SOLUTION RESPIRATORY (INHALATION) at 13:08

## 2022-05-15 RX ADMIN — IPRATROPIUM BROMIDE AND ALBUTEROL SULFATE 1 AMPULE: 2.5; .5 SOLUTION RESPIRATORY (INHALATION) at 23:57

## 2022-05-15 RX ADMIN — CARVEDILOL 6.25 MG: 6.25 TABLET, FILM COATED ORAL at 20:43

## 2022-05-15 RX ADMIN — IPRATROPIUM BROMIDE AND ALBUTEROL SULFATE 1 AMPULE: 2.5; .5 SOLUTION RESPIRATORY (INHALATION) at 09:24

## 2022-05-15 RX ADMIN — SODIUM CHLORIDE, PRESERVATIVE FREE 10 ML: 5 INJECTION INTRAVENOUS at 20:44

## 2022-05-15 RX ADMIN — ENOXAPARIN SODIUM 30 MG: 100 INJECTION SUBCUTANEOUS at 20:43

## 2022-05-15 RX ADMIN — CEFUROXIME AXETIL 500 MG: 250 TABLET ORAL at 08:31

## 2022-05-15 RX ADMIN — GABAPENTIN 600 MG: 300 CAPSULE ORAL at 08:31

## 2022-05-15 RX ADMIN — CARVEDILOL 6.25 MG: 6.25 TABLET, FILM COATED ORAL at 08:31

## 2022-05-15 RX ADMIN — POTASSIUM CHLORIDE 10 MEQ: 750 TABLET, EXTENDED RELEASE ORAL at 08:32

## 2022-05-15 RX ADMIN — Medication 400 MG: at 20:43

## 2022-05-15 RX ADMIN — POLYETHYLENE GLYCOL 3350 17 G: 17 POWDER, FOR SOLUTION ORAL at 11:39

## 2022-05-15 ASSESSMENT — PAIN SCALES - GENERAL: PAINLEVEL_OUTOF10: 0

## 2022-05-15 NOTE — PROGRESS NOTES
Hospitalist Progress Note      PCP: Ron Dougherty MD    Date of Admission: 5/13/2022    Chief Complaint: [facility] Course: 66 yo with recently diagnosed small cell lung cancer and post obstructive pna. Was dced 5/13 to home and several hours later became acutely sob. Came back to hospital and readmitted for resp failure    Subjective: breathing is much better. Does not feel like she diuresed much    Medications:  Reviewed    Infusion Medications    sodium chloride       Scheduled Medications    gabapentin  600 mg Oral TID    atorvastatin  40 mg Oral Nightly    magnesium oxide  400 mg Oral BID    [Held by provider] clopidogrel  75 mg Oral Daily    amiodarone  200 mg Oral Daily    aspirin  81 mg Oral Daily    carvedilol  6.25 mg Oral BID    pantoprazole  40 mg Oral Daily    potassium chloride  10 mEq Oral Daily    empagliflozin  10 mg Oral Daily    tiotropium-olodaterol  2 puff Inhalation Daily    Vericiguat  5 mg Oral QAM    ipratropium-albuterol  1 ampule Inhalation Q4H    furosemide  40 mg IntraVENous Q8H    cefUROXime  500 mg Oral 2 times per day    sodium chloride flush  5-40 mL IntraVENous 2 times per day    enoxaparin  30 mg SubCUTAneous BID     PRN Meds: albuterol sulfate HFA, ondansetron, prochlorperazine, HYDROmorphone, traMADol, sodium chloride flush, sodium chloride, [DISCONTINUED] ondansetron **OR** ondansetron, polyethylene glycol, acetaminophen **OR** acetaminophen      Intake/Output Summary (Last 24 hours) at 5/15/2022 1231  Last data filed at 5/15/2022 0846  Gross per 24 hour   Intake 1620 ml   Output 1900 ml   Net -280 ml       Physical Exam Performed:    BP (!) 136/90   Pulse 69   Temp 97.4 °F (36.3 °C) (Oral)   Resp 16   Wt 217 lb 6 oz (98.6 kg)   SpO2 100%   BMI 34.05 kg/m²     General appearance: No apparent distress, appears stated age and cooperative. HEENT: Pupils equal, round, and reactive to light. Conjunctivae/corneas clear.   Neck: Supple, with full range of motion. No jugular venous distention. Trachea midline. Respiratory:  Clear   Cardiovascular: Regular rate and rhythm with normal S1/S2 without murmurs, rubs or gallops. Abdomen: Soft, non-tender, non-distended with normal bowel sounds. Musculoskeletal: No clubbing, cyanosis or edema bilaterally. Full range of motion without deformity. Skin: Skin color, texture, turgor normal.  No rashes or lesions. Neurologic:  Neurovascularly intact without any focal sensory/motor deficits. Cranial nerves: II-XII intact, grossly non-focal.  Psychiatric: Alert and oriented, thought content appropriate, normal insight  Capillary Refill: Brisk,3 seconds, normal   Peripheral Pulses: +2 palpable, equal bilaterally       Labs:   Recent Labs     05/13/22 0752 05/13/22 2100 05/14/22  0711   WBC 4.8 28.6* 23.5*   HGB 10.6* 10.0* 9.5*   HCT 33.7* 31.9* 31.1*    285 230     Recent Labs     05/13/22 0752 05/13/22 2100 05/14/22  0711    135* 142   K 3.7 3.8 3.9    97* 102   CO2 26 24 25   BUN 33* 30* 28*   CREATININE 1.4* 1.4* 1.3*   CALCIUM 9.0 8.3 9.0     Recent Labs     05/13/22 0752 05/13/22 2100 05/14/22  0711   AST 22 68* 42*   ALT 26 65* 59*   BILITOT 0.5 0.4 0.3   ALKPHOS 72 109 93     No results for input(s): INR in the last 72 hours. Recent Labs     05/13/22 2100   TROPONINI 0.07*       Urinalysis:      Lab Results   Component Value Date    NITRU Negative 03/31/2022    WBCUA 6 03/30/2022    BACTERIA 4+ 03/30/2022    RBCUA 1 03/30/2022    BLOODU Negative 03/31/2022    SPECGRAV 1.010 03/31/2022    GLUCOSEU >=1000 03/31/2022       Radiology:  XR CHEST PORTABLE   Final Result   Moderate increasing left lower lobe airspace disease, concern for pneumonia   possibly postobstructive given CT scan findings. .      Vascular congestion and suspected early edema.                  Assessment/Plan:    1  Acute hypoxic hypercarbic resp failure      More likely flash pulmonary edema from acute on chr systolic hf      Continue iv lasix for another 24 hrs      Change back to demedex 40 mg bid tomorrow       2  Post obstructive pna-doubt its worsening pna      Did well at dc and feel ok to continue ceftin      procalcitonin is low. Leukocytosis likely from steroids        3  Small cell lung cancer-will consult Hemonc. Already got chemo last      Admit    4  Acute on chronic systolic hf      Continue iv lasix      b-blocker. No ace or arb due to renal failure      On b-blocker/verquvo. DVT Prophylaxis: lovenox  Diet: ADULT DIET;  Regular; Low Fat/Low Chol/High Fiber/2 gm Na  Code Status: Full Code      Home tomorrow    Dispo - 1-2 d    Micaela Alba MD

## 2022-05-15 NOTE — PROGRESS NOTES
[facility]   Progress Note      SUBJECTIVE:      Events noted. Doing okay. Has some shortness of breath  No mouth sores  No fever    OBJECTIVE    Physical  VITALS:  BP (!) 136/90   Pulse 69   Temp 97.4 °F (36.3 °C) (Oral)   Resp 20   Wt 217 lb 6 oz (98.6 kg)   SpO2 95%   BMI 34.05 kg/m²   TEMPERATURE:  Current - Temp: 97.4 °F (36.3 °C); Max - Temp  Av.7 °F (36.5 °C)  Min: 97.4 °F (36.3 °C)  Max: 98.4 °F (36.9 °C)  BLOOD PRESSURE RANGE:  Systolic (73JQX), FJ , Min:93 , XQL:849   ; Diastolic (54CVH), LHM:60, Min:51, Max:90    24HR INTAKE/OUTPUT:    Intake/Output Summary (Last 24 hours) at 5/15/2022 0800  Last data filed at 5/15/2022 0523  Gross per 24 hour   Intake 1500 ml   Output 1900 ml   Net -400 ml       Conscious alert and oriented. Neck is supple  Mild pallor is present no icterus  Basal crackles were heard respiratory efforts were normal.  Abdomen is soft bowel sounds are heard no organomegaly noted. Extremities no edema.     Data  Labs:  General Labs:  CBC with Differential:    Lab Results   Component Value Date    WBC 23.5 2022    RBC 4.11 2022    RBC 5.12 2017    HGB 9.5 2022    HCT 31.1 2022     2022    MCV 75.5 2022    MCH 23.1 2022    MCHC 30.6 2022    RDW 17.9 2022    SEGSPCT 73.7 2013    LYMPHOPCT 0.8 2022    LYMPHOPCT 18.8 2017    MONOPCT 0.2 2022    BASOPCT 0.1 2022    MONOSABS 0.0 2022    LYMPHSABS 0.2 2022    EOSABS 0.0 2022    BASOSABS 0.0 2022    DIFFTYPE Auto 2013     BMP:    Lab Results   Component Value Date     2022    K 3.9 2022     2022    CO2 25 2022    BUN 28 2022    LABALBU 3.9 2022    CREATININE 1.3 2022    CALCIUM 9.0 2022    GFRAA 50 2022    GFRAA >60 2013    LABGLOM 42 2022    GLUCOSE 152 2022    GLUCOSE 154 2015     Hepatic Function Panel:    Lab Results   Component Value Date    ALKPHOS 93 05/14/2022    ALT 59 05/14/2022    AST 42 05/14/2022    PROT 5.7 05/14/2022    PROT 7.6 06/05/2015    BILITOT 0.3 05/14/2022    BILIDIR <0.2 12/02/2021    IBILI see below 12/02/2021    LABALBU 3.9 05/14/2022     LDH:  No results found for: LDH  PT/INR:    Lab Results   Component Value Date    PROTIME 11.7 05/10/2022    INR 1.03 05/10/2022     PTT:    Lab Results   Component Value Date    APTT 21.3 12/15/2012   [APTT    Current Medications  Current Facility-Administered Medications: albuterol sulfate  (90 Base) MCG/ACT inhaler 2 puff, 2 puff, Inhalation, Q4H PRN  gabapentin (NEURONTIN) capsule 600 mg, 600 mg, Oral, TID  atorvastatin (LIPITOR) tablet 40 mg, 40 mg, Oral, Nightly  magnesium oxide (MAG-OX) tablet 400 mg, 400 mg, Oral, BID  [Held by provider] clopidogrel (PLAVIX) tablet 75 mg, 75 mg, Oral, Daily  amiodarone (CORDARONE) tablet 200 mg, 200 mg, Oral, Daily  aspirin EC tablet 81 mg, 81 mg, Oral, Daily  carvedilol (COREG) tablet 6.25 mg, 6.25 mg, Oral, BID  pantoprazole (PROTONIX) tablet 40 mg, 40 mg, Oral, Daily  potassium chloride (KLOR-CON) extended release tablet 10 mEq, 10 mEq, Oral, Daily  empagliflozin (JARDIANCE) tablet 10 mg, 10 mg, Oral, Daily  tiotropium-olodaterol (STIOLTO) 2.5-2.5 MCG/ACT inhaler 2 puff, 2 puff, Inhalation, Daily  Vericiguat TABS 5 mg, 5 mg, Oral, QAM  ondansetron (ZOFRAN) tablet 8 mg, 8 mg, Oral, Q8H PRN  prochlorperazine (COMPAZINE) tablet 10 mg, 10 mg, Oral, Q8H PRN  ipratropium-albuterol (DUONEB) nebulizer solution 1 ampule, 1 ampule, Inhalation, Q4H  furosemide (LASIX) injection 40 mg, 40 mg, IntraVENous, Q8H  cefUROXime (CEFTIN) tablet 500 mg, 500 mg, Oral, 2 times per day  HYDROmorphone (DILAUDID) injection 0.5 mg, 0.5 mg, IntraVENous, Q4H PRN  traMADol (ULTRAM) tablet 50 mg, 50 mg, Oral, Q6H PRN  sodium chloride flush 0.9 % injection 5-40 mL, 5-40 mL, IntraVENous, 2 times per day  sodium chloride flush 0.9 % injection 5-40 mL, 5-40 mL, IntraVENous, PRN  0.9 % sodium chloride infusion, , IntraVENous, PRN  [DISCONTINUED] ondansetron (ZOFRAN-ODT) disintegrating tablet 4 mg, 4 mg, Oral, Q8H PRN **OR** ondansetron (ZOFRAN) injection 4 mg, 4 mg, IntraVENous, Q6H PRN  polyethylene glycol (GLYCOLAX) packet 17 g, 17 g, Oral, Daily PRN  acetaminophen (TYLENOL) tablet 650 mg, 650 mg, Oral, Q6H PRN **OR** acetaminophen (TYLENOL) suppository 650 mg, 650 mg, Rectal, Q6H PRN  enoxaparin Sodium (LOVENOX) injection 30 mg, 30 mg, SubCUTAneous, BID    ASSESSMENT AND PLAN    1. Extensive stage small cell lung cancer. C1D7 of carboplatin\-16. \Tecentriq ( received only 2 days of chemo. D3 not done due to hospitalization)    2. Pneumonia. Continue Ceftin    3. Leukocytosis: Most likely due to Granix    4. Anemia. Received Procrit last week    OK for discharge.       Pee Patterson MD

## 2022-05-15 NOTE — PROGRESS NOTES
Pt's R PAC flushing but no blood return even after re accessing. Lab unable to obtain blood after several sticks. Made Dr. [facility] aware via [email or web address]. Orders received for Cathflo. Will continue to monitor.

## 2022-05-15 NOTE — PROGRESS NOTES
Cathflo administered to patients port. See [facility] Education given to patient on Cathflo. Patient verbally confirms understanding of education.  Electronically signed by Ed Knowles RN on 5/15/2022 at 6:05 PM

## 2022-05-16 ENCOUNTER — APPOINTMENT (OUTPATIENT)
Dept: GENERAL RADIOLOGY | Age: 62
DRG: 193 | End: 2022-05-16
Payer: MEDICARE

## 2022-05-16 PROCEDURE — 99223 1ST HOSP IP/OBS HIGH 75: CPT | Performed by: INTERNAL MEDICINE

## 2022-05-16 PROCEDURE — 71046 X-RAY EXAM CHEST 2 VIEWS: CPT

## 2022-05-16 PROCEDURE — 6370000000 HC RX 637 (ALT 250 FOR IP): Performed by: INTERNAL MEDICINE

## 2022-05-16 PROCEDURE — 6370000000 HC RX 637 (ALT 250 FOR IP): Performed by: FAMILY MEDICINE

## 2022-05-16 PROCEDURE — 2700000000 HC OXYGEN THERAPY PER DAY

## 2022-05-16 PROCEDURE — 94660 CPAP INITIATION&MGMT: CPT

## 2022-05-16 PROCEDURE — 94760 N-INVAS EAR/PLS OXIMETRY 1: CPT

## 2022-05-16 PROCEDURE — 6360000002 HC RX W HCPCS: Performed by: INTERNAL MEDICINE

## 2022-05-16 PROCEDURE — 2060000000 HC ICU INTERMEDIATE R&B

## 2022-05-16 PROCEDURE — 94640 AIRWAY INHALATION TREATMENT: CPT

## 2022-05-16 PROCEDURE — 6360000002 HC RX W HCPCS: Performed by: FAMILY MEDICINE

## 2022-05-16 PROCEDURE — 6360000002 HC RX W HCPCS

## 2022-05-16 PROCEDURE — 2580000003 HC RX 258: Performed by: FAMILY MEDICINE

## 2022-05-16 PROCEDURE — 9990000010 HC NO CHARGE VISIT

## 2022-05-16 RX ORDER — MILRINONE LACTATE 0.2 MG/ML
0.2 INJECTION, SOLUTION INTRAVENOUS CONTINUOUS
Status: DISCONTINUED | OUTPATIENT
Start: 2022-05-16 | End: 2022-05-16

## 2022-05-16 RX ORDER — PREDNISONE 20 MG/1
60 TABLET ORAL DAILY
Status: DISCONTINUED | OUTPATIENT
Start: 2022-05-16 | End: 2022-05-18

## 2022-05-16 RX ORDER — MILRINONE LACTATE 0.2 MG/ML
0.12 INJECTION, SOLUTION INTRAVENOUS CONTINUOUS
Status: DISPENSED | OUTPATIENT
Start: 2022-05-16 | End: 2022-05-18

## 2022-05-16 RX ORDER — FUROSEMIDE 10 MG/ML
60 INJECTION INTRAMUSCULAR; INTRAVENOUS EVERY 8 HOURS
Status: DISCONTINUED | OUTPATIENT
Start: 2022-05-16 | End: 2022-05-19

## 2022-05-16 RX ADMIN — GABAPENTIN 600 MG: 300 CAPSULE ORAL at 13:36

## 2022-05-16 RX ADMIN — Medication 400 MG: at 21:14

## 2022-05-16 RX ADMIN — ENOXAPARIN SODIUM 30 MG: 100 INJECTION SUBCUTANEOUS at 21:13

## 2022-05-16 RX ADMIN — ASPIRIN 81 MG: 81 TABLET, COATED ORAL at 09:18

## 2022-05-16 RX ADMIN — SODIUM CHLORIDE, PRESERVATIVE FREE 10 ML: 5 INJECTION INTRAVENOUS at 10:00

## 2022-05-16 RX ADMIN — AMIODARONE HYDROCHLORIDE 200 MG: 200 TABLET ORAL at 09:18

## 2022-05-16 RX ADMIN — POTASSIUM CHLORIDE 10 MEQ: 750 TABLET, EXTENDED RELEASE ORAL at 09:18

## 2022-05-16 RX ADMIN — ENOXAPARIN SODIUM 30 MG: 100 INJECTION SUBCUTANEOUS at 09:19

## 2022-05-16 RX ADMIN — CEFUROXIME AXETIL 500 MG: 250 TABLET ORAL at 21:14

## 2022-05-16 RX ADMIN — IPRATROPIUM BROMIDE AND ALBUTEROL SULFATE 1 AMPULE: 2.5; .5 SOLUTION RESPIRATORY (INHALATION) at 11:41

## 2022-05-16 RX ADMIN — PREDNISONE 60 MG: 20 TABLET ORAL at 09:18

## 2022-05-16 RX ADMIN — IPRATROPIUM BROMIDE AND ALBUTEROL SULFATE 1 AMPULE: 2.5; .5 SOLUTION RESPIRATORY (INHALATION) at 19:46

## 2022-05-16 RX ADMIN — IPRATROPIUM BROMIDE AND ALBUTEROL SULFATE 1 AMPULE: 2.5; .5 SOLUTION RESPIRATORY (INHALATION) at 16:09

## 2022-05-16 RX ADMIN — CEFUROXIME AXETIL 500 MG: 250 TABLET ORAL at 09:23

## 2022-05-16 RX ADMIN — FUROSEMIDE 40 MG: 10 INJECTION, SOLUTION INTRAMUSCULAR; INTRAVENOUS at 06:01

## 2022-05-16 RX ADMIN — ATORVASTATIN CALCIUM 40 MG: 40 TABLET, FILM COATED ORAL at 21:14

## 2022-05-16 RX ADMIN — CARVEDILOL 6.25 MG: 6.25 TABLET, FILM COATED ORAL at 09:17

## 2022-05-16 RX ADMIN — GABAPENTIN 600 MG: 300 CAPSULE ORAL at 21:13

## 2022-05-16 RX ADMIN — Medication 400 MG: at 09:18

## 2022-05-16 RX ADMIN — FUROSEMIDE 60 MG: 10 INJECTION, SOLUTION INTRAMUSCULAR; INTRAVENOUS at 21:14

## 2022-05-16 RX ADMIN — POLYETHYLENE GLYCOL 3350 17 G: 17 POWDER, FOR SOLUTION ORAL at 09:18

## 2022-05-16 RX ADMIN — FUROSEMIDE 40 MG: 10 INJECTION, SOLUTION INTRAMUSCULAR; INTRAVENOUS at 13:36

## 2022-05-16 RX ADMIN — MILRINONE LACTATE IN DEXTROSE 0.12 MCG/KG/MIN: 200 INJECTION, SOLUTION INTRAVENOUS at 20:23

## 2022-05-16 RX ADMIN — IPRATROPIUM BROMIDE AND ALBUTEROL SULFATE 1 AMPULE: 2.5; .5 SOLUTION RESPIRATORY (INHALATION) at 08:45

## 2022-05-16 RX ADMIN — TIOTROPIUM BROMIDE AND OLODATEROL 2 PUFF: 3.124; 2.736 SPRAY, METERED RESPIRATORY (INHALATION) at 08:45

## 2022-05-16 RX ADMIN — PANTOPRAZOLE SODIUM 40 MG: 40 TABLET, DELAYED RELEASE ORAL at 09:17

## 2022-05-16 RX ADMIN — GABAPENTIN 600 MG: 300 CAPSULE ORAL at 09:19

## 2022-05-16 RX ADMIN — IPRATROPIUM BROMIDE AND ALBUTEROL SULFATE 1 AMPULE: 2.5; .5 SOLUTION RESPIRATORY (INHALATION) at 23:44

## 2022-05-16 ASSESSMENT — PAIN SCALES - GENERAL
PAINLEVEL_OUTOF10: 0

## 2022-05-16 NOTE — PROGRESS NOTES
Hospitalist Progress Note      PCP: Bina Diaz MD    Date of Admission: 5/13/2022    Chief Complaint: [facility] Course: 64 yo with recently diagnosed small cell lung cancer and post obstructive pna. Was dced 5/13 to home and several hours later became acutely sob. Came back to hospital and readmitted for resp failure    Subjective: More dyspneic today than yesterday. Does not feel better. Medications:  Reviewed    Infusion Medications    sodium chloride       Scheduled Medications    predniSONE  60 mg Oral Daily    gabapentin  600 mg Oral TID    atorvastatin  40 mg Oral Nightly    magnesium oxide  400 mg Oral BID    [Held by provider] clopidogrel  75 mg Oral Daily    amiodarone  200 mg Oral Daily    aspirin  81 mg Oral Daily    carvedilol  6.25 mg Oral BID    pantoprazole  40 mg Oral Daily    potassium chloride  10 mEq Oral Daily    empagliflozin  10 mg Oral Daily    tiotropium-olodaterol  2 puff Inhalation Daily    Vericiguat  5 mg Oral QAM    ipratropium-albuterol  1 ampule Inhalation Q4H    furosemide  40 mg IntraVENous Q8H    cefUROXime  500 mg Oral 2 times per day    sodium chloride flush  5-40 mL IntraVENous 2 times per day    enoxaparin  30 mg SubCUTAneous BID     PRN Meds: albuterol sulfate HFA, ondansetron, prochlorperazine, HYDROmorphone, traMADol, sodium chloride flush, sodium chloride, [DISCONTINUED] ondansetron **OR** ondansetron, polyethylene glycol, acetaminophen **OR** acetaminophen      Intake/Output Summary (Last 24 hours) at 5/16/2022 1300  Last data filed at 5/16/2022 0915  Gross per 24 hour   Intake 2170 ml   Output 1300 ml   Net 870 ml       Physical Exam Performed:    /74   Pulse 67   Temp 97.5 °F (36.4 °C) (Oral)   Resp 16   Wt 224 lb 3.3 oz (101.7 kg)   SpO2 100%   BMI 35.12 kg/m²     General appearance: No apparent distress, appears stated age and cooperative. HEENT: Pupils equal, round, and reactive to light.  Conjunctivae/corneas clear.  Neck: Supple, with full range of motion. No jugular venous distention. Trachea midline. Respiratory: Bibasilar Rales  Cardiovascular: Regular rate and rhythm with normal S1/S2 without murmurs, rubs or gallops. Abdomen: Soft, non-tender, non-distended with normal bowel sounds. Musculoskeletal: No clubbing, cyanosis or edema bilaterally. Full range of motion without deformity. Skin: Skin color, texture, turgor normal.  No rashes or lesions. Neurologic:  Neurovascularly intact without any focal sensory/motor deficits. Cranial nerves: II-XII intact, grossly non-focal.  Psychiatric: Alert and oriented, thought content appropriate, normal insight  Capillary Refill: Brisk,3 seconds, normal   Peripheral Pulses: +2 palpable, equal bilaterally       Labs:   Recent Labs     05/13/22  2100 05/14/22  0711 05/15/22  2050   WBC 28.6* 23.5* 15.4*   HGB 10.0* 9.5* 9.1*   HCT 31.9* 31.1* 28.3*    230 235     Recent Labs     05/13/22  2100 05/14/22  0711 05/15/22  2050   * 142 137   K 3.8 3.9 4.3   CL 97* 102 97*   CO2 24 25 28   BUN 30* 28* 31*   CREATININE 1.4* 1.3* 1.3*   CALCIUM 8.3 9.0 8.7     Recent Labs     05/13/22  2100 05/14/22  0711 05/15/22  2050   AST 68* 42* 26   ALT 65* 59* 52*   BILITOT 0.4 0.3 <0.2   ALKPHOS 109 93 85     No results for input(s): INR in the last 72 hours. Recent Labs     05/13/22 2100   TROPONINI 0.07*            Pro-BNP 32,444 High  pg/mL    Comment: Methodology by NT-proBNP           Urinalysis:      Lab Results   Component Value Date    NITRU Negative 03/31/2022    WBCUA 6 03/30/2022    BACTERIA 4+ 03/30/2022    RBCUA 1 03/30/2022    BLOODU Negative 03/31/2022    SPECGRAV 1.010 03/31/2022    GLUCOSEU >=1000 03/31/2022       Radiology:  XR CHEST PORTABLE   Final Result   Moderate increasing left lower lobe airspace disease, concern for pneumonia   possibly postobstructive given CT scan findings. .      Vascular congestion and suspected early edema.          XR CHEST (2 VW) (Results Pending)           Assessment/Plan:    1  Acute hypoxic hypercarbic resp failure      More likely flash pulmonary edema from acute on chr systolic hf      BNP remains very elevated. Suboptimal diuretic response      Will ask cardiology to see. Steroids started by hemonc for       Autoimmune pneumonitis vs copd       2  Post obstructive pna-doubt its worsening pna      Did well at dc and feel ok to continue ceftin      procalcitonin is low. Leukocytosis likely from steroids        3  Small cell lung cancer-will consult Hemonc. Already got chemo last      Admit. Seen by Dr Ana Garcia today    4  Acute on chronic systolic CV-CTZ>88V      Continue iv lasix      b-blocker. No ace or arb due to renal failure. Cardiology consult      On b-blocker/verquvo. DVT Prophylaxis: lovenox  Diet: ADULT DIET;  Regular; Low Fat/Low Chol/High Fiber/2 gm Na  Code Status: Full Code      Not ready for dc  C at [facility] - 1-2 d    Micaela Alba MD

## 2022-05-16 NOTE — PROGRESS NOTES
Hematology Oncology Daily Progress Note    Admit Date: 5/13/2022  Hospital day several    Subjective:     Patient has complaints of stable SOB/HEATH--denies CP. Medication side effects: none    Scheduled Meds:   gabapentin  600 mg Oral TID    atorvastatin  40 mg Oral Nightly    magnesium oxide  400 mg Oral BID    [Held by provider] clopidogrel  75 mg Oral Daily    amiodarone  200 mg Oral Daily    aspirin  81 mg Oral Daily    carvedilol  6.25 mg Oral BID    pantoprazole  40 mg Oral Daily    potassium chloride  10 mEq Oral Daily    empagliflozin  10 mg Oral Daily    tiotropium-olodaterol  2 puff Inhalation Daily    Vericiguat  5 mg Oral QAM    ipratropium-albuterol  1 ampule Inhalation Q4H    furosemide  40 mg IntraVENous Q8H    cefUROXime  500 mg Oral 2 times per day    sodium chloride flush  5-40 mL IntraVENous 2 times per day    enoxaparin  30 mg SubCUTAneous BID     Continuous Infusions:   sodium chloride       PRN Meds:albuterol sulfate HFA, ondansetron, prochlorperazine, HYDROmorphone, traMADol, sodium chloride flush, sodium chloride, [DISCONTINUED] ondansetron **OR** ondansetron, polyethylene glycol, acetaminophen **OR** acetaminophen    Review of Systems  Pertinent items are noted in HPI. REVIEW OF SYSTEMS:         · Constitutional: Denies fever, sweats, weight loss     · Eyes: No visual changes or diplopia. No scleral icterus. · ENT: No Headaches, hearing loss or vertigo. No mouth sores or sore throat. · Cardiovascular: No chest pain, dyspnea on exertion, palpitations or loss of consciousness. · Respiratory: No cough or wheezing, no sputum production. No hemoptysis. .    · Gastrointestinal: No abdominal pain, appetite loss, blood in stools. No change in bowel habits. · Genitourinary: No dysuria, trouble voiding, or hematuria. · Musculoskeletal:  Generalized weakness. No joint complaints. · Integumentary: No rash or pruritis. · Neurological: No headache, diplopia.  No change in gait, balance, or coordination. No paresthesias. · Endocrine: No temperature intolerance. No excessive thirst, fluid intake, or urination. · Hematologic/Lymphatic: No abnormal bruising or ecchymoses, blood clots or swollen lymph nodes. · Allergic/Immunologic: No nasal congestion or hives. ·     Objective:     Patient Vitals for the past 8 hrs:   BP Temp Temp src Pulse Resp SpO2 Weight   05/16/22 0511 (!) 110/52 97.7 °F (36.5 °C) Oral 61 18 95 %    05/16/22 0506       224 lb 3.3 oz (101.7 kg)   05/16/22 0038 111/70 97.7 °F (36.5 °C) Oral 76 18 94 %    05/15/22 2357     18 94 %      I/O last 3 completed shifts: In: 1630 [P.O.:1620; I.V.:10]  Out: 2500 [Urine:2500]  No intake/output data recorded.     BP (!) 110/52   Pulse 61   Temp 97.7 °F (36.5 °C) (Oral)   Resp 18   Wt 224 lb 3.3 oz (101.7 kg)   SpO2 95%   BMI 35.12 kg/m²     General Appearance:    Alert, cooperative, no distress, appears stated age   Head:    Normocephalic, without obvious abnormality, atraumatic   Eyes:    PERRL, conjunctiva/corneas clear, EOM's intact, fundi     benign, both eyes        Ears:    Normal TM's and external ear canals, both ears   Nose:   Nares normal, septum midline, mucosa normal, no drainage    or sinus tenderness   Throat:   Lips, mucosa, and tongue normal; teeth and gums normal   Neck:   Supple, symmetrical, trachea midline, no adenopathy;        thyroid:  No enlargement/tenderness/nodules; no carotid    bruit or JVD   Back:     Symmetric, no curvature, ROM normal, no CVA tenderness   Lungs:     Clear to auscultation bilaterally, respirations unlabored   Chest wall:    No tenderness or deformity   Heart:    Regular rate and rhythm, S1 and S2 normal, no murmur, rub   or gallop   Abdomen:     Soft, non-tender, bowel sounds active all four quadrants,     no masses, no organomegaly           Extremities:   Extremities normal, atraumatic, no cyanosis or edema   Pulses:   2+ and symmetric all extremities   Skin: Skin color, texture, turgor normal, no rashes or lesions   Lymph nodes:   Cervical, supraclavicular, and axillary nodes normal   Neurologic:   CNII-XII intact. Normal strength, sensation and reflexes       throughout         Data Review  CBC:   Lab Results   Component Value Date    WBC 15.4 05/15/2022    RBC 3.82 05/15/2022    RBC 5.12 05/26/2017       Assessment:     Principal Problem:    Pneumonia  Resolved Problems:    * No resolved hospital problems. *      Plan:     1. Extensive stage small cell lung cancer\shortness of breath. This could certainly be partially due to CHF. She also has COPD. This there is a small chance she has autoimmune pneumonitis or myocarditis. I will put her on steroids.         Electronically signed by Giacomo Ramos MD on 5/16/2022 at 7:44 AM

## 2022-05-16 NOTE — CARE COORDINATION
05/16/22 1259   IMM Letter   IMM Letter given to Patient/Family/Significant other/Guardian/POA/by: given to pt/Shanna Manjarrez RN   IMM Letter date given: 05/16/22   IMM Letter time given: 1217     Brie Gonzalez RN, BSN,   [phone]  Electronically signed by Brie Gonzalez RN on 5/16/2022 at 1:00 PM

## 2022-05-16 NOTE — PROGRESS NOTES
Occupational Therapy  Defer OT eval due to independence noted in PT exam, no OT was required after d/c from last admit and not loss of function noted.  Abhilash Colin, OTR/L #[id number] 5/16/2022 2:06 PM

## 2022-05-16 NOTE — CARE COORDINATION
[facility]  Patient is active with [facility], Will follow for discharge to home with orders to resume care.       Jaclyn Smith RN, BSN CTN  [facility] [phone]

## 2022-05-16 NOTE — PLAN OF CARE
Problem: Discharge Planning  Goal: Discharge to home or other facility with appropriate resources  5/16/2022 0147 by Maggie Brito RN  Outcome: Progressing     Problem: Pain  Goal: Verbalizes/displays adequate comfort level or baseline comfort level  5/16/2022 1117 by Dayna Lau RN  Outcome: Progressing  5/16/2022 0147 by Maggie Brito RN  Outcome: Progressing     Problem: Skin/Tissue Integrity  Goal: Absence of new skin breakdown  Description: 1. Monitor for areas of redness and/or skin breakdown  2. Assess vascular access sites hourly  3. Every 4-6 hours minimum:  Change oxygen saturation probe site  4. Every 4-6 hours:  If on nasal continuous positive airway pressure, respiratory therapy assess nares and determine need for appliance change or resting period.   5/16/2022 1117 by Dayna Lau RN  Outcome: Progressing  5/16/2022 0147 by Maggie Brito RN  Outcome: Progressing     Problem: Safety - Adult  Goal: Free from fall injury  5/16/2022 1117 by Dayna Lau RN  Outcome: Progressing  5/16/2022 0147 by Maggie Brito RN  Outcome: Progressing

## 2022-05-16 NOTE — CONSULTS
[facility]  1960    May 16, 2022    Reason for Consult: CHF    CC: Shortness of breath    HPI:  The patient is 65 y.o. female with a past medical history significant for CAD with prior PCI, a new diagnosis of lung carcinoma, essential hypertension, type 2 DM, COPD and chronic systolic CHF who presented to [facility] with shortness of breath. Carlitos Suresh is currently wearing Bipap when I interview her. She states that she had chemotherapy last Monday7 and Tuesday and came in because she could not breathe. She was discharged home for 5 hours on Friday. She is back on Bipap today. She follows at [facility] with Dr. Jacquelin Quiroz and had a new AICD generator in December 2021. She relates that her weight has been 215lbs at home and she has never been lower than that. She is seeing  at [facility]. Review of Systems:  Constitutional: No fatigue, weakness, night sweats or fever. HEENT: No new vision difficulties or ringing in the ears. Respiratory: No new SOB, PND, orthopnea or cough. Cardiovascular: See HPI   GI: No n/v, diarrhea, constipation, abdominal pain or changes in bowel habits. No melena, no hematochezia  : No urinary frequency, urgency, incontinence, hematuria or dysuria. Skin: No cyanosis or skin lesions. Musculoskeletal: No new muscle or joint pain. Neurological: No syncope or TIA-like symptoms.   Psychiatric: No anxiety, insomnia or depression     Past Medical History:   Diagnosis Date    Asthma     [facility]     CHF (congestive heart failure) (HCC)     COPD [facility]chronic obstructive pulmonary disease) [facility]     Diabetes mellitus ([facility]     Essential hypertension     Hyperlipidemia     Hypertension     Obesity      Past Surgical History:   Procedure Laterality Date    CARPAL TUNNEL RELEASE      CHOLECYSTECTOMY      CORONARY ANGIOPLASTY WITH STENT PLACEMENT  01/07/2013    Stent LAD    INCONTINENCE SURGERY      IR PORT PLACEMENT EQUAL OR GREATER THAN 5 YEARS Right 05/03/2022    Power port, inserted by Dr. Leticia Castle, cut at [address] 5 YEARS  5/3/2022    IR PORT PLACEMENT EQUAL OR GREATER THAN 5 YEARS 5/3/2022 [facility] PROCED[facility]     Family History   Problem Relation Age of Onset    High Blood Pressure Mother     Diabetes Mother     Cancer Mother         thyroid    Stroke Father      Social History     Tobacco Use    Smoking status: Never Smoker    Smokeless tobacco: Never Used   Vaping Use    Vaping Use: Never used   Substance Use Topics    Alcohol use: Not Currently    Drug use: Not Currently       Allergies   Allergen Reactions    Ace Inhibitors     Diclofenac     Losartan     Spironolactone     Vicodin Hp [Hydrocodone-Acetaminophen]     Vicodin [Hydrocodone-Acetaminophen]      Current Facility-Administered Medications   Medication Dose Route Frequency Provider Last Rate Last Admin    predniSONE (DELTASONE) tablet 60 mg  60 mg Oral Daily Vinay Morales MD   60 mg at 05/16/22 0918    albuterol sulfate  (90 Base) MCG/ACT inhaler 2 puff  2 puff Inhalation Q4H PRN Feliciano Angel MD        gabapentin (NEURONTIN) capsule 600 mg  600 mg Oral TID Feliciano Angel MD   600 mg at 05/16/22 0919    atorvastatin (LIPITOR) tablet 40 mg  40 mg Oral Nightly Feliciano Angel MD   40 mg at 05/15/22 2043    magnesium oxide (MAG-OX) tablet 400 mg  400 mg Oral BID Feliciano Angel MD   400 mg at 05/16/22 0433    [Held by provider] clopidogrel (PLAVIX) tablet 75 mg  75 mg Oral Daily Feliciano Angel MD        amiodarone (CORDARONE) tablet 200 mg  200 mg Oral Daily Feliciano Angel MD   200 mg at 05/16/22 0918    aspirin EC tablet 81 mg  81 mg Oral Daily Feliciano Angel MD   81 mg at 05/16/22 0918    carvedilol (COREG) tablet 6.25 mg  6.25 mg Oral BID Feliciano Angel MD   6.25 mg at 05/16/22 0917    pantoprazole (PROTONIX) tablet 40 mg  40 mg Oral Daily Feliciano Angel MD 30 mg at 05/16/22 0919       Physical Exam:   /74   Pulse 67   Temp 97.5 °F (36.4 °C) (Oral)   Resp 16   Wt 224 lb 3.3 oz (101.7 kg)   SpO2 100%   BMI 35.12 kg/m²     Intake/Output Summary (Last 24 hours) at 5/16/2022 1159  Last data filed at 5/16/2022 0915  Gross per 24 hour   Intake 2170 ml   Output 1300 ml   Net 870 ml     Wt Readings from Last 2 Encounters:   05/16/22 224 lb 3.3 oz (101.7 kg)   05/13/22 218 lb 11.1 oz (99.2 kg)     Constitutional: She is oriented to person, place, and time. She appears well-developed and well-nourished. In no acute distress. Head: Normocephalic and atraumatic. Neck: Neck supple. No JVD present. Carotid bruit is not present. No mass and no thyromegaly present. No lymphadenopathy present. Cardiovascular: Normal rate, regular rhythm, normal heart sounds and intact distal pulses. Exam reveals no gallop and no friction rub. No murmur heard. Pulmonary/Chest: Effort normal and breath sounds normal. No respiratory distress. She has no wheezes, rhonchi or rales. Abdominal: Soft, non-tender. Bowel sounds and aorta are normal. She exhibits no organomegaly, mass or bruit. Extremities: No edema, cyanosis, or clubbing. Pulses are 2+ radial/carotid/dorsalis pedis and posterior tibial bilaterally. Neurological: She is alert and oriented to person, place, and time. She has normal reflexes. No cranial nerve deficit. Coordination normal.   Skin: Skin is warm and dry. There is no rash or diaphoresis. Psychiatric: She has a normal mood and affect.  Her speech is normal and behavior is normal.     Personally reviewed and interpreted   EKG Interpretation: Ventricular paced    Lab Review:   Lab Results   Component Value Date    TRIG 82 12/11/2021    HDL 25 12/11/2021    LDLCALC 36 12/11/2021    LABVLDL 16 12/11/2021     Lab Results   Component Value Date     05/15/2022    K 4.3 05/15/2022    BUN 31 05/15/2022    CREATININE 1.3 05/15/2022     Recent Labs 05/14/22  0711 05/14/22  0711 05/15/22  2050   WBC 23.5*   < > 15.4*   HGB 9.5*  --  9.1*   HCT 31.1*  --  28.3*     --  235    < > = values in this interval not displayed. Echo 10/22/2021:  Left ventricular cavity size is severely dilated. Ejection fraction is visually estimated to be 25-30%. Grade II diastolic dysfunction with elevated LV filling pressures. Moderate to Severe mitral regurgitation. The left atrium is severely dilated. Normal right ventricular size and function. Pacemaker / ICD lead is visualized in the right atrium. There is a trivial pericardial effusion noted.        Stress 2020 ([facility]):     The LV EF is 34%   There is moderate global hypokinesis of the left ventricle. There is a medium sized, reversible defect in the inferior wall consistent   with moderate ischemia. There is a small size, partially reversible defect in the anterior wall   consistent with mild keysha-infarct ischemia. There is a small size, partially reversible defect in the inferolateral wall   consistent with mild keysha-infarct ischemia. Assessment / Plan:    1. Acute on Chronic as a contributing factor, class 4  I am really not certain that this is due to CHF. All we can do is try to further diurese the patient and see if this helps improve her hypoxia. Her blood pressure is somewhat marginal so I think that I would use a small dose of an inotrope such as milrinone 0.20 mcg/kg/min for her and increase her IV Lasix. Will hold carvedilol for now given relative hypotension. Also hold ACE inhibitor or ARB therapy. We can consider Aldactone tomorrow if she is doing better. AICD in place. Likely not a candidate for long-term SGLT2 inhibitor given poor prognosis from lung carcinoma. 2.  Late stage small cell carcinoma  Will defer to Dr. Marianna Ackerman for potential treatment or hospice consideration.     3.  Acute respiratory failure with hypoxia and hypercarbia  Unlikely to only be her systolic CHF is the contributing factor. Continue BiPAP ventilation.

## 2022-05-16 NOTE — PROGRESS NOTES
Patient has good blood return from port in chest. Olive Cespedes labs as requested and passed from day shift.  Electronically signed by Brannon Reis RN on 5/15/2022 at 8:53 PM

## 2022-05-16 NOTE — DISCHARGE INSTR - COC
([facility] I50.23    Hyperlipidemia E78.5    Morbid obesity due to excess calories ([facility]) E66.01    Acute pulmonary edema ([facility]) J81.0    Acute respiratory failure with hypercarbia (HCC) J96.01, J96.02    Congestive heart failure with left ventricular dysfunction (HCC) I50.9    Acute decompensated heart failure ([facility]) I50.9    Acute kidney injury superimposed on chronic kidney disease ([facility]) N17.9, N18.9    COPD exacerbation ([facility]) J44.1    Demand ischemia of myocardium ([facility]) I24.8    Non morbid obesity due to excess calories E66.09    Acute respiratory failure with hypoxia and hypercapnia ([facility]) J96.01, J96.02    Essential hypertension I10    Hyperlipidemia E78.5    DMII (diabetes mellitus, type 2) ([facility]) E11.9    CAD (coronary artery disease) I25.10    Respiratory failure ([facility] J96.90    Pneumonia J18.9       Isolation/Infection:   Isolation            No Isolation          Patient Infection Status       Infection Onset Added Last Indicated Last Indicated By Review Planned Expiration Resolved Resolved By    None active    Resolved    COVID-19 (Rule Out) 12/01/21 12/01/21 12/01/21 COVID-19, Rapid (Ordered)   12/01/21 Rule-Out Test Resulted    COVID-19 (Rule Out) 10/11/21 10/11/21 10/11/21 COVID-19, Rapid (Ordered)   10/11/21 Rule-Out Test Resulted            Nurse Assessment:  Last Vital Signs: /74   Pulse 67   Temp 97.5 °F (36.4 °C) (Oral)   Resp 18   Wt 224 lb 3.3 oz (101.7 kg)   SpO2 100%   BMI 35.12 kg/m²     Last documented pain score (0-10 scale): Pain Level: 0  Last Weight:   Wt Readings from Last 1 Encounters:   05/16/22 224 lb 3.3 oz (101.7 kg)     Mental Status:  oriented and alert    IV Access:  - deaccessed port in right chest    Nursing Mobility/ADLs:  Walking   Independent  Transfer  Independent  Bathing  Assisted  Dressing  Assisted  Toileting  Assisted  Feeding  [address]  Assisted  Med Delivery   whole    Wound Care Documentation and Therapy:        Elimination:  Continence:    Bowel: Yes  Bladder: Yes  Urinary Catheter: None   Colostomy/Ileostomy/Ileal Conduit: No       Date of Last BM: 05/18/2022    Intake/Output Summary (Last 24 hours) at 5/16/2022 1409  Last data filed at 5/16/2022 0915  Gross per 24 hour   Intake 1690 ml   Output 700 ml   Net 990 ml     I/O last 3 completed shifts: In: 1630 [P.O.:1620; I.V.:10]  Out: 2500 [Urine:2500]    Safety Concerns: At Risk for Falls    Impairments/Disabilities:      None    Nutrition Therapy:  Current Nutrition Therapy:   - Oral Diet:  General    Routes of Feeding: Oral  Liquids:  Thin Liquids  Daily Fluid Restriction: 1500 mL fluid resriction  Last Modified Barium Swallow with Video (Video Swallowing Test): not done    Treatments at the Time of Hospital Discharge:   Respiratory Treatments:   Oxygen Therapy:  intermittent need  Ventilator:    - BiPAP   IPAP: 16 cmH20, CPAP/EPAP: 8 cmH2O only when sleeping    Rehab Therapies: Nurse  Weight Bearing Status/Restrictions: No weight bearing restrictions  Other Medical Equipment (for information only, NOT a DME order):  walker  Other Treatments: [facility]: LEVEL 3 [address] to establish plan of care for patient over 60 day period   Nursing  Initial home SN evaluation visit to occur within 24-48 hours for:  1)  medication management  2)  VS and clinical assessment  3)  S&S chronic disease exacerbation education + when to contact MD/NP  4)  care coordination  Medication Reconciliation during 1st SN visit  PT/OT/Speech   Evaluations in home within 24-48 hours of discharge to include DME and home safety   Frontload therapy 5 days, then 3x a week   OT to evaluate if patient has [address] needs for personal care    evaluation within 24-48 hours to evaluate resources & insurance for potential [place], [place], LTC, and Medicaid options   Palliative Care referral within 5 days of hospital discharge   PCP Visit scheduled within 3 - 7 days of hospital discharge    32 Jing Riggins - 3:20 PM EDT    CASE MANAGEMENT/SOCIAL WORK SECTION    Inpatient Status Date: 5/13/2022    Readmission Risk Assessment Score:  Readmission Risk              Risk of Unplanned Readmission:  48           Discharging to Facility/ Agency   Name:  [facility]    Address: [address] [place]  Phone: [phone]  Fax: [phone]     / signature: Electronically signed by Alla Leblanc RN on 5/19/22 at 3:00 PM EDT    PHYSICIAN SECTION    Prognosis: Good    Condition at Discharge: Stable    Rehab Potential (if transferring to Rehab): Good    Recommended Labs or Other Treatments After Discharge: Follow-up with PCP within 7 days from discharge, not doing so could have life-threatening consequences. Take medication as instructed;  return to the emergency department if persistent symptoms, experiencing side effects from medication including nausea vomiting or unable to keep medications down. Follow-up with cardiology, and oncology recommended    Physician Certification: I certify the above information and transfer of Warner Jean  is necessary for the continuing treatment of the diagnosis listed and that she requires Home Care for greater 30 days.      Update Admission H&P: No change in H&P    PHYSICIAN SIGNATURE:  Electronically signed by Nataliya Pineda MD on 5/19/22 at 2:29 PM EDT

## 2022-05-16 NOTE — PROGRESS NOTES
Physical Therapy  Daily Treatment Attempt    22    Name: Ranjit Fortune   : 1960    MRN: [id number]    PT follow-up attempt. Patient supine in bed with bipap donned - will hold therapy this date. Will continue to follow.     Electronically signed by Hernandez Gutierrez PT on 2022 at 2:09 PM

## 2022-05-16 NOTE — CARE COORDINATION
INITIAL CASE MANAGEMENT ASSESSMENT    Reviewed chart, met with patient to assess possible discharge needs. Explained Case Management role/services. Living Situation: verified address, lives in a basement level apartment with 5 ARIS down to door & 2 ARIS into the building    ADLs: independent, daughter assists with cooking & laundry     DME: live alert, rollator, [employer], shower chair, BSC, manual wc, electric wc, nebulizer & CPAP    PT/OT Recs:   PT  \"Date of Service: 5/14/2022     Discharge Recommendations:  Home with assist PRN   PT Equipment Recommendations  Equipment Needed: No   [facility] scored a 21/24 on the AM-PAC short mobility form. .  Please see assessment section for further patient specific details. \"    OT-pending     Active Services: [facility] ([facility])     Transportation: not an active , family transports for errands & appointments     Medications: no barriers, uses [facility] [address] in [place]    PCP: April William MD    PLAN/COMMENTS: denies needs, plans to return home at [place], she will have a ride    CM provided contact information for patient or family to call with any questions. CM will follow and assist as needed.     Bre Soto RN, BSN,   [phone]    Electronically signed by Bre Soto RN on 5/16/2022 at 1:19 PM

## 2022-05-16 NOTE — ACP (ADVANCE CARE PLANNING)
Verified with pt that ACP conversation on 5/13/22 is still valid.     Terrance Herr RN, BSN,   [phone]  Electronically signed by Terrance Herr RN on 5/16/2022 at 1:26 PM

## 2022-05-17 LAB
ANION GAP SERPL CALCULATED.3IONS-SCNC: 12 MMOL/L (ref 3–16)
BUN BLDV-MCNC: 29 MG/DL (ref 7–20)
CALCIUM SERPL-MCNC: 9.1 MG/DL (ref 8.3–10.6)
CHLORIDE BLD-SCNC: 99 MMOL/L (ref 99–110)
CO2: 30 MMOL/L (ref 21–32)
CREAT SERPL-MCNC: 1.1 MG/DL (ref 0.6–1.2)
GFR AFRICAN AMERICAN: >60
GFR NON-AFRICAN AMERICAN: 50
GLUCOSE BLD-MCNC: 126 MG/DL (ref 70–99)
POTASSIUM SERPL-SCNC: 4 MMOL/L (ref 3.5–5.1)
SODIUM BLD-SCNC: 141 MMOL/L (ref 136–145)

## 2022-05-17 PROCEDURE — 2580000003 HC RX 258: Performed by: FAMILY MEDICINE

## 2022-05-17 PROCEDURE — 6370000000 HC RX 637 (ALT 250 FOR IP): Performed by: FAMILY MEDICINE

## 2022-05-17 PROCEDURE — 80048 BASIC METABOLIC PNL TOTAL CA: CPT

## 2022-05-17 PROCEDURE — 6360000002 HC RX W HCPCS: Performed by: INTERNAL MEDICINE

## 2022-05-17 PROCEDURE — 2060000000 HC ICU INTERMEDIATE R&B

## 2022-05-17 PROCEDURE — 6370000000 HC RX 637 (ALT 250 FOR IP): Performed by: INTERNAL MEDICINE

## 2022-05-17 PROCEDURE — 6360000002 HC RX W HCPCS: Performed by: FAMILY MEDICINE

## 2022-05-17 PROCEDURE — 94640 AIRWAY INHALATION TREATMENT: CPT

## 2022-05-17 PROCEDURE — 2700000000 HC OXYGEN THERAPY PER DAY

## 2022-05-17 RX ADMIN — PANTOPRAZOLE SODIUM 40 MG: 40 TABLET, DELAYED RELEASE ORAL at 08:11

## 2022-05-17 RX ADMIN — IPRATROPIUM BROMIDE AND ALBUTEROL SULFATE 1 AMPULE: 2.5; .5 SOLUTION RESPIRATORY (INHALATION) at 07:51

## 2022-05-17 RX ADMIN — SODIUM CHLORIDE, PRESERVATIVE FREE 10 ML: 5 INJECTION INTRAVENOUS at 08:08

## 2022-05-17 RX ADMIN — PREDNISONE 60 MG: 20 TABLET ORAL at 08:10

## 2022-05-17 RX ADMIN — GABAPENTIN 600 MG: 300 CAPSULE ORAL at 08:10

## 2022-05-17 RX ADMIN — ASPIRIN 81 MG: 81 TABLET, COATED ORAL at 08:10

## 2022-05-17 RX ADMIN — FUROSEMIDE 60 MG: 10 INJECTION, SOLUTION INTRAMUSCULAR; INTRAVENOUS at 05:38

## 2022-05-17 RX ADMIN — CEFUROXIME AXETIL 500 MG: 250 TABLET ORAL at 20:17

## 2022-05-17 RX ADMIN — AMIODARONE HYDROCHLORIDE 200 MG: 200 TABLET ORAL at 08:10

## 2022-05-17 RX ADMIN — IPRATROPIUM BROMIDE AND ALBUTEROL SULFATE 1 AMPULE: 2.5; .5 SOLUTION RESPIRATORY (INHALATION) at 19:38

## 2022-05-17 RX ADMIN — SODIUM CHLORIDE, PRESERVATIVE FREE 10 ML: 5 INJECTION INTRAVENOUS at 20:18

## 2022-05-17 RX ADMIN — POTASSIUM CHLORIDE 10 MEQ: 750 TABLET, EXTENDED RELEASE ORAL at 08:10

## 2022-05-17 RX ADMIN — FUROSEMIDE 60 MG: 10 INJECTION, SOLUTION INTRAMUSCULAR; INTRAVENOUS at 13:06

## 2022-05-17 RX ADMIN — Medication 400 MG: at 20:17

## 2022-05-17 RX ADMIN — CEFUROXIME AXETIL 500 MG: 250 TABLET ORAL at 08:10

## 2022-05-17 RX ADMIN — GABAPENTIN 600 MG: 300 CAPSULE ORAL at 14:22

## 2022-05-17 RX ADMIN — FUROSEMIDE 60 MG: 10 INJECTION, SOLUTION INTRAMUSCULAR; INTRAVENOUS at 20:17

## 2022-05-17 RX ADMIN — IPRATROPIUM BROMIDE AND ALBUTEROL SULFATE 1 AMPULE: 2.5; .5 SOLUTION RESPIRATORY (INHALATION) at 23:54

## 2022-05-17 RX ADMIN — IPRATROPIUM BROMIDE AND ALBUTEROL SULFATE 1 AMPULE: 2.5; .5 SOLUTION RESPIRATORY (INHALATION) at 15:59

## 2022-05-17 RX ADMIN — IPRATROPIUM BROMIDE AND ALBUTEROL SULFATE 1 AMPULE: 2.5; .5 SOLUTION RESPIRATORY (INHALATION) at 03:57

## 2022-05-17 RX ADMIN — MILRINONE LACTATE IN DEXTROSE 0.12 MCG/KG/MIN: 200 INJECTION, SOLUTION INTRAVENOUS at 18:49

## 2022-05-17 RX ADMIN — IPRATROPIUM BROMIDE AND ALBUTEROL SULFATE 1 AMPULE: 2.5; .5 SOLUTION RESPIRATORY (INHALATION) at 12:04

## 2022-05-17 RX ADMIN — GABAPENTIN 600 MG: 300 CAPSULE ORAL at 20:18

## 2022-05-17 RX ADMIN — POLYETHYLENE GLYCOL 3350 17 G: 17 POWDER, FOR SOLUTION ORAL at 08:11

## 2022-05-17 RX ADMIN — ATORVASTATIN CALCIUM 40 MG: 40 TABLET, FILM COATED ORAL at 20:18

## 2022-05-17 RX ADMIN — ENOXAPARIN SODIUM 30 MG: 100 INJECTION SUBCUTANEOUS at 08:11

## 2022-05-17 RX ADMIN — ENOXAPARIN SODIUM 30 MG: 100 INJECTION SUBCUTANEOUS at 20:18

## 2022-05-17 RX ADMIN — Medication 400 MG: at 08:11

## 2022-05-17 RX ADMIN — TIOTROPIUM BROMIDE AND OLODATEROL 2 PUFF: 3.124; 2.736 SPRAY, METERED RESPIRATORY (INHALATION) at 07:53

## 2022-05-17 ASSESSMENT — PAIN - FUNCTIONAL ASSESSMENT
PAIN_FUNCTIONAL_ASSESSMENT: ACTIVITIES ARE NOT PREVENTED

## 2022-05-17 ASSESSMENT — PAIN DESCRIPTION - PAIN TYPE
TYPE: CHRONIC PAIN

## 2022-05-17 ASSESSMENT — PAIN DESCRIPTION - ORIENTATION
ORIENTATION: LOWER

## 2022-05-17 ASSESSMENT — PAIN DESCRIPTION - DESCRIPTORS
DESCRIPTORS: ACHING

## 2022-05-17 ASSESSMENT — PAIN SCALES - GENERAL
PAINLEVEL_OUTOF10: 0
PAINLEVEL_OUTOF10: 2
PAINLEVEL_OUTOF10: 0
PAINLEVEL_OUTOF10: 0

## 2022-05-17 ASSESSMENT — PAIN DESCRIPTION - LOCATION
LOCATION: BACK

## 2022-05-17 ASSESSMENT — PAIN DESCRIPTION - FREQUENCY
FREQUENCY: CONTINUOUS
FREQUENCY: CONTINUOUS

## 2022-05-17 NOTE — PROGRESS NOTES
Hematology Oncology Daily Progress Note    Admit Date: 5/13/2022  Hospital day several    Subjective:     Patient has complaints of improved SOB from yesterday--denies CP. Medication side effects: none    Scheduled Meds:   predniSONE  60 mg Oral Daily    furosemide  60 mg IntraVENous Q8H    gabapentin  600 mg Oral TID    atorvastatin  40 mg Oral Nightly    magnesium oxide  400 mg Oral BID    [Held by provider] clopidogrel  75 mg Oral Daily    amiodarone  200 mg Oral Daily    aspirin  81 mg Oral Daily    pantoprazole  40 mg Oral Daily    potassium chloride  10 mEq Oral Daily    empagliflozin  10 mg Oral Daily    tiotropium-olodaterol  2 puff Inhalation Daily    Vericiguat  5 mg Oral QAM    ipratropium-albuterol  1 ampule Inhalation Q4H    cefUROXime  500 mg Oral 2 times per day    sodium chloride flush  5-40 mL IntraVENous 2 times per day    enoxaparin  30 mg SubCUTAneous BID     Continuous Infusions:   milrinone 0.125 mcg/kg/min (05/17/22 0559)    sodium chloride       PRN Meds:albuterol sulfate HFA, ondansetron, prochlorperazine, HYDROmorphone, traMADol, sodium chloride flush, sodium chloride, [DISCONTINUED] ondansetron **OR** ondansetron, polyethylene glycol, acetaminophen **OR** acetaminophen    Review of Systems  Pertinent items are noted in HPI. REVIEW OF SYSTEMS:         · Constitutional: Denies fever, sweats, weight loss     · Eyes: No visual changes or diplopia. No scleral icterus. · ENT: No Headaches, hearing loss or vertigo. No mouth sores or sore throat. · Cardiovascular: No chest pain, dyspnea on exertion, palpitations or loss of consciousness. · Respiratory: No cough or wheezing, no sputum production. No hemoptysis. .    · Gastrointestinal: No abdominal pain, appetite loss, blood in stools. No change in bowel habits. · Genitourinary: No dysuria, trouble voiding, or hematuria. · Musculoskeletal:  Generalized weakness. No joint complaints.   · Integumentary: No rash or pruritis. · Neurological: No headache, diplopia. No change in gait, balance, or coordination. No paresthesias. · Endocrine: No temperature intolerance. No excessive thirst, fluid intake, or urination. · Hematologic/Lymphatic: No abnormal bruising or ecchymoses, blood clots or swollen lymph nodes. · Allergic/Immunologic: No nasal congestion or hives. ·     Objective:     Patient Vitals for the past 8 hrs:   BP Temp Temp src Pulse Resp SpO2 Weight   05/17/22 0900 (!) 142/58 97.4 °F (36.3 °C) Oral 67 14 100 %    05/17/22 0754     17 100 %    05/17/22 0729 (!) 129/55 97.8 °F (36.6 °C) Oral 67 16 100 %    05/17/22 0501       226 lb 13.7 oz (102.9 kg)   05/17/22 0357     18 100 %    05/17/22 0325 (!) 132/54 97.8 °F (36.6 °C) Oral 60 13 100 %      I/O last 3 completed shifts: In: 1546.4 [P.O.:1500;  I.V.:46.4]  Out: 2850 [Urine:2850]  I/O this shift:  In: 120 [P.O.:120]  Out: -     BP (!) 142/58   Pulse 67   Temp 97.4 °F (36.3 °C) (Oral)   Resp 14   Wt 226 lb 13.7 oz (102.9 kg)   SpO2 100%   BMI 35.53 kg/m²     General Appearance:    Alert, cooperative, no distress, appears stated age   Head:    Normocephalic, without obvious abnormality, atraumatic   Eyes:    PERRL, conjunctiva/corneas clear, EOM's intact, fundi     benign, both eyes        Ears:    Normal TM's and external ear canals, both ears   Nose:   Nares normal, septum midline, mucosa normal, no drainage    or sinus tenderness   Throat:   Lips, mucosa, and tongue normal; teeth and gums normal   Neck:   Supple, symmetrical, trachea midline, no adenopathy;        thyroid:  No enlargement/tenderness/nodules; no carotid    bruit or JVD   Back:     Symmetric, no curvature, ROM normal, no CVA tenderness   Lungs:     Clear to auscultation bilaterally, respirations unlabored   Chest wall:    No tenderness or deformity   Heart:    Regular rate and rhythm, S1 and S2 normal, no murmur, rub   or gallop   Abdomen:     Soft, non-tender, bowel sounds active all four quadrants,     no masses, no organomegaly           Extremities:   Extremities normal, atraumatic, no cyanosis or edema   Pulses:   2+ and symmetric all extremities   Skin:   Skin color, texture, turgor normal, no rashes or lesions   Lymph nodes:   Cervical, supraclavicular, and axillary nodes normal   Neurologic:   CNII-XII intact. Normal strength, sensation and reflexes       throughout         Data Review  CBC:   Lab Results   Component Value Date    WBC 15.4 05/15/2022    RBC 3.82 05/15/2022    RBC 5.12 05/26/2017       Assessment:     Principal Problem:    Pneumonia  Resolved Problems:    * No resolved hospital problems. *      Plan:     1. SCLC. S/P Carbo/-16/Tecentriq last week    2.  SOB--steroids started yesterday in case of autoimmune pneumonitis which is unlikely          Electronically signed by Anand Willingham MD on 5/17/2022 at 10:31 AM

## 2022-05-17 NOTE — PROGRESS NOTES
Hospitalist Progress Note      PCP: Koko Duran MD    Date of Admission: 5/13/2022    Chief Complaint: [facility] Course:     66 yo with recently diagnosed small cell lung cancer and post obstructive pna who was treated by me. Also seen by oncology at that time and started chemo S/P Carbo/-16/Tecentriq on the initial admission    I discharged her 5/13 to home and several hours later she became acutely sob. Came back to hospital and readmitted for resp failure under my care again in CHF-likely flash pulmonary edema with hx of known chronic systolic hf    Subjective:     Breathing better. Seen by Dr. Yadira Weaver from cardiology. Started on Primacor infusion yesterday. Transferred to medical stepdown for inotropic therapy  She feels better today.   Having less orthopnea      Medications:  Reviewed    Infusion Medications    milrinone 0.125 mcg/kg/min (05/17/22 0594)    sodium chloride       Scheduled Medications    predniSONE  60 mg Oral Daily    furosemide  60 mg IntraVENous Q8H    gabapentin  600 mg Oral TID    atorvastatin  40 mg Oral Nightly    magnesium oxide  400 mg Oral BID    [Held by provider] clopidogrel  75 mg Oral Daily    amiodarone  200 mg Oral Daily    aspirin  81 mg Oral Daily    pantoprazole  40 mg Oral Daily    potassium chloride  10 mEq Oral Daily    empagliflozin  10 mg Oral Daily    tiotropium-olodaterol  2 puff Inhalation Daily    Vericiguat  5 mg Oral QAM    ipratropium-albuterol  1 ampule Inhalation Q4H    cefUROXime  500 mg Oral 2 times per day    sodium chloride flush  5-40 mL IntraVENous 2 times per day    enoxaparin  30 mg SubCUTAneous BID     PRN Meds: albuterol sulfate HFA, ondansetron, prochlorperazine, HYDROmorphone, traMADol, sodium chloride flush, sodium chloride, [DISCONTINUED] ondansetron **OR** ondansetron, polyethylene glycol, acetaminophen **OR** acetaminophen      Intake/Output Summary (Last 24 hours) at 5/17/2022 1610  Last data filed at 5/17/2022 1300  Gross per 24 hour   Intake 576.36 ml   Output 2850 ml   Net -2273.64 ml       Physical Exam Performed:    /70   Pulse 77   Temp 97.1 °F (36.2 °C) (Oral)   Resp 16   Wt 226 lb 13.7 oz (102.9 kg)   SpO2 100%   BMI 35.53 kg/m²     General appearance: No apparent distress, appears stated age and cooperative. HEENT: Pupils equal, round, and reactive to light. Conjunctivae/corneas clear. Neck: Supple, with full range of motion. No jugular venous distention. Trachea midline. Respiratory: Clear  Cardiovascular: 2 out of 6 systolic murmur at the apex radiating the axilla  Abdomen: Soft, non-tender, non-distended with normal bowel sounds. Musculoskeletal: No clubbing, cyanosis or edema bilaterally. Full range of motion without deformity. Skin: Skin color, texture, turgor normal.  No rashes or lesions. Neurologic:  Neurovascularly intact without any focal sensory/motor deficits. Cranial nerves: II-XII intact, grossly non-focal.  Psychiatric: Alert and oriented, thought content appropriate, normal insight  Capillary Refill: Brisk,3 seconds, normal   Peripheral Pulses: +2 palpable, equal bilaterally       Labs:   Recent Labs     05/15/22  2050   WBC 15.4*   HGB 9.1*   HCT 28.3*        Recent Labs     05/15/22  2050 05/17/22  0746    141   K 4.3 4.0   CL 97* 99   CO2 28 30   BUN 31* 29*   CREATININE 1.3* 1.1   CALCIUM 8.7 9.1     Recent Labs     05/15/22  2050   AST 26   ALT 52*   BILITOT <0.2   ALKPHOS 85     No results for input(s): INR in the last 72 hours. No results for input(s): Leo Silke in the last 72 hours.          Pro-BNP 32,444 High  pg/mL    Comment: Methodology by NT-proBNP           Urinalysis:      Lab Results   Component Value Date    NITRU Negative 03/31/2022    WBCUA 6 03/30/2022    BACTERIA 4+ 03/30/2022    RBCUA 1 03/30/2022    BLOODU Negative 03/31/2022    SPECGRAV 1.010 03/31/2022    GLUCOSEU >=1000 03/31/2022       Radiology:  XR CHEST (2 VW)   Final Result   Unchanged moderate to severe CHF pattern pulmonary vascular congestion. Moderate left pleural effusion with left basilar infiltrate or atelectasis. XR CHEST PORTABLE   Final Result   Moderate increasing left lower lobe airspace disease, concern for pneumonia   possibly postobstructive given CT scan findings. .      Vascular congestion and suspected early edema. Assessment/Plan:    1  Acute hypoxic hypercarbic resp failure      Improved with diuresis and Primacor      Also on prednisone for possible pneumonitis       2  Post obstructive pna-doubt its worsening pna      Did well at dc and feel ok to continue ceftin      procalcitonin is low. Leukocytosis likely from steroids        3  Small cell lung cancer S/P Carbo/-16/Tecentriq last week    4  Acute on chronic systolic hf-fantastic diuretic response with    Primacor. Coreg and ACE and ARB held for now        DVT Prophylaxis: lovenox  Diet: ADULT DIET;  Regular; Low Fat/Low Chol/High Fiber/2 gm Na; 1500 ml  Code Status: Full Code      Not ready for dc  [address] -2-3 more days    I will not be here tomorrow her care will be assumed by one of my hospitalist colleagues    Abdiel Carpio MD

## 2022-05-17 NOTE — PLAN OF CARE
Problem: Discharge Planning  Goal: Discharge to home or other facility with appropriate resources  Outcome: Progressing     Problem: Pain  Goal: Verbalizes/displays adequate comfort level or baseline comfort level  Outcome: Progressing     Problem: Skin/Tissue Integrity  Goal: Absence of new skin breakdown  Description: 1. Monitor for areas of redness and/or skin breakdown  2. Assess vascular access sites hourly  3. Every 4-6 hours minimum:  Change oxygen saturation probe site  4. Every 4-6 hours:  If on nasal continuous positive airway pressure, respiratory therapy assess nares and determine need for appliance change or resting period.   Outcome: Progressing     Problem: Safety - Adult  Goal: Free from fall injury  Outcome: Progressing     Problem: ABCDS Injury Assessment  Goal: Absence of physical injury  Outcome: Progressing     Problem: Chronic Conditions and Co-morbidities  Goal: Patient's chronic conditions and co-morbidity symptoms are monitored and maintained or improved  Outcome: Progressing     Problem: Respiratory - Adult  Goal: Achieves optimal ventilation and oxygenation  Outcome: Progressing     Problem: Cardiovascular - Adult  Goal: Maintains optimal cardiac output and hemodynamic stability  Outcome: Progressing  Goal: Absence of cardiac dysrhythmias or at baseline  Outcome: Progressing     Problem: Hematologic - Adult  Goal: Maintains hematologic stability  Outcome: Progressing

## 2022-05-18 ENCOUNTER — APPOINTMENT (OUTPATIENT)
Dept: GENERAL RADIOLOGY | Age: 62
DRG: 193 | End: 2022-05-18
Payer: MEDICARE

## 2022-05-18 LAB — BLOOD CULTURE, ROUTINE: NORMAL

## 2022-05-18 PROCEDURE — 2060000000 HC ICU INTERMEDIATE R&B

## 2022-05-18 PROCEDURE — 94640 AIRWAY INHALATION TREATMENT: CPT

## 2022-05-18 PROCEDURE — 94761 N-INVAS EAR/PLS OXIMETRY MLT: CPT

## 2022-05-18 PROCEDURE — 6370000000 HC RX 637 (ALT 250 FOR IP): Performed by: INTERNAL MEDICINE

## 2022-05-18 PROCEDURE — 6360000002 HC RX W HCPCS: Performed by: INTERNAL MEDICINE

## 2022-05-18 PROCEDURE — 94660 CPAP INITIATION&MGMT: CPT

## 2022-05-18 PROCEDURE — 99233 SBSQ HOSP IP/OBS HIGH 50: CPT | Performed by: INTERNAL MEDICINE

## 2022-05-18 PROCEDURE — 6370000000 HC RX 637 (ALT 250 FOR IP): Performed by: FAMILY MEDICINE

## 2022-05-18 PROCEDURE — 71045 X-RAY EXAM CHEST 1 VIEW: CPT

## 2022-05-18 PROCEDURE — 6360000002 HC RX W HCPCS: Performed by: FAMILY MEDICINE

## 2022-05-18 PROCEDURE — 2580000003 HC RX 258: Performed by: FAMILY MEDICINE

## 2022-05-18 RX ORDER — METOLAZONE 5 MG/1
5 TABLET ORAL ONCE
Status: COMPLETED | OUTPATIENT
Start: 2022-05-18 | End: 2022-05-18

## 2022-05-18 RX ORDER — PREDNISONE 20 MG/1
20 TABLET ORAL DAILY
Status: DISCONTINUED | OUTPATIENT
Start: 2022-05-18 | End: 2022-05-19 | Stop reason: HOSPADM

## 2022-05-18 RX ORDER — CARVEDILOL 3.12 MG/1
3.12 TABLET ORAL 2 TIMES DAILY WITH MEALS
Status: DISCONTINUED | OUTPATIENT
Start: 2022-05-18 | End: 2022-05-18

## 2022-05-18 RX ORDER — SPIRONOLACTONE 25 MG/1
12.5 TABLET ORAL DAILY
Status: DISCONTINUED | OUTPATIENT
Start: 2022-05-18 | End: 2022-05-19

## 2022-05-18 RX ADMIN — Medication 400 MG: at 20:31

## 2022-05-18 RX ADMIN — GABAPENTIN 600 MG: 300 CAPSULE ORAL at 09:00

## 2022-05-18 RX ADMIN — GABAPENTIN 600 MG: 300 CAPSULE ORAL at 20:31

## 2022-05-18 RX ADMIN — IPRATROPIUM BROMIDE AND ALBUTEROL SULFATE 1 AMPULE: 2.5; .5 SOLUTION RESPIRATORY (INHALATION) at 08:22

## 2022-05-18 RX ADMIN — IPRATROPIUM BROMIDE AND ALBUTEROL SULFATE 1 AMPULE: 2.5; .5 SOLUTION RESPIRATORY (INHALATION) at 20:40

## 2022-05-18 RX ADMIN — CEFUROXIME AXETIL 500 MG: 250 TABLET ORAL at 09:01

## 2022-05-18 RX ADMIN — IPRATROPIUM BROMIDE AND ALBUTEROL SULFATE 1 AMPULE: 2.5; .5 SOLUTION RESPIRATORY (INHALATION) at 15:19

## 2022-05-18 RX ADMIN — GABAPENTIN 600 MG: 300 CAPSULE ORAL at 16:23

## 2022-05-18 RX ADMIN — Medication 400 MG: at 09:01

## 2022-05-18 RX ADMIN — METOLAZONE 5 MG: 5 TABLET ORAL at 12:22

## 2022-05-18 RX ADMIN — TIOTROPIUM BROMIDE AND OLODATEROL 2 PUFF: 3.124; 2.736 SPRAY, METERED RESPIRATORY (INHALATION) at 08:22

## 2022-05-18 RX ADMIN — AMIODARONE HYDROCHLORIDE 200 MG: 200 TABLET ORAL at 09:01

## 2022-05-18 RX ADMIN — ASPIRIN 81 MG: 81 TABLET, COATED ORAL at 09:01

## 2022-05-18 RX ADMIN — ENOXAPARIN SODIUM 30 MG: 100 INJECTION SUBCUTANEOUS at 20:31

## 2022-05-18 RX ADMIN — ENOXAPARIN SODIUM 30 MG: 100 INJECTION SUBCUTANEOUS at 09:01

## 2022-05-18 RX ADMIN — PREDNISONE 20 MG: 20 TABLET ORAL at 09:01

## 2022-05-18 RX ADMIN — ATORVASTATIN CALCIUM 40 MG: 40 TABLET, FILM COATED ORAL at 20:31

## 2022-05-18 RX ADMIN — IPRATROPIUM BROMIDE AND ALBUTEROL SULFATE 1 AMPULE: 2.5; .5 SOLUTION RESPIRATORY (INHALATION) at 03:45

## 2022-05-18 RX ADMIN — SODIUM CHLORIDE, PRESERVATIVE FREE 10 ML: 5 INJECTION INTRAVENOUS at 20:35

## 2022-05-18 RX ADMIN — CARVEDILOL 3.12 MG: 3.12 TABLET, FILM COATED ORAL at 12:22

## 2022-05-18 RX ADMIN — FUROSEMIDE 60 MG: 10 INJECTION, SOLUTION INTRAMUSCULAR; INTRAVENOUS at 20:35

## 2022-05-18 RX ADMIN — SPIRONOLACTONE 12.5 MG: 25 TABLET ORAL at 16:23

## 2022-05-18 RX ADMIN — IPRATROPIUM BROMIDE AND ALBUTEROL SULFATE 1 AMPULE: 2.5; .5 SOLUTION RESPIRATORY (INHALATION) at 11:37

## 2022-05-18 RX ADMIN — POLYETHYLENE GLYCOL 3350 17 G: 17 POWDER, FOR SOLUTION ORAL at 09:00

## 2022-05-18 RX ADMIN — POTASSIUM CHLORIDE 10 MEQ: 750 TABLET, EXTENDED RELEASE ORAL at 09:01

## 2022-05-18 RX ADMIN — CEFUROXIME AXETIL 500 MG: 250 TABLET ORAL at 20:30

## 2022-05-18 RX ADMIN — FUROSEMIDE 60 MG: 10 INJECTION, SOLUTION INTRAMUSCULAR; INTRAVENOUS at 12:25

## 2022-05-18 RX ADMIN — PANTOPRAZOLE SODIUM 40 MG: 40 TABLET, DELAYED RELEASE ORAL at 09:01

## 2022-05-18 RX ADMIN — FUROSEMIDE 60 MG: 10 INJECTION, SOLUTION INTRAMUSCULAR; INTRAVENOUS at 05:25

## 2022-05-18 ASSESSMENT — PAIN DESCRIPTION - DESCRIPTORS
DESCRIPTORS: ACHING
DESCRIPTORS: ACHING

## 2022-05-18 ASSESSMENT — PAIN DESCRIPTION - PAIN TYPE: TYPE: CHRONIC PAIN

## 2022-05-18 ASSESSMENT — PAIN SCALES - GENERAL
PAINLEVEL_OUTOF10: 2
PAINLEVEL_OUTOF10: 0
PAINLEVEL_OUTOF10: 2

## 2022-05-18 ASSESSMENT — PAIN DESCRIPTION - LOCATION
LOCATION: BACK
LOCATION: BACK

## 2022-05-18 ASSESSMENT — PAIN DESCRIPTION - ORIENTATION
ORIENTATION: LOWER
ORIENTATION: LOWER

## 2022-05-18 ASSESSMENT — PAIN - FUNCTIONAL ASSESSMENT: PAIN_FUNCTIONAL_ASSESSMENT: ACTIVITIES ARE NOT PREVENTED

## 2022-05-18 ASSESSMENT — PAIN DESCRIPTION - FREQUENCY: FREQUENCY: CONTINUOUS

## 2022-05-18 NOTE — PROGRESS NOTES
Hospitalist Progress Note      PCP: Fidencio Madsen MD    Date of Admission: 5/13/2022    Chief Complaint: [facility] Course:     64 yo with recently diagnosed small cell lung cancer and post obstructive pna who was treated by me. Also seen by oncology at that time and started chemo S/P Carbo/-16/Tecentriq on the initial admission    I discharged her 5/13 to home and several hours later she became acutely sob. Readmitted with acute on chronic systolic heart failure. Echocardiogram on December 2021 with EF of 25%. 5/18  Feeling better today, remains on 3 L of O2 per nasal cannula.   Remains on Primacor infusion    Subjective:  As above      Medications:  Reviewed    Infusion Medications    milrinone 0.125 mcg/kg/min (05/18/22 9288)    sodium chloride       Scheduled Medications    predniSONE  20 mg Oral Daily    furosemide  60 mg IntraVENous Q8H    gabapentin  600 mg Oral TID    atorvastatin  40 mg Oral Nightly    magnesium oxide  400 mg Oral BID    [Held by provider] clopidogrel  75 mg Oral Daily    amiodarone  200 mg Oral Daily    aspirin  81 mg Oral Daily    pantoprazole  40 mg Oral Daily    potassium chloride  10 mEq Oral Daily    empagliflozin  10 mg Oral Daily    tiotropium-olodaterol  2 puff Inhalation Daily    Vericiguat  5 mg Oral QAM    ipratropium-albuterol  1 ampule Inhalation Q4H    cefUROXime  500 mg Oral 2 times per day    sodium chloride flush  5-40 mL IntraVENous 2 times per day    enoxaparin  30 mg SubCUTAneous BID     PRN Meds: albuterol sulfate HFA, ondansetron, prochlorperazine, HYDROmorphone, traMADol, sodium chloride flush, sodium chloride, [DISCONTINUED] ondansetron **OR** ondansetron, polyethylene glycol, acetaminophen **OR** acetaminophen      Intake/Output Summary (Last 24 hours) at 5/18/2022 0940  Last data filed at 5/18/2022 0759  Gross per 24 hour   Intake 811.66 ml   Output 4200 ml   Net -3388.34 ml       Physical Exam Performed:    BP (!) 123/35 Pulse 66   Temp 98.1 °F (36.7 °C) (Oral)   Resp 17   Wt 221 lb 5.5 oz (100.4 kg)   SpO2 98%   BMI 34.67 kg/m²     General appearance: No apparent distress, appears stated age and cooperative. HEENT: Pupils equal, round, and reactive to light. Conjunctivae/corneas clear. Neck: Supple, with full range of motion. No jugular venous distention. Trachea midline. Respiratory: Clear  Cardiovascular: 2 out of 6 systolic murmur at the apex radiating the axilla  Abdomen: Soft, non-tender, non-distended with normal bowel sounds. Musculoskeletal: No clubbing, cyanosis or edema bilaterally. Full range of motion without deformity. Skin: Skin color, texture, turgor normal.  No rashes or lesions. Neurologic:  Neurovascularly intact without any focal sensory/motor deficits. Cranial nerves: II-XII intact, grossly non-focal.  Psychiatric: Alert and oriented, thought content appropriate, normal insight  Capillary Refill: Brisk,3 seconds, normal   Peripheral Pulses: +2 palpable, equal bilaterally       Labs:   Recent Labs     05/15/22  2050   WBC 15.4*   HGB 9.1*   HCT 28.3*        Recent Labs     05/15/22  2050 05/17/22  0746    141   K 4.3 4.0   CL 97* 99   CO2 28 30   BUN 31* 29*   CREATININE 1.3* 1.1   CALCIUM 8.7 9.1     Recent Labs     05/15/22  2050   AST 26   ALT 52*   BILITOT <0.2   ALKPHOS 85     No results for input(s): INR in the last 72 hours. No results for input(s): Adan Hug in the last 72 hours. Pro-BNP 32,444 High  pg/mL    Comment: Methodology by NT-proBNP           Urinalysis:      Lab Results   Component Value Date    NITRU Negative 03/31/2022    WBCUA 6 03/30/2022    BACTERIA 4+ 03/30/2022    RBCUA 1 03/30/2022    BLOODU Negative 03/31/2022    SPECGRAV 1.010 03/31/2022    GLUCOSEU >=1000 03/31/2022       Radiology:  XR CHEST (2 VW)   Final Result   Unchanged moderate to severe CHF pattern pulmonary vascular congestion.       Moderate left pleural effusion with left basilar infiltrate or atelectasis. XR CHEST PORTABLE   Final Result   Moderate increasing left lower lobe airspace disease, concern for pneumonia   possibly postobstructive given CT scan findings. .      Vascular congestion and suspected early edema. XR CHEST PORTABLE    (Results Pending)           Assessment/Plan:    1  Acute hypoxic hypercarbic resp failure      Improved with diuresis and Primacor      Also on prednisone for possible pneumonitis. Repeat chest x-ray       2  Post obstructive pna-doubt its worsening pna      Did well at dc and feel ok to continue ceftin      procalcitonin is low. Leukocytosis likely from steroids        3  Small cell lung cancer S/P Carbo/-16/Tecentriq last week    4  Acute on chronic systolic hf. Ejection fraction 25%, stage III NYHA      Currently on Lasix 60 twice daily, Primacor. Restart carvedilol. Patient has an allergy to lisinopril      DVT Prophylaxis: lovenox  Diet: ADULT DIET; Regular; Low Fat/Low Chol/High Fiber/2 gm Na; 1500 ml  Code Status: Full Code      Not ready for dc  HHC at OR    Dispo -2-3 more days.      Santa Donis MD

## 2022-05-18 NOTE — PROGRESS NOTES
Hematology Oncology Daily Progress Note    Admit Date: 5/13/2022  Hospital day several    Subjective:     Patient has complaints of slowly improving SOB---denies CP. Medication side effects: none    Scheduled Meds:   predniSONE  20 mg Oral Daily    furosemide  60 mg IntraVENous Q8H    gabapentin  600 mg Oral TID    atorvastatin  40 mg Oral Nightly    magnesium oxide  400 mg Oral BID    [Held by provider] clopidogrel  75 mg Oral Daily    amiodarone  200 mg Oral Daily    aspirin  81 mg Oral Daily    pantoprazole  40 mg Oral Daily    potassium chloride  10 mEq Oral Daily    empagliflozin  10 mg Oral Daily    tiotropium-olodaterol  2 puff Inhalation Daily    Vericiguat  5 mg Oral QAM    ipratropium-albuterol  1 ampule Inhalation Q4H    cefUROXime  500 mg Oral 2 times per day    sodium chloride flush  5-40 mL IntraVENous 2 times per day    enoxaparin  30 mg SubCUTAneous BID     Continuous Infusions:   milrinone 0.125 mcg/kg/min (05/18/22 7598)    sodium chloride       PRN Meds:albuterol sulfate HFA, ondansetron, prochlorperazine, HYDROmorphone, traMADol, sodium chloride flush, sodium chloride, [DISCONTINUED] ondansetron **OR** ondansetron, polyethylene glycol, acetaminophen **OR** acetaminophen    Review of Systems  Pertinent items are noted in HPI. REVIEW OF SYSTEMS:         · Constitutional: Denies fever, sweats, weight loss     · Eyes: No visual changes or diplopia. No scleral icterus. · ENT: No Headaches, hearing loss or vertigo. No mouth sores or sore throat. · Cardiovascular: No chest pain, dyspnea on exertion, palpitations or loss of consciousness. · Respiratory: No cough or wheezing, no sputum production. No hemoptysis. .    · Gastrointestinal: No abdominal pain, appetite loss, blood in stools. No change in bowel habits. · Genitourinary: No dysuria, trouble voiding, or hematuria. · Musculoskeletal:  Generalized weakness. No joint complaints.   · Integumentary: No rash or pruritis. · Neurological: No headache, diplopia. No change in gait, balance, or coordination. No paresthesias. · Endocrine: No temperature intolerance. No excessive thirst, fluid intake, or urination. · Hematologic/Lymphatic: No abnormal bruising or ecchymoses, blood clots or swollen lymph nodes. · Allergic/Immunologic: No nasal congestion or hives. ·     Objective:     Patient Vitals for the past 8 hrs:   BP Temp Temp src Pulse Resp SpO2 Weight   05/18/22 0803 (!) 123/35 98.1 °F (36.7 °C) Oral 66 16 100 %    05/18/22 0522 (!) 137/49 97.5 °F (36.4 °C) Oral 60 12 99 %    05/18/22 0512       221 lb 5.5 oz (100.4 kg)   05/18/22 0345     24 92 %    05/18/22 0344     19 97 %      I/O last 3 completed shifts: In: 4263 [P.O.:1140;  I.V.:128]  Out: 5550 [Urine:5550]  I/O this shift:  In: -   Out: 800 [Urine:800]    BP (!) 123/35   Pulse 66   Temp 98.1 °F (36.7 °C) (Oral)   Resp 16   Wt 221 lb 5.5 oz (100.4 kg)   SpO2 100%   BMI 34.67 kg/m²     General Appearance:    Alert, cooperative, no distress, appears stated age   Head:    Normocephalic, without obvious abnormality, atraumatic   Eyes:    PERRL, conjunctiva/corneas clear, EOM's intact, fundi     benign, both eyes        Ears:    Normal TM's and external ear canals, both ears   Nose:   Nares normal, septum midline, mucosa normal, no drainage    or sinus tenderness   Throat:   Lips, mucosa, and tongue normal; teeth and gums normal   Neck:   Supple, symmetrical, trachea midline, no adenopathy;        thyroid:  No enlargement/tenderness/nodules; no carotid    bruit or JVD   Back:     Symmetric, no curvature, ROM normal, no CVA tenderness   Lungs:     Clear to auscultation bilaterally, respirations unlabored   Chest wall:    No tenderness or deformity   Heart:    Regular rate and rhythm, S1 and S2 normal, no murmur, rub   or gallop   Abdomen:     Soft, non-tender, bowel sounds active all four quadrants,     no masses, no organomegaly Extremities:   Extremities normal, atraumatic, no cyanosis or edema   Pulses:   2+ and symmetric all extremities   Skin:   Skin color, texture, turgor normal, no rashes or lesions   Lymph nodes:   Cervical, supraclavicular, and axillary nodes normal   Neurologic:   CNII-XII intact. Normal strength, sensation and reflexes       throughout       Data Review  CBC:   Lab Results   Component Value Date    WBC 15.4 05/15/2022    RBC 3.82 05/15/2022    RBC 5.12 05/26/2017       Assessment:     Principal Problem:    Pneumonia  Resolved Problems:    * No resolved hospital problems. *      Plan:     1. Extensive stage small cell lung cancer. She is status post carboplatin -16 and Tecentriq last week. 2.  Shortness of breath. I would cover all the bases. She is on steroids for possible autoimmune pneumonitis and COPD. I would continue antibiotics as well.         Electronically signed by Waldemar Grimaldo MD on 5/18/2022 at 8:25 AM

## 2022-05-18 NOTE — PLAN OF CARE
Problem: Discharge Planning  Goal: Discharge to home or other facility with appropriate resources  Outcome: Progressing     Problem: Pain  Goal: Verbalizes/displays adequate comfort level or baseline comfort level  Outcome: Progressing     Problem: Skin/Tissue Integrity  Goal: Absence of new skin breakdown  Description: 1. Monitor for areas of redness and/or skin breakdown  2. Assess vascular access sites hourly  3. Every 4-6 hours minimum:  Change oxygen saturation probe site  4. Every 4-6 hours:  If on nasal continuous positive airway pressure, respiratory therapy assess nares and determine need for appliance change or resting period.   Outcome: Progressing     Problem: Safety - Adult  Goal: Free from fall injury  5/18/2022 1813 by Tracy Parada, RN  Outcome: Progressing  Flowsheets (Taken 5/18/2022 1729)  Free From Fall Injury: Instruct family/caregiver on patient safety     Problem: Chronic Conditions and Co-morbidities  Goal: Patient's chronic conditions and co-morbidity symptoms are monitored and maintained or improved  Outcome: Progressing     Problem: Respiratory - Adult  Goal: Achieves optimal ventilation and oxygenation  Outcome: Progressing     Problem: Cardiovascular - Adult  Goal: Maintains optimal cardiac output and hemodynamic stability  Outcome: Progressing     Problem: Cardiovascular - Adult  Goal: Absence of cardiac dysrhythmias or at baseline  Outcome: Progressing     Problem: Hematologic - Adult  Goal: Maintains hematologic stability  Outcome: Progressing

## 2022-05-18 NOTE — PROGRESS NOTES
[address]   Daily Progress Note      Admit Date:  5/13/2022      Subjective:   Ms. Sari Nolasco is a 65 y.o. female with a past medical history significant for CAD with prior PCI, a new diagnosis of lung carcinoma, essential hypertension, type 2 DM, COPD and chronic systolic CHF who presented to [facility] with shortness of breath. She states that she had chemotherapy last Monday and Tuesday and came in because she could not breathe. She was discharged home for 5 hours on Friday. She is back on Bipap today. She follows at [facility] with Dr. Mode Orona and had a new AICD generator in December 2021. She relates that her weight has been 215lbs at home and she has never been lower than that. She is seeing  at [facility]. Interval History:  Angelito Solo reports feeling better today with less shortness of breath. Sinus rhythm on telemetry.      Objective:     BP (!) 123/35   Pulse 66   Temp 98.1 °F (36.7 °C) (Oral)   Resp 17   Wt 221 lb 5.5 oz (100.4 kg)   SpO2 98%   BMI 34.67 kg/m²      Intake/Output Summary (Last 24 hours) at 5/18/2022 0946  Last data filed at 5/18/2022 0759  Gross per 24 hour   Intake 811.66 ml   Output 4200 ml   Net -3388.34 ml       Physical Exam:  General:  Awake, alert, NAD  Skin:  Warm and dry  Neck:  JVD<8, no carotid bruits  Chest:  Clear to auscultation, no wheezes/rhonchi/rales  Cardiovascular:  RRR, normal S1/S2, no M/R/G  Abdomen:  Soft, nontender, +bowel sounds  Extremities:  No edema  Pulses: 2+ bilat carotid    2+ bilat radial    2+ bilat femoral        Medications:    predniSONE  20 mg Oral Daily    carvedilol  3.125 mg Oral BID WC    furosemide  60 mg IntraVENous Q8H    gabapentin  600 mg Oral TID    atorvastatin  40 mg Oral Nightly    magnesium oxide  400 mg Oral BID    [Held by provider] clopidogrel  75 mg Oral Daily    amiodarone  200 mg Oral Daily    aspirin  81 mg Oral Daily    pantoprazole  40 mg Oral Daily    potassium chloride  10 mEq Oral Daily  empagliflozin  10 mg Oral Daily    tiotropium-olodaterol  2 puff Inhalation Daily    Vericiguat  5 mg Oral QAM    ipratropium-albuterol  1 ampule Inhalation Q4H    cefUROXime  500 mg Oral 2 times per day    sodium chloride flush  5-40 mL IntraVENous 2 times per day    enoxaparin  30 mg SubCUTAneous BID      milrinone 0.125 mcg/kg/min (05/18/22 0608)    sodium chloride         Lab Data:  CBC:   Recent Labs     05/15/22  2050   WBC 15.4*   HGB 9.1*        BMP:    Recent Labs     05/15/22  2050 05/17/22  0746    141   K 4.3 4.0   CO2 28 30   BUN 31* 29*   CREATININE 1.3* 1.1     LIVR:   Recent Labs     05/15/22  2050   AST 26   ALT 52*     PT/INR:   Recent Labs     05/15/22  2050   PROT 5.8*   FASTING LIPID PANEL:  Lab Results   Component Value Date    CHOL 77 12/11/2021    HDL 25 12/11/2021    TRIG 82 12/11/2021     Echo 10/22/2021:  Left ventricular cavity size is severely dilated. Ejection fraction is visually estimated to be 25-30%. Grade II diastolic dysfunction with elevated LV filling pressures. Moderate to Severe mitral regurgitation. The left atrium is severely dilated. Normal right ventricular size and function. Pacemaker / ICD lead is visualized in the right atrium. There is a trivial pericardial effusion noted.        Stress 2020 ([address]):     The LV EF is 34%   There is moderate global hypokinesis of the left ventricle. There is a medium sized, reversible defect in the inferior wall consistent   with moderate ischemia. There is a small size, partially reversible defect in the anterior wall   consistent with mild keysha-infarct ischemia. There is a small size, partially reversible defect in the inferolateral wall   consistent with mild keysha-infarct ischemia.         Assessment / Plan:     1. Acute on Chronic as a contributing factor, class 4  I am really not certain that this is all due to CHF but she has improved on antibiotics and diuresis. Continue IV lasix at 60mg tid. Her blood pressure is improved with diuresis but I would hold on restarting beta blockade until she is more euvolemic. Continue milrinone at reduced dose of 0.125 mcg/kg/min for now. Hold ACE inhibitor or ARB therapy. Restart Aldactone at lower dose of 12.5mg daily now   Give a one time dose of oral metolazone 5mg now.      AICD in place. Likely not a candidate for long-term SGLT2 inhibitor given poor prognosis from lung carcinoma.     2.  Late stage small cell carcinoma  Will defer to Dr. Dillon Travis for potential treatment or hospice consideration.     3. Acute respiratory failure with hypoxia and hypercarbia  Unlikely to only be her systolic CHF is the contributing factor.   Continue BiPAP ventilation as needed    Signed:  Segun Myers MD

## 2022-05-18 NOTE — PLAN OF CARE
Problem: Discharge Planning  Goal: Discharge to home or other facility with appropriate resources  Outcome: Progressing  Flowsheets (Taken 5/17/2022 2122)  Discharge to home or other facility with appropriate resources: Identify barriers to discharge with patient and caregiver     Problem: Pain  Goal: Verbalizes/displays adequate comfort level or baseline comfort level  Outcome: Progressing     Problem: Skin/Tissue Integrity  Goal: Absence of new skin breakdown  Description: 1. Monitor for areas of redness and/or skin breakdown  2. Assess vascular access sites hourly  3. Every 4-6 hours minimum:  Change oxygen saturation probe site  4. Every 4-6 hours:  If on nasal continuous positive airway pressure, respiratory therapy assess nares and determine need for appliance change or resting period.   Outcome: Progressing     Problem: Safety - Adult  Goal: Free from fall injury  Outcome: Progressing     Problem: ABCDS Injury Assessment  Goal: Absence of physical injury  Outcome: Progressing     Problem: Chronic Conditions and Co-morbidities  Goal: Patient's chronic conditions and co-morbidity symptoms are monitored and maintained or improved  Outcome: Progressing  Flowsheets (Taken 5/17/2022 2122)  Care Plan - Patient's Chronic Conditions and Co-Morbidity Symptoms are Monitored and Maintained or Improved: Monitor and assess patient's chronic conditions and comorbid symptoms for stability, deterioration, or improvement     Problem: Respiratory - Adult  Goal: Achieves optimal ventilation and oxygenation  Outcome: Progressing  Flowsheets (Taken 5/17/2022 2122)  Achieves optimal ventilation and oxygenation: Assess for changes in respiratory status     Problem: Cardiovascular - Adult  Goal: Maintains optimal cardiac output and hemodynamic stability  Outcome: Progressing  Flowsheets (Taken 5/17/2022 2122)  Maintains optimal cardiac output and hemodynamic stability: Monitor blood pressure and heart rate  Goal: Absence of cardiac dysrhythmias or at baseline  Outcome: Progressing  Flowsheets (Taken 5/17/2022 2122)  Absence of cardiac dysrhythmias or at baseline: Monitor cardiac rate and rhythm     Problem: Hematologic - Adult  Goal: Maintains hematologic stability  Outcome: Progressing  Flowsheets (Taken 5/17/2022 2122)  Maintains hematologic stability: Assess for signs and symptoms of bleeding or hemorrhage

## 2022-05-19 VITALS
SYSTOLIC BLOOD PRESSURE: 110 MMHG | OXYGEN SATURATION: 96 % | DIASTOLIC BLOOD PRESSURE: 61 MMHG | RESPIRATION RATE: 15 BRPM | HEART RATE: 69 BPM | BODY MASS INDEX: 33.25 KG/M2 | WEIGHT: 212.3 LBS | TEMPERATURE: 97.9 F

## 2022-05-19 LAB
ANION GAP SERPL CALCULATED.3IONS-SCNC: 10 MMOL/L (ref 3–16)
BASOPHILS ABSOLUTE: 0 K/UL (ref 0–0.2)
BASOPHILS RELATIVE PERCENT: 0.2 %
BUN BLDV-MCNC: 27 MG/DL (ref 7–20)
CALCIUM SERPL-MCNC: 9.6 MG/DL (ref 8.3–10.6)
CHLORIDE BLD-SCNC: 93 MMOL/L (ref 99–110)
CO2: 36 MMOL/L (ref 21–32)
CREAT SERPL-MCNC: 1.3 MG/DL (ref 0.6–1.2)
EOSINOPHILS ABSOLUTE: 0 K/UL (ref 0–0.6)
EOSINOPHILS RELATIVE PERCENT: 0.5 %
GFR AFRICAN AMERICAN: 50
GFR NON-AFRICAN AMERICAN: 42
GLUCOSE BLD-MCNC: 103 MG/DL (ref 70–99)
HCT VFR BLD CALC: 29.7 % (ref 36–48)
HEMOGLOBIN: 9.6 G/DL (ref 12–16)
LYMPHOCYTES ABSOLUTE: 0.5 K/UL (ref 1–5.1)
LYMPHOCYTES RELATIVE PERCENT: 21.3 %
MCH RBC QN AUTO: 23.8 PG (ref 26–34)
MCHC RBC AUTO-ENTMCNC: 32.2 G/DL (ref 31–36)
MCV RBC AUTO: 73.9 FL (ref 80–100)
MONOCYTES ABSOLUTE: 0.2 K/UL (ref 0–1.3)
MONOCYTES RELATIVE PERCENT: 10.8 %
NEUTROPHILS ABSOLUTE: 1.5 K/UL (ref 1.7–7.7)
NEUTROPHILS RELATIVE PERCENT: 67.2 %
PDW BLD-RTO: 18.1 % (ref 12.4–15.4)
PLATELET # BLD: 238 K/UL (ref 135–450)
PMV BLD AUTO: 9.5 FL (ref 5–10.5)
POTASSIUM SERPL-SCNC: 4.3 MMOL/L (ref 3.5–5.1)
RBC # BLD: 4.01 M/UL (ref 4–5.2)
SODIUM BLD-SCNC: 139 MMOL/L (ref 136–145)
WBC # BLD: 2.3 K/UL (ref 4–11)

## 2022-05-19 PROCEDURE — 80048 BASIC METABOLIC PNL TOTAL CA: CPT

## 2022-05-19 PROCEDURE — 99233 SBSQ HOSP IP/OBS HIGH 50: CPT | Performed by: INTERNAL MEDICINE

## 2022-05-19 PROCEDURE — 6370000000 HC RX 637 (ALT 250 FOR IP): Performed by: INTERNAL MEDICINE

## 2022-05-19 PROCEDURE — 85025 COMPLETE CBC W/AUTO DIFF WBC: CPT

## 2022-05-19 PROCEDURE — 2580000003 HC RX 258: Performed by: FAMILY MEDICINE

## 2022-05-19 PROCEDURE — 6360000002 HC RX W HCPCS: Performed by: INTERNAL MEDICINE

## 2022-05-19 PROCEDURE — 94660 CPAP INITIATION&MGMT: CPT

## 2022-05-19 PROCEDURE — 6370000000 HC RX 637 (ALT 250 FOR IP): Performed by: FAMILY MEDICINE

## 2022-05-19 PROCEDURE — 94640 AIRWAY INHALATION TREATMENT: CPT

## 2022-05-19 PROCEDURE — 6360000002 HC RX W HCPCS: Performed by: FAMILY MEDICINE

## 2022-05-19 PROCEDURE — 97116 GAIT TRAINING THERAPY: CPT

## 2022-05-19 PROCEDURE — 97530 THERAPEUTIC ACTIVITIES: CPT

## 2022-05-19 PROCEDURE — 94761 N-INVAS EAR/PLS OXIMETRY MLT: CPT

## 2022-05-19 RX ORDER — TORSEMIDE 20 MG/1
40 TABLET ORAL 2 TIMES DAILY
Status: DISCONTINUED | OUTPATIENT
Start: 2022-05-19 | End: 2022-05-19 | Stop reason: HOSPADM

## 2022-05-19 RX ORDER — TORSEMIDE 20 MG/1
40 TABLET ORAL 2 TIMES DAILY
Qty: 30 TABLET | Refills: 3 | Status: ON HOLD | OUTPATIENT
Start: 2022-05-19 | End: 2022-06-21 | Stop reason: HOSPADM

## 2022-05-19 RX ORDER — ENOXAPARIN SODIUM 100 MG/ML
40 INJECTION SUBCUTANEOUS DAILY
Status: DISCONTINUED | OUTPATIENT
Start: 2022-05-20 | End: 2022-05-19 | Stop reason: HOSPADM

## 2022-05-19 RX ORDER — POTASSIUM CHLORIDE 750 MG/1
20 TABLET, FILM COATED, EXTENDED RELEASE ORAL DAILY
Qty: 60 TABLET | Refills: 0 | Status: ON HOLD | OUTPATIENT
Start: 2022-05-19 | End: 2022-06-18

## 2022-05-19 RX ORDER — CARVEDILOL 6.25 MG/1
6.25 TABLET ORAL 2 TIMES DAILY WITH MEALS
Status: DISCONTINUED | OUTPATIENT
Start: 2022-05-19 | End: 2022-05-19 | Stop reason: HOSPADM

## 2022-05-19 RX ORDER — CEFUROXIME AXETIL 500 MG/1
500 TABLET ORAL EVERY 12 HOURS SCHEDULED
Qty: 4 TABLET | Refills: 0 | Status: SHIPPED | OUTPATIENT
Start: 2022-05-19 | End: 2022-05-21

## 2022-05-19 RX ORDER — PREDNISONE 20 MG/1
20 TABLET ORAL DAILY
Qty: 10 TABLET | Refills: 0 | Status: SHIPPED | OUTPATIENT
Start: 2022-05-20 | End: 2022-05-30

## 2022-05-19 RX ADMIN — CEFUROXIME AXETIL 500 MG: 250 TABLET ORAL at 10:05

## 2022-05-19 RX ADMIN — POTASSIUM CHLORIDE 10 MEQ: 750 TABLET, EXTENDED RELEASE ORAL at 10:05

## 2022-05-19 RX ADMIN — SPIRONOLACTONE 12.5 MG: 25 TABLET ORAL at 10:06

## 2022-05-19 RX ADMIN — IPRATROPIUM BROMIDE AND ALBUTEROL SULFATE 1 AMPULE: 2.5; .5 SOLUTION RESPIRATORY (INHALATION) at 07:35

## 2022-05-19 RX ADMIN — ENOXAPARIN SODIUM 30 MG: 100 INJECTION SUBCUTANEOUS at 10:08

## 2022-05-19 RX ADMIN — GABAPENTIN 600 MG: 300 CAPSULE ORAL at 10:06

## 2022-05-19 RX ADMIN — FUROSEMIDE 60 MG: 10 INJECTION, SOLUTION INTRAMUSCULAR; INTRAVENOUS at 04:38

## 2022-05-19 RX ADMIN — IPRATROPIUM BROMIDE AND ALBUTEROL SULFATE 1 AMPULE: 2.5; .5 SOLUTION RESPIRATORY (INHALATION) at 04:46

## 2022-05-19 RX ADMIN — ASPIRIN 81 MG: 81 TABLET, COATED ORAL at 10:06

## 2022-05-19 RX ADMIN — GABAPENTIN 600 MG: 300 CAPSULE ORAL at 13:49

## 2022-05-19 RX ADMIN — CARVEDILOL 6.25 MG: 6.25 TABLET, FILM COATED ORAL at 13:50

## 2022-05-19 RX ADMIN — TIOTROPIUM BROMIDE AND OLODATEROL 2 PUFF: 3.124; 2.736 SPRAY, METERED RESPIRATORY (INHALATION) at 07:45

## 2022-05-19 RX ADMIN — AMIODARONE HYDROCHLORIDE 200 MG: 200 TABLET ORAL at 10:05

## 2022-05-19 RX ADMIN — PREDNISONE 20 MG: 20 TABLET ORAL at 10:06

## 2022-05-19 RX ADMIN — SODIUM CHLORIDE, PRESERVATIVE FREE 10 ML: 5 INJECTION INTRAVENOUS at 10:13

## 2022-05-19 RX ADMIN — PANTOPRAZOLE SODIUM 40 MG: 40 TABLET, DELAYED RELEASE ORAL at 10:06

## 2022-05-19 RX ADMIN — Medication 400 MG: at 10:05

## 2022-05-19 RX ADMIN — IPRATROPIUM BROMIDE AND ALBUTEROL SULFATE 1 AMPULE: 2.5; .5 SOLUTION RESPIRATORY (INHALATION) at 11:09

## 2022-05-19 ASSESSMENT — ENCOUNTER SYMPTOMS
RESPIRATORY NEGATIVE: 1
GASTROINTESTINAL NEGATIVE: 1
EYES NEGATIVE: 1

## 2022-05-19 NOTE — DISCHARGE SUMMARY
Hospital Medicine Discharge Summary    Patient ID: Blaze Soto      Patient's PCP: Carl Funez MD    Admit Date: 5/13/2022     Discharge Date:   05/19/22      Admitting Provider: Geno Cox MD     Discharge Provider: Suzanne Espino MD     Discharge Diagnoses: Active Hospital Problems    Diagnosis     Pneumonia [J18.9]      Priority: Medium       The patient was seen and examined on day of discharge and this discharge summary is in conjunction with any daily progress note from day of discharge. Hospital Course:     64 yo with recently diagnosed small cell lung cancer and post obstructive pna who was treated by me. Also seen by oncology at that time and started chemo S/P Carbo/-16/Tecentriq on the initial admission     IReadmitted with acute on chronic systolic heart failure. Echocardiogram on December 2021 with EF of 25%. Currently on IV diuretics. Cardiology was consulted for recommendation. Patient was started on Primacor infusion for 48 hours. By the time of discharge she was feeling much better saturating well on room air. She is to follow-up with cardiology, oncology and radiation therapy. Physical Exam Performed:     /61   Pulse 69   Temp 97.9 °F (36.6 °C) (Oral)   Resp 15   Wt 212 lb 4.9 oz (96.3 kg)   SpO2 96%   BMI 33.25 kg/m²       General appearance:  No apparent distress, appears stated age and cooperative. HEENT:  Normal cephalic, atraumatic without obvious deformity. Pupils equal, round, and reactive to light. Extra ocular muscles intact. Conjunctivae/corneas clear. Neck: Supple, with full range of motion. No jugular venous distention. Trachea midline. Respiratory:  Normal respiratory effort. Clear to auscultation, bilaterally without Rales/Wheezes/Rhonchi. Cardiovascular:  Regular rate and rhythm with normal S1/S2 without murmurs, rubs or gallops. Abdomen: Soft, non-tender, non-distended with normal bowel sounds.   Musculoskeletal:  No clubbing, cyanosis or edema bilaterally. Full range of motion without deformity. Skin: Skin color, texture, turgor normal.  No rashes or lesions. Neurologic:  Neurovascularly intact without any focal sensory/motor deficits. Cranial nerves: II-XII intact, grossly non-focal.  Psychiatric:  Alert and oriented, thought content appropriate, normal insight  Capillary Refill: Brisk,< 3 seconds   Peripheral Pulses: +2 palpable, equal bilaterally       Labs: For convenience and continuity at follow-up the following most recent labs are provided:      CBC:    Lab Results   Component Value Date    WBC 2.3 05/19/2022    HGB 9.6 05/19/2022    HCT 29.7 05/19/2022     05/19/2022       Renal:    Lab Results   Component Value Date     05/19/2022    K 4.3 05/19/2022    K 4.3 05/15/2022    CL 93 05/19/2022    CO2 36 05/19/2022    BUN 27 05/19/2022    CREATININE 1.3 05/19/2022    CALCIUM 9.6 05/19/2022    PHOS 4.0 12/20/2021         Significant Diagnostic Studies    Radiology:   XR CHEST PORTABLE   Final Result   Moderate cardiomegaly with mild interstitial congestion, with mild   improvement in the congestive changes. Persistent small left pleural   effusion with left lung base atelectasis versus infiltrate. No pneumothorax. XR CHEST (2 VW)   Final Result   Unchanged moderate to severe CHF pattern pulmonary vascular congestion. Moderate left pleural effusion with left basilar infiltrate or atelectasis. XR CHEST PORTABLE   Final Result   Moderate increasing left lower lobe airspace disease, concern for pneumonia   possibly postobstructive given CT scan findings. .      Vascular congestion and suspected early edema.                 Consults:     IP CONSULT TO HEM/ONC  IP CONSULT TO CARDIOLOGY  IP CONSULT TO PALLIATIVE CARE    Disposition:  home     Condition at Discharge: Stable    Discharge Instructions/Follow-up:  Follow-up with PCP within 7 days from discharge, not doing so could have life-threatening consequences. Take medication as instructed;  return to the emergency department if persistent symptoms, experiencing side effects from medication including nausea vomiting or unable to keep medications down. Cardiology and oncology follow-up recommended    Code Status:  Full Code     Activity: activity as tolerated    Diet: cardiac diet      Discharge Medications:     Current Discharge Medication List           Details   !! torsemide 40 MG TABS Take 40 mg by mouth 2 times daily  Qty: 30 tablet, Refills: 3      predniSONE (DELTASONE) 20 MG tablet Take 1 tablet by mouth daily for 10 days  Qty: 10 tablet, Refills: 0      potassium chloride (KLOR-CON) 10 MEQ extended release tablet Take 2 tablets by mouth daily  Qty: 60 tablet, Refills: 0       !! - Potential duplicate medications found. Please discuss with provider. Details   !! torsemide (DEMADEX) 20 MG tablet Take 2 tablets by mouth in the morning and at bedtime  Qty: 30 tablet, Refills: 3      cefUROXime (CEFTIN) 500 MG tablet Take 1 tablet by mouth every 12 hours for 4 doses  Qty: 4 tablet, Refills: 0       !! - Potential duplicate medications found. Please discuss with provider.            Details   empagliflozin (JARDIANCE) 10 MG tablet Take 10 mg by mouth daily      umeclidinium-vilanterol (ANORO ELLIPTA) 62.5-25 MCG/INH AEPB inhaler Inhale 1 puff into the lungs daily      Vericiguat (VERQUVO) 5 MG TABS Take 5 mg by mouth every morning      ondansetron (ZOFRAN) 8 MG tablet Take 8 mg by mouth every 8 hours as needed for Nausea or Vomiting (POST CHEMO NAUSEA)      prochlorperazine (COMPAZINE) 10 MG tablet Take 10 mg by mouth every 8 hours as needed (POST CHEMO NAUSEA)      albuterol (PROVENTIL) (2.5 MG/3ML) 0.083% nebulizer solution USE 1 VIAL VIA NEBULIZER EVERY 4 HOURS AS NEEDED DX J44.9      amiodarone (CORDARONE) 200 MG tablet TAKE 1 TABLET BY MOUTH EVERY DAY      ASPIRIN LOW DOSE 81 MG EC tablet TAKE 1 TABLET BY MOUTH EVERY DAY carvedilol (COREG) 6.25 MG tablet TAKE 1 TABLET BY MOUTH TWICE A DAY WITH MEALS      pantoprazole (PROTONIX) 40 MG tablet TAKE 1 TABLET BY MOUTH EVERY DAY      clopidogrel (PLAVIX) 75 MG tablet Take 1 tablet by mouth daily  Qty: 30 tablet, Refills: 3      atorvastatin (LIPITOR) 40 MG tablet Take 40 mg by mouth nightly       magnesium oxide (MAG-OX) 400 MG tablet Take 400 mg by mouth 2 times daily       gabapentin (NEURONTIN) 600 MG tablet Take 600 mg by mouth 3 times daily. albuterol (PROVENTIL HFA) 108 (90 BASE) MCG/ACT inhaler Inhale 2 puffs into the lungs every 4 hours as needed for Wheezing or Shortness of Breath              Time Spent on discharge is more than 30 minutes in the examination, evaluation, counseling and review of medications and discharge plan. Signed:    Patrick Linton MD   5/19/2022      Thank you Franci Shi MD for the opportunity to be involved in this patient's care. If you have any questions or concerns, please feel free to contact me at [phone].

## 2022-05-19 NOTE — PROGRESS NOTES
[facility]   Daily Progress Note      Admit Date:  5/13/2022      Subjective:   Ms. Alec Robb is a 65 y.o. female with a past medical history significant for CAD with prior PCI, a new diagnosis of lung carcinoma, essential hypertension, type 2 DM, COPD and chronic systolic CHF who presented to [facility] with shortness of breath. She states that she had chemotherapy last Monday and Tuesday and came in because she could not breathe. She was discharged home for 5 hours on Friday. She is back on Bipap today. She follows at [facility] with Dr. Jia Wilder and had a new AICD generator in December 2021. She relates that her weight has been 215lbs at home and she has never been lower than that. She is seeing  at [facility]. Interval History:  Judith Arvizu reports that her breathing is better. V-paced on telemetry. She is laying flat in bed without oxygen.         Objective:     /61   Pulse 60   Temp 98.1 °F (36.7 °C) (Oral)   Resp 14   Wt 212 lb 4.9 oz (96.3 kg)   SpO2 97%   BMI 33.25 kg/m²      Intake/Output Summary (Last 24 hours) at 5/19/2022 1208  Last data filed at 5/19/2022 0654  Gross per 24 hour   Intake 1020 ml   Output 5350 ml   Net -4330 ml       Physical Exam:  General:  Awake, alert, NAD  Skin:  Warm and dry  Neck:  JVD<8, no carotid bruits  Chest:  Clear to auscultation, no wheezes/rhonchi/rales  Cardiovascular:  RRR, normal S1/S2, no M/R/G  Abdomen:  Soft, nontender, +bowel sounds  Extremities:  No edema  Pulses: 2+ bilat carotid    2+ bilat radial    2+ bilat femoral        Medications:    predniSONE  20 mg Oral Daily    spironolactone  12.5 mg Oral Daily    furosemide  60 mg IntraVENous Q8H    gabapentin  600 mg Oral TID    atorvastatin  40 mg Oral Nightly    magnesium oxide  400 mg Oral BID    [Held by provider] clopidogrel  75 mg Oral Daily    amiodarone  200 mg Oral Daily    aspirin  81 mg Oral Daily    pantoprazole  40 mg Oral Daily    potassium chloride  10 mEq Oral Daily    empagliflozin  10 mg Oral Daily    tiotropium-olodaterol  2 puff Inhalation Daily    Vericiguat  5 mg Oral QAM    ipratropium-albuterol  1 ampule Inhalation Q4H    cefUROXime  500 mg Oral 2 times per day    sodium chloride flush  5-40 mL IntraVENous 2 times per day    enoxaparin  30 mg SubCUTAneous BID      sodium chloride         Lab Data:  CBC:   Recent Labs     05/19/22  0435   WBC 2.3*   HGB 9.6*        BMP:    Recent Labs     05/17/22  0746 05/19/22  0435    139   K 4.0 4.3   CO2 30 36*   BUN 29* 27*   CREATININE 1.1 1.3*     LIVR:   No results for input(s): AST, ALT in the last 72 hours. PT/INR:   No results for input(s): PROT, INR in the last 72 hours. FASTING LIPID PANEL:  Lab Results   Component Value Date    CHOL 77 12/11/2021    HDL 25 12/11/2021    TRIG 82 12/11/2021     Echo 10/22/2021:  Left ventricular cavity size is severely dilated. Ejection fraction is visually estimated to be 25-30%. Grade II diastolic dysfunction with elevated LV filling pressures. Moderate to Severe mitral regurgitation. The left atrium is severely dilated. Normal right ventricular size and function. Pacemaker / ICD lead is visualized in the right atrium. There is a trivial pericardial effusion noted.        Stress 2020 ([address]):     The LV EF is 34%   There is moderate global hypokinesis of the left ventricle. There is a medium sized, reversible defect in the inferior wall consistent   with moderate ischemia. There is a small size, partially reversible defect in the anterior wall   consistent with mild keysha-infarct ischemia. There is a small size, partially reversible defect in the inferolateral wall   consistent with mild keysha-infarct ischemia.         Assessment / Plan:     1. Acute on Chronic as a contributing factor, class 4  Diane's hypoxia does not seem to all be due to [facility] she has improved on antibiotics and diuresis.  Stop IV lasix and restart home oral diuretics with torsemide 40mg bid starting this evening as she received IV las already. Her blood pressure is improved with diuresis but I would hold on restarting beta blockade until she is more euvolemic. Milrinone stopped last night. . Hold ACE inhibitor or ARB therapy as she apparently has an allergy. Restart carvedilol now at 6.25mg bid. She apparently had an allergy to Aldactone as well. We will hold on starting this. AICD in place. Likely not a candidate for long-term SGLT2 inhibitor given poor prognosis from lung carcinoma.     2.  Late stage small cell carcinoma  Will defer to Dr. Mo Macias for potential treatment or hospice consideration.     3. Acute respiratory failure with hypoxia and hypercarbia  Unlikely to only be her systolic CHF is the contributing factor. She is to see Dr. Karey Ruth in the office within the next week.     Signed:  Mack Rust MD

## 2022-05-19 NOTE — PROGRESS NOTES
Dc order noted. HF dc instructions are on AVS. Pt has received >60 mins of HF education. Cardiology hospital follow up appt scheduled with her cardiologist Dr Theresa Taylor from [address] [place] [address] on 5/23 at 1:00 (for both a device check and cardiology appt). Bedside RN made aware.

## 2022-05-19 NOTE — PROGRESS NOTES
Port deassessed and telemetry monitor removed. Discharge paperwork completed and discussed with the patient. All questions answered. Patient has been made aware of follow up appointments that have been made/need to be made and patient verbalized understanding. No new prescriptions to . All belongings have been packed up and sent with the patient. Patient will be driven home by her daughter.

## 2022-05-19 NOTE — CONSULTS
PALLIATIVE MEDICINE CONSULTATION     [id number]    HELIO:[birth year]  Room/Bed:W7O-9498/5113-01   LOS: 6 days         Date of consult:5/19/2022    Consult Information    Inpatient consult to Palliative Care  Consult performed by: AME Fernández CNP  Consult ordered by: Crystal Rodriguez MD         Reason for Consult: Goals of Care, Code Status, 30 day readmit. ASSESSMENT/RECOMMENDATIONS     65 y.o. female with debility and swelling. Symptom Management:  1. Debility: Patient was pale and exhibited some bilateral extremity weakness during my visit today. Patient only requiring some assistance with her ADL's at this time. 2. Swelling: Patient has been taking Lasix (60 mg) by IV every 8 hours for relief. 3. Goals of Care: Met patient at her bedside. Patient was oriented x 4 and was decisional today. Reviewed patient's current health status, goals of care and code status. Patient indicated she was with [facility] services prior to entering the hospital and she would like to continue [address] when she is discharged from the hospital.  Patient would like to continue on with all aggressive treatment at this time. Code status was reviewed and the patient would like to remain a Full Code. Patient/Family Goals of Care :    5/19 - Met patient at her bedside. Patient was oriented x 4 and was decisional today. Reviewed patient's current health status, goals of care and code status. Patient indicated she was with [facility] services prior to entering the hospital and she would like to continue [address] when she is discharged from the hospital.  Patient would like to continue on with all aggressive treatment at this time. Code status was reviewed and the patient would like to remain a Full Code.      Disposition/Discharge Plan:   Patient indicated she will be continuing with [facility] and meeting with [facility] once she returns home. Just in case an outpatient [facility] referral was ordered for post-discharge to followup with the patient once they return home. Advance Directives:  · Surrogate Decision Maker: Patient has two daughters, Maddison Pedraza and Sonya Winters.  Patient indicated Sonya Winters would be her primary medical contact for decision making. Patient was decisional today. · Code status:  Full Code    Case discussed with: patient, floor RN  Thank you for allowing us to participate in the care of this patient. HISTORY     CC: Debility  HPI: The patient is a 65 y.o. female with a past medical history of Lung Cancer, heart failure, CAD, CKD and COPD who presented to [facility] with shortness of breath. Patient was admitted with respiratory distress and malignant neoplasm of lung. Palliative Medicine SymptomScreening/ROS:    Review of Systems   Constitutional: Positive for fatigue. HENT: Negative. Eyes: Negative. Respiratory: Negative. Cardiovascular: Positive for leg swelling. Gastrointestinal: Negative. Musculoskeletal: Negative. Skin: Positive for pallor. Neurological: Positive for weakness. Psychiatric/Behavioral: Negative. All other systems reviewed and are negative. A complete 10 count ROS was obtained. Pertinent positives mentioned above in HPI/ROS. All others if not mentioned are negative. Palliative Performance Scale:     [] 60%  Amb reduced; Sig dz. Can't do hobbies/housework; Intake normal or reduced, Occasional assist; LOC full/confusion   [x] 50%  Mainly sit/lie; Extensive disease. Mainly assist, Intake normal or reduced; Occasional assist; LOC full/confusion   [] 40%  Mainly in bed; Extensive disease; Mainly assist; Intake normal or reduced; Occasional assist; LOC full/confusion   [] 30%  Bed bound, Extensive disease; Total care; Intake reduced; LOC full/confusion   [] 20%  Bed bound; Extensive disease; Total care;  Intake minimal; Drowsy/coma   [] 10%  Bed bound; Extensive disease; Total care; Mouth care only; Drowsy/coma   []  0%   Death     Home med list and hospital medications reviewed in chart as of 5/19/2022     EXAM     Vitals:    05/19/22 1412   BP: 110/61   Pulse: 69   Resp: 15   Temp: 97.9 °F (36.6 °C)   SpO2: 96%       Physical Exam  Vitals reviewed. Constitutional:       Appearance: She is ill-appearing. HENT:      Head: Normocephalic and atraumatic. Nose: Nose normal.   Eyes:      Extraocular Movements: Extraocular movements intact. Pupils: Pupils are equal, round, and reactive to light. Cardiovascular:      Rate and Rhythm: Normal rate. Pulses: Normal pulses. Pulmonary:      Effort: Pulmonary effort is normal. No respiratory distress. Abdominal:      General: Bowel sounds are normal.      Palpations: Abdomen is soft. Musculoskeletal:      Cervical back: Neck supple. Right lower leg: Edema (1+) present. Left lower leg: Edema (1+) present. Skin:     General: Skin is warm and dry. Coloration: Skin is pale. Neurological:      Comments: Oriented x4   Psychiatric:         Behavior: Behavior normal.         Thought Content:  Thought content normal.         Judgment: Judgment normal.          Current labs in the epic chart reviewed as of 5/19/2022   Review of previous notes, admits, labs, radiology and testing relevant to this consult done in this chart today 5/19/2022      Total time: 76 minutes  >50% of time spent counseling patient at bedside or POA/family member if applicable , reviewing information and discussing care, coordinating with care team  Signed By: Electronically signed by AME Bautista CNP on 5/19/2022 at 2:51 PM  Palliative Medicine   [phone]    May 19, 2022

## 2022-05-19 NOTE — PLAN OF CARE
Problem: Discharge Planning  Goal: Discharge to home or other facility with appropriate resources  5/19/2022 0239 by Dary Cantrell RN  Outcome: Progressing  Flowsheets (Taken 5/18/2022 2001)  Discharge to home or other facility with appropriate resources:   Identify barriers to discharge with patient and caregiver   Arrange for needed discharge resources and transportation as appropriate     Problem: Pain  Goal: Verbalizes/displays adequate comfort level or baseline comfort level  5/19/2022 0239 by Dary Cantrell RN  Outcome: Progressing  Flowsheets (Taken 5/19/2022 0239)  Verbalizes/displays adequate comfort level or baseline comfort level:   Encourage patient to monitor pain and request assistance   Assess pain using appropriate pain scale  Note: Assessing pain using appropriate pain rating scale q shift and PRN. Problem: Skin/Tissue Integrity  Goal: Absence of new skin breakdown  Description: 1. Monitor for areas of redness and/or skin breakdown  2. Assess vascular access sites hourly  3. Every 4-6 hours minimum:  Change oxygen saturation probe site  4. Every 4-6 hours:  If on nasal continuous positive airway pressure, respiratory therapy assess nares and determine need for appliance change or resting period. 5/19/2022 0239 by Dary Cantrell RN  Outcome: Progressing  Note: Skin assessment completed every shift. Pt assessed for incontinence. Pt encouraged to turn/rotate every 2 hours. Assistance provided if pt unable to do so themselves. Problem: Safety - Adult  Goal: Free from fall injury  5/19/2022 0239 by Dary Cantrell RN  Outcome: Progressing  Flowsheets (Taken 5/19/2022 0239)  Free From Fall Injury: Instruct family/caregiver on patient safety  Note: Bed alarm on, bed in lowest position, wheels locked. Fall risk assessment completed every shift. Nonskid socks on, fall sign posted. Pt educated on use of call light.         Problem: ABCDS Injury Assessment  Goal: Absence of physical injury  5/19/2022 0239 by Rosa Webster RN  Outcome: Progressing  Flowsheets  Taken 5/19/2022 0239  Absence of Physical Injury: Implement safety measures based on patient assessment  Taken 5/18/2022 2231  Absence of Physical Injury: Implement safety measures based on patient assessment  Note: No physical injury this shift.       Problem: Chronic Conditions and Co-morbidities  Goal: Patient's chronic conditions and co-morbidity symptoms are monitored and maintained or improved  5/19/2022 0239 by Rosa Webster RN  Outcome: Progressing  Flowsheets (Taken 5/18/2022 2001)  Care Plan - Patient's Chronic Conditions and Co-Morbidity Symptoms are Monitored and Maintained or Improved: Monitor and assess patient's chronic conditions and comorbid symptoms for stability, deterioration, or improvement     Problem: Respiratory - Adult  Goal: Achieves optimal ventilation and oxygenation  5/19/2022 0239 by Rosa Webster RN  Outcome: Progressing  Flowsheets (Taken 5/18/2022 2001)  Achieves optimal ventilation and oxygenation:   Assess for changes in respiratory status   Assess for changes in mentation and behavior     Problem: Cardiovascular - Adult  Goal: Maintains optimal cardiac output and hemodynamic stability  5/19/2022 0239 by Rosa Webster RN  Outcome: Progressing  Flowsheets (Taken 5/18/2022 2001)  Maintains optimal cardiac output and hemodynamic stability: Monitor blood pressure and heart rate     Problem: Cardiovascular - Adult  Goal: Absence of cardiac dysrhythmias or at baseline  5/19/2022 0239 by Rosa Webster RN  Outcome: Progressing  Flowsheets (Taken 5/18/2022 2001)  Absence of cardiac dysrhythmias or at baseline: Monitor cardiac rate and rhythm     Problem: Hematologic - Adult  Goal: Maintains hematologic stability  5/19/2022 0239 by Rosa Webster RN  Outcome: Progressing  Flowsheets (Taken 5/18/2022 2001)  Maintains hematologic stability: Assess for signs and symptoms of bleeding or hemorrhage

## 2022-05-19 NOTE — CARE COORDINATION
[facility]    DC order noted, all docs needed have been faxed to [facility] for home care services.     Home care to see patient 5/22/22    Daryle Greaves RN, BSN CTN  [facility] [phone]

## 2022-05-19 NOTE — PLAN OF CARE
Problem: Discharge Planning  Goal: Discharge to home or other facility with appropriate resources  5/19/2022 1438 by Aydee Sanchez RN  Outcome: Progressing     Problem: Pain  Goal: Verbalizes/displays adequate comfort level or baseline comfort level  5/19/2022 1438 by Ayede Sanchez RN  Outcome: Progressing     Problem: Skin/Tissue Integrity  Goal: Absence of new skin breakdown  Description: 1. Monitor for areas of redness and/or skin breakdown  2. Assess vascular access sites hourly  3. Every 4-6 hours minimum:  Change oxygen saturation probe site  4. Every 4-6 hours:  If on nasal continuous positive airway pressure, respiratory therapy assess nares and determine need for appliance change or resting period.   5/19/2022 1438 by Aydee Sanchez RN  Outcome: Progressing     Problem: Safety - Adult  Goal: Free from fall injury  5/19/2022 1438 by Aydee Sanchez RN  Outcome: Progressing     Problem: ABCDS Injury Assessment  Goal: Absence of physical injury  5/19/2022 1438 by Aydee Sanchez RN  Outcome: Progressing     Problem: Chronic Conditions and Co-morbidities  Goal: Patient's chronic conditions and co-morbidity symptoms are monitored and maintained or improved  5/19/2022 1438 by Aydee Sanchez RN  Outcome: Progressing     Problem: Respiratory - Adult  Goal: Achieves optimal ventilation and oxygenation  5/19/2022 1438 by Aydee Sanchez RN  Outcome: Progressing     Problem: Cardiovascular - Adult  Goal: Maintains optimal cardiac output and hemodynamic stability  5/19/2022 1438 by Aydee Sanchez RN  Outcome: Progressing     Problem: Cardiovascular - Adult  Goal: Absence of cardiac dysrhythmias or at baseline  5/19/2022 1438 by Aydee Sanchez RN  Outcome: Progressing     Problem: Hematologic - Adult  Goal: Maintains hematologic stability  5/19/2022 1438 by Aydee Sanchez RN  Outcome: Progressing

## 2022-05-19 NOTE — CARE COORDINATION
CASE MANAGEMENT DISCHARGE SUMMARY: Discharge order noted. Patient discharging to home w/home healthcare.     DISCHARGE DATE: 5/19/22    DISCHARGED TO: Home w/Home Healthcare     Discharging to Facility/ Agency   · Name:  [facility]    · Address: [address] [place] [address], [address] [place] [address]  · Phone: [phone]  Fax: [phone]               TRANSPORTATION: Family             TIME: Afternoon     PREFERRED PHARMACY: [facility]             NUMBER: [phone]     Yes MATILDA Updated   Yes Case Management   Yes Physician   Yes Nurse    Yes Destination updated (SNF/HHC)    Yes Whiteboard Note Updated with above    Yes Home Care Order Verified     Wade COUGHLIN RN  Case Management  [id number]    Electronically signed by Wade Lombardi RN on 5/19/2022 at 6:14 PM

## 2022-05-19 NOTE — PROGRESS NOTES
Physical Therapy  Facility/Department: [facility] 5W PROGRESSIVE CARE  Daily Treatment Note  NAME: Blaze Soto  : 1960  MRN: [id number]    Date of Service: 2022    Discharge Recommendations:  Home with assist PRN   PT Equipment Recommendations  Equipment Needed: No Eulalio Dandy Va scored a 23/24 on the AM-PAC short mobility form. Please see assessment section for further patient specific details. If patient discharges prior to next session this note will serve as a discharge summary. Please see below for the latest assessment towards goals. Patient Diagnosis(es): The primary encounter diagnosis was Respiratory distress. A diagnosis of Malignant neoplasm of lung, unspecified laterality, unspecified part of lung ([address] was also pertinent to this visit. Assessment   Assessment: Today, , pt presenting at her baseline function. Pt able to complete bed mobility and transfers independently. AMbulated 100' with RW and supervision without any safety concerns. Pt currently on RA and SpO2 remained >90%. Pt without any SOB or safety concerns at this time. DC from PT services. Anticipate pt will be safe to return home with PRN assist.  Activity Tolerance: Patient tolerated treatment well  Equipment Needed: No     Plan    Plan  Plan: 2-3 times per week  Specific Instructions for Next Treatment: Improve endurance  Current Treatment Recommendations: Balance training; Endurance training;Home exercise program;Safety education & training;Patient/Caregiver education & training;Equipment evaluation, education, & procurement     Restrictions  Position Activity Restriction  Other position/activity restrictions: on RA    Subjective    Subjective  Subjective: Pt sitting EOB receiving breathing treatment upon arrival. Pt pleasant and agreeable to PT leonardosoin.  Stating that she feels greatt and back to normal     Bed Mobility Training  Bed Mobility Training: Yes  Overall Level of Assistance: Independent  Scooting: Independent  Balance  Sitting: Intact  Standing: Intact (RW)  Transfer Training  Transfer Training: Yes  Overall Level of Assistance: Independent (RW)  Sit to Stand: Independent (RW)  Stand to Sit: Independent  Toilet Transfer: Independent  Gait  Overall Level of Assistance: Supervision (no LOB; Pt's SpO2 >90% throughout on RA)  Distance (ft):  (100', 25')  Assistive Device: Walker, rolling        Other Specialty Interventions  Other Treatments/Modalities: Pt requested to used commode; ind with clothes manage, wiping and toilet transfers. Stood to sinl ~ 1 min with supervision to wash her hands. Safety Devices  Type of Devices: Call light within reach;Nurse notified; Left in bed; All fall risk precautions in place;Gait belt;Patient at risk for falls  Restraints  Restraints Initially in Place: No       Goals  Short Term Goals  Time Frame for Short term goals: DC from PT services at this time- pt at baseline    Education  Patient Education  Education Given To: Patient  Education Provided: Role of Therapy;Plan of Care;Transfer Training  Education Method: Verbal  Barriers to Learning: None  Education Outcome: Verbalized understanding    Therapy Time   Individual Concurrent Group Co-treatment   Time In 1120         Time Out 1148         Minutes 28               Electronically signed by Funmi Dodd on 5/19/2022 at 11:54 AM

## 2022-05-19 NOTE — PROGRESS NOTES
Hospitalist Progress Note      PCP: Alee Bae MD    Date of Admission: 5/13/2022    Chief Complaint: [facility] Course:     64 yo with recently diagnosed small cell lung cancer and post obstructive pna who was treated by me. Also seen by oncology at that time and started chemo S/P Carbo/-16/Tecentriq on the initial admission    I discharged her 5/13 to home and several hours later she became acutely sob. Readmitted with acute on chronic systolic heart failure. Echocardiogram on December 2021 with EF of 25%. 5/18  Feeling better today, remains on 3 L of O2 per nasal cannula. Remains on Primacor infusion    5/19  Patient feeling better today, saturating well on room air 97%. Slightly increased in creatinine. I suggest to decrease Lasix to 40 twice daily, transitioning to p.o. today. Chest x-ray with improved vascular congestion.   Patient is net negative    Subjective:  As above      Medications:  Reviewed    Infusion Medications    sodium chloride       Scheduled Medications    predniSONE  20 mg Oral Daily    spironolactone  12.5 mg Oral Daily    furosemide  60 mg IntraVENous Q8H    gabapentin  600 mg Oral TID    atorvastatin  40 mg Oral Nightly    magnesium oxide  400 mg Oral BID    [Held by provider] clopidogrel  75 mg Oral Daily    amiodarone  200 mg Oral Daily    aspirin  81 mg Oral Daily    pantoprazole  40 mg Oral Daily    potassium chloride  10 mEq Oral Daily    empagliflozin  10 mg Oral Daily    tiotropium-olodaterol  2 puff Inhalation Daily    Vericiguat  5 mg Oral QAM    ipratropium-albuterol  1 ampule Inhalation Q4H    cefUROXime  500 mg Oral 2 times per day    sodium chloride flush  5-40 mL IntraVENous 2 times per day    enoxaparin  30 mg SubCUTAneous BID     PRN Meds: albuterol sulfate HFA, ondansetron, prochlorperazine, HYDROmorphone, traMADol, sodium chloride flush, sodium chloride, [DISCONTINUED] ondansetron **OR** ondansetron, polyethylene glycol, acetaminophen **OR** acetaminophen      Intake/Output Summary (Last 24 hours) at 5/19/2022 0858  Last data filed at 5/19/2022 0654  Gross per 24 hour   Intake 1020 ml   Output 5350 ml   Net -4330 ml       Physical Exam Performed:    /61   Pulse 60   Temp 98.1 °F (36.7 °C) (Oral)   Resp 14   Wt 212 lb 4.9 oz (96.3 kg)   SpO2 97%   BMI 33.25 kg/m²     General appearance: No apparent distress, appears stated age and cooperative. HEENT: Pupils equal, round, and reactive to light. Conjunctivae/corneas clear. Neck: Supple, with full range of motion. No jugular venous distention. Trachea midline. Respiratory: Clear  Cardiovascular: 2 out of 6 systolic murmur at the apex radiating the axilla  Abdomen: Soft, non-tender, non-distended with normal bowel sounds. Musculoskeletal: No clubbing, cyanosis or edema bilaterally. Full range of motion without deformity. Skin: Skin color, texture, turgor normal.  No rashes or lesions. Neurologic:  Neurovascularly intact without any focal sensory/motor deficits. Cranial nerves: II-XII intact, grossly non-focal.  Psychiatric: Alert and oriented, thought content appropriate, normal insight  Capillary Refill: Brisk,3 seconds, normal   Peripheral Pulses: +2 palpable, equal bilaterally       Labs:   Recent Labs     05/19/22  0435   WBC 2.3*   HGB 9.6*   HCT 29.7*        Recent Labs     05/17/22  0746 05/19/22  0435    139   K 4.0 4.3   CL 99 93*   CO2 30 36*   BUN 29* 27*   CREATININE 1.1 1.3*   CALCIUM 9.1 9.6     No results for input(s): AST, ALT, BILIDIR, BILITOT, ALKPHOS in the last 72 hours. No results for input(s): INR in the last 72 hours. No results for input(s): Kevan Alexandre in the last 72 hours.          Pro-BNP 32,444 High  pg/mL    Comment: Methodology by NT-proBNP           Urinalysis:      Lab Results   Component Value Date    NITRU Negative 03/31/2022    WBCUA 6 03/30/2022    BACTERIA 4+ 03/30/2022    RBCUA 1 03/30/2022    BLOODU Negative 03/31/2022    SPECGRAV 1.010 03/31/2022    GLUCOSEU >=1000 03/31/2022       Radiology:  XR CHEST PORTABLE   Final Result   Moderate cardiomegaly with mild interstitial congestion, with mild   improvement in the congestive changes. Persistent small left pleural   effusion with left lung base atelectasis versus infiltrate. No pneumothorax. XR CHEST (2 VW)   Final Result   Unchanged moderate to severe CHF pattern pulmonary vascular congestion. Moderate left pleural effusion with left basilar infiltrate or atelectasis. XR CHEST PORTABLE   Final Result   Moderate increasing left lower lobe airspace disease, concern for pneumonia   possibly postobstructive given CT scan findings. .      Vascular congestion and suspected early edema. Assessment/Plan:    1  Acute hypoxic hypercarbic resp failure      Improved with diuresis, bridged to p.o. if okay with cardiology      Also on prednisone for possible pneumonitis. Repeat chest x-ray       2  Post obstructive pna-doubt its worsening pna      Did well at [facility] and feel ok to continue ceftin      procalcitonin is low. Leukocytosis likely from steroids        3  Small cell lung cancer S/P Carbo/-16/Tecentriq last week    4  Acute on chronic systolic hf. Ejection fraction 25%, stage III NYHA      Primacor was discontinued on 5/18      Currently on Lasix 60 twice 3 times daily, switch to p.o. when okay with cardiology      Spironolactone added      Beta-blocker on hold per cardiology      Not on ACE inhibitors due to allergy      DVT Prophylaxis: lovenox  Diet: ADULT DIET; Regular; Low Fat/Low Chol/High Fiber/2 gm Na; 1500 ml  Code Status: Full Code      Not ready for dc  HHC at [facility]    Dispo -2-3 more days.      Suzanne Espino MD

## 2022-05-23 ENCOUNTER — FOLLOWUP TELEPHONE ENCOUNTER (OUTPATIENT)
Dept: INPATIENT UNIT | Age: 62
End: 2022-05-23

## 2022-05-23 NOTE — PROGRESS NOTES
LATE ENTRY:    This HF RN called pt on Sunday 5/22 for a hospital 72 hour heart failure follow up call (late entry due to no access to Epic over weekend). I made x3 attempts. I was unable to reach pt. I was able to leave a message. I also reminded pt (at that time of VM) of her appt 5/23 at 1:00 with Dr Devon Espinosa for cardiology hospital follow up appt. This appt was on her AVS at time of discharge.

## 2022-05-27 NOTE — DISCHARGE SUMMARY
Hospital Medicine Discharge Summary    Patient ID: Shreyas Person      Patient's PCP: Jeison Woodall MD    Admit Date: 5/10/2022     Discharge Date: 5/13/2022      Admitting Provider: Walter Young MD     Discharge Provider: Christine Gibson MD     Discharge Diagnoses:  Extensive small cell lung cancer  Postobstructive pneumonia  Leukopenia due to chemotherapy  Anemia  COPD  Chronic systolic heart failure     Active Hospital Problems    Diagnosis     Respiratory failure [facility]) [J96.90]      Priority: Medium       The patient was seen and examined on day of discharge and this discharge summary is in conjunction with any daily progress note from day of discharge. Hospital Course: Patient is 20-year-old female with history of small cell lung cancer who recently started chemotherapy on 5/9/2022 who presented to the hospital with shortness of breath and cough. Patient had respiratory failure and required BiPAP. She was treated for a postobstructive gram-negative pneumonia. Her course was complicated by some leukopenia for which she required Granix. Patient responded to antibiotic therapy and at the time of discharge was breathing better. She her white count was improving. She was discharged home with outpatient follow-up with Dr. Char Jain and her PCP        Physical Exam Performed:     /63   Pulse 63   Temp 97.4 °F (36.3 °C) (Oral)   Resp 18   Ht 5' 7\" (1.702 m)   Wt 218 lb 11.1 oz (99.2 kg)   SpO2 95%   BMI 34.25 kg/m²       General appearance:  No apparent distress, appears stated age and cooperative. HEENT:  Normal cephalic, atraumatic without obvious deformity. Pupils equal, round, and reactive to light. Extra ocular muscles intact. Conjunctivae/corneas clear. Neck: Supple, with full range of motion. No jugular venous distention. Trachea midline. Respiratory:  Normal respiratory effort. Clear to auscultation, bilaterally without Rales/Wheezes/Rhonchi.   Cardiovascular:  Regular rate and rhythm with normal S1/S2 without murmurs, rubs or gallops. Abdomen: Soft, non-tender, non-distended with normal bowel sounds. Musculoskeletal:  No clubbing, cyanosis or edema bilaterally. Full range of motion without deformity. Skin: Skin color, texture, turgor normal.  No rashes or lesions. Neurologic:  Neurovascularly intact without any focal sensory/motor deficits. Cranial nerves: II-XII intact, grossly non-focal.  Psychiatric:  Alert and oriented, thought content appropriate, normal insight  Capillary Refill: Brisk,< 3 seconds   Peripheral Pulses: +2 palpable, equal bilaterally       Labs: For convenience and continuity at follow-up the following most recent labs are provided:      CBC:    Lab Results   Component Value Date    WBC 2.3 05/19/2022    HGB 9.6 05/19/2022    HCT 29.7 05/19/2022     05/19/2022       Renal:    Lab Results   Component Value Date     05/19/2022    K 4.3 05/19/2022    K 4.3 05/15/2022    CL 93 05/19/2022    CO2 36 05/19/2022    BUN 27 05/19/2022    CREATININE 1.3 05/19/2022    CALCIUM 9.6 05/19/2022    PHOS 4.0 12/20/2021         Significant Diagnostic Studies    Radiology:   CT CHEST PULMONARY EMBOLISM W CONTRAST   Final Result   No evidence of pulmonary embolism. 3.2 cm mass in the inferior left lower lobe with surrounding area of patchy   consolidation which could represent disease extension versus superimposed   airspace disease process. Small bilateral pleural effusions. Left hilar   adenopathy as well as multiple enlarged upper mediastinal lymph nodes. XR CHEST PORTABLE   Final Result      1. Degree of vascular congestion without a definite focal pulmonary   infiltrate. 2.  Mild cardiomegaly, unchanged. Consults:     IP CONSULT TO ONCOLOGY  IP CONSULT TO HOME CARE NEEDS    Disposition:  home    Condition at Discharge: Stable    Discharge Instructions/Follow-up:  [facility] in 1 week.  Charles Robb MD in 1 week      Code Status:  full    Activity: activity as tolerated    Diet: cardiac diet      Discharge Medications:     Discharge Medication List as of 5/13/2022  3:44 PM           Details   cefUROXime (CEFTIN) 250 MG tablet Take 1 tablet by mouth 2 times daily for 7 days, Disp-14 tablet, R-0Normal              Details   empagliflozin (JARDIANCE) 10 MG tablet Take 10 mg by mouth dailyHistorical Med      umeclidinium-vilanterol (ANORO ELLIPTA) 62.5-25 MCG/INH AEPB inhaler Inhale 1 puff into the lungs dailyHistorical Med      Vericiguat (VERQUVO) 5 MG TABS Take 5 mg by mouth every morningHistorical Med      ondansetron (ZOFRAN) 8 MG tablet Take 8 mg by mouth every 8 hours as needed for Nausea or Vomiting (POST CHEMO NAUSEA)Historical Med      prochlorperazine (COMPAZINE) 10 MG tablet Take 10 mg by mouth every 8 hours as needed (POST CHEMO NAUSEA)Historical Med      potassium chloride (MICRO-K) 10 MEQ extended release capsule Take 10 mEq by mouth dailyHistorical Med      albuterol (PROVENTIL) (2.5 MG/3ML) 0.083% nebulizer solution USE 1 VIAL VIA NEBULIZER EVERY 4 HOURS AS NEEDED DX J44.9Historical Med      amiodarone (CORDARONE) 200 MG tablet TAKE 1 TABLET BY MOUTH EVERY DAYHistorical Med      ASPIRIN LOW DOSE 81 MG EC tablet TAKE 1 TABLET BY MOUTH EVERY DAY, DAWHistorical Med      carvedilol (COREG) 6.25 MG tablet TAKE 1 TABLET BY MOUTH TWICE A DAY WITH MEALSHistorical Med      pantoprazole (PROTONIX) 40 MG tablet TAKE 1 TABLET BY MOUTH EVERY DAYHistorical Med      torsemide (DEMADEX) 20 MG tablet Take 40 mg by mouth in the morning and at bedtime Historical Med      clopidogrel (PLAVIX) 75 MG tablet Take 1 tablet by mouth daily, Disp-30 tablet, R-3Normal      atorvastatin (LIPITOR) 40 MG tablet Take 40 mg by mouth nightly Historical Med      magnesium oxide (MAG-OX) 400 MG tablet Take 400 mg by mouth 2 times daily Historical Med      gabapentin (NEURONTIN) 600 MG tablet Take 600 mg by mouth 3 times daily.  Historical Med      albuterol (PROVENTIL HFA) 108 (90 BASE) MCG/ACT inhaler Inhale 2 puffs into the lungs every 4 hours as needed for Wheezing or Shortness of Breath Historical Med             Time Spent on discharge is more than 45 minutes in the examination, evaluation, counseling and review of medications and discharge plan. Signed:    Jazzy Frazier MD   5/27/2022      Thank you Mone Chacko MD for the opportunity to be involved in this patient's care. If you have any questions or concerns, please feel free to contact me at [phone].

## 2022-06-10 ENCOUNTER — HOSPITAL ENCOUNTER (OUTPATIENT)
Age: 62
Discharge: HOME | End: 2022-06-10
Payer: MEDICARE

## 2022-06-10 ENCOUNTER — HOSPITAL ENCOUNTER (OUTPATIENT)
Dept: GENERAL RADIOLOGY | Age: 62
Discharge: HOME | End: 2022-06-10
Payer: MEDICARE

## 2022-06-10 DIAGNOSIS — R06.02 SHORTNESS OF BREATH: ICD-10-CM

## 2022-06-10 PROCEDURE — 71046 X-RAY EXAM CHEST 2 VIEWS: CPT

## 2022-06-15 ENCOUNTER — HOSPITAL ENCOUNTER (INPATIENT)
Age: 62
LOS: 6 days | Discharge: HOME HEALTH | DRG: 208 | End: 2022-06-21
Attending: EMERGENCY MEDICINE | Admitting: STUDENT IN AN ORGANIZED HEALTH CARE EDUCATION/TRAINING PROGRAM
Payer: MEDICARE

## 2022-06-15 ENCOUNTER — APPOINTMENT (OUTPATIENT)
Dept: GENERAL RADIOLOGY | Age: 62
DRG: 208 | End: 2022-06-15
Payer: MEDICARE

## 2022-06-15 DIAGNOSIS — I50.23 ACUTE ON CHRONIC SYSTOLIC CONGESTIVE HEART FAILURE (HCC): ICD-10-CM

## 2022-06-15 DIAGNOSIS — R06.03 RESPIRATORY DISTRESS: Primary | ICD-10-CM

## 2022-06-15 DIAGNOSIS — Z85.118 HISTORY OF LUNG CANCER: ICD-10-CM

## 2022-06-15 PROBLEM — J96.00 ACUTE RESPIRATORY FAILURE (HCC): Status: ACTIVE | Noted: 2022-06-15

## 2022-06-15 LAB
A/G RATIO: 1.9 (ref 1.1–2.2)
ALBUMIN SERPL-MCNC: 4.1 G/DL (ref 3.4–5)
ALP BLD-CCNC: 83 U/L (ref 40–129)
ALT SERPL-CCNC: 12 U/L (ref 10–40)
ANION GAP SERPL CALCULATED.3IONS-SCNC: 15 MMOL/L (ref 3–16)
AST SERPL-CCNC: 15 U/L (ref 15–37)
BASE EXCESS VENOUS: -8.3 MMOL/L
BASOPHILS ABSOLUTE: 0 K/UL (ref 0–0.2)
BASOPHILS RELATIVE PERCENT: 0.8 %
BILIRUB SERPL-MCNC: 0.6 MG/DL (ref 0–1)
BUN BLDV-MCNC: 21 MG/DL (ref 7–20)
CALCIUM SERPL-MCNC: 8.9 MG/DL (ref 8.3–10.6)
CARBOXYHEMOGLOBIN: 1.9 %
CHLORIDE BLD-SCNC: 100 MMOL/L (ref 99–110)
CO2: 24 MMOL/L (ref 21–32)
CREAT SERPL-MCNC: 1.6 MG/DL (ref 0.6–1.2)
EOSINOPHILS ABSOLUTE: 0.1 K/UL (ref 0–0.6)
EOSINOPHILS RELATIVE PERCENT: 1.8 %
GFR AFRICAN AMERICAN: 40
GFR NON-AFRICAN AMERICAN: 33
GLUCOSE BLD-MCNC: 246 MG/DL (ref 70–99)
HCO3 VENOUS: 18 MMOL/L (ref 23–29)
HCT VFR BLD CALC: 29.7 % (ref 36–48)
HEMOGLOBIN: 9.2 G/DL (ref 12–16)
LACTIC ACID: 2.4 MMOL/L (ref 0.4–2)
LYMPHOCYTES ABSOLUTE: 1 K/UL (ref 1–5.1)
LYMPHOCYTES RELATIVE PERCENT: 15.7 %
MCH RBC QN AUTO: 23 PG (ref 26–34)
MCHC RBC AUTO-ENTMCNC: 30.8 G/DL (ref 31–36)
MCV RBC AUTO: 74.5 FL (ref 80–100)
METHEMOGLOBIN VENOUS: 0 %
MONOCYTES ABSOLUTE: 0.3 K/UL (ref 0–1.3)
MONOCYTES RELATIVE PERCENT: 5.1 %
NEUTROPHILS ABSOLUTE: 4.7 K/UL (ref 1.7–7.7)
NEUTROPHILS RELATIVE PERCENT: 76.6 %
O2 SAT, VEN: 82 %
O2 THERAPY: ABNORMAL
PCO2, VEN: 39.5 MMHG (ref 40–50)
PDW BLD-RTO: 21.7 % (ref 12.4–15.4)
PH VENOUS: 7.26 (ref 7.35–7.45)
PLATELET # BLD: 171 K/UL (ref 135–450)
PMV BLD AUTO: 9.6 FL (ref 5–10.5)
PO2, VEN: 55 MMHG
POTASSIUM REFLEX MAGNESIUM: 3.9 MMOL/L (ref 3.5–5.1)
PRO-BNP: ABNORMAL PG/ML (ref 0–124)
RBC # BLD: 3.99 M/UL (ref 4–5.2)
SODIUM BLD-SCNC: 139 MMOL/L (ref 136–145)
TCO2 CALC VENOUS: 19 MMOL/L
TOTAL PROTEIN: 6.3 G/DL (ref 6.4–8.2)
TROPONIN: 0.02 NG/ML
WBC # BLD: 6.1 K/UL (ref 4–11)

## 2022-06-15 PROCEDURE — 83880 ASSAY OF NATRIURETIC PEPTIDE: CPT

## 2022-06-15 PROCEDURE — 2000000000 HC ICU R&B

## 2022-06-15 PROCEDURE — 82803 BLOOD GASES ANY COMBINATION: CPT

## 2022-06-15 PROCEDURE — 93005 ELECTROCARDIOGRAM TRACING: CPT | Performed by: EMERGENCY MEDICINE

## 2022-06-15 PROCEDURE — 94760 N-INVAS EAR/PLS OXIMETRY 1: CPT

## 2022-06-15 PROCEDURE — 83605 ASSAY OF LACTIC ACID: CPT

## 2022-06-15 PROCEDURE — 80053 COMPREHEN METABOLIC PANEL: CPT

## 2022-06-15 PROCEDURE — 96374 THER/PROPH/DIAG INJ IV PUSH: CPT

## 2022-06-15 PROCEDURE — 2580000003 HC RX 258: Performed by: EMERGENCY MEDICINE

## 2022-06-15 PROCEDURE — 85025 COMPLETE CBC W/AUTO DIFF WBC: CPT

## 2022-06-15 PROCEDURE — 36415 COLL VENOUS BLD VENIPUNCTURE: CPT

## 2022-06-15 PROCEDURE — 6370000000 HC RX 637 (ALT 250 FOR IP): Performed by: EMERGENCY MEDICINE

## 2022-06-15 PROCEDURE — 6360000002 HC RX W HCPCS: Performed by: EMERGENCY MEDICINE

## 2022-06-15 PROCEDURE — 84484 ASSAY OF TROPONIN QUANT: CPT

## 2022-06-15 PROCEDURE — 99285 EMERGENCY DEPT VISIT HI MDM: CPT

## 2022-06-15 PROCEDURE — 2700000000 HC OXYGEN THERAPY PER DAY

## 2022-06-15 PROCEDURE — 71045 X-RAY EXAM CHEST 1 VIEW: CPT

## 2022-06-15 PROCEDURE — 94660 CPAP INITIATION&MGMT: CPT

## 2022-06-15 PROCEDURE — 94640 AIRWAY INHALATION TREATMENT: CPT

## 2022-06-15 PROCEDURE — 96375 TX/PRO/DX INJ NEW DRUG ADDON: CPT

## 2022-06-15 RX ORDER — FUROSEMIDE 10 MG/ML
40 INJECTION INTRAMUSCULAR; INTRAVENOUS ONCE
Status: COMPLETED | OUTPATIENT
Start: 2022-06-15 | End: 2022-06-15

## 2022-06-15 RX ORDER — FENTANYL CITRATE-0.9 % NACL/PF 10 MCG/ML
25-200 PLASTIC BAG, INJECTION (ML) INTRAVENOUS CONTINUOUS
Status: DISCONTINUED | OUTPATIENT
Start: 2022-06-16 | End: 2022-06-16

## 2022-06-15 RX ORDER — IPRATROPIUM BROMIDE AND ALBUTEROL SULFATE 2.5; .5 MG/3ML; MG/3ML
1 SOLUTION RESPIRATORY (INHALATION) ONCE
Status: COMPLETED | OUTPATIENT
Start: 2022-06-15 | End: 2022-06-15

## 2022-06-15 RX ORDER — PROPOFOL 10 MG/ML
5-50 INJECTION, EMULSION INTRAVENOUS
Status: DISCONTINUED | OUTPATIENT
Start: 2022-06-16 | End: 2022-06-17

## 2022-06-15 RX ORDER — METHYLPREDNISOLONE SODIUM SUCCINATE 125 MG/2ML
125 INJECTION, POWDER, LYOPHILIZED, FOR SOLUTION INTRAMUSCULAR; INTRAVENOUS ONCE
Status: COMPLETED | OUTPATIENT
Start: 2022-06-15 | End: 2022-06-15

## 2022-06-15 RX ORDER — MILRINONE LACTATE 0.2 MG/ML
.0625-.75 INJECTION, SOLUTION INTRAVENOUS CONTINUOUS
Status: DISCONTINUED | OUTPATIENT
Start: 2022-06-15 | End: 2022-06-17

## 2022-06-15 RX ADMIN — FUROSEMIDE 5 MG/HR: 10 INJECTION, SOLUTION INTRAMUSCULAR; INTRAVENOUS at 23:17

## 2022-06-15 RX ADMIN — MILRINONE LACTATE IN DEXTROSE 0.38 MCG/KG/MIN: 200 INJECTION, SOLUTION INTRAVENOUS at 23:20

## 2022-06-15 RX ADMIN — IPRATROPIUM BROMIDE AND ALBUTEROL SULFATE 1 AMPULE: .5; 3 SOLUTION RESPIRATORY (INHALATION) at 21:20

## 2022-06-15 RX ADMIN — IPRATROPIUM BROMIDE AND ALBUTEROL SULFATE 1 AMPULE: .5; 3 SOLUTION RESPIRATORY (INHALATION) at 22:43

## 2022-06-15 RX ADMIN — FUROSEMIDE 40 MG: 10 INJECTION, SOLUTION INTRAMUSCULAR; INTRAVENOUS at 21:39

## 2022-06-15 RX ADMIN — METHYLPREDNISOLONE SODIUM SUCCINATE 125 MG: 125 INJECTION, POWDER, FOR SOLUTION INTRAMUSCULAR; INTRAVENOUS at 21:39

## 2022-06-16 ENCOUNTER — APPOINTMENT (OUTPATIENT)
Dept: GENERAL RADIOLOGY | Age: 62
DRG: 208 | End: 2022-06-16
Payer: MEDICARE

## 2022-06-16 ENCOUNTER — APPOINTMENT (OUTPATIENT)
Dept: CT IMAGING | Age: 62
DRG: 208 | End: 2022-06-16
Payer: MEDICARE

## 2022-06-16 LAB
A/G RATIO: 1.2 (ref 1.1–2.2)
ALBUMIN SERPL-MCNC: 3.2 G/DL (ref 3.4–5)
ALP BLD-CCNC: 79 U/L (ref 40–129)
ALT SERPL-CCNC: 12 U/L (ref 10–40)
ANION GAP SERPL CALCULATED.3IONS-SCNC: 16 MMOL/L (ref 3–16)
AST SERPL-CCNC: 13 U/L (ref 15–37)
BASOPHILS ABSOLUTE: 0 K/UL (ref 0–0.2)
BASOPHILS RELATIVE PERCENT: 0.2 %
BILIRUB SERPL-MCNC: 0.7 MG/DL (ref 0–1)
BUN BLDV-MCNC: 23 MG/DL (ref 7–20)
CALCIUM SERPL-MCNC: 9 MG/DL (ref 8.3–10.6)
CHLORIDE BLD-SCNC: 100 MMOL/L (ref 99–110)
CO2: 22 MMOL/L (ref 21–32)
CREAT SERPL-MCNC: 2 MG/DL (ref 0.6–1.2)
EKG ATRIAL RATE: 101 BPM
EKG DIAGNOSIS: NORMAL
EKG P AXIS: 104 DEGREES
EKG Q-T INTERVAL: 492 MS
EKG QRS DURATION: 208 MS
EKG QTC CALCULATION (BAZETT): 608 MS
EKG R AXIS: -72 DEGREES
EKG T AXIS: 111 DEGREES
EKG VENTRICULAR RATE: 92 BPM
EOSINOPHILS ABSOLUTE: 0 K/UL (ref 0–0.6)
EOSINOPHILS RELATIVE PERCENT: 0 %
GFR AFRICAN AMERICAN: 31
GFR NON-AFRICAN AMERICAN: 25
GLUCOSE BLD-MCNC: 144 MG/DL (ref 70–99)
GLUCOSE BLD-MCNC: 148 MG/DL (ref 70–99)
GLUCOSE BLD-MCNC: 151 MG/DL (ref 70–99)
GLUCOSE BLD-MCNC: 171 MG/DL (ref 70–99)
GLUCOSE BLD-MCNC: 277 MG/DL (ref 70–99)
HCT VFR BLD CALC: 27.7 % (ref 36–48)
HEMOGLOBIN: 8.8 G/DL (ref 12–16)
LYMPHOCYTES ABSOLUTE: 0.3 K/UL (ref 1–5.1)
LYMPHOCYTES RELATIVE PERCENT: 4.9 %
MAGNESIUM: 2.5 MG/DL (ref 1.8–2.4)
MCH RBC QN AUTO: 23.2 PG (ref 26–34)
MCHC RBC AUTO-ENTMCNC: 31.8 G/DL (ref 31–36)
MCV RBC AUTO: 72.8 FL (ref 80–100)
MONOCYTES ABSOLUTE: 0.1 K/UL (ref 0–1.3)
MONOCYTES RELATIVE PERCENT: 1.6 %
NEUTROPHILS ABSOLUTE: 5.1 K/UL (ref 1.7–7.7)
NEUTROPHILS RELATIVE PERCENT: 93.3 %
PDW BLD-RTO: 21.5 % (ref 12.4–15.4)
PERFORMED ON: ABNORMAL
PLATELET # BLD: 156 K/UL (ref 135–450)
PLATELET SLIDE REVIEW: ADEQUATE
PMV BLD AUTO: 9.5 FL (ref 5–10.5)
POTASSIUM SERPL-SCNC: 3.7 MMOL/L (ref 3.5–5.1)
PROCALCITONIN: 0.44 NG/ML (ref 0–0.15)
RBC # BLD: 3.81 M/UL (ref 4–5.2)
SLIDE REVIEW: ABNORMAL
SODIUM BLD-SCNC: 138 MMOL/L (ref 136–145)
TOTAL PROTEIN: 5.9 G/DL (ref 6.4–8.2)
TROPONIN: 0.02 NG/ML
TROPONIN: 0.03 NG/ML
WBC # BLD: 5.5 K/UL (ref 4–11)

## 2022-06-16 PROCEDURE — APPNB15 APP NON BILLABLE TIME 0-15 MINS: Performed by: NURSE PRACTITIONER

## 2022-06-16 PROCEDURE — 5A1945Z RESPIRATORY VENTILATION, 24-96 CONSECUTIVE HOURS: ICD-10-PCS | Performed by: STUDENT IN AN ORGANIZED HEALTH CARE EDUCATION/TRAINING PROGRAM

## 2022-06-16 PROCEDURE — 6360000002 HC RX W HCPCS: Performed by: STUDENT IN AN ORGANIZED HEALTH CARE EDUCATION/TRAINING PROGRAM

## 2022-06-16 PROCEDURE — 36415 COLL VENOUS BLD VENIPUNCTURE: CPT

## 2022-06-16 PROCEDURE — 84484 ASSAY OF TROPONIN QUANT: CPT

## 2022-06-16 PROCEDURE — 6370000000 HC RX 637 (ALT 250 FOR IP): Performed by: INTERNAL MEDICINE

## 2022-06-16 PROCEDURE — 85025 COMPLETE CBC W/AUTO DIFF WBC: CPT

## 2022-06-16 PROCEDURE — 0BH18EZ INSERTION OF ENDOTRACHEAL AIRWAY INTO TRACHEA, VIA NATURAL OR ARTIFICIAL OPENING ENDOSCOPIC: ICD-10-PCS | Performed by: STUDENT IN AN ORGANIZED HEALTH CARE EDUCATION/TRAINING PROGRAM

## 2022-06-16 PROCEDURE — 83735 ASSAY OF MAGNESIUM: CPT

## 2022-06-16 PROCEDURE — 99222 1ST HOSP IP/OBS MODERATE 55: CPT | Performed by: INTERNAL MEDICINE

## 2022-06-16 PROCEDURE — 84145 PROCALCITONIN (PCT): CPT

## 2022-06-16 PROCEDURE — 99291 CRITICAL CARE FIRST HOUR: CPT | Performed by: INTERNAL MEDICINE

## 2022-06-16 PROCEDURE — 2500000003 HC RX 250 WO HCPCS: Performed by: NURSE PRACTITIONER

## 2022-06-16 PROCEDURE — 2580000003 HC RX 258: Performed by: NURSE PRACTITIONER

## 2022-06-16 PROCEDURE — 6370000000 HC RX 637 (ALT 250 FOR IP): Performed by: STUDENT IN AN ORGANIZED HEALTH CARE EDUCATION/TRAINING PROGRAM

## 2022-06-16 PROCEDURE — 93010 ELECTROCARDIOGRAM REPORT: CPT | Performed by: INTERNAL MEDICINE

## 2022-06-16 PROCEDURE — C9113 INJ PANTOPRAZOLE SODIUM, VIA: HCPCS | Performed by: NURSE PRACTITIONER

## 2022-06-16 PROCEDURE — 94002 VENT MGMT INPAT INIT DAY: CPT

## 2022-06-16 PROCEDURE — 6360000002 HC RX W HCPCS

## 2022-06-16 PROCEDURE — 6370000000 HC RX 637 (ALT 250 FOR IP)

## 2022-06-16 PROCEDURE — 2500000003 HC RX 250 WO HCPCS: Performed by: STUDENT IN AN ORGANIZED HEALTH CARE EDUCATION/TRAINING PROGRAM

## 2022-06-16 PROCEDURE — 6370000000 HC RX 637 (ALT 250 FOR IP): Performed by: NURSE PRACTITIONER

## 2022-06-16 PROCEDURE — 31500 INSERT EMERGENCY AIRWAY: CPT

## 2022-06-16 PROCEDURE — C9113 INJ PANTOPRAZOLE SODIUM, VIA: HCPCS

## 2022-06-16 PROCEDURE — 2700000000 HC OXYGEN THERAPY PER DAY

## 2022-06-16 PROCEDURE — 2500000003 HC RX 250 WO HCPCS: Performed by: INTERNAL MEDICINE

## 2022-06-16 PROCEDURE — 71045 X-RAY EXAM CHEST 1 VIEW: CPT

## 2022-06-16 PROCEDURE — 87641 MR-STAPH DNA AMP PROBE: CPT

## 2022-06-16 PROCEDURE — 80053 COMPREHEN METABOLIC PANEL: CPT

## 2022-06-16 PROCEDURE — 94761 N-INVAS EAR/PLS OXIMETRY MLT: CPT

## 2022-06-16 PROCEDURE — 2580000003 HC RX 258: Performed by: STUDENT IN AN ORGANIZED HEALTH CARE EDUCATION/TRAINING PROGRAM

## 2022-06-16 PROCEDURE — 2000000000 HC ICU R&B

## 2022-06-16 PROCEDURE — 6360000002 HC RX W HCPCS: Performed by: NURSE PRACTITIONER

## 2022-06-16 PROCEDURE — 71250 CT THORAX DX C-: CPT

## 2022-06-16 RX ORDER — POTASSIUM CHLORIDE 29.8 MG/ML
20 INJECTION INTRAVENOUS ONCE
Status: COMPLETED | OUTPATIENT
Start: 2022-06-16 | End: 2022-06-16

## 2022-06-16 RX ORDER — ONDANSETRON 2 MG/ML
4 INJECTION INTRAMUSCULAR; INTRAVENOUS EVERY 6 HOURS PRN
Status: DISCONTINUED | OUTPATIENT
Start: 2022-06-16 | End: 2022-06-21 | Stop reason: HOSPADM

## 2022-06-16 RX ORDER — POTASSIUM CHLORIDE 29.8 MG/ML
INJECTION INTRAVENOUS
Status: COMPLETED
Start: 2022-06-16 | End: 2022-06-16

## 2022-06-16 RX ORDER — FENTANYL CITRATE-0.9 % NACL/PF 10 MCG/ML
25-200 PLASTIC BAG, INJECTION (ML) INTRAVENOUS CONTINUOUS
Status: DISCONTINUED | OUTPATIENT
Start: 2022-06-16 | End: 2022-06-17

## 2022-06-16 RX ORDER — ATORVASTATIN CALCIUM 10 MG/1
10 TABLET, FILM COATED ORAL NIGHTLY
Status: DISCONTINUED | OUTPATIENT
Start: 2022-06-16 | End: 2022-06-21 | Stop reason: HOSPADM

## 2022-06-16 RX ORDER — ATORVASTATIN CALCIUM 40 MG/1
40 TABLET, FILM COATED ORAL NIGHTLY
Status: DISCONTINUED | OUTPATIENT
Start: 2022-06-16 | End: 2022-06-16

## 2022-06-16 RX ORDER — PANTOPRAZOLE SODIUM 40 MG/10ML
INJECTION, POWDER, LYOPHILIZED, FOR SOLUTION INTRAVENOUS
Status: COMPLETED
Start: 2022-06-16 | End: 2022-06-16

## 2022-06-16 RX ORDER — SODIUM CHLORIDE 9 MG/ML
INJECTION, SOLUTION INTRAVENOUS PRN
Status: DISCONTINUED | OUTPATIENT
Start: 2022-06-16 | End: 2022-06-21 | Stop reason: HOSPADM

## 2022-06-16 RX ORDER — CHLORHEXIDINE GLUCONATE 0.12 MG/ML
RINSE ORAL
Status: COMPLETED
Start: 2022-06-16 | End: 2022-06-16

## 2022-06-16 RX ORDER — AMIODARONE HYDROCHLORIDE 200 MG/1
200 TABLET ORAL DAILY
Status: DISCONTINUED | OUTPATIENT
Start: 2022-06-16 | End: 2022-06-21 | Stop reason: HOSPADM

## 2022-06-16 RX ORDER — ASPIRIN 81 MG/1
81 TABLET, CHEWABLE ORAL DAILY
Status: DISCONTINUED | OUTPATIENT
Start: 2022-06-16 | End: 2022-06-21 | Stop reason: HOSPADM

## 2022-06-16 RX ORDER — ASPIRIN 81 MG/1
TABLET, CHEWABLE ORAL
Status: COMPLETED
Start: 2022-06-16 | End: 2022-06-16

## 2022-06-16 RX ORDER — SODIUM CHLORIDE 0.9 % (FLUSH) 0.9 %
5-40 SYRINGE (ML) INJECTION EVERY 12 HOURS SCHEDULED
Status: DISCONTINUED | OUTPATIENT
Start: 2022-06-16 | End: 2022-06-21 | Stop reason: HOSPADM

## 2022-06-16 RX ORDER — DEXTROSE MONOHYDRATE 50 MG/ML
100 INJECTION, SOLUTION INTRAVENOUS PRN
Status: DISCONTINUED | OUTPATIENT
Start: 2022-06-16 | End: 2022-06-21 | Stop reason: HOSPADM

## 2022-06-16 RX ORDER — CLOPIDOGREL BISULFATE 75 MG/1
75 TABLET ORAL DAILY
Status: DISCONTINUED | OUTPATIENT
Start: 2022-06-16 | End: 2022-06-16

## 2022-06-16 RX ORDER — INSULIN LISPRO 100 [IU]/ML
0-6 INJECTION, SOLUTION INTRAVENOUS; SUBCUTANEOUS EVERY 4 HOURS
Status: DISCONTINUED | OUTPATIENT
Start: 2022-06-16 | End: 2022-06-16

## 2022-06-16 RX ORDER — HEPARIN SODIUM 5000 [USP'U]/ML
5000 INJECTION, SOLUTION INTRAVENOUS; SUBCUTANEOUS EVERY 8 HOURS SCHEDULED
Status: DISCONTINUED | OUTPATIENT
Start: 2022-06-16 | End: 2022-06-18

## 2022-06-16 RX ORDER — METRONIDAZOLE 500 MG/100ML
500 INJECTION, SOLUTION INTRAVENOUS EVERY 8 HOURS
Status: DISCONTINUED | OUTPATIENT
Start: 2022-06-16 | End: 2022-06-17

## 2022-06-16 RX ORDER — GABAPENTIN 300 MG/1
600 CAPSULE ORAL 3 TIMES DAILY
Status: DISCONTINUED | OUTPATIENT
Start: 2022-06-16 | End: 2022-06-21 | Stop reason: HOSPADM

## 2022-06-16 RX ORDER — CHLORHEXIDINE GLUCONATE 0.12 MG/ML
15 RINSE ORAL 2 TIMES DAILY
Status: DISCONTINUED | OUTPATIENT
Start: 2022-06-16 | End: 2022-06-17

## 2022-06-16 RX ORDER — ACETAMINOPHEN 650 MG/1
650 SUPPOSITORY RECTAL EVERY 6 HOURS PRN
Status: DISCONTINUED | OUTPATIENT
Start: 2022-06-16 | End: 2022-06-21 | Stop reason: HOSPADM

## 2022-06-16 RX ORDER — INSULIN LISPRO 100 [IU]/ML
0-12 INJECTION, SOLUTION INTRAVENOUS; SUBCUTANEOUS EVERY 4 HOURS
Status: DISCONTINUED | OUTPATIENT
Start: 2022-06-16 | End: 2022-06-17

## 2022-06-16 RX ORDER — NITROGLYCERIN 20 MG/100ML
5-200 INJECTION INTRAVENOUS CONTINUOUS
Status: DISCONTINUED | OUTPATIENT
Start: 2022-06-16 | End: 2022-06-17

## 2022-06-16 RX ORDER — ASPIRIN 81 MG/1
81 TABLET ORAL DAILY
Status: DISCONTINUED | OUTPATIENT
Start: 2022-06-16 | End: 2022-06-16

## 2022-06-16 RX ORDER — SODIUM CHLORIDE 0.9 % (FLUSH) 0.9 %
5-40 SYRINGE (ML) INJECTION PRN
Status: DISCONTINUED | OUTPATIENT
Start: 2022-06-16 | End: 2022-06-21 | Stop reason: HOSPADM

## 2022-06-16 RX ORDER — ACETAMINOPHEN 325 MG/1
650 TABLET ORAL EVERY 6 HOURS PRN
Status: DISCONTINUED | OUTPATIENT
Start: 2022-06-16 | End: 2022-06-21 | Stop reason: HOSPADM

## 2022-06-16 RX ORDER — METRONIDAZOLE 500 MG/100ML
500 INJECTION, SOLUTION INTRAVENOUS EVERY 8 HOURS
Status: DISCONTINUED | OUTPATIENT
Start: 2022-06-16 | End: 2022-06-16

## 2022-06-16 RX ADMIN — GABAPENTIN 600 MG: 300 CAPSULE ORAL at 11:02

## 2022-06-16 RX ADMIN — CEFEPIME HYDROCHLORIDE 2000 MG: 2 INJECTION, POWDER, FOR SOLUTION INTRAVENOUS at 17:27

## 2022-06-16 RX ADMIN — PROPOFOL 15 MCG/KG/MIN: 10 INJECTION, EMULSION INTRAVENOUS at 06:33

## 2022-06-16 RX ADMIN — PROPOFOL 10 MCG/KG/MIN: 10 INJECTION, EMULSION INTRAVENOUS at 00:10

## 2022-06-16 RX ADMIN — ASPIRIN 81 MG: 81 TABLET, CHEWABLE ORAL at 11:02

## 2022-06-16 RX ADMIN — Medication 10 ML: at 20:18

## 2022-06-16 RX ADMIN — SODIUM CHLORIDE: 9 INJECTION, SOLUTION INTRAVENOUS at 02:36

## 2022-06-16 RX ADMIN — ATORVASTATIN CALCIUM 10 MG: 10 TABLET, FILM COATED ORAL at 20:12

## 2022-06-16 RX ADMIN — CEFEPIME HYDROCHLORIDE 2000 MG: 2 INJECTION, POWDER, FOR SOLUTION INTRAVENOUS at 02:29

## 2022-06-16 RX ADMIN — GABAPENTIN 600 MG: 300 CAPSULE ORAL at 20:12

## 2022-06-16 RX ADMIN — AZITHROMYCIN MONOHYDRATE 500 MG: 500 INJECTION, POWDER, LYOPHILIZED, FOR SOLUTION INTRAVENOUS at 03:35

## 2022-06-16 RX ADMIN — INSULIN LISPRO 2 UNITS: 100 INJECTION, SOLUTION INTRAVENOUS; SUBCUTANEOUS at 20:12

## 2022-06-16 RX ADMIN — INSULIN LISPRO 2 UNITS: 100 INJECTION, SOLUTION INTRAVENOUS; SUBCUTANEOUS at 11:14

## 2022-06-16 RX ADMIN — HEPARIN SODIUM 5000 UNITS: 5000 INJECTION INTRAVENOUS; SUBCUTANEOUS at 04:57

## 2022-06-16 RX ADMIN — METRONIDAZOLE 500 MG: 500 INJECTION, SOLUTION INTRAVENOUS at 02:38

## 2022-06-16 RX ADMIN — AMIODARONE HYDROCHLORIDE 200 MG: 200 TABLET ORAL at 11:03

## 2022-06-16 RX ADMIN — Medication 40 MG: at 11:00

## 2022-06-16 RX ADMIN — HEPARIN SODIUM 5000 UNITS: 5000 INJECTION INTRAVENOUS; SUBCUTANEOUS at 20:17

## 2022-06-16 RX ADMIN — Medication 50 MCG/HR: at 15:24

## 2022-06-16 RX ADMIN — METRONIDAZOLE 500 MG: 500 INJECTION, SOLUTION INTRAVENOUS at 20:03

## 2022-06-16 RX ADMIN — INSULIN LISPRO 2 UNITS: 100 INJECTION, SOLUTION INTRAVENOUS; SUBCUTANEOUS at 17:24

## 2022-06-16 RX ADMIN — Medication 10 ML: at 11:18

## 2022-06-16 RX ADMIN — GABAPENTIN 600 MG: 300 CAPSULE ORAL at 14:34

## 2022-06-16 RX ADMIN — SODIUM CHLORIDE: 9 INJECTION, SOLUTION INTRAVENOUS at 02:26

## 2022-06-16 RX ADMIN — POTASSIUM CHLORIDE 20 MEQ: 29.8 INJECTION, SOLUTION INTRAVENOUS at 11:10

## 2022-06-16 RX ADMIN — INSULIN LISPRO 2 UNITS: 100 INJECTION, SOLUTION INTRAVENOUS; SUBCUTANEOUS at 14:37

## 2022-06-16 RX ADMIN — PROPOFOL 20 MCG/KG/MIN: 10 INJECTION, EMULSION INTRAVENOUS at 23:55

## 2022-06-16 RX ADMIN — HEPARIN SODIUM 5000 UNITS: 5000 INJECTION INTRAVENOUS; SUBCUTANEOUS at 14:35

## 2022-06-16 RX ADMIN — Medication 25 MCG/HR: at 01:54

## 2022-06-16 RX ADMIN — METRONIDAZOLE 500 MG: 500 INJECTION, SOLUTION INTRAVENOUS at 14:39

## 2022-06-16 RX ADMIN — PROPOFOL 20 MCG/KG/MIN: 10 INJECTION, EMULSION INTRAVENOUS at 15:21

## 2022-06-16 RX ADMIN — PANTOPRAZOLE SODIUM: 40 INJECTION, POWDER, FOR SOLUTION INTRAVENOUS at 11:05

## 2022-06-16 RX ADMIN — POTASSIUM CHLORIDE 20 MEQ: 29.8 INJECTION INTRAVENOUS at 11:10

## 2022-06-16 RX ADMIN — FUROSEMIDE 5 MG/HR: 10 INJECTION, SOLUTION INTRAMUSCULAR; INTRAVENOUS at 14:34

## 2022-06-16 RX ADMIN — CHLORHEXIDINE GLUCONATE 15 ML: 1.2 RINSE ORAL at 11:02

## 2022-06-16 RX ADMIN — CHLORHEXIDINE GLUCONATE 15 ML: 1.2 RINSE ORAL at 20:12

## 2022-06-16 RX ADMIN — 0.12% CHLORHEXIDINE GLUCONATE 15 ML: 1.2 RINSE ORAL at 11:02

## 2022-06-16 ASSESSMENT — PULMONARY FUNCTION TESTS
PIF_VALUE: 23
PIF_VALUE: 34
PIF_VALUE: 34
PIF_VALUE: 23
PIF_VALUE: 34
PIF_VALUE: 23
PIF_VALUE: 34
PIF_VALUE: 23
PIF_VALUE: 34
PIF_VALUE: 27
PIF_VALUE: 34
PIF_VALUE: 23
PIF_VALUE: 23
PIF_VALUE: 34
PIF_VALUE: 34
PIF_VALUE: 23
PIF_VALUE: 34
PIF_VALUE: 23
PIF_VALUE: 23
PIF_VALUE: 24
PIF_VALUE: 23
PIF_VALUE: 23
PIF_VALUE: 34
PIF_VALUE: 23
PIF_VALUE: 34
PIF_VALUE: 23
PIF_VALUE: 34
PIF_VALUE: 23
PIF_VALUE: 34
PIF_VALUE: 34
PIF_VALUE: 23
PIF_VALUE: 23
PIF_VALUE: 34
PIF_VALUE: 34
PIF_VALUE: 23
PIF_VALUE: 34
PIF_VALUE: 34
PIF_VALUE: 23
PIF_VALUE: 34
PIF_VALUE: 23
PIF_VALUE: 23
PIF_VALUE: 34
PIF_VALUE: 23
PIF_VALUE: 34
PIF_VALUE: 23
PIF_VALUE: 34
PIF_VALUE: 23
PIF_VALUE: 34
PIF_VALUE: 34
PIF_VALUE: 23
PIF_VALUE: 23
PIF_VALUE: 34
PIF_VALUE: 23
PIF_VALUE: 34
PIF_VALUE: 34
PIF_VALUE: 23
PIF_VALUE: 20
PIF_VALUE: 34
PIF_VALUE: 24
PIF_VALUE: 34
PIF_VALUE: 23
PIF_VALUE: 34
PIF_VALUE: 23
PIF_VALUE: 34
PIF_VALUE: 23
PIF_VALUE: 34
PIF_VALUE: 34
PIF_VALUE: 23
PIF_VALUE: 23
PIF_VALUE: 34
PIF_VALUE: 23
PIF_VALUE: 34
PIF_VALUE: 35
PIF_VALUE: 23
PIF_VALUE: 34
PIF_VALUE: 23
PIF_VALUE: 34
PIF_VALUE: 23
PIF_VALUE: 24
PIF_VALUE: 23
PIF_VALUE: 34
PIF_VALUE: 34

## 2022-06-16 ASSESSMENT — PAIN SCALES - GENERAL
PAINLEVEL_OUTOF10: 0
PAINLEVEL_OUTOF10: 2

## 2022-06-16 ASSESSMENT — PAIN DESCRIPTION - LOCATION: LOCATION: THROAT

## 2022-06-16 ASSESSMENT — ENCOUNTER SYMPTOMS: SHORTNESS OF BREATH: 1

## 2022-06-16 ASSESSMENT — PAIN DESCRIPTION - DESCRIPTORS: DESCRIPTORS: SORE

## 2022-06-16 ASSESSMENT — PAIN DESCRIPTION - PAIN TYPE: TYPE: ACUTE PAIN

## 2022-06-16 NOTE — ED NOTES
Patient tripoding with her body down on top of her legs. OVerbed table placed in front of her with a pillow. Patient continuously states that she feels like she can't breathe. Oxygen at %100. Dr. Ismael Cronin notified.       Tati Garland RN  06/15/22 2160

## 2022-06-16 NOTE — PROGRESS NOTES
Results for Devang Lindsay (MRN [id number]) as of 6/15/2022 22:46   Ref. Range 6/15/2022 21:30   pH, [place] Latest Ref Range: 7.350 - 7.450  7.265 (L)   pCO2, [place] Latest Ref Range: 40.0 - 50.0 mmHg 39.5 (L)   pO2, [place] Latest Ref Range: Not Established mmHg 55   HCO3, Venous Latest Ref Range: 23 - 29 mmol/L 18 (L)     VBG on bipap. Pt is in moderate respiratory distress. Pt is awake but is retracting and leaning forward to breath.

## 2022-06-16 NOTE — ED NOTES
Patient continues to tripod. She is retracting as well. Patient states that if she needed to be intubate after the breathing tx she would be. Dr. Enoch May made aware.       Nacho Harris RN  06/15/22 6260

## 2022-06-16 NOTE — CONSULTS
[facility]  Cardiology Consult Note        CC:      Shortness of breath           HPI:   This is a 65 y.o. female with small cell lung cancer status postchemotherapy, multivessel coronary disease, ischemic cardiomyopathy with EF of 25% and severe MR, history of recurrent heart failure, HLD HTN DM who presents with shortness of breath. Was in acute respiratory distress on BiPAP and had to be intubated. We are asked to see her because of possible CHF. Her creatinine on admission was 1.6 and proBNP was greater than 15,000. Currently the patient is intubated she is on a Lasix drip. Sitting up in bed alert awake communicating. Her breathing is much improved from yesterday. The patient has been on a Lasix drip at 5 mg an hour.   urine output is 1542      Past Medical History:   Diagnosis Date    Asthma     Cardiomyopathy [facility] [address]     CHF (congestive heart failure) (HCC)     COPD (chronic obstructive pulmonary disease) (HCC)     Diabetes mellitus ([facility] [address]     Essential hypertension     Hyperlipidemia     Hypertension     Obesity       Past Surgical History:   Procedure Laterality Date    CARPAL TUNNEL RELEASE      CHOLECYSTECTOMY      CORONARY ANGIOPLASTY WITH STENT PLACEMENT  01/07/2013    Stent LAD    INCONTINENCE SURGERY      IR PORT PLACEMENT EQUAL OR GREATER THAN 5 YEARS Right 05/03/2022    Power port, inserted by Dr. Suzanne Wilkerson, cut at 22    IR PORT PLACEMENT EQUAL OR GREATER THAN 5 YEARS  5/3/2022    IR PORT PLACEMENT EQUAL OR GREATER THAN 5 YEARS 5/3/2022 [facility] PROCEDURES      Family History   Problem Relation Age of Onset    High Blood Pressure Mother     Diabetes Mother     Cancer Mother         thyroid    Stroke Father       Social History     Tobacco Use    Smoking status: Never Smoker    Smokeless tobacco: Never Used   Vaping Use    Vaping Use: Never used   Substance Use Topics    Alcohol use: Not Currently    Drug use: Not Currently      Allergies   Allergen Reactions  Ace Inhibitors     Diclofenac     Losartan     Spironolactone     Vicodin Hp [Hydrocodone-Acetaminophen]     Vicodin [Hydrocodone-Acetaminophen]       amiodarone  200 mg Oral Daily    gabapentin  600 mg Oral TID    sodium chloride flush  5-40 mL IntraVENous 2 times per day    heparin (porcine)  5,000 Units SubCUTAneous 3 times per day    cefepime  2,000 mg IntraVENous Q12H    chlorhexidine  15 mL Mouth/Throat BID    pantoprazole (PROTONIX) 40 mg injection  40 mg IntraVENous Daily    aspirin  81 mg Oral Daily    insulin lispro  0-12 Units SubCUTAneous Q4H    metroNIDAZOLE  500 mg IntraVENous Q8H    atorvastatin  10 mg Oral Nightly       Review of Systems -   Constitutional: Negative for weight gain/loss; malaise, fever  Respiratory: Negative for Asthma;  cough and hemoptysis  Cardiovascular: Negative for palpitations,dizziness   Gastrointestinal: Negative for abd.pain; constipation/diarrhea;    Genitourinary: Negative for stones; hematuria; frequency hesitancy  Integumentt: Negative for rash or pruritis  Hematologic/lymphatic: Negative for blood dyscrasia; leukemia/lymphoma  Musculoskeletal: Negative for Connective tissue disease  Neurological:  Negative for Seizure   Behavioral/Psych:Negative for Bipolar disorder, Schizophrenia; Dementia  Endocrine: negative for thyroid, parathyroid disease      Intake/Output Summary (Last 24 hours) at 6/16/2022 1429  Last data filed at 6/16/2022 0700  Gross per 24 hour   Intake 587.57 ml   Output 890 ml   Net -302.43 ml       Physical Examination:    /68   Pulse 60   Temp 97.8 °F (36.6 °C) (Oral)   Resp 13   Ht 5' 7\" (1.702 m)   Wt 215 lb 9.8 oz (97.8 kg)   SpO2 100%   BMI 33.77 kg/m²    HEENT:  Face: Atraumatic, Conjunctiva: Pink; non icteric,  Mucous Memb:  Moist, No thyromegaly or Lymphadenopathy  Respiratory:  Resp Assessment: Intubated, normal, Resp Auscultation: clear   Cardiovascular: Auscultation: nl S1 & S2, Palpation:  Nl PMI;  No heaves or thrills, JVP:  normal  Abdomen: Soft, non-tender, Normal bowel sounds,  No organomegaly  Extremities: No Cyanosis or Clubbing; Edema none  Neurological: Oriented to time, place, and person, Non-anxious  Psychiatric: Normal mood and affect  Skin: Warm and dry,  No rash seen      Current Facility-Administered Medications: amiodarone (CORDARONE) tablet 200 mg, 200 mg, Oral, Daily  gabapentin (NEURONTIN) capsule 600 mg, 600 mg, Oral, TID  sodium chloride flush 0.9 % injection 5-40 mL, 5-40 mL, IntraVENous, 2 times per day  sodium chloride flush 0.9 % injection 5-40 mL, 5-40 mL, IntraVENous, PRN  0.9 % sodium chloride infusion, , IntraVENous, PRN  acetaminophen (TYLENOL) tablet 650 mg, 650 mg, Oral, Q6H PRN **OR** acetaminophen (TYLENOL) suppository 650 mg, 650 mg, Rectal, Q6H PRN  ondansetron (ZOFRAN) injection 4 mg, 4 mg, IntraVENous, Q6H PRN  heparin (porcine) injection 5,000 Units, 5,000 Units, SubCUTAneous, 3 times per day  [Held by provider] nitroGLYCERIN 50 mg in dextrose 5% 250 mL infusion, 5-200 mcg/min, IntraVENous, Continuous  cefepime (MAXIPIME) 2000 mg IVPB minibag, 2,000 mg, IntraVENous, Q12H  0.9 % sodium chloride infusion, , IntraVENous, PRN  glucose chewable tablet 16 g, 4 tablet, Oral, PRN  dextrose bolus 10% 125 mL, 125 mL, IntraVENous, PRN **OR** dextrose bolus 10% 250 mL, 250 mL, IntraVENous, PRN  glucagon (rDNA) injection 1 mg, 1 mg, IntraMUSCular, PRN  dextrose 5 % solution, 100 mL/hr, IntraVENous, PRN  fentaNYL (SUBLIMAZE) 1,000 mcg in sodium chloride 0.9% 100 mL infusion,  mcg/hr, IntraVENous, Continuous **AND** fentaNYL (SUBLIMAZE) injection 50 mcg, 50 mcg, IntraVENous, Q30 Min PRN  chlorhexidine (PERIDEX) 0.12 % solution 15 mL, 15 mL, Mouth/Throat, BID  pantoprazole (PROTONIX) 40 mg in sodium chloride (PF) 10 mL injection, 40 mg, IntraVENous, Daily  aspirin chewable tablet 81 mg, 81 mg, Oral, Daily  insulin lispro (HUMALOG) injection vial 0-12 Units, 0-12 Units, SubCUTAneous, Q4H  metronidazole (FLAGYL) 500 mg in 0.9% NaCl 100 mL IVPB premix, 500 mg, IntraVENous, Q8H  atorvastatin (LIPITOR) tablet 10 mg, 10 mg, Oral, Nightly  [Held by provider] milrinone (PRIMACOR) 20 mg in dextrose 5 % 100 mL infusion, 0.0625-0.75 mcg/kg/min, IntraVENous, Continuous  furosemide (LASIX) 100 mg in dextrose 5 % 100 mL infusion, 5 mg/hr, IntraVENous, Continuous  propofol injection, 5-50 mcg/kg/min, IntraVENous, Titrated      Labs:   Recent Labs     06/15/22  2130 06/16/22  0422   WBC 6.1 5.5   HGB 9.2* 8.8*   HCT 29.7* 27.7*    156     Recent Labs     06/15/22  2130 06/15/22  2130 06/16/22  0422     --  138   K 3.9  --  3.7   CO2 24  --  22   BUN 21*  --  23*   CREATININE 1.6*  --  2.0*   GLUCOSE 246*   < > 277*    < > = values in this interval not displayed. No results for input(s): BNP in the last 72 hours. No results for input(s): PROTIME, INR in the last 72 hours. No results for input(s): APTT in the last 72 hours. Recent Labs     06/15/22  2130 06/16/22  0144 06/16/22 0422   TROPONINI 0.02* 0.03* 0.02*     Lab Results   Component Value Date    HDL 25 12/11/2021    LDLCALC 36 12/11/2021    TRIG 82 12/11/2021     Recent Labs     06/15/22  2130 06/16/22  0422   AST 15 13*   ALT 12 12   LABALBU 4.1 3.2*         EKG:   AV paced    Chest X-Ray:  1. Lines and tubes as described.  The endotracheal tube tip terminates 1.2 cm   above the kristin.  However, endotracheal tube appears in adequate position on   follow-up CT. 2. Multifocal pneumonia with possible superimposed edema. Echo 10/22/2021:  Left ventricular cavity size is severely dilated. Ejection fraction is visually estimated to be 25-30%. Grade II diastolic dysfunction with elevated LV filling pressures. Moderate to Severe mitral regurgitation. The left atrium is severely dilated. Normal right ventricular size and function. Pacemaker / ICD lead is visualized in the right atrium.   There is a trivial pericardial effusion noted. Stress 2020 ([facility] [address] [facility]): The LV EF is 34%   There is moderate global hypokinesis of the left ventricle. There is a medium sized, reversible defect in the inferior wall consistent   with moderate ischemia. There is a small size, partially reversible defect in the anterior wall   consistent with mild keysha-infarct ischemia. There is a small size, partially reversible defect in the inferolateral wall   consistent with mild keysha-infarct ischemia. Corornary angiogram  & Intervention:12/13/2021    1. Evidence of right-sided volume overload with a right atrial pressure  of 10 mmHg and a pulmonary capillary wedge pressure of 29 mmHg. The  left ventricular end-diastolic pressure is also 30 mmHg. 2.  Pulmonary hypertension with an instantaneous pressure of 50/24 for a  mean pulmonary arterial pressure of 37 mmHg. 3.  Low cardiac output by thermodilution at 3.68 liters per minute with  a cardiac index of 1.64 L/kg per minute. 4.  Pulmonary vascular resistance 113 dynes per second. 5. Low mixed venous oxygen saturations at 54% in the right atrium and  55% in the pulmonary artery. 87% for systemic aortic and systemic  arterial saturation. 6.  Right dominant coronary arterial system with an 80 to 90% mid RCA  lesion. In the left system, there is trivial plaque disease and  calcification of the left main. The circumflex has 25% plaque disease. In the left anterior descending artery, there is a 75% lesion with a  focal calcification in the midportion. There is YESICA 3 flow here in thevessel. 7.  No gradient across the aortic valve on pullback to suggest aortic  stenosis. ASSESSMENT AND PLAN:        58-year-old with severe ischemic cardiomyopathy (25%), advanced small cell lung cancer, possible pneumonia (post obstructive), presented with severe shortness of breath requiring intubation  BNP 15,000.   In the past it has been much higher  Being treated empirically with IV Lasix drip; I concur with its use  Has had modest diuresis with over 1500 out  Breathing is much improved  Asking to be extubated    When she is extubated  She should continue with the torsemide 40 mg twice daily  Also my recommendation would be to decrease the dose of gabapentin as will exacerbate CHF        Aron Carcamo M.D  6/16/2022

## 2022-06-16 NOTE — ED NOTES
30 mg  of etomidate and 60 mg  of jorge drawn for intubation.       Alexandria Velasquez RN  06/15/22 7283

## 2022-06-16 NOTE — PROGRESS NOTES
Pt was on bipap for over a hour with no improvement. Decision was to intubate. Pt was intubated with a 7.5 [email or web address] selina at the lips/ Positive color change. Awaiting xray. Had to place pt on AC/PC due to high peak pressures.  MD aware

## 2022-06-16 NOTE — PROGRESS NOTES
Hospitalist Progress Note      PCP: Sriram Rico MD    Date of Admission: 6/15/2022    Chief Complaint: shortness of breath     Hospital Course:   72-year-old female patient with a past medical history of COPD, hypertension, type 2 diabetes mellitus, hyperlipidemia, CAD, CKD, systolic CHF (EF 25 to 63%). Patient presented with respiratory distress, intubated 6/16    Subjective:   Patient continues to be intubated, however very alert.    Communicates via writing, denies any complaints  Had questions about possible extubation, explained that plan is for possible extubation today    Medications:  Reviewed    Infusion Medications    sodium chloride 5 mL/hr at 06/16/22 0700    [Held by provider] nitroGLYCERIN Stopped (06/16/22 0332)    sodium chloride 5 mL/hr at 06/16/22 0700    dextrose      [Held by provider] milrinone Stopped (06/15/22 2352)    furosemide (LASIX) 1mg/ml infusion 5 mg/hr (06/16/22 0700)    propofol 20 mcg/kg/min (06/16/22 0700)    fentaNYL 50 mcg/hr (06/16/22 0700)     Scheduled Medications    amiodarone  200 mg Oral Daily    aspirin  81 mg Oral Daily    atorvastatin  40 mg Oral Nightly    gabapentin  600 mg Oral TID    sodium chloride flush  5-40 mL IntraVENous 2 times per day    heparin (porcine)  5,000 Units SubCUTAneous 3 times per day    cefepime  2,000 mg IntraVENous Q12H    metroNIDAZOLE  500 mg IntraVENous Q8H    azithromycin  500 mg IntraVENous Q24H    insulin lispro  0-6 Units SubCUTAneous Q4H     PRN Meds: sodium chloride flush, sodium chloride, acetaminophen **OR** acetaminophen, ondansetron, sodium chloride, glucose, dextrose bolus **OR** dextrose bolus, glucagon (rDNA), dextrose, fentaNYL **AND** fentaNYL      Intake/Output Summary (Last 24 hours) at 6/16/2022 0808  Last data filed at 6/16/2022 0700  Gross per 24 hour   Intake 587.57 ml   Output 890 ml   Net -302.43 ml       Physical Exam Performed:    BP (!) 111/55   Pulse 60   Temp 97.8 °F (36.6 °C) (Oral) Resp 14   Wt 215 lb 9.8 oz (97.8 kg)   SpO2 100%   BMI 33.77 kg/m²     General appearance: Intubated however alert HEENT: Pupils equal, round, and reactive to light. Conjunctivae/corneas clear. Neck: Supple, with full range of motion. No jugular venous distention. Trachea midline. Respiratory:  Normal respiratory effort. Clear to auscultation, bilaterally without Rales/Wheezes/Rhonchi. Cardiovascular: Regular rate and rhythm with normal S1/S2 without murmurs, rubs or gallops. Abdomen: Soft, non-tender, non-distended with normal bowel sounds. Musculoskeletal: Lower limb swelling   skin: Skin color, texture, turgor normal.  No rashes or lesions. Neurologic:  Neurovascularly intact without any focal sensory/motor deficits. Cranial nerves: II-XII intact, grossly non-focal.  Psychiatric: Alert and oriented, thought content appropriate, normal insight  Capillary Refill: Brisk,3 seconds, normal   Peripheral Pulses: +2 palpable, equal bilaterally       Labs:   Recent Labs     06/15/22  2130 06/16/22  0422   WBC 6.1 5.5   HGB 9.2* 8.8*   HCT 29.7* 27.7*     --      Recent Labs     06/15/22  2130 06/16/22  0422    138   K 3.9 3.7    100   CO2 24 22   BUN 21* 23*   CREATININE 1.6* 2.0*   CALCIUM 8.9 9.0     Recent Labs     06/15/22  2130 06/16/22  0422   AST 15 13*   ALT 12 12   BILITOT 0.6 0.7   ALKPHOS 83 79     No results for input(s): INR in the last 72 hours. Recent Labs     06/15/22  2130 06/16/22  0144 06/16/22 0422   TROPONINI 0.02* 0.03* 0.02*       Urinalysis:      Lab Results   Component Value Date    NITRU Negative 03/31/2022    WBCUA 6 03/30/2022    BACTERIA 4+ 03/30/2022    RBCUA 1 03/30/2022    BLOODU Negative 03/31/2022    SPECGRAV 1.010 03/31/2022    GLUCOSEU >=1000 03/31/2022       Radiology:  CT CHEST WO CONTRAST   Final Result   1. Left lower lobe is completely opacified and has air bronchograms and   could relate to combination of compressive atelectasis and pneumonia. Other   areas of nodular opacity and peripheral consolidative areas in the lungs   suggesting multifocal pneumonia. However, there is also interlobular septal   thickening, ground-glass opacities, and bilateral pleural effusions   suggesting a component of CHF as well. 2.  Previous more focal mass in the left lower lobe is not evaluated given   the completely opacified lung. Limited assessment of lymphadenopathy. 3.  Multinodular thyroid gland with the larger nodule in the right side   measuring up to 2.5 cm. Can have non emergent thyroid ultrasound if not   previously worked up. XR CHEST PORTABLE   Preliminary Result   1. Lines and tubes as described. The endotracheal tube tip terminates 1.2 cm   above the kristin. However, endotracheal tube appears in adequate position on   follow-up CT. 2. Multifocal pneumonia with possible superimposed edema. XR CHEST PORTABLE   Final Result   Cardiomegaly perihilar/diffuse interstitial opacities can be seen with   interstitial alveolar pulmonary edema. Please correlate exam findings. Assessment/Plan:    Active Hospital Problems    Diagnosis     Acute respiratory failure ([facility] [address] [J96.00]      Priority: Medium    Pneumonia [J18.9]      Priority: Medium    Acute respiratory failure with hypoxia and hypercapnia (HCC) [J96.01, J96.02]     DMII (diabetes mellitus, type 2) (HCC) [E11.9]     Acute decompensated heart failure ([address] [I50.9]     Acute pulmonary edema (HCC) [J81.0]     CHF (congestive heart failure) (HCC) [I50.9]     COPD (chronic obstructive pulmonary disease) ([address] [J44.9]     Essential hypertension [I10]      Acute hypoxic [place] failure. Acute pulmonary edema. Acute systolic heart failure exacerbation  Patient known EF of 25%, presented to emergency with acute shortness of breath, did not improve on BiPAP and patient was subsequently intubated 6/16.   FiO2 improved from 100% to 35%, patient is currently on diuresis. Plan. -Appreciate ICU input.    -Continue Lasix 5 mg/h.    -continue intake output.  -Appreciate cardiology input.  -Possible extubation      Mitral regurg  Appreciate cardiology input    Multifocal pneumonia. Postobstructive left lower lobe  CT scan with signs of postobstructive left lower lobe consolidation, procalcitonin 0.44. Plan.    -continue on cefepime and Flagyl    AIDA. Baseline around 1.3, patient presented with creatinine of 1.6--> 2.    Will consider nephrology consult if creatinine worsens    Small cell lung cancer  Postchemotherapy        DVT Prophylaxis: heparin  Diet: Diet NPO  Code Status: Full Code    PT/OT Eval Status: pending clinical improvement      Dispo - pending clinical improvement      Keshav Robison MD

## 2022-06-16 NOTE — ED PROVIDER NOTES
[facility]  eMERGENCY dEPARTMENT eNCOUnter        Pt Name: Raysa Rutherford  MRN: [id number]  Armsruthgfbrody 1960  Date of evaluation: 6/15/2022  Provider: Jordy Esposito MD  PCP: Bhupinder Suazo MD      CHIEF COMPLAINT       Chief Complaint   Patient presents with    Respiratory Distress       HISTORY OFPRESENT ILLNESS   (Location/Symptom, Timing/Onset, Context/Setting, Quality, Duration, Modifying Factors,Severity)  Note limiting factors. Raysa Rutherford is a 65 y.o. female   with a history of systolic congestive heart failure and lung cancer. She is still undergoing treatment for the lung cancer. Presents with increasing respiratory distress progressing throughout the day. Not really having any chest pain or fever. She does come in respiratory distress. Patient is still a full code. There is a note from hospice recently that confirms this. Limited history of present illness due to patient's respiratory distress. Nursing Noteswere all reviewed and agreed with or any disagreements were addressed  in the HPI. REVIEW OF SYSTEMS    (2-9 systems for level 4, 10 or more for level 5)     Review of Systems   Unable to perform ROS: Severe respiratory distress   Respiratory: Positive for shortness of breath.           PAST MEDICAL HISTORY     Past Medical History:   Diagnosis Date    Asthma     Cardiomyopathy ([address]     CHF (congestive heart failure) (HCC)     COPD (chronic obstructive pulmonary disease) ([address]     Diabetes mellitus ([address]     Essential hypertension     Hyperlipidemia     Hypertension     Obesity          SURGICAL HISTORY     Past Surgical History:   Procedure Laterality Date    CARPAL TUNNEL RELEASE      CHOLECYSTECTOMY      CORONARY ANGIOPLASTY WITH STENT PLACEMENT  01/07/2013    Stent LAD    INCONTINENCE SURGERY      IR PORT PLACEMENT EQUAL OR GREATER THAN 5 YEARS Right 05/03/2022    Power port, inserted by Dr. Angely Ybarra, cut at 22    IR PORT PLACEMENT EQUAL OR GREATER THAN 5 YEARS  5/3/2022    IR PORT PLACEMENT EQUAL OR GREATER THAN 5 YEARS 5/3/2022 [facility] SPECIAL PROCEDURES         CURRENTMEDICATIONS       Previous Medications    ALBUTEROL (PROVENTIL HFA) 108 (90 BASE) MCG/ACT INHALER    Inhale 2 puffs into the lungs every 4 hours as needed for Wheezing or Shortness of Breath     ALBUTEROL (PROVENTIL) (2.5 MG/3ML) 0.083% NEBULIZER SOLUTION    USE 1 VIAL VIA NEBULIZER EVERY 4 HOURS AS NEEDED DX J44.9    AMIODARONE (CORDARONE) 200 MG TABLET    TAKE 1 TABLET BY MOUTH EVERY DAY    ASPIRIN LOW DOSE 81 MG EC TABLET    TAKE 1 TABLET BY MOUTH EVERY DAY    ATORVASTATIN (LIPITOR) 40 MG TABLET    Take 40 mg by mouth nightly     CARVEDILOL (COREG) 6.25 MG TABLET    TAKE 1 TABLET BY MOUTH TWICE A DAY WITH MEALS    CLOPIDOGREL (PLAVIX) 75 MG TABLET    Take 1 tablet by mouth daily    EMPAGLIFLOZIN (JARDIANCE) 10 MG TABLET    Take 10 mg by mouth daily    GABAPENTIN (NEURONTIN) 600 MG TABLET    Take 600 mg by mouth 3 times daily.      MAGNESIUM OXIDE (MAG-OX) 400 MG TABLET    Take 400 mg by mouth 2 times daily     ONDANSETRON (ZOFRAN) 8 MG TABLET    Take 8 mg by mouth every 8 hours as needed for Nausea or Vomiting (POST CHEMO NAUSEA)    PANTOPRAZOLE (PROTONIX) 40 MG TABLET    TAKE 1 TABLET BY MOUTH EVERY DAY    POTASSIUM CHLORIDE (KLOR-CON) 10 MEQ EXTENDED RELEASE TABLET    Take 2 tablets by mouth daily    PROCHLORPERAZINE (COMPAZINE) 10 MG TABLET    Take 10 mg by mouth every 8 hours as needed (POST CHEMO NAUSEA)    TORSEMIDE (DEMADEX) 20 MG TABLET    Take 2 tablets by mouth in the morning and at bedtime    TORSEMIDE 40 MG TABS    Take 40 mg by mouth 2 times daily    UMECLIDINIUM-VILANTEROL (ANORO ELLIPTA) 62.5-25 MCG/INH AEPB INHALER    Inhale 1 puff into the lungs daily    VERICIGUAT (VERQUVO) 5 MG TABS    Take 5 mg by mouth every morning       ALLERGIES     Ace inhibitors, Diclofenac, Losartan, Spironolactone, Vicodin hp [hydrocodone-acetaminophen], and Vicodin [hydrocodone-acetaminophen]    FAMILY HISTORY       Family History   Problem Relation Age of Onset    High Blood Pressure Mother     Diabetes Mother     Cancer Mother         thyroid    Stroke Father           SOCIAL HISTORY       Social History     Socioeconomic History    Marital status:      Spouse name: None    Number of children: None    Years of education: None    Highest education level: None   Occupational History    None   Tobacco Use    Smoking status: Never Smoker    Smokeless tobacco: Never Used   Vaping Use    Vaping Use: Never used   Substance and Sexual Activity    Alcohol use: Not Currently    Drug use: Not Currently    Sexual activity: Not Currently     Partners: Male     Comment:    Other Topics Concern    None   Social History Narrative    ** Merged History Encounter **          Social Determinants of Health     Financial Resource Strain:     Difficulty of Paying Living Expenses: Not on file   Food Insecurity:     Worried About Running Out of Food in the Last Year: Not on file    Yin of Food in the Last Year: Not on file   Transportation Needs:     Lack of Transportation (Medical): Not on file    Lack of Transportation (Non-Medical):  Not on file   Physical Activity:     Days of Exercise per Week: Not on file    Minutes of Exercise per Session: Not on file   Stress:     Feeling of Stress : Not on file   Social Connections:     Frequency of Communication with Friends and Family: Not on file    Frequency of Social Gatherings with Friends and Family: Not on file    Attends Confucianist Services: Not on file    Active Member of Clubs or Organizations: Not on file    Attends Club or Organization Meetings: Not on file    Marital Status: Not on file   Intimate Partner Violence:     Fear of Current or Ex-Partner: Not on file    Emotionally Abused: Not on file    Physically Abused: Not on file    Sexually Abused: Not on file   Housing Stability:     Unable to Pay for Housing in the Last Year: Not on file    Number of Places Lived in the Last Year: Not on file    Unstable Housing in the Last Year: Not on file       SCREENINGS    [place] Coma Scale  Eye Opening: Spontaneous  Best Verbal Response: Oriented  Best Motor Response: Obeys commands  [place] Coma Scale Score: 15        PHYSICAL EXAM    (up to 7 for level 4, 8 or more for level 5)     ED Triage Vitals [06/15/22 2116]   BP Temp Temp Source Heart Rate Resp SpO2 Height Weight   (!) 172/81 98.6 °F (37 °C) Axillary 91 20 94 % -- 223 lb 12.3 oz (101.5 kg)      weight is 223 lb 12.3 oz (101.5 kg). Her axillary temperature is 98.6 °F (37 °C). Her blood pressure is 152/59 (abnormal) and her pulse is 88. Her respiration is 16 and oxygen saturation is 95%. Physical Exam  Constitutional:       General: She is in acute distress. Appearance: She is well-developed. She is ill-appearing. She is not diaphoretic. HENT:      Head: Normocephalic and atraumatic. Right Ear: External ear normal.      Left Ear: External ear normal.   Eyes:      General: No scleral icterus. Right eye: No discharge. Left eye: No discharge. Neck:      Thyroid: No thyromegaly. Trachea: No tracheal deviation. Comments: No JVD in the upright position  Cardiovascular:      Rate and Rhythm: Normal rate and regular rhythm. Heart sounds: No murmur heard. No friction rub. No gallop. Pulmonary:      Effort: Tachypnea and respiratory distress present. Breath sounds: Normal breath sounds. Decreased air movement present. No stridor. No wheezing or rales. Abdominal:      General: There is no distension. Palpations: Abdomen is soft. Tenderness: There is no abdominal tenderness. There is no guarding or rebound. Musculoskeletal:         General: No tenderness. Cervical back: Normal range of motion. Right lower leg: No edema. Left lower leg: No edema.    Skin:     General: Skin is warm and dry. Findings: No rash (On exposed body surfaces). Neurological:      Mental Status: She is alert and oriented to person, place, and time. Coordination: Coordination normal.   Psychiatric:         Behavior: Behavior normal.         Thought Content:  Thought content normal.         DIAGNOSTIC RESULTS   LABS:    Results for orders placed or performed during the hospital encounter of 06/15/22   CBC with Auto Differential   Result Value Ref Range    WBC 6.1 4.0 - 11.0 K/uL    RBC 3.99 (L) 4.00 - 5.20 M/uL    Hemoglobin 9.2 (L) 12.0 - 16.0 g/dL    Hematocrit 29.7 (L) 36.0 - 48.0 %    MCV 74.5 (L) 80.0 - 100.0 fL    MCH 23.0 (L) 26.0 - 34.0 pg    MCHC 30.8 (L) 31.0 - 36.0 g/dL    RDW 21.7 (H) 12.4 - 15.4 %    Platelets 554 594 - 884 K/uL    MPV 9.6 5.0 - 10.5 fL    Neutrophils % 76.6 %    Lymphocytes % 15.7 %    Monocytes % 5.1 %    Eosinophils % 1.8 %    Basophils % 0.8 %    Neutrophils Absolute 4.7 1.7 - 7.7 K/uL    Lymphocytes Absolute 1.0 1.0 - 5.1 K/uL    Monocytes Absolute 0.3 0.0 - 1.3 K/uL    Eosinophils Absolute 0.1 0.0 - 0.6 K/uL    Basophils Absolute 0.0 0.0 - 0.2 K/uL   Comprehensive Metabolic Panel w/ Reflex to MG   Result Value Ref Range    Sodium 139 136 - 145 mmol/L    Potassium reflex Magnesium 3.9 3.5 - 5.1 mmol/L    Chloride 100 99 - 110 mmol/L    CO2 24 21 - 32 mmol/L    Anion Gap 15 3 - 16    Glucose 246 (H) 70 - 99 mg/dL    BUN 21 (H) 7 - 20 mg/dL    CREATININE 1.6 (H) 0.6 - 1.2 mg/dL    GFR Non- 33 (A) >60    GFR  40 (A) >60    Calcium 8.9 8.3 - 10.6 mg/dL    Total Protein 6.3 (L) 6.4 - 8.2 g/dL    Albumin 4.1 3.4 - 5.0 g/dL    Albumin/Globulin Ratio 1.9 1.1 - 2.2    Total Bilirubin 0.6 0.0 - 1.0 mg/dL    Alkaline Phosphatase 83 40 - 129 U/L    ALT 12 10 - 40 U/L    AST 15 15 - 37 U/L   Troponin   Result Value Ref Range    Troponin 0.02 (H) <0.01 ng/mL   Brain Natriuretic Peptide   Result Value Ref Range    Pro-BNP 15,048 (H) 0 - 124 pg/mL   Lactic Acid Result Value Ref Range    Lactic Acid 2.4 (H) 0.4 - 2.0 mmol/L   Blood Gas, Venous   Result Value Ref Range    pH, [place] 7.265 (L) 7.350 - 7.450    pCO2, [place] 39.5 (L) 40.0 - 50.0 mmHg    pO2, [place] 55 Not Established mmHg    HCO3, Venous 18 (L) 23 - 29 mmol/L    Base Excess, [place] -8.3 Not Established mmol/L    O2 Sat, [place] 82 Not Established %    Carboxyhemoglobin 1.9 %    MetHgb, [place] 0.0 <1.5 %    TC02 (Calc), [place] 19 Not Established mmol/L    O2 Therapy Unknown        All other labs were within normal range or not returned as of this dictation. EKG: All EKG's are interpreted by the Emergency Department Physician who either signs orCo-signs this chart in the absence of a cardiologist.    EKG visualized preliminarily interpreted by myself shows ventricular paced rhythm with a wide complex. Rate of 92 and axis of -72    RADIOLOGY:   Non-plain film images such as CT, Ultrasound and MRI are read by the radiologist. Plain radiographic images are visualized and preliminarily interpreted by the  EDProvider with the below findings:    XR CHEST PORTABLE    Result Date: 6/15/2022  EXAMINATION: ONE XRAY VIEW OF THE CHEST 6/15/2022 9:18 pm COMPARISON: 06/10/2022 HISTORY: ORDERING SYSTEM PROVIDED HISTORY: Respiratory distress TECHNOLOGIST PROVIDED HISTORY: Reason for exam:->Respiratory distress Reason for Exam: Respiratory Distress FINDINGS: The cardiomediastinal silhouette is enlarged. Perihilar pulmonary congestion and diffusely increased in vascular can be seen with interstitial alveolar edema. Right-sided MediPort device unchanged. Left-sided pacemaker device unchanged. No pleural effusion or pneumothorax is present. Cardiomegaly perihilar/diffuse interstitial opacities can be seen with interstitial alveolar pulmonary edema. Please correlate exam findings.            PROCEDURES   Unless otherwise noted below, none     Procedures    CRITICAL CARE TIME   CRITICAL CARE: There was a high probability of clinically significant/life threatening deterioration in this patient's condition which required my urgent intervention. Total critical care time was 75 minutes. This excludes any time for separately reportable procedures. CONSULTS:  IP CONSULT TO CARDIOLOGY  IP CONSULT TO CARDIOLOGY  IP CONSULT TO CRITICAL CARE  IP CONSULT TO HEART FAILURE NURSE/COORDINATOR  IP CONSULT TO DIETITIAN    EMERGENCY DEPARTMENT COURSE and DIFFERENTIAL DIAGNOSIS/MDM:   Vitals:    Vitals:    06/15/22 2244 06/15/22 2245 06/15/22 2246 06/15/22 2345   BP:    (!) 152/59   Pulse: 91  90 88   Resp:  23  16   Temp:       TempSrc:       SpO2:    95%   Weight:           Patient was given the following medications:  Medications   milrinone (PRIMACOR) 20 mg in dextrose 5 % 100 mL infusion (0.375 mcg/kg/min × 101.5 kg IntraVENous New Bag 6/15/22 2320)   furosemide (LASIX) 100 mg in dextrose 5 % 100 mL infusion (5 mg/hr IntraVENous New Bag 6/15/22 2317)   propofol injection (has no administration in time range)   fentaNYL (SUBLIMAZE) 1,000 mcg in sodium chloride 0.9% 100 mL infusion (has no administration in time range)     And   fentaNYL (SUBLIMAZE) injection 50 mcg (has no administration in time range)   ipratropium-albuterol (DUONEB) nebulizer solution 1 ampule (1 ampule Inhalation Given 6/15/22 2120)   methylPREDNISolone sodium (SOLU-MEDROL) injection 125 mg (125 mg IntraVENous Given 6/15/22 2139)   furosemide (LASIX) injection 40 mg (40 mg IntraVENous Given 6/15/22 2139)   ipratropium-albuterol (DUONEB) nebulizer solution 1 ampule (1 ampule Inhalation Given 6/15/22 2243)       Patient was treated aggressively with IV Lasix BiPAP Solu-Medrol breathing treatments. She was minimally improved. She was still very uncomfortable and appeared to be in acute distress. I discussed intubation with the patient and she wanted to hold off and try medications first.  I started her on milrinone and Lasix drip.   Case was also discussed with the hospitalist was

## 2022-06-16 NOTE — CONSULTS
Comprehensive Nutrition Assessment    Type and Reason for Visit:  Initial (Diet education)    Nutrition Recommendations/Plan:   Monitor for start of nutrition. If TF initiated, consult RD for \"Tube Feedings order and management\". · Recommend Vital HP with goal rate of 50 mL/hr +1 proteinex daily (adjusted for propofol dose). Flush per provider. · Monitor for changes in propofol dose, currently 12.2 mL/hr. Malnutrition Assessment:  Malnutrition Status:  Insufficient data (06/16/22 1129)      Nutrition Assessment:    Pt with PMH of CHF, lung cancer, and DM, presented with respiratory distress. Pt tried on bipap but then was intubated last night. CT showed PNA, bilateral pleural effusion, and pulmonary edema. OG in place. Currently on fentanyl and propofol @ 12.2 mL/hr providing 322 kcal daily. Will provide TF rec's should TF start soon. Nutrition Related Findings:    No BM or edema noted; Labs reviewed Wound Type: None       Current Nutrition Intake & Therapies:    Average Meal Intake: NPO  Average Supplements Intake: NPO  Diet NPO  Current Tube Feeding (TF) Recommendations:  · Goal TF & Flush Orders Provides: Recommend Vital HP with goal rate of 50 mL/hr +1 proteinex daily. Flush per provider. TF regimen provides: 1000 mL TV, 1104 kcal, 114 gm pro, 836 mL free water (includes 1 proteinex daily). Anthropometric Measures:  Height: 5' 7\" (170.2 cm)  Ideal Body Weight (IBW): 135 lbs (61 kg)       Current Body Weight: 215 lb (97.5 kg), 159.3 % IBW. Weight Source: Bed Scale  Current BMI (kg/m2): 33.7                          BMI Categories: Obese Class 1 (BMI 30.0-34. 9)    Estimated Daily Nutrient Needs:        Energy (kcal/day): 1428-3384 kcal (11-14 kcal/kg 97 kg CBW)     Protein (g/day): 123 gm (2gm/kg IBW)     Fluid (ml/day): per provider    Nutrition Diagnosis:   · Inadequate oral intake related to impaired respiratory function as evidenced by intubation      Nutrition Interventions:   Food and/or Nutrient Delivery: Start Tube Feeding  Nutrition Education/Counseling: No recommendation at this time  Coordination of Nutrition Care: Continue to monitor while inpatient       Goals:     Goals: Initiate nutrition support,Tolerate nutrition support at goal rate,within 2 days       Nutrition Monitoring and Evaluation:      Food/Nutrient Intake Outcomes: Diet Advancement/Tolerance,Enteral Nutrition Intake/Tolerance  Physical Signs/Symptoms Outcomes: Biochemical Data,Weight,Skin,Nutrition Focused Physical Findings    Discharge Planning:     Too soon to determine     Glo Melton RD, LD  Contact: [phone]

## 2022-06-16 NOTE — PROCEDURES
Intubation Procedure Note    Indication: impending respiratory failure    Consent: The patient provided verbal consent for this procedure. Medications Used: etomidate intravenously and rocuronium intravenously    Procedure: The patient was placed in the appropriate position. Cricoid pressure was not required. Intubation was performed by direct laryngoscopy using a laryngoscope and a 7.5 cuffed endotracheal tube. The cuff was then inflated and the tube was secured appropriately at a distance of 25 cm to the dental ridge. Initial confirmation of placement included bilateral breath sounds, an end tidal CO2 detector, tube fogging, and adequate chest rise. A chest x-ray to verify correct placement of the tube showed the tube needed withdrawing and the tube was repositioned accordingly. The patient tolerated the procedure well.      Complications: None

## 2022-06-16 NOTE — CONSULTS
Pulmonary Critical Care Consult Note     Patient's name:  Leila Metcalf  Medical Record Number: [id number]  Patient's account/billing number: [de-identified]  Patient's YOB: 1960  Age: 65 y.o. Date of Admission: 6/15/2022  9:15 PM  Date of Consult: 6/16/2022      Primary Care Physician: Amina Obregon MD      Code Status: Full Code    Reason for consult: Acute hypoxic respiratory failure    Assessment and Plan     1. Acute respiratory failure with hypoxia requiring intubation on mechanical ventilation. 2. Acute on chronic systolic heart failure EF of 25% status post AICD  3. Moderately severe mitral regurg  4. Multifocal PNA, post obstructive in the LLL  5. Acute renal failure with metabolic acidosis   6. Extensive stage small cell lung cancer status postchemotherapy currently on Tecentriq  7. Anemia of chronic disease       Plan:  Aggressive diuresis for pulmonary edema, monitor kidney function and replace electrolytes as needed  Keep MAP more than 65. Ventilator support setting reviewed and adjusted. Continue broad-spectrum antibiotic coverage (Cefepime), check procalcitonin, check sputum cultures. GI and DVT prophylaxis. Discussed with daughter. HISTORY OF PRESENT ILLNESS:   Mr./MsGerald Metcalf is a 65 y.o.  lady with past medical history stated below significant for history of COPD, extensive stage small cell lung cancer status post chemotherapy currently onTecentriq, presented to the hospital with worsening shortness of breath, initially started on BiPAP failed BiPAP requiring intubation for hypoxia.   CT chest with increase interlobular septal thickness, bilateral pleural effusions, multifocal airspace disease as well as complete consolidation of the left lower lobe          Past Medical History:        Diagnosis Date    Asthma     Cardiomyopathy ([address]     CHF (congestive heart failure) (HCC)     [facility]ch[facility] [address]     Diabetes mellitus ([facility] [address]     Essential hypertension     Hyperlipidemia     Hypertension     Obesity        Past Surgical History:        Procedure Laterality Date    CARPAL TUNNEL RELEASE      CHOLECYSTECTOMY      CORONARY ANGIOPLASTY WITH STENT PLACEMENT  01/07/2013    Stent LAD    INCONTINENCE SURGERY      IR PORT PLACEMENT EQUAL OR GREATER THAN 5 YEARS Right 05/03/2022    Power port, inserted by Dr. Celina Butler, cut at 22    IR Beals Posrclas 15 5 YEARS  5/3/2022    IR PORT PLACEMENT EQUAL OR GREATER THAN 5 YEARS 5/3/2022 [facility] PROC[facility]       Allergies: Allergies   Allergen Reactions    Ace Inhibitors     Diclofenac     Losartan     Spironolactone     Vicodin Hp [Hydrocodone-Acetaminophen]     Vicodin [Hydrocodone-Acetaminophen]          Home Meds:   Prior to Admission medications    Medication Sig Start Date End Date Taking?  Authorizing Provider   torsemide (DEMADEX) 20 MG tablet Take 2 tablets by mouth in the morning and at bedtime 5/19/22   Kristofer Anton MD   torsemide 40 MG TABS Take 40 mg by mouth 2 times daily 5/19/22   Joce Bush MD   potassium chloride (KLOR-CON) 10 MEQ extended release tablet Take 2 tablets by mouth daily 5/19/22   Joce Bush MD   empagliflozin (JARDIANCE) 10 MG tablet Take 10 mg by mouth daily    Historical Provider, MD   umeclidinium-vilanterol (ANORO ELLIPTA) 62.5-25 MCG/INH AEPB inhaler Inhale 1 puff into the lungs daily    Historical Provider, MD   Vericiguat (VERQUVO) 5 MG TABS Take 5 mg by mouth every morning    Historical Provider, MD   ondansetron (ZOFRAN) 8 MG tablet Take 8 mg by mouth every 8 hours as needed for Nausea or Vomiting (POST CHEMO NAUSEA)    Historical Provider, MD   prochlorperazine (COMPAZINE) 10 MG tablet Take 10 mg by mouth every 8 hours as needed (POST CHEMO NAUSEA)    Historical Provider, MD   albuterol (PROVENTIL) (2.5 MG/3ML) 0.083% nebulizer solution USE 1 VIAL VIA NEBULIZER EVERY 4 HOURS AS NEEDED DX J44.9 1/13/22   Historical Provider, MD   amiodarone (CORDARONE) 200 MG tablet TAKE 1 TABLET BY MOUTH EVERY DAY 3/20/22   Historical Provider, MD   ASPIRIN LOW DOSE 81 MG EC tablet TAKE 1 TABLET BY MOUTH EVERY DAY 1/17/22   Historical Provider, MD   carvedilol (COREG) 6.25 MG tablet TAKE 1 TABLET BY MOUTH TWICE A DAY WITH MEALS 1/24/22   Historical Provider, MD   pantoprazole (PROTONIX) 40 MG tablet TAKE 1 TABLET BY MOUTH EVERY DAY 3/20/22   Historical Provider, MD   atorvastatin (LIPITOR) 40 MG tablet Take 40 mg by mouth nightly     Historical Provider, MD   magnesium oxide (MAG-OX) 400 MG tablet Take 400 mg by mouth 2 times daily     Historical Provider, MD   gabapentin (NEURONTIN) 600 MG tablet Take 600 mg by mouth 3 times daily. Historical Provider, MD   albuterol (PROVENTIL HFA) 108 (90 BASE) MCG/ACT inhaler Inhale 2 puffs into the lungs every 4 hours as needed for Wheezing or Shortness of Breath     Historical Provider, MD       Family History:       Problem Relation Age of Onset    High Blood Pressure Mother     Diabetes Mother     Cancer Mother         thyroid    Stroke Father          Social History:   TOBACCO:   reports that she has never smoked. She has never used smokeless tobacco.  ETOH:   reports previous alcohol use. DRUGS:  reports previous drug use. REVIEW OF SYSTEMS:  Review of Systems -   Intubated on mechanical ventilation. Physical Exam:    Vitals: BP (!) 111/55   Pulse 60   Temp 97.8 °F (36.6 °C) (Oral)   Resp 14   Ht 5' 7\" (1.702 m)   Wt 215 lb 9.8 oz (97.8 kg)   SpO2 100%   BMI 33.77 kg/m²     Last Body weight:   Wt Readings from Last 3 Encounters:   06/16/22 215 lb 9.8 oz (97.8 kg)   05/19/22 212 lb 4.9 oz (96.3 kg)   05/13/22 218 lb 11.1 oz (99.2 kg)       Body Mass Index : Body mass index is 33.77 kg/m².       Intake and Output summary:     Intake/Output Summary (Last 24 hours) at 6/16/2022 0958  Last data filed at 6/16/2022 0700  Gross per 24 hour   Intake 587.57 ml   Output 890 ml   Net -302.43 ml       Physical Examination:     PHYSICAL EXAM:    Gen: Mild acute distress  Eyes: PERRL. Anicteric sclera. No conjunctival injection. ENT: No discharge. Posterior oropharynx clear. External appearance of ears and nose normal.  Neck: Trachea midline. No mass, no lymphadenopathy    Resp: Diminished bilaterally with rales  CV: Regular rate. Regular rhythm. No murmur or rub. No edema. GI: Soft, Non-tender. Non-distended. +BS  Skin: Warm, dry, w/o erythema. Lymph: No cervical or supraclavicular LAD. M/S: No cyanosis. No clubbing. Neuro: Awake follows simple commands no focal deficit        Laboratory findings:-    CBC:   Recent Labs     06/15/22  2130 06/15/22  2130 06/16/22  0422   WBC 6.1   < > 5.5   HGB 9.2*   < > 8.8*     --   --     < > = values in this interval not displayed. BMP:    Recent Labs     06/15/22  2130 06/15/22  2130 06/16/22  0422      < > 138   K 3.9  --  3.7      < > 100   CO2 24   < > 22   BUN 21*   < > 23*   CREATININE 1.6*  --  2.0*   GLUCOSE 246*   < > 277*    < > = values in this interval not displayed. S. Calcium:  Recent Labs     06/16/22 0422   CALCIUM 9.0     S. Magnesium:  Recent Labs     06/16/22 0422   MG 2.50*     S. Glucose:  Recent Labs     06/16/22  0835   POCGLU 171*     Hepatic functions:   Recent Labs     06/16/22 0422   ALKPHOS 79   ALT 12   AST 13*   PROT 5.9*   BILITOT 0.7   LABALBU 3.2*     Pancreatic functions:  Recent Labs     06/15/22  2129   LACTA 2.4*     S. Lactic Acid:   Recent Labs     06/15/22  2129   LACTA 2.4*     Cardiac enzymes:  Recent Labs     06/15/22  2130 06/16/22  0144 06/16/22  0422   TROPONINI 0.02* 0.03* 0.02*     BNP:No results for input(s): BNP in the last 72 hours. Lipid profile: No results for input(s): CHOL, TRIG, HDL, LDL, LDLCALC in the last 72 hours.   Blood Gases: No results found for: PH, PCO2, PO2, HCO3, O2SAT  Thyroid functions:   Lab Results   Component Value Date    TSH 0.379 06/05/2015          Radiology Review:  Pertinent images / reports were reviewed as a part of this visit. CT Chest w/ contrast: No results found for this or any previous visit. CT Chest w/o contrast: Results for orders placed during the hospital encounter of 06/15/22    CT CHEST WO CONTRAST    Narrative  EXAMINATION:  CT OF THE CHEST WITHOUT CONTRAST 6/16/2022 12:48 am    TECHNIQUE:  CT of the chest was performed without the administration of intravenous  contrast. Multiplanar reformatted images are provided for review. Automated  exposure control, iterative reconstruction, and/or weight based adjustment of  the mA/kV was utilized to reduce the radiation dose to as low as reasonably  achievable. COMPARISON:  05/10/2022    HISTORY:  ORDERING SYSTEM PROVIDED HISTORY: SOB, suspect mainly pulm edema and CHF  TECHNOLOGIST PROVIDED HISTORY:  Reason for exam:->SOB, suspect mainly pulm edema and CHF  Reason for Exam: SOB, suspect mainly pulm edema and CHF    FINDINGS:  Mediastinum: Cardiac chambers are mildly enlarged. ICD leads are present  within the heart with the pulse generator in the left upper chest.  Endotracheal tube and nasogastric tube acceptable. Calcified subcarinal and  right hilar lymph nodes are present. The left hilum is not well evaluated  due to the opacified lung and lack of contrast.  There is heavy calcification  of the aorta and into the great branches. Heavy coronary artery  calcifications as well. Lungs/pleura: Small to moderate bilateral pleural effusions with the right  side greater than left. There is complete collapse or consolidation of the  left lower lobe with air bronchograms. Patchy nodular opacities and  consolidative areas within the left upper lobe and right upper lobe more than  the right lower lobe. Ground-glass and interlobular septal thickening are  also present in both lungs.   There is no pneumothorax. Upper Abdomen: No free air free fluid at the upper abdomen. Visualized  portion of the adrenal glands are not enlarged but not fully included. Soft Tissues/Bones: Multinodular thyroid gland is present with the larger  nodule on the right side measuring up to 2.5 cm and heterogenous. CT  features are not significantly changed. Right-sided MediPort is present with  the tip near the cavoatrial junction. There is no soft tissue emphysema. The bones are stable with degenerative changes along the spine. Impression  1. Left lower lobe is completely opacified and has air bronchograms and  could relate to combination of compressive atelectasis and pneumonia. Other  areas of nodular opacity and peripheral consolidative areas in the lungs  suggesting multifocal pneumonia. However, there is also interlobular septal  thickening, ground-glass opacities, and bilateral pleural effusions  suggesting a component of CHF as well. 2.  Previous more focal mass in the left lower lobe is not evaluated given  the completely opacified lung. Limited assessment of lymphadenopathy. 3.  Multinodular thyroid gland with the larger nodule in the right side  measuring up to 2.5 cm. Can have non emergent thyroid ultrasound if not  previously worked up. CTPA: Results for orders placed during the hospital encounter of 05/10/22    CT CHEST PULMONARY EMBOLISM W CONTRAST    Narrative  EXAMINATION:  CTA OF THE CHEST 5/10/2022 8:43 pm    TECHNIQUE:  CTA of the chest was performed after the administration of intravenous  contrast.  Multiplanar reformatted images are provided for review. MIP  images are provided for review. Automated exposure control, iterative  reconstruction, and/or weight based adjustment of the mA/kV was utilized to  reduce the radiation dose to as low as reasonably achievable. COMPARISON:  None.     HISTORY:  ORDERING SYSTEM PROVIDED HISTORY: sob hx of cancer on chemo r/o pe also has  hx of heart failure and copd  TECHNOLOGIST PROVIDED HISTORY:  Reason for exam:->sob hx of cancer on chemo r/o pe also has hx of heart  failure and copd  Decision Support Exception - unselect if not a suspected or confirmed  emergency medical condition->Emergency Medical Condition (MA)  Reason for Exam: sob hx of cancer on chemo r/o pe also has hx of heart  failure and copd    FINDINGS:  Pulmonary Arteries: Pulmonary arteries are adequately opacified for  evaluation. No evidence of intraluminal filling defect to suggest pulmonary  embolism. Main pulmonary artery is normal in caliber. Mediastinum: No evidence of mediastinal lymphadenopathy. The heart and  pericardium demonstrate no acute abnormality. There is no acute abnormality  of the thoracic aorta. Lungs/pleura: 3.2 cm mass in the inferior left lower lobe with surrounding  area of patchy consolidation which could represent disease extension versus  superimposed airspace disease process. Small bilateral pleural effusions. Left hilar adenopathy as well as multiple enlarged upper mediastinal lymph  nodes. Upper Abdomen: Limited images of the upper abdomen are unremarkable. Soft Tissues/Bones: No acute bone or soft tissue abnormality. Impression  No evidence of pulmonary embolism. 3.2 cm mass in the inferior left lower lobe with surrounding area of patchy  consolidation which could represent disease extension versus superimposed  airspace disease process. Small bilateral pleural effusions. Left hilar  adenopathy as well as multiple enlarged upper mediastinal lymph nodes.       CXR PA/LAT: Results for orders placed during the hospital encounter of 06/10/22    XR CHEST (2 VW)    Narrative  EXAMINATION:  TWO XRAY VIEWS OF THE CHEST    6/10/2022 3:27 pm    COMPARISON:  05/18/2022    HISTORY:  ORDERING SYSTEM PROVIDED HISTORY: Shortness of breath  TECHNOLOGIST PROVIDED HISTORY:  Reason for Exam: shortness of breath    FINDINGS:  A left subclavian infusion port catheter terminates over the superior right  atrium. A left subclavian biventricular ICD is present. The cardiac silhouette remains at the upper limits of normal.  There is  moderate thoracic aortic calcification. No consolidation, pleural effusion  or pneumothorax is identified. Previous left basilar airspace disease has  resolved. Impression  No acute cardiopulmonary disease. Previous left basilar consolidation or atelectasis has resolved. CXR portable: Results for orders placed during the hospital encounter of 06/15/22    XR CHEST PORTABLE    Narrative  EXAMINATION:  ONE XRAY VIEW OF THE CHEST    6/16/2022 12:18 am    COMPARISON:  Gerri 15, 2022. June 16, 2022. HISTORY:  ORDERING SYSTEM PROVIDED HISTORY: check for ET placement. TECHNOLOGIST PROVIDED HISTORY:  Reason for exam:->check for ET placement. Reason for Exam: check for ET placement. FINDINGS:  Endotracheal tube tip terminates approximately 1.2 cm above the kristin. The  nasogastric tube tip terminates off the inferior margin of the radiograph. Stable positioning of a right chest wall MediPort and left chest wall AICD. Cardiomediastinal silhouette is enlarged. Perihilar opacities are seen in  addition to a dense retrocardiac opacity with bilateral pleural effusions. No acute osseous abnormality. Impression  1. Lines and tubes as described. The endotracheal tube tip terminates 1.2 cm  above the kristin. However, endotracheal tube appears in adequate position on  follow-up CT. 2. Multifocal pneumonia with possible superimposed edema.     Critical Care time 45 minutes     Rachael Jean-Baptiste MD, M.D.            6/16/2022, 9:58 AM

## 2022-06-16 NOTE — ED NOTES
Per Dr. Lauro Zapien pause milrinone for now and continue lasix gtt.       Nacho Harris RN  06/15/22 [id number]

## 2022-06-16 NOTE — ED NOTES
Patient remains in a tripod position bent over bedside table. Dr. Rahel Coleman aware of this. New orders for breathing tx, RT notified.       Radha Mendez RN  06/15/22 1391

## 2022-06-16 NOTE — H&P
Hospital Medicine History & Physical      PCP: Ana Moser MD    Date of Admission: 6/15/2022    Date of Service: Pt seen/examined on 6/15/2022 and admitted to inpatient    Chief Complaint: Acute respiratory distress      History Of Present Illness: The patient is a 65 y.o. female past medical history of congestive heart failure previous history of lung cancer, COPD, hypertension, type 2 diabetes, hyperlipidemia who presents to [facility] with progressive worsening respiratory distress throughout the day mainly according to both patient and daughter. History is obtained via the daughter and the ED providers report. Patient is currently on BiPAP and is in significant respiratory stress still while on BiPAP. From what the daughter can explain, patient has been known to have significant heart failure and COPD while at home as well as lung cancer but still wishes to continue treatment and is full code. According to patient and the daughter, patient was having some intermittent shortness of breath at baseline but over the course of the last 12 hours overall today she has become progressively worse and short of breath to the point of she is requiring BiPAP currently in the ED. She is tripoding currently as well. She denies that she was feeling any fevers last few days or chills. However, she does have a cough and has had some sputum production from what I can understand and even right now she is still slightly coughing with the BiPAP on. Patient and daughter deny that she has had any other recent symptoms of dizziness, syncope, chest pain, dysuria, blood in urine/stool/sputum, nausea/vomiting/diarrhea/abdominal pain. Unable to completely verify given the seriousness of patient's situation but this seems to be what is understood at this time.     Past Medical NEEDED DX J44.9 1/13/22   Historical Provider, MD   amiodarone (CORDARONE) 200 MG tablet TAKE 1 TABLET BY MOUTH EVERY DAY 3/20/22   Historical Provider, MD   ASPIRIN LOW DOSE 81 MG EC tablet TAKE 1 TABLET BY MOUTH EVERY DAY 1/17/22   Historical Provider, MD   carvedilol (COREG) 6.25 MG tablet TAKE 1 TABLET BY MOUTH TWICE A DAY WITH MEALS 1/24/22   Historical Provider, MD   pantoprazole (PROTONIX) 40 MG tablet TAKE 1 TABLET BY MOUTH EVERY DAY 3/20/22   Historical Provider, MD   atorvastatin (LIPITOR) 40 MG tablet Take 40 mg by mouth nightly     Historical Provider, MD   magnesium oxide (MAG-OX) 400 MG tablet Take 400 mg by mouth 2 times daily     Historical Provider, MD   gabapentin (NEURONTIN) 600 MG tablet Take 600 mg by mouth 3 times daily. Historical Provider, MD   albuterol (PROVENTIL HFA) 108 (90 BASE) MCG/ACT inhaler Inhale 2 puffs into the lungs every 4 hours as needed for Wheezing or Shortness of Breath     Historical Provider, MD       Allergies:  Ace inhibitors, Diclofenac, Losartan, Spironolactone, Vicodin hp [hydrocodone-acetaminophen], and Vicodin [hydrocodone-acetaminophen]    Social History:  The patient currently lives home    TOBACCO:   reports that she has never smoked. She has never used smokeless tobacco.  ETOH:   reports previous alcohol use. Family History:  Reviewed in detail and negative for DM, Early CAD, Cancer, CVA. Positive as follows:        Problem Relation Age of Onset    High Blood Pressure Mother     Diabetes Mother     Cancer Mother         thyroid    Stroke Father        REVIEW OF SYSTEMS:    as noted in the HPI. All other systems reviewed and negative.     PHYSICAL EXAM:    BP (!) 103/54   Pulse 62   Temp 97.8 °F (36.6 °C) (Oral)   Resp 20   Wt 215 lb 9.8 oz (97.8 kg)   SpO2 100%   BMI 33.77 kg/m²        Prior to intubation physical exam:    General appearance: Using BiPAP, lurching forward, tripoding some degree, very significant respiratory distress and noted to have both crackles and wheezing audibly, some upper airway crackling, was able to still understand conversation and was alert and oriented but able to only talk in brief sentences  HEENT Normal cephalic, atraumatic without obvious deformity.   Moist mucous membranes  Neck: Supple, slight JVD possibly but difficult to tell given patient was sitting forward in a tripod position  Lungs: Bilateral breath sounds noted with significant crackles bilaterally possibly left greater than right, possibly some end expiratory wheezing as well  Heart: Tachycardic, regular rhythm, no murmurs felt noted  Abdomen: Soft, nondistended  Extremities: 2+ bilateral lower extremity pitting edema symmetrically  Skin: No rashes  Neurologic: Unable to fully evaluate but patient was able to move body appropriate  Mental status: Alert and oriented as best can be evaluated  Capillary Refill: Acceptable  < 3 seconds  Peripheral Pulses: +3 Easily felt, not easily obliterated with pressure      CT chest without contrast:  1.  Left lower lobe is completely opacified and has air bronchograms and   could relate to combination of compressive atelectasis and pneumonia.  Other   areas of nodular opacity and peripheral consolidative areas in the lungs   suggesting multifocal pneumonia.  However, there is also interlobular septal   thickening, ground-glass opacities, and bilateral pleural effusions   suggesting a component of CHF as well.       2.  Previous more focal mass in the left lower lobe is not evaluated given   the completely opacified lung.  Limited assessment of lymphadenopathy.       3.  Multinodular thyroid gland with the larger nodule in the right side   measuring up to 2.5 cm.  Can have non emergent thyroid ultrasound if not   previously worked up.             CBC   Recent Labs     06/15/22  2130 06/16/22  0422   WBC 6.1 5.5   HGB 9.2* 8.8*   HCT 29.7* 27.7*     --       RENAL  Recent Labs     06/15/22  2130 06/16/22  0422    138   K 3.9 3.7    100   CO2 24 22   BUN 21* 23*   CREATININE 1.6* 2.0*     LFT'S  Recent Labs     06/15/22  2130 06/16/22  0422   AST 15 13*   ALT 12 12   BILITOT 0.6 0.7   ALKPHOS 83 79     COAG  No results for input(s): INR in the last 72 hours.   CARDIAC ENZYMES  Recent Labs     06/15/22  2130 06/16/22  0144 06/16/22 0422   TROPONINI 0.02* 0.03* 0.02*       U/A:    Lab Results   Component Value Date    COLORU Yellow 03/31/2022    WBCUA 6 03/30/2022    RBCUA 1 03/30/2022    MUCUS 1+ 12/15/2012    BACTERIA 4+ 03/30/2022    CLARITYU Clear 03/31/2022    SPECGRAV 1.010 03/31/2022    LEUKOCYTESUR Negative 03/31/2022    BLOODU Negative 03/31/2022    GLUCOSEU >=1000 03/31/2022    AMORPHOUS 2+ 12/18/2012       ABG    Lab Results   Component Value Date    OPL3PQH 29.1 05/13/2022    BEART 1.1 05/13/2022    J7ZIRIBS 72.9 05/13/2022    PHART 7.265 05/13/2022    THGBART 14.7 12/15/2012    FEK3SFY 64.3 05/13/2022    PO2ART 46.8 05/13/2022    PYY8BGY 31.1 05/13/2022           Active Hospital Problems    Diagnosis Date Noted    Acute respiratory failure ([facility] [J96.00] 06/15/2022     Priority: High    Pneumonia [J18.9] 05/13/2022     Priority: High    Acute pulmonary edema ([facility] [J81.0] 10/12/2021     Priority: High    Acute respiratory failure with hypoxia and hypercapnia (HCC) [J96.01, J96.02] 03/31/2022    DMII (diabetes mellitus, type 2) ([facility] [E11.9] 03/31/2022    Acute decompensated heart failure ([facility] [I50.9] 12/10/2021    CHF (congestive heart failure) (HCC) [I50.9]     COPD (chronic obstructive pulmonary disease) ([facility] [J44.9]     Essential hypertension [I10]          PHYSICIANS CERTIFICATION:    I certify that Kim Sandhu is expected to be hospitalized for greater than 2 midnights based on the following assessment and plan:      ASSESSMENT/PLAN:  · Acute respiratory failure  · Acute pulmonary edema  · Pneumonia  · Type 2 diabetes  · Congestive heart failure  · COPD  · Hypertension    Plan:  · Patient was on BiPAP in the ED initially, unfortunately patient was tolerating BiPAP but still seem to be increasingly concerning and reaching respiratory compromise in my opinion, patient agreed to intubation and patient was put through rapid sequence intubation in the ED  · Patient was started on propofol and fentanyl and admitted to ICU  · Some suspicion of possible acute pulmonary edema with hypertensive emergency versus pneumonia versus both  · CT chest noncontrast was obtained after intubation noted above findings of possible component of pneumonia and bilateral pleural effusions and pulmonary edema  · Start patient on broad-spectrum antibiotics with cefepime/Flagyl/Zithromax  · Started patient on Lasix IV infusion for pulmonary edema, holding milrinone and nitroglycerin drip for now  · Cardiology consulted  · Critical care consulted  · Repeat labs in the morning  · Currently patient and family members agreed to intubation and currently patient has made full code    DVT Prophylaxis: Heparin  Diet: Diet NPO  Code Status: Full Code  PT/OT Eval Status: Ambulatory    Dispo -pending clinical course       Jas Villagomezs, DO    Thank you Mello Finn MD for the opportunity to be involved in this patient's care. If you have any questions or concerns please feel free to contact me at [phone].

## 2022-06-16 NOTE — CONSULTS
Consult received- pt currently on a vent. Will continue to follow along, but will plan to see patient closer to discharge.

## 2022-06-16 NOTE — ED NOTES
Etomidate pushed for intuabtion, and jorge pushed for intubation.       Jacob Cesar RN  06/15/22 [id number]

## 2022-06-16 NOTE — ED NOTES
Gracy Del Valle RT at the bedside for [address] [place] [address].       Sanaz Garcia RN  06/15/22 [id number]

## 2022-06-16 NOTE — ED NOTES
Patient presents with SOB worsening over today. She presents on a CPAP via EMS and she was changed over to a BIPAP by RT. Dr. Laird Query to the bedside.       Charleen Abdalla RN  06/15/22 [id number]

## 2022-06-17 ENCOUNTER — APPOINTMENT (OUTPATIENT)
Dept: GENERAL RADIOLOGY | Age: 62
DRG: 208 | End: 2022-06-17
Payer: MEDICARE

## 2022-06-17 LAB
A/G RATIO: 1.7 (ref 1.1–2.2)
ALBUMIN SERPL-MCNC: 3.6 G/DL (ref 3.4–5)
ALP BLD-CCNC: 72 U/L (ref 40–129)
ALT SERPL-CCNC: 11 U/L (ref 10–40)
ANION GAP SERPL CALCULATED.3IONS-SCNC: 16 MMOL/L (ref 3–16)
AST SERPL-CCNC: 15 U/L (ref 15–37)
BASOPHILS ABSOLUTE: 0 K/UL (ref 0–0.2)
BASOPHILS RELATIVE PERCENT: 0 %
BILIRUB SERPL-MCNC: 0.4 MG/DL (ref 0–1)
BUN BLDV-MCNC: 24 MG/DL (ref 7–20)
CALCIUM SERPL-MCNC: 9.2 MG/DL (ref 8.3–10.6)
CHLORIDE BLD-SCNC: 104 MMOL/L (ref 99–110)
CO2: 24 MMOL/L (ref 21–32)
CREAT SERPL-MCNC: 1.5 MG/DL (ref 0.6–1.2)
EOSINOPHILS ABSOLUTE: 0 K/UL (ref 0–0.6)
EOSINOPHILS RELATIVE PERCENT: 0 %
GFR AFRICAN AMERICAN: 43
GFR NON-AFRICAN AMERICAN: 35
GLUCOSE BLD-MCNC: 127 MG/DL (ref 70–99)
GLUCOSE BLD-MCNC: 129 MG/DL (ref 70–99)
GLUCOSE BLD-MCNC: 132 MG/DL (ref 70–99)
GLUCOSE BLD-MCNC: 147 MG/DL (ref 70–99)
GLUCOSE BLD-MCNC: 150 MG/DL (ref 70–99)
GLUCOSE BLD-MCNC: 153 MG/DL (ref 70–99)
GLUCOSE BLD-MCNC: 153 MG/DL (ref 70–99)
GLUCOSE BLD-MCNC: 160 MG/DL (ref 70–99)
HCT VFR BLD CALC: 27.1 % (ref 36–48)
HEMOGLOBIN: 8.6 G/DL (ref 12–16)
LYMPHOCYTES ABSOLUTE: 0.3 K/UL (ref 1–5.1)
LYMPHOCYTES RELATIVE PERCENT: 7.2 %
MAGNESIUM: 2.4 MG/DL (ref 1.8–2.4)
MCH RBC QN AUTO: 23.3 PG (ref 26–34)
MCHC RBC AUTO-ENTMCNC: 31.8 G/DL (ref 31–36)
MCV RBC AUTO: 73.1 FL (ref 80–100)
MONOCYTES ABSOLUTE: 0.2 K/UL (ref 0–1.3)
MONOCYTES RELATIVE PERCENT: 4.6 %
MRSA SCREEN RT-PCR: NORMAL
NEUTROPHILS ABSOLUTE: 4.1 K/UL (ref 1.7–7.7)
NEUTROPHILS RELATIVE PERCENT: 88.2 %
PDW BLD-RTO: 21.2 % (ref 12.4–15.4)
PERFORMED ON: ABNORMAL
PLATELET # BLD: 186 K/UL (ref 135–450)
PMV BLD AUTO: 9.3 FL (ref 5–10.5)
POTASSIUM SERPL-SCNC: 3.3 MMOL/L (ref 3.5–5.1)
POTASSIUM SERPL-SCNC: 4.5 MMOL/L (ref 3.5–5.1)
RBC # BLD: 3.71 M/UL (ref 4–5.2)
SODIUM BLD-SCNC: 144 MMOL/L (ref 136–145)
TOTAL PROTEIN: 5.7 G/DL (ref 6.4–8.2)
WBC # BLD: 4.6 K/UL (ref 4–11)

## 2022-06-17 PROCEDURE — 94640 AIRWAY INHALATION TREATMENT: CPT

## 2022-06-17 PROCEDURE — 99291 CRITICAL CARE FIRST HOUR: CPT | Performed by: INTERNAL MEDICINE

## 2022-06-17 PROCEDURE — 2580000003 HC RX 258: Performed by: STUDENT IN AN ORGANIZED HEALTH CARE EDUCATION/TRAINING PROGRAM

## 2022-06-17 PROCEDURE — 80053 COMPREHEN METABOLIC PANEL: CPT

## 2022-06-17 PROCEDURE — 94761 N-INVAS EAR/PLS OXIMETRY MLT: CPT

## 2022-06-17 PROCEDURE — 84132 ASSAY OF SERUM POTASSIUM: CPT

## 2022-06-17 PROCEDURE — 36591 DRAW BLOOD OFF VENOUS DEVICE: CPT

## 2022-06-17 PROCEDURE — C9113 INJ PANTOPRAZOLE SODIUM, VIA: HCPCS | Performed by: NURSE PRACTITIONER

## 2022-06-17 PROCEDURE — 6370000000 HC RX 637 (ALT 250 FOR IP): Performed by: STUDENT IN AN ORGANIZED HEALTH CARE EDUCATION/TRAINING PROGRAM

## 2022-06-17 PROCEDURE — 2500000003 HC RX 250 WO HCPCS: Performed by: INTERNAL MEDICINE

## 2022-06-17 PROCEDURE — 6370000000 HC RX 637 (ALT 250 FOR IP): Performed by: NURSE PRACTITIONER

## 2022-06-17 PROCEDURE — 71045 X-RAY EXAM CHEST 1 VIEW: CPT

## 2022-06-17 PROCEDURE — 6360000002 HC RX W HCPCS: Performed by: INTERNAL MEDICINE

## 2022-06-17 PROCEDURE — 6360000002 HC RX W HCPCS: Performed by: STUDENT IN AN ORGANIZED HEALTH CARE EDUCATION/TRAINING PROGRAM

## 2022-06-17 PROCEDURE — 85025 COMPLETE CBC W/AUTO DIFF WBC: CPT

## 2022-06-17 PROCEDURE — 83735 ASSAY OF MAGNESIUM: CPT

## 2022-06-17 PROCEDURE — 2700000000 HC OXYGEN THERAPY PER DAY

## 2022-06-17 PROCEDURE — 2580000003 HC RX 258: Performed by: NURSE PRACTITIONER

## 2022-06-17 PROCEDURE — 94660 CPAP INITIATION&MGMT: CPT

## 2022-06-17 PROCEDURE — 6370000000 HC RX 637 (ALT 250 FOR IP): Performed by: INTERNAL MEDICINE

## 2022-06-17 PROCEDURE — 2000000000 HC ICU R&B

## 2022-06-17 PROCEDURE — 6360000002 HC RX W HCPCS: Performed by: NURSE PRACTITIONER

## 2022-06-17 PROCEDURE — 94003 VENT MGMT INPAT SUBQ DAY: CPT

## 2022-06-17 RX ORDER — HYDRALAZINE HYDROCHLORIDE 20 MG/ML
20 INJECTION INTRAMUSCULAR; INTRAVENOUS EVERY 6 HOURS PRN
Status: DISCONTINUED | OUTPATIENT
Start: 2022-06-17 | End: 2022-06-21 | Stop reason: HOSPADM

## 2022-06-17 RX ORDER — INSULIN LISPRO 100 [IU]/ML
0-12 INJECTION, SOLUTION INTRAVENOUS; SUBCUTANEOUS
Status: DISCONTINUED | OUTPATIENT
Start: 2022-06-17 | End: 2022-06-18

## 2022-06-17 RX ORDER — BACITRACIN ZINC AND POLYMYXIN B SULFATE 500; 1000 [USP'U]/G; [USP'U]/G
OINTMENT TOPICAL 2 TIMES DAILY PRN
Status: DISCONTINUED | OUTPATIENT
Start: 2022-06-17 | End: 2022-06-21 | Stop reason: HOSPADM

## 2022-06-17 RX ORDER — IPRATROPIUM BROMIDE AND ALBUTEROL SULFATE 2.5; .5 MG/3ML; MG/3ML
1 SOLUTION RESPIRATORY (INHALATION) EVERY 4 HOURS
Status: DISCONTINUED | OUTPATIENT
Start: 2022-06-17 | End: 2022-06-21 | Stop reason: HOSPADM

## 2022-06-17 RX ORDER — HYDRALAZINE HYDROCHLORIDE 20 MG/ML
10 INJECTION INTRAMUSCULAR; INTRAVENOUS ONCE
Status: COMPLETED | OUTPATIENT
Start: 2022-06-17 | End: 2022-06-17

## 2022-06-17 RX ORDER — POTASSIUM CHLORIDE 29.8 MG/ML
20 INJECTION INTRAVENOUS
Status: COMPLETED | OUTPATIENT
Start: 2022-06-17 | End: 2022-06-17

## 2022-06-17 RX ORDER — INSULIN LISPRO 100 [IU]/ML
0-6 INJECTION, SOLUTION INTRAVENOUS; SUBCUTANEOUS NIGHTLY
Status: DISCONTINUED | OUTPATIENT
Start: 2022-06-17 | End: 2022-06-18

## 2022-06-17 RX ORDER — CARVEDILOL 6.25 MG/1
6.25 TABLET ORAL 2 TIMES DAILY WITH MEALS
Status: DISCONTINUED | OUTPATIENT
Start: 2022-06-17 | End: 2022-06-18

## 2022-06-17 RX ORDER — IPRATROPIUM BROMIDE AND ALBUTEROL SULFATE 2.5; .5 MG/3ML; MG/3ML
1 SOLUTION RESPIRATORY (INHALATION) EVERY 6 HOURS
Status: DISCONTINUED | OUTPATIENT
Start: 2022-06-17 | End: 2022-06-17

## 2022-06-17 RX ADMIN — HEPARIN SODIUM 5000 UNITS: 5000 INJECTION INTRAVENOUS; SUBCUTANEOUS at 14:14

## 2022-06-17 RX ADMIN — GABAPENTIN 600 MG: 300 CAPSULE ORAL at 20:43

## 2022-06-17 RX ADMIN — INSULIN LISPRO 2 UNITS: 100 INJECTION, SOLUTION INTRAVENOUS; SUBCUTANEOUS at 00:33

## 2022-06-17 RX ADMIN — POTASSIUM CHLORIDE 20 MEQ: 29.8 INJECTION, SOLUTION INTRAVENOUS at 11:55

## 2022-06-17 RX ADMIN — METRONIDAZOLE 500 MG: 500 INJECTION, SOLUTION INTRAVENOUS at 04:34

## 2022-06-17 RX ADMIN — ATORVASTATIN CALCIUM 10 MG: 10 TABLET, FILM COATED ORAL at 20:43

## 2022-06-17 RX ADMIN — IPRATROPIUM BROMIDE AND ALBUTEROL SULFATE 1 AMPULE: .5; 3 SOLUTION RESPIRATORY (INHALATION) at 19:57

## 2022-06-17 RX ADMIN — Medication 10 ML: at 07:55

## 2022-06-17 RX ADMIN — GABAPENTIN 600 MG: 300 CAPSULE ORAL at 07:48

## 2022-06-17 RX ADMIN — Medication 40 MG: at 07:48

## 2022-06-17 RX ADMIN — CEFEPIME HYDROCHLORIDE 2000 MG: 2 INJECTION, POWDER, FOR SOLUTION INTRAVENOUS at 04:47

## 2022-06-17 RX ADMIN — IPRATROPIUM BROMIDE AND ALBUTEROL SULFATE 1 AMPULE: .5; 3 SOLUTION RESPIRATORY (INHALATION) at 14:00

## 2022-06-17 RX ADMIN — HYDRALAZINE HYDROCHLORIDE 10 MG: 20 INJECTION INTRAMUSCULAR; INTRAVENOUS at 14:15

## 2022-06-17 RX ADMIN — Medication 10 ML: at 20:47

## 2022-06-17 RX ADMIN — HEPARIN SODIUM 5000 UNITS: 5000 INJECTION INTRAVENOUS; SUBCUTANEOUS at 04:35

## 2022-06-17 RX ADMIN — INSULIN LISPRO 1 UNITS: 100 INJECTION, SOLUTION INTRAVENOUS; SUBCUTANEOUS at 20:43

## 2022-06-17 RX ADMIN — GABAPENTIN 600 MG: 300 CAPSULE ORAL at 14:14

## 2022-06-17 RX ADMIN — ASPIRIN 81 MG: 81 TABLET, CHEWABLE ORAL at 07:48

## 2022-06-17 RX ADMIN — FUROSEMIDE 5 MG/HR: 10 INJECTION, SOLUTION INTRAMUSCULAR; INTRAVENOUS at 08:56

## 2022-06-17 RX ADMIN — PROPOFOL 20 MCG/KG/MIN: 10 INJECTION, EMULSION INTRAVENOUS at 06:02

## 2022-06-17 RX ADMIN — BACITRACIN ZINC AND POLYMYXIN B SULFATE: 500; 10000 OINTMENT TOPICAL at 11:57

## 2022-06-17 RX ADMIN — AMIODARONE HYDROCHLORIDE 200 MG: 200 TABLET ORAL at 07:48

## 2022-06-17 RX ADMIN — INSULIN LISPRO 2 UNITS: 100 INJECTION, SOLUTION INTRAVENOUS; SUBCUTANEOUS at 04:35

## 2022-06-17 RX ADMIN — CEFEPIME HYDROCHLORIDE 2000 MG: 2 INJECTION, POWDER, FOR SOLUTION INTRAVENOUS at 16:36

## 2022-06-17 RX ADMIN — POTASSIUM CHLORIDE 20 MEQ: 29.8 INJECTION, SOLUTION INTRAVENOUS at 13:14

## 2022-06-17 RX ADMIN — POTASSIUM CHLORIDE 20 MEQ: 29.8 INJECTION, SOLUTION INTRAVENOUS at 09:54

## 2022-06-17 RX ADMIN — INSULIN LISPRO 2 UNITS: 100 INJECTION, SOLUTION INTRAVENOUS; SUBCUTANEOUS at 09:45

## 2022-06-17 RX ADMIN — CHLORHEXIDINE GLUCONATE 15 ML: 1.2 RINSE ORAL at 07:48

## 2022-06-17 RX ADMIN — CARVEDILOL 6.25 MG: 6.25 TABLET, FILM COATED ORAL at 14:14

## 2022-06-17 ASSESSMENT — PULMONARY FUNCTION TESTS
PIF_VALUE: 23
PIF_VALUE: 25
PIF_VALUE: 23
PIF_VALUE: 24
PIF_VALUE: 25
PIF_VALUE: 23
PIF_VALUE: 26
PIF_VALUE: 25
PIF_VALUE: 23
PIF_VALUE: 11
PIF_VALUE: 24
PIF_VALUE: 23
PIF_VALUE: 26
PIF_VALUE: 23
PIF_VALUE: 24
PIF_VALUE: 23
PIF_VALUE: 24
PIF_VALUE: 23
PIF_VALUE: 11
PIF_VALUE: 23
PIF_VALUE: 23
PIF_VALUE: 11
PIF_VALUE: 24
PIF_VALUE: 24

## 2022-06-17 ASSESSMENT — PAIN SCALES - GENERAL
PAINLEVEL_OUTOF10: 0

## 2022-06-17 NOTE — PROGRESS NOTES
Hospitalist Progress Note      PCP: Temitope Whatley MD    Date of Admission: 6/15/2022    Chief Complaint: shortness of breath     Hospital Course:   58-year-old female patient with a past medical history of COPD, hypertension, type 2 diabetes mellitus, hyperlipidemia, CAD, CKD, systolic CHF (EF 25 to 31%). Patient presented with respiratory distress, intubated 6/16    Subjective: Pt S/E. Pt was extubated today, states she feels better.  Now on room air    Medications:  Reviewed    Infusion Medications    sodium chloride 5 mL/hr at 06/17/22 0700    [Held by provider] nitroGLYCERIN Stopped (06/16/22 0332)    sodium chloride 5 mL/hr at 06/17/22 0700    dextrose      fentaNYL 50 mcg/hr (06/17/22 0700)    [Held by provider] milrinone Stopped (06/15/22 2352)    furosemide (LASIX) 1mg/ml infusion 5 mg/hr (06/17/22 0700)    propofol 20 mcg/kg/min (06/17/22 0700)     Scheduled Medications    amiodarone  200 mg Oral Daily    gabapentin  600 mg Oral TID    sodium chloride flush  5-40 mL IntraVENous 2 times per day    heparin (porcine)  5,000 Units SubCUTAneous 3 times per day    cefepime  2,000 mg IntraVENous Q12H    chlorhexidine  15 mL Mouth/Throat BID    pantoprazole (PROTONIX) 40 mg injection  40 mg IntraVENous Daily    aspirin  81 mg Oral Daily    insulin lispro  0-12 Units SubCUTAneous Q4H    metroNIDAZOLE  500 mg IntraVENous Q8H    atorvastatin  10 mg Oral Nightly     PRN Meds: sodium chloride flush, sodium chloride, acetaminophen **OR** acetaminophen, ondansetron, sodium chloride, glucose, dextrose bolus **OR** dextrose bolus, glucagon (rDNA), dextrose, fentaNYL **AND** fentaNYL      Intake/Output Summary (Last 24 hours) at 6/17/2022 0723  Last data filed at 6/17/2022 0700  Gross per 24 hour   Intake 1245.74 ml   Output 2215 ml   Net -969.26 ml       Physical Exam Performed:    /62   Pulse 60   Temp 97.4 °F (36.3 °C) (Oral)   Resp 12   Ht 5' 7\" (1.702 m)   Wt 219 lb 2.2 oz (99.4 bronchograms and   could relate to combination of compressive atelectasis and pneumonia. Other   areas of nodular opacity and peripheral consolidative areas in the lungs   suggesting multifocal pneumonia. However, there is also interlobular septal   thickening, ground-glass opacities, and bilateral pleural effusions   suggesting a component of CHF as well. 2.  Previous more focal mass in the left lower lobe is not evaluated given   the completely opacified lung. Limited assessment of lymphadenopathy. 3.  Multinodular thyroid gland with the larger nodule in the right side   measuring up to 2.5 cm. Can have non emergent thyroid ultrasound if not   previously worked up. XR CHEST PORTABLE   Final Result   1. Lines and tubes as described. The endotracheal tube tip terminates 1.2 cm   above the kristin. However, endotracheal tube appears in adequate position on   follow-up CT. 2. Multifocal pneumonia with possible superimposed edema. XR CHEST PORTABLE   Final Result   Cardiomegaly perihilar/diffuse interstitial opacities can be seen with   interstitial alveolar pulmonary edema. Please correlate exam findings. Assessment/Plan:    Active Hospital Problems    Diagnosis     Respiratory distress [R06.03]      Priority: Medium    Acute respiratory failure (HCC) [J96.00]      Priority: Medium    Pneumonia [J18.9]      Priority: Medium    Acute respiratory failure with hypoxia and hypercapnia (HCC) [J96.01, J96.02]     DMII (diabetes mellitus, type 2) (HCC) [E11.9]     Acute decompensated heart failure (HCC) [I50.9]     Acute pulmonary edema (HCC) [J81.0]     CHF (congestive heart failure) (HCC) [I50.9]     [facility] [J44.9]     Essential hypertension [I10]      Acute hypoxic [place] failure - improved, extubated today, now on room air  Acute pulmonary edema.   Acute systolic heart failure exacerbation  Patient known EF of 25%, presented to emergency with acute shortness of breath, did not improve on BiPAP and patient was subsequently intubated 6/16. FiO2 improved from 100% to 35%, patient is currently on diuresis. -Continue Lasix 5 mg/h.    -continue intake output.  -Appreciate cardiology input. Mitral regurg  Appreciate cardiology input, cont diuresis,  Reduce gabapentin dose    Multifocal pneumonia. Postobstructive left lower lobe  CT scan with signs of postobstructive left lower lobe consolidation, procalcitonin 0.44.    -continue on cefepime and Flagyl    AIDA.   Baseline around 1.3, peakd at 2 -> 1.5 today  Will consider nephrology consult if creatinine worsens    Small cell lung cancer  Postchemotherapy    DVT Prophylaxis: heparin  Diet: Diet NPO  Code Status: Full Code    PT/OT Eval Status: pending clinical improvement    Dispo - pending clinical improvement      Bernadette Lambert, DO

## 2022-06-17 NOTE — PROGRESS NOTES
Narcotic Waste Documentation    30mL of fentanyl wasted with Eli NUNEZ per [place] [place] .   Electronically signed by Dio Nguyen RN on 6/17/22 at 10:00 AM EDT    Electronically signed by Cathryn Ruiz RN on 6/17/2022 at 10:00 AM

## 2022-06-17 NOTE — PROGRESS NOTES
Pulmonary Critical Care Progress Note     Patient's name:  Ladan Hunt  Medical Record Number: [id number]  Patient's account/billing number: [de-identified]  Patient's YOB: 1960  Age: 65 y.o. Date of Admission: 6/15/2022  9:15 PM  Date of Consult: 6/17/2022      Primary Care Physician: Laney Casanova MD      Code Status: Full Code    Reason for consult: Acute hypoxic respiratory failure    Assessment and Plan     1. Acute respiratory failure with hypoxia requiring intubation on mechanical ventilation. 2. Acute on chronic systolic heart failure EF of 25% status post AICD  3. Moderately severe mitral regurg  4. Multifocal PNA, post obstructive in the LLL  5. Acute renal failure with metabolic acidosis   6. Extensive stage small cell lung cancer status postchemotherapy currently on Tecentriq  7. Anemia of chronic disease       Plan:  Aggressive diuresis for pulmonary edema, monitor kidney function and replace electrolytes as needed  Keep MAP more than 65. Ventilator support setting reviewed and adjusted, SBT extubate if tolerated  Continue broad-spectrum antibiotic coverage (Cefepime & flagyl), check procalcitonin, check sputum cultures. GI and DVT prophylaxis. Replace lytes. Subjective:  No acute events overnight  Responding to diuresis      HISTORY OF PRESENT ILLNESS:   Mr./Ms. Ladan Hunt is a 65 y.o.  lady with past medical history stated below significant for history of COPD, extensive stage small cell lung cancer status post chemotherapy currently onTecentriq, presented to the hospital with worsening shortness of breath, initially started on BiPAP failed BiPAP requiring intubation for hypoxia.   CT chest with increase interlobular septal thickness, bilateral pleural effusions, multifocal airspace disease as well as complete consolidation of the left lower lobe          REVIEW OF SYSTEMS:  Review of Systems -   Intubated on mechanical ventilation. Physical Exam:    Vitals: /61   Pulse 76   Temp (!) 95.5 °F (35.3 °C) (Rectal)   Resp 22   Ht 5' 7\" (1.702 m)   Wt 219 lb 2.2 oz (99.4 kg)   SpO2 100%   BMI 34.32 kg/m²     Last Body weight:   Wt Readings from Last 3 Encounters:   06/17/22 219 lb 2.2 oz (99.4 kg)   05/19/22 212 lb 4.9 oz (96.3 kg)   05/13/22 218 lb 11.1 oz (99.2 kg)       Body Mass Index : Body mass index is 34.32 kg/m². Intake and Output summary:     Intake/Output Summary (Last 24 hours) at 6/17/2022 0954  Last data filed at 6/17/2022 0804  Gross per 24 hour   Intake 1215.74 ml   Output 2215 ml   Net -999.26 ml       Physical Examination:     PHYSICAL EXAM:    Gen: Mild acute distress  Eyes: PERRL. Anicteric sclera. No conjunctival injection. ENT: No discharge. Posterior oropharynx clear. External appearance of ears and nose normal.  Neck: Trachea midline. No mass, no lymphadenopathy    Resp: Diminished bilaterally with rales  CV: Regular rate. Regular rhythm. No murmur or rub. No edema. GI: Soft, Non-tender. Non-distended. +BS  Skin: Warm, dry, w/o erythema. Lymph: No cervical or supraclavicular LAD. M/S: No cyanosis. No clubbing. Neuro: Awake follows simple commands no focal deficit        Laboratory findings:-    CBC:   Recent Labs     06/17/22  0407   WBC 4.6   HGB 8.6*        BMP:    Recent Labs     06/15/22  2130 06/15/22  2130 06/16/22  0422 06/16/22  0422 06/17/22  0407      < > 138   < > 144   K 3.9  --  3.7   < > 3.3*      < > 100   < > 104   CO2 24   < > 22   < > 24   BUN 21*   < > 23*   < > 24*   CREATININE 1.6*  --  2.0*  --  1.5*   GLUCOSE 246*   < > 277*   < > 150*    < > = values in this interval not displayed. S. Calcium:  Recent Labs     06/17/22  0407   CALCIUM 9.2     S. Magnesium:  Recent Labs     06/17/22  0407   MG 2.40     S.  Glucose:  Recent Labs     06/17/22  0001 06/17/22  0334 06/17/22  0942   POCGLU 147* 153* 153*     Hepatic functions: Recent Labs     06/17/22  0407   ALKPHOS 72   ALT 11   AST 15   PROT 5.7*   BILITOT 0.4   LABALBU 3.6     Pancreatic functions:  Recent Labs     06/15/22  2129   LACTA 2.4*     S. Lactic Acid:   Recent Labs     06/15/22  2129   LACTA 2.4*     Cardiac enzymes:  Recent Labs     06/15/22  2130 06/16/22  0144 06/16/22  0422   TROPONINI 0.02* 0.03* 0.02*     BNP:No results for input(s): BNP in the last 72 hours. Lipid profile: No results for input(s): CHOL, TRIG, HDL, LDL, LDLCALC in the last 72 hours. Blood Gases: No results found for: PH, PCO2, PO2, HCO3, O2SAT  Thyroid functions:   Lab Results   Component Value Date    TSH 0.379 06/05/2015          Radiology Review:  Pertinent images / reports were reviewed as a part of this visit. CT Chest w/ contrast: No results found for this or any previous visit. CT Chest w/o contrast: Results for orders placed during the hospital encounter of 06/15/22    CT CHEST WO CONTRAST    Narrative  EXAMINATION:  CT OF THE CHEST WITHOUT CONTRAST 6/16/2022 12:48 am    TECHNIQUE:  CT of the chest was performed without the administration of intravenous  contrast. Multiplanar reformatted images are provided for review. Automated  exposure control, iterative reconstruction, and/or weight based adjustment of  the mA/kV was utilized to reduce the radiation dose to as low as reasonably  achievable. COMPARISON:  05/10/2022    HISTORY:  ORDERING SYSTEM PROVIDED HISTORY: SOB, suspect mainly pulm edema and CHF  TECHNOLOGIST PROVIDED HISTORY:  Reason for exam:->SOB, suspect mainly pulm edema and CHF  Reason for Exam: SOB, suspect mainly pulm edema and CHF    FINDINGS:  Mediastinum: Cardiac chambers are mildly enlarged. ICD leads are present  within the heart with the pulse generator in the left upper chest.  Endotracheal tube and nasogastric tube acceptable. Calcified subcarinal and  right hilar lymph nodes are present.   The left hilum is not well evaluated  due to the opacified lung and lack of contrast.  There is heavy calcification  of the aorta and into the great branches. Heavy coronary artery  calcifications as well. Lungs/pleura: Small to moderate bilateral pleural effusions with the right  side greater than left. There is complete collapse or consolidation of the  left lower lobe with air bronchograms. Patchy nodular opacities and  consolidative areas within the left upper lobe and right upper lobe more than  the right lower lobe. Ground-glass and interlobular septal thickening are  also present in both lungs. There is no pneumothorax. Upper Abdomen: No free air free fluid at the upper abdomen. Visualized  portion of the adrenal glands are not enlarged but not fully included. Soft Tissues/Bones: Multinodular thyroid gland is present with the larger  nodule on the right side measuring up to 2.5 cm and heterogenous. CT  features are not significantly changed. Right-sided MediPort is present with  the tip near the cavoatrial junction. There is no soft tissue emphysema. The bones are stable with degenerative changes along the spine. Impression  1. Left lower lobe is completely opacified and has air bronchograms and  could relate to combination of compressive atelectasis and pneumonia. Other  areas of nodular opacity and peripheral consolidative areas in the lungs  suggesting multifocal pneumonia. However, there is also interlobular septal  thickening, ground-glass opacities, and bilateral pleural effusions  suggesting a component of CHF as well. 2.  Previous more focal mass in the left lower lobe is not evaluated given  the completely opacified lung. Limited assessment of lymphadenopathy. 3.  Multinodular thyroid gland with the larger nodule in the right side  measuring up to 2.5 cm. Can have non emergent thyroid ultrasound if not  previously worked up.       CTPA: Results for orders placed during the hospital encounter of 05/10/22    CT CHEST PULMONARY represent disease extension versus superimposed  airspace disease process. Small bilateral pleural effusions. Left hilar  adenopathy as well as multiple enlarged upper mediastinal lymph nodes. CXR PA/LAT: Results for orders placed during the hospital encounter of 06/10/22    XR CHEST (2 VW)    Narrative  EXAMINATION:  TWO XRAY VIEWS OF THE CHEST    6/10/2022 3:27 pm    COMPARISON:  05/18/2022    HISTORY:  ORDERING SYSTEM PROVIDED HISTORY: Shortness of breath  TECHNOLOGIST PROVIDED HISTORY:  Reason for Exam: shortness of breath    FINDINGS:  A left subclavian infusion port catheter terminates over the superior right  atrium. A left subclavian biventricular ICD is present. The cardiac silhouette remains at the upper limits of normal.  There is  moderate thoracic aortic calcification. No consolidation, pleural effusion  or pneumothorax is identified. Previous left basilar airspace disease has  resolved. Impression  No acute cardiopulmonary disease. Previous left basilar consolidation or atelectasis has resolved. CXR portable: Results for orders placed during the hospital encounter of 06/15/22    XR CHEST PORTABLE    Narrative  EXAMINATION:  ONE XRAY VIEW OF THE CHEST    6/16/2022 12:18 am    COMPARISON:  Gerri 15, 2022. June 16, 2022. HISTORY:  ORDERING SYSTEM PROVIDED HISTORY: check for ET placement. TECHNOLOGIST PROVIDED HISTORY:  Reason for exam:->check for ET placement. Reason for Exam: check for ET placement. FINDINGS:  Endotracheal tube tip terminates approximately 1.2 cm above the kristin. The  nasogastric tube tip terminates off the inferior margin of the radiograph. Stable positioning of a right chest wall MediPort and left chest wall AICD. Cardiomediastinal silhouette is enlarged. Perihilar opacities are seen in  addition to a dense retrocardiac opacity with bilateral pleural effusions. No acute osseous abnormality. Impression  1. Lines and tubes as described.   The endotracheal tube tip terminates 1.2 cm  above the kristin. However, endotracheal tube appears in adequate position on  follow-up CT. 2. Multifocal pneumonia with possible superimposed edema.     Critical Care time 35 minutes     Gianfranco Tyler MD, M.D.            6/17/2022, 9:54 AM

## 2022-06-17 NOTE — ACP (ADVANCE CARE PLANNING)
Advance Care Planning     Advance Care Planning Activator (Inpatient)  Conversation Note      Date of ACP Conversation: 6/17/2022     Conversation Conducted with: Patient with Decision Making Capacity    ACP Activator: William Green RN    Health Care Decision Maker:     Current Designated Health Care Decision Maker:     Primary Decision Maker: Benjiman Dakin - [phone]    Secondary Decision Maker: Maynor Siddiqui - Child - [phone]    Supplemental (Other) Decision Maker: Nuvia Steinberg - Parent - [phone]    Care Preferences    Ventilation: \"If you were in your present state of health and suddenly became very ill and were unable to breathe on your own, what would your preference be about the use of a ventilator (breathing machine) if it were available to you? \"      Would the patient desire the use of ventilator (breathing machine)?: yes    \"If your health worsens and it becomes clear that your chance of recovery is unlikely, what would your preference be about the use of a ventilator (breathing machine) if it were available to you? \"     Would the patient desire the use of ventilator (breathing machine)?: No      Resuscitation  \"CPR works best to restart the heart when there is a sudden event, like a heart attack, in someone who is otherwise healthy. Unfortunately, CPR does not typically restart the heart for people who have serious health conditions or who are very sick. \"    \"In the event your heart stopped as a result of an underlying serious health condition, would you want attempts to be made to restart your heart (answer \"yes\" for attempt to resuscitate) or would you prefer a natural death (answer \"no\" for do not attempt to resuscitate)? \" yes       [] Yes   [x] No   Educated Patient / Sidney Perez regarding differences between Advance Directives and portable DNR orders.     Length of ACP Conversation in minutes:  5 minutes    Conversation Outcomes:  [x] ACP discussion completed - prior ACP, no changes only updated decision makers according to new advance directive  [] Existing advance directive reviewed with patient; no changes to patient's previously recorded wishes  [] New Advance Directive completed  [] Portable Do Not Rescitate prepared for Provider review and signature  [] POLST/POST/MOLST/MOST prepared for Provider review and signature    Electronically signed by Fidel Arauz RN Case Management [phone] on 6/17/2022 at 12:11 PM

## 2022-06-17 NOTE — RT PROTOCOL NOTE
RT Inhaler-Nebulizer Bronchodilator Protocol Note    There is a bronchodilator order in the chart from a provider indicating to follow the RT Bronchodilator Protocol and there is an Initiate RT Inhaler-Nebulizer Bronchodilator Protocol order as well (see protocol at bottom of note). CXR Findings:  XR CHEST PORTABLE    Result Date: 6/17/2022  Interval worsening of bibasilar airspace opacities consistent with pneumonia. Small right pleural effusion. XR CHEST PORTABLE    Result Date: 6/17/2022  1. Lines and tubes as described. The endotracheal tube tip terminates 1.2 cm above the kristin. However, endotracheal tube appears in adequate position on follow-up CT. 2. Multifocal pneumonia with possible superimposed edema. XR CHEST PORTABLE    Result Date: 6/15/2022  Cardiomegaly perihilar/diffuse interstitial opacities can be seen with interstitial alveolar pulmonary edema. Please correlate exam findings. The findings from the last RT Protocol Assessment were as follows:   History Pulmonary Disease: Chronic pulmonary disease  Respiratory Pattern: Mild dyspnea at rest, irregular pattern, or RR 21-25 bpm  Breath Sounds: Slightly diminished and/or crackles  Cough: Strong, productive  Indication for Bronchodilator Therapy:    Bronchodilator Assessment Score: 9    Aerosolized bronchodilator medication orders have been revised according to the RT Inhaler-Nebulizer Bronchodilator Protocol below. Respiratory Therapist to perform RT Therapy Protocol Assessment initially then follow the protocol. Repeat RT Therapy Protocol Assessment PRN for score 0-3 or on second treatment, BID, and PRN for scores above 3. No Indications - adjust the frequency to every 6 hours PRN wheezing or bronchospasm, if no treatments needed after 48 hours then discontinue using Per Protocol order mode. If indication present, adjust the RT bronchodilator orders based on the Bronchodilator Assessment Score as indicated below.   Use Inhaler orders unless patient has one or more of the following: on home nebulizer, not able to hold breath for 10 seconds, is not alert and oriented, cannot activate and use MDI correctly, or respiratory rate 25 breaths per minute or more, then use the equivalent nebulizer order(s) with same Frequency and PRN reasons based on the score. If a patient is on this medication at home then do not decrease Frequency below that used at home. 0-3 - enter or revise RT bronchodilator order(s) to equivalent RT Bronchodilator order with Frequency of every 4 hours PRN for wheezing or increased work of breathing using Per Protocol order mode. 4-6 - enter or revise RT Bronchodilator order(s) to two equivalent RT bronchodilator orders with one order with BID Frequency and one order with Frequency of every 4 hours PRN wheezing or increased work of breathing using Per Protocol order mode. 7-10 - enter or revise RT Bronchodilator order(s) to two equivalent RT bronchodilator orders with one order with TID Frequency and one order with Frequency of every 4 hours PRN wheezing or increased work of breathing using Per Protocol order mode. 11-13 - enter or revise RT Bronchodilator order(s) to one equivalent RT bronchodilator order with QID Frequency and an Albuterol order with Frequency of every 4 hours PRN wheezing or increased work of breathing using Per Protocol order mode. Greater than 13 - enter or revise RT Bronchodilator order(s) to one equivalent RT bronchodilator order with every 4 hours Frequency and an Albuterol order with Frequency of every 2 hours PRN wheezing or increased work of breathing using Per Protocol order mode. RT to enter RT Home Evaluation for COPD & MDI Assessment order using Per Protocol order mode.     Electronically signed by linda tobias RCP on 6/17/2022 at 5:32 PM

## 2022-06-17 NOTE — CARE COORDINATION
INITIAL CASE MANAGEMENT ASSESSMENT    Reviewed chart, met with patient to assess possible discharge needs. Explained Case Management role/services. Living Situation: Confirmed address, lives alone in a basement level apartment, 5 steps down, 2 steps into building    ADLs: Independent, family assists PRN & gets cleaning assists thru [employer]     DME: Rollator, wheeled walker, shower chair, BSC, manual & electric wheelchair, nebulizer, BIPAP, life alert, oxygen 2-3L per [employer]    PT/OT Recs: Not ordered     Active Services: [place] [employer] [facility] cleaning aide once weekly     Transportation: Family transports PRN     Medications: [facility] on [address] -- no issues     PCP: Damien Hairston MD      HD/PD: n/a    PLAN/COMMENTS: Patient will return home, lives alone but has nearby family support. Wants to resume home care, [facility] following. Watch for needs. SW/CM provided contact information for patient or family to call with any questions. SW/CM will follow and assist as needed.   Electronically signed by Mikie Dave RN Case Management [phone] on 6/17/2022 at 1:24 PM

## 2022-06-17 NOTE — CARE COORDINATION
[facility]    If patient discharges over the weekend, please fax order and MATILDA, and all docs to [facility] at [phone] upon discharge.     Thank you,    Eric Bradley RN, BSN CTN  [facility] [phone]

## 2022-06-18 LAB
A/G RATIO: 1.4 (ref 1.1–2.2)
ALBUMIN SERPL-MCNC: 3.3 G/DL (ref 3.4–5)
ALP BLD-CCNC: 66 U/L (ref 40–129)
ALT SERPL-CCNC: 14 U/L (ref 10–40)
ANION GAP SERPL CALCULATED.3IONS-SCNC: 13 MMOL/L (ref 3–16)
AST SERPL-CCNC: 14 U/L (ref 15–37)
BASOPHILS ABSOLUTE: 0 K/UL (ref 0–0.2)
BASOPHILS RELATIVE PERCENT: 0.1 %
BILIRUB SERPL-MCNC: 0.4 MG/DL (ref 0–1)
BUN BLDV-MCNC: 28 MG/DL (ref 7–20)
CALCIUM SERPL-MCNC: 8.8 MG/DL (ref 8.3–10.6)
CHLORIDE BLD-SCNC: 105 MMOL/L (ref 99–110)
CO2: 25 MMOL/L (ref 21–32)
CREAT SERPL-MCNC: 1.5 MG/DL (ref 0.6–1.2)
EOSINOPHILS ABSOLUTE: 0 K/UL (ref 0–0.6)
EOSINOPHILS RELATIVE PERCENT: 0 %
GFR AFRICAN AMERICAN: 43
GFR NON-AFRICAN AMERICAN: 35
GLUCOSE BLD-MCNC: 104 MG/DL (ref 70–99)
GLUCOSE BLD-MCNC: 109 MG/DL (ref 70–99)
GLUCOSE BLD-MCNC: 110 MG/DL (ref 70–99)
HCT VFR BLD CALC: 26.1 % (ref 36–48)
HEMOGLOBIN: 8.3 G/DL (ref 12–16)
LYMPHOCYTES ABSOLUTE: 0.6 K/UL (ref 1–5.1)
LYMPHOCYTES RELATIVE PERCENT: 8.7 %
MAGNESIUM: 2.2 MG/DL (ref 1.8–2.4)
MCH RBC QN AUTO: 23.5 PG (ref 26–34)
MCHC RBC AUTO-ENTMCNC: 31.7 G/DL (ref 31–36)
MCV RBC AUTO: 74.2 FL (ref 80–100)
MONOCYTES ABSOLUTE: 0.5 K/UL (ref 0–1.3)
MONOCYTES RELATIVE PERCENT: 7.1 %
NEUTROPHILS ABSOLUTE: 6 K/UL (ref 1.7–7.7)
NEUTROPHILS RELATIVE PERCENT: 84.1 %
PDW BLD-RTO: 21.7 % (ref 12.4–15.4)
PERFORMED ON: ABNORMAL
PERFORMED ON: ABNORMAL
PLATELET # BLD: 200 K/UL (ref 135–450)
PMV BLD AUTO: 9 FL (ref 5–10.5)
POTASSIUM SERPL-SCNC: 3.9 MMOL/L (ref 3.5–5.1)
PROCALCITONIN: 0.66 NG/ML (ref 0–0.15)
RBC # BLD: 3.52 M/UL (ref 4–5.2)
SODIUM BLD-SCNC: 143 MMOL/L (ref 136–145)
TOTAL PROTEIN: 5.7 G/DL (ref 6.4–8.2)
WBC # BLD: 7.1 K/UL (ref 4–11)

## 2022-06-18 PROCEDURE — 85025 COMPLETE CBC W/AUTO DIFF WBC: CPT

## 2022-06-18 PROCEDURE — 6370000000 HC RX 637 (ALT 250 FOR IP): Performed by: STUDENT IN AN ORGANIZED HEALTH CARE EDUCATION/TRAINING PROGRAM

## 2022-06-18 PROCEDURE — C9113 INJ PANTOPRAZOLE SODIUM, VIA: HCPCS | Performed by: NURSE PRACTITIONER

## 2022-06-18 PROCEDURE — 6360000002 HC RX W HCPCS: Performed by: STUDENT IN AN ORGANIZED HEALTH CARE EDUCATION/TRAINING PROGRAM

## 2022-06-18 PROCEDURE — 6370000000 HC RX 637 (ALT 250 FOR IP): Performed by: INTERNAL MEDICINE

## 2022-06-18 PROCEDURE — 94760 N-INVAS EAR/PLS OXIMETRY 1: CPT

## 2022-06-18 PROCEDURE — 84145 PROCALCITONIN (PCT): CPT

## 2022-06-18 PROCEDURE — 6360000002 HC RX W HCPCS: Performed by: INTERNAL MEDICINE

## 2022-06-18 PROCEDURE — 99291 CRITICAL CARE FIRST HOUR: CPT | Performed by: INTERNAL MEDICINE

## 2022-06-18 PROCEDURE — 2000000000 HC ICU R&B

## 2022-06-18 PROCEDURE — 2580000003 HC RX 258: Performed by: STUDENT IN AN ORGANIZED HEALTH CARE EDUCATION/TRAINING PROGRAM

## 2022-06-18 PROCEDURE — 6360000002 HC RX W HCPCS: Performed by: NURSE PRACTITIONER

## 2022-06-18 PROCEDURE — 83735 ASSAY OF MAGNESIUM: CPT

## 2022-06-18 PROCEDURE — 36591 DRAW BLOOD OFF VENOUS DEVICE: CPT

## 2022-06-18 PROCEDURE — 2700000000 HC OXYGEN THERAPY PER DAY

## 2022-06-18 PROCEDURE — 80053 COMPREHEN METABOLIC PANEL: CPT

## 2022-06-18 PROCEDURE — 94640 AIRWAY INHALATION TREATMENT: CPT

## 2022-06-18 PROCEDURE — 6370000000 HC RX 637 (ALT 250 FOR IP): Performed by: NURSE PRACTITIONER

## 2022-06-18 PROCEDURE — 2580000003 HC RX 258: Performed by: NURSE PRACTITIONER

## 2022-06-18 RX ORDER — CARVEDILOL 3.12 MG/1
3.12 TABLET ORAL 2 TIMES DAILY WITH MEALS
Status: DISCONTINUED | OUTPATIENT
Start: 2022-06-18 | End: 2022-06-21 | Stop reason: HOSPADM

## 2022-06-18 RX ORDER — ENOXAPARIN SODIUM 100 MG/ML
40 INJECTION SUBCUTANEOUS DAILY
Status: DISCONTINUED | OUTPATIENT
Start: 2022-06-18 | End: 2022-06-21 | Stop reason: HOSPADM

## 2022-06-18 RX ORDER — POTASSIUM CHLORIDE 20 MEQ/1
20 TABLET, EXTENDED RELEASE ORAL DAILY
COMMUNITY

## 2022-06-18 RX ADMIN — GABAPENTIN 600 MG: 300 CAPSULE ORAL at 21:26

## 2022-06-18 RX ADMIN — Medication 10 ML: at 21:26

## 2022-06-18 RX ADMIN — IPRATROPIUM BROMIDE AND ALBUTEROL SULFATE 1 AMPULE: .5; 3 SOLUTION RESPIRATORY (INHALATION) at 16:02

## 2022-06-18 RX ADMIN — GABAPENTIN 600 MG: 300 CAPSULE ORAL at 15:32

## 2022-06-18 RX ADMIN — IPRATROPIUM BROMIDE AND ALBUTEROL SULFATE 1 AMPULE: .5; 3 SOLUTION RESPIRATORY (INHALATION) at 08:34

## 2022-06-18 RX ADMIN — FUROSEMIDE 5 MG/HR: 10 INJECTION, SOLUTION INTRAMUSCULAR; INTRAVENOUS at 04:02

## 2022-06-18 RX ADMIN — IPRATROPIUM BROMIDE AND ALBUTEROL SULFATE 1 AMPULE: .5; 3 SOLUTION RESPIRATORY (INHALATION) at 12:25

## 2022-06-18 RX ADMIN — IPRATROPIUM BROMIDE AND ALBUTEROL SULFATE 1 AMPULE: .5; 3 SOLUTION RESPIRATORY (INHALATION) at 23:56

## 2022-06-18 RX ADMIN — ENOXAPARIN SODIUM 40 MG: 100 INJECTION SUBCUTANEOUS at 15:33

## 2022-06-18 RX ADMIN — GABAPENTIN 600 MG: 300 CAPSULE ORAL at 07:46

## 2022-06-18 RX ADMIN — BACITRACIN ZINC AND POLYMYXIN B SULFATE: 500; 10000 OINTMENT TOPICAL at 07:57

## 2022-06-18 RX ADMIN — IPRATROPIUM BROMIDE AND ALBUTEROL SULFATE 1 AMPULE: .5; 3 SOLUTION RESPIRATORY (INHALATION) at 04:21

## 2022-06-18 RX ADMIN — ATORVASTATIN CALCIUM 10 MG: 10 TABLET, FILM COATED ORAL at 21:26

## 2022-06-18 RX ADMIN — IPRATROPIUM BROMIDE AND ALBUTEROL SULFATE 1 AMPULE: .5; 3 SOLUTION RESPIRATORY (INHALATION) at 00:15

## 2022-06-18 RX ADMIN — Medication 10 ML: at 07:47

## 2022-06-18 RX ADMIN — ASPIRIN 81 MG: 81 TABLET, CHEWABLE ORAL at 07:46

## 2022-06-18 RX ADMIN — IPRATROPIUM BROMIDE AND ALBUTEROL SULFATE 1 AMPULE: .5; 3 SOLUTION RESPIRATORY (INHALATION) at 19:36

## 2022-06-18 RX ADMIN — AMIODARONE HYDROCHLORIDE 200 MG: 200 TABLET ORAL at 07:46

## 2022-06-18 RX ADMIN — HEPARIN SODIUM 5000 UNITS: 5000 INJECTION INTRAVENOUS; SUBCUTANEOUS at 05:40

## 2022-06-18 RX ADMIN — CEFEPIME HYDROCHLORIDE 2000 MG: 2 INJECTION, POWDER, FOR SOLUTION INTRAVENOUS at 15:41

## 2022-06-18 RX ADMIN — Medication 40 MG: at 07:46

## 2022-06-18 RX ADMIN — CEFEPIME HYDROCHLORIDE 2000 MG: 2 INJECTION, POWDER, FOR SOLUTION INTRAVENOUS at 04:00

## 2022-06-18 ASSESSMENT — PAIN SCALES - GENERAL
PAINLEVEL_OUTOF10: 0

## 2022-06-18 NOTE — PROGRESS NOTES
Medication Reconciliation    List of medications patient is currently taking is complete. Source of information: 1.  Med list brought in from home

## 2022-06-18 NOTE — CONSULTS
[address]                                  CONSULTATION    PATIENT NAME: [place]                     :        1960  MED REC NO:   [id number]                          ROOM:       2110  ACCOUNT NO:   [de-identified]                           ADMIT DATE: 06/15/2022  PROVIDER:     Colby Grace MD    ONCOLOGY CONSULTATION    CONSULT DATE:  2022    REASON FOR CONSULTATION:  Small cell lung cancer, in with pneumonia and  respiratory failure. CONSULTING PROVIDER:  Xiomara Manning MD    HISTORY OF PRESENT ILLNESS:  The patient is a pleasant 59-year-old  female that I follow for extensive stage small cell lung cancer  currently receiving carboplatin plus -16 plus Tecentriq  chemotherapy/immunotherapy, who presented to the hospital with  increasing shortness of breath and fevers. She developed respiratory  failure and was intubated. She was extubated yesterday. A chest CT  without contrast was done that showed a completely opacified left lower  lobe consistent with pneumonia. She is feeling better. Her breathing  has improved. She is back to her baseline 3 liters of O2. Oncology was  consulted for further workup and management. PAST MEDICAL HISTORY:  1. Extensive stage small cell lung cancer. A lung cancer screening CT  in 2022 showed a 3.6-cm left lower lobe mass with mediastinal and  hilar lymphadenopathy. A subsequent PET/CT showed a left lower lobe  mass with extensive marques disease in the mediastinum, hilum, right  axilla, and right supraclavicular region. A biopsy of her right  supraclavicular node showed small cell carcinoma. 2.  Recurrent iron deficiency anemia. 3.  COPD. 4.  Diabetes. 5.  Coronary artery disease status post stent. 6.  Congestive heart failure. 7.  Chronic kidney disease. MEDICATION LIST:  Please see the list in the chart.     SOCIAL HISTORY: She is a . She is a former heavy smoker but does  not smoke or drink currently. She is retired. FAMILY HISTORY:  Noncontributory. REVIEW OF SYSTEMS:  She did have recent fevers, chills, sweats, and  shortness of breath. These are improving. She denies any headaches,  any new bone aches, dysphagia, odynophagia, diarrhea, constipation,  hemoptysis, hematemesis, change in vision/hearing/smell/taste,  neuropathy, skin rashes, productive cough, urinary or bowel prolapse or  incontinence, petechiae, purpura, skin rashes, vaginal bleeding,  pruritus, hallucinations, nasal congestion or drainage, seizures,  strokes, syncope, depression, anxiety, suicidal ideations, melena, or  hematochezia. She has mild to moderate fatigue. Her 10-system review  of systems is otherwise negative. PHYSICAL EXAMINATION:  VITAL SIGNS:  She is afebrile with normal vital signs. GENERAL:  She is in no acute distress. HEENT:  Her pupils are round and reactive to light and accommodation. Extraocular muscles are intact. NECK:  She has no jugular venous distention. No thyromegaly. Oropharynx is clear. She has no carotid bruits. She has no palpable  lymphadenopathy. HEART:  Regular rate and rhythm. LUNGS:  Clear to auscultation bilaterally. ABDOMEN:  Nondistended, nontender with bowel sounds x4. EXTREMITIES:  She has trace lower extremity edema bilaterally. NEUROLOGIC:  Significant for generalized weakness. LABORATORY DATA:  White blood cell count 7.1, hemoglobin 8.3, platelets  762. ASSESSMENT AND PLAN:  1. Extensive stage small cell lung cancer. She will resume  chemotherapy as an outpatient. She is due this coming Monday for her  third cycle. I will delay that by one week. I will likely dose reduce  her. 2.  Anemia. I will check iron studies. If normal, I will give her  Procrit.   I explained the potential side effects of Procrit including  slightly higher risk of strokes, heart attacks, blood clots, cancer  progression. She is willing to proceed. 3.  Pneumonia. Continue antibiotics. Thank you for the consultation. I will follow closely.         Brisa Larios MD    D: 06/18/2022 11:46:54       T: 06/18/2022 14:25:49     [email or web address]  Job#: [id number]     Doc#: [id number]    CC:  MD Oskar Fisher MD

## 2022-06-18 NOTE — PROGRESS NOTES
Pulmonary Critical Care Progress Note     Patient's name:  Anthony Lopez  Medical Record Number: [id number]  Patient's account/billing number: [de-identified]  Patient's YOB: 1960  Age: 65 y.o. Date of Admission: 6/15/2022  9:15 PM  Date of Consult: 6/18/2022      Primary Care Physician: Meggan Gonzalez MD      Code Status: Full Code    Reason for consult: Acute hypoxic respiratory failure    Assessment and Plan     1. Acute respiratory failure with hypoxia requiring intubation status postextubation 6/17/2022  2. Acute on chronic systolic heart failure EF of 25% status post AICD  3. Moderately severe mitral regurg  4. Multifocal PNA, post obstructive in the LLL  5. Acute renal failure with metabolic acidosis   6. Extensive stage small cell lung cancer status postchemotherapy currently on Tecentriq  7. Anemia of chronic disease       Plan:  Continue IV diuresis. BiPAP intermittently as needed for increased work of breathing bedside ultrasound revealed bilateral small pleural effusion not significant enough to drain at this point. Continue broad-spectrum antibiotic coverage with cefepime  GI and DVT prophylaxis. Replace lytes. Subjective:  She developed respiratory distress postextubation yesterday requiring BiPAP      HISTORY OF PRESENT ILLNESS:   Mr./Ms. Anthony Lopez is a 65 y.o.  lady with past medical history stated below significant for history of COPD, extensive stage small cell lung cancer status post chemotherapy currently onTecentriq, presented to the hospital with worsening shortness of breath, initially started on BiPAP failed BiPAP requiring intubation for hypoxia. CT chest with increase interlobular septal thickness, bilateral pleural effusions, multifocal airspace disease as well as complete consolidation of the left lower lobe          REVIEW OF SYSTEMS:  Review of Systems -   Shortness of breath intermittent.   Denied pain.        Physical Exam:    Vitals: BP (!) 87/43   Pulse 66   Temp 97.9 °F (36.6 °C) (Axillary)   Resp 13   Ht 5' 7\" (1.702 m)   Wt 217 lb 6 oz (98.6 kg)   SpO2 96%   BMI 34.05 kg/m²     Last Body weight:   Wt Readings from Last 3 Encounters:   06/18/22 217 lb 6 oz (98.6 kg)   05/19/22 212 lb 4.9 oz (96.3 kg)   05/13/22 218 lb 11.1 oz (99.2 kg)       Body Mass Index : Body mass index is 34.05 kg/m². Intake and Output summary:     Intake/Output Summary (Last 24 hours) at 6/18/2022 1317  Last data filed at 6/18/2022 1144  Gross per 24 hour   Intake 1064 ml   Output 2475 ml   Net -1411 ml       Physical Examination:     PHYSICAL EXAM:    Gen: Mild acute distress  Eyes: PERRL. Anicteric sclera. No conjunctival injection. ENT: No discharge. Posterior oropharynx clear. External appearance of ears and nose normal.  Neck: Trachea midline. No mass, no lymphadenopathy    Resp: Diminished bilaterally with rales  CV: Regular rate. Regular rhythm. No murmur or rub. No edema. GI: Soft, Non-tender. Non-distended. +BS  Skin: Warm, dry, w/o erythema. Lymph: No cervical or supraclavicular LAD. M/S: No cyanosis. No clubbing. Neuro: Awake, no seizure activity, no focal deficit        Laboratory findings:-    CBC:   Recent Labs     06/18/22  0404   WBC 7.1   HGB 8.3*        BMP:    Recent Labs     06/16/22  0422 06/16/22  0422 06/17/22  0407 06/17/22  1830 06/18/22  0404      < > 144  --  143   K 3.7   < > 3.3*   < > 3.9      < > 104  --  105   CO2 22   < > 24  --  25   BUN 23*   < > 24*  --  28*   CREATININE 2.0*  --  1.5*  --  1.5*   GLUCOSE 277*   < > 150*  --  109*    < > = values in this interval not displayed. S. Calcium:  Recent Labs     06/18/22  0404   CALCIUM 8.8     S. Magnesium:  Recent Labs     06/18/22  0404   MG 2.20     S.  Glucose:  Recent Labs     06/17/22  2339 06/18/22  0337 06/18/22  0752   POCGLU 129* 110* 104*     Hepatic functions:   Recent Labs     06/18/22  0404 ALKPHOS 66   ALT 14   AST 14*   PROT 5.7*   BILITOT 0.4   LABALBU 3.3*     Pancreatic functions:  Recent Labs     06/15/22  2129   LACTA 2.4*     S. Lactic Acid:   Recent Labs     06/15/22  2129   LACTA 2.4*     Cardiac enzymes:  Recent Labs     06/15/22  2130 06/16/22  0144 06/16/22  0422   TROPONINI 0.02* 0.03* 0.02*     BNP:No results for input(s): BNP in the last 72 hours. Lipid profile: No results for input(s): CHOL, TRIG, HDL, LDL, LDLCALC in the last 72 hours. Blood Gases: No results found for: PH, PCO2, PO2, HCO3, O2SAT  Thyroid functions:   Lab Results   Component Value Date    TSH 0.379 06/05/2015          Radiology Review:  Pertinent images / reports were reviewed as a part of this visit. CT Chest w/ contrast: No results found for this or any previous visit. CT Chest w/o contrast: Results for orders placed during the hospital encounter of 06/15/22    CT CHEST WO CONTRAST    Narrative  EXAMINATION:  CT OF THE CHEST WITHOUT CONTRAST 6/16/2022 12:48 am    TECHNIQUE:  CT of the chest was performed without the administration of intravenous  contrast. Multiplanar reformatted images are provided for review. Automated  exposure control, iterative reconstruction, and/or weight based adjustment of  the mA/kV was utilized to reduce the radiation dose to as low as reasonably  achievable. COMPARISON:  05/10/2022    HISTORY:  ORDERING SYSTEM PROVIDED HISTORY: SOB, suspect mainly pulm edema and CHF  TECHNOLOGIST PROVIDED HISTORY:  Reason for exam:->SOB, suspect mainly pulm edema and CHF  Reason for Exam: SOB, suspect mainly pulm edema and CHF    FINDINGS:  Mediastinum: Cardiac chambers are mildly enlarged. ICD leads are present  within the heart with the pulse generator in the left upper chest.  Endotracheal tube and nasogastric tube acceptable. Calcified subcarinal and  right hilar lymph nodes are present.   The left hilum is not well evaluated  due to the opacified lung and lack of contrast.  There is heavy calcification  of the aorta and into the great branches. Heavy coronary artery  calcifications as well. Lungs/pleura: Small to moderate bilateral pleural effusions with the right  side greater than left. There is complete collapse or consolidation of the  left lower lobe with air bronchograms. Patchy nodular opacities and  consolidative areas within the left upper lobe and right upper lobe more than  the right lower lobe. Ground-glass and interlobular septal thickening are  also present in both lungs. There is no pneumothorax. Upper Abdomen: No free air free fluid at the upper abdomen. Visualized  portion of the adrenal glands are not enlarged but not fully included. Soft Tissues/Bones: Multinodular thyroid gland is present with the larger  nodule on the right side measuring up to 2.5 cm and heterogenous. CT  features are not significantly changed. Right-sided MediPort is present with  the tip near the cavoatrial junction. There is no soft tissue emphysema. The bones are stable with degenerative changes along the spine. Impression  1. Left lower lobe is completely opacified and has air bronchograms and  could relate to combination of compressive atelectasis and pneumonia. Other  areas of nodular opacity and peripheral consolidative areas in the lungs  suggesting multifocal pneumonia. However, there is also interlobular septal  thickening, ground-glass opacities, and bilateral pleural effusions  suggesting a component of CHF as well. 2.  Previous more focal mass in the left lower lobe is not evaluated given  the completely opacified lung. Limited assessment of lymphadenopathy. 3.  Multinodular thyroid gland with the larger nodule in the right side  measuring up to 2.5 cm. Can have non emergent thyroid ultrasound if not  previously worked up.       CTPA: Results for orders placed during the hospital encounter of 05/10/22    CT CHEST PULMONARY EMBOLISM W CONTRAST    Narrative  EXAMINATION:  CTA OF THE CHEST 5/10/2022 8:43 pm    TECHNIQUE:  CTA of the chest was performed after the administration of intravenous  contrast.  Multiplanar reformatted images are provided for review. MIP  images are provided for review. Automated exposure control, iterative  reconstruction, and/or weight based adjustment of the mA/kV was utilized to  reduce the radiation dose to as low as reasonably achievable. COMPARISON:  None. HISTORY:  ORDERING SYSTEM PROVIDED HISTORY: sob hx of cancer on chemo r/o pe also has  hx of heart failure and copd  TECHNOLOGIST PROVIDED HISTORY:  Reason for exam:->sob hx of cancer on chemo r/o pe also has hx of heart  failure and copd  Decision Support Exception - unselect if not a suspected or confirmed  emergency medical condition->Emergency Medical Condition (MA)  Reason for Exam: sob hx of cancer on chemo r/o pe also has hx of heart  failure and copd    FINDINGS:  Pulmonary Arteries: Pulmonary arteries are adequately opacified for  evaluation. No evidence of intraluminal filling defect to suggest pulmonary  embolism. Main pulmonary artery is normal in caliber. Mediastinum: No evidence of mediastinal lymphadenopathy. The heart and  pericardium demonstrate no acute abnormality. There is no acute abnormality  of the thoracic aorta. Lungs/pleura: 3.2 cm mass in the inferior left lower lobe with surrounding  area of patchy consolidation which could represent disease extension versus  superimposed airspace disease process. Small bilateral pleural effusions. Left hilar adenopathy as well as multiple enlarged upper mediastinal lymph  nodes. Upper Abdomen: Limited images of the upper abdomen are unremarkable. Soft Tissues/Bones: No acute bone or soft tissue abnormality. Impression  No evidence of pulmonary embolism.     3.2 cm mass in the inferior left lower lobe with surrounding area of patchy  consolidation which could represent disease extension versus superimposed  airspace disease process. Small bilateral pleural effusions. Left hilar  adenopathy as well as multiple enlarged upper mediastinal lymph nodes. CXR PA/LAT: Results for orders placed during the hospital encounter of 06/10/22    XR CHEST (2 VW)    Narrative  EXAMINATION:  TWO XRAY VIEWS OF THE CHEST    6/10/2022 3:27 pm    COMPARISON:  05/18/2022    HISTORY:  ORDERING SYSTEM PROVIDED HISTORY: Shortness of breath  TECHNOLOGIST PROVIDED HISTORY:  Reason for Exam: shortness of breath    FINDINGS:  A left subclavian infusion port catheter terminates over the superior right  atrium. A left subclavian biventricular ICD is present. The cardiac silhouette remains at the upper limits of normal.  There is  moderate thoracic aortic calcification. No consolidation, pleural effusion  or pneumothorax is identified. Previous left basilar airspace disease has  resolved. Impression  No acute cardiopulmonary disease. Previous left basilar consolidation or atelectasis has resolved. CXR portable: Results for orders placed during the hospital encounter of 06/15/22    XR CHEST PORTABLE    Narrative  EXAMINATION:  ONE XRAY VIEW OF THE CHEST    6/16/2022 12:18 am    COMPARISON:  Gerri 15, 2022. June 16, 2022. HISTORY:  ORDERING SYSTEM PROVIDED HISTORY: check for ET placement. TECHNOLOGIST PROVIDED HISTORY:  Reason for exam:->check for ET placement. Reason for Exam: check for ET placement. FINDINGS:  Endotracheal tube tip terminates approximately 1.2 cm above the kristin. The  nasogastric tube tip terminates off the inferior margin of the radiograph. Stable positioning of a right chest wall MediPort and left chest wall AICD. Cardiomediastinal silhouette is enlarged. Perihilar opacities are seen in  addition to a dense retrocardiac opacity with bilateral pleural effusions. No acute osseous abnormality. Impression  1. Lines and tubes as described.   The endotracheal tube tip terminates 1.2 cm  above the kristin. However, endotracheal tube appears in adequate position on  follow-up CT. 2. Multifocal pneumonia with possible superimposed edema.     Critical Care time 35 minutes     Inocente Jenkins MD, M.D.            6/18/2022, 1:17 PM

## 2022-06-18 NOTE — PROGRESS NOTES
Hospitalist Progress Note      PCP: Ayleen Garcia MD    Date of Admission: 6/15/2022    Chief Complaint: shortness of breath     Hospital Course:   61-year-old female patient with a past medical history of COPD, hypertension, type 2 diabetes mellitus, hyperlipidemia, CAD, CKD, systolic CHF (EF 25 to 10%). Patient presented with respiratory distress, intubated 6/16    Subjective: Pt S/E. Between home nasal cannula and BiPAP. Feels better but still short of breath. Medications:  Reviewed    Infusion Medications    sodium chloride 5 mL/hr at 06/17/22 1809    sodium chloride 5 mL/hr at 06/17/22 1255    dextrose      furosemide (LASIX) 1mg/ml infusion 5 mg/hr (06/18/22 0402)     Scheduled Medications    enoxaparin  40 mg SubCUTAneous Daily    carvedilol  3.125 mg Oral BID WC    ipratropium-albuterol  1 ampule Inhalation Q4H    amiodarone  200 mg Oral Daily    gabapentin  600 mg Oral TID    sodium chloride flush  5-40 mL IntraVENous 2 times per day    cefepime  2,000 mg IntraVENous Q12H    pantoprazole (PROTONIX) 40 mg injection  40 mg IntraVENous Daily    aspirin  81 mg Oral Daily    atorvastatin  10 mg Oral Nightly     PRN Meds: bacitracin-polymyxin b, hydrALAZINE, sodium chloride flush, sodium chloride, acetaminophen **OR** acetaminophen, ondansetron, sodium chloride, glucose, dextrose bolus **OR** dextrose bolus, glucagon (rDNA), dextrose      Intake/Output Summary (Last 24 hours) at 6/18/2022 0930  Last data filed at 6/18/2022 0751  Gross per 24 hour   Intake 824 ml   Output 2175 ml   Net -1351 ml       Physical Exam Performed:    /61   Pulse 89   Temp 97.7 °F (36.5 °C) (Axillary)   Resp 23   Ht 5' 7\" (1.702 m)   Wt 217 lb 6 oz (98.6 kg)   SpO2 96%   BMI 34.05 kg/m²     General appearance: nad, sitting up in bed, talking   HEENT: Pupils equal, round, and reactive to light. Conjunctivae/corneas clear. Neck: Supple, with full range of motion. No jugular venous distention. Trachea midline. Respiratory:  Normal respiratory effort. Clear to auscultation, bilaterally without Rales/Wheezes/Rhonchi. Cardiovascular: Regular rate and rhythm with normal S1/S2 without murmurs, rubs or gallops. Abdomen: Soft, non-tender, non-distended with normal bowel sounds. Musculoskeletal: Lower limb swelling   skin: Skin color, texture, turgor normal.  No rashes or lesions. Neurologic:  Neurovascularly intact without any focal sensory/motor deficits. Cranial nerves: II-XII intact, grossly non-focal.  Psychiatric: Alert and oriented x3, thought content appropriate, normal insight  Capillary Refill: Brisk,3 seconds, normal   Peripheral Pulses: +2 palpable, equal bilaterally       Labs:   Recent Labs     06/16/22 0422 06/17/22 0407 06/18/22  0404   WBC 5.5 4.6 7.1   HGB 8.8* 8.6* 8.3*   HCT 27.7* 27.1* 26.1*    186 200     Recent Labs     06/16/22 0422 06/16/22 0422 06/17/22  0407 06/17/22  1830 06/18/22  0404     --  144  --  143   K 3.7   < > 3.3* 4.5 3.9     --  104  --  105   CO2 22  --  24  --  25   BUN 23*  --  24*  --  28*   CREATININE 2.0*  --  1.5*  --  1.5*   CALCIUM 9.0  --  9.2  --  8.8    < > = values in this interval not displayed. Recent Labs     06/16/22 0422 06/17/22 0407 06/18/22  0404   AST 13* 15 14*   ALT 12 11 14   BILITOT 0.7 0.4 0.4   ALKPHOS 79 72 66     No results for input(s): INR in the last 72 hours. Recent Labs     06/15/22  2130 06/16/22  0144 06/16/22 0422   TROPONINI 0.02* 0.03* 0.02*       Urinalysis:      Lab Results   Component Value Date    NITRU Negative 03/31/2022    WBCUA 6 03/30/2022    BACTERIA 4+ 03/30/2022    RBCUA 1 03/30/2022    BLOODU Negative 03/31/2022    SPECGRAV 1.010 03/31/2022    GLUCOSEU >=1000 03/31/2022       Radiology:  XR CHEST PORTABLE   Final Result   Interval worsening of bibasilar airspace opacities consistent with pneumonia. Small right pleural effusion. CT CHEST WO CONTRAST   Final Result   1.   Left lower lobe is completely opacified and has air bronchograms and   could relate to combination of compressive atelectasis and pneumonia. Other   areas of nodular opacity and peripheral consolidative areas in the lungs   suggesting multifocal pneumonia. However, there is also interlobular septal   thickening, ground-glass opacities, and bilateral pleural effusions   suggesting a component of CHF as well. 2.  Previous more focal mass in the left lower lobe is not evaluated given   the completely opacified lung. Limited assessment of lymphadenopathy. 3.  Multinodular thyroid gland with the larger nodule in the right side   measuring up to 2.5 cm. Can have non emergent thyroid ultrasound if not   previously worked up. XR CHEST PORTABLE   Final Result   1. Lines and tubes as described. The endotracheal tube tip terminates 1.2 cm   above the kristin. However, endotracheal tube appears in adequate position on   follow-up CT. 2. Multifocal pneumonia with possible superimposed edema. XR CHEST PORTABLE   Final Result   Cardiomegaly perihilar/diffuse interstitial opacities can be seen with   interstitial alveolar pulmonary edema. Please correlate exam findings. Assessment/Plan:    Active Hospital Problems    Diagnosis     Respiratory distress [R06.03]      Priority: Medium    Acute respiratory failure (HCC) [J96.00]      Priority: Medium    Pneumonia [J18.9]      Priority: Medium    Acute respiratory failure with hypoxia and hypercapnia (HCC) [J96.01, J96.02]     DMII (diabetes mellitus, type 2) (HCC) [E11.9]     Acute decompensated heart failure (HCC) [I50.9]     Acute pulmonary edema (HCC) [J81.0]     CHF (congestive heart failure) (HCC) [I50.9]     [facility] [J44.9]     Essential hypertension [I10]      Acute hypoxic [place] failure - improved, extubated 6/17   Acute pulmonary edema.   Acute systolic heart failure exacerbation  Patient known EF of 25%, presented to emergency with acute shortness of breath, did not improve on BiPAP and patient was subsequently intubated 6/16. FiO2 improved from 100% to 35%, patient is currently on diuresis. -Continue Lasix 5 mg/h.    -continue intake output.  -Appreciate cardiology input. Mitral regurg  Appreciate cardiology input, cont diuresis,  Reduce gabapentin dose    Multifocal pneumonia. Postobstructive left lower lobe  CT scan with signs of postobstructive left lower lobe consolidation, procalcitonin 0.44.    -continue on cefepime     AIDA. Baseline around 1.3, peakd at 2 -> 1.5 today  Avoid nephrotoxic medications  Trend and monitor    Small cell lung cancer  On chemo  Consider oncology consult tomorrow to address chemo schedule (due Monday for treatment)    DVT Prophylaxis: heparin  Diet: ADULT DIET; Regular;  Low Sodium (2 gm)  Code Status: Full Code    PT/OT Eval Status: pending clinical improvement    Dispo - pending clinical improvement      Zuri Lynn MD

## 2022-06-18 NOTE — CONSULTS
Oncology Consult    See dictation    A/P:  1. Extensive stage SCLC--s/p 2 cycles of chemo. Will delay cycle 3 by one week. Will likely reduce dose. 2. Anemia--check iron studies. Will give EPO if normal.    3. LLL pneumonia--continue ATBs. Thanks for consult.  Will follow closely,    Lex Diamond MD

## 2022-06-18 NOTE — PROGRESS NOTES
Patient's congestive heart failure being managed by the critical care team.    Will defer further management to them    Patient unable to be on guideline directed medical therapy for heart failure, i.e. ACE inhibitor or spironolactone,  due to hypotension.       Please consult if necessary  Will sign off    Charmayne Coral M.D.

## 2022-06-18 NOTE — PLAN OF CARE
Problem: Discharge Planning  Goal: Discharge to home or other facility with appropriate resources  Outcome: Progressing  Flowsheets (Taken 6/17/2022 2030)  Discharge to home or other facility with appropriate resources: Identify barriers to discharge with patient and caregiver     Problem: Pain  Goal: Verbalizes/displays adequate comfort level or baseline comfort level  Outcome: Progressing  Flowsheets  Taken 6/18/2022 0030  Verbalizes/displays adequate comfort level or baseline comfort level:   Encourage patient to monitor pain and request assistance   Assess pain using appropriate pain scale  Taken 6/17/2022 2030  Verbalizes/displays adequate comfort level or baseline comfort level:   Encourage patient to monitor pain and request assistance   Assess pain using appropriate pain scale   Administer analgesics based on type and severity of pain and evaluate response   Implement non-pharmacological measures as appropriate and evaluate response     Problem: Safety - Medical Restraint  Goal: Remains free of injury from restraints (Restraint for Interference with Medical Device)  Description: INTERVENTIONS:  1. Determine that other, less restrictive measures have been tried or would not be effective before applying the restraint  2. Evaluate the patient's condition at the time of restraint application  3. Inform patient/family regarding the reason for restraint  4. Q2H: Monitor safety, psychosocial status, comfort, nutrition and hydration  Outcome: Progressing     Problem: Nutrition Deficit:  Goal: Optimize nutritional status  Outcome: Progressing     Problem: Skin/Tissue Integrity  Goal: Absence of new skin breakdown  Description: 1. Monitor for areas of redness and/or skin breakdown  2. Assess vascular access sites hourly  3. Every 4-6 hours minimum:  Change oxygen saturation probe site  4.   Every 4-6 hours:  If on nasal continuous positive airway pressure, respiratory therapy assess nares and determine need for appliance change or resting period.   Outcome: Progressing     Problem: Safety - Adult  Goal: Free from fall injury  Outcome: Progressing     Problem: ABCDS Injury Assessment  Goal: Absence of physical injury  Outcome: Progressing  Flowsheets (Taken 6/18/2022 0456)  Absence of Physical Injury: Implement safety measures based on patient assessment     Problem: Cardiovascular - Adult  Goal: Maintains optimal cardiac output and hemodynamic stability  Outcome: Progressing  Flowsheets (Taken 6/17/2022 2030)  Maintains optimal cardiac output and hemodynamic stability:   Monitor blood pressure and heart rate   Monitor urine output and notify Licensed Independent Practitioner for values outside of normal range   Assess for signs of decreased cardiac output  Goal: Absence of cardiac dysrhythmias or at baseline  Outcome: Progressing  Flowsheets (Taken 6/17/2022 2030)  Absence of cardiac dysrhythmias or at baseline: Monitor cardiac rate and rhythm     Problem: Respiratory - Adult  Goal: Achieves optimal ventilation and oxygenation  Outcome: Progressing  Flowsheets (Taken 6/17/2022 2030)  Achieves optimal ventilation and oxygenation:   Assess for changes in respiratory status   Assess for changes in mentation and behavior   Position to facilitate oxygenation and minimize respiratory effort     Problem: Metabolic/Fluid and Electrolytes - Adult  Goal: Electrolytes maintained within normal limits  Outcome: Progressing  Flowsheets (Taken 6/17/2022 2030)  Electrolytes maintained within normal limits:   Monitor labs and assess patient for signs and symptoms of electrolyte imbalances   Administer electrolyte replacement as ordered   Monitor response to electrolyte replacements, including repeat lab results as appropriate  Goal: Hemodynamic stability and optimal renal function maintained  Outcome: Progressing  Flowsheets (Taken 6/17/2022 2030)  Hemodynamic stability and optimal renal function maintained:   Monitor labs and assess for signs and symptoms of volume excess or deficit   Monitor intake, output and patient weight  Goal: Glucose maintained within prescribed range  Outcome: Progressing  Flowsheets (Taken 6/17/2022 2030)  Glucose maintained within prescribed range:   Monitor blood glucose as ordered   Assess for signs and symptoms of hyperglycemia and hypoglycemia   Administer ordered medications to maintain glucose within target range     Problem: Hematologic - Adult  Goal: Maintains hematologic stability  Outcome: Progressing  Flowsheets (Taken 6/17/2022 2030)  Maintains hematologic stability:   Assess for signs and symptoms of bleeding or hemorrhage   Monitor labs for bleeding or clotting disorders

## 2022-06-19 LAB
A/G RATIO: 1.7 (ref 1.1–2.2)
ALBUMIN SERPL-MCNC: 3.1 G/DL (ref 3.4–5)
ALP BLD-CCNC: 62 U/L (ref 40–129)
ALT SERPL-CCNC: 12 U/L (ref 10–40)
ANION GAP SERPL CALCULATED.3IONS-SCNC: 12 MMOL/L (ref 3–16)
AST SERPL-CCNC: 12 U/L (ref 15–37)
BASOPHILS ABSOLUTE: 0 K/UL (ref 0–0.2)
BASOPHILS RELATIVE PERCENT: 0.6 %
BILIRUB SERPL-MCNC: 0.5 MG/DL (ref 0–1)
BUN BLDV-MCNC: 18 MG/DL (ref 7–20)
CALCIUM SERPL-MCNC: 8.4 MG/DL (ref 8.3–10.6)
CHLORIDE BLD-SCNC: 104 MMOL/L (ref 99–110)
CO2: 26 MMOL/L (ref 21–32)
CREAT SERPL-MCNC: 1.3 MG/DL (ref 0.6–1.2)
EOSINOPHILS ABSOLUTE: 0 K/UL (ref 0–0.6)
EOSINOPHILS RELATIVE PERCENT: 0.3 %
GFR AFRICAN AMERICAN: 50
GFR NON-AFRICAN AMERICAN: 42
GLUCOSE BLD-MCNC: 101 MG/DL (ref 70–99)
GLUCOSE BLD-MCNC: 93 MG/DL (ref 70–99)
HCT VFR BLD CALC: 23 % (ref 36–48)
HEMOGLOBIN: 7.5 G/DL (ref 12–16)
LYMPHOCYTES ABSOLUTE: 0.7 K/UL (ref 1–5.1)
LYMPHOCYTES RELATIVE PERCENT: 15.1 %
MAGNESIUM: 1.9 MG/DL (ref 1.8–2.4)
MCH RBC QN AUTO: 23.9 PG (ref 26–34)
MCHC RBC AUTO-ENTMCNC: 32.4 G/DL (ref 31–36)
MCV RBC AUTO: 73.7 FL (ref 80–100)
MONOCYTES ABSOLUTE: 0.4 K/UL (ref 0–1.3)
MONOCYTES RELATIVE PERCENT: 9.1 %
NEUTROPHILS ABSOLUTE: 3.5 K/UL (ref 1.7–7.7)
NEUTROPHILS RELATIVE PERCENT: 74.9 %
PDW BLD-RTO: 22.6 % (ref 12.4–15.4)
PERFORMED ON: ABNORMAL
PLATELET # BLD: 200 K/UL (ref 135–450)
PMV BLD AUTO: 9.6 FL (ref 5–10.5)
POTASSIUM SERPL-SCNC: 3.5 MMOL/L (ref 3.5–5.1)
RBC # BLD: 3.12 M/UL (ref 4–5.2)
SODIUM BLD-SCNC: 142 MMOL/L (ref 136–145)
TOTAL PROTEIN: 4.9 G/DL (ref 6.4–8.2)
WBC # BLD: 4.7 K/UL (ref 4–11)

## 2022-06-19 PROCEDURE — 6370000000 HC RX 637 (ALT 250 FOR IP): Performed by: INTERNAL MEDICINE

## 2022-06-19 PROCEDURE — 6360000002 HC RX W HCPCS: Performed by: STUDENT IN AN ORGANIZED HEALTH CARE EDUCATION/TRAINING PROGRAM

## 2022-06-19 PROCEDURE — 6360000002 HC RX W HCPCS: Performed by: INTERNAL MEDICINE

## 2022-06-19 PROCEDURE — 99233 SBSQ HOSP IP/OBS HIGH 50: CPT | Performed by: INTERNAL MEDICINE

## 2022-06-19 PROCEDURE — 2580000003 HC RX 258: Performed by: STUDENT IN AN ORGANIZED HEALTH CARE EDUCATION/TRAINING PROGRAM

## 2022-06-19 PROCEDURE — 6370000000 HC RX 637 (ALT 250 FOR IP): Performed by: STUDENT IN AN ORGANIZED HEALTH CARE EDUCATION/TRAINING PROGRAM

## 2022-06-19 PROCEDURE — 83735 ASSAY OF MAGNESIUM: CPT

## 2022-06-19 PROCEDURE — 94660 CPAP INITIATION&MGMT: CPT

## 2022-06-19 PROCEDURE — 2700000000 HC OXYGEN THERAPY PER DAY

## 2022-06-19 PROCEDURE — 94760 N-INVAS EAR/PLS OXIMETRY 1: CPT

## 2022-06-19 PROCEDURE — C9113 INJ PANTOPRAZOLE SODIUM, VIA: HCPCS | Performed by: NURSE PRACTITIONER

## 2022-06-19 PROCEDURE — 2580000003 HC RX 258: Performed by: NURSE PRACTITIONER

## 2022-06-19 PROCEDURE — 6360000002 HC RX W HCPCS: Performed by: NURSE PRACTITIONER

## 2022-06-19 PROCEDURE — 2060000000 HC ICU INTERMEDIATE R&B

## 2022-06-19 PROCEDURE — 85025 COMPLETE CBC W/AUTO DIFF WBC: CPT

## 2022-06-19 PROCEDURE — 94640 AIRWAY INHALATION TREATMENT: CPT

## 2022-06-19 PROCEDURE — 36591 DRAW BLOOD OFF VENOUS DEVICE: CPT

## 2022-06-19 PROCEDURE — 80053 COMPREHEN METABOLIC PANEL: CPT

## 2022-06-19 PROCEDURE — 6370000000 HC RX 637 (ALT 250 FOR IP): Performed by: NURSE PRACTITIONER

## 2022-06-19 RX ORDER — MAGNESIUM SULFATE IN WATER 40 MG/ML
2000 INJECTION, SOLUTION INTRAVENOUS ONCE
Status: COMPLETED | OUTPATIENT
Start: 2022-06-19 | End: 2022-06-19

## 2022-06-19 RX ADMIN — Medication 40 MG: at 08:25

## 2022-06-19 RX ADMIN — CEFEPIME HYDROCHLORIDE 2000 MG: 2 INJECTION, POWDER, FOR SOLUTION INTRAVENOUS at 16:23

## 2022-06-19 RX ADMIN — CARVEDILOL 3.12 MG: 3.12 TABLET, FILM COATED ORAL at 16:23

## 2022-06-19 RX ADMIN — CEFEPIME HYDROCHLORIDE 2000 MG: 2 INJECTION, POWDER, FOR SOLUTION INTRAVENOUS at 04:21

## 2022-06-19 RX ADMIN — EPOETIN ALFA-EPBX 40000 UNITS: 40000 INJECTION, SOLUTION INTRAVENOUS; SUBCUTANEOUS at 21:05

## 2022-06-19 RX ADMIN — IPRATROPIUM BROMIDE AND ALBUTEROL SULFATE 1 AMPULE: .5; 3 SOLUTION RESPIRATORY (INHALATION) at 07:59

## 2022-06-19 RX ADMIN — Medication 10 ML: at 08:30

## 2022-06-19 RX ADMIN — ATORVASTATIN CALCIUM 10 MG: 10 TABLET, FILM COATED ORAL at 21:06

## 2022-06-19 RX ADMIN — IPRATROPIUM BROMIDE AND ALBUTEROL SULFATE 1 AMPULE: .5; 3 SOLUTION RESPIRATORY (INHALATION) at 20:16

## 2022-06-19 RX ADMIN — GABAPENTIN 600 MG: 300 CAPSULE ORAL at 14:09

## 2022-06-19 RX ADMIN — ENOXAPARIN SODIUM 40 MG: 100 INJECTION SUBCUTANEOUS at 08:25

## 2022-06-19 RX ADMIN — AMIODARONE HYDROCHLORIDE 200 MG: 200 TABLET ORAL at 08:25

## 2022-06-19 RX ADMIN — IRON SUCROSE 200 MG: 20 INJECTION, SOLUTION INTRAVENOUS at 14:09

## 2022-06-19 RX ADMIN — Medication 10 ML: at 21:06

## 2022-06-19 RX ADMIN — IPRATROPIUM BROMIDE AND ALBUTEROL SULFATE 1 AMPULE: .5; 3 SOLUTION RESPIRATORY (INHALATION) at 11:47

## 2022-06-19 RX ADMIN — GABAPENTIN 600 MG: 300 CAPSULE ORAL at 21:06

## 2022-06-19 RX ADMIN — FUROSEMIDE 5 MG/HR: 10 INJECTION, SOLUTION INTRAMUSCULAR; INTRAVENOUS at 00:02

## 2022-06-19 RX ADMIN — IPRATROPIUM BROMIDE AND ALBUTEROL SULFATE 1 AMPULE: .5; 3 SOLUTION RESPIRATORY (INHALATION) at 03:36

## 2022-06-19 RX ADMIN — FUROSEMIDE 5 MG/HR: 10 INJECTION, SOLUTION INTRAMUSCULAR; INTRAVENOUS at 21:37

## 2022-06-19 RX ADMIN — MAGNESIUM SULFATE HEPTAHYDRATE 2000 MG: 40 INJECTION, SOLUTION INTRAVENOUS at 09:50

## 2022-06-19 RX ADMIN — IPRATROPIUM BROMIDE AND ALBUTEROL SULFATE 1 AMPULE: .5; 3 SOLUTION RESPIRATORY (INHALATION) at 16:36

## 2022-06-19 RX ADMIN — ASPIRIN 81 MG: 81 TABLET, CHEWABLE ORAL at 08:25

## 2022-06-19 RX ADMIN — GABAPENTIN 600 MG: 300 CAPSULE ORAL at 08:34

## 2022-06-19 ASSESSMENT — PAIN SCALES - GENERAL
PAINLEVEL_OUTOF10: 0
PAINLEVEL_OUTOF10: 0

## 2022-06-19 NOTE — PROGRESS NOTES
Hospitalist Progress Note      PCP: Bhupinder Suazo MD    Date of Admission: 6/15/2022    Chief Complaint: shortness of breath     Hospital Course:   78-year-old female patient with a past medical history of COPD, hypertension, type 2 diabetes mellitus, hyperlipidemia, CAD, CKD, systolic CHF (EF 25 to 60%). Patient presented with respiratory distress, intubated 6/16    Subjective: Pt S/E. Between home nasal cannula and BiPAP. Feels better but still short of breath. Medications:  Reviewed    Infusion Medications    sodium chloride 5 mL/hr at 06/18/22 1752    sodium chloride 5 mL/hr at 06/17/22 1255    dextrose      furosemide (LASIX) 1mg/ml infusion 5 mg/hr (06/19/22 0002)     Scheduled Medications    magnesium sulfate  2,000 mg IntraVENous Once    enoxaparin  40 mg SubCUTAneous Daily    carvedilol  3.125 mg Oral BID WC    ipratropium-albuterol  1 ampule Inhalation Q4H    amiodarone  200 mg Oral Daily    gabapentin  600 mg Oral TID    sodium chloride flush  5-40 mL IntraVENous 2 times per day    cefepime  2,000 mg IntraVENous Q12H    pantoprazole (PROTONIX) 40 mg injection  40 mg IntraVENous Daily    aspirin  81 mg Oral Daily    atorvastatin  10 mg Oral Nightly     PRN Meds: bacitracin-polymyxin b, hydrALAZINE, sodium chloride flush, sodium chloride, acetaminophen **OR** acetaminophen, ondansetron, sodium chloride, glucose, dextrose bolus **OR** dextrose bolus, glucagon (rDNA), dextrose      Intake/Output Summary (Last 24 hours) at 6/19/2022 1010  Last data filed at 6/19/2022 0950  Gross per 24 hour   Intake 1046.07 ml   Output 5275 ml   Net -4228.93 ml       Physical Exam Performed:    BP (!) 112/56   Pulse 73   Temp 98.5 °F (36.9 °C) (Axillary)   Resp 14   Ht 5' 7\" (1.702 m)   Wt 215 lb 2.7 oz (97.6 kg)   SpO2 96%   BMI 33.70 kg/m²     General appearance: nad, sitting up in bed, talking   HEENT: Pupils equal, round, and reactive to light. Conjunctivae/corneas clear.   Neck: Supple, heart failure exacerbation  Patient known EF of 25%, presented to emergency with acute shortness of breath, did not improve on BiPAP and patient was subsequently intubated 6/16. FiO2 improved from 100% to 35%, patient is currently on diuresis. -Continue Lasix 5 mg/h.    -continue intake output.  -Appreciate cardiology input. Mitral regurg  Appreciate cardiology input, cont diuresis,  Reduce gabapentin dose    Multifocal pneumonia. Postobstructive left lower lobe  CT scan with signs of postobstructive left lower lobe consolidation, procalcitonin 0.44.    -continue on cefepime     AIDA. Baseline around 1.3, peakd at 2 -> 1.3 today  Avoid nephrotoxic medications  Trend and monitor    Small cell lung cancer  On chemo  Oncology consulted, will defer chemo for a week    DVT Prophylaxis: heparin  Diet: ADULT DIET; Regular;  Low Sodium (2 gm)  Code Status: Full Code    PT/OT Eval Status: pending clinical improvement    Dispo - ok to transfer to PCU      Sami Carlton MD

## 2022-06-19 NOTE — PROGRESS NOTES
Pulmonary Critical Care Progress Note     Patient's name:  Angel Gunn  Medical Record Number: [id number]  Patient's account/billing number: [de-identified]  Patient's YOB: 1960  Age: 65 y.o. Date of Admission: 6/15/2022  9:15 PM  Date of Consult: 6/19/2022      Primary Care Physician: Wing Karin MD      Code Status: Full Code    Reason for consult: Acute hypoxic respiratory failure    Assessment and Plan     1. Acute respiratory failure with hypoxia requiring intubation status postextubation 6/17/2022  2. Acute on chronic systolic heart failure EF of 25% status post AICD  3. Moderately severe mitral regurg  4. Multifocal PNA, post obstructive in the LLL  5. Acute renal failure with metabolic acidosis   6. Extensive stage small cell lung cancer status postchemotherapy currently on Tecentriq  7. Anemia of chronic disease       Plan:  Continue diuresis. BiPAP intermittently as needed for increased work of breathing   Continue broad-spectrum antibiotic coverage with cefepime  GI and DVT prophylaxis. Subjective:  No acute events overnight        HISTORY OF PRESENT ILLNESS:   ./Ms. Angel Gunn is a 65 y.o.  lady with past medical history stated below significant for history of COPD, extensive stage small cell lung cancer status post chemotherapy currently onTecentriq, presented to the hospital with worsening shortness of breath, initially started on BiPAP failed BiPAP requiring intubation for hypoxia. CT chest with increase interlobular septal thickness, bilateral pleural effusions, multifocal airspace disease as well as complete consolidation of the left lower lobe          REVIEW OF SYSTEMS:  Review of Systems -   Shortness of breath intermittent. Denied pain.         Physical Exam:    Vitals: /72   Pulse 81   Temp 98.5 °F (36.9 °C) (Axillary)   Resp 13   Ht 5' 7\" (1.702 m)   Wt 215 lb 2.7 oz (97.6 kg)   SpO2 97% BMI 33.70 kg/m²     Last Body weight:   Wt Readings from Last 3 Encounters:   06/19/22 215 lb 2.7 oz (97.6 kg)   05/19/22 212 lb 4.9 oz (96.3 kg)   05/13/22 218 lb 11.1 oz (99.2 kg)       Body Mass Index : Body mass index is 33.7 kg/m². Intake and Output summary:     Intake/Output Summary (Last 24 hours) at 6/19/2022 1450  Last data filed at 6/19/2022 1200  Gross per 24 hour   Intake 1526.07 ml   Output 5225 ml   Net -3698.93 ml       Physical Examination:     PHYSICAL EXAM:    Gen: Mild acute distress  Eyes: PERRL. Anicteric sclera. No conjunctival injection. ENT: No discharge. Posterior oropharynx clear. External appearance of ears and nose normal.  Neck: Trachea midline. No mass, no lymphadenopathy    Resp: Diminished bilaterally with rales  CV: Regular rate. Regular rhythm. No murmur or rub. No edema. GI: Soft, Non-tender. Non-distended. +BS  Skin: Warm, dry, w/o erythema. Lymph: No cervical or supraclavicular LAD. M/S: No cyanosis. No clubbing. Neuro: Awake, no seizure activity, no focal deficit        Laboratory findings:-    CBC:   Recent Labs     06/19/22  0430   WBC 4.7   HGB 7.5*        BMP:    Recent Labs     06/17/22  0407 06/17/22  1830 06/18/22  0404 06/18/22  0404 06/19/22  0430     --  143   < > 142   K 3.3*   < > 3.9   < > 3.5     --  105   < > 104   CO2 24  --  25   < > 26   BUN 24*  --  28*   < > 18   CREATININE 1.5*  --  1.5*  --  1.3*   GLUCOSE 150*  --  109*   < > 93    < > = values in this interval not displayed. S. Calcium:  Recent Labs     06/19/22  0430   CALCIUM 8.4     S. Magnesium:  Recent Labs     06/19/22  0430   MG 1.90     S. Glucose:  Recent Labs     06/18/22  0337 06/18/22  0752 06/19/22  0816   POCGLU 110* 104* 101*     Hepatic functions:   Recent Labs     06/19/22  0430   ALKPHOS 62   ALT 12   AST 12*   PROT 4.9*   BILITOT 0.5   LABALBU 3.1*     Pancreatic functions:  No results for input(s): LACTA, AMYLASE in the last 72 hours.   S. Lactic Acid:   No results for input(s): LACTA in the last 72 hours. Cardiac enzymes:  No results for input(s): CKTOTAL, CKMB, CKMBINDEX, TROPONINI in the last 72 hours. BNP:No results for input(s): BNP in the last 72 hours. Lipid profile: No results for input(s): CHOL, TRIG, HDL, LDL, LDLCALC in the last 72 hours. Blood Gases: No results found for: PH, PCO2, PO2, HCO3, O2SAT  Thyroid functions:   Lab Results   Component Value Date    TSH 0.379 06/05/2015          Radiology Review:  Pertinent images / reports were reviewed as a part of this visit. CT Chest w/ contrast: No results found for this or any previous visit. CT Chest w/o contrast: Results for orders placed during the hospital encounter of 06/15/22    CT CHEST WO CONTRAST    Narrative  EXAMINATION:  CT OF THE CHEST WITHOUT CONTRAST 6/16/2022 12:48 am    TECHNIQUE:  CT of the chest was performed without the administration of intravenous  contrast. Multiplanar reformatted images are provided for review. Automated  exposure control, iterative reconstruction, and/or weight based adjustment of  the mA/kV was utilized to reduce the radiation dose to as low as reasonably  achievable. COMPARISON:  05/10/2022    HISTORY:  ORDERING SYSTEM PROVIDED HISTORY: SOB, suspect mainly pulm edema and CHF  TECHNOLOGIST PROVIDED HISTORY:  Reason for exam:->SOB, suspect mainly pulm edema and CHF  Reason for Exam: SOB, suspect mainly pulm edema and CHF    FINDINGS:  Mediastinum: Cardiac chambers are mildly enlarged. ICD leads are present  within the heart with the pulse generator in the left upper chest.  Endotracheal tube and nasogastric tube acceptable. Calcified subcarinal and  right hilar lymph nodes are present. The left hilum is not well evaluated  due to the opacified lung and lack of contrast.  There is heavy calcification  of the aorta and into the great branches. Heavy coronary artery  calcifications as well.     Lungs/pleura: Small to moderate bilateral pleural reformatted images are provided for review. MIP  images are provided for review. Automated exposure control, iterative  reconstruction, and/or weight based adjustment of the mA/kV was utilized to  reduce the radiation dose to as low as reasonably achievable. COMPARISON:  None. HISTORY:  ORDERING SYSTEM PROVIDED HISTORY: sob hx of cancer on chemo r/o pe also has  hx of heart failure and copd  TECHNOLOGIST PROVIDED HISTORY:  Reason for exam:->sob hx of cancer on chemo r/o pe also has hx of heart  failure and copd  Decision Support Exception - unselect if not a suspected or confirmed  emergency medical condition->Emergency Medical Condition (MA)  Reason for Exam: sob hx of cancer on chemo r/o pe also has hx of heart  failure and copd    FINDINGS:  Pulmonary Arteries: Pulmonary arteries are adequately opacified for  evaluation. No evidence of intraluminal filling defect to suggest pulmonary  embolism. Main pulmonary artery is normal in caliber. Mediastinum: No evidence of mediastinal lymphadenopathy. The heart and  pericardium demonstrate no acute abnormality. There is no acute abnormality  of the thoracic aorta. Lungs/pleura: 3.2 cm mass in the inferior left lower lobe with surrounding  area of patchy consolidation which could represent disease extension versus  superimposed airspace disease process. Small bilateral pleural effusions. Left hilar adenopathy as well as multiple enlarged upper mediastinal lymph  nodes. Upper Abdomen: Limited images of the upper abdomen are unremarkable. Soft Tissues/Bones: No acute bone or soft tissue abnormality. Impression  No evidence of pulmonary embolism. 3.2 cm mass in the inferior left lower lobe with surrounding area of patchy  consolidation which could represent disease extension versus superimposed  airspace disease process. Small bilateral pleural effusions.   Left hilar  adenopathy as well as multiple enlarged upper mediastinal lymph nodes.      CXR PA/LAT: Results for orders placed during the hospital encounter of 06/10/22    XR CHEST (2 VW)    Narrative  EXAMINATION:  TWO XRAY VIEWS OF THE CHEST    6/10/2022 3:27 pm    COMPARISON:  05/18/2022    HISTORY:  ORDERING SYSTEM PROVIDED HISTORY: Shortness of breath  TECHNOLOGIST PROVIDED HISTORY:  Reason for Exam: shortness of breath    FINDINGS:  A left subclavian infusion port catheter terminates over the superior right  atrium. A left subclavian biventricular ICD is present. The cardiac silhouette remains at the upper limits of normal.  There is  moderate thoracic aortic calcification. No consolidation, pleural effusion  or pneumothorax is identified. Previous left basilar airspace disease has  resolved. Impression  No acute cardiopulmonary disease. Previous left basilar consolidation or atelectasis has resolved. CXR portable: Results for orders placed during the hospital encounter of 06/15/22    XR CHEST PORTABLE    Narrative  EXAMINATION:  ONE XRAY VIEW OF THE CHEST    6/16/2022 12:18 am    COMPARISON:  Gerri 15, 2022. June 16, 2022. HISTORY:  ORDERING SYSTEM PROVIDED HISTORY: check for ET placement. TECHNOLOGIST PROVIDED HISTORY:  Reason for exam:->check for ET placement. Reason for Exam: check for ET placement. FINDINGS:  Endotracheal tube tip terminates approximately 1.2 cm above the kristin. The  nasogastric tube tip terminates off the inferior margin of the radiograph. Stable positioning of a right chest wall MediPort and left chest wall AICD. Cardiomediastinal silhouette is enlarged. Perihilar opacities are seen in  addition to a dense retrocardiac opacity with bilateral pleural effusions. No acute osseous abnormality. Impression  1. Lines and tubes as described. The endotracheal tube tip terminates 1.2 cm  above the kristin. However, endotracheal tube appears in adequate position on  follow-up CT.   2. Multifocal pneumonia with possible superimposed edema.         Ana Bynum MD, M.D.            6/19/2022, 2:50 PM

## 2022-06-19 NOTE — PLAN OF CARE
Problem: Discharge Planning  Goal: Discharge to home or other facility with appropriate resources  Outcome: Progressing  Flowsheets (Taken 6/18/2022 2000)  Discharge to home or other facility with appropriate resources: Identify barriers to discharge with patient and caregiver     Problem: Pain  Goal: Verbalizes/displays adequate comfort level or baseline comfort level  Outcome: Progressing  Flowsheets (Taken 6/18/2022 2000)  Verbalizes/displays adequate comfort level or baseline comfort level:   Encourage patient to monitor pain and request assistance   Assess pain using appropriate pain scale     Problem: Safety - Medical Restraint  Goal: Remains free of injury from restraints (Restraint for Interference with Medical Device)  Description: INTERVENTIONS:  1. Determine that other, less restrictive measures have been tried or would not be effective before applying the restraint  2. Evaluate the patient's condition at the time of restraint application  3. Inform patient/family regarding the reason for restraint  4. Q2H: Monitor safety, psychosocial status, comfort, nutrition and hydration  Outcome: Progressing     Problem: Nutrition Deficit:  Goal: Optimize nutritional status  Outcome: Progressing     Problem: Skin/Tissue Integrity  Goal: Absence of new skin breakdown  Description: 1. Monitor for areas of redness and/or skin breakdown  2. Assess vascular access sites hourly  3. Every 4-6 hours minimum:  Change oxygen saturation probe site  4. Every 4-6 hours:  If on nasal continuous positive airway pressure, respiratory therapy assess nares and determine need for appliance change or resting period.   Outcome: Progressing     Problem: Safety - Adult  Goal: Free from fall injury  Outcome: Progressing  Flowsheets (Taken 6/19/2022 0344)  Free From Fall Injury: Instruct family/caregiver on patient safety     Problem: ABCDS Injury Assessment  Goal: Absence of physical injury  Outcome: Progressing  Flowsheets (Taken 6/19/2022 0344)  Absence of Physical Injury: Implement safety measures based on patient assessment     Problem: Cardiovascular - Adult  Goal: Maintains optimal cardiac output and hemodynamic stability  Outcome: Progressing  Flowsheets (Taken 6/18/2022 2000)  Maintains optimal cardiac output and hemodynamic stability:   Monitor blood pressure and heart rate   Monitor urine output and notify Licensed Independent Practitioner for values outside of normal range   Assess for signs of decreased cardiac output  Goal: Absence of cardiac dysrhythmias or at baseline  Outcome: Progressing  Flowsheets (Taken 6/18/2022 2000)  Absence of cardiac dysrhythmias or at baseline: Monitor cardiac rate and rhythm     Problem: Respiratory - Adult  Goal: Achieves optimal ventilation and oxygenation  Outcome: Progressing  Flowsheets (Taken 6/18/2022 2000)  Achieves optimal ventilation and oxygenation:   Assess for changes in respiratory status   Assess for changes in mentation and behavior   Position to facilitate oxygenation and minimize respiratory effort   Oxygen supplementation based on oxygen saturation or arterial blood gases     Problem: Metabolic/Fluid and Electrolytes - Adult  Goal: Electrolytes maintained within normal limits  Outcome: Progressing  Flowsheets (Taken 6/18/2022 2000)  Electrolytes maintained within normal limits: Monitor labs and assess patient for signs and symptoms of electrolyte imbalances  Goal: Hemodynamic stability and optimal renal function maintained  Outcome: Progressing  Flowsheets (Taken 6/18/2022 2000)  Hemodynamic stability and optimal renal function maintained:   Monitor labs and assess for signs and symptoms of volume excess or deficit   Monitor intake, output and patient weight  Goal: Glucose maintained within prescribed range  Outcome: Progressing  Flowsheets (Taken 6/18/2022 2000)  Glucose maintained within prescribed range: Monitor blood glucose as ordered     Problem: Hematologic - Adult  Goal: Maintains hematologic stability  Outcome: Progressing  Flowsheets (Taken 6/18/2022 2000)  Maintains hematologic stability: Assess for signs and symptoms of bleeding or hemorrhage

## 2022-06-19 NOTE — PROGRESS NOTES
Hematology Oncology Daily Progress Note    Admit Date: 6/15/2022  Hospital day several    Subjective:     Patient has complaints of improved HEATH/SOB--denies CP. Medication side effects: none    Scheduled Meds:   iron sucrose  200 mg IntraVENous Q24H    epoetin beth-epbx  40,000 Units SubCUTAneous Once    enoxaparin  40 mg SubCUTAneous Daily    carvedilol  3.125 mg Oral BID WC    ipratropium-albuterol  1 ampule Inhalation Q4H    amiodarone  200 mg Oral Daily    gabapentin  600 mg Oral TID    sodium chloride flush  5-40 mL IntraVENous 2 times per day    cefepime  2,000 mg IntraVENous Q12H    pantoprazole (PROTONIX) 40 mg injection  40 mg IntraVENous Daily    aspirin  81 mg Oral Daily    atorvastatin  10 mg Oral Nightly     Continuous Infusions:   sodium chloride 5 mL/hr at 06/18/22 1752    sodium chloride 5 mL/hr at 06/17/22 1255    dextrose      furosemide (LASIX) 1mg/ml infusion 5 mg/hr (06/19/22 0002)     PRN Meds:bacitracin-polymyxin b, hydrALAZINE, sodium chloride flush, sodium chloride, acetaminophen **OR** acetaminophen, ondansetron, sodium chloride, glucose, dextrose bolus **OR** dextrose bolus, glucagon (rDNA), dextrose    Review of Systems  Pertinent items are noted in HPI. REVIEW OF SYSTEMS:         · Constitutional: Denies fever, sweats, weight loss     · Eyes: No visual changes or diplopia. No scleral icterus. · ENT: No Headaches, hearing loss or vertigo. No mouth sores or sore throat. · Cardiovascular: No chest pain, dyspnea on exertion, palpitations or loss of consciousness. · Respiratory: No cough or wheezing, no sputum production. No hemoptysis. .    · Gastrointestinal: No abdominal pain, appetite loss, blood in stools. No change in bowel habits. · Genitourinary: No dysuria, trouble voiding, or hematuria. · Musculoskeletal:  Generalized weakness. No joint complaints. · Integumentary: No rash or pruritis. · Neurological: No headache, diplopia.  No change in gait, balance, or coordination. No paresthesias. · Endocrine: No temperature intolerance. No excessive thirst, fluid intake, or urination. · Hematologic/Lymphatic: No abnormal bruising or ecchymoses, blood clots or swollen lymph nodes. · Allergic/Immunologic: No nasal congestion or hives. ·     Objective:     Patient Vitals for the past 8 hrs:   BP Temp Temp src Pulse Resp SpO2   06/19/22 1200 123/72 -- -- 81 13 97 %   06/19/22 1148 -- -- -- 86 22 96 %   06/19/22 1100 (!) 111/53 -- -- 66 16 98 %   06/19/22 1000 (!) 108/54 -- -- 76 25 100 %   06/19/22 0900 (!) 107/56 -- -- 82 14 99 %   06/19/22 0830 -- 98.5 °F (36.9 °C) Axillary -- -- --   06/19/22 0800 (!) 112/56 -- -- 73 14 96 %   06/19/22 0700 (!) 94/52 -- -- 60 14 97 %   06/19/22 0600 (!) 101/45 -- -- 60 14 96 %   06/19/22 0500 (!) 102/46 -- -- 61 14 97 %     I/O last 3 completed shifts: In: 1526.1 [P.O.:1200; I.V.:231; IV Piggyback:95.1]  Out: 3465 [NEYMS:0783]  I/O this shift:   In: 12 [P.O.:960]  Out: 1200 [Urine:1200]    /72   Pulse 81   Temp 98.5 °F (36.9 °C) (Axillary)   Resp 13   Ht 5' 7\" (1.702 m)   Wt 215 lb 2.7 oz (97.6 kg)   SpO2 97%   BMI 33.70 kg/m²     General Appearance:    Alert, cooperative, no distress, appears stated age   Head:    Normocephalic, without obvious abnormality, atraumatic   Eyes:    PERRL, conjunctiva/corneas clear, EOM's intact, fundi     benign, both eyes        Ears:    Normal TM's and external ear canals, both ears   Nose:   Nares normal, septum midline, mucosa normal, no drainage    or sinus tenderness   Throat:   Lips, mucosa, and tongue normal; teeth and gums normal   Neck:   Supple, symmetrical, trachea midline, no adenopathy;        thyroid:  No enlargement/tenderness/nodules; no carotid    bruit or JVD   Back:     Symmetric, no curvature, ROM normal, no CVA tenderness   Lungs:     Clear to auscultation bilaterally, respirations unlabored   Chest wall:    No tenderness or deformity   Heart:    Regular rate and rhythm, S1 and S2 normal, no murmur, rub   or gallop   Abdomen:     Soft, non-tender, bowel sounds active all four quadrants,     no masses, no organomegaly           Extremities:   Extremities normal, atraumatic, no cyanosis or edema   Pulses:   2+ and symmetric all extremities   Skin:   Skin color, texture, turgor normal, no rashes or lesions   Lymph nodes:   Cervical, supraclavicular, and axillary nodes normal   Neurologic:   Stable       Data Review  CBC:   Lab Results   Component Value Date    WBC 4.7 06/19/2022    RBC 3.12 06/19/2022    RBC 5.12 05/26/2017       Assessment:     Principal Problem:    Acute respiratory failure (HCC)  Active Problems:    Pneumonia    Respiratory distress    Essential hypertension    CHF (congestive heart failure) (HCC)    COPD (chronic obstructive pulmonary disease) (HCC)    Acute pulmonary edema (HCC)    Acute decompensated heart failure (HCC)    Acute respiratory failure with hypoxia and hypercapnia (HCC)    DMII (diabetes mellitus, type 2) (HCC)  Resolved Problems:    * No resolved hospital problems. *      Plan:     1. SCLC--resume chemo/immunotherapy as outpt--will reduce dose    2. LLL pneumonia--continue ATBs    3. Anemia--she is persistently iron deficient. Will give IV iron and EPO (explained risks).         Electronically signed by Cristiana Thorne MD on 6/19/2022 at 12:34 PM

## 2022-06-20 LAB
A/G RATIO: 2.1 (ref 1.1–2.2)
ALBUMIN SERPL-MCNC: 3.2 G/DL (ref 3.4–5)
ALP BLD-CCNC: 67 U/L (ref 40–129)
ALT SERPL-CCNC: 13 U/L (ref 10–40)
ANION GAP SERPL CALCULATED.3IONS-SCNC: 11 MMOL/L (ref 3–16)
ANISOCYTOSIS: ABNORMAL
AST SERPL-CCNC: 14 U/L (ref 15–37)
ATYPICAL LYMPHOCYTE RELATIVE PERCENT: 1 % (ref 0–6)
BANDED NEUTROPHILS RELATIVE PERCENT: 2 % (ref 0–7)
BASOPHILS ABSOLUTE: 0 K/UL (ref 0–0.2)
BASOPHILS RELATIVE PERCENT: 0 %
BILIRUB SERPL-MCNC: 0.4 MG/DL (ref 0–1)
BUN BLDV-MCNC: 14 MG/DL (ref 7–20)
CALCIUM SERPL-MCNC: 8.7 MG/DL (ref 8.3–10.6)
CHLORIDE BLD-SCNC: 105 MMOL/L (ref 99–110)
CO2: 28 MMOL/L (ref 21–32)
CREAT SERPL-MCNC: 1.2 MG/DL (ref 0.6–1.2)
EOSINOPHILS ABSOLUTE: 0.1 K/UL (ref 0–0.6)
EOSINOPHILS RELATIVE PERCENT: 2 %
FERRITIN: 288.8 NG/ML (ref 15–150)
GFR AFRICAN AMERICAN: 55
GFR NON-AFRICAN AMERICAN: 46
GLUCOSE BLD-MCNC: 110 MG/DL (ref 70–99)
GLUCOSE BLD-MCNC: 126 MG/DL (ref 70–99)
HCT VFR BLD CALC: 24.7 % (ref 36–48)
HEMOGLOBIN: 7.9 G/DL (ref 12–16)
IRON SATURATION: 17 % (ref 15–50)
IRON: 41 UG/DL (ref 37–145)
LYMPHOCYTES ABSOLUTE: 0.6 K/UL (ref 1–5.1)
LYMPHOCYTES RELATIVE PERCENT: 12 %
MAGNESIUM: 2.2 MG/DL (ref 1.8–2.4)
MCH RBC QN AUTO: 23.7 PG (ref 26–34)
MCHC RBC AUTO-ENTMCNC: 32.1 G/DL (ref 31–36)
MCV RBC AUTO: 73.9 FL (ref 80–100)
METAMYELOCYTES RELATIVE PERCENT: 2 %
MONOCYTES ABSOLUTE: 0.2 K/UL (ref 0–1.3)
MONOCYTES RELATIVE PERCENT: 4 %
NEUTROPHILS ABSOLUTE: 3.9 K/UL (ref 1.7–7.7)
NEUTROPHILS RELATIVE PERCENT: 77 %
OVALOCYTES: ABNORMAL
PDW BLD-RTO: 23 % (ref 12.4–15.4)
PERFORMED ON: ABNORMAL
PLATELET # BLD: 228 K/UL (ref 135–450)
PLATELET SLIDE REVIEW: ADEQUATE
PMV BLD AUTO: 9.3 FL (ref 5–10.5)
POIKILOCYTES: ABNORMAL
POTASSIUM SERPL-SCNC: 3.5 MMOL/L (ref 3.5–5.1)
RBC # BLD: 3.35 M/UL (ref 4–5.2)
SLIDE REVIEW: ABNORMAL
SODIUM BLD-SCNC: 144 MMOL/L (ref 136–145)
TOTAL IRON BINDING CAPACITY: 237 UG/DL (ref 260–445)
TOTAL PROTEIN: 4.7 G/DL (ref 6.4–8.2)
WBC # BLD: 4.8 K/UL (ref 4–11)

## 2022-06-20 PROCEDURE — 6370000000 HC RX 637 (ALT 250 FOR IP): Performed by: STUDENT IN AN ORGANIZED HEALTH CARE EDUCATION/TRAINING PROGRAM

## 2022-06-20 PROCEDURE — 99232 SBSQ HOSP IP/OBS MODERATE 35: CPT | Performed by: INTERNAL MEDICINE

## 2022-06-20 PROCEDURE — 82728 ASSAY OF FERRITIN: CPT

## 2022-06-20 PROCEDURE — 83735 ASSAY OF MAGNESIUM: CPT

## 2022-06-20 PROCEDURE — 6360000002 HC RX W HCPCS: Performed by: NURSE PRACTITIONER

## 2022-06-20 PROCEDURE — 94660 CPAP INITIATION&MGMT: CPT

## 2022-06-20 PROCEDURE — 85025 COMPLETE CBC W/AUTO DIFF WBC: CPT

## 2022-06-20 PROCEDURE — 6360000002 HC RX W HCPCS: Performed by: STUDENT IN AN ORGANIZED HEALTH CARE EDUCATION/TRAINING PROGRAM

## 2022-06-20 PROCEDURE — 6360000002 HC RX W HCPCS: Performed by: INTERNAL MEDICINE

## 2022-06-20 PROCEDURE — 80053 COMPREHEN METABOLIC PANEL: CPT

## 2022-06-20 PROCEDURE — 2060000000 HC ICU INTERMEDIATE R&B

## 2022-06-20 PROCEDURE — 6370000000 HC RX 637 (ALT 250 FOR IP): Performed by: NURSE PRACTITIONER

## 2022-06-20 PROCEDURE — 2580000003 HC RX 258: Performed by: NURSE PRACTITIONER

## 2022-06-20 PROCEDURE — 94760 N-INVAS EAR/PLS OXIMETRY 1: CPT

## 2022-06-20 PROCEDURE — 83540 ASSAY OF IRON: CPT

## 2022-06-20 PROCEDURE — 6370000000 HC RX 637 (ALT 250 FOR IP): Performed by: INTERNAL MEDICINE

## 2022-06-20 PROCEDURE — 94640 AIRWAY INHALATION TREATMENT: CPT

## 2022-06-20 PROCEDURE — 2700000000 HC OXYGEN THERAPY PER DAY

## 2022-06-20 PROCEDURE — 83550 IRON BINDING TEST: CPT

## 2022-06-20 PROCEDURE — C9113 INJ PANTOPRAZOLE SODIUM, VIA: HCPCS | Performed by: NURSE PRACTITIONER

## 2022-06-20 PROCEDURE — 2580000003 HC RX 258: Performed by: STUDENT IN AN ORGANIZED HEALTH CARE EDUCATION/TRAINING PROGRAM

## 2022-06-20 RX ORDER — TORSEMIDE 20 MG/1
40 TABLET ORAL 2 TIMES DAILY
Status: DISCONTINUED | OUTPATIENT
Start: 2022-06-20 | End: 2022-06-21 | Stop reason: HOSPADM

## 2022-06-20 RX ADMIN — IRON SUCROSE 200 MG: 20 INJECTION, SOLUTION INTRAVENOUS at 14:32

## 2022-06-20 RX ADMIN — CARVEDILOL 3.12 MG: 3.12 TABLET, FILM COATED ORAL at 17:13

## 2022-06-20 RX ADMIN — ATORVASTATIN CALCIUM 10 MG: 10 TABLET, FILM COATED ORAL at 20:51

## 2022-06-20 RX ADMIN — GABAPENTIN 600 MG: 300 CAPSULE ORAL at 09:03

## 2022-06-20 RX ADMIN — IPRATROPIUM BROMIDE AND ALBUTEROL SULFATE 1 AMPULE: .5; 3 SOLUTION RESPIRATORY (INHALATION) at 11:47

## 2022-06-20 RX ADMIN — IPRATROPIUM BROMIDE AND ALBUTEROL SULFATE 1 AMPULE: .5; 3 SOLUTION RESPIRATORY (INHALATION) at 04:10

## 2022-06-20 RX ADMIN — CARVEDILOL 3.12 MG: 3.12 TABLET, FILM COATED ORAL at 09:03

## 2022-06-20 RX ADMIN — IPRATROPIUM BROMIDE AND ALBUTEROL SULFATE 1 AMPULE: .5; 3 SOLUTION RESPIRATORY (INHALATION) at 16:18

## 2022-06-20 RX ADMIN — IPRATROPIUM BROMIDE AND ALBUTEROL SULFATE 1 AMPULE: .5; 3 SOLUTION RESPIRATORY (INHALATION) at 00:13

## 2022-06-20 RX ADMIN — Medication 40 MG: at 09:03

## 2022-06-20 RX ADMIN — IPRATROPIUM BROMIDE AND ALBUTEROL SULFATE 1 AMPULE: .5; 3 SOLUTION RESPIRATORY (INHALATION) at 08:14

## 2022-06-20 RX ADMIN — CEFEPIME HYDROCHLORIDE 2000 MG: 2 INJECTION, POWDER, FOR SOLUTION INTRAVENOUS at 17:18

## 2022-06-20 RX ADMIN — GABAPENTIN 600 MG: 300 CAPSULE ORAL at 20:51

## 2022-06-20 RX ADMIN — CEFEPIME HYDROCHLORIDE 2000 MG: 2 INJECTION, POWDER, FOR SOLUTION INTRAVENOUS at 04:03

## 2022-06-20 RX ADMIN — GABAPENTIN 600 MG: 300 CAPSULE ORAL at 14:32

## 2022-06-20 RX ADMIN — AMIODARONE HYDROCHLORIDE 200 MG: 200 TABLET ORAL at 09:03

## 2022-06-20 RX ADMIN — ASPIRIN 81 MG: 81 TABLET, CHEWABLE ORAL at 09:03

## 2022-06-20 RX ADMIN — SODIUM CHLORIDE 25 ML: 9 INJECTION, SOLUTION INTRAVENOUS at 04:03

## 2022-06-20 RX ADMIN — Medication 10 ML: at 20:51

## 2022-06-20 RX ADMIN — ENOXAPARIN SODIUM 40 MG: 100 INJECTION SUBCUTANEOUS at 09:03

## 2022-06-20 RX ADMIN — IPRATROPIUM BROMIDE AND ALBUTEROL SULFATE 1 AMPULE: .5; 3 SOLUTION RESPIRATORY (INHALATION) at 20:30

## 2022-06-20 RX ADMIN — TORSEMIDE 40 MG: 20 TABLET ORAL at 17:13

## 2022-06-20 ASSESSMENT — PAIN SCALES - GENERAL: PAINLEVEL_OUTOF10: 0

## 2022-06-20 NOTE — PROGRESS NOTES
Pulmonary Critical Care Progress Note     Patient's name:  Doug Castellanos  Medical Record Number: [id number]  Patient's account/billing number: [de-identified]  Patient's YOB: 1960  Age: 65 y.o. Date of Admission: 6/15/2022  9:15 PM  Date of Consult: 6/20/2022      Primary Care Physician: Maureen Gonzalez MD      Code Status: Full Code    Reason for consult: Acute hypoxic respiratory failure    Assessment and Plan     1. Acute respiratory failure with hypoxia requiring intubation status postextubation 6/17/2022  2. Acute on chronic systolic heart failure EF of 25% status post AICD  3. Moderately severe mitral regurg  4. Multifocal PNA, post obstructive in the LLL  5. Acute renal failure with metabolic acidosis   6. Extensive stage small cell lung cancer status postchemotherapy currently on Tecentriq  7. Anemia of chronic disease       Plan:  Can switch diuresis back to oral   Cefepime x 7 days,   GI and DVT prophylaxis. IS/Acapella       Subjective:  No acute events overnight  Wore cpap       HISTORY OF PRESENT ILLNESS:   Mr./Ms. Doug Castellanos is a 65 y.o.  lady with past medical history stated below significant for history of COPD, extensive stage small cell lung cancer status post chemotherapy currently onTecentriq, presented to the hospital with worsening shortness of breath, initially started on BiPAP failed BiPAP requiring intubation for hypoxia. CT chest with increase interlobular septal thickness, bilateral pleural effusions, multifocal airspace disease as well as complete consolidation of the left lower lobe          REVIEW OF SYSTEMS:  Review of Systems -   Shortness of breath is much better   Denied pain.         Physical Exam:    Vitals: BP (!) 159/83   Pulse 65   Temp 97.2 °F (36.2 °C) (Oral)   Resp 14   Ht 5' 7\" (1.702 m)   Wt 213 lb 3 oz (96.7 kg)   SpO2 100%   BMI 33.39 kg/m²     Last Body weight:   Wt Readings from Last 3 Encounters:   06/20/22 213 lb 3 oz (96.7 kg)   05/19/22 212 lb 4.9 oz (96.3 kg)   05/13/22 218 lb 11.1 oz (99.2 kg)       Body Mass Index : Body mass index is 33.39 kg/m². Intake and Output summary:     Intake/Output Summary (Last 24 hours) at 6/20/2022 0829  Last data filed at 6/20/2022 9853  Gross per 24 hour   Intake 960 ml   Output 3625 ml   Net -2665 ml       Physical Examination:     PHYSICAL EXAM:    Gen: no acute distress  Eyes: PERRL. Anicteric sclera. No conjunctival injection. ENT: No discharge. Posterior oropharynx clear. External appearance of ears and nose normal.  Neck: Trachea midline. No mass, no lymphadenopathy    Resp: Diminished bilaterally with no rales or wheezing   CV: Regular rate. Regular rhythm. No murmur or rub. No edema. GI: Soft, Non-tender. Non-distended. +BS  Skin: Warm, dry, w/o erythema. Lymph: No cervical or supraclavicular LAD. M/S: No cyanosis. No clubbing. Neuro: Awake, no seizure activity, no focal deficit        Laboratory findings:-    CBC:   Recent Labs     06/20/22  0630   WBC 4.8   HGB 7.9*        BMP:    Recent Labs     06/18/22  0404 06/18/22  0404 06/19/22  0430 06/19/22  0430 06/20/22  0630      < > 142   < > 144   K 3.9   < > 3.5   < > 3.5      < > 104   < > 105   CO2 25   < > 26   < > 28   BUN 28*   < > 18   < > 14   CREATININE 1.5*  --  1.3*  --  1.2   GLUCOSE 109*   < > 93   < > 126*    < > = values in this interval not displayed. S. Calcium:  Recent Labs     06/20/22  0630   CALCIUM 8.7     S. Magnesium:  Recent Labs     06/20/22  0630   MG 2.20     S. Glucose:  Recent Labs     06/18/22  0337 06/18/22  0752 06/19/22  0816   POCGLU 110* 104* 101*     Hepatic functions:   Recent Labs     06/20/22  0630   ALKPHOS 67   ALT 13   AST 14*   PROT 4.7*   BILITOT 0.4   LABALBU 3.2*     Pancreatic functions:  No results for input(s): LACTA, AMYLASE in the last 72 hours.   S. Lactic Acid:   No results for input(s): LACTA in the last 72 hours. Cardiac enzymes:  No results for input(s): CKTOTAL, CKMB, CKMBINDEX, TROPONINI in the last 72 hours. BNP:No results for input(s): BNP in the last 72 hours. Lipid profile: No results for input(s): CHOL, TRIG, HDL, LDL, LDLCALC in the last 72 hours. Blood Gases: No results found for: PH, PCO2, PO2, HCO3, O2SAT  Thyroid functions:   Lab Results   Component Value Date    TSH 0.379 06/05/2015          Radiology Review:  Pertinent images / reports were reviewed as a part of this visit. CT Chest w/ contrast: No results found for this or any previous visit. CT Chest w/o contrast: Results for orders placed during the hospital encounter of 06/15/22    CT CHEST WO CONTRAST    Narrative  EXAMINATION:  CT OF THE CHEST WITHOUT CONTRAST 6/16/2022 12:48 am    TECHNIQUE:  CT of the chest was performed without the administration of intravenous  contrast. Multiplanar reformatted images are provided for review. Automated  exposure control, iterative reconstruction, and/or weight based adjustment of  the mA/kV was utilized to reduce the radiation dose to as low as reasonably  achievable. COMPARISON:  05/10/2022    HISTORY:  ORDERING SYSTEM PROVIDED HISTORY: SOB, suspect mainly pulm edema and CHF  TECHNOLOGIST PROVIDED HISTORY:  Reason for exam:->SOB, suspect mainly pulm edema and CHF  Reason for Exam: SOB, suspect mainly pulm edema and CHF    FINDINGS:  Mediastinum: Cardiac chambers are mildly enlarged. ICD leads are present  within the heart with the pulse generator in the left upper chest.  Endotracheal tube and nasogastric tube acceptable. Calcified subcarinal and  right hilar lymph nodes are present. The left hilum is not well evaluated  due to the opacified lung and lack of contrast.  There is heavy calcification  of the aorta and into the great branches. Heavy coronary artery  calcifications as well. Lungs/pleura: Small to moderate bilateral pleural effusions with the right  side greater than left. There is complete collapse or consolidation of the  left lower lobe with air bronchograms. Patchy nodular opacities and  consolidative areas within the left upper lobe and right upper lobe more than  the right lower lobe. Ground-glass and interlobular septal thickening are  also present in both lungs. There is no pneumothorax. Upper Abdomen: No free air free fluid at the upper abdomen. Visualized  portion of the adrenal glands are not enlarged but not fully included. Soft Tissues/Bones: Multinodular thyroid gland is present with the larger  nodule on the right side measuring up to 2.5 cm and heterogenous. CT  features are not significantly changed. Right-sided MediPort is present with  the tip near the cavoatrial junction. There is no soft tissue emphysema. The bones are stable with degenerative changes along the spine. Impression  1. Left lower lobe is completely opacified and has air bronchograms and  could relate to combination of compressive atelectasis and pneumonia. Other  areas of nodular opacity and peripheral consolidative areas in the lungs  suggesting multifocal pneumonia. However, there is also interlobular septal  thickening, ground-glass opacities, and bilateral pleural effusions  suggesting a component of CHF as well. 2.  Previous more focal mass in the left lower lobe is not evaluated given  the completely opacified lung. Limited assessment of lymphadenopathy. 3.  Multinodular thyroid gland with the larger nodule in the right side  measuring up to 2.5 cm. Can have non emergent thyroid ultrasound if not  previously worked up. CTPA: Results for orders placed during the hospital encounter of 05/10/22    CT CHEST PULMONARY EMBOLISM W CONTRAST    Narrative  EXAMINATION:  CTA OF THE CHEST 5/10/2022 8:43 pm    TECHNIQUE:  CTA of the chest was performed after the administration of intravenous  contrast.  Multiplanar reformatted images are provided for review.   MIP  images are provided for review. Automated exposure control, iterative  reconstruction, and/or weight based adjustment of the mA/kV was utilized to  reduce the radiation dose to as low as reasonably achievable. COMPARISON:  None. HISTORY:  ORDERING SYSTEM PROVIDED HISTORY: sob hx of cancer on chemo r/o pe also has  hx of heart failure and copd  TECHNOLOGIST PROVIDED HISTORY:  Reason for exam:->sob hx of cancer on chemo r/o pe also has hx of heart  failure and copd  Decision Support Exception - unselect if not a suspected or confirmed  emergency medical condition->Emergency Medical Condition (MA)  Reason for Exam: sob hx of cancer on chemo r/o pe also has hx of heart  failure and copd    FINDINGS:  Pulmonary Arteries: Pulmonary arteries are adequately opacified for  evaluation. No evidence of intraluminal filling defect to suggest pulmonary  embolism. Main pulmonary artery is normal in caliber. Mediastinum: No evidence of mediastinal lymphadenopathy. The heart and  pericardium demonstrate no acute abnormality. There is no acute abnormality  of the thoracic aorta. Lungs/pleura: 3.2 cm mass in the inferior left lower lobe with surrounding  area of patchy consolidation which could represent disease extension versus  superimposed airspace disease process. Small bilateral pleural effusions. Left hilar adenopathy as well as multiple enlarged upper mediastinal lymph  nodes. Upper Abdomen: Limited images of the upper abdomen are unremarkable. Soft Tissues/Bones: No acute bone or soft tissue abnormality. Impression  No evidence of pulmonary embolism. 3.2 cm mass in the inferior left lower lobe with surrounding area of patchy  consolidation which could represent disease extension versus superimposed  airspace disease process. Small bilateral pleural effusions. Left hilar  adenopathy as well as multiple enlarged upper mediastinal lymph nodes.       CXR PA/LAT: Results for orders placed during the hospital encounter of 06/10/22    XR CHEST (2 VW)    Narrative  EXAMINATION:  TWO XRAY VIEWS OF THE CHEST    6/10/2022 3:27 pm    COMPARISON:  05/18/2022    HISTORY:  ORDERING SYSTEM PROVIDED HISTORY: Shortness of breath  TECHNOLOGIST PROVIDED HISTORY:  Reason for Exam: shortness of breath    FINDINGS:  A left subclavian infusion port catheter terminates over the superior right  atrium. A left subclavian biventricular ICD is present. The cardiac silhouette remains at the upper limits of normal.  There is  moderate thoracic aortic calcification. No consolidation, pleural effusion  or pneumothorax is identified. Previous left basilar airspace disease has  resolved. Impression  No acute cardiopulmonary disease. Previous left basilar consolidation or atelectasis has resolved. CXR portable: Results for orders placed during the hospital encounter of 06/15/22    XR CHEST PORTABLE    Narrative  EXAMINATION:  ONE XRAY VIEW OF THE CHEST    6/16/2022 12:18 am    COMPARISON:  Gerri 15, 2022. June 16, 2022. HISTORY:  ORDERING SYSTEM PROVIDED HISTORY: check for ET placement. TECHNOLOGIST PROVIDED HISTORY:  Reason for exam:->check for ET placement. Reason for Exam: check for ET placement. FINDINGS:  Endotracheal tube tip terminates approximately 1.2 cm above the kristin. The  nasogastric tube tip terminates off the inferior margin of the radiograph. Stable positioning of a right chest wall MediPort and left chest wall AICD. Cardiomediastinal silhouette is enlarged. Perihilar opacities are seen in  addition to a dense retrocardiac opacity with bilateral pleural effusions. No acute osseous abnormality. Impression  1. Lines and tubes as described. The endotracheal tube tip terminates 1.2 cm  above the kristin. However, endotracheal tube appears in adequate position on  follow-up CT. 2. Multifocal pneumonia with possible superimposed edema.         Sailaja Carter MD, M.D.            6/20/2022, 8:29 AM

## 2022-06-20 NOTE — PLAN OF CARE
Problem: Discharge Planning  Goal: Discharge to home or other facility with appropriate resources  Outcome: Progressing     Problem: Pain  Goal: Verbalizes/displays adequate comfort level or baseline comfort level  Outcome: Progressing   Pain/discomfort being managed with PRN analgesics per MD orders. Pt able to express presence and absence of pain and rate pain appropriately using numerical scale. Problem: Safety - Medical Restraint  Goal: Remains free of injury from restraints (Restraint for Interference with Medical Device)  Description: INTERVENTIONS:  1. Determine that other, less restrictive measures have been tried or would not be effective before applying the restraint  2. Evaluate the patient's condition at the time of restraint application  3. Inform patient/family regarding the reason for restraint  4. Q2H: Monitor safety, psychosocial status, comfort, nutrition and hydration  Outcome: Progressing   Patient assessed for fall risk; fall precautions initiated. Patient and family instructed about safety devices. Environment kept free of clutter and adequate lighting provided. Bed locked and in lowest position. Call light within reach. Will continue to monitor. Problem: Nutrition Deficit:  Goal: Optimize nutritional status  Outcome: Progressing     Problem: Skin/Tissue Integrity  Goal: Absence of new skin breakdown  Description: 1. Monitor for areas of redness and/or skin breakdown  2. Assess vascular access sites hourly  3. Every 4-6 hours minimum:  Change oxygen saturation probe site  4. Every 4-6 hours:  If on nasal continuous positive airway pressure, respiratory therapy assess nares and determine need for appliance change or resting period. Outcome: Progressing     Problem: Safety - Adult  Goal: Free from fall injury  Outcome: Progressing     Problem: ABCDS Injury Assessment  Goal: Absence of physical injury  Outcome: Progressing   Skin assessment completed every shift.  Pt assessed for incontinence, appropriate barrier cream applied prn. Pt encouraged to turn/rotate every 2 hours. Assistance provided if pt unable to do so themselves.       Problem: Cardiovascular - Adult  Goal: Maintains optimal cardiac output and hemodynamic stability  Outcome: Progressing  Goal: Absence of cardiac dysrhythmias or at baseline  Outcome: Progressing     Problem: Respiratory - Adult  Goal: Achieves optimal ventilation and oxygenation  Outcome: Progressing     Problem: Metabolic/Fluid and Electrolytes - Adult  Goal: Electrolytes maintained within normal limits  Outcome: Progressing  Goal: Hemodynamic stability and optimal renal function maintained  Outcome: Progressing  Goal: Glucose maintained within prescribed range  Outcome: Progressing     Problem: Hematologic - Adult  Goal: Maintains hematologic stability  Outcome: Progressing

## 2022-06-20 NOTE — PLAN OF CARE
Problem: Discharge Planning  Goal: Discharge to home or other facility with appropriate resources  6/20/2022 1002 by Robert Jamison RN  Outcome: Progressing  Flowsheets  Taken 6/20/2022 4298  Discharge to home or other facility with appropriate resources: Identify barriers to discharge with patient and caregiver  Taken 6/20/2022 0912  Discharge to home or other facility with appropriate resources: Identify barriers to discharge with patient and caregiver       Problem: Pain  Goal: Verbalizes/displays adequate comfort level or baseline comfort level  6/20/2022 1002 by Robert Jaimson RN  Outcome: Progressing  Assessing pain using appropriate pain rating scale q shift and PRN. Medicating pt as prescribed and indicated. Offering pt non-pharmacologic pain interventions. Reassessing pain and pts response to pain intervention. Problem: Safety - Medical Restraint  Goal: Remains free of injury from restraints (Restraint for Interference with Medical Device)  Description: INTERVENTIONS:  1. Determine that other, less restrictive measures have been tried or would not be effective before applying the restraint  2. Evaluate the patient's condition at the time of restraint application  3. Inform patient/family regarding the reason for restraint  4. Q2H: Monitor safety, psychosocial status, comfort, nutrition and hydration  6/20/2022 1002 by Robert Jamison RN  Outcome: Progressing       Problem: Nutrition Deficit:  Goal: Optimize nutritional status  6/20/2022 1002 by Robert Jamison RN  Outcome: Progressing  Fair appetite      Problem: Skin/Tissue Integrity  Goal: Absence of new skin breakdown  Description: 1. Monitor for areas of redness and/or skin breakdown  2. Assess vascular access sites hourly  3. Every 4-6 hours minimum:  Change oxygen saturation probe site  4.   Every 4-6 hours:  If on nasal continuous positive airway pressure, respiratory therapy assess nares and determine need for appliance change or resting

## 2022-06-20 NOTE — PROGRESS NOTES
Hospitalist Progress Note      PCP: Wing Karin MD    Date of Admission: 6/15/2022    Chief Complaint: shortness of breath     Hospital Course:   58-year-old female patient with a past medical history of COPD, hypertension, type 2 diabetes mellitus, hyperlipidemia, CAD, CKD, systolic CHF (EF 25 to 78%). Patient presented with respiratory distress, intubated 6/16    Subjective: Pt S/E. No longer on Bipap and on home O2. Medications:  Reviewed    Infusion Medications    sodium chloride 5 mL/hr at 06/18/22 1752    sodium chloride 25 mL (06/20/22 0403)    dextrose      furosemide (LASIX) 1mg/ml infusion 5 mg/hr (06/19/22 2137)     Scheduled Medications    iron sucrose  200 mg IntraVENous Q24H    enoxaparin  40 mg SubCUTAneous Daily    carvedilol  3.125 mg Oral BID WC    ipratropium-albuterol  1 ampule Inhalation Q4H    amiodarone  200 mg Oral Daily    gabapentin  600 mg Oral TID    sodium chloride flush  5-40 mL IntraVENous 2 times per day    cefepime  2,000 mg IntraVENous Q12H    pantoprazole (PROTONIX) 40 mg injection  40 mg IntraVENous Daily    aspirin  81 mg Oral Daily    atorvastatin  10 mg Oral Nightly     PRN Meds: bacitracin-polymyxin b, hydrALAZINE, sodium chloride flush, sodium chloride, acetaminophen **OR** acetaminophen, ondansetron, sodium chloride, glucose, dextrose bolus **OR** dextrose bolus, glucagon (rDNA), dextrose      Intake/Output Summary (Last 24 hours) at 6/20/2022 1235  Last data filed at 6/20/2022 1150  Gross per 24 hour   Intake 960 ml   Output 3075 ml   Net -2115 ml       Physical Exam Performed:    BP (!) 117/58   Pulse 75   Temp 98 °F (36.7 °C) (Oral)   Resp 18   Ht 5' 7\" (1.702 m)   Wt 213 lb 3 oz (96.7 kg)   SpO2 92%   BMI 33.39 kg/m²     General appearance: nad, sitting up in bed, talking   HEENT: Pupils equal, round, and reactive to light. Conjunctivae/corneas clear. Neck: Supple, with full range of motion. No jugular venous distention.  Trachea midline. Respiratory:  Normal respiratory effort. Clear to auscultation, bilaterally without Rales/Wheezes/Rhonchi. Cardiovascular: Regular rate and rhythm with normal S1/S2 without murmurs, rubs or gallops. Abdomen: Soft, non-tender, non-distended with normal bowel sounds. Musculoskeletal: Lower limb swelling   skin: Skin color, texture, turgor normal.  No rashes or lesions. Neurologic:  Neurovascularly intact without any focal sensory/motor deficits. Cranial nerves: II-XII intact, grossly non-focal.  Psychiatric: Alert and oriented x3, thought content appropriate, normal insight  Capillary Refill: Brisk,3 seconds, normal   Peripheral Pulses: +2 palpable, equal bilaterally       Labs:   Recent Labs     06/18/22  0404 06/19/22  0430 06/20/22  0630   WBC 7.1 4.7 4.8   HGB 8.3* 7.5* 7.9*   HCT 26.1* 23.0* 24.7*    200 228     Recent Labs     06/18/22  0404 06/19/22  0430 06/20/22  0630    142 144   K 3.9 3.5 3.5    104 105   CO2 25 26 28   BUN 28* 18 14   CREATININE 1.5* 1.3* 1.2   CALCIUM 8.8 8.4 8.7     Recent Labs     06/18/22  0404 06/19/22  0430 06/20/22  0630   AST 14* 12* 14*   ALT 14 12 13   BILITOT 0.4 0.5 0.4   ALKPHOS 66 62 67     No results for input(s): INR in the last 72 hours. No results for input(s): [facility] in the last 72 hours. Urinalysis:      Lab Results   Component Value Date    NITRU Negative 03/31/2022    WBCUA 6 03/30/2022    BACTERIA 4+ 03/30/2022    RBCUA 1 03/30/2022    BLOODU Negative 03/31/2022    SPECGRAV 1.010 03/31/2022    GLUCOSEU >=1000 03/31/2022       Radiology:  XR CHEST PORTABLE   Final Result   Interval worsening of bibasilar airspace opacities consistent with pneumonia. Small right pleural effusion. CT CHEST WO CONTRAST   Final Result   1. Left lower lobe is completely opacified and has air bronchograms and   could relate to combination of compressive atelectasis and pneumonia.   Other   areas of nodular opacity and peripheral consolidative areas in the lungs   suggesting multifocal pneumonia. However, there is also interlobular septal   thickening, ground-glass opacities, and bilateral pleural effusions   suggesting a component of CHF as well. 2.  Previous more focal mass in the left lower lobe is not evaluated given   the completely opacified lung. Limited assessment of lymphadenopathy. 3.  Multinodular thyroid gland with the larger nodule in the right side   measuring up to 2.5 cm. Can have non emergent thyroid ultrasound if not   previously worked up. XR CHEST PORTABLE   Final Result   1. Lines and tubes as described. The endotracheal tube tip terminates 1.2 cm   above the kristin. However, endotracheal tube appears in adequate position on   follow-up CT. 2. Multifocal pneumonia with possible superimposed edema. XR CHEST PORTABLE   Final Result   Cardiomegaly perihilar/diffuse interstitial opacities can be seen with   interstitial alveolar pulmonary edema. Please correlate exam findings. Assessment/Plan:    Active Hospital Problems    Diagnosis     Respiratory distress [R06.03]      Priority: Medium    Acute respiratory failure (HCC) [J96.00]      Priority: Medium    Pneumonia [J18.9]      Priority: Medium    Acute respiratory failure with hypoxia and hypercapnia (HCC) [J96.01, J96.02]     DMII (diabetes mellitus, type 2) ([facility]) [E11.9]     Acute decompensated heart failure (HCC) [I50.9]     Acute pulmonary edema ([facility]) [J81.0]     CHF (congestive heart failure) ([facility]) [I50.9]     [facility] [J44.9]     Essential hypertension [I10]      Acute hypoxic [place] failure - improved, extubated 6/17   Acute pulmonary edema. Acute systolic heart failure exacerbation  Patient known EF of 25%, presented to emergency with acute shortness of breath, did not improve on BiPAP and patient was subsequently intubated 6/16.   FiO2 improved from 100% to 35%, patient is currently on diuresis. -transition from Lasix 5 mg/h to PO torsemide 40 mg BID. -continue intake output.  -Appreciate cardiology input. Mitral regurg  Appreciate cardiology input, cont diuresis,  Reduce gabapentin dose    Multifocal pneumonia. Postobstructive left lower lobe  CT scan with signs of postobstructive left lower lobe consolidation, procalcitonin 0.44.    -continue on cefepime     AIDA. Baseline around 1.3, peakd at 2 -> 1.3 today  Avoid nephrotoxic medications  Trend and monitor    Small cell lung cancer  On chemo  Oncology consulted, will defer chemo for a week    DVT Prophylaxis: heparin  Diet: ADULT DIET; Regular;  Low Sodium (2 gm)  Code Status: Full Code    PT/OT Eval Status: pending clinical improvement    Dispo - continue care, d/c in 1-2 days      Adore Newell MD

## 2022-06-20 NOTE — PROGRESS NOTES
Patient complaining of pain related to vann catheter. Vann removed per patient request. Patient declined Simi Turner, stated she would get up to the bedside commode.

## 2022-06-20 NOTE — PROGRESS NOTES
Late entry    Call from Dr. Martin Jacobsen office stating patient would resume chemo next Monday. Message relayed to patient.

## 2022-06-20 NOTE — PROGRESS NOTES
Nutrition Assessment     Type and Reason for Visit: Initial (Diet education)    Nutrition Recommendations/Plan:   1. Continue Low Na diet      Malnutrition Assessment:  Malnutrition Status: No malnutrition    Nutrition Assessment:  Follow-up. Pt extubated and on PCU. Diet in place, pt tolerating and eating well. On appropriate diet (hx of DM but glucose well controlled). No other nutritional needs at this time. Nutrition Related Findings:   Reviewed labs Wound Type: None    Current Nutrition Therapies:    ADULT DIET; Regular;  Low Sodium (2 gm)    Anthropometric Measures:  · Height: 5' 7\" (170.2 cm)  · Current Body Wt: 213 lb (96.6 kg)   · BMI: 33.4    Nutrition Diagnosis:   · Inadequate oral intake (resolved) related to impaired respiratory function as evidenced by intubation      Nutrition Interventions:   Food and/or Nutrient Delivery: Continue Current Diet  Nutrition Education/Counseling: No recommendation at this time  Coordination of Nutrition Care: Continue to monitor while inpatient       Goals:  Previous Goal Met:  (N/A)  Goals: Meet at least 75% of estimated needs       Nutrition Monitoring and Evaluation:   Behavioral-Environmental Outcomes: None Identified  Food/Nutrient Intake Outcomes: Food and Nutrient Intake  Physical Signs/Symptoms Outcomes: Biochemical Data,Weight,Skin,Nutrition Focused Physical Findings    Discharge Planning:    No discharge needs at this time     Zahraa Garcia, [address] [place] [address],   Contact: [phone]

## 2022-06-20 NOTE — CARE COORDINATION
Per chart review and conversation with patient, patient plans to return home with home care through [facility]. Will need new home care orders for resumption of care. PT/OT orders pursued via physician order. Will continue to monitor for updated needs/recommendations.     Electronically signed by Rey Marshall RN on 6/20/2022 at 4:07 PM

## 2022-06-21 VITALS
WEIGHT: 214.73 LBS | HEART RATE: 72 BPM | TEMPERATURE: 98 F | HEIGHT: 67 IN | DIASTOLIC BLOOD PRESSURE: 51 MMHG | RESPIRATION RATE: 13 BRPM | SYSTOLIC BLOOD PRESSURE: 136 MMHG | OXYGEN SATURATION: 99 % | BODY MASS INDEX: 33.7 KG/M2

## 2022-06-21 LAB
A/G RATIO: 1.9 (ref 1.1–2.2)
ALBUMIN SERPL-MCNC: 3.5 G/DL (ref 3.4–5)
ALP BLD-CCNC: 67 U/L (ref 40–129)
ALT SERPL-CCNC: 12 U/L (ref 10–40)
ANION GAP SERPL CALCULATED.3IONS-SCNC: 14 MMOL/L (ref 3–16)
ANISOCYTOSIS: ABNORMAL
AST SERPL-CCNC: 16 U/L (ref 15–37)
BANDED NEUTROPHILS RELATIVE PERCENT: 3 % (ref 0–7)
BASOPHILS ABSOLUTE: 0 K/UL (ref 0–0.2)
BASOPHILS RELATIVE PERCENT: 0 %
BILIRUB SERPL-MCNC: 0.6 MG/DL (ref 0–1)
BUN BLDV-MCNC: 10 MG/DL (ref 7–20)
CALCIUM SERPL-MCNC: 8.9 MG/DL (ref 8.3–10.6)
CHLORIDE BLD-SCNC: 98 MMOL/L (ref 99–110)
CO2: 28 MMOL/L (ref 21–32)
CREAT SERPL-MCNC: 1.2 MG/DL (ref 0.6–1.2)
EOSINOPHILS ABSOLUTE: 0.3 K/UL (ref 0–0.6)
EOSINOPHILS RELATIVE PERCENT: 4 %
GFR AFRICAN AMERICAN: 55
GFR NON-AFRICAN AMERICAN: 46
GLUCOSE BLD-MCNC: 108 MG/DL (ref 70–99)
GLUCOSE BLD-MCNC: 124 MG/DL (ref 70–99)
HCT VFR BLD CALC: 27.5 % (ref 36–48)
HEMOGLOBIN: 8.8 G/DL (ref 12–16)
LYMPHOCYTES ABSOLUTE: 1.2 K/UL (ref 1–5.1)
LYMPHOCYTES RELATIVE PERCENT: 18 %
MAGNESIUM: 1.8 MG/DL (ref 1.8–2.4)
MCH RBC QN AUTO: 23.7 PG (ref 26–34)
MCHC RBC AUTO-ENTMCNC: 32 G/DL (ref 31–36)
MCV RBC AUTO: 74.2 FL (ref 80–100)
METAMYELOCYTES RELATIVE PERCENT: 1 %
MICROCYTES: ABNORMAL
MONOCYTES ABSOLUTE: 0.6 K/UL (ref 0–1.3)
MONOCYTES RELATIVE PERCENT: 9 %
NEUTROPHILS ABSOLUTE: 4.5 K/UL (ref 1.7–7.7)
NEUTROPHILS RELATIVE PERCENT: 65 %
OVALOCYTES: ABNORMAL
PDW BLD-RTO: 24 % (ref 12.4–15.4)
PERFORMED ON: ABNORMAL
PLATELET # BLD: 298 K/UL (ref 135–450)
PLATELET SLIDE REVIEW: ADEQUATE
PMV BLD AUTO: 8.8 FL (ref 5–10.5)
POIKILOCYTES: ABNORMAL
POTASSIUM SERPL-SCNC: 3.8 MMOL/L (ref 3.5–5.1)
RBC # BLD: 3.7 M/UL (ref 4–5.2)
SLIDE REVIEW: ABNORMAL
SODIUM BLD-SCNC: 140 MMOL/L (ref 136–145)
TOTAL PROTEIN: 5.3 G/DL (ref 6.4–8.2)
WBC # BLD: 6.5 K/UL (ref 4–11)

## 2022-06-21 PROCEDURE — 2700000000 HC OXYGEN THERAPY PER DAY

## 2022-06-21 PROCEDURE — 2580000003 HC RX 258: Performed by: STUDENT IN AN ORGANIZED HEALTH CARE EDUCATION/TRAINING PROGRAM

## 2022-06-21 PROCEDURE — 99232 SBSQ HOSP IP/OBS MODERATE 35: CPT | Performed by: INTERNAL MEDICINE

## 2022-06-21 PROCEDURE — 6360000002 HC RX W HCPCS: Performed by: NURSE PRACTITIONER

## 2022-06-21 PROCEDURE — 6360000002 HC RX W HCPCS: Performed by: STUDENT IN AN ORGANIZED HEALTH CARE EDUCATION/TRAINING PROGRAM

## 2022-06-21 PROCEDURE — 83735 ASSAY OF MAGNESIUM: CPT

## 2022-06-21 PROCEDURE — 6360000002 HC RX W HCPCS: Performed by: INTERNAL MEDICINE

## 2022-06-21 PROCEDURE — 97161 PT EVAL LOW COMPLEX 20 MIN: CPT | Performed by: PHYSICAL THERAPIST

## 2022-06-21 PROCEDURE — 2580000003 HC RX 258: Performed by: NURSE PRACTITIONER

## 2022-06-21 PROCEDURE — C9113 INJ PANTOPRAZOLE SODIUM, VIA: HCPCS | Performed by: NURSE PRACTITIONER

## 2022-06-21 PROCEDURE — 97166 OT EVAL MOD COMPLEX 45 MIN: CPT

## 2022-06-21 PROCEDURE — 94760 N-INVAS EAR/PLS OXIMETRY 1: CPT

## 2022-06-21 PROCEDURE — 97530 THERAPEUTIC ACTIVITIES: CPT

## 2022-06-21 PROCEDURE — 97535 SELF CARE MNGMENT TRAINING: CPT

## 2022-06-21 PROCEDURE — 85025 COMPLETE CBC W/AUTO DIFF WBC: CPT

## 2022-06-21 PROCEDURE — 97116 GAIT TRAINING THERAPY: CPT | Performed by: PHYSICAL THERAPIST

## 2022-06-21 PROCEDURE — 94640 AIRWAY INHALATION TREATMENT: CPT

## 2022-06-21 PROCEDURE — A4216 STERILE WATER/SALINE, 10 ML: HCPCS | Performed by: NURSE PRACTITIONER

## 2022-06-21 PROCEDURE — 6370000000 HC RX 637 (ALT 250 FOR IP): Performed by: INTERNAL MEDICINE

## 2022-06-21 PROCEDURE — 6370000000 HC RX 637 (ALT 250 FOR IP): Performed by: STUDENT IN AN ORGANIZED HEALTH CARE EDUCATION/TRAINING PROGRAM

## 2022-06-21 PROCEDURE — 97530 THERAPEUTIC ACTIVITIES: CPT | Performed by: PHYSICAL THERAPIST

## 2022-06-21 PROCEDURE — 80053 COMPREHEN METABOLIC PANEL: CPT

## 2022-06-21 PROCEDURE — 6370000000 HC RX 637 (ALT 250 FOR IP): Performed by: NURSE PRACTITIONER

## 2022-06-21 RX ORDER — LEVOFLOXACIN 500 MG/1
500 TABLET, FILM COATED ORAL DAILY
Qty: 3 TABLET | Refills: 0 | Status: SHIPPED | OUTPATIENT
Start: 2022-06-21 | End: 2022-06-24

## 2022-06-21 RX ORDER — LEVOFLOXACIN 500 MG/1
500 TABLET, FILM COATED ORAL DAILY
Status: DISCONTINUED | OUTPATIENT
Start: 2022-06-21 | End: 2022-06-21 | Stop reason: HOSPADM

## 2022-06-21 RX ADMIN — IRON SUCROSE 200 MG: 20 INJECTION, SOLUTION INTRAVENOUS at 12:50

## 2022-06-21 RX ADMIN — TORSEMIDE 40 MG: 20 TABLET ORAL at 08:02

## 2022-06-21 RX ADMIN — IPRATROPIUM BROMIDE AND ALBUTEROL SULFATE 1 AMPULE: .5; 3 SOLUTION RESPIRATORY (INHALATION) at 11:40

## 2022-06-21 RX ADMIN — GABAPENTIN 600 MG: 300 CAPSULE ORAL at 12:50

## 2022-06-21 RX ADMIN — IPRATROPIUM BROMIDE AND ALBUTEROL SULFATE 1 AMPULE: .5; 3 SOLUTION RESPIRATORY (INHALATION) at 04:36

## 2022-06-21 RX ADMIN — CARVEDILOL 3.12 MG: 3.12 TABLET, FILM COATED ORAL at 08:02

## 2022-06-21 RX ADMIN — GABAPENTIN 600 MG: 300 CAPSULE ORAL at 08:01

## 2022-06-21 RX ADMIN — ASPIRIN 81 MG: 81 TABLET, CHEWABLE ORAL at 08:02

## 2022-06-21 RX ADMIN — IPRATROPIUM BROMIDE AND ALBUTEROL SULFATE 1 AMPULE: .5; 3 SOLUTION RESPIRATORY (INHALATION) at 07:34

## 2022-06-21 RX ADMIN — ENOXAPARIN SODIUM 40 MG: 100 INJECTION SUBCUTANEOUS at 08:01

## 2022-06-21 RX ADMIN — CEFEPIME HYDROCHLORIDE 2000 MG: 2 INJECTION, POWDER, FOR SOLUTION INTRAVENOUS at 04:25

## 2022-06-21 RX ADMIN — Medication 40 MG: at 08:03

## 2022-06-21 RX ADMIN — LEVOFLOXACIN 500 MG: 500 TABLET, FILM COATED ORAL at 12:49

## 2022-06-21 RX ADMIN — AMIODARONE HYDROCHLORIDE 200 MG: 200 TABLET ORAL at 08:02

## 2022-06-21 RX ADMIN — IPRATROPIUM BROMIDE AND ALBUTEROL SULFATE 1 AMPULE: .5; 3 SOLUTION RESPIRATORY (INHALATION) at 00:56

## 2022-06-21 NOTE — CARE COORDINATION
Discharge Planning:    Spoke with patient via phone. She is wondering what options exist for mobile tanks as she is out of her home for over 5 hours on days she has chemo. Call placed to [address] with Mila; she is going to check on options with her manager. Electronically signed by Rachel Colorado RN on 6/21/2022 at 9:24 AM    Received return call from [address] Mila to coordinate a drop off of additional tanks in the meantime to ensure patient has the oxygen she needs for this upcoming week of chemo treatments. Patient can also get on the list for a portable oxygen concentrator once an order has been placed by a physician, stating that it is needed urgently due to chemo treatments. I have pursued order via attending physician.     Electronically signed by Rachel Colorado RN on 6/21/2022 at 9:26 AM

## 2022-06-21 NOTE — PROGRESS NOTES
Physical Therapy  Facility/Department: [address] PROGRESSIVE CARE  Physical Therapy Initial Assessment    Name: Queta Butler  : 1960  MRN: [id number]  Date of Service: 2022    Discharge Recommendations:  Home with assist PRN,Home with Home health PT   PT Equipment Recommendations  Equipment Needed: No    [facility] scored a 19/24 on the AM-PAC short mobility form. Current research shows that an AM-PAC score of 18 or greater is typically associated with a discharge to the patient's home setting. Based on the patient's AM-PAC score and their current functional mobility deficits, it is recommended that the patient have 2-3 sessions per week of Physical Therapy at d/c to increase the patient's independence. At this time, this patient demonstrates the endurance and safety to discharge home with home PT and a follow up treatment frequency of 2-3x/wk. Please see assessment section for further patient specific details. If patient discharges prior to next session this note will serve as a discharge summary. Please see below for the latest assessment towards goals. Patient Diagnosis(es): The primary encounter diagnosis was Respiratory distress. Diagnoses of History of lung cancer and Acute on chronic systolic congestive heart failure ([address] were also pertinent to this visit. Past Medical History:  has a past medical history of Asthma, Cardiomyopathy ([address] CHF (congestive heart failure) ([address] COPD (chronic obstructive pulmonary disease) ([address] Diabetes mellitus ([address] Essential hypertension, Hyperlipidemia, Hypertension, and Obesity. Past Surgical History:  has a past surgical history that includes Cholecystectomy; Carpal tunnel release; Incontinence surgery; Coronary angioplasty with stent (2013); IR PORT PLACEMENT > 5 YEARS (Right, 2022); and IR PORT PLACEMENT > 5 YEARS (5/3/2022).     Assessment   Body Structures, Functions, Activity Limitations Requiring Skilled Therapeutic Intervention: Decreased functional mobility ; Decreased endurance  Assessment: pt is a 66 yo female who was adm to hosp with SOB/acute respiratory failure; pt at baseline lives alone and is Ind using a rollator; pt appears slightly below her baseline due to decreased endurance; pt is able to transfer Bed to/from [facility] Ind; pt will be safe to return home with PRN assist and home PT  Therapy Prognosis: Good  Decision Making: Low Complexity  Barriers to Learning: none  Activity Tolerance  Activity Tolerance: Patient tolerated treatment well;Patient limited by endurance     Plan   Plan  Plan:  (1-2 more visits)  Current Treatment Recommendations: Functional mobility training  Safety Devices  Type of Devices: Call light within reach,Left in bed,Nurse notified,All fall risk precautions in place     Restrictions  Restrictions/Precautions  Restrictions/Precautions: Fall Risk  Position Activity Restriction  Other position/activity restrictions: 3 L O2 via NC     Subjective   General  Chart Reviewed: Yes  Patient assessed for rehabilitation services?: Yes  Additional Pertinent Hx: per Dr Nadyne Canavan note: \"65year-old female patient with a past medical history of COPD, hypertension, type 2 diabetes mellitus, hyperlipidemia, CAD, CKD, systolic CHF (EF 25 to 69%). Patient presented with respiratory distress, intubated 6/16 and extubated 6-17\"  Response To Previous Treatment: Not applicable  Family / Caregiver Present: No  Referring Practitioner: Dr Karla Zaragoza  Referral Date : 06/20/22  Follows Commands: Within Functional Limits  Subjective  Subjective: pt very pleasant and agreeable to working with therapy         Social/Functional History  Social/Functional History  Lives With: Alone  Type of Home: Apartment  Home Layout: One level  Home Access: Stairs to enter with rails  Entrance Stairs - Number of Steps: 2 steps, 5 steps to basement  Bathroom Shower/Tub: Tub/Shower unit  Bathroom Toilet:  (RTS without handles)  Home Equipment: Cane,Walker, 4 wheeled,Wheelchair-manual  Has the patient had two or more falls in the past year or any fall with injury in the past year?: No  ADL Assistance: Independent  Homemaking Assistance: Needs assistance (has aid once a week to assist with cleaning and laundry)  Ambulation Assistance: Independent (with 1HB)  Transfer Assistance: Independent  Active : No  Occupation: On disability  Vision/Hearing  Vision  Vision: Within Functional Limits  Hearing  Hearing: Within functional limits    Cognition   Orientation  Overall Orientation Status: Within Functional Limits  Cognition  Overall Cognitive Status: WFL     Objective   Heart Rate: 77  Heart Rate Source: Monitor  BP: 119/71  BP Location: Left upper arm  BP Method: Automatic  MAP (Calculated): 87  Resp: 14  SpO2: 98 %  O2 Device: Nasal cannula              AROM RLE (degrees)  RLE AROM: WFL  AROM LLE (degrees)  LLE AROM : WFL  Strength RLE  Strength RLE: WFL  Strength LLE  Strength LLE: WFL        Balance  Sitting: Intact  Standing: With support (~4 min total for transfers and mobility with RW, SBA for static, CGA for dynamic. Pt sat for ADLs 2/2 fatigue)  Gait  Overall Level of Assistance: Stand-by assistance (pt ambulated <> bathroom with RW and SBA.  Slow pace.)  Bed mobility  Supine to Sit: Modified independent  Sit to Supine: Modified independent  Transfers  Sit to Stand: Stand by assistance  Stand to sit: Stand by assistance  Ambulation  Surface: level tile  Device: Rolling Walker  Assistance: Contact guard assistance;Stand by assistance  Quality of Gait: increased lateral sway due to body habitus; slow steps, no LOB noted  Distance: 10' x2 and 3'  Comments: pt used commode with Supervision; completed grooming tasks at sink with set up; pt set up sitting in bed for breakfast; pt declines getting up to chair as she feels more comfortable sitting cross-legged in bed     Balance  Sitting - Static: Good  Sitting - Dynamic: Good  Standing - Static: Fair;+ (with walker)  Standing - Dynamic: Fair;+ (with walker)           OutComes Score                                                  AM-PAC Score  AM-PAC Inpatient Mobility Raw Score : 19 (06/21/22 0935)  AM-PAC Inpatient T-Scale Score : 45.44 (06/21/22 0935)  Mobility Inpatient CMS 0-100% Score: 41.77 (06/21/22 0935)  Mobility Inpatient CMS G-Code Modifier : CK (06/21/22 0935)          Goals  Short Term Goals  Time Frame for Short term goals: by discharge  Short term goal 1: transfers MI  Short term goal 2: amb household distances with RW sup  Patient Goals   Patient goals : to get home       Education  Patient Education  Education Given To: Patient  Education Provided: Role of Therapy  Education Method: Verbal  Education Outcome: Verbalized understanding      Therapy Time   Individual Concurrent Group Co-treatment   Time In 0845         Time Out 0935         Minutes 50                 KAROL AMBROSIO PT    Electronically signed by KAROL AMBROSIO PT on 6/21/2022 at 9:36 AM

## 2022-06-21 NOTE — PROGRESS NOTES
Occupational Therapy  Facility/Department: [facility] PROGRESSIVE CARE  Occupational Therapy Initial Assessment/Treatment    Name: Kevin Miramontes  : 1960  MRN: [id number]  Date of Service: 2022    Discharge Recommendations:  Home with Home health OT,2-3 sessions per week,S Level 1    Kevin Miramontes scored a 21/24 on the AM-PAC ADL Inpatient form. Current research shows that an AM-PAC score of 18 or greater is typically associated with a discharge to the patient's home setting. Based on the patient's AM-PAC score, and their current ADL deficits, it is recommended that the patient have 2-3 sessions per week of Occupational Therapy at d/c to increase the patient's independence. At this time, this patient demonstrates the endurance and safety to discharge home with home services and a follow up treatment frequency of 2-3x/wk. Please see assessment section for further patient specific details. If patient discharges prior to next session this note will serve as a discharge summary. Please see below for the latest assessment towards goals. HOME HEALTH CARE: LEVEL 1 STANDARD    - Initial home health evaluation to occur within 24-48 hours, in patient home   - Therapy to evaluate with goal of regaining prior level of functioning   - Therapy to evaluate if patient has [address] needs for personal care       Patient Diagnosis(es): The primary encounter diagnosis was Respiratory distress. Diagnoses of History of lung cancer and Acute on chronic systolic congestive heart failure ([address] were also pertinent to this visit. Past Medical History:  has a past medical history of Asthma, Cardiomyopathy ([address] CHF (congestive heart failure) ([address] COPD (chronic obstructive pulmonary disease) ([address] Diabetes mellitus ([address] Essential hypertension, Hyperlipidemia, Hypertension, and Obesity. Past Surgical History:  has a past surgical history that includes Cholecystectomy; Carpal tunnel release;  Incontinence surgery; Coronary angioplasty with stent (01/07/2013); IR PORT PLACEMENT > 5 YEARS (Right, 05/03/2022); and IR PORT PLACEMENT > 5 YEARS (5/3/2022). Treatment Diagnosis: ADL/functional mobility deficit      Assessment   Performance deficits / Impairments: Decreased functional mobility ; Decreased endurance;Decreased strength;Decreased ADL status  Assessment: pt is a 65 y.o. female presenting slightly below baseline this date. Pt requiring SBA for functional transfers and mobility with RW and toileting. Pt demos decreased strength and endurance needing to sit to complete ADLs vs in stance. Pt plans to DC home where she lives alone. Pt would benfit from cont OT while admitted and [facility] services for increased strength and endurance needed for ADL performance. Treatment Diagnosis: ADL/functional mobility deficit  Prognosis: Good  Decision Making: Medium Complexity  REQUIRES OT FOLLOW-UP: No  Activity Tolerance  Activity Tolerance: Patient Tolerated treatment well;Patient limited by fatigue  Activity Tolerance Comments: tolerated well however level of activity limited by fatigue        Plan   Plan  Times per Week: 3-5  Current Treatment Recommendations: Strengthening,Balance training,Functional mobility training,Endurance training,Safety education & training,Patient/Caregiver education & training,Self-Care / ADL,Equipment evaluation, education, & procurement     Restrictions  Restrictions/Precautions  Restrictions/Precautions: Fall Risk  Position Activity Restriction  Other position/activity restrictions: 3 L O2 via NC    Subjective   General  Chart Reviewed: Yes  Patient assessed for rehabilitation services?: Yes  Additional Pertinent Hx: 65 y.o. female past medical history of congestive heart failure previous history of lung cancer, COPD, hypertension, type 2 diabetes, hyperlipidemia who presents to [facility] with progressive worsening respiratory distress. Intubated 6/16, extubated 6/17.   Family / Caregiver Present: No  Referring Practitioner: Forrest Mcclain  Diagnosis: acute respiratory failure  Subjective  Subjective: pt sitting  upright cross legged in bed,pleasant and agreeable to PT/OT evaluation. Social/Functional History  Social/Functional History  Lives With: Alone  Type of Home: Apartment  Home Layout: One level  Home Access: Stairs to enter with rails  Entrance Stairs - Number of Steps: 2 steps, 5 steps to basement  Bathroom Shower/Tub: Tub/Shower unit  Bathroom Toilet:  (RTS without handles)  Home Equipment: Cane,Walker, 4 wheeled,Wheelchair-manual  Has the patient had two or more falls in the past year or any fall with injury in the past year?: No  ADL Assistance: Independent  Homemaking Assistance: Needs assistance (has aid once a week to assist with cleaning and laundry)  Ambulation Assistance: Independent (with [id number])  Transfer Assistance: Independent  Active : No  Occupation: On disability       Objective   Heart Rate: 72  Heart Rate Source: Monitor  BP: (!) 136/51  BP Location: Right Arm  BP Method: Automatic  MAP (Calculated): 79.33  Resp: 13  SpO2: 99 %  O2 Device: Nasal cannula             Safety Devices  Type of Devices: Call light within reach; Left in bed;Nurse notified; All fall risk precautions in place  Balance  Sitting: Intact  Standing: With support (~4 min total for transfers and mobility with RW, SBA for static, CGA for dynamic. Pt sat for ADLs 2/2 fatigue)  Gait  Overall Level of Assistance: Stand-by assistance (pt ambulated <> bathroom with RW and SBA.  Slow pace.)  Toilet Transfers  Toilet - Technique: Ambulating  Equipment Used: Grab bars  Toilet Transfer: Stand by assistance  Toilet Transfers Comments: with RW  AROM: Within functional limits  PROM: Within functional limits  Strength: Generally decreased, functional  Coordination: Within functional limits  Tone: Normal  Sensation: Intact  ADL  Grooming: Setup  Grooming Skilled Clinical Factors: pt washed face, hands and completed oral care seated at sink with setup. UE Bathing: Setup  LE Bathing: Setup  LE Dressing: Setup  LE Dressing Skilled Clinical Factors: pt donned slip on tennis shoes sitting EOB with setup and SPV for sitting balance. Toileting: Supervision  Toileting Skilled Clinical Factors: pt completed all parts of toileting at toilet level with SPV for sitting balance. Additional Comments: pt sat in chair at sink to complete total body bathing with setup. Pt reporting mild fatigue. O2 sats >95% throughout session. Activity Tolerance  Activity Tolerance: Patient tolerated treatment well;Patient limited by endurance  Bed mobility  Supine to Sit: Modified independent  Sit to Supine: Modified independent  Transfers  Sit to stand: Stand by assistance  Stand to sit: Stand by assistance  Transfer Comments: from bed<>chair with RW     Cognition  Overall Cognitive Status: [facility]                  Education Given To: Patient  Education Provided: Role of Therapy;Plan of Care; Fall Prevention Strategies; ADL Adaptive Strategies  Education Method: Verbal  Barriers to Learning: None  Education Outcome: Verbalized understanding;Demonstrated understanding                                                                          AM-PAC Score        AM-[facility] Inpatient Daily Activity Raw Score: 21 (06/21/22 0936)  AM-PAC Inpatient ADL T-Scale Score : 44.27 (06/21/22 0936)  ADL Inpatient CMS 0-100% Score: 32.79 (06/21/22 0936)  ADL Inpatient CMS G-Code Modifier : CJ (06/21/22 5986)    Goals  Short Term Goals  Time Frame for Short term goals: DC  Short Term Goal 1: pt will increase standing tolerance to 4 min wihtout rest to complete brief ADL in stance at sink-not met  Short Term Goal 2: pt will complete toilet transfer with mod I-not met  Long Term Goals  Time Frame for Long term goals : ST=LT  Patient Goals   Patient goals : not stated       Therapy Time   Individual Concurrent Group Co-treatment   Time In 0845         Time Out 0935         Minutes 50

## 2022-06-21 NOTE — PLAN OF CARE
Problem: Discharge Planning  Goal: Discharge to home or other facility with appropriate resources  6/20/2022 2158 by Rudy Terrell RN  Outcome: Progressing     Problem: Pain  Goal: Verbalizes/displays adequate comfort level or baseline comfort level  6/20/2022 2158 by Rudy Terrell RN  Outcome: Progressing     Problem: Safety - Medical Restraint  Goal: Remains free of injury from restraints (Restraint for Interference with Medical Device)  Description: INTERVENTIONS:  1. Determine that other, less restrictive measures have been tried or would not be effective before applying the restraint  2. Evaluate the patient's condition at the time of restraint application  3. Inform patient/family regarding the reason for restraint  4. Q2H: Monitor safety, psychosocial status, comfort, nutrition and hydration  6/20/2022 2158 by Rudy Terrell RN  Outcome: Progressing     Problem: Safety - Adult  Goal: Free from fall injury  6/20/2022 2158 by Rudy Terrell RN  Outcome: Progressing  Flowsheets (Taken 6/20/2022 2157)  Free From Fall Injury: Instruct family/caregiver on patient safety   Patient assessed for fall risk; fall precautions initiated. Patient and family instructed about safety devices. Environment kept free of clutter and adequate lighting provided. Bed locked and in lowest position. Call light within reach. Will continue to monitor. Problem: Skin/Tissue Integrity  Goal: Absence of new skin breakdown  Description: 1. Monitor for areas of redness and/or skin breakdown  2. Assess vascular access sites hourly  3. Every 4-6 hours minimum:  Change oxygen saturation probe site  4. Every 4-6 hours:  If on nasal continuous positive airway pressure, respiratory therapy assess nares and determine need for appliance change or resting period.   6/20/2022 2158 by Rudy Terrell RN  Outcome: Progressing

## 2022-06-21 NOTE — DISCHARGE SUMMARY
Hospital Medicine Discharge Summary    Patient ID: Kevin Miramontes      Patient's PCP: Ayleen Garcia MD    Admit Date: 6/15/2022     Discharge Date:   6/21/2022    Admitting Physician: Vania Grande DO     Discharge Physician: Yazan Ortega MD     Discharge Diagnoses: Active Hospital Problems    Diagnosis Date Noted    Respiratory distress [R06.03]      Priority: Medium    Acute respiratory failure ([address] [J96.00] 06/15/2022     Priority: Medium    Pneumonia [J18.9] 05/13/2022     Priority: Medium    Acute respiratory failure with hypoxia and hypercapnia (HCC) [J96.01, J96.02] 03/31/2022    DMII (diabetes mellitus, type 2) ([address] [E11.9] 03/31/2022    Acute decompensated heart failure ([address] [I50.9] 12/10/2021    Acute pulmonary edema ([address] [J81.0] 10/12/2021    CHF (congestive heart failure) (HCC) [I50.9]     COPD (chronic obstructive pulmonary disease) ([address] [J44.9]     Essential hypertension [I10]        The patient was seen and examined on day of discharge and this discharge summary is in conjunction with any daily progress note from day of discharge. Hospital Course: 78-year-old female patient with a past medical history of COPD, hypertension, type 2 diabetes mellitus, hyperlipidemia, CAD, CKD, systolic CHF (EF 25 to 86%). Patient presented with respiratory distress, intubated 6/16      Acute hypoxic [place] failure - improved, extubated 6/17   Acute pulmonary edema. Acute systolic heart failure exacerbation  Patient known EF of 25%, presented to emergency with acute shortness of breath, did not improve on BiPAP and patient was subsequently intubated 6/16. FiO2 improved from 100% to 35%, patient is currently on diuresis. -transition from Lasix 5 mg/h to PO torsemide 40 mg BID. -continue intake output.  -Appreciate cardiology input.     Mitral regurg  Appreciate cardiology input, cont diuresis,  Reduce gabapentin dose     Multifocal pneumonia.   Postobstructive left lower 06/21/2022    K 3.9 06/15/2022    CL 98 06/21/2022    CO2 28 06/21/2022    BUN 10 06/21/2022    CREATININE 1.2 06/21/2022    CALCIUM 8.9 06/21/2022    PHOS 4.0 12/20/2021         Significant Diagnostic Studies    Radiology:   XR CHEST PORTABLE   Final Result   Interval worsening of bibasilar airspace opacities consistent with pneumonia. Small right pleural effusion. CT CHEST WO CONTRAST   Final Result   1. Left lower lobe is completely opacified and has air bronchograms and   could relate to combination of compressive atelectasis and pneumonia. Other   areas of nodular opacity and peripheral consolidative areas in the lungs   suggesting multifocal pneumonia. However, there is also interlobular septal   thickening, ground-glass opacities, and bilateral pleural effusions   suggesting a component of CHF as well. 2.  Previous more focal mass in the left lower lobe is not evaluated given   the completely opacified lung. Limited assessment of lymphadenopathy. 3.  Multinodular thyroid gland with the larger nodule in the right side   measuring up to 2.5 cm. Can have non emergent thyroid ultrasound if not   previously worked up. XR CHEST PORTABLE   Final Result   1. Lines and tubes as described. The endotracheal tube tip terminates 1.2 cm   above the kristin. However, endotracheal tube appears in adequate position on   follow-up CT. 2. Multifocal pneumonia with possible superimposed edema. XR CHEST PORTABLE   Final Result   Cardiomegaly perihilar/diffuse interstitial opacities can be seen with   interstitial alveolar pulmonary edema. Please correlate exam findings.                 Consults:     IP CONSULT TO CARDIOLOGY  IP CONSULT TO CARDIOLOGY  IP CONSULT TO CRITICAL CARE  IP CONSULT TO HEART FAILURE NURSE/COORDINATOR  IP CONSULT TO DIETITIAN  IP CONSULT TO ONCOLOGY  IP CONSULT TO HOME CARE NEEDS    Disposition:  home with Eleanor     Condition at Discharge: Stable    Discharge Instructions/Follow-up:  meds as prescribed, follow-up with PCP and oncology    Code Status:  Full Code     Activity: activity as tolerated    Diet: cardiac diet      Discharge Medications:     Current Discharge Medication List           Details   levoFLOXacin (LEVAQUIN) 500 MG tablet Take 1 tablet by mouth daily for 3 doses  Qty: 3 tablet, Refills: 0              Details   torsemide 40 MG TABS Take 40 mg by mouth 2 times daily  Qty: 30 tablet, Refills: 3              Details   potassium chloride (KLOR-CON M) 20 MEQ extended release tablet Take 20 mEq by mouth daily      empagliflozin (JARDIANCE) 10 MG tablet Take 10 mg by mouth daily      umeclidinium-vilanterol (ANORO ELLIPTA) 62.5-25 MCG/INH AEPB inhaler Inhale 1 puff into the lungs daily      Vericiguat (VERQUVO) 5 MG TABS Take 5 mg by mouth every morning      ondansetron (ZOFRAN) 8 MG tablet Take 8 mg by mouth every 8 hours as needed for Nausea or Vomiting (POST CHEMO NAUSEA)      prochlorperazine (COMPAZINE) 10 MG tablet Take 10 mg by mouth every 8 hours as needed (POST CHEMO NAUSEA)      albuterol (PROVENTIL) (2.5 MG/3ML) 0.083% nebulizer solution 4 times daily       amiodarone (CORDARONE) 200 MG tablet TAKE 1 TABLET BY MOUTH EVERY DAY      ASPIRIN LOW DOSE 81 MG EC tablet TAKE 1 TABLET BY MOUTH EVERY DAY      carvedilol (COREG) 6.25 MG tablet TAKE 1 TABLET BY MOUTH TWICE A DAY WITH MEALS      pantoprazole (PROTONIX) 40 MG tablet TAKE 1 TABLET BY MOUTH EVERY DAY      atorvastatin (LIPITOR) 40 MG tablet Take 40 mg by mouth nightly       magnesium oxide (MAG-OX) 400 MG tablet Take 400 mg by mouth 2 times daily       gabapentin (NEURONTIN) 600 MG tablet Take 600 mg by mouth 3 times daily. albuterol (PROVENTIL HFA) 108 (90 BASE) MCG/ACT inhaler Inhale 2 puffs into the lungs 4 times daily              Time Spent on discharge is more than 30 minutes in the examination, evaluation, counseling and review of medications and discharge plan.       Signed:    Neil Enriquez Cammie Aldridge MD   6/21/2022      Thank you Rimma Reece MD for the opportunity to be involved in this patient's care. If you have any questions or concerns please feel free to contact me at [phone].

## 2022-06-21 NOTE — PROGRESS NOTES
Pulmonary Critical Care Progress Note     Patient's name:  Mikayla Gómez  Medical Record Number: [id number]  Patient's account/billing number: [de-identified]  Patient's YOB: 1960  Age: 65 y.o. Date of Admission: 6/15/2022  9:15 PM  Date of Consult: 6/21/2022      Primary Care Physician: Gwendolyn Conley MD      Code Status: Full Code    Reason for consult: Acute hypoxic respiratory failure    Assessment and Plan     1. Acute respiratory failure with hypoxia requiring intubation status postextubation 6/17/2022  2. Acute on chronic systolic heart failure EF of 25% status post AICD  3. Moderately severe mitral regurg  4. Multifocal PNA, post obstructive in the LLL  5. Acute renal failure with metabolic acidosis   6. Extensive stage small cell lung cancer status postchemotherapy currently on Tecentriq  7. Anemia of chronic disease       Plan: Will switch antibiotics to oral levaquin x 3 more days. Torsemide bid  GI and DVT prophylaxis. IS/Acapella       Subjective:  No acute events overnight  Wore cpap       HISTORY OF PRESENT ILLNESS:   Mr./Ms. Mikayla Gómez is a 65 y.o.  lady with past medical history stated below significant for history of COPD, extensive stage small cell lung cancer status post chemotherapy currently onTecentriq, presented to the hospital with worsening shortness of breath, initially started on BiPAP failed BiPAP requiring intubation for hypoxia. CT chest with increase interlobular septal thickness, bilateral pleural effusions, multifocal airspace disease as well as complete consolidation of the left lower lobe          REVIEW OF SYSTEMS:  Review of Systems -   Shortness of breath is much better   Denied pain. Physical Exam:    Vitals: /71   Pulse 77   Temp 97.8 °F (36.6 °C) (Axillary)   Resp 14   Ht 5' 7\" (1.702 m)   Wt 214 lb 11.7 oz (97.4 kg)   SpO2 98%   BMI 33.63 kg/m²     Last Body weight:    Wt Readings from Last 3 Encounters:   06/21/22 214 lb 11.7 oz (97.4 kg)   05/19/22 212 lb 4.9 oz (96.3 kg)   05/13/22 218 lb 11.1 oz (99.2 kg)       Body Mass Index : Body mass index is 33.63 kg/m². Intake and Output summary:     Intake/Output Summary (Last 24 hours) at 6/21/2022 1050  Last data filed at 6/21/2022 1028  Gross per 24 hour   Intake 2280 ml   Output 2925 ml   Net -645 ml       Physical Examination:     PHYSICAL EXAM:    Gen: no acute distress  Eyes: PERRL. Anicteric sclera. No conjunctival injection. ENT: No discharge. Posterior oropharynx clear. External appearance of ears and nose normal.  Neck: Trachea midline. No mass, no lymphadenopathy    Resp: Diminished bilaterally with no rales or wheezing   CV: Regular rate. Regular rhythm. No murmur or rub. No edema. GI: Soft, Non-tender. Non-distended. +BS  Skin: Warm, dry, w/o erythema. Lymph: No cervical or supraclavicular LAD. M/S: No cyanosis. No clubbing. Neuro: Awake, no seizure activity, no focal deficit        Laboratory findings:-    CBC:   Recent Labs     06/21/22  0508   WBC 6.5   HGB 8.8*        BMP:    Recent Labs     06/19/22  0430 06/19/22  0430 06/20/22  0630 06/20/22  0630 06/21/22  0508      < > 144   < > 140   K 3.5   < > 3.5   < > 3.8      < > 105   < > 98*   CO2 26   < > 28   < > 28   BUN 18   < > 14   < > 10   CREATININE 1.3*  --  1.2  --  1.2   GLUCOSE 93   < > 126*   < > 108*    < > = values in this interval not displayed. S. Calcium:  Recent Labs     06/21/22  0508   CALCIUM 8.9     S. Magnesium:  Recent Labs     06/21/22  0508   MG 1.80     S. Glucose:  Recent Labs     06/19/22  0816 06/20/22  0831 06/21/22  0821   POCGLU 101* 110* 124*     Hepatic functions:   Recent Labs     06/21/22  0508   ALKPHOS 67   ALT 12   AST 16   PROT 5.3*   BILITOT 0.6   LABALBU 3.5     Pancreatic functions:  No results for input(s): LACTA, AMYLASE in the last 72 hours.   S. Lactic Acid:   No results for input(s): LACTA in the last 72 hours. Cardiac enzymes:  No results for input(s): CKTOTAL, CKMB, CKMBINDEX, TROPONINI in the last 72 hours. BNP:No results for input(s): BNP in the last 72 hours. Lipid profile: No results for input(s): CHOL, TRIG, HDL, LDL, LDLCALC in the last 72 hours. Blood Gases: No results found for: PH, PCO2, PO2, HCO3, O2SAT  Thyroid functions:   Lab Results   Component Value Date    TSH 0.379 06/05/2015          Radiology Review:  Pertinent images / reports were reviewed as a part of this visit. CT Chest w/ contrast: No results found for this or any previous visit. CT Chest w/o contrast: Results for orders placed during the hospital encounter of 06/15/22    CT CHEST WO CONTRAST    Narrative  EXAMINATION:  CT OF THE CHEST WITHOUT CONTRAST 6/16/2022 12:48 am    TECHNIQUE:  CT of the chest was performed without the administration of intravenous  contrast. Multiplanar reformatted images are provided for review. Automated  exposure control, iterative reconstruction, and/or weight based adjustment of  the mA/kV was utilized to reduce the radiation dose to as low as reasonably  achievable. COMPARISON:  05/10/2022    HISTORY:  ORDERING SYSTEM PROVIDED HISTORY: SOB, suspect mainly pulm edema and CHF  TECHNOLOGIST PROVIDED HISTORY:  Reason for exam:->SOB, suspect mainly pulm edema and CHF  Reason for Exam: SOB, suspect mainly pulm edema and CHF    FINDINGS:  Mediastinum: Cardiac chambers are mildly enlarged. ICD leads are present  within the heart with the pulse generator in the left upper chest.  Endotracheal tube and nasogastric tube acceptable. Calcified subcarinal and  right hilar lymph nodes are present. The left hilum is not well evaluated  due to the opacified lung and lack of contrast.  There is heavy calcification  of the aorta and into the great branches. Heavy coronary artery  calcifications as well.     Lungs/pleura: Small to moderate bilateral pleural effusions with the right  side greater than left. There is complete collapse or consolidation of the  left lower lobe with air bronchograms. Patchy nodular opacities and  consolidative areas within the left upper lobe and right upper lobe more than  the right lower lobe. Ground-glass and interlobular septal thickening are  also present in both lungs. There is no pneumothorax. Upper Abdomen: No free air free fluid at the upper abdomen. Visualized  portion of the adrenal glands are not enlarged but not fully included. Soft Tissues/Bones: Multinodular thyroid gland is present with the larger  nodule on the right side measuring up to 2.5 cm and heterogenous. CT  features are not significantly changed. Right-sided MediPort is present with  the tip near the cavoatrial junction. There is no soft tissue emphysema. The bones are stable with degenerative changes along the spine. Impression  1. Left lower lobe is completely opacified and has air bronchograms and  could relate to combination of compressive atelectasis and pneumonia. Other  areas of nodular opacity and peripheral consolidative areas in the lungs  suggesting multifocal pneumonia. However, there is also interlobular septal  thickening, ground-glass opacities, and bilateral pleural effusions  suggesting a component of CHF as well. 2.  Previous more focal mass in the left lower lobe is not evaluated given  the completely opacified lung. Limited assessment of lymphadenopathy. 3.  Multinodular thyroid gland with the larger nodule in the right side  measuring up to 2.5 cm. Can have non emergent thyroid ultrasound if not  previously worked up.       CTPA: Results for orders placed during the hospital encounter of 05/10/22    CT CHEST PULMONARY EMBOLISM W CONTRAST    Narrative  EXAMINATION:  CTA OF THE CHEST 5/10/2022 8:43 pm    TECHNIQUE:  CTA of the chest was performed after the administration of intravenous  contrast.  Multiplanar reformatted images are provided for review. MIP  images are provided for review. Automated exposure control, iterative  reconstruction, and/or weight based adjustment of the mA/kV was utilized to  reduce the radiation dose to as low as reasonably achievable. COMPARISON:  None. HISTORY:  ORDERING SYSTEM PROVIDED HISTORY: sob hx of cancer on chemo r/o pe also has  hx of heart failure and copd  TECHNOLOGIST PROVIDED HISTORY:  Reason for exam:->sob hx of cancer on chemo r/o pe also has hx of heart  failure and copd  Decision Support Exception - unselect if not a suspected or confirmed  emergency medical condition->Emergency Medical Condition (MA)  Reason for Exam: sob hx of cancer on chemo r/o pe also has hx of heart  failure and copd    FINDINGS:  Pulmonary Arteries: Pulmonary arteries are adequately opacified for  evaluation. No evidence of intraluminal filling defect to suggest pulmonary  embolism. Main pulmonary artery is normal in caliber. Mediastinum: No evidence of mediastinal lymphadenopathy. The heart and  pericardium demonstrate no acute abnormality. There is no acute abnormality  of the thoracic aorta. Lungs/pleura: 3.2 cm mass in the inferior left lower lobe with surrounding  area of patchy consolidation which could represent disease extension versus  superimposed airspace disease process. Small bilateral pleural effusions. Left hilar adenopathy as well as multiple enlarged upper mediastinal lymph  nodes. Upper Abdomen: Limited images of the upper abdomen are unremarkable. Soft Tissues/Bones: No acute bone or soft tissue abnormality. Impression  No evidence of pulmonary embolism. 3.2 cm mass in the inferior left lower lobe with surrounding area of patchy  consolidation which could represent disease extension versus superimposed  airspace disease process. Small bilateral pleural effusions. Left hilar  adenopathy as well as multiple enlarged upper mediastinal lymph nodes.       CXR PA/LAT: Results for orders placed during the hospital encounter of 06/10/22    XR CHEST (2 VW)    Narrative  EXAMINATION:  TWO XRAY VIEWS OF THE CHEST    6/10/2022 3:27 pm    COMPARISON:  05/18/2022    HISTORY:  ORDERING SYSTEM PROVIDED HISTORY: Shortness of breath  TECHNOLOGIST PROVIDED HISTORY:  Reason for Exam: shortness of breath    FINDINGS:  A left subclavian infusion port catheter terminates over the superior right  atrium. A left subclavian biventricular ICD is present. The cardiac silhouette remains at the upper limits of normal.  There is  moderate thoracic aortic calcification. No consolidation, pleural effusion  or pneumothorax is identified. Previous left basilar airspace disease has  resolved. Impression  No acute cardiopulmonary disease. Previous left basilar consolidation or atelectasis has resolved. CXR portable: Results for orders placed during the hospital encounter of 06/15/22    XR CHEST PORTABLE    Narrative  EXAMINATION:  ONE XRAY VIEW OF THE CHEST    6/16/2022 12:18 am    COMPARISON:  Gerri 15, 2022. June 16, 2022. HISTORY:  ORDERING SYSTEM PROVIDED HISTORY: check for ET placement. TECHNOLOGIST PROVIDED HISTORY:  Reason for exam:->check for ET placement. Reason for Exam: check for ET placement. FINDINGS:  Endotracheal tube tip terminates approximately 1.2 cm above the kristin. The  nasogastric tube tip terminates off the inferior margin of the radiograph. Stable positioning of a right chest wall MediPort and left chest wall AICD. Cardiomediastinal silhouette is enlarged. Perihilar opacities are seen in  addition to a dense retrocardiac opacity with bilateral pleural effusions. No acute osseous abnormality. Impression  1. Lines and tubes as described. The endotracheal tube tip terminates 1.2 cm  above the kristin. However, endotracheal tube appears in adequate position on  follow-up CT. 2. Multifocal pneumonia with possible superimposed edema.         Kelsy Carranza MD, M.D. 6/21/2022, 10:50 AM

## 2022-06-21 NOTE — PROGRESS NOTES
Pharmacy Heart Failure Medication Reconciliation Note    Pt discharged from [facility] today. Chief Complaint   Patient presents with    Respiratory Distress       [place] has a diagnosis of systolic heart failure (last EF = 35-40% on 5/23/22). Pertinent Labs:  BMP:   Lab Results   Component Value Date     06/21/2022    K 3.8 06/21/2022    K 3.9 06/15/2022    CL 98 06/21/2022    CO2 28 06/21/2022    BUN 10 06/21/2022    CREATININE 1.2 06/21/2022     BNP:   Lab Results   Component Value Date    PROBNP 15,048 06/15/2022    PROBNP 32,444 05/15/2022    PROBNP 28,135 05/13/2022       Patient taking an ACEI / ARB / Entresto for EF </= 40%:  No, Hypotension   Patient taking a BETA BLOCKER (Coreg, Toprol XL or bisoprolol) for EF </= 40%: Yes  Patient taking a LOOP DIURETIC: yes  Patient taking a ALDOSTERONE RECEPTOR ANTAGONIST for EF </= 35%: No, Other: hypotension  Patient taking an SGLT2I for EF </= 40%: Yes    Patient has a diagnosis of Atrial Fibrillation: No. If yes, patient is on appropriate anticoagulation:     Patient has a diagnosis of type 2 diabetes:  Yes.  If yes, patient prescribed the following anti-hyperglycemic medication at discharge: Yes, SGLT2 Inhibitor      Corrections to discharge medications include:  none    Discharge Medications:         Medication List      START taking these medications    levoFLOXacin 500 MG tablet  Commonly known as: LEVAQUIN  Take 1 tablet by mouth daily for 3 doses        CHANGE how you take these medications    Torsemide 40 MG Tabs  Take 40 mg by mouth 2 times daily  What changed:   · medication strength  · when to take this        CONTINUE taking these medications    amiodarone 200 MG tablet  Commonly known as: CORDARONE     Anoro Ellipta 62.5-25 MCG/INH Aepb inhaler  Generic drug: umeclidinium-vilanterol     Aspirin Low Dose 81 MG EC tablet  Generic drug: aspirin     atorvastatin 40 MG tablet  Commonly known as: LIPITOR     carvedilol 6.25 MG tablet  Commonly known as: COREG     gabapentin 600 MG tablet  Commonly known as: NEURONTIN     Jardiance 10 MG tablet  Generic drug: empagliflozin     magnesium oxide 400 MG tablet  Commonly known as: MAG-OX     ondansetron 8 MG tablet  Commonly known as: ZOFRAN     pantoprazole 40 MG tablet  Commonly known as: PROTONIX     potassium chloride 20 MEQ extended release tablet  Commonly known as: KLOR-CON M     prochlorperazine 10 MG tablet  Commonly known as: COMPAZINE     * Proventil  (90 Base) MCG/ACT inhaler  Generic drug: albuterol sulfate HFA     * albuterol (2.5 MG/3ML) 0.083% nebulizer solution  Commonly known as: PROVENTIL     Verquvo 5 MG Tabs  Generic drug: Vericiguat         * This list has 2 medication(s) that are the same as other medications prescribed for you. Read the directions carefully, and ask your doctor or other care provider to review them with you.                Where to Get Your Medications      These medications were sent to [address] [place] [address] Rd, [address] - F [phone]  [address] [place] [address] [address] [place] [address]    Phone: [phone]   · levoFLOXacin 500 MG tablet  · Torsemide 40 MG Tabs         [facility]IN[facility] - [place]  Heart Failure Discharge Medication Reconciliation Program  [phone]

## 2022-06-21 NOTE — CARE COORDINATION
CASE MANAGEMENT DISCHARGE SUMMARY:    DISCHARGE DATE: 6/21/2022    DISCHARGED TO: home      Discharging to Facility/ Agency   · Name:  [facility]    · Address: [address] [place] [address]., [facility] [place] Muhammad  · Phone: [phone]  · Fax: [phone]       TRANSPORTATION: via family             TIME: TBD by patient and bedside RN    PREFERRED PHARMACY: [facility] on [address]: Patient to [place] home with Eleanor [address] [place] [address]. Spoke with Mila at patient's request re: possibility of mobile oxygen tanks to aide in mobility on days she is out of the home for chemotherapy. [facility] to ensure patient has enough current tanks to get her through this next week, as chemotherapy treatments restarting. DME order submitted per physician for portable oxygen tank. Per Catterina with Mila, patient will be contacted at home once POC is available. Patient and bedside RN aware of discharge plan and timeline.     Electronically signed by Kimmy Weber RN on 6/21/2022 at 12:50 PM

## 2022-06-21 NOTE — CONSULTS
Originally consulted while patient was in the ICU on the [facility]. Have been following along. Met with Macrina Mayer today, on the day of discharge. I introduced myself and she remembered me from previous admissions. She is awake, alert, engaging- still able to smile and laugh despite having so many chronic medical conditions. She still has her [facility] 104 Failure Education booklet from the last time we met,  and was able to discuss several of the points. She reports that when she gets into trouble --she goes from calling her daughter / EMS c/o sob--and by the time help arrives, she is in severe distress. She had been monitoring her daily weight, but it had not gone up in the 3/5 lb fashion we had talked about in the past.  She said her weight increased gradually, and she had been eating a lot. So she thought it  was fat, not water. She had been to [facility] with a fever, and was being treated with atb prior to coming in. She has her follow up appointment in place for 6/24 @345pm with her PCP. She has [facility] to resume their visits weekly. She will continue to watch for early s/s from the zones of heart failure list.   She denied further questions.

## 2022-06-21 NOTE — PROGRESS NOTES
Hematology Oncology Daily Progress Note    Admit Date: 6/15/2022  Hospital day several    Subjective:     Patient has complaints of improved HEATH/SOB--denies CP. Medication side effects: none    Scheduled Meds:   levoFLOXacin  500 mg Oral Daily    torsemide  40 mg Oral BID    enoxaparin  40 mg SubCUTAneous Daily    carvedilol  3.125 mg Oral BID WC    ipratropium-albuterol  1 ampule Inhalation Q4H    amiodarone  200 mg Oral Daily    gabapentin  600 mg Oral TID    sodium chloride flush  5-40 mL IntraVENous 2 times per day    pantoprazole (PROTONIX) 40 mg injection  40 mg IntraVENous Daily    aspirin  81 mg Oral Daily    atorvastatin  10 mg Oral Nightly     Continuous Infusions:   sodium chloride 5 mL/hr at 06/18/22 1752    sodium chloride 25 mL (06/20/22 0403)    dextrose       PRN Meds:bacitracin-polymyxin b, hydrALAZINE, sodium chloride flush, sodium chloride, acetaminophen **OR** acetaminophen, ondansetron, sodium chloride, glucose, dextrose bolus **OR** dextrose bolus, glucagon (rDNA), dextrose    Review of Systems  Pertinent items are noted in HPI. REVIEW OF SYSTEMS:         · Constitutional: Denies fever, sweats, weight loss     · Eyes: No visual changes or diplopia. No scleral icterus. · ENT: No Headaches, hearing loss or vertigo. No mouth sores or sore throat. · Cardiovascular: No chest pain, dyspnea on exertion, palpitations or loss of consciousness. · Respiratory: No cough or wheezing, no sputum production. No hemoptysis. .    · Gastrointestinal: No abdominal pain, appetite loss, blood in stools. No change in bowel habits. · Genitourinary: No dysuria, trouble voiding, or hematuria. · Musculoskeletal:  Generalized weakness. No joint complaints. · Integumentary: No rash or pruritis. · Neurological: No headache, diplopia. No change in gait, balance, or coordination. No paresthesias. · Endocrine: No temperature intolerance. No excessive thirst, fluid intake, or urination. Neurologic:   CNII-XII intact. Normal strength, sensation and reflexes       throughout       Data Review  CBC:   Lab Results   Component Value Date    WBC 6.5 06/21/2022    RBC 3.70 06/21/2022    RBC 5.12 05/26/2017       Assessment:     Principal Problem:    Acute respiratory failure (HCC)  Active Problems:    Pneumonia    Respiratory distress    Essential hypertension    CHF (congestive heart failure) (HCC)    COPD (chronic obstructive pulmonary disease) (HCC)    Acute pulmonary edema (HCC)    Acute decompensated heart failure (HCC)    Acute respiratory failure with hypoxia and hypercapnia (HCC)    DMII (diabetes mellitus, type 2) ([facility]  Resolved Problems:    * No resolved hospital problems. *      Plan:     1. SCLC--resume chemo as outpt    2. Pneumonia--resolving    3.   Anemia--s/p EPO and IV iron        Electronically signed by Cat Briones MD on 6/21/2022 at 1:36 PM

## 2022-06-21 NOTE — PROGRESS NOTES
Written discharge instructions including diet, activity, weight monitoring, what to do if symptoms worsen and the importance of follow up care and need to call his/her physician for  an appointment were reviewed with the patient who verbalized understanding. Delivered new med from pharmacy. [place] accessed and dressed. These written instructions were given to the patient to take home and a copy was made for the medical record.  Electronically signed by Hussain Arora RN on 6/21/2022 at 1:05 PM

## 2022-06-21 NOTE — DISCHARGE INSTR - COC
Continuity of Care Form    Patient Name: Queta Butler   :  1960  MRN:  [id number]    Admit date:  6/15/2022  Discharge date:  2022    Code Status Order: Full Code   Advance Directives:      Admitting Physician:  Yolanda aWtts DO  PCP: Yumiko Do MD    Discharging Nurse:  [facility] [place] [facility] Unit/Room#: O3N-2230/5129-01  Discharging Unit Phone Number:     Emergency Contact:   Extended Emergency Contact Information  Primary Emergency Contact: Bhavna Watson  Home Phone: [phone]  Relation: Child  Secondary Emergency Contact: Arpita [address] Phone: [phone]  Relation: Child    Past Surgical History:  Past Surgical History:   Procedure Laterality Date    CARPAL TUNNEL RELEASE      CHOLECYSTECTOMY      CORONARY ANGIOPLASTY WITH STENT PLACEMENT  2013    Stent LAD    INCONTINENCE SURGERY      IR PORT PLACEMENT EQUAL OR GREATER THAN 5 YEARS Right 2022    Power port, inserted by Dr. Luis Berrios, cut at 23    IR [place] [address] 5 YEARS  5/3/2022    IR PORT PLACEMENT EQUAL OR GREATER THAN 5 YEARS 5/3/2022 [facility] SPECIAL PROCEDURES       Immunization History:   Immunization History   Administered Date(s) Administered    Influenza Whole 2013    Influenza, Quadv, IM, PF (6 mo and older Fluzone, Flulaval, Fluarix, and 3 yrs and older Afluria) 10/13/2021       Active Problems:  Patient Active Problem List   Diagnosis Code    Essential hypertension I10    Cardiomyopathy ([address] I42.9    CHF (congestive heart failure) ([address] I50.9    COPD (chronic obstructive pulmonary disease) ([facility] J44.9    Chest pain R07.9    NEGIN treated with BiPAP G47.33    DMII (diabetes mellitus, type 2) (HCC) E11.9    Iron deficiency anemia D50.9    Intestinal malabsorption K90.9    Iron deficiency anemia due to chronic blood loss D50.0    Treatment Z51.89    Anemia D64.9    Acute on chronic systolic CHF (congestive heart failure), NYHA class 3 ([facility] I50.23    Hyperlipidemia E78.5    Morbid obesity due to excess calories ([facility]) E66.01    Acute pulmonary edema ([facility]) J81.0    Acute respiratory failure with hypercarbia (HCC) J96.01, J96.02    Congestive heart failure with left ventricular dysfunction (HCC) I50.9    Acute decompensated heart failure ([facility]) I50.9    Acute kidney injury superimposed on chronic kidney disease ([facility]) N17.9, N18.9    COPD exacerbation ([facility]) J44.1    Demand ischemia of myocardium ([facility]) I24.8    Non morbid obesity due to excess calories E66.09    Acute respiratory failure with hypoxia and hypercapnia ([facility]) J96.01, J96.02    Essential hypertension I10    Hyperlipidemia E78.5    DMII (diabetes mellitus, type 2) ([facility]) E11.9    CAD (coronary artery disease) I25.10    Respiratory failure ([facility] J96.90    Pneumonia J18.9    Acute respiratory failure ([facility]) J96.00    Respiratory distress R06.03       Isolation/Infection:   Isolation            No Isolation          Patient Infection Status       Infection Onset Added Last Indicated Last Indicated By Review Planned Expiration Resolved Resolved By    None active    Resolved    COVID-19 (Rule Out) 12/01/21 12/01/21 12/01/21 COVID-19, Rapid (Ordered)   12/01/21 Rule-Out Test Resulted    COVID-19 (Rule Out) 10/11/21 10/11/21 10/11/21 COVID-19, Rapid (Ordered)   10/11/21 Rule-Out Test Resulted            Nurse Assessment:  Last Vital Signs: BP (!) 136/51   Pulse 72   Temp 98 °F (36.7 °C) (Axillary)   Resp 13   Ht 5' 7\" (1.702 m)   Wt 214 lb 11.7 oz (97.4 kg)   SpO2 99%   BMI 33.63 kg/m²     Last documented pain score (0-10 scale): Pain Level: 0  Last Weight:   Wt Readings from Last 1 Encounters:   06/21/22 214 lb 11.7 oz (97.4 kg)     Mental Status:  oriented and alert    IV Access:  - Central Venous Catheter  - site  right, insertion date: PORT    Nursing Mobility/ADLs:  Walking   Independent  Transfer  Independent  Bathing  Independent  Dressing  Assisted  Toileting  Independent  Feeding Independent  Med Admin  Independent  Med Delivery   whole    Wound Care Documentation and Therapy:        Elimination:  Continence: Bowel: Yes  Bladder: Yes  Urinary Catheter: None   Colostomy/Ileostomy/Ileal Conduit: No       Date of Last BM:     Intake/Output Summary (Last 24 hours) at 6/21/2022 1201  Last data filed at 6/21/2022 1028  Gross per 24 hour   Intake 2040 ml   Output 2925 ml   Net -885 ml     I/O last 3 completed shifts: In: 2070 [P.O.:2070]  Out: 6000 [Urine:6000]    Safety Concerns: At Risk for Falls    Impairments/Disabilities:      None    Nutrition Therapy:  Current Nutrition Therapy:   - Oral Diet:  Low Sodium (2gm)    Routes of Feeding: Oral  Liquids: No Restrictions  Daily Fluid Restriction: no  Last Modified Barium Swallow with Video (Video Swallowing Test): not done    Treatments at the Time of Hospital Discharge:   Respiratory Treatments:   Oxygen Therapy:  is on oxygen at 3 L/min per nasal cannula.   Ventilator:    - BiPAP   IPAP: 20 cmH20, CPAP/EPAP:  (home settings) only when sleeping    Rehab Therapies: Physical Therapy, Occupational Therapy, and Nurse  Weight Bearing Status/Restrictions: No weight bearing restrictions  Other Medical Equipment (for information only, NOT a DME order):  {EQUIPMENT:[id number]}  Other Treatments: [facility]: LEVEL 3 [address] to establish plan of care for patient over 60 day period   Nursing  Initial home SN evaluation visit to occur within 24-48 hours for:  1)  medication management  2)  VS and clinical assessment  3)  S&S chronic disease exacerbation education + when to contact MD/NP  4)  care coordination  Medication Reconciliation during 1st SN visit  PT/OT/Speech   Evaluations in home within 24-48 hours of discharge to include DME and home safety   Frontload therapy 5 days, then 3x a week   OT to evaluate if patient has [address] needs for personal care    evaluation within 24-48 hours to evaluate resources & insurance for potential AL, [place], LTC, and Medicaid options   Palliative Care referral within 5 days of hospital discharge   PCP Visit scheduled within 3 - 7 days of hospital discharge    [address] (If patient is agreeable and meets guidelines)      Patient's personal belongings (please select all that are sent with patient):  None    RN SIGNATURE:  Electronically signed by Margoth Hyman RN on 6/21/22 at 1:12 PM EDT    CASE MANAGEMENT/SOCIAL WORK SECTION    Inpatient Status Date: 6/15/2022    Readmission Risk Assessment Score:  Readmission Risk              Risk of Unplanned Readmission:  37           Discharging to Facility/ Agency   Name:  [facility] care    Address: [address] [place] [address] 85, [address] [facility]  Phone: [phone]  Fax: [phone]    / signature: Electronically signed by Luisa Jerome RN on 6/21/22 at 12:52 PM EDT    PHYSICIAN SECTION    Prognosis: Good    Condition at Discharge: Stable    Rehab Potential (if transferring to Rehab): Good    Recommended Labs or Other Treatments After Discharge: meds as prescribed, follow-up with PCP, oncology    Physician Certification: I certify the above information and transfer of Joseph Atkinson  is necessary for the continuing treatment of the diagnosis listed and that she requires Home Care for less 30 days.      Update Admission H&P: No change in H&P    PHYSICIAN SIGNATURE:  Electronically signed by Abdelrahman Carlton MD on 6/21/22 at 12:01 PM EDT

## 2022-06-28 ENCOUNTER — APPOINTMENT (OUTPATIENT)
Dept: GENERAL RADIOLOGY | Age: 62
DRG: 177 | End: 2022-06-28
Payer: MEDICARE

## 2022-06-28 ENCOUNTER — TELEPHONE (OUTPATIENT)
Dept: OTHER | Facility: CLINIC | Age: 62
End: 2022-06-28

## 2022-06-28 ENCOUNTER — HOSPITAL ENCOUNTER (INPATIENT)
Age: 62
LOS: 3 days | Discharge: HOME HEALTH | DRG: 177 | End: 2022-07-02
Attending: EMERGENCY MEDICINE | Admitting: STUDENT IN AN ORGANIZED HEALTH CARE EDUCATION/TRAINING PROGRAM
Payer: MEDICARE

## 2022-06-28 DIAGNOSIS — R06.00 DYSPNEA, UNSPECIFIED TYPE: ICD-10-CM

## 2022-06-28 DIAGNOSIS — R06.03 ACUTE RESPIRATORY DISTRESS: Primary | ICD-10-CM

## 2022-06-28 DIAGNOSIS — R79.89 ELEVATED BRAIN NATRIURETIC PEPTIDE (BNP) LEVEL: ICD-10-CM

## 2022-06-28 DIAGNOSIS — Z85.118 HISTORY OF LUNG CANCER: ICD-10-CM

## 2022-06-28 LAB
A/G RATIO: 1.4 (ref 1.1–2.2)
ALBUMIN SERPL-MCNC: 3.8 G/DL (ref 3.4–5)
ALP BLD-CCNC: 75 U/L (ref 40–129)
ALT SERPL-CCNC: 15 U/L (ref 10–40)
ANION GAP SERPL CALCULATED.3IONS-SCNC: 19 MMOL/L (ref 3–16)
AST SERPL-CCNC: 28 U/L (ref 15–37)
BASE EXCESS VENOUS: -0.6 MMOL/L
BASOPHILS ABSOLUTE: 0 K/UL (ref 0–0.2)
BASOPHILS RELATIVE PERCENT: 0.5 %
BILIRUB SERPL-MCNC: 0.4 MG/DL (ref 0–1)
BUN BLDV-MCNC: 36 MG/DL (ref 7–20)
CALCIUM SERPL-MCNC: 9.4 MG/DL (ref 8.3–10.6)
CARBOXYHEMOGLOBIN: 2 %
CHLORIDE BLD-SCNC: 101 MMOL/L (ref 99–110)
CO2: 20 MMOL/L (ref 21–32)
CREAT SERPL-MCNC: 1.5 MG/DL (ref 0.6–1.2)
EOSINOPHILS ABSOLUTE: 0 K/UL (ref 0–0.6)
EOSINOPHILS RELATIVE PERCENT: 0 %
GFR AFRICAN AMERICAN: 43
GFR NON-AFRICAN AMERICAN: 35
GLUCOSE BLD-MCNC: 209 MG/DL (ref 70–99)
HCO3 VENOUS: 27 MMOL/L (ref 23–29)
HCT VFR BLD CALC: 32.5 % (ref 36–48)
HEMOGLOBIN: 10 G/DL (ref 12–16)
LACTIC ACID: 2.2 MMOL/L (ref 0.4–2)
LYMPHOCYTES ABSOLUTE: 0.4 K/UL (ref 1–5.1)
LYMPHOCYTES RELATIVE PERCENT: 6.8 %
MCH RBC QN AUTO: 24.2 PG (ref 26–34)
MCHC RBC AUTO-ENTMCNC: 30.9 G/DL (ref 31–36)
MCV RBC AUTO: 78.4 FL (ref 80–100)
METHEMOGLOBIN VENOUS: 0.2 %
MONOCYTES ABSOLUTE: 0.3 K/UL (ref 0–1.3)
MONOCYTES RELATIVE PERCENT: 5.5 %
NEUTROPHILS ABSOLUTE: 5 K/UL (ref 1.7–7.7)
NEUTROPHILS RELATIVE PERCENT: 87.2 %
O2 SAT, VEN: 66 %
O2 THERAPY: ABNORMAL
PCO2, VEN: 60.9 MMHG (ref 40–50)
PDW BLD-RTO: 27.7 % (ref 12.4–15.4)
PH VENOUS: 7.26 (ref 7.35–7.45)
PLATELET # BLD: 441 K/UL (ref 135–450)
PMV BLD AUTO: 9.3 FL (ref 5–10.5)
PO2, VEN: 45 MMHG
POTASSIUM SERPL-SCNC: 5.2 MMOL/L (ref 3.5–5.1)
PRO-BNP: ABNORMAL PG/ML (ref 0–124)
PROCALCITONIN: 0.1 NG/ML (ref 0–0.15)
RBC # BLD: 4.14 M/UL (ref 4–5.2)
SODIUM BLD-SCNC: 140 MMOL/L (ref 136–145)
TCO2 CALC VENOUS: 29 MMOL/L
TOTAL PROTEIN: 6.6 G/DL (ref 6.4–8.2)
WBC # BLD: 5.8 K/UL (ref 4–11)

## 2022-06-28 PROCEDURE — 84484 ASSAY OF TROPONIN QUANT: CPT

## 2022-06-28 PROCEDURE — 84145 PROCALCITONIN (PCT): CPT

## 2022-06-28 PROCEDURE — 6360000002 HC RX W HCPCS: Performed by: PHYSICIAN ASSISTANT

## 2022-06-28 PROCEDURE — 80053 COMPREHEN METABOLIC PANEL: CPT

## 2022-06-28 PROCEDURE — 96374 THER/PROPH/DIAG INJ IV PUSH: CPT

## 2022-06-28 PROCEDURE — 6370000000 HC RX 637 (ALT 250 FOR IP): Performed by: PHYSICIAN ASSISTANT

## 2022-06-28 PROCEDURE — 87040 BLOOD CULTURE FOR BACTERIA: CPT

## 2022-06-28 PROCEDURE — 36415 COLL VENOUS BLD VENIPUNCTURE: CPT

## 2022-06-28 PROCEDURE — 87635 SARS-COV-2 COVID-19 AMP PRB: CPT

## 2022-06-28 PROCEDURE — 96375 TX/PRO/DX INJ NEW DRUG ADDON: CPT

## 2022-06-28 PROCEDURE — 71045 X-RAY EXAM CHEST 1 VIEW: CPT

## 2022-06-28 PROCEDURE — 83605 ASSAY OF LACTIC ACID: CPT

## 2022-06-28 PROCEDURE — 2700000000 HC OXYGEN THERAPY PER DAY

## 2022-06-28 PROCEDURE — 99285 EMERGENCY DEPT VISIT HI MDM: CPT

## 2022-06-28 PROCEDURE — 94640 AIRWAY INHALATION TREATMENT: CPT

## 2022-06-28 PROCEDURE — 93005 ELECTROCARDIOGRAM TRACING: CPT | Performed by: PHYSICIAN ASSISTANT

## 2022-06-28 PROCEDURE — 83880 ASSAY OF NATRIURETIC PEPTIDE: CPT

## 2022-06-28 PROCEDURE — 82803 BLOOD GASES ANY COMBINATION: CPT

## 2022-06-28 PROCEDURE — 85025 COMPLETE CBC W/AUTO DIFF WBC: CPT

## 2022-06-28 RX ORDER — METHYLPREDNISOLONE SODIUM SUCCINATE 125 MG/2ML
125 INJECTION, POWDER, LYOPHILIZED, FOR SOLUTION INTRAMUSCULAR; INTRAVENOUS ONCE
Status: COMPLETED | OUTPATIENT
Start: 2022-06-28 | End: 2022-06-28

## 2022-06-28 RX ORDER — FUROSEMIDE 10 MG/ML
40 INJECTION INTRAMUSCULAR; INTRAVENOUS ONCE
Status: COMPLETED | OUTPATIENT
Start: 2022-06-28 | End: 2022-06-29

## 2022-06-28 RX ORDER — MIDAZOLAM HYDROCHLORIDE 1 MG/ML
1 INJECTION INTRAMUSCULAR; INTRAVENOUS ONCE
Status: COMPLETED | OUTPATIENT
Start: 2022-06-28 | End: 2022-06-28

## 2022-06-28 RX ORDER — IPRATROPIUM BROMIDE AND ALBUTEROL SULFATE 2.5; .5 MG/3ML; MG/3ML
3 SOLUTION RESPIRATORY (INHALATION) ONCE
Status: COMPLETED | OUTPATIENT
Start: 2022-06-28 | End: 2022-06-28

## 2022-06-28 RX ADMIN — METHYLPREDNISOLONE SODIUM SUCCINATE 125 MG: 125 INJECTION, POWDER, FOR SOLUTION INTRAMUSCULAR; INTRAVENOUS at 22:12

## 2022-06-28 RX ADMIN — IPRATROPIUM BROMIDE AND ALBUTEROL SULFATE 3 AMPULE: .5; 3 SOLUTION RESPIRATORY (INHALATION) at 22:17

## 2022-06-28 RX ADMIN — MIDAZOLAM 1 MG: 1 INJECTION INTRAMUSCULAR; INTRAVENOUS at 22:44

## 2022-06-28 ASSESSMENT — ENCOUNTER SYMPTOMS
SHORTNESS OF BREATH: 1
COLOR CHANGE: 0
ABDOMINAL PAIN: 0
COUGH: 1
WHEEZING: 1
CHEST TIGHTNESS: 1
VOMITING: 0

## 2022-06-29 ENCOUNTER — APPOINTMENT (OUTPATIENT)
Dept: GENERAL RADIOLOGY | Age: 62
DRG: 177 | End: 2022-06-29
Payer: MEDICARE

## 2022-06-29 PROBLEM — J96.20 ACUTE ON CHRONIC RESPIRATORY FAILURE (HCC): Status: ACTIVE | Noted: 2022-06-29

## 2022-06-29 LAB
A/G RATIO: 2.1 (ref 1.1–2.2)
ALBUMIN SERPL-MCNC: 4.2 G/DL (ref 3.4–5)
ALP BLD-CCNC: 76 U/L (ref 40–129)
ALT SERPL-CCNC: 16 U/L (ref 10–40)
ANION GAP SERPL CALCULATED.3IONS-SCNC: 12 MMOL/L (ref 3–16)
AST SERPL-CCNC: 21 U/L (ref 15–37)
BASOPHILS ABSOLUTE: 0 K/UL (ref 0–0.2)
BASOPHILS RELATIVE PERCENT: 0.1 %
BILIRUB SERPL-MCNC: 0.4 MG/DL (ref 0–1)
BUN BLDV-MCNC: 35 MG/DL (ref 7–20)
CALCIUM SERPL-MCNC: 9.3 MG/DL (ref 8.3–10.6)
CHLORIDE BLD-SCNC: 103 MMOL/L (ref 99–110)
CO2: 28 MMOL/L (ref 21–32)
CREAT SERPL-MCNC: 1.5 MG/DL (ref 0.6–1.2)
EKG ATRIAL RATE: 93 BPM
EKG DIAGNOSIS: NORMAL
EKG Q-T INTERVAL: 442 MS
EKG QRS DURATION: 178 MS
EKG QTC CALCULATION (BAZETT): 549 MS
EKG R AXIS: -67 DEGREES
EKG T AXIS: 115 DEGREES
EKG VENTRICULAR RATE: 93 BPM
EOSINOPHILS ABSOLUTE: 0 K/UL (ref 0–0.6)
EOSINOPHILS RELATIVE PERCENT: 0 %
GFR AFRICAN AMERICAN: 43
GFR NON-AFRICAN AMERICAN: 35
GLUCOSE BLD-MCNC: 145 MG/DL (ref 70–99)
GLUCOSE BLD-MCNC: 158 MG/DL (ref 70–99)
GLUCOSE BLD-MCNC: 168 MG/DL (ref 70–99)
GLUCOSE BLD-MCNC: 184 MG/DL (ref 70–99)
GLUCOSE BLD-MCNC: 193 MG/DL (ref 70–99)
GLUCOSE BLD-MCNC: 201 MG/DL (ref 70–99)
HCT VFR BLD CALC: 27.7 % (ref 36–48)
HEMOGLOBIN: 8.9 G/DL (ref 12–16)
LACTIC ACID: 2 MMOL/L (ref 0.4–2)
LYMPHOCYTES ABSOLUTE: 0.2 K/UL (ref 1–5.1)
LYMPHOCYTES RELATIVE PERCENT: 6.4 %
MAGNESIUM: 2.7 MG/DL (ref 1.8–2.4)
MCH RBC QN AUTO: 24.8 PG (ref 26–34)
MCHC RBC AUTO-ENTMCNC: 32.3 G/DL (ref 31–36)
MCV RBC AUTO: 76.8 FL (ref 80–100)
MONOCYTES ABSOLUTE: 0.1 K/UL (ref 0–1.3)
MONOCYTES RELATIVE PERCENT: 4.3 %
NEUTROPHILS ABSOLUTE: 2.4 K/UL (ref 1.7–7.7)
NEUTROPHILS RELATIVE PERCENT: 89.2 %
PDW BLD-RTO: 27.7 % (ref 12.4–15.4)
PERFORMED ON: ABNORMAL
PLATELET # BLD: 286 K/UL (ref 135–450)
PMV BLD AUTO: 8.8 FL (ref 5–10.5)
POTASSIUM SERPL-SCNC: 3.9 MMOL/L (ref 3.5–5.1)
RBC # BLD: 3.6 M/UL (ref 4–5.2)
SARS-COV-2, NAAT: NOT DETECTED
SODIUM BLD-SCNC: 143 MMOL/L (ref 136–145)
TOTAL PROTEIN: 6.2 G/DL (ref 6.4–8.2)
TROPONIN: <0.01 NG/ML
VANCOMYCIN RANDOM: 9.3 UG/ML
WBC # BLD: 2.7 K/UL (ref 4–11)

## 2022-06-29 PROCEDURE — 94761 N-INVAS EAR/PLS OXIMETRY MLT: CPT

## 2022-06-29 PROCEDURE — 2580000003 HC RX 258: Performed by: STUDENT IN AN ORGANIZED HEALTH CARE EDUCATION/TRAINING PROGRAM

## 2022-06-29 PROCEDURE — 94640 AIRWAY INHALATION TREATMENT: CPT

## 2022-06-29 PROCEDURE — 6360000002 HC RX W HCPCS: Performed by: INTERNAL MEDICINE

## 2022-06-29 PROCEDURE — 99223 1ST HOSP IP/OBS HIGH 75: CPT | Performed by: INTERNAL MEDICINE

## 2022-06-29 PROCEDURE — 36591 DRAW BLOOD OFF VENOUS DEVICE: CPT

## 2022-06-29 PROCEDURE — 6370000000 HC RX 637 (ALT 250 FOR IP): Performed by: STUDENT IN AN ORGANIZED HEALTH CARE EDUCATION/TRAINING PROGRAM

## 2022-06-29 PROCEDURE — 83735 ASSAY OF MAGNESIUM: CPT

## 2022-06-29 PROCEDURE — 85025 COMPLETE CBC W/AUTO DIFF WBC: CPT

## 2022-06-29 PROCEDURE — 96375 TX/PRO/DX INJ NEW DRUG ADDON: CPT

## 2022-06-29 PROCEDURE — 6360000002 HC RX W HCPCS: Performed by: STUDENT IN AN ORGANIZED HEALTH CARE EDUCATION/TRAINING PROGRAM

## 2022-06-29 PROCEDURE — 93010 ELECTROCARDIOGRAM REPORT: CPT | Performed by: INTERNAL MEDICINE

## 2022-06-29 PROCEDURE — 93308 TTE F-UP OR LMTD: CPT

## 2022-06-29 PROCEDURE — 80202 ASSAY OF VANCOMYCIN: CPT

## 2022-06-29 PROCEDURE — 80053 COMPREHEN METABOLIC PANEL: CPT

## 2022-06-29 PROCEDURE — 97162 PT EVAL MOD COMPLEX 30 MIN: CPT

## 2022-06-29 PROCEDURE — 94150 VITAL CAPACITY TEST: CPT

## 2022-06-29 PROCEDURE — 6360000002 HC RX W HCPCS: Performed by: PHYSICIAN ASSISTANT

## 2022-06-29 PROCEDURE — 97530 THERAPEUTIC ACTIVITIES: CPT

## 2022-06-29 PROCEDURE — 83605 ASSAY OF LACTIC ACID: CPT

## 2022-06-29 PROCEDURE — 2060000000 HC ICU INTERMEDIATE R&B

## 2022-06-29 PROCEDURE — 94669 MECHANICAL CHEST WALL OSCILL: CPT

## 2022-06-29 PROCEDURE — 87641 MR-STAPH DNA AMP PROBE: CPT

## 2022-06-29 PROCEDURE — 2580000003 HC RX 258: Performed by: PHYSICIAN ASSISTANT

## 2022-06-29 PROCEDURE — 71045 X-RAY EXAM CHEST 1 VIEW: CPT

## 2022-06-29 PROCEDURE — 97116 GAIT TRAINING THERAPY: CPT

## 2022-06-29 PROCEDURE — 36415 COLL VENOUS BLD VENIPUNCTURE: CPT

## 2022-06-29 PROCEDURE — 2580000003 HC RX 258: Performed by: INTERNAL MEDICINE

## 2022-06-29 PROCEDURE — 2700000000 HC OXYGEN THERAPY PER DAY

## 2022-06-29 PROCEDURE — 94660 CPAP INITIATION&MGMT: CPT

## 2022-06-29 RX ORDER — ENOXAPARIN SODIUM 100 MG/ML
40 INJECTION SUBCUTANEOUS DAILY
Status: DISCONTINUED | OUTPATIENT
Start: 2022-06-29 | End: 2022-06-29

## 2022-06-29 RX ORDER — AMIODARONE HYDROCHLORIDE 200 MG/1
200 TABLET ORAL DAILY
Status: DISCONTINUED | OUTPATIENT
Start: 2022-06-29 | End: 2022-07-02 | Stop reason: HOSPADM

## 2022-06-29 RX ORDER — SODIUM CHLORIDE 9 MG/ML
INJECTION, SOLUTION INTRAVENOUS PRN
Status: DISCONTINUED | OUTPATIENT
Start: 2022-06-29 | End: 2022-07-02 | Stop reason: HOSPADM

## 2022-06-29 RX ORDER — ALBUTEROL SULFATE 90 UG/1
2 AEROSOL, METERED RESPIRATORY (INHALATION) EVERY 4 HOURS PRN
Status: DISCONTINUED | OUTPATIENT
Start: 2022-06-29 | End: 2022-07-02 | Stop reason: HOSPADM

## 2022-06-29 RX ORDER — SODIUM CHLORIDE 0.9 % (FLUSH) 0.9 %
5-40 SYRINGE (ML) INJECTION EVERY 12 HOURS SCHEDULED
Status: DISCONTINUED | OUTPATIENT
Start: 2022-06-29 | End: 2022-07-02 | Stop reason: HOSPADM

## 2022-06-29 RX ORDER — INSULIN LISPRO 100 [IU]/ML
0-3 INJECTION, SOLUTION INTRAVENOUS; SUBCUTANEOUS NIGHTLY
Status: DISCONTINUED | OUTPATIENT
Start: 2022-06-29 | End: 2022-07-02 | Stop reason: HOSPADM

## 2022-06-29 RX ORDER — DEXTROSE MONOHYDRATE 50 MG/ML
100 INJECTION, SOLUTION INTRAVENOUS PRN
Status: DISCONTINUED | OUTPATIENT
Start: 2022-06-29 | End: 2022-07-02 | Stop reason: HOSPADM

## 2022-06-29 RX ORDER — ALBUTEROL SULFATE 90 UG/1
2 AEROSOL, METERED RESPIRATORY (INHALATION) 4 TIMES DAILY
Status: DISCONTINUED | OUTPATIENT
Start: 2022-06-29 | End: 2022-06-29

## 2022-06-29 RX ORDER — ASPIRIN 81 MG/1
81 TABLET ORAL DAILY
Status: DISCONTINUED | OUTPATIENT
Start: 2022-06-29 | End: 2022-07-02 | Stop reason: HOSPADM

## 2022-06-29 RX ORDER — INSULIN LISPRO 100 [IU]/ML
0-6 INJECTION, SOLUTION INTRAVENOUS; SUBCUTANEOUS
Status: DISCONTINUED | OUTPATIENT
Start: 2022-06-29 | End: 2022-07-02 | Stop reason: HOSPADM

## 2022-06-29 RX ORDER — ONDANSETRON 2 MG/ML
4 INJECTION INTRAMUSCULAR; INTRAVENOUS EVERY 6 HOURS PRN
Status: DISCONTINUED | OUTPATIENT
Start: 2022-06-29 | End: 2022-07-02 | Stop reason: HOSPADM

## 2022-06-29 RX ORDER — ATORVASTATIN CALCIUM 40 MG/1
40 TABLET, FILM COATED ORAL NIGHTLY
Status: DISCONTINUED | OUTPATIENT
Start: 2022-06-29 | End: 2022-07-02 | Stop reason: HOSPADM

## 2022-06-29 RX ORDER — IPRATROPIUM BROMIDE AND ALBUTEROL SULFATE 2.5; .5 MG/3ML; MG/3ML
1 SOLUTION RESPIRATORY (INHALATION) EVERY 4 HOURS
Status: DISCONTINUED | OUTPATIENT
Start: 2022-06-29 | End: 2022-07-02 | Stop reason: HOSPADM

## 2022-06-29 RX ORDER — CARVEDILOL 6.25 MG/1
6.25 TABLET ORAL 2 TIMES DAILY WITH MEALS
Status: DISCONTINUED | OUTPATIENT
Start: 2022-06-29 | End: 2022-07-02 | Stop reason: HOSPADM

## 2022-06-29 RX ORDER — GABAPENTIN 300 MG/1
600 CAPSULE ORAL 3 TIMES DAILY
Status: DISCONTINUED | OUTPATIENT
Start: 2022-06-29 | End: 2022-07-02 | Stop reason: HOSPADM

## 2022-06-29 RX ORDER — ACETAMINOPHEN 650 MG/1
650 SUPPOSITORY RECTAL EVERY 6 HOURS PRN
Status: DISCONTINUED | OUTPATIENT
Start: 2022-06-29 | End: 2022-07-02 | Stop reason: HOSPADM

## 2022-06-29 RX ORDER — FUROSEMIDE 10 MG/ML
40 INJECTION INTRAMUSCULAR; INTRAVENOUS 2 TIMES DAILY
Status: DISCONTINUED | OUTPATIENT
Start: 2022-06-29 | End: 2022-07-02 | Stop reason: HOSPADM

## 2022-06-29 RX ORDER — PANTOPRAZOLE SODIUM 40 MG/1
40 TABLET, DELAYED RELEASE ORAL
Status: DISCONTINUED | OUTPATIENT
Start: 2022-06-29 | End: 2022-07-02 | Stop reason: HOSPADM

## 2022-06-29 RX ORDER — IPRATROPIUM BROMIDE AND ALBUTEROL SULFATE 2.5; .5 MG/3ML; MG/3ML
1 SOLUTION RESPIRATORY (INHALATION) EVERY 4 HOURS PRN
Status: DISCONTINUED | OUTPATIENT
Start: 2022-06-29 | End: 2022-06-29

## 2022-06-29 RX ORDER — ACETAMINOPHEN 325 MG/1
650 TABLET ORAL EVERY 6 HOURS PRN
Status: DISCONTINUED | OUTPATIENT
Start: 2022-06-29 | End: 2022-07-02 | Stop reason: HOSPADM

## 2022-06-29 RX ORDER — SODIUM CHLORIDE 0.9 % (FLUSH) 0.9 %
5-40 SYRINGE (ML) INJECTION PRN
Status: DISCONTINUED | OUTPATIENT
Start: 2022-06-29 | End: 2022-07-02 | Stop reason: HOSPADM

## 2022-06-29 RX ORDER — ENOXAPARIN SODIUM 100 MG/ML
30 INJECTION SUBCUTANEOUS 2 TIMES DAILY
Status: DISCONTINUED | OUTPATIENT
Start: 2022-06-29 | End: 2022-07-01

## 2022-06-29 RX ADMIN — ENOXAPARIN SODIUM 30 MG: 100 INJECTION SUBCUTANEOUS at 21:35

## 2022-06-29 RX ADMIN — INSULIN LISPRO 1 UNITS: 100 INJECTION, SOLUTION INTRAVENOUS; SUBCUTANEOUS at 12:30

## 2022-06-29 RX ADMIN — IPRATROPIUM BROMIDE AND ALBUTEROL SULFATE 1 AMPULE: .5; 3 SOLUTION RESPIRATORY (INHALATION) at 03:01

## 2022-06-29 RX ADMIN — Medication 1500 MG: at 22:09

## 2022-06-29 RX ADMIN — FILGRASTIM-AAFI 480 MCG: 480 INJECTION, SOLUTION SUBCUTANEOUS at 15:24

## 2022-06-29 RX ADMIN — ASPIRIN 81 MG: 81 TABLET, COATED ORAL at 08:50

## 2022-06-29 RX ADMIN — AMIODARONE HYDROCHLORIDE 200 MG: 200 TABLET ORAL at 08:50

## 2022-06-29 RX ADMIN — EPOETIN ALFA-EPBX 40000 UNITS: 40000 INJECTION, SOLUTION INTRAVENOUS; SUBCUTANEOUS at 15:23

## 2022-06-29 RX ADMIN — ATORVASTATIN CALCIUM 40 MG: 40 TABLET, FILM COATED ORAL at 21:35

## 2022-06-29 RX ADMIN — GABAPENTIN 600 MG: 300 CAPSULE ORAL at 08:50

## 2022-06-29 RX ADMIN — IPRATROPIUM BROMIDE AND ALBUTEROL SULFATE 1 AMPULE: .5; 3 SOLUTION RESPIRATORY (INHALATION) at 23:36

## 2022-06-29 RX ADMIN — INSULIN LISPRO 1 UNITS: 100 INJECTION, SOLUTION INTRAVENOUS; SUBCUTANEOUS at 17:25

## 2022-06-29 RX ADMIN — GABAPENTIN 600 MG: 300 CAPSULE ORAL at 15:23

## 2022-06-29 RX ADMIN — FUROSEMIDE 40 MG: 10 INJECTION, SOLUTION INTRAMUSCULAR; INTRAVENOUS at 00:15

## 2022-06-29 RX ADMIN — IPRATROPIUM BROMIDE AND ALBUTEROL SULFATE 1 AMPULE: .5; 3 SOLUTION RESPIRATORY (INHALATION) at 07:36

## 2022-06-29 RX ADMIN — IPRATROPIUM BROMIDE AND ALBUTEROL SULFATE 1 AMPULE: .5; 3 SOLUTION RESPIRATORY (INHALATION) at 11:55

## 2022-06-29 RX ADMIN — IPRATROPIUM BROMIDE AND ALBUTEROL SULFATE 1 AMPULE: .5; 3 SOLUTION RESPIRATORY (INHALATION) at 19:37

## 2022-06-29 RX ADMIN — CARVEDILOL 6.25 MG: 6.25 TABLET, FILM COATED ORAL at 17:28

## 2022-06-29 RX ADMIN — INSULIN LISPRO 1 UNITS: 100 INJECTION, SOLUTION INTRAVENOUS; SUBCUTANEOUS at 08:51

## 2022-06-29 RX ADMIN — TIOTROPIUM BROMIDE AND OLODATEROL 2 PUFF: 3.124; 2.736 SPRAY, METERED RESPIRATORY (INHALATION) at 07:36

## 2022-06-29 RX ADMIN — CEFEPIME HYDROCHLORIDE 2000 MG: 2 INJECTION, POWDER, FOR SOLUTION INTRAVENOUS at 11:52

## 2022-06-29 RX ADMIN — GABAPENTIN 600 MG: 300 CAPSULE ORAL at 21:34

## 2022-06-29 RX ADMIN — SODIUM CHLORIDE, PRESERVATIVE FREE 10 ML: 5 INJECTION INTRAVENOUS at 21:57

## 2022-06-29 RX ADMIN — SODIUM CHLORIDE, PRESERVATIVE FREE 10 ML: 5 INJECTION INTRAVENOUS at 08:52

## 2022-06-29 RX ADMIN — Medication 1500 MG: at 01:07

## 2022-06-29 RX ADMIN — PANTOPRAZOLE SODIUM 40 MG: 40 TABLET, DELAYED RELEASE ORAL at 06:31

## 2022-06-29 RX ADMIN — INSULIN LISPRO 1 UNITS: 100 INJECTION, SOLUTION INTRAVENOUS; SUBCUTANEOUS at 21:37

## 2022-06-29 RX ADMIN — FUROSEMIDE 40 MG: 10 INJECTION, SOLUTION INTRAMUSCULAR; INTRAVENOUS at 17:28

## 2022-06-29 RX ADMIN — ENOXAPARIN SODIUM 30 MG: 100 INJECTION SUBCUTANEOUS at 08:50

## 2022-06-29 RX ADMIN — CEFEPIME HYDROCHLORIDE 2000 MG: 2 INJECTION, POWDER, FOR SOLUTION INTRAVENOUS at 00:32

## 2022-06-29 ASSESSMENT — ENCOUNTER SYMPTOMS
GASTROINTESTINAL NEGATIVE: 1
COUGH: 0
EYES NEGATIVE: 1
CHEST TIGHTNESS: 0
SHORTNESS OF BREATH: 1

## 2022-06-29 NOTE — ED PROVIDER NOTES
Date of evaluation: 6/28/2022    ED Attending Attestation Note     CHIEF COMPLAINT     Trouble breathing  HISTORY OF PRESENT ILLNESS  (Location/Symptom, Timing/Onset,Context/Setting, Quality, Duration, Modifying Factors, Severity). Note limiting factors. This patient was seen by the advance practice provider. I have seen and examined the patient, agree with the workup, evaluation, management and diagnosis. The care plan has been discussed. Chief Complaint   Patient presents with    Respiratory Distress        Destiny Carrasco is a 65 y.o. female who presents to the emergency department secondary to concern for shortness of breath and trouble breathing. She states she called 911 for this reason. 911 reports they have been to her house many times, and she has a known history of lung cancer, she does endorse she is a full code. Asked here directly she states she would want intubation if needed. She denies any recent fever, chills, nausea, vomiting, cough. However she is a limited historian as she is on CPAP and is having increased work of breathing at the time of evaluation. Per EMS on their arrival she was on 3 L nasal cannula with an oxygen saturation in the 80s. Past medical history noted below:    has a past medical history of Asthma, Cardiomyopathy ([address] CHF (congestive heart failure) ([address] COPD (chronic obstructive pulmonary disease) ([address] Diabetes mellitus ([address] Essential hypertension, Hyperlipidemia, Hypertension, and Obesity. Social History     Socioeconomic History    Marital status:       Spouse name: None    Number of children: None    Years of education: None    Highest education level: None   Occupational History    None   Tobacco Use    Smoking status: Never Smoker    Smokeless tobacco: Never Used   Vaping Use    Vaping Use: Never used   Substance and Sexual Activity    Alcohol use: Not Currently    Drug use: Not Currently    Sexual activity: Not Currently Partners: Male     Comment:    Other Topics Concern    None   Social History Narrative    ** Merged History Encounter **          Social Determinants of Health     Financial Resource Strain:     Difficulty of Paying Living Expenses: Not on file   Food Insecurity:     Worried About Running Out of Food in the Last Year: Not on file    [address] of Food in the Last Year: Not on file   Transportation Needs:     Lack of Transportation (Medical): Not on file    Lack of Transportation (Non-Medical): Not on file   Physical Activity:     Days of Exercise per Week: Not on file    Minutes of Exercise per Session: Not on file   Stress:     Feeling of Stress : Not on file   Social Connections:     Frequency of Communication with Friends and Family: Not on file    Frequency of Social Gatherings with Friends and Family: Not on file    Attends Orthodox Services: Not on file    Active Member of [address] [place] [address] or Organizations: Not on file    Attends Club or Organization Meetings: Not on file    Marital Status: Not on file   Intimate Partner Violence:     Fear of Current or Ex-Partner: Not on file    Emotionally Abused: Not on file    Physically Abused: Not on file    Sexually Abused: Not on file   Housing Stability:     Unable to Pay for Housing in the Last Year: Not on file    Number of [place] in the Last Year: Not on file    Unstable Housing in the Last Year: Not on file     Aside from what is stated above denies any other symptoms or modifying factors. Nursing Notes reviewed.     Past Surgical History:   Procedure Laterality Date    CARPAL TUNNEL RELEASE      CHOLECYSTECTOMY      CORONARY ANGIOPLASTY WITH STENT PLACEMENT  01/07/2013    Stent LAD    INCONTINENCE SURGERY      IR PORT PLACEMENT EQUAL OR GREATER THAN 5 YEARS Right 05/03/2022    Power port, inserted by Dr. Angely Ybarra, cut at [email or web address] U. 6. 5 YEARS  5/3/2022    IR PORT PLACEMENT EQUAL OR GREATER THAN 5 YEARS that time ventricularly paced with no acute ischemic changes noted. RADIOLOGY:   Interpretation per Radiologist below, if available at the time of this note:  XR CHEST PORTABLE   Preliminary Result   Interstitial opacities bilaterally could represent pulmonary edema and/or   infection. Left retrocardiac opacity could represent a layering pleural effusion,   atelectasis, and/or infiltrate. Patient was given the following medications:  Orders Placed This Encounter   Medications    methylPREDNISolone sodium (SOLU-MEDROL) injection 125 mg    ipratropium-albuterol (DUONEB) nebulizer solution 3 ampule     Order Specific Question:   Initiate RT Bronchodilator Protocol     Answer: Yes    midazolam (VERSED) injection 1 mg    DISCONTD: vancomycin 1000 mg IVPB in 250 mL D5W addavial     Order Specific Question:   Antimicrobial Indications     Answer:   Pneumonia (HAP)    cefepime (MAXIPIME) 2000 mg IVPB minibag     Order Specific Question:   Antimicrobial Indications     Answer:   Pneumonia (HAP)    furosemide (LASIX) injection 40 mg    vancomycin (VANCOCIN) 1500 mg in dextrose 5 % 250 mL IVPB     Order Specific Question:   Antimicrobial Indications     Answer:   Pneumonia (HAP)       INITIAL VITALS: BP: (!) 148/134, Temp: 99.4 °F (37.4 °C), Heart Rate: 100, Resp: 19, SpO2: 100 %   RECENT VITALS:  BP: (!) 169/75,Temp: 99.4 °F (37.4 °C), Heart Rate: 99, Resp: 21, SpO2: 98 %     I personally saw the patient and performed a substantive portion of the visit including all aspects of the medical decision making. Is this patient to be included in the SEP-1 Core Measure due to severe sepsis or septic shock?    No   Exclusion criteria - the patient is NOT to be included for SEP-1 Core Measure due to:  May have criteria for sepsis, but does not meet criteria for severe sepsis or septic shock      My assessment reveals a chronically ill-appearing female who presents to the emergency department with shortness of breath. On arrival she appears anxious and it was reported from EMS her initial breathing rate had been in the 40s so here initial breathing rate in the low 20s has already improved when she is on her BiPAP machine. Given that she was recently intubated during her last admission this was discussed with her at the bedside by both the DONNA and myself. The patient does nod her head and confirm she would want intubation if needed. Her lung sounds are notable for sounds of mild wheezing, crackles. She has somewhat diminished breath sounds. Given these findings she was started for treatment for a COPD exacerbation and was doing well on the BiPAP maintaining an oxygen saturation in the mid 90s with 40% O2. She does appear a bit anxious in addition to COPD treatment and she was also ordered a little bit of Versed which did seem to help with her anxiety. Her initial work-up here is notable for signs of potential heart failure with an elevated BNP as well as a chest x-ray which shows signs of pulmonary edema (though could also represent infection). Her troponin is pending. Her initial VBG is somewhat reassuring with pCO2 of 60.9 which is increased from 40 on [address] 15 though her range goes as high as 63 in this year alone. Her procalcitonin is within normal limits and actually improved from June 18 (0.1 today, 0.66 on June 18). Given the finding she had Lasix ordered in addition to antibiotics. Discussed admission with the hospitalist, Dr. Lurdes Bowman, who kindly accepted. Critical Care:  I personally saw the patient and independently provided 9 minutes of nonconcurrent critical care out of the total shared critical care time provided. Due to the immediate potential for life-threatening deterioration due to respiratory distress with history of lung cancer and concern for potential need of intuabtion, I spent 9 minutes providing critical care.   This time excludes time spent performing procedures but includes time spent on direct patient care, history retrieval, review of the chart, and discussions with patient, family, and consultant(s). FINAL IMPRESSION      1. Acute respiratory distress    2. Dyspnea, unspecified type    3. History of lung cancer    4.  Elevated brain natriuretic peptide (BNP) level        DISPOSITION/PLAN   DISPOSITION admission           (Please note that portions of this note were completed with a voice recognition program. Efforts were made to edit the dictations but occasionally words are mis-transcribed.)    Deirdre Peres MD (electronically signed)  Attending Emergency Physician     Deirdre Peres MD  06/28/22 0559

## 2022-06-29 NOTE — TELEPHONE ENCOUNTER
Writer contacted Dr. Maribel Herman to inform of 30 day readmission risk. ED provider informed writer of readmission.     Call Back: If you need to call back to inform of disposition you can contact me at [phone]

## 2022-06-29 NOTE — CONSULTS
Clinical Pharmacy Note  Vancomycin Consult    Pharmacy consult received for one-time dose of vancomycin in the Emergency Department per MAXWELL Shaw. Ht Readings from Last 1 Encounters:   06/16/22 5' 7\" (1.702 m)        Wt Readings from Last 1 Encounters:   06/21/22 214 lb 11.7 oz (97.4 kg)         Assessment/Plan:   Vancomycin 1500 mg x 1 in ED.  If Vancomycin is to continue on admission and pharmacy is to manage dosing, please re-consult with admission orders.       Melisa Velázquez [facility]  6/28/2022 11:15 PM

## 2022-06-29 NOTE — H&P
Hospital Medicine History & Physical      PCP: Rafael Levine MD    Date of Admission: 6/28/2022    Date of Service: Pt seen/examined on 6/28/2022 and admitted to inpatient    Chief Complaint: Acute respiratory distress      History Of Present Illness: The patient is a 65 y.o. female with past medical history as below who presents to [facility] with acute onset worsening respiratory distress while patient was still on her 3 L of home oxygen, it is noted per the daughter that she was not witnessed to patient's worsening respiratory stress but patient was not doing anything exertional or of concern to cause the worsening respiratory distress and shortness of breath. She was apparently saturating around 78 to 80% on her home oxygen level at home and she did call EMS because she was feeling more short of breath compared to baseline. Patient was just admitted for similar respiratory distress about a week or so ago and was just discharged 7 days ago. She apparently had completed a course of antibiotics for suspected component of superimposed pneumonia as well as was diuresed and treated for COPD as well as CHF exacerbations. She notes that she did restart on her chemotherapy as outpatient for lung malignancy the last 2 days but she was still feeling overall well even while doing the chemotherapy. She denies any other sick contacts. She denies any other recent symptoms prior to this acute respiratory distress such as fever, chills, cough or sputum production, dizziness, syncope, chest pain, dysuria, blood in urine/stool/sputum, nausea/vomiting/diarrhea/abdominal pain.     Past Medical History:        Diagnosis Date    Asthma     Cardiomy[facility]     CHF (congestive heart failure) (HCC)     COPD [facility]chronic obstructive pulmonary disease) [facility]     [facility]     Essential hypertension     Hyperlipidemia     Hypertension     Obesity        Past Surgical History:        Procedure Laterality Date    CARPAL TUNNEL RELEASE      CHOLECYSTECTOMY      CORONARY ANGIOPLASTY WITH STENT PLACEMENT  01/07/2013    Stent LAD    INCONTINENCE SURGERY      IR PORT PLACEMENT EQUAL OR GREATER THAN 5 YEARS Right 05/03/2022    Power port, inserted by Dr. Gabriel Chambers, cut at 22    IR [place] [facility] 5 YEARS  5/3/2022    IR PORT PLACEMENT EQUAL OR GREATER THAN 5 YEARS 5/3/2022 [facility] SPECIAL PROCEDURES       Medications Prior to Admission:    Prior to Admission medications    Medication Sig Start Date End Date Taking?  Authorizing Provider   torsemide 40 MG TABS Take 40 mg by mouth 2 times daily 6/21/22   Chika Monique MD   potassium chloride (KLOR-CON M) 20 MEQ extended release tablet Take 20 mEq by mouth daily    Historical Provider, MD   empagliflozin (JARDIANCE) 10 MG tablet Take 10 mg by mouth daily    Historical Provider, MD   umeclidinium-vilanterol (ANORO ELLIPTA) 62.5-25 MCG/INH AEPB inhaler Inhale 1 puff into the lungs daily    Historical Provider, MD   Vericiguat (VERQUVO) 5 MG TABS Take 5 mg by mouth every morning    Historical Provider, MD   ondansetron (ZOFRAN) 8 MG tablet Take 8 mg by mouth every 8 hours as needed for Nausea or Vomiting (POST CHEMO NAUSEA)    Historical Provider, MD   prochlorperazine (COMPAZINE) 10 MG tablet Take 10 mg by mouth every 8 hours as needed (POST CHEMO NAUSEA)    Historical Provider, MD   albuterol (PROVENTIL) (2.5 MG/3ML) 0.083% nebulizer solution 4 times daily  1/13/22   Historical Provider, MD   amiodarone (CORDARONE) 200 MG tablet TAKE 1 TABLET BY MOUTH EVERY DAY 3/20/22   Historical Provider, MD   ASPIRIN LOW DOSE 81 MG EC tablet TAKE 1 TABLET BY MOUTH EVERY DAY 1/17/22   Historical Provider, MD   carvedilol (COREG) 6.25 MG tablet TAKE 1 TABLET BY MOUTH TWICE A DAY WITH MEALS 1/24/22   Historical Provider, MD   pantoprazole (PROTONIX) 40 MG tablet TAKE 1 TABLET BY MOUTH EVERY DAY 3/20/22   Historical Provider, MD   atorvastatin (LIPITOR) 40 MG tablet Take 40 mg by mouth nightly     Historical Provider, MD   magnesium oxide (MAG-OX) 400 MG tablet Take 400 mg by mouth 2 times daily     Historical Provider, MD   gabapentin (NEURONTIN) 600 MG tablet Take 600 mg by mouth 3 times daily. Historical Provider, MD   albuterol (PROVENTIL HFA) 108 (90 BASE) MCG/ACT inhaler Inhale 2 puffs into the lungs 4 times daily     Historical Provider, MD       Allergies:  Ace inhibitors, Diclofenac, Losartan, Spironolactone, Vicodin hp [hydrocodone-acetaminophen], and Vicodin [hydrocodone-acetaminophen]    Social History:  The patient currently lives home    TOBACCO:   reports that she has never smoked. She has never used smokeless tobacco.  ETOH:   reports previous alcohol use. Family History:  Reviewed in detail and negative for DM, Early CAD, Cancer, CVA. Positive as follows:        Problem Relation Age of Onset    High Blood Pressure Mother     Diabetes Mother     Cancer Mother         thyroid    Stroke Father        REVIEW OF SYSTEMS:   as noted in the HPI. All other systems reviewed and negative. PHYSICAL EXAM:    BP (!) 110/59   Pulse 83   Temp 97.5 °F (36.4 °C) (Axillary)   Resp 16   Ht 5' 7\" (1.702 m)   Wt 223 lb 1.7 oz (101.2 kg)   SpO2 100%   BMI 34.94 kg/m²     General appearance: On BiPAP at this time, breathing more comfortably, fatigued appearing but not toxic appearing  HEENT Normal cephalic, atraumatic without obvious deformity. Pupils equal, round, and reactive to light. Extra ocular muscles intact.   Moist mucous membranes, anicteric sclera  Neck: Supple, no JVD  Lungs: Coarse breath sounds bilaterally, significantly more crackles to the left anterior lung fields compared to right, no wheezing noted at this time  Heart: Currently regular rate and rhythm, no murmurs noted  Abdomen: Soft, nontender, nondistended, active bowel  Extremities: 1+ bilateral lower extremity pitting edema symmetrically  Skin: No rash  Neurologic: Difficult to evaluate but seems to be grossly intact neurologically  Mental status: Alert, oriented, thought content appropriate. Capillary Refill: Acceptable  < 3 seconds  Peripheral Pulses: +3 Easily felt, not easily obliterated with pressure    06/28/22 2258  XR CHEST PORTABLE   Performed: 06/28/22 2231  Preliminary        Impression: Interstitial opacities bilaterally could represent pulmonary edema and/or infection. Left retrocardiac opacity could represent a layering pleural effusion, atelectasis, and/or infiltrate. CBC   Recent Labs     06/28/22 2205 06/29/22 0622   WBC 5.8 2.7*   HGB 10.0* 8.9*   HCT 32.5* 27.7*    286      RENAL  Recent Labs     06/28/22 2205      K 5.2*      CO2 20*   BUN 36*   CREATININE 1.5*     LFT'S  Recent Labs     06/28/22 2205   AST 28   ALT 15   BILITOT 0.4   ALKPHOS 75     COAG  No results for input(s): INR in the last 72 hours.   CARDIAC ENZYMES  Recent Labs     06/28/22 2205   TROPONINI <0.01       U/A:    Lab Results   Component Value Date    COLORU Yellow 03/31/2022    WBCUA 6 03/30/2022    RBCUA 1 03/30/2022    MUCUS 1+ 12/15/2012    BACTERIA 4+ 03/30/2022    CLARITYU Clear 03/31/2022    SPECGRAV 1.010 03/31/2022    LEUKOCYTESUR Negative 03/31/2022    BLOODU Negative 03/31/2022    GLUCOSEU >=1000 03/31/2022    AMORPHOUS 2+ 12/18/2012       ABG    Lab Results   Component Value Date    OUI2IFL 29.1 05/13/2022    BEART 1.1 05/13/2022    K6YUGEDT 72.9 05/13/2022    PHART 7.265 05/13/2022    THGBART 14.7 12/15/2012    RDN5SAW 64.3 05/13/2022    PO2ART 46.8 05/13/2022    EGG3DDQ 31.1 05/13/2022           Active Hospital Problems    Diagnosis Date Noted    Acute pulmonary edema ([facility] [J81.0] 10/12/2021     Priority: High    Acute on chronic respiratory failure ([facility] [J96.20] 06/29/2022     Priority: Medium    DMII (diabetes mellitus, type 2) ([facility] [E11.9] 03/31/2022    DMII (diabetes mellitus, type 2) ([facility] [E11.9] 01/06/2013    NEGIN treated with BiPAP [G47.33] 01/06/2013    COPD (chronic obstructive pulmonary disease) ([facility] [J44.9]     Essential hypertension [I10]          PHYSICIANS CERTIFICATION:    I certify that Gaudencio Sandhu is expected to be hospitalized for greater than 2 midnights based on the following assessment and plan:      ASSESSMENT/PLAN:  · Acute on chronic respiratory failure  · Acute pulmonary edema  · Type 2 diabetes  · NEGIN  · COPD  · Hypertension    Plan:  · Patient has multifactorial respiratory failure, was given antibiotics as well as steroids and diuretics in the ED, I suspect mainly patient has a component of likely just acute pulmonary edema and likely could have been exacerbated from recent use of chemotherapy as outpatient  · Procalcitonin pending, and low suspicion for infectious process at this time  · Patient maintaining on BiPAP at this time, no need for intubation, patient admitted to PCU on BiPAP  · Continue patient on 40 IV twice daily of Lasix  · Restart patient's other home medications  · Pulmonology consult for respiratory failure  · Oncology consult for malignancy  · Will repeat chest x-ray during this admission in the next few hours to possibly consider infectious process  · Repeat labs daily    DVT Prophylaxis: Lovenox  Diet: ADULT DIET; Regular; 4 carb choices (60 gm/meal); Low Sodium (2 gm); 1500 ml  Code Status: Full Code  PT/OT Eval Status: Ambulatory    Dispo -pending clinical course       Jas Silva DO    Thank you Gurdeep Myers MD for the opportunity to be involved in this patient's care. If you have any questions or concerns please feel free to contact me at [phone].

## 2022-06-29 NOTE — ED TRIAGE NOTES
Patient presents to ED via [facility] for c/o respiratory distress. Patient has hx of lung ca, COPD, emphysema and CHF. Per EMS, patient's O2 was 78-80% on home o2, and respirations in the 40s, was given albuterol and duoneb and placed on BIPAP. Upon arrival to ED, patient on BIPAP, A&O x4.

## 2022-06-29 NOTE — PLAN OF CARE
Problem: Discharge Planning  Goal: Discharge to home or other facility with appropriate resources  6/29/2022 1646 by Levi Majano RN  Outcome: Progressing  6/29/2022 1646 by Levi Majano RN  Outcome: Progressing  6/29/2022 1631 by Levi Majano RN  Outcome: Progressing     Problem: Safety - Adult  Goal: Free from fall injury  6/29/2022 1646 by Levi Majano RN  Outcome: Progressing  6/29/2022 1646 by Levi Majano RN  Outcome: Progressing  6/29/2022 1631 by Levi Majano RN  Outcome: Progressing     Problem: ABCDS Injury Assessment  Goal: Absence of physical injury  6/29/2022 1646 by Levi Majano RN  Outcome: Progressing  6/29/2022 1646 by Levi Majano RN  Outcome: Progressing  6/29/2022 1631 by Levi Majano RN  Outcome: Progressing

## 2022-06-29 NOTE — PROGRESS NOTES
Hospitalist Progress Note      PCP: Myranda Espino MD    Date of Admission: 6/28/2022    Chief Complaint:   Chief Complaint   Patient presents with    Respiratory [address] Course: 80-year-old female with past medical history significant for stage IV lung cancer currently undergoing chemotherapy, chronic respiratory failure on home O2 3 L,systolic heart failure with EF less than 51%, grade 3 diastolic dysfunctio presents acute hypoxic respiratory failure possibly from a combination of acute on chronic CHF exacerbation and HCAP.    6/29  Patient feeling better today. She remains on 3 L O2 per nasal cannula, which is her baseline at home.     Subjective: as above      Medications:  Reviewed    Infusion Medications    sodium chloride      dextrose       Scheduled Medications    amiodarone  200 mg Oral Daily    aspirin  81 mg Oral Daily    atorvastatin  40 mg Oral Nightly    carvedilol  6.25 mg Oral BID WC    gabapentin  600 mg Oral TID    pantoprazole  40 mg Oral QAM AC    tiotropium-olodaterol  2 puff Inhalation Daily    Vericiguat  5 mg Oral QAM    sodium chloride flush  5-40 mL IntraVENous 2 times per day    insulin lispro  0-6 Units SubCUTAneous TID WC    insulin lispro  0-3 Units SubCUTAneous Nightly    furosemide  40 mg IntraVENous BID    ipratropium-albuterol  1 ampule Inhalation Q4H    enoxaparin  30 mg SubCUTAneous BID    filgrastim-aafi  480 mcg SubCUTAneous Daily    epoetin beth-epbx  40,000 Units SubCUTAneous Once    cefepime  2,000 mg IntraVENous Once     PRN Meds: sodium chloride flush, sodium chloride, acetaminophen **OR** acetaminophen, ondansetron, glucose, dextrose bolus **OR** dextrose bolus, glucagon (rDNA), dextrose, albuterol sulfate HFA      Intake/Output Summary (Last 24 hours) at 6/29/2022 1121  Last data filed at 6/29/2022 8770  Gross per 24 hour   Intake --   Output 850 ml   Net -850 ml       Physical Exam Performed:    BP (!) 103/47   Pulse 83 Temp 97.3 °F (36.3 °C) (Axillary)   Resp 16   Ht 5' 7\" (1.702 m)   Wt 223 lb 1.7 oz (101.2 kg)   SpO2 100%   BMI 34.94 kg/m²     General appearance: No apparent distress, appears stated age and cooperative. HEENT: Pupils equal, round, and reactive to light. Conjunctivae/corneas clear. Neck: Supple, with full range of motion. No jugular venous distention. Trachea midline. Respiratory:  Normal respiratory effort. Clear to auscultation, bilaterally without Rales/Wheezes/Rhonchi. Cardiovascular: Regular rate and rhythm with normal S1/S2 without murmurs, rubs or gallops. Abdomen: Soft, non-tender, non-distended with normal bowel sounds. Musculoskeletal: No clubbing, cyanosis or edema bilaterally. Full range of motion without deformity. Skin: Skin color, texture, turgor normal.  No rashes or lesions. Neurologic:  Neurovascularly intact without any focal sensory/motor deficits. Cranial nerves: II-XII intact, grossly non-focal.  Psychiatric: Alert and oriented, thought content appropriate, normal insight  Capillary Refill: Brisk,3 seconds, normal   Peripheral Pulses: +2 palpable, equal bilaterally       Labs:   Recent Labs     06/28/22 2205 06/29/22 0622   WBC 5.8 2.7*   HGB 10.0* 8.9*   HCT 32.5* 27.7*    286     Recent Labs     06/28/22 2205 06/29/22 0622    143   K 5.2* 3.9    103   CO2 20* 28   BUN 36* 35*   CREATININE 1.5* 1.5*   CALCIUM 9.4 9.3     Recent Labs     06/28/22 2205 06/29/22 0622   AST 28 21   ALT 15 16   BILITOT 0.4 0.4   ALKPHOS 75 76     No results for input(s): INR in the last 72 hours.   Recent Labs     06/28/22 2205   TROPONINI <0.01       Urinalysis:      Lab Results   Component Value Date    NITRU Negative 03/31/2022    WBCUA 6 03/30/2022    BACTERIA 4+ 03/30/2022    RBCUA 1 03/30/2022    BLOODU Negative 03/31/2022    SPECGRAV 1.010 03/31/2022    GLUCOSEU >=1000 03/31/2022       Radiology:  XR CHEST PORTABLE   Preliminary Result   Left retrocardiac opacity along with right basilar airspace opacities,   appearing slightly more prominent than the prior exam.  This could represent   atelectasis and/or infiltrate. Possible left pleural effusion. XR CHEST PORTABLE   Preliminary Result   Interstitial opacities bilaterally could represent pulmonary edema and/or   infection. Left retrocardiac opacity could represent a layering pleural effusion,   atelectasis, and/or infiltrate. Assessment/Plan:    Active Hospital Problems    Diagnosis     Acute on chronic respiratory failure ([facility]) [J96.20]      Priority: Medium    DMII (diabetes mellitus, type 2) ([facility] [E11.9]     Acute pulmonary edema (HCC) [J81.0]     DMII (diabetes mellitus, type 2) ([facility] [E11.9]     NEGIN treated with BiPAP [G47.33]     COPD (chronic obstructive pulmonary disease) ([facility]) [J44.9]     Essential hypertension [I10]      Problem list    Chronic respiratory failure  Acute on chronic systolic/diastolic heart failure exacerbation  HCAP  Stage IV lung cancer-currently undergoing chemotherapy    Plan    Continue IV Lasix   Continue cefepime and vancomycin  Heme-onc consulted for recommendations    DVT Prophylaxis: Lovenox  Diet: ADULT DIET; Regular; 4 carb choices (60 gm/meal); Low Sodium (2 gm); 1500 ml  Code Status: Full Code    PT/OT Eval Status: Requested    Dispo -?     Meir Lau MD

## 2022-06-29 NOTE — DISCHARGE INSTR - COC
3 ([facility] I50.23    Hyperlipidemia E78.5    Morbid obesity due to excess calories ([facility]) E66.01    Acute pulmonary edema ([facility]) J81.0    Acute respiratory failure with hypercarbia (HCC) J96.01, J96.02    Congestive heart failure with left ventricular dysfunction (HCC) I50.9    Acute decompensated heart failure ([facility]) I50.9    Acute kidney injury superimposed on chronic kidney disease ([facility]) N17.9, N18.9    COPD exacerbation ([facility]) J44.1    Demand ischemia of myocardium ([facility]) I24.8    Non morbid obesity due to excess calories E66.09    Acute respiratory failure with hypoxia and hypercapnia ([facility]) J96.01, J96.02    Essential hypertension I10    Hyperlipidemia E78.5    DMII (diabetes mellitus, type 2) ([facility]) E11.9    CAD (coronary artery disease) I25.10    Respiratory failure ([facility] J96.90    Pneumonia J18.9    Acute respiratory failure ([facility]) J96.00    Respiratory distress R06.03    Acute on chronic respiratory failure ([facility]) J96.20       Isolation/Infection:   Isolation            No Isolation          Patient Infection Status       Infection Onset Added Last Indicated Last Indicated By Review Planned Expiration Resolved Resolved By    None active    Resolved    COVID-19 (Rule Out) 06/28/22 06/28/22 06/28/22 COVID-19, Rapid (Ordered)   06/29/22 Rule-Out Test Resulted    COVID-19 (Rule Out) 12/01/21 12/01/21 12/01/21 COVID-19, Rapid (Ordered)   12/01/21 Rule-Out Test Resulted    COVID-19 (Rule Out) 10/11/21 10/11/21 10/11/21 COVID-19, Rapid (Ordered)   10/11/21 Rule-Out Test Resulted            Nurse Assessment:  Last Vital Signs: BP (!) 100/48   Pulse 76   Temp 98.2 °F (36.8 °C) (Oral)   Resp 18   Ht 5' 7\" (1.702 m)   Wt 223 lb 1.7 oz (101.2 kg)   SpO2 99%   BMI 34.94 kg/m²     Last documented pain score (0-10 scale): Pain Level:  (resting eyes closed)  Last Weight:   Wt Readings from Last 1 Encounters:   06/29/22 223 lb 1.7 oz (101.2 kg)     Mental Status:  oriented, alert, coherent, logical, thought processes intact, and able to concentrate and follow conversation    IV Access:  - unaccessed port    Nursing Mobility/ADLs:  Walking   Independent  Transfer  Independent  Bathing  Independent  Dressing  Independent  Toileting  Independent  Feeding  Independent  Med [address] [facility] Delivery   whole    Wound Care Documentation and Therapy:  Wound 06/29/22 Sacrum (Active)   Wound Etiology Pressure Stage 2 06/29/22 0916   Dressing/Treatment Zinc paste 06/29/22 0916   Wound Assessment Pink/red 06/29/22 0916   Drainage Amount None 06/29/22 0916   Number of days: 0        Elimination:  Continence: Bowel: Yes  Bladder: Yes  Urinary Catheter: None   Colostomy/Ileostomy/Ileal Conduit: No       Date of Last BM: 07/01/2022    Intake/Output Summary (Last 24 hours) at 6/29/2022 1348  Last data filed at 6/29/2022 4280  Gross per 24 hour   Intake --   Output 850 ml   Net -850 ml     I/O last 3 completed shifts:  In: -   Out: 850 [Urine:850]    Safety Concerns:     None    Impairments/Disabilities:      None    Nutrition Therapy:  Current Nutrition Therapy:   - Oral Diet:  Carb Control 4 carbs/meal (1800kcals/day) and Low Sodium (2gm)  - Oral Nutrition Supplement:  Low Calorie High Protein  three times a day    Routes of Feeding: Oral  Liquids: Thin Liquids  Daily Fluid Restriction: yes - amount 1500  Last Modified Barium Swallow with Video (Video Swallowing Test): not done    Treatments at the Time of Hospital Discharge:   Respiratory Treatments:   Oxygen Therapy:  is on oxygen at 3 L/min per nasal cannula.   Ventilator:    - CPAP   device from home    Rehab Therapies:   Weight Bearing Status/Restrictions: No weight bearing restrictions  Other Medical Equipment (for information only, NOT a DME order):  walker  Other Treatments:     Patient's personal belongings (please select all that are sent with patient):  None    RN SIGNATURE:  Electronically signed by Rebecca Wesley RN on 7/2/22 at 10:52 AM EDT    CASE MANAGEMENT/SOCIAL WORK SECTION    Inpatient Status Date: 6/29/2022    Readmission Risk Assessment Score:  Readmission Risk              Risk of Unplanned Readmission:  46           Discharging to Facility/ Agency   Name:  [facility]    Address: [address] [place] [address]., [facility]  Phone: [phone]  Fax: [phone] INFUSION PHARMACY:  Name:     [facility]  Address: [facility]. [facility] [address]  Phone:    [phone]  Fax:        [phone]      / signature: Electronically signed by Madi Martinez RN Case Management on 7/1/2022 at 12:07 PM      PHYSICIAN SECTION    Prognosis: Good    Condition at Discharge: Stable    Rehab Potential (if transferring to Rehab): Fair    Recommended Labs or Other Treatments After Discharge:   IV Cefepime x 2 gm Q 12 hr  stop date  x 7/15  First dose given in hospital   Oral Flagyl x 500 mg q 8 hr x 5 days  CBC with diff, BMP weekly   Fax results to [phone]  Chest port in place  No ID follow up     [address] [facility]  Phone: [phone]   Fax : [phone]       Physician Certification: I certify the above information and transfer of Nelly Alvarenga  is necessary for the continuing treatment of the diagnosis listed and that she requires Home Care for greater 30 days.      Update Admission H&P: No change in H&P    PHYSICIAN SIGNATURE:  Electronically signed by Ashley Dhaliwal MD on 7/2/22 at 9:17 AM EDT

## 2022-06-29 NOTE — CONSULTS
[address] [facility]                                  CONSULTATION    PATIENT NAME: [place]                     :        1960  MED REC NO:   [id number]                          ROOM:       5262  ACCOUNT NO:   [de-identified]                           ADMIT DATE: 2022  PROVIDER:     Dipti Mckay MD    ONCOLOGY CONSULTATION    CONSULT DATE:  2022    REASON FOR CONSULTATION:  Small cell lung cancer, in with shortness of  breath. CONSULTING PROVIDER:  Chris Armenta MD    HISTORY OF PRESENT ILLNESS:  The patient is a 67-year-old female that I  follow with extensive stage small cell lung cancer, who presented to the  hospital with a one-day history of shortness of breath. She was cycle  3, day 2 of carboplatin plus -16 plus Tecentriq when she presented to  the hospital.  Chest x-ray was done in the emergency room that showed  signs of pulmonary edema versus infection. She was admitted. She is  feeling better. She denies any recent fevers or productive cough. PAST MEDICAL HISTORY:  1. Extensive stage small cell lung cancer. A lung cancer screening CT  in 2022 showed a 3.6-cm left lower lobe mass with mediastinal and  hilar lymphadenopathy. A PET scan also showed uptake in the right  supraclavicular region and right axilla. A biopsy of the  supraclavicular lymph node revealed small cell carcinoma. 2.  Recurrent iron deficiency anemia. 3.  COPD. 4.  Diabetes. 5.  Coronary artery disease status post stent. 6.  Congestive heart failure. 7.  Chronic kidney disease. MEDICATION LIST:  Please see the list in her chart. SOCIAL HISTORY:  She is . She is a former heavy smoker but does  not smoke or drink currently. She is retired. FAMILY HISTORY:  Noncontributory.     REVIEW OF SYSTEMS:  She denies any recent fever, chills, sweats, nausea,  vomiting, abdominal pain, chest pain, headaches, any new bone aches,  dysphagia, odynophagia, diarrhea, constipation, hemoptysis, hematemesis,  change in vision/hearing/smell/taste, weakness, neuropathy, skin rashes,  productive cough, urinary or bowel prolapse or incontinence, petechiae,  purpura, skin rashes, vaginal bleeding, pruritus, hallucinations, nasal  congestion or drainage, seizures, strokes, syncope, depression, anxiety,  suicidal ideations, melena, or hematochezia. She has mild to moderate  fatigue and mild to moderate dyspnea on exertion. Her 10-system review  of systems is otherwise negative. PHYSICAL EXAMINATION:  VITAL SIGNS:  She is afebrile with normal vital signs. GENERAL:  She is in no acute distress. NECK:  She has no palpable lymphadenopathy. HEART:  Regular rate and rhythm. LUNGS:  Clear to auscultation bilaterally. ABDOMEN:  Nondistended, nontender with bowel sounds x4. No  hepatosplenomegaly. EXTREMITIES:  She has no peripheral clubbing, cyanosis, or edema. NEUROLOGIC:  Exam is nonfocal.    LABORATORY DATA:  White blood cell count 2.7, hemoglobin 8.9, platelets  228. ASSESSMENT AND PLAN:  1. Extensive stage small cell lung cancer. I will consider her cycle 3  of chemotherapy completed. She is set to get a PET CT in the near  future. 2.  Shortness of breath. This could be COPD. I doubt that it is  autoimmune pneumonitis due to her Tecentriq. She is feeling much  better. She is on antibiotics as well. 3.  Leukopenia. This is due to chemotherapy. I will give her Granix. 4.  Anemia. I will check her iron studies. I will also give her a dose  of Procrit. I explained the potential side effects of Procrit including  a slightly higher risk of strokes, heart attacks, blood clots, cancer  progression. She is willing to proceed. Thank you for the consultation. I will follow closely.         Lana Valdez MD    D: 06/29/2022 11:30:41       T: 06/29/2022 13:34:52     [email or web address]  Job#: [id number]     Doc#: [id number]    CC:  MD Lit Olmstead MD

## 2022-06-29 NOTE — ED PROVIDER NOTES
[address] [facility]      Pt Name: Leila Metcalf  MRN: [id number]  [place] 1960  Date of evaluation: 6/28/2022  Provider: MAXWELL Villegas    This patient was seen and evaluated by the attending physician Yoly Christian MD.    [address] [place] [address]       Chief Complaint   Patient presents with    Respiratory Distress       CRITICAL CARE TIME   I performed a total Critical Care time of  31 minutes, excluding separately reportable procedures. There was a high probability of clinically significant/life threatening deterioration in the patient's condition which required my urgent intervention. Not limited to multiple reexaminations, discussions with attending physician and consultants. HISTORY OF PRESENT ILLNESS  (Location/Symptom, Timing/Onset, Context/Setting, Quality, Duration, Modifying Factors, Severity.)   Leila Metcalf is a 65 y.o. female who presents to the emergency department via EMS from home for respiratory distress. The patient states symptoms started today. Per EMS the oxygen saturations were 78 to 80% on her home oxygen of 3 L via nasal cannula. She has a history of COPD with emphysema, CHF and lung cancer. The patient tells me that she wants to be intubated if she needs it. She is currently on BiPAP and EMS had placed her on CPAP prior to arrival.  She denies vomiting diarrhea or other illness. She is not on daily steroids per the patient. Nursing Notes were reviewed and I agree. REVIEW OF SYSTEMS    (2-9 systems for level 4, 10 or more for level 5)     Review of Systems   Constitutional: Negative for fever. Respiratory: Positive for cough, chest tightness, shortness of breath and wheezing. Cardiovascular: Negative for chest pain and leg swelling. Gastrointestinal: Negative for abdominal pain and vomiting. Skin: Negative for color change and wound. Neurological: Negative for weakness. Psychiatric/Behavioral: Negative for agitation, behavioral problems and confusion. Except as noted above the remainder of the review of systems was reviewed and negative. PAST MEDICAL HISTORY         Diagnosis Date    Asthma     Cardiomyopathy [facility])     [facility]congestive [facility] failure) [facility]     COPD (chronic obstructive pulmonary disease) ([facility]     Diabetes mellitus ([facility] [address]     Essential hypertension     Hyperlipidemia     Hypertension     Obesity        SURGICAL HISTORY           Procedure Laterality Date    CARPAL TUNNEL RELEASE      CHOLECYSTECTOMY      CORONARY ANGIOPLASTY WITH STENT PLACEMENT  01/07/2013    Stent LAD    INCONTINENCE SURGERY      IR PORT PLACEMENT EQUAL OR GREATER THAN 5 YEARS Right 05/03/2022    Power port, inserted by Dr. Ni Wilder, cut at 22    IR PORT PLACEMENT EQUAL OR GREATER THAN 5 YEARS  5/3/2022    IR PORT PLACEMENT EQUAL OR GREATER THAN 5 YEARS 5/3/2022 [facility] SPECIAL PROCEDURES       CURRENT MEDICATIONS       Previous Medications    ALBUTEROL (PROVENTIL HFA) 108 (90 BASE) MCG/ACT INHALER    Inhale 2 puffs into the lungs 4 times daily     ALBUTEROL (PROVENTIL) (2.5 MG/3ML) 0.083% NEBULIZER SOLUTION    4 times daily     AMIODARONE (CORDARONE) 200 MG TABLET    TAKE 1 TABLET BY MOUTH EVERY DAY    ASPIRIN LOW DOSE 81 MG EC TABLET    TAKE 1 TABLET BY MOUTH EVERY DAY    ATORVASTATIN (LIPITOR) 40 MG TABLET    Take 40 mg by mouth nightly     CARVEDILOL (COREG) 6.25 MG TABLET    TAKE 1 TABLET BY MOUTH TWICE A DAY WITH MEALS    EMPAGLIFLOZIN (JARDIANCE) 10 MG TABLET    Take 10 mg by mouth daily    GABAPENTIN (NEURONTIN) 600 MG TABLET    Take 600 mg by mouth 3 times daily.      MAGNESIUM OXIDE (MAG-OX) 400 MG TABLET    Take 400 mg by mouth 2 times daily     ONDANSETRON (ZOFRAN) 8 MG TABLET    Take 8 mg by mouth every 8 hours as needed for Nausea or Vomiting (POST CHEMO NAUSEA)    PANTOPRAZOLE (PROTONIX) 40 MG TABLET    TAKE 1 TABLET BY MOUTH EVERY DAY    POTASSIUM CHLORIDE (KLOR-CON M) 20 MEQ EXTENDED RELEASE TABLET    Take 20 mEq by mouth daily    PROCHLORPERAZINE (COMPAZINE) 10 MG TABLET    Take 10 mg by mouth every 8 hours as needed (POST CHEMO NAUSEA)    TORSEMIDE 40 MG TABS    Take 40 mg by mouth 2 times daily    UMECLIDINIUM-VILANTEROL (ANORO ELLIPTA) 62.5-25 MCG/INH AEPB INHALER    Inhale 1 puff into the lungs daily    VERICIGUAT (VERQUVO) 5 MG TABS    Take 5 mg by mouth every morning       ALLERGIES     Ace inhibitors, Diclofenac, Losartan, Spironolactone, Vicodin hp [hydrocodone-acetaminophen], and Vicodin [hydrocodone-acetaminophen]    FAMILY HISTORY           Problem Relation Age of Onset    High Blood Pressure Mother     Diabetes Mother     Cancer Mother         thyroid    Stroke Father      Family Status   Relation Name Status    Mother  (Not Specified)    Father  (Not Specified)        SOCIAL HISTORY      reports that she has never smoked. She has never used smokeless tobacco. She reports previous alcohol use. She reports previous drug use. PHYSICAL EXAM    (up to 7 for level 4, 8 or more for level 5)     ED Triage Vitals   BP Temp Temp src Heart Rate Resp SpO2 Height Weight   06/28/22 2158 06/28/22 2312 -- 06/28/22 2159 06/28/22 2159 06/28/22 2158 -- --   (!) 148/134 99.4 °F (37.4 °C)  100 19 100 %         Physical Exam  Vitals and nursing note reviewed. Constitutional:       General: She is in acute distress. Appearance: Normal appearance. HENT:      Head: Normocephalic and atraumatic. Mouth/Throat:      Mouth: Mucous membranes are moist.   Eyes:      Pupils: Pupils are equal, round, and reactive to light. Cardiovascular:      Rate and Rhythm: Tachycardia present. Pulmonary:      Effort: Respiratory distress present. Breath sounds: Wheezing and rales present. Abdominal:      Tenderness: There is no abdominal tenderness. Musculoskeletal:         General: No swelling. Normal range of motion. Cervical back: Normal range of motion. Skin:     General: Skin is warm. Neurological:      General: No focal deficit present. Mental Status: She is alert and oriented to person, place, and time. Psychiatric:         Mood and Affect: Mood normal.         Behavior: Behavior normal.         DIAGNOSTIC RESULTS     EKG: All EKG's are interpreted by MAXWELL Rosario in the absence of a cardiologist.    EKG interpreted by myself - please refer to attending physician's note for complete EKG interpretation:    No evidence of acute ischemia or injury. RADIOLOGY:   Non-plain film images such as CT, Ultrasound and MRI are read by the radiologist. Plain radiographic images are visualized and preliminarily interpreted by MAXWELL Rosario with the below findings:    Reviewed radiologist's interpretation. Interpretation per the Radiologist below, if available at the time of this note:    XR CHEST PORTABLE   Preliminary Result   Interstitial opacities bilaterally could represent pulmonary edema and/or   infection. Left retrocardiac opacity could represent a layering pleural effusion,   atelectasis, and/or infiltrate.                LABS:  Labs Reviewed   CBC WITH AUTO DIFFERENTIAL - Abnormal; Notable for the following components:       Result Value    Hemoglobin 10.0 (*)     Hematocrit 32.5 (*)     MCV 78.4 (*)     MCH 24.2 (*)     MCHC 30.9 (*)     RDW 27.7 (*)     Lymphocytes Absolute 0.4 (*)     All other components within normal limits   COMPREHENSIVE METABOLIC PANEL - Abnormal; Notable for the following components:    Potassium 5.2 (*)     CO2 20 (*)     Anion Gap 19 (*)     Glucose 209 (*)     BUN 36 (*)     CREATININE 1.5 (*)     GFR Non- 35 (*)     GFR  43 (*)     All other components within normal limits   BRAIN NATRIURETIC PEPTIDE - Abnormal; Notable for the following components:    Pro-BNP 45,904 (*)     All other components within normal limits   LACTIC ACID - Abnormal; Notable for the following Charlsie Goltz, [place]  06/28/22 [address], [place]  06/28/22 9310

## 2022-06-29 NOTE — ED NOTES
Report called to [facility] RN, all questions answered.      Lilia Gan RN  06/29/22 1046

## 2022-06-29 NOTE — CARE COORDINATION
[facility]  Patient is active with [facility], Will follow for discharge to home with orders to resume care.       Noris Horta RN, BSN CTN  [facility] [phone]

## 2022-06-29 NOTE — CONSULTS
PALLIATIVE MEDICINE CONSULTATION     Patient name:Diane Diamond   CAROLIN:[id number]    :1960  Room/Bed:L7O-8643/5262-01   LOS: 0 days         Date of consult:2022    Consult Information    Inpatient consult to Palliative Care  Consult performed by: AME Nichols CNP  Consult ordered by: Riut Fuentes MD         Reason for Consult: Goals of Care, Code Status, 30 day readmit    ASSESSMENT/RECOMMENDATIONS     65 y.o. female with hypoxia and debility. Symptom Management:  1. Hypoxia: Patient on 3 liters of oxygen via a nasal cannula for relief today. 2. Debility: Patient pale and lethargic during my visit today. Patient exhibited some generalized weakness during my visit today as well. Patient was short of breath at rest with speech today. Patient requires some ADL assistance. 3. Goals of Care: Met patient at her bedside. Patient was oriented x 4 and appeared to be decisional today. Reviewed patient's current health condition, goals of care and code status. Patient indicated she would like to pursue all aggressive treatment at this time and resume [facility] services post her hospital stay. Code status was reviewed and the patient wishes to remain a Full Code. Patient/Family Goals of Care :     - Met patient at her bedside. Patient was oriented x 4 and appeared to be decisional today. Reviewed patient's current health condition, goals of care and code status. Patient indicated she would like to pursue all aggressive treatment at this time and resume [facility] services post her hospital stay. Code status was reviewed and the patient wishes to remain a Full Code. Disposition/Discharge Plan:   Patient indicated she will be continuing with [facility] and meeting with [facility] once she returns home. Just in case an outpatient [facility] referral was ordered for post-discharge to followup with the patient once they return home.     Advance Directives:  · Surrogate Decision Maker: Opal Ga, daughter and POA. · Code status:  Full Code    Case discussed with: patient, floor RN  Thank you for allowing us to participate in the care of this patient. HISTORY     CC: Hypoxia. HPI: The patient is a 65 y.o. female with a past medical history of lung cancer, heart failure, CAD, CKD and COPD who presented to [facility] with acute respiratory distress. Patient was admitted with acute respiratory distress, dyspnea, lung cancer and elevated BNP level. Palliative Medicine SymptomScreening/ROS:    Review of Systems   Constitutional: Positive for fatigue. HENT: Negative. Eyes: Negative. Respiratory: Positive for shortness of breath. Negative for cough and chest tightness. Cardiovascular: Negative. Gastrointestinal: Negative. Musculoskeletal: Negative. Skin: Positive for pallor. Neurological: Negative. Psychiatric/Behavioral: Negative. All other systems reviewed and are negative. A complete 10 count ROS was obtained. Pertinent positives mentioned above in HPI/ROS. All others if not mentioned are negative. Palliative Performance Scale:     [] 60%  Amb reduced; Sig dz. Can't do hobbies/housework; Intake normal or reduced, Occasional assist; LOC full/confusion   [x] 50%  Mainly sit/lie; Extensive disease. Mainly assist, Intake normal or reduced; Occasional assist; LOC full/confusion   [] 40%  Mainly in bed; Extensive disease; Mainly assist; Intake normal or reduced; Occasional assist; LOC full/confusion   [] 30%  Bed bound, Extensive disease; Total care; Intake reduced; LOC full/confusion   [] 20%  Bed bound; Extensive disease; Total care; Intake minimal; Drowsy/coma   [] 10%  Bed bound; Extensive disease;  Total care; Mouth care only; Drowsy/coma   []  0%   Death     Home med list and hospital medications reviewed in chart as of 6/29/2022     EXAM     Vitals:    06/29/22 1157   BP:    Pulse: 76   Resp: 18   Temp: SpO2: 99%       Physical Exam  Vitals reviewed. Constitutional:       Appearance: She is ill-appearing. Interventions: Nasal cannula in place. HENT:      Head: Normocephalic and atraumatic. Nose: Nose normal.   Eyes:      Extraocular Movements: Extraocular movements intact. Pupils: Pupils are equal, round, and reactive to light. Cardiovascular:      Rate and Rhythm: Normal rate. Pulses: Normal pulses. Pulmonary:      Comments: Breath sounds clear bilaterally and diminished in bases bilaterally today. Abdominal:      General: Bowel sounds are normal.      Palpations: Abdomen is soft. Musculoskeletal:      Cervical back: Neck supple. Right lower leg: No edema. Left lower leg: No edema. Skin:     General: Skin is dry. Coloration: Skin is pale. Neurological:      Comments: Oriented x4   Psychiatric:         Behavior: Behavior normal.         Thought Content:  Thought content normal.         Judgment: Judgment normal.          Current labs in the epic chart reviewed as of 6/29/2022   Review of previous notes, admits, labs, radiology and testing relevant to this consult done in this chart today 6/29/2022    Total time: 80 minutes  >50% of time spent counseling patient at bedside or POA/family member if applicable , reviewing information and discussing care, coordinating with care team  Signed By: Electronically signed by AME Alexandra CNP on 6/29/2022 at 4:27 PM  Palliative Medicine   [phone]    June 29, 2022

## 2022-06-29 NOTE — PROGRESS NOTES
Clinical Pharmacy Note  Vancomycin Consult    Yasmeen Barrera is a 65 y.o. female ordered Vancomycin for pneumonia; consult received from Dr. Jeanette Sparrow to manage therapy.      Patient Active Problem List   Diagnosis    Essential hypertension    Cardiomy[facility] ([facility] [address]    CHF (congestive heart failure) ([facility] [address]    COPD (chronic obstructive pulmonary disease) ([facility] [address]    Chest pain    NEGIN treated with BiPAP    DMII (diabetes mellitus, type 2) (HCC)    Iron deficiency anemia    Intestinal malabsorption    Iron deficiency anemia due to chronic blood loss    Treatment    Anemia    Acute on chronic systolic CHF (congestive heart failure), NYHA class 3 (HCC)    Hyperlipidemia    Morbid obesity due to excess calories (HCC)    Acute pulmonary edema (HCC)    Acute respiratory failure with hypercarbia (HCC)    Congestive heart failure with left ventricular dysfunction (HCC)    Acute decompensated heart failure (HCC)    Acute kidney injury superimposed on chronic kidney disease ([facility] [address]    [facility]    Demand ischemia of myocardium (HCC)    Non morbid obesity due to excess calories    Acute respiratory failure with hypoxia and hypercapnia (HCC)    Essential hypertension    Hyperlipidemia    DMII (diabetes mellitus, type 2) (HCC)    CAD (coronary artery disease)    Respiratory failure (HCC)    Pneumonia    Acute respiratory failure (HCC)    Respiratory distress    Acute on chronic respiratory failure (HCC)       Allergies:  Ace inhibitors, Diclofenac, Losartan, Spironolactone, Vicodin hp [hydrocodone-acetaminophen], and Vicodin [hydrocodone-acetaminophen]     Temp max:  Temp (24hrs), Av.1 °F (36.7 °C), Min:97.3 °F (36.3 °C), Max:99.4 °F (37.4 °C)      Recent Labs     22   WBC 5.8 2.7*       Recent Labs     22   BUN 36* 35*   CREATININE 1.5* 1.5*         Intake/Output Summary (Last 24 hours) at 2022 0354  Last data filed at 2022 1829  Gross per 24 hour   Intake --   Output 1150 ml   Net -1150 ml         Ht Readings from Last 1 Encounters:   06/29/22 5' 7\" (1.702 m)        Wt Readings from Last 1 Encounters:   06/29/22 223 lb 1.7 oz (101.2 kg)         Estimated Creatinine Clearance: 48 mL/min (A) (based on SCr of 1.5 mg/dL (H)). Assessment/Plan:  Vancomycin random level 9.3 ug/ml  SCr still at 1.5  Will re-dose Vancomycin 1500 mg IV once and get a repeat level tomorrow. Regimen projects a trough level of 15-20 mg/L. Level ordered for 6/30/22 2100. Thank you for the consult.    [address] DEBORA Vasquez,6/29/2022,7:02 PM not applicable

## 2022-06-29 NOTE — CONSULTS
REASON FOR CONSULTATION/CC: Acute on chronic hypoxemia      Consult at request of Viviana Crooks DO     PCP: Alec Griffin MD  Established Pulmonologist: Tavares Lamas    Chief Complaint   Patient presents with    Respiratory Distress       HISTORY OF PRESENT ILLNESS: Kevin Coujoão is a 65y.o. year old female with a history of extensive stage small cell who presents with severe shortness of breath no acute onset. Patient states symptoms present for a couple of days. Progressively and acutely worsened. Not found anything that made it better or worse so presented to the ER. Of note she was recently admitted last week for postobstructive pneumonia. Patient states she feels much better since arrival.  She is getting breathing treatments and inhaler. She thinks the BiPAP helped overnight. Past Medical History:   Diagnosis Date    Asthma     [facility] [address]     CHF (congestive heart failure) (HCC)     COPD (chronic obstructive pulmonary disease) (HCC)     Diabetes mellitus ([facility] [address]     Essential hypertension     Hyperlipidemia     Hypertension     Obesity          Past Surgical History:   Procedure Laterality Date    CARPAL TUNNEL RELEASE      CHOLECYSTECTOMY      CORONARY ANGIOPLASTY WITH STENT PLACEMENT  01/07/2013    Stent LAD    INCONTINENCE SURGERY      IR PORT PLACEMENT EQUAL OR GREATER THAN 5 YEARS Right 05/03/2022    Power port, inserted by Dr. Josh Best, cut at 22    IR PORT PLACEMENT EQUAL OR GREATER THAN 5 YEARS  5/3/2022    IR PORT PLACEMENT EQUAL OR GREATER THAN 5 YEARS 5/3/2022 [facility] SPECIAL PROCEDURES       Family Hx  family history includes Cancer in her mother; Diabetes in her mother; High Blood Pressure in her mother; Stroke in her father. Social Hx   reports that she has never smoked.  She has never used smokeless tobacco.    Scheduled Meds:   amiodarone  200 mg Oral Daily    aspirin  81 mg Oral Daily    atorvastatin  40 mg Oral Nightly    carvedilol  6.25 mg Oral BID WC    gabapentin  600 mg Oral TID    pantoprazole  40 mg Oral QAM AC    tiotropium-olodaterol  2 puff Inhalation Daily    Vericiguat  5 mg Oral QAM    sodium chloride flush  5-40 mL IntraVENous 2 times per day    insulin lispro  0-6 Units SubCUTAneous TID WC    insulin lispro  0-3 Units SubCUTAneous Nightly    furosemide  40 mg IntraVENous BID    ipratropium-albuterol  1 ampule Inhalation Q4H    enoxaparin  30 mg SubCUTAneous BID       Continuous Infusions:   sodium chloride      dextrose         PRN Meds:  sodium chloride flush, sodium chloride, acetaminophen **OR** acetaminophen, ondansetron, glucose, dextrose bolus **OR** dextrose bolus, glucagon (rDNA), dextrose, albuterol sulfate HFA    ALLERGIES:  Patient is allergic to ace inhibitors, diclofenac, losartan, spironolactone, vicodin hp [hydrocodone-acetaminophen], and vicodin [hydrocodone-acetaminophen]. REVIEW OF SYSTEMS:  Constitutional: Negative for fever  HENT: Negative for sore throat  Eyes: Negative for redness   Respiratory: +dyspnea, cough  Cardiovascular: Negative for chest pain  Gastrointestinal: Negative for vomiting, diarrhea   Genitourinary: Negative for hematuria   Musculoskeletal: Negative for arthralgias   Skin: Negative for rash  Neurological: Negative for syncope  Hematological: Negative for adenopathy  Psychiatric/Behavorial: Negative for anxiety    Objective:   PHYSICAL EXAM:  Blood pressure (!) 110/59, pulse 83, temperature 97.5 °F (36.4 °C), temperature source Axillary, resp. rate 16, height 5' 7\" (1.702 m), weight 223 lb 1.7 oz (101.2 kg), SpO2 100 %.'  Gen:  No acute distress. Eyes: PERRL. Anicteric sclera. No conjunctival injection. ENT: No discharge. Posterior oropharynx clear. External appearance of ears and nose normal.  Neck: Trachea midline. No mass   Resp:  No crackles. No wheezes. No rhonchi. No dullness on percussion. CV: Regular rate. Regular rhythm. No murmur or rub. No edema.    GI: Soft, Non-tender. Non-distended. +BS  Skin: Warm, dry, w/o erythema. Lymph: No cervical or supraclavicular LAD. M/S: No cyanosis. No clubbing. Neuro:  no focal neurologic deficit. Moves all extremities  Psych: Awake and alert, Oriented x 3. Judgement and insight appropriate. Mood stable. Data Reviewed:   LABS:  CBC:   Recent Labs     06/28/22 2205 06/29/22 0622   WBC 5.8 2.7*   HGB 10.0* 8.9*   HCT 32.5* 27.7*   MCV 78.4* 76.8*    286     BMP:   Recent Labs     06/28/22 2205      K 5.2*      CO2 20*   BUN 36*   CREATININE 1.5*     LIVER PROFILE:   Recent Labs     06/28/22 2205   AST 28   ALT 15   BILITOT 0.4   ALKPHOS 75     PT/INR: No results for input(s): PROTIME, INR in the last 72 hours. APTT: No results for input(s): APTT in the last 72 hours. UA:No results for input(s): NITRITE, COLORU, PHUR, LABCAST, WBCUA, RBCUA, MUCUS, TRICHOMONAS, YEAST, BACTERIA, CLARITYU, SPECGRAV, LEUKOCYTESUR, UROBILINOGEN, BILIRUBINUR, BLOODU, GLUCOSEU, AMORPHOUS in the last 72 hours. Invalid input(s): KETONESU  No results for input(s): PHART, LYM0TOU, PO2ART in the last 72 hours. Radiology Review:  Pertinent images / reports were reviewed as a part of this visit. Chest x-ray images reviewed personally by me, interpretation as follows:  -Right lower lobe infiltrate, cannot rule out pulmonary edema. ECHO  Conclusions      Summary   The left ventricle is dilated with normal wall thickness and severely   reduced global systolic function. Ejection fraction is visually estimated to be 25-30%. Pacer / ICD wire is visualized in the right ventricle. Normal right ventricular size and function. Moderately dilated left atrium. LIOR imaged with no evidence of thrombus. Calcification of the leaflets of the mitral valve. Moderately severe mitral regurgitation. Mild tricuspid regurgitation. Calcification of the leaflets of the mitral valve.    Moderately severe mitral regurgitation. Vena Contracta= 0.64cm   There is no right to left shunt visualized by agitated saline bubble study. Assessment/Plan:     Acute on chronic hypoxemic respiratory failure with SPO2 less than 90% on room air  -Wean supplemental oxygen to goal saturation of >90%    Acute on chronic systolic heart failure LVEF 25%  Severe mitral regurg  -Lasix twice daily    Postobstructive pneumonia  -We will need HCAP coverage given recent admission  -Cefepime/Vanco  Extensive stage small cell lung cancer  -Currently on Tecentriq    This note was transcribed using [id number] [employer]. Please disregard any translational errors.     Thank you for the consult    [address]  Pulmonary, Sleep and Critical Care Medicine

## 2022-06-29 NOTE — PROGRESS NOTES
Pt arrived to room 5262 via stretcher from ED and transferred to bed. Bedside telemetry activated and confirmed with [facility]. Patient oriented to room and use of call light. Call light and personal items within reach. Admission and assessment initiated. POC and education initiated and reviewed with patient. VSS. Pt on continuous BiPAP, tolerating well. Denied further needs or questions at this time. Will continue to monitor.       Electronically signed by Brendan Turk RN on 6/29/2022 at 2:07 AM

## 2022-06-29 NOTE — PROGRESS NOTES
Physical Therapy  Facility/Department: [facility]N PROGRESSIVE CARE  Physical Therapy Initial Assessment    Name: Mookie Kerr  : 1960  MRN: [id number]  Date of Service: 2022    Discharge Recommendations:  Home with assist PRN,No therapy recommended at discharge     Rajinder Sandhu scored a 21/24 on the AM-PAC short mobility form. At this time, no further PT is recommended upon discharge due to patient at baseline. Recommend patient returns to prior setting with prior services. Patient Diagnosis(es): The primary encounter diagnosis was Acute respiratory distress. Diagnoses of Dyspnea, unspecified type, History of lung cancer, and Elevated brain natriuretic peptide (BNP) level were also pertinent to this visit. Past Medical History:  has a past medical history of Asthma, Cardiomyopathy ([address] CHF (congestive heart failure) ([address] COPD (chronic obstructive pulmonary disease) ([address] Diabetes mellitus ([facility] [address] Essential hypertension, Hyperlipidemia, Hypertension, and Obesity. Past Surgical History:  has a past surgical history that includes Cholecystectomy; Carpal tunnel release; Incontinence surgery; Coronary angioplasty with stent (2013); IR PORT PLACEMENT > 5 YEARS (Right, 2022); and IR PORT PLACEMENT > 5 YEARS (5/3/2022). Assessment   Body Structures, Functions, Activity Limitations Requiring Skilled Therapeutic Intervention: Decreased functional mobility ; Decreased endurance  Assessment: 66 y/o female presenting to ED with respiratory distress. She has a history of COPD with emphysema, CHF and lung cancer. Pt at home is on 3L O2, but was turned down to 2L O2 today. Today, she was agreeable to PT treatment and independent in bed mobility. She was able to amb 40' and 30' within the room with RW SBA-CGA, slight SOB but SpO2 stayed mid-high 90s. Her strength and ROM was in functional range. Pt would benefit continued PT treatment during acute care stay to help with endurance.   Therapy Prognosis: Good  Decision Making: Medium Complexity  Requires PT Follow-Up: Yes  Activity Tolerance  Activity Tolerance: Patient tolerated treatment well;Patient limited by endurance     Plan   Plan  Plan: 3-5 times per week  Plan weeks: during acute stay  Current Treatment Recommendations: Functional mobility training,Home exercise program,Endurance training,Gait training,Stair training,Transfer training  Safety Devices  Type of Devices: Call light within reach,Gait belt,Left in bed  Restraints  Restraints Initially in Place: No     Restrictions  Restrictions/Precautions  Restrictions/Precautions:  (Recieved chemo recently.)  Implants present? : Pacemaker     Subjective   General  Chart Reviewed: Yes  Patient assessed for rehabilitation services?: Yes  Additional Pertinent Hx: Queta Butler is a 65y.o. year old female with a history of extensive stage small cell who presents with severe shortness of breath no acute onset. Patient states symptoms present for a couple of days. Progressively and acutely worsened. Response To Previous Treatment: Not applicable  Family / Caregiver Present: No  Referring Practitioner: Jesse Varela MD  Referral Date : 06/29/22  Follows Commands: Within Functional Limits  Subjective  Subjective: Pt was sitting up in bed upon arrival. She was plesant and agreeable to PT session. Reports back pain 3/10 & bottom 2/10 pain (from previous hospitalization).          Social/Functional History  Social/Functional History  Lives With: Alone  Type of Home: Apartment  Home Layout: One level  Home Access: Stairs to enter with rails  Entrance Stairs - Number of Steps: 2 ARIS (uses wall and cane) into building then 5 steps to basement level with rail L ascending  Bathroom Shower/Tub: Tub/Shower unit  Bathroom Toilet:  (RTS without handles or uses BSC)  Bathroom Equipment: Tub transfer bench,Hand-held shower  Home Equipment: [employer], 4 wheeled,Reacher  ADL Assistance: Independent  Homemaking Assistance: Needs assistance (someone cleans, does laundry, and picks up prescription for her)  Ambulation Assistance: Independent  Transfer Assistance: Independent  Active : No  Occupation: On disability    Objective   Gross Assessment  Sensation: Impaired (Pt states feet are numb from diabetic neuropathy.)     PROM RLE (degrees)  RLE PROM: WFL  AROM RLE (degrees)  RLE AROM: WFL  PROM LLE (degrees)  LLE PROM: WFL  AROM LLE (degrees)  LLE AROM : WFL  Strength RLE  R Hip Flexion: 3+/5  R Knee Flexion: 4-/5  R Knee Extension: 4-/5  R Ankle Dorsiflexion: 4/5  Strength LLE  L Hip Flexion: 3+/5  L Knee Flexion: 4-/5  L Knee Extension: 4-/5  L Ankle Dorsiflexion: 4/5          Bed mobility  Supine to Sit: Independent  Sit to Supine: Independent  Transfers  Sit to Stand: Supervision;Stand by assistance  Stand to sit: Supervision;Stand by assistance  Ambulation  Surface: level tile  Device: Rolling Walker  Other Apparatus: O2 (2L O2)  Assistance: Stand by assistance;Contact guard assistance  Quality of Gait: Pt describes todays amb as her baseline. Gait Deviations: Decreased step length;Decreased step height  Distance: ~40', ~30'  Comments: Patient stated slight SOB with amb; SpO2 stayed within the mid-high 90s. More Ambulation?: No     Balance  Sitting - Static: Good  Sitting - Dynamic: Good  Standing - Static: Fair;+ (with RW)  Standing - Dynamic: Fair;+    AM-PAC Score  AM-PAC Inpatient Mobility Raw Score : 21 (06/29/22 1644)  AM-PAC Inpatient T-Scale Score : 50.25 (06/29/22 1644)  Mobility Inpatient CMS 0-100% Score: 28.97 (06/29/22 1644)  Mobility Inpatient CMS G-Code Modifier : CJ (06/29/22 1644)        Goals  Short Term Goals  Time Frame for Short term goals: Upon d/c  Short term goal 1: transfers mod-I  Short term goal 2: ambulate 48' mod-I  Short term goal 3: curb step with RW SBA  Additional Goals?: No  Patient Goals   Patient goals : None stated.        Education  Patient Education  Education Given To: Patient  Education Provided: Role of Therapy;Plan of Care  Education Method: Verbal  Barriers to Learning: None  Education Outcome: Verbalized understanding      Therapy Time   Individual Concurrent Group Co-treatment   Time In 9574         Time Out 1630         Minutes 45         Timed Code Treatment Minutes: 55 Minutes       Electronically signed by PAVEL Figueroa on 6/29/2022 at 4:49 PM    Therapist was present, directed the patient's care, made skilled judgement, and was responsible for assessment and treatment of the patient.       Electronically signed by Boroks Marti PT on 6/29/2022 at 4:51 PM

## 2022-06-30 PROBLEM — C34.90 SMALL CELL LUNG CANCER (HCC): Status: ACTIVE | Noted: 2022-06-30

## 2022-06-30 LAB
A/G RATIO: 1.6 (ref 1.1–2.2)
ALBUMIN SERPL-MCNC: 3.5 G/DL (ref 3.4–5)
ALP BLD-CCNC: 61 U/L (ref 40–129)
ALT SERPL-CCNC: 14 U/L (ref 10–40)
ANION GAP SERPL CALCULATED.3IONS-SCNC: 15 MMOL/L (ref 3–16)
AST SERPL-CCNC: 16 U/L (ref 15–37)
BASOPHILS ABSOLUTE: 0 K/UL (ref 0–0.2)
BASOPHILS RELATIVE PERCENT: 0.1 %
BILIRUB SERPL-MCNC: 0.4 MG/DL (ref 0–1)
BUN BLDV-MCNC: 43 MG/DL (ref 7–20)
CALCIUM SERPL-MCNC: 8.8 MG/DL (ref 8.3–10.6)
CHLORIDE BLD-SCNC: 103 MMOL/L (ref 99–110)
CO2: 25 MMOL/L (ref 21–32)
CREAT SERPL-MCNC: 1.4 MG/DL (ref 0.6–1.2)
EOSINOPHILS ABSOLUTE: 0 K/UL (ref 0–0.6)
EOSINOPHILS RELATIVE PERCENT: 0 %
FERRITIN: 367.1 NG/ML (ref 15–150)
GFR AFRICAN AMERICAN: 46
GFR NON-AFRICAN AMERICAN: 38
GLUCOSE BLD-MCNC: 109 MG/DL (ref 70–99)
GLUCOSE BLD-MCNC: 112 MG/DL (ref 70–99)
GLUCOSE BLD-MCNC: 118 MG/DL (ref 70–99)
GLUCOSE BLD-MCNC: 134 MG/DL (ref 70–99)
GLUCOSE BLD-MCNC: 173 MG/DL (ref 70–99)
HCT VFR BLD CALC: 25.1 % (ref 36–48)
HEMOGLOBIN: 7.9 G/DL (ref 12–16)
IRON SATURATION: 61 % (ref 15–50)
IRON: 174 UG/DL (ref 37–145)
LYMPHOCYTES ABSOLUTE: 0.3 K/UL (ref 1–5.1)
LYMPHOCYTES RELATIVE PERCENT: 1.1 %
MAGNESIUM: 2.3 MG/DL (ref 1.8–2.4)
MCH RBC QN AUTO: 24.6 PG (ref 26–34)
MCHC RBC AUTO-ENTMCNC: 31.6 G/DL (ref 31–36)
MCV RBC AUTO: 77.9 FL (ref 80–100)
MONOCYTES ABSOLUTE: 0.4 K/UL (ref 0–1.3)
MONOCYTES RELATIVE PERCENT: 1.7 %
MRSA SCREEN RT-PCR: NORMAL
NEUTROPHILS ABSOLUTE: 24.3 K/UL (ref 1.7–7.7)
NEUTROPHILS RELATIVE PERCENT: 97.1 %
PDW BLD-RTO: 28 % (ref 12.4–15.4)
PERFORMED ON: ABNORMAL
PLATELET # BLD: 276 K/UL (ref 135–450)
PMV BLD AUTO: 9 FL (ref 5–10.5)
POTASSIUM SERPL-SCNC: 3.6 MMOL/L (ref 3.5–5.1)
RBC # BLD: 3.23 M/UL (ref 4–5.2)
SODIUM BLD-SCNC: 143 MMOL/L (ref 136–145)
TOTAL IRON BINDING CAPACITY: 285 UG/DL (ref 260–445)
TOTAL PROTEIN: 5.7 G/DL (ref 6.4–8.2)
WBC # BLD: 25.1 K/UL (ref 4–11)

## 2022-06-30 PROCEDURE — 6360000002 HC RX W HCPCS: Performed by: INTERNAL MEDICINE

## 2022-06-30 PROCEDURE — 6370000000 HC RX 637 (ALT 250 FOR IP): Performed by: INTERNAL MEDICINE

## 2022-06-30 PROCEDURE — 2060000000 HC ICU INTERMEDIATE R&B

## 2022-06-30 PROCEDURE — 6370000000 HC RX 637 (ALT 250 FOR IP): Performed by: STUDENT IN AN ORGANIZED HEALTH CARE EDUCATION/TRAINING PROGRAM

## 2022-06-30 PROCEDURE — 99223 1ST HOSP IP/OBS HIGH 75: CPT | Performed by: INTERNAL MEDICINE

## 2022-06-30 PROCEDURE — 2700000000 HC OXYGEN THERAPY PER DAY

## 2022-06-30 PROCEDURE — 97530 THERAPEUTIC ACTIVITIES: CPT

## 2022-06-30 PROCEDURE — 94640 AIRWAY INHALATION TREATMENT: CPT

## 2022-06-30 PROCEDURE — 94660 CPAP INITIATION&MGMT: CPT

## 2022-06-30 PROCEDURE — 85025 COMPLETE CBC W/AUTO DIFF WBC: CPT

## 2022-06-30 PROCEDURE — 82728 ASSAY OF FERRITIN: CPT

## 2022-06-30 PROCEDURE — 6360000002 HC RX W HCPCS: Performed by: STUDENT IN AN ORGANIZED HEALTH CARE EDUCATION/TRAINING PROGRAM

## 2022-06-30 PROCEDURE — 83540 ASSAY OF IRON: CPT

## 2022-06-30 PROCEDURE — 2500000003 HC RX 250 WO HCPCS: Performed by: INTERNAL MEDICINE

## 2022-06-30 PROCEDURE — 97535 SELF CARE MNGMENT TRAINING: CPT

## 2022-06-30 PROCEDURE — 94761 N-INVAS EAR/PLS OXIMETRY MLT: CPT

## 2022-06-30 PROCEDURE — 83735 ASSAY OF MAGNESIUM: CPT

## 2022-06-30 PROCEDURE — 83550 IRON BINDING TEST: CPT

## 2022-06-30 PROCEDURE — 80053 COMPREHEN METABOLIC PANEL: CPT

## 2022-06-30 PROCEDURE — 2580000003 HC RX 258: Performed by: STUDENT IN AN ORGANIZED HEALTH CARE EDUCATION/TRAINING PROGRAM

## 2022-06-30 PROCEDURE — 94669 MECHANICAL CHEST WALL OSCILL: CPT

## 2022-06-30 PROCEDURE — 99232 SBSQ HOSP IP/OBS MODERATE 35: CPT | Performed by: INTERNAL MEDICINE

## 2022-06-30 PROCEDURE — 2580000003 HC RX 258: Performed by: INTERNAL MEDICINE

## 2022-06-30 PROCEDURE — 97166 OT EVAL MOD COMPLEX 45 MIN: CPT

## 2022-06-30 RX ORDER — METRONIDAZOLE 500 MG/100ML
500 INJECTION, SOLUTION INTRAVENOUS EVERY 8 HOURS
Status: DISCONTINUED | OUTPATIENT
Start: 2022-06-30 | End: 2022-07-01

## 2022-06-30 RX ORDER — POTASSIUM CHLORIDE 20 MEQ/1
20 TABLET, EXTENDED RELEASE ORAL 2 TIMES DAILY WITH MEALS
Status: DISCONTINUED | OUTPATIENT
Start: 2022-06-30 | End: 2022-07-02 | Stop reason: HOSPADM

## 2022-06-30 RX ADMIN — METRONIDAZOLE 500 MG: 500 INJECTION, SOLUTION INTRAVENOUS at 12:56

## 2022-06-30 RX ADMIN — CEFEPIME HYDROCHLORIDE 2000 MG: 2 INJECTION, POWDER, FOR SOLUTION INTRAVENOUS at 10:54

## 2022-06-30 RX ADMIN — PANTOPRAZOLE SODIUM 40 MG: 40 TABLET, DELAYED RELEASE ORAL at 06:53

## 2022-06-30 RX ADMIN — TIOTROPIUM BROMIDE AND OLODATEROL 2 PUFF: 3.124; 2.736 SPRAY, METERED RESPIRATORY (INHALATION) at 07:39

## 2022-06-30 RX ADMIN — SODIUM CHLORIDE, PRESERVATIVE FREE 10 ML: 5 INJECTION INTRAVENOUS at 09:13

## 2022-06-30 RX ADMIN — FUROSEMIDE 40 MG: 10 INJECTION, SOLUTION INTRAMUSCULAR; INTRAVENOUS at 17:10

## 2022-06-30 RX ADMIN — FILGRASTIM-AAFI 480 MCG: 480 INJECTION, SOLUTION SUBCUTANEOUS at 17:10

## 2022-06-30 RX ADMIN — AMIODARONE HYDROCHLORIDE 200 MG: 200 TABLET ORAL at 07:56

## 2022-06-30 RX ADMIN — ATORVASTATIN CALCIUM 40 MG: 40 TABLET, FILM COATED ORAL at 20:27

## 2022-06-30 RX ADMIN — ENOXAPARIN SODIUM 30 MG: 100 INJECTION SUBCUTANEOUS at 20:27

## 2022-06-30 RX ADMIN — IPRATROPIUM BROMIDE AND ALBUTEROL SULFATE 1 AMPULE: .5; 3 SOLUTION RESPIRATORY (INHALATION) at 03:29

## 2022-06-30 RX ADMIN — FUROSEMIDE 40 MG: 10 INJECTION, SOLUTION INTRAMUSCULAR; INTRAVENOUS at 07:57

## 2022-06-30 RX ADMIN — CARVEDILOL 6.25 MG: 6.25 TABLET, FILM COATED ORAL at 07:56

## 2022-06-30 RX ADMIN — SODIUM CHLORIDE, PRESERVATIVE FREE 10 ML: 5 INJECTION INTRAVENOUS at 20:32

## 2022-06-30 RX ADMIN — CARVEDILOL 6.25 MG: 6.25 TABLET, FILM COATED ORAL at 17:10

## 2022-06-30 RX ADMIN — EMPAGLIFLOZIN 10 MG: 10 TABLET, FILM COATED ORAL at 11:08

## 2022-06-30 RX ADMIN — GABAPENTIN 600 MG: 300 CAPSULE ORAL at 07:56

## 2022-06-30 RX ADMIN — POTASSIUM CHLORIDE 20 MEQ: 20 TABLET, EXTENDED RELEASE ORAL at 17:10

## 2022-06-30 RX ADMIN — ENOXAPARIN SODIUM 30 MG: 100 INJECTION SUBCUTANEOUS at 07:57

## 2022-06-30 RX ADMIN — POTASSIUM CHLORIDE 20 MEQ: 20 TABLET, EXTENDED RELEASE ORAL at 12:52

## 2022-06-30 RX ADMIN — CEFEPIME HYDROCHLORIDE 2000 MG: 2 INJECTION, POWDER, FOR SOLUTION INTRAVENOUS at 21:37

## 2022-06-30 RX ADMIN — IPRATROPIUM BROMIDE AND ALBUTEROL SULFATE 1 AMPULE: .5; 3 SOLUTION RESPIRATORY (INHALATION) at 07:39

## 2022-06-30 RX ADMIN — GABAPENTIN 600 MG: 300 CAPSULE ORAL at 20:27

## 2022-06-30 RX ADMIN — METRONIDAZOLE 500 MG: 500 INJECTION, SOLUTION INTRAVENOUS at 20:33

## 2022-06-30 RX ADMIN — IPRATROPIUM BROMIDE AND ALBUTEROL SULFATE 1 AMPULE: .5; 3 SOLUTION RESPIRATORY (INHALATION) at 15:43

## 2022-06-30 RX ADMIN — GABAPENTIN 600 MG: 300 CAPSULE ORAL at 12:52

## 2022-06-30 RX ADMIN — ASPIRIN 81 MG: 81 TABLET, COATED ORAL at 07:57

## 2022-06-30 RX ADMIN — INSULIN LISPRO 1 UNITS: 100 INJECTION, SOLUTION INTRAVENOUS; SUBCUTANEOUS at 12:53

## 2022-06-30 RX ADMIN — IPRATROPIUM BROMIDE AND ALBUTEROL SULFATE 1 AMPULE: .5; 3 SOLUTION RESPIRATORY (INHALATION) at 11:42

## 2022-06-30 RX ADMIN — IPRATROPIUM BROMIDE AND ALBUTEROL SULFATE 1 AMPULE: .5; 3 SOLUTION RESPIRATORY (INHALATION) at 19:23

## 2022-06-30 NOTE — CONSULTS
Infectious Diseases Inpatient Consult Note      Reason for Consult:  Pneumonia and Left lung small cell  cancer and Resp failure     Requesting Physician:  Anibal Gomez      Primary Care Physician:  Ned Gordon MD    History Obtained From:  [facility] and Patient     CHIEF COMPLAINT:     Chief Complaint   Patient presents with    Respiratory Distress         HISTORY OF PRESENT ILLNESS:  65 y.o. woman recent d/c from [facility] from 6/15/22 to 6/21/22 on chronic Oxygen 3ts. ,  Significant history for small cell lung cancer recent admission, treated for postobstructive pneumonia. she was noted to be in pulmonary edema, acute systolic, treated with a course of diuretic. Also with Mitral regurgitation, acute kidney injury on previous admission improved creatinine from 1.2 at discharge. She has been receiving chemotherapy for her small cell lung cancer  by oncology. She was sent home for 3 days course of levofloxacin at CT. She is now admitted with acute respiratory distress currently using 6 L nasal cannula. Admission labs indicate creatinine of 1.5 lactic acid 2.2 [id number], WBC 25.1 post Filgrastin,  hemoglobin 7.9, MRSA screen negative blood culture in process. Chest x-ray with left retrocardiac opacity, small right pleural effusion chest port in place, cardiac device on the left side with multiple leads.         Past Medical History:    Past Medical History:   Diagnosis Date    Asthma     Cardiomyopathy ([address]     CHF (congestive heart failure) (HCC)     COPD (chronic obstructive pulmonary disease) ([address]     Diabetes mellitus [facility]     Essential hypertension     Hyperlipidemia     Hypertension     Obesity        Past Surgical History:    Past Surgical History:   Procedure Laterality Date    CARPAL TUNNEL RELEASE      CHOLECYSTECTOMY      CORONARY ANGIOPLASTY WITH STENT PLACEMENT  01/07/2013    Stent LAD    INCONTINENCE SURGERY      IR PORT PLACEMENT EQUAL OR GREATER THAN 5 YEARS Right 05/03/2022 Power port, inserted by Dr. Fito Ty, cut at 22    IR [place] Posrclas 15 5 YEARS  5/3/2022    IR PORT PLACEMENT EQUAL OR GREATER THAN 5 YEARS 5/3/2022 [facility] SPECIAL PROCEDURES       Current Medications:    No outpatient medications have been marked as taking for the 6/28/22 encounter [facility] Encounter).        Allergies:  Ace inhibitors, Diclofenac, Losartan, Spironolactone, Vicodin hp [hydrocodone-acetaminophen], and Vicodin [hydrocodone-acetaminophen]    Immunizations :   Immunization History   Administered Date(s) Administered    Influenza Whole 09/12/2013    Influenza, Quadv, IM, PF (6 mo and older Fluzone, Flulaval, Fluarix, and 3 yrs and older Afluria) 10/13/2021         Social History:    Social History     Tobacco Use    Smoking status: Never Smoker    Smokeless tobacco: Never Used   Vaping Use    Vaping Use: Never used   Substance Use Topics    Alcohol use: Not Currently    Drug use: Not Currently     Social History     Tobacco Use   Smoking Status Never Smoker   Smokeless Tobacco Never Used      Family History   Problem Relation Age of Onset    High Blood Pressure Mother     Diabetes Mother     Cancer Mother         thyroid    Stroke Father           REVIEW OF SYSTEMS:     Constitutional:  negative for fevers, chills, night sweats  Eyes:  negative for blurred vision, eye discharge, visual disturbance   HEENT:  negative for hearing loss, ear drainage,nasal congestion  Respiratory:   cough+ , shortness of breath + or hemoptysis   Cardiovascular:  negative for chest pain, palpitations, syncope  Gastrointestinal:  negative for nausea, vomiting, diarrhea, constipation, abdominal pain  Genitourinary:  negative for frequency, dysuria, urinary incontinence, hematuria  Hematologic/Lymphatic:  negative for easy bruising, bleeding and lymphadenopathy  Allergic/Immunologic:  negative for recurrent infections, angioedema, anaphylaxis   Endocrine:  negative for weight changes, polyuria, polydipsia and polyphagia  Musculoskeletal:  negative for joint  pain, swelling, decreased range of motion  Integumentary: No rashes, skin lesions  Neurological:  negative for headaches, slurred speech, unilateral weakness  Psychiatric: negative for hallucinations,confusion,agitation.      PHYSICAL EXAM:      Vitals:    BP (!) 108/56   Pulse 73   Temp (!) 96.5 °F (35.8 °C) (Oral)   Resp 14   Ht 5' 7\" (1.702 m)   Wt 222 lb 10.6 oz (101 kg)   SpO2 98%   BMI 34.87 kg/m²     General Appearance: alert,in some acute distress, ++  pallor, no icterus chronic ill appearing woman + alopecia+   Skin: warm and dry, no rash or erythema  Head: normocephalic and atraumatic  Eyes: pupils equal, round, and reactive to light, conjunctivae normal  ENT: tympanic membrane, external ear and ear canal normal bilaterally, nose without deformity, nasal mucosa and turbinates normal without polyps  Neck: supple and non-tender without mass, no thyromegaly  no cervical lymphadenopathy  Pulmonary/Chest: Left base coarse crepts++  no wheezes, rales or rhonchi, normal air movement, in some  respiratory distress  Cardiovascular:  S1 and S2, no murmurs, rubs, clicks, or gallops, no carotid bruits  Abdomen: soft, non-tender, non-distended, normal bowel sounds, no masses or organomegaly  Extremities: no cyanosis, clubbing or+edema  Musculoskeletal: normal range of motion, no joint swelling, deformity or tenderness  Integumentary: No rashes, no abnormal skin lesions, no petechiae  Neurologic: reflexes normal and symmetric, no cranial nerve deficit  Psych:  Orientation, sensorium, mood normal   Lines: chest port ++  DATA:    CBC:   Lab Results   Component Value Date    WBC 25.1 (H) 06/30/2022    HGB 7.9 (L) 06/30/2022    HCT 25.1 (L) 06/30/2022    MCV 77.9 (L) 06/30/2022     06/30/2022     RENAL:   Lab Results   Component Value Date    CREATININE 1.4 (H) 06/30/2022    BUN 43 (H) 06/30/2022     06/30/2022    K 3.6 06/30/2022     06/30/2022    CO2 25 06/30/2022     SED RATE: No results found for: SEDRATE  CK:   Lab Results   Component Value Date/Time    CKTOTAL 113 12/15/2012 09:38 PM     CRP: No results found for: CRP  Hepatic Function Panel:   Lab Results   Component Value Date/Time    ALKPHOS 61 06/30/2022 05:10 AM    ALT 14 06/30/2022 05:10 AM    AST 16 06/30/2022 05:10 AM    PROT 5.7 06/30/2022 05:10 AM    PROT 7.6 06/05/2015 12:40 PM    BILITOT 0.4 06/30/2022 05:10 AM    BILIDIR <0.2 12/02/2021 04:54 AM    IBILI see below 12/02/2021 04:54 AM    LABALBU 3.5 06/30/2022 05:10 AM     UA:  Lab Results   Component Value Date/Time    COLORU Yellow 03/31/2022 11:51 AM    CLARITYU Clear 03/31/2022 11:51 AM    GLUCOSEU >=1000 03/31/2022 11:51 AM    BILIRUBINUR Negative 03/31/2022 11:51 AM    KETUA Negative 03/31/2022 11:51 AM    SPECGRAV 1.010 03/31/2022 11:51 AM    BLOODU Negative 03/31/2022 11:51 AM    PHUR 6.0 03/31/2022 11:51 AM    PROTEINU Negative 03/31/2022 11:51 AM    UROBILINOGEN 0.2 03/31/2022 11:51 AM    NITRU Negative 03/31/2022 11:51 AM    LEUKOCYTESUR Negative 03/31/2022 11:51 AM    LABMICR Not Indicated 03/31/2022 11:51 AM    URINETYPE NotGiven 03/31/2022 11:51 AM      Urine Microscopic:   Lab Results   Component Value Date/Time    BACTERIA 4+ 03/30/2022 09:28 PM    HYALCAST 4 03/30/2022 09:28 PM    WBCUA 6 03/30/2022 09:28 PM    RBCUA 1 03/30/2022 09:28 PM    EPIU 6 03/30/2022 09:28 PM     Urine Reflex to Culture:   Lab Results   Component Value Date/Time    URRFLXCULT Not Indicated 03/30/2022 09:28 PM       Case #: [id number]     Final Pathologic Diagnosis   Lymph node, right supraclavicular, core biopsy:     -      Necrotic tumor, most consistent with small cell carcinoma. Comment(s)   The tumor is predominantly necrotic.  Immunostains show that the tumor is     MICRO: cultures reviewed and updated by me       Blood Culture:   Lab Results   Component Value Date/Time    [facility]  06/28/2022 10:05 PM     No Growth to date.   Any relate to combination of compressive atelectasis and pneumonia.  Other   areas of nodular opacity and peripheral consolidative areas in the lungs   suggesting multifocal pneumonia.  However, there is also interlobular septal   thickening, ground-glass opacities, and bilateral pleural effusions   suggesting a component of CHF as well.       2.  Previous more focal mass in the left lower lobe is not evaluated given   the completely opacified lung.  Limited assessment of lymphadenopathy.       3.  Multinodular thyroid gland with the larger nodule in the right side   measuring up to 2.5 cm.  Can have non emergent thyroid ultrasound if not   previously worked up.             All pertinent images and reports for the current Hospitalization were reviewed by me.     IMPRESSION:    Patient Active Problem List   Diagnosis    Essential hypertension    Cardiomyopathy ([address]    CHF (congestive heart failure) ([address]    COPD (chronic obstructive pulmonary disease) ([address]    Chest pain    NEGIN treated with BiPAP    DMII (diabetes mellitus, type 2) (HCC)    Iron deficiency anemia    Intestinal malabsorption    Iron deficiency anemia due to chronic blood loss    Treatment    Anemia    Acute on chronic systolic CHF (congestive heart failure), NYHA class 3 (HCC)    Hyperlipidemia    Morbid obesity due to excess calories (HCC)    Acute pulmonary edema (HCC)    Acute respiratory failure with hypercarbia (HCC)    Congestive heart failure with left ventricular dysfunction (HCC)    Acute decompensated heart failure (HCC)    Acute kidney injury superimposed on chronic kidney disease ([address]    COPD exacerbation ([address]    Demand ischemia of myocardium ([address]    Non morbid obesity due to excess calories    Acute respiratory failure with hypoxia and hypercapnia (HCC)    Essential hypertension    Hyperlipidemia    DMII (diabetes mellitus, type 2) ([address]    CAD (coronary artery disease)    Respiratory failure ([address]    Pneumonia    Acute respiratory failure (HCC)    Respiratory distress    Acute on chronic respiratory failure (HCC)     Acute on chronic Hypoxic resp failure  On home OXYGEN   Left Lung cancer small cell cancer  S/p Chemo and immune therapy  Chest port in place  CAD  CHF  DM+  Obesity BMI at  34   AIDA  Pulmonary edema   NEGIN on Bi PAP  Left lower lobe Post obstructive PNA  Recurrent Admit for Resp failure from Edema and PNA  This is her 3rd admit per patient  CT chest abnormal from 6/16        Labs, Microbiology, Radiology and pertinent results from current hospitalization and care every where were reviewed by me as a part of the consultation. PLAN :  1. Cont IV Cefepime 2 gm Q 12 hrs  2. Has chest port in place we can complete IV abx at home  3. Immune suppressed from Cancer  4. Off IV Vancomycin as MRSA probe -ve  5. WBC reactive post Filgrastim   6. CT chest images from 6/16 reviewed  7. OPAT d/w pt       Discussed with patient/Family and Nursing   Risk of Complications/Morbidity: High      · Illness(es)/ Infection present that pose threat to bodily function. · There is potential for severe exacerbation of infection/side effects of treatment. · Therapy requires intensive monitoring for antimicrobial agent toxicity. Thanks for allowing me to participate in your patient's care please call me with any questions or concerns.     Dr. Maia Tripp MD  [facility] Physician  Phone: [phone]   Fax : [phone]

## 2022-06-30 NOTE — PROGRESS NOTES
Hematology Oncology Daily Progress Note    Admit Date: 6/28/2022  Hospital day several    Subjective:     Patient has complaints of improved weakness/sob. Denies CP. Medication side effects: none    Scheduled Meds:   cefepime  2,000 mg IntraVENous Q12H    empagliflozin  10 mg Oral Daily    potassium chloride  20 mEq Oral BID     metroNIDAZOLE  500 mg IntraVENous Q8H    amiodarone  200 mg Oral Daily    aspirin  81 mg Oral Daily    atorvastatin  40 mg Oral Nightly    carvedilol  6.25 mg Oral BID WC    gabapentin  600 mg Oral TID    pantoprazole  40 mg Oral QAM AC    tiotropium-olodaterol  2 puff Inhalation Daily    Vericiguat  5 mg Oral QAM    sodium chloride flush  5-40 mL IntraVENous 2 times per day    insulin lispro  0-6 Units SubCUTAneous TID WC    insulin lispro  0-3 Units SubCUTAneous Nightly    furosemide  40 mg IntraVENous BID    ipratropium-albuterol  1 ampule Inhalation Q4H    enoxaparin  30 mg SubCUTAneous BID    filgrastim-aafi  480 mcg SubCUTAneous Daily     Continuous Infusions:   sodium chloride      dextrose       PRN Meds:sodium chloride flush, sodium chloride, acetaminophen **OR** acetaminophen, ondansetron, glucose, dextrose bolus **OR** dextrose bolus, glucagon (rDNA), dextrose, albuterol sulfate HFA    Review of Systems  Pertinent items are noted in HPI. REVIEW OF SYSTEMS:         · Constitutional: Denies fever, sweats, weight loss     · Eyes: No visual changes or diplopia. No scleral icterus. · ENT: No Headaches, hearing loss or vertigo. No mouth sores or sore throat. · Cardiovascular: No chest pain, dyspnea on exertion, palpitations or loss of consciousness. · Respiratory: No cough or wheezing, no sputum production. No hemoptysis. .    · Gastrointestinal: No abdominal pain, appetite loss, blood in stools. No change in bowel habits. · Genitourinary: No dysuria, trouble voiding, or hematuria. · Musculoskeletal:  Generalized weakness.  No joint complaints. · Integumentary: No rash or pruritis. · Neurological: No headache, diplopia. No change in gait, balance, or coordination. No paresthesias. · Endocrine: No temperature intolerance. No excessive thirst, fluid intake, or urination. · Hematologic/Lymphatic: No abnormal bruising or ecchymoses, blood clots or swollen lymph nodes. · Allergic/Immunologic: No nasal congestion or hives.    ·     Objective:     Patient Vitals for the past 8 hrs:   BP Temp Temp src Pulse Resp SpO2 Weight   06/30/22 1158 (!) 99/51 -- -- 74 17 100 % --   06/30/22 1143 -- -- -- 80 13 98 % --   06/30/22 0753 (!) 108/56 (!) 96.5 °F (35.8 °C) Oral 73 14 98 % --   06/30/22 0743 -- -- -- 69 17 100 % --   06/30/22 0411 -- -- -- -- -- -- 222 lb 10.6 oz (101 kg)     I/O last 3 completed shifts:  In: -   Out: 1150 [Urine:1150]  I/O this shift:  In: 210 [P.O.:210]  Out: -     BP (!) 99/51   Pulse 74   Temp (!) 96.5 °F (35.8 °C) (Oral)   Resp 17   Ht 5' 7\" (1.702 m)   Wt 222 lb 10.6 oz (101 kg)   SpO2 100%   BMI 34.87 kg/m²     General Appearance:    Alert, cooperative, no distress, appears stated age   Head:    Normocephalic, without obvious abnormality, atraumatic   Eyes:    PERRL, conjunctiva/corneas clear, EOM's intact, fundi     benign, both eyes        Ears:    Normal TM's and external ear canals, both ears   Nose:   Nares normal, septum midline, mucosa normal, no drainage    or sinus tenderness   Throat:   Lips, mucosa, and tongue normal; teeth and gums normal   Neck:   Supple, symmetrical, trachea midline, no adenopathy;        thyroid:  No enlargement/tenderness/nodules; no carotid    bruit or JVD   Back:     Symmetric, no curvature, ROM normal, no CVA tenderness   Lungs:     Clear to auscultation bilaterally, respirations unlabored   Chest wall:    No tenderness or deformity   Heart:    Regular rate and rhythm, S1 and S2 normal, no murmur, rub   or gallop   Abdomen:     Soft, non-tender, bowel sounds active all four quadrants,     no masses, no organomegaly           Extremities:   Extremities normal, atraumatic, no cyanosis or edema   Pulses:   2+ and symmetric all extremities   Skin:   Skin color, texture, turgor normal, no rashes or lesions   Lymph nodes:   Cervical, supraclavicular, and axillary nodes normal   Neurologic:   CNII-XII intact. Normal strength, sensation and reflexes       throughout         Data Review  CBC:   Lab Results   Component Value Date/Time    WBC 25.1 06/30/2022 05:10 AM    RBC 3.23 06/30/2022 05:10 AM    RBC 5.12 05/26/2017 02:05 PM       Assessment:     Principal Problem:    Acute on chronic respiratory failure (HCC)  Active Problems:    Small cell lung cancer (HCC)    Essential hypertension    COPD (chronic obstructive pulmonary disease) (HCC)    NEGIN treated with BiPAP    DMII (diabetes mellitus, type 2) (HCC)    Acute pulmonary edema (HCC)    DMII (diabetes mellitus, type 2) ([facility] [address]  Resolved Problems:    * No resolved hospital problems. *      Plan:     1. Extensive stage small cell lung cancer. She is status post 3 cycles of carboplatin -16.    2.  Leukopenia. This is due to chemotherapy. It has resolved. 3.  Anemia. Transfuse as needed. 4.  Possible sepsis. Infectious disease has been consulted.     Electronically signed by Amey Lesch, MD on 6/30/2022 at 12:05 PM

## 2022-06-30 NOTE — PROGRESS NOTES
Physician Progress Note      Ramirez ESPITIA #:                  [id number]  :                       1960  ADMIT DATE:       2022 9:55 PM  [address] [place] DATE:  RESPONDING  PROVIDER #:        Mark Hennessy        QUERY TEXT:    Stage of Chronic Kidney Disease: Please provide further specificity, if known. Clinical indicators include: ckd, bnp, chronic kidney disease  Options provided:  -- Chronic kidney disease stage 1  -- Chronic kidney disease stage 2  -- Chronic kidney disease stage 3  -- Chronic kidney disease stage 3a  -- Chronic kidney disease stage 3b  -- Chronic kidney disease stage 4  -- Chronic kidney disease stage 5  -- Chronic kidney disease stage 5, requiring dialysis  -- End stage renal disease  -- Other - I will add my own diagnosis  -- Disagree - Not applicable / Not valid  -- Disagree - Clinically Unable to determine / Unknown        PROVIDER RESPONSE TEXT:    The patient has chronic kidney disease stage 3.       Electronically signed by:  Mark Hennessy 2022 2:57 PM

## 2022-06-30 NOTE — PROGRESS NOTES
Per patient, Dr Jacobo Gregory stated she could resume her home supplied Jardiance, and patient took one tab. Message sent to Dr Jacobo Gregory for order clarification.

## 2022-06-30 NOTE — PROGRESS NOTES
Occupational Therapy  Facility/Department: [address] PROGRESSIVE CARE  Occupational Therapy Initial Assessment and Tentative D/C      Name: Yasmeen Barrera  : 1960  MRN: [id number]  Date of Service: 2022    Discharge Recommendations: Yasmeen Barrera scored a 21/24 on the AM-PAC ADL Inpatient form. Current research shows that an AM-PAC score of 18 or greater is typically associated with a discharge to the patient's home setting. If patient discharges prior to next session this note will serve as a discharge summary. Please see below for the latest assessment towards goals. Continue to assess pending progress,Home with assist PRN  OT Equipment Recommendations  Equipment Needed: No       Patient Diagnosis(es): The primary encounter diagnosis was Acute respiratory distress. Diagnoses of Dyspnea, unspecified type, History of lung cancer, and Elevated brain natriuretic peptide (BNP) level were also pertinent to this visit. Past Medical History:  has a past medical history of Asthma, Cardiomyopathy ([facility] CHF (congestive heart failure) ([facility] [address] COPD (chronic obstructive pulmonary disease) ([facility] [address] Diabetes mellitus ([facility] [address] Essential hypertension, Hyperlipidemia, Hypertension, and Obesity. Past Surgical History:  has a past surgical history that includes Cholecystectomy; Carpal tunnel release; Incontinence surgery; Coronary angioplasty with stent (2013); IR PORT PLACEMENT > 5 YEARS (Right, 2022); and IR PORT PLACEMENT > 5 YEARS (5/3/2022). Assessment   Performance deficits / Impairments: Decreased functional mobility ; Decreased ADL status; Decreased balance;Decreased high-level IADLs;Decreased endurance  Assessment: Yasmeen Barrera is a 65 y.o. female who presents to the emergency department via EMS from home for respiratory distress. The patient states symptoms started today. Per EMS the oxygen saturations were 78 to 80% on her home oxygen of 3 L via nasal cannula.   She has a history of COPD with emphysema, CHF and lung cancer. The patient tells me that she wants to be intubated if she needs it. She is currently on BiPAP and EMS had placed her on CPAP prior to arrival.  She denies vomiting diarrhea or other illness. She is not on daily steroids per the patient. PTA pt from home alone where pt was Ind with mobility and ADLs with RW. Pt currently requires SBA and use of RW for mobility and transfers. Pt with no LOB. Pt on 2L O2 throughout with SpO2 >90% throughout. Anticipate pt needing up to SBA for ADLs. Pt will benefit from skilled OT services at this time. Anticipate pt safe to return home with PRN assist.  Prognosis: Good  Decision Making: Medium Complexity  Exam: see above  Assistance / Modification: RW  REQUIRES OT FOLLOW-UP: Yes  Activity Tolerance  Activity Tolerance: Patient Tolerated treatment well        Plan   Plan  Times per Week: 3-5x  Current Treatment Recommendations: Strengthening,Balance training,Functional mobility training,Endurance training,Patient/Caregiver education & training,Self-Care / ADL,Safety education & training     Restrictions  Restrictions/Precautions  Restrictions/Precautions:  (Recieved chemo recently.)  Implants present? : Pacemaker    Subjective   General  Chart Reviewed: Yes  Patient assessed for rehabilitation services?: Yes  Additional Pertinent Hx: per ED note, Ken Bedolla is a 65 y.o. female who presents to the emergency department via EMS from home for respiratory distress. The patient states symptoms started today. Per EMS the oxygen saturations were 78 to 80% on her home oxygen of 3 L via nasal cannula. She has a history of COPD with emphysema, CHF and lung cancer. The patient tells me that she wants to be intubated if she needs it. She is currently on BiPAP and EMS had placed her on CPAP prior to arrival.  She denies vomiting diarrhea or other illness. She is not on daily steroids per the patient.   Family / Caregiver Present: No  Referring Practitioner: Bekah Tay MD  Subjective  Subjective: Pt agreeable to OT evaluation. Pt reports chronic back pain with no number stated.      Social/Functional History  Social/Functional History  Lives With: Alone  Type of Home: Apartment  Home Layout: One level  Home Access: Stairs to enter with rails  Entrance Stairs - Number of Steps: 2 ARIS (uses wall and cane) into building then 5 steps to basement level with rail L ascending  Bathroom Shower/Tub: Tub/Shower unit  Bathroom Toilet:  (RTS without handles or uses BSC)  Bathroom Equipment: Tub transfer bench,Hand-held shower  Home Equipment: [employer], 4 wheeled,Reacher  ADL Assistance: Independent  Homemaking Assistance: Needs assistance (someone cleans, does laundry, and picks up prescription for her)  Ambulation Assistance: Independent  Transfer Assistance: Independent  Active : No  Occupation: On disability       Objective   Heart Rate: 73  Heart Rate Source: Monitor  BP: (!) 108/56  BP Location: Left upper arm  MAP (Calculated): 73.33  Resp: 14  SpO2: 98 %  O2 Device: Nasal cannula             Safety Devices  Type of Devices: Call light within reach;Gait belt;Left in bed  Restraints  Restraints Initially in Place: No  Bed Mobility Training  Bed Mobility Training: Yes  Overall Level of Assistance: Independent  Supine to Sit: Independent  Sit to Supine: Independent  Scooting: Independent  Balance  Sitting: Intact  Standing: Impaired (RW)  Standing - Static: Good  Standing - Dynamic: Fair  Transfer Training  Transfer Training: Yes  Overall Level of Assistance: Stand-by assistance (RW)  Sit to Stand: Stand-by assistance (RW)  Stand to Sit: Stand-by assistance  Toilet Transfer: Stand-by assistance (RW)  Gait  Overall Level of Assistance: Stand-by assistance (no overt LOB; no noted SOB; SpO2 >90% throughout)  Distance (ft): 15 Feet (15ft, 40ft)  Assistive Device: Walker, rolling     AROM: Within functional limits  PROM: Within functional limits  Strength:  Within functional limits  Coordination: Within functional limits  Tone: Normal  ADL  Grooming: Supervision (seated at sink to wash face, complete denture care, and wash hands)  UE Bathing: Supervision (seated at sink)  LE Dressing: Supervision (seated to don bilateral socks)  Toileting: Stand by assistance (SBA for balance; pt able to complete pericare)  Additional Comments: Anticipate pt needing up to SBA for ADLs based on ROM, strength, and balance                 Cognition  Overall Cognitive Status: WFL  Orientation  Overall Orientation Status: Within Functional Limits                  Education Given To: Patient  Education Provided: Role of Therapy;Plan of Care;Transfer Training;ADL Adaptive Strategies  Education Method: Demonstration;Verbal  Barriers to Learning: None  Education Outcome: Verbalized understanding;Demonstrated understanding          AM-PAC Score        AM-PAC Inpatient Daily Activity Raw Score: 21 (06/30/22 0946)  AM-PAC Inpatient ADL T-Scale Score : 44.27 (06/30/22 0946)  ADL Inpatient CMS 0-100% Score: 32.79 (06/30/22 0946)  ADL Inpatient CMS G-Code Modifier : CJ (06/30/22 0946)    Goals  Short Term Goals  Time Frame for Short term goals: prior to D/C  Short Term Goal 1: complete functional mobility and transfers Mod Ind  Short Term Goal 2: complete bathing and dressing Mod Ind  Short Term Goal 3: complete toileting Mod Ind  Short Term Goal 4: complete grooming in stance at sink [address] Term Goals  Time Frame for Long term goals : STG=LTG  Patient Goals   Patient goals : return home       Therapy Time   Individual Concurrent Group Co-treatment   Time In 0908         Time Out 0946         Minutes 38         Timed Code Treatment Minutes: 23 Minutes (15 minute eval)       Valentine Cerda OTR/L

## 2022-06-30 NOTE — ACP (ADVANCE CARE PLANNING)
Advance Care Planning     Advance Care Planning Activator (Inpatient)  Conversation Note      Date of ACP Conversation: 6/30/2022     Conversation Conducted with: Patient with Decision Making Capacity    ACP Activator: Cresenciano Dubin, RN    Health Care Decision Maker:     Current Designated Health Care Decision Maker:     Primary Decision Maker: Jay Yen - [phone]    Secondary Decision Maker: Danitza Burkett - Child - [phone]    Supplemental (Other) Decision Maker: Nuvia Steinberg - Parent - [phone]      Care Preferences    Ventilation: \"If you were in your present state of health and suddenly became very ill and were unable to breathe on your own, what would your preference be about the use of a ventilator (breathing machine) if it were available to you? \"      Would the patient desire the use of ventilator (breathing machine)?: yes    \"If your health worsens and it becomes clear that your chance of recovery is unlikely, what would your preference be about the use of a ventilator (breathing machine) if it were available to you? \"     Would the patient desire the use of ventilator (breathing machine)?: No      Resuscitation  \"CPR works best to restart the heart when there is a sudden event, like a heart attack, in someone who is otherwise healthy. Unfortunately, CPR does not typically restart the heart for people who have serious health conditions or who are very sick. \"    \"In the event your heart stopped as a result of an underlying serious health condition, would you want attempts to be made to restart your heart (answer \"yes\" for attempt to resuscitate) or would you prefer a natural death (answer \"no\" for do not attempt to resuscitate)? \" yes    Conversation Outcomes:  [x] ACP discussion completed - reviewed prior ACP, confirmed no changes.   [] Existing advance directive reviewed with patient; no changes to patient's previously recorded wishes  [] New Advance Directive completed  [] Portable Do Not Rescitate prepared for Provider review and signature  [] POLST/POST/MOLST/MOST prepared for Provider review and signature    Electronically signed by Elisa Bernard RN Case Management [phone] on 6/30/2022 at 10:00 AM

## 2022-06-30 NOTE — CARE COORDINATION
INITIAL CASE MANAGEMENT ASSESSMENT    Reviewed chart, met with patient to assess possible discharge needs. Explained Case Management role/services. Living Situation: Confirmed address, lives alone in a basement level apartment, 5 steps down, 2 steps into building    ADLs: Independent, family assists PRN & gets cleaning assist thru [employer]     DME: Rollator, wheeled walker, shower chair, BSC, manual & electric wheelchair, nebulizer, BIPAP, life alert, oxygen per [employer] 2-3L baseline    PT/OT Recs: Discharge Recommendations:  Home with assist PRN,No therapy recommended at discharge     [address] scored a 21/24 on the AM-PAC short mobility form. At this time, no further PT is recommended upon discharge due to patient at baseline. Recommend patient returns to prior setting with prior services. Discharge Recommendations: [address] scored a 21/24 on the AM-PAC ADL Inpatient form. Current research shows that an AM-PAC score of 18 or greater is typically associated with a discharge to the patient's home setting.      Continue to assess pending progress,Home with assist PRN  OT Equipment Recommendations  Equipment Needed: No     Active Services[facility] [address] [facility] & [employer] cleaning aide once weekly     Transportation: Family transports PRN     Medications: Uses [facility] [address] -- no issues    PCP: Dee Musa      HD/PD: n/a    PLAN/COMMENTS: Patient will return home and resume [facility]. Patient denies additional home needs. Family transport at discharge. SW/CM provided contact information for patient or family to call with any questions. SW/CM will follow and assist as needed.   Electronically signed by Sandra Cervantes RN Case Management [phone] on 6/30/2022 at 9:58 AM

## 2022-06-30 NOTE — PLAN OF CARE
Problem: Discharge Planning  Goal: Discharge to home or other facility with appropriate resources  6/29/2022 1646 by Kathleen Galvez RN  Outcome: Progressing  6/29/2022 1646 by Kathleen Galvez RN  Outcome: Progressing  6/29/2022 1631 by Kathleen Galvez RN  Outcome: Progressing     Problem: Safety - Adult  Goal: Free from fall injury  6/29/2022 2007 by Doreen Raymundo RN  Outcome: Progressing  [facility] (Taken 6/29/2022 2007)  Free From Fall Injury:   Instruct family/caregiver on patient safety   Based on caregiver fall risk screen, instruct family/caregiver to ask for assistance with transferring infant if caregiver noted to have fall risk factors  6/29/2022 1646 by Kathleen Galvez RN  Outcome: Progressing  6/29/2022 1646 by Kathleen Galvez RN  Outcome: Progressing  6/29/2022 1631 by Kathleen Galvez RN  Outcome: Progressing

## 2022-06-30 NOTE — PROGRESS NOTES
Pulmonary Progress Note    Date of Admission: 6/28/2022   LOS: 1 day     Chief Complaint   Patient presents with    Respiratory Distress       Assessment/Plan:     chronic hypoxemic respiratory failure with SPO2 less than 90% on room air  -Wean supplemental oxygen to goal saturation of >90%  -At or below home oxygen requirement    Acute on chronic systolic heart failure LVEF 25%  Severe mitral regurg  -Diuresis per primary     Postobstructive pneumonia  -We will need HCAP coverage given recent admission  -Cefepime/Vanco  -ID consult as she failed outpatient antibiotics may need home IV. Extensive stage small cell lung cancer  -Currently on Tecentriq        24 Hour Events/Subjective  No acute events overnight. Improving. Down to 2 L of oxygen    ROS:   No nausea  No Vomiting  No chest pain      Intake/Output Summary (Last 24 hours) at 6/30/2022 1054  Last data filed at 6/30/2022 0809  Gross per 24 hour   Intake 210 ml   Output 300 ml   Net -90 ml         PHYSICAL EXAM:   Blood pressure (!) 108/56, pulse 73, temperature (!) 96.5 °F (35.8 °C), temperature source Oral, resp. rate 14, height 5' 7\" (1.702 m), weight 222 lb 10.6 oz (101 kg), SpO2 98 %.'  Gen:  No acute distress. Eyes: PERRL. Anicteric sclera. No conjunctival injection. ENT: No discharge. Posterior oropharynx clear. External appearance of ears and nose normal.  Neck: Trachea midline. No mass   Resp:  No crackles. No wheezes. No rhonchi. No dullness on percussion. CV: Regular rate. Regular rhythm. No murmur or rub. No edema. GI: Soft, Non-tender. Non-distended. +BS  Skin: Warm, dry, w/o erythema. Lymph: No cervical or supraclavicular LAD. M/S: No cyanosis. No clubbing. Neuro:  no focal neurologic deficit. Moves all extremities  Psych: Awake and alert, Oriented x 3. Judgement and insight appropriate. Mood stable.       Medications:    Scheduled Meds:   cefepime  2,000 mg IntraVENous Q12H    empagliflozin  10 mg Oral Daily    amiodarone  200 mg Oral Daily    aspirin  81 mg Oral Daily    atorvastatin  40 mg Oral Nightly    carvedilol  6.25 mg Oral BID WC    gabapentin  600 mg Oral TID    pantoprazole  40 mg Oral QAM AC    tiotropium-olodaterol  2 puff Inhalation Daily    Vericiguat  5 mg Oral QAM    sodium chloride flush  5-40 mL IntraVENous 2 times per day    insulin lispro  0-6 Units SubCUTAneous TID WC    insulin lispro  0-3 Units SubCUTAneous Nightly    furosemide  40 mg IntraVENous BID    ipratropium-albuterol  1 ampule Inhalation Q4H    enoxaparin  30 mg SubCUTAneous BID    filgrastim-aafi  480 mcg SubCUTAneous Daily       Continuous Infusions:   sodium chloride      dextrose         PRN Meds:  sodium chloride flush, sodium chloride, acetaminophen **OR** acetaminophen, ondansetron, glucose, dextrose bolus **OR** dextrose bolus, glucagon (rDNA), dextrose, albuterol sulfate HFA    Labs reviewed:  CBC:   Recent Labs     06/28/22 2205 06/29/22 0622 06/30/22  0510   WBC 5.8 2.7* 25.1*   HGB 10.0* 8.9* 7.9*   HCT 32.5* 27.7* 25.1*   MCV 78.4* 76.8* 77.9*    286 276     BMP:   Recent Labs     06/28/22 2205 06/29/22 0622 06/30/22  0510    143 143   K 5.2* 3.9 3.6    103 103   CO2 20* 28 25   BUN 36* 35* 43*   CREATININE 1.5* 1.5* 1.4*     LIVER PROFILE:   Recent Labs     06/28/22 2205 06/29/22 0622 06/30/22  0510   AST 28 21 16   ALT 15 16 14   BILITOT 0.4 0.4 0.4   ALKPHOS 75 76 61     PT/INR: No results for input(s): PROTIME, INR in the last 72 hours. APTT: No results for input(s): APTT in the last 72 hours. UA:No results for input(s): NITRITE, COLORU, PHUR, LABCAST, WBCUA, RBCUA, MUCUS, TRICHOMONAS, YEAST, BACTERIA, CLARITYU, SPECGRAV, LEUKOCYTESUR, UROBILINOGEN, BILIRUBINUR, BLOODU, GLUCOSEU, AMORPHOUS in the last 72 hours. Invalid input(s): Randy Niall  No results for input(s): PH, PCO2, PO2 in the last 72 hours.       Films:  Radiology Review:  Pertinent images / reports were reviewed as a part of this visit. Echo Limited  Transthoracic Echocardiography Report (TTE)     Demographics      Patient Name       Abbie Miles      Date of Study      06/29/2022         Gender              Female      Patient Number     [id number]         Date of Birth       1960      Visit Number       [id number]          Age                 65 year(s)      Accession Number   [id number]         Room Number         4378      Corporate ID       [id number]            Astrid Hernandez      Ordering Physician Maribel Conn MD       Physician           Cony Ibanez MD     Procedure    Type of Study      TTE procedure:ECHOCARDIOGRAM LIMITED. Procedure Date  Date: 06/29/2022 Start: 02:31 PM    Study Location: [facility] - Echo Lab  Technical Quality: Adequate visualization    Indications:Congestive heart failure. Patient Status: Routine    BP: 100/48 mmHg     Conclusions      Summary   Severely dilated LV with akinesis of the inferolateral and inferior walls. EF    30-35%   Moderate mitral regurgitation is present. Large bilateral pleural effusions seen. Signature      ------------------------------------------------------------------   Electronically signed by Cony Ibanez MD (Interpreting   physician) on 06/29/2022 at 05:19 PM   ------------------------------------------------------------------      Findings      Left Ventricle   Severely dilated LV with akinesis of the inferolateral and inferior walls. EF    30-35%      Mitral Valve   Moderate mitral regurgitation is present. Pericardial Effusion   No pericardial effusion noted.       Pleural Effusion   Large bilateral pleural effusions     M-Mode/2D Measurements (cm)      LV Diastolic Dimension: 6.7 cm LV Systolic Dimension: 1.28 cm     XR CHEST PORTABLE  Narrative: EXAMINATION:  ONE XRAY VIEW OF THE CHEST    6/29/2022 5:05 am    COMPARISON:  06/28/2022    HISTORY:  ORDERING SYSTEM PROVIDED HISTORY: SOB  TECHNOLOGIST PROVIDED HISTORY:  Reason for exam:->SOB  Reason for Exam: SOB    FINDINGS:  Heart is similar in size to the prior study. There is a left retrocardiac  opacity. Right basilar airspace opacity appears slightly more prominent than  the prior exam.  No measurable pneumothorax is seen. Similar appearance of  the right-sided port catheter. There is a left-sided cardiac device with  multiple leads. Impression: Left retrocardiac opacity along with right basilar airspace opacities,  appearing slightly more prominent than the prior exam.  This could represent  atelectasis and/or infiltrate. Possible left pleural effusion. This note was transcribed using [id number]. Please disregard any translational errors.     Thank you for this consult,    Larry Bai MD  [facility] Pulmonary, Critical Care, and Sleep Medicine

## 2022-07-01 LAB
A/G RATIO: 1.7 (ref 1.1–2.2)
ALBUMIN SERPL-MCNC: 3.4 G/DL (ref 3.4–5)
ALP BLD-CCNC: 62 U/L (ref 40–129)
ALT SERPL-CCNC: 14 U/L (ref 10–40)
ANION GAP SERPL CALCULATED.3IONS-SCNC: 11 MMOL/L (ref 3–16)
AST SERPL-CCNC: 14 U/L (ref 15–37)
BASOPHILS ABSOLUTE: 0 K/UL (ref 0–0.2)
BASOPHILS RELATIVE PERCENT: 0.1 %
BILIRUB SERPL-MCNC: 0.5 MG/DL (ref 0–1)
BUN BLDV-MCNC: 48 MG/DL (ref 7–20)
CALCIUM SERPL-MCNC: 8.8 MG/DL (ref 8.3–10.6)
CHLORIDE BLD-SCNC: 100 MMOL/L (ref 99–110)
CO2: 25 MMOL/L (ref 21–32)
CREAT SERPL-MCNC: 1.3 MG/DL (ref 0.6–1.2)
EOSINOPHILS ABSOLUTE: 0 K/UL (ref 0–0.6)
EOSINOPHILS RELATIVE PERCENT: 0 %
GFR AFRICAN AMERICAN: 50
GFR NON-AFRICAN AMERICAN: 42
GLUCOSE BLD-MCNC: 117 MG/DL (ref 70–99)
GLUCOSE BLD-MCNC: 122 MG/DL (ref 70–99)
GLUCOSE BLD-MCNC: 125 MG/DL (ref 70–99)
GLUCOSE BLD-MCNC: 94 MG/DL (ref 70–99)
GLUCOSE BLD-MCNC: 97 MG/DL (ref 70–99)
HCT VFR BLD CALC: 23.9 % (ref 36–48)
HEMOGLOBIN: 7.7 G/DL (ref 12–16)
L. PNEUMOPHILA SEROGP 1 UR AG: NORMAL
LYMPHOCYTES ABSOLUTE: 0.7 K/UL (ref 1–5.1)
LYMPHOCYTES RELATIVE PERCENT: 2.5 %
MCH RBC QN AUTO: 24.6 PG (ref 26–34)
MCHC RBC AUTO-ENTMCNC: 32.2 G/DL (ref 31–36)
MCV RBC AUTO: 76.5 FL (ref 80–100)
MONOCYTES ABSOLUTE: 0.1 K/UL (ref 0–1.3)
MONOCYTES RELATIVE PERCENT: 0.4 %
NEUTROPHILS ABSOLUTE: 24.8 K/UL (ref 1.7–7.7)
NEUTROPHILS RELATIVE PERCENT: 97 %
PDW BLD-RTO: 27.4 % (ref 12.4–15.4)
PERFORMED ON: ABNORMAL
PERFORMED ON: NORMAL
PLATELET # BLD: 256 K/UL (ref 135–450)
PMV BLD AUTO: 8.9 FL (ref 5–10.5)
POTASSIUM SERPL-SCNC: 3.9 MMOL/L (ref 3.5–5.1)
RBC # BLD: 3.13 M/UL (ref 4–5.2)
SODIUM BLD-SCNC: 136 MMOL/L (ref 136–145)
STREP PNEUMONIAE ANTIGEN, URINE: NORMAL
TOTAL PROTEIN: 5.4 G/DL (ref 6.4–8.2)
WBC # BLD: 25.6 K/UL (ref 4–11)

## 2022-07-01 PROCEDURE — 99233 SBSQ HOSP IP/OBS HIGH 50: CPT | Performed by: INTERNAL MEDICINE

## 2022-07-01 PROCEDURE — 6360000002 HC RX W HCPCS: Performed by: STUDENT IN AN ORGANIZED HEALTH CARE EDUCATION/TRAINING PROGRAM

## 2022-07-01 PROCEDURE — 6370000000 HC RX 637 (ALT 250 FOR IP): Performed by: STUDENT IN AN ORGANIZED HEALTH CARE EDUCATION/TRAINING PROGRAM

## 2022-07-01 PROCEDURE — 97116 GAIT TRAINING THERAPY: CPT

## 2022-07-01 PROCEDURE — 87449 NOS EACH ORGANISM AG IA: CPT

## 2022-07-01 PROCEDURE — 2500000003 HC RX 250 WO HCPCS: Performed by: INTERNAL MEDICINE

## 2022-07-01 PROCEDURE — 6370000000 HC RX 637 (ALT 250 FOR IP): Performed by: INTERNAL MEDICINE

## 2022-07-01 PROCEDURE — 6360000002 HC RX W HCPCS: Performed by: INTERNAL MEDICINE

## 2022-07-01 PROCEDURE — 2580000003 HC RX 258: Performed by: INTERNAL MEDICINE

## 2022-07-01 PROCEDURE — 94761 N-INVAS EAR/PLS OXIMETRY MLT: CPT

## 2022-07-01 PROCEDURE — 85025 COMPLETE CBC W/AUTO DIFF WBC: CPT

## 2022-07-01 PROCEDURE — 80053 COMPREHEN METABOLIC PANEL: CPT

## 2022-07-01 PROCEDURE — 99232 SBSQ HOSP IP/OBS MODERATE 35: CPT | Performed by: INTERNAL MEDICINE

## 2022-07-01 PROCEDURE — 94669 MECHANICAL CHEST WALL OSCILL: CPT

## 2022-07-01 PROCEDURE — 2580000003 HC RX 258: Performed by: STUDENT IN AN ORGANIZED HEALTH CARE EDUCATION/TRAINING PROGRAM

## 2022-07-01 PROCEDURE — 2700000000 HC OXYGEN THERAPY PER DAY

## 2022-07-01 PROCEDURE — 2060000000 HC ICU INTERMEDIATE R&B

## 2022-07-01 PROCEDURE — 94640 AIRWAY INHALATION TREATMENT: CPT

## 2022-07-01 RX ORDER — ENOXAPARIN SODIUM 100 MG/ML
40 INJECTION SUBCUTANEOUS DAILY
Status: DISCONTINUED | OUTPATIENT
Start: 2022-07-02 | End: 2022-07-02 | Stop reason: HOSPADM

## 2022-07-01 RX ORDER — SENNA AND DOCUSATE SODIUM 50; 8.6 MG/1; MG/1
2 TABLET, FILM COATED ORAL DAILY
Status: DISCONTINUED | OUTPATIENT
Start: 2022-07-01 | End: 2022-07-02 | Stop reason: HOSPADM

## 2022-07-01 RX ORDER — POLYETHYLENE GLYCOL 3350 17 G/17G
17 POWDER, FOR SOLUTION ORAL DAILY
Status: DISCONTINUED | OUTPATIENT
Start: 2022-07-01 | End: 2022-07-02 | Stop reason: HOSPADM

## 2022-07-01 RX ORDER — METRONIDAZOLE 500 MG/1
500 TABLET ORAL EVERY 8 HOURS SCHEDULED
Status: DISCONTINUED | OUTPATIENT
Start: 2022-07-01 | End: 2022-07-02 | Stop reason: HOSPADM

## 2022-07-01 RX ADMIN — GABAPENTIN 600 MG: 300 CAPSULE ORAL at 20:40

## 2022-07-01 RX ADMIN — IPRATROPIUM BROMIDE AND ALBUTEROL SULFATE 1 AMPULE: .5; 3 SOLUTION RESPIRATORY (INHALATION) at 03:45

## 2022-07-01 RX ADMIN — POLYETHYLENE GLYCOL 3350 17 G: 17 POWDER, FOR SOLUTION ORAL at 11:19

## 2022-07-01 RX ADMIN — POTASSIUM CHLORIDE 20 MEQ: 20 TABLET, EXTENDED RELEASE ORAL at 18:20

## 2022-07-01 RX ADMIN — POTASSIUM CHLORIDE 20 MEQ: 20 TABLET, EXTENDED RELEASE ORAL at 08:39

## 2022-07-01 RX ADMIN — ATORVASTATIN CALCIUM 40 MG: 40 TABLET, FILM COATED ORAL at 20:40

## 2022-07-01 RX ADMIN — FUROSEMIDE 40 MG: 10 INJECTION, SOLUTION INTRAMUSCULAR; INTRAVENOUS at 08:39

## 2022-07-01 RX ADMIN — IPRATROPIUM BROMIDE AND ALBUTEROL SULFATE 1 AMPULE: .5; 3 SOLUTION RESPIRATORY (INHALATION) at 09:27

## 2022-07-01 RX ADMIN — TIOTROPIUM BROMIDE AND OLODATEROL 2 PUFF: 3.124; 2.736 SPRAY, METERED RESPIRATORY (INHALATION) at 09:29

## 2022-07-01 RX ADMIN — SODIUM CHLORIDE, PRESERVATIVE FREE 10 ML: 5 INJECTION INTRAVENOUS at 11:20

## 2022-07-01 RX ADMIN — IPRATROPIUM BROMIDE AND ALBUTEROL SULFATE 1 AMPULE: .5; 3 SOLUTION RESPIRATORY (INHALATION) at 16:00

## 2022-07-01 RX ADMIN — METRONIDAZOLE 500 MG: 500 TABLET ORAL at 20:40

## 2022-07-01 RX ADMIN — IPRATROPIUM BROMIDE AND ALBUTEROL SULFATE 1 AMPULE: .5; 3 SOLUTION RESPIRATORY (INHALATION) at 19:54

## 2022-07-01 RX ADMIN — CARVEDILOL 6.25 MG: 6.25 TABLET, FILM COATED ORAL at 18:25

## 2022-07-01 RX ADMIN — AMIODARONE HYDROCHLORIDE 200 MG: 200 TABLET ORAL at 08:39

## 2022-07-01 RX ADMIN — ASPIRIN 81 MG: 81 TABLET, COATED ORAL at 08:38

## 2022-07-01 RX ADMIN — METRONIDAZOLE 500 MG: 500 INJECTION, SOLUTION INTRAVENOUS at 04:17

## 2022-07-01 RX ADMIN — SENNOSIDES AND DOCUSATE SODIUM 2 TABLET: 50; 8.6 TABLET ORAL at 11:19

## 2022-07-01 RX ADMIN — CEFEPIME HYDROCHLORIDE 2000 MG: 2 INJECTION, POWDER, FOR SOLUTION INTRAVENOUS at 20:47

## 2022-07-01 RX ADMIN — CEFEPIME HYDROCHLORIDE 2000 MG: 2 INJECTION, POWDER, FOR SOLUTION INTRAVENOUS at 08:52

## 2022-07-01 RX ADMIN — IPRATROPIUM BROMIDE AND ALBUTEROL SULFATE 1 AMPULE: .5; 3 SOLUTION RESPIRATORY (INHALATION) at 00:37

## 2022-07-01 RX ADMIN — GABAPENTIN 600 MG: 300 CAPSULE ORAL at 14:13

## 2022-07-01 RX ADMIN — GABAPENTIN 600 MG: 300 CAPSULE ORAL at 08:38

## 2022-07-01 RX ADMIN — EMPAGLIFLOZIN 10 MG: 10 TABLET, FILM COATED ORAL at 08:38

## 2022-07-01 RX ADMIN — SODIUM CHLORIDE, PRESERVATIVE FREE 10 ML: 5 INJECTION INTRAVENOUS at 20:40

## 2022-07-01 RX ADMIN — PANTOPRAZOLE SODIUM 40 MG: 40 TABLET, DELAYED RELEASE ORAL at 05:30

## 2022-07-01 RX ADMIN — METRONIDAZOLE 500 MG: 500 TABLET ORAL at 14:13

## 2022-07-01 RX ADMIN — ENOXAPARIN SODIUM 30 MG: 100 INJECTION SUBCUTANEOUS at 08:39

## 2022-07-01 RX ADMIN — IPRATROPIUM BROMIDE AND ALBUTEROL SULFATE 1 AMPULE: .5; 3 SOLUTION RESPIRATORY (INHALATION) at 12:30

## 2022-07-01 RX ADMIN — IPRATROPIUM BROMIDE AND ALBUTEROL SULFATE 1 AMPULE: .5; 3 SOLUTION RESPIRATORY (INHALATION) at 23:55

## 2022-07-01 RX ADMIN — CARVEDILOL 6.25 MG: 6.25 TABLET, FILM COATED ORAL at 08:38

## 2022-07-01 ASSESSMENT — ENCOUNTER SYMPTOMS
CHOKING: 0
GASTROINTESTINAL NEGATIVE: 1
SHORTNESS OF BREATH: 1
EYES NEGATIVE: 1

## 2022-07-01 NOTE — CONSULTS
Consult received. I have met with Zuleika Lawson several times in the past. Will defer this consult at this time. She has very good understanding of her many medical conditions, including extensive lung cancer, and her chronic systolic/diastolic heart failure. She explained to me during her last admission, she understands she has very little time left, but she is not ready to give up just yet. She goes into pulm edema very quickly at home, despite adhering to the guidelines. . Will continue to follow along.

## 2022-07-01 NOTE — PROGRESS NOTES
Comprehensive Nutrition Assessment    Type and Reason for Visit:  Wound    Nutrition Recommendations/Plan:   Continue Carb control, Low Na diet with 1500 mL FR. Start Ensure HP daily. Malnutrition Assessment:  Malnutrition Status:  No malnutrition (07/01/22 1035)      Nutrition Assessment:    + nutrition screen, wound. Pt with PMH of small cell lung cancer and HF, presented with respiratory distress. Noted pt with hx of diet education. Pt wt fairly stable. Noted intakes >75% since admission. Pt with increased nutrition needs r/t wound healing. Will trial ONS. Nutrition Related Findings:    BM 6/28; Labs reviewed Wound Type: Pressure Injury,Stage II       Current Nutrition Intake & Therapies:    Average Meal Intake: %  Average Supplements Intake: None Ordered  ADULT DIET; Regular; 4 carb choices (60 gm/meal); Low Sodium (2 gm); 1500 ml    Anthropometric Measures:  Height: 5' 7\" (170.2 cm)  Ideal Body Weight (IBW): 135 lbs (61 kg)       Current Body Weight: 219 lb (99.3 kg), 162.2 % IBW. Weight Source: Standing Scale  Current BMI (kg/m2): 34.3                          BMI Categories: Obese Class 1 (BMI 30.0-34. 9)    Estimated Daily Nutrient Needs:        Energy (kcal/day): 4419-7203 kcal (15-18 kcal/kg 99 kg CBW)     Protein (g/day):  gm (1.4-2 mg/kg IBW)     Fluid (ml/day): per provider, 1500 mL FR    Nutrition Diagnosis:   · Increased nutrient needs related to increase demand for energy/nutrients as evidenced by wounds      Nutrition Interventions:   Food and/or Nutrient Delivery: Continue Current Diet,Start Oral Nutrition Supplement  Nutrition Education/Counseling: Education not indicated  Coordination of Nutrition Care: Continue to monitor while inpatient       Goals:     Goals: Meet at least 75% of estimated needs       Nutrition Monitoring and Evaluation:      Food/Nutrient Intake Outcomes: Food and Nutrient Intake,Supplement Intake  Physical Signs/Symptoms Outcomes: Biochemical Data,Weight,Skin,Nutrition Focused Physical Findings,Fluid Status or Edema    Discharge Planning:    Continue Oral Nutrition Supplement     Eveline Miles RD, LD  Contact: [phone]

## 2022-07-01 NOTE — PROGRESS NOTES
Infectious Disease Follow up Notes  Admit Date: 6/28/2022  Hospital Day: 4    Antibiotics : IV Cefepime  Oral   Flagyl     CHIEF COMPLAINT:     Left lower lobe PNA  Abnormal CT chest  Lung cancer    Subjective interval History :  65 y.o. woman recent d/c from [facility] from 6/15/22 to 6/21/22 on chronic Oxygen 3ts. ,  Significant history for small cell lung cancer recent admission, treated for postobstructive pneumonia. she was noted to be in pulmonary edema, acute systolic, treated with a course of diuretic. Also with Mitral regurgitation, acute kidney injury on previous admission improved creatinine from 1.2 at discharge. She has been receiving chemotherapy for her small cell lung cancer  by oncology. She was sent home for 3 days course of levofloxacin at CT. She is now admitted with acute respiratory distress currently using 6 L nasal cannula. Admission labs indicate creatinine of 1.5 lactic acid 2.2 [id number], WBC 25.1 post Filgrastin,  hemoglobin 7.9, MRSA screen negative blood culture in process.   Chest x-ray with left retrocardiac opacity, small right pleural effusion chest port in place, cardiac device on the left side with multiple leads.       Interval History : cough + sob + using 2  lts nasal cannula no chills feels congested and chest port working ok       Past Medical History:    Past Medical History:   Diagnosis Date    Asthma     Cardiomyopathy ([address]     CHF (congestive heart failure) ([address]     COPD (chronic obstructive pulmonary disease) ([address]     Diabetes mellitus ([address]     Essential hypertension     Hyperlipidemia     Hypertension     Obesity        Past Surgical History:    Past Surgical History:   Procedure Laterality Date    CARPAL TUNNEL RELEASE      CHOLECYSTECTOMY      CORONARY ANGIOPLASTY WITH STENT PLACEMENT  01/07/2013    Stent LAD    INCONTINENCE SURGERY      IR PORT PLACEMENT EQUAL OR GREATER THAN 5 YEARS Right 05/03/2022    Power port, inserted by Dr. Gabriel Chambers, cut at [address] 5 YEARS  5/3/2022    IR PORT PLACEMENT EQUAL OR GREATER THAN 5 YEARS 5/3/2022 [facility] SPECIAL PROCEDURES       Current Medications:    No outpatient medications have been marked as taking for the 6/28/22 encounter [facility] Encounter).        Allergies:  Ace inhibitors, Diclofenac, Losartan, Spironolactone, Vicodin hp [hydrocodone-acetaminophen], and Vicodin [hydrocodone-acetaminophen]    Immunizations :   Immunization History   Administered Date(s) Administered    Influenza Whole 09/12/2013    Influenza, Quadv, IM, PF (6 mo and older Fluzone, Flulaval, Fluarix, and 3 yrs and older Afluria) 10/13/2021       Social History:   Social History     Tobacco Use    Smoking status: Never Smoker    Smokeless tobacco: Never Used   Vaping Use    Vaping Use: Never used   Substance Use Topics    Alcohol use: Not Currently    Drug use: Not Currently     Social History     Tobacco Use   Smoking Status Never Smoker   Smokeless Tobacco Never Used      Family History   Problem Relation Age of Onset    High Blood Pressure Mother     Diabetes Mother     Cancer Mother         thyroid    Stroke Father          REVIEW OF SYSTEMS:     Constitutional:  negative for fevers, chills, night sweats  Eyes:  negative for blurred vision, eye discharge, visual disturbance   HEENT:  negative for hearing loss, ear drainage,nasal congestion  Respiratory:  cough,+  shortness of breath+  or hemoptysis   Cardiovascular:  negative for chest pain, palpitations, syncope  Gastrointestinal:  negative for nausea, vomiting, diarrhea, constipation, abdominal pain  Genitourinary:  negative for frequency, dysuria, urinary incontinence, hematuria  Hematologic/Lymphatic:  negative for easy bruising, bleeding and lymphadenopathy  Allergic/Immunologic:  negative for recurrent infections, angioedema, anaphylaxis   Endocrine:  negative for weight changes, polyuria, polydipsia and polyphagia  Musculoskeletal:  negative for joint  pain, swelling, decreased range of motion  Integumentary: No rashes, skin lesions  Neurological:  negative for headaches, slurred speech, unilateral weakness  Psychiatric: negative for hallucinations,confusion,agitation.                 PHYSICAL EXAM:      Vitals:    BP (!) 102/54   Pulse 76   Temp 97.6 °F (36.4 °C) (Oral)   Resp 20   Ht 5' 7\" (1.702 m)   Wt 219 lb 9.3 oz (99.6 kg)   SpO2 95%   BMI 34.39 kg/m²     General Appearance: alert,in some acute distress, ++  pallor, no icterus chronic ill appearing woman + alopecia+   Skin: warm and dry, no rash or erythema  Head: normocephalic and atraumatic  Eyes: pupils equal, round, and reactive to light, conjunctivae normal  ENT: tympanic membrane, external ear and ear canal normal bilaterally, nose without deformity, nasal mucosa and turbinates normal without polyps  Neck: supple and non-tender without mass, no thyromegaly  no cervical lymphadenopathy  Pulmonary/Chest: Left base coarse crepts++  no wheezes, rales or rhonchi, normal air movement, in some  respiratory distress  Cardiovascular:  S1 and S2, no murmurs, rubs, clicks, or gallops, no carotid bruits  Abdomen: soft, non-tender, non-distended, normal bowel sounds, no masses or organomegaly  Extremities: no cyanosis, clubbing or+edema  Musculoskeletal: normal range of motion, no joint swelling, deformity or tenderness  Integumentary: No rashes, no abnormal skin lesions, no petechiae  Neurologic: reflexes normal and symmetric, no cranial nerve deficit  Psych:  Orientation, sensorium, mood normal            Lines: chest port ++    Data Review:    CBC:   Lab Results   Component Value Date    WBC 25.6 (H) 07/01/2022    HGB 7.7 (L) 07/01/2022    HCT 23.9 (L) 07/01/2022    MCV 76.5 (L) 07/01/2022     07/01/2022     RENAL:   Lab Results   Component Value Date    CREATININE 1.3 (H) 07/01/2022    BUN 48 (H) 07/01/2022  07/01/2022    K 3.9 07/01/2022     07/01/2022    CO2 25 07/01/2022     SED RATE: No results found for: SEDRATE  CK:   Lab Results   Component Value Date/Time    CKTOTAL 113 12/15/2012 09:38 PM     CRP: No results found for: CRP  Hepatic Function Panel:   Lab Results   Component Value Date/Time    ALKPHOS 62 07/01/2022 05:35 AM    ALT 14 07/01/2022 05:35 AM    AST 14 07/01/2022 05:35 AM    PROT 5.4 07/01/2022 05:35 AM    PROT 7.6 06/05/2015 12:40 PM    BILITOT 0.5 07/01/2022 05:35 AM    BILIDIR <0.2 12/02/2021 04:54 AM    IBILI see below 12/02/2021 04:54 AM    LABALBU 3.4 07/01/2022 05:35 AM     UA:  Lab Results   Component Value Date/Time    COLORU Yellow 03/31/2022 11:51 AM    CLARITYU Clear 03/31/2022 11:51 AM    GLUCOSEU >=1000 03/31/2022 11:51 AM    BILIRUBINUR Negative 03/31/2022 11:51 AM    KETUA Negative 03/31/2022 11:51 AM    SPECGRAV 1.010 03/31/2022 11:51 AM    BLOODU Negative 03/31/2022 11:51 AM    PHUR 6.0 03/31/2022 11:51 AM    PROTEINU Negative 03/31/2022 11:51 AM    UROBILINOGEN 0.2 03/31/2022 11:51 AM    NITRU Negative 03/31/2022 11:51 AM    LEUKOCYTESUR Negative 03/31/2022 11:51 AM    LABMICR Not Indicated 03/31/2022 11:51 AM    URINETYPE NotGiven 03/31/2022 11:51 AM      Urine Microscopic:   Lab Results   Component Value Date/Time    BACTERIA 4+ 03/30/2022 09:28 PM    HYALCAST 4 03/30/2022 09:28 PM    WBCUA 6 03/30/2022 09:28 PM    RBCUA 1 03/30/2022 09:28 PM    EPIU 6 03/30/2022 09:28 PM     Urine Reflex to Culture:   Lab Results   Component Value Date/Time    URRFLXCULT Not Indicated 03/30/2022 09:28 PM         MICRO: cultures reviewed and updated by me   Blood Culture:   Lab Results   Component Value Date/Time    [facility]  06/28/2022 10:05 PM     No Growth to date. Any change in status will be called. Crissie Soulier  06/28/2022 11:27 PM     No Growth to date. Any change in status will be called.        Respiratory Culture:  No results found for: Harish Smoke  AFB:No results found for: AFBSMEAR  Viral Culture:  Lab Results   Component Value Date/Time    COVID19 Not Detected 06/28/2022 11:27 PM     Urine Culture: No results for input(s): [place] Dust in the last 72 hours. IMAGING:     CT chest  :  6/16/22:      Impression   1.  Left lower lobe is completely opacified and has air bronchograms and   could relate to combination of compressive atelectasis and pneumonia.  Other   areas of nodular opacity and peripheral consolidative areas in the lungs   suggesting multifocal pneumonia.  However, there is also interlobular septal   thickening, ground-glass opacities, and bilateral pleural effusions   suggesting a component of CHF as well.       2.  Previous more focal mass in the left lower lobe is not evaluated given   the completely opacified lung.  Limited assessment of lymphadenopathy.       3.  Multinodular thyroid gland with the larger nodule in the right side   measuring up to 2.5 cm.  Can have non emergent thyroid ultrasound if not   previously worked up.              XR CHEST PORTABLE   Final Result   Left retrocardiac opacity along with right basilar airspace opacities,   appearing slightly more prominent than the prior exam.  This could represent   atelectasis and/or infiltrate. Possible left pleural effusion. XR CHEST PORTABLE   Final Result   Interstitial opacities bilaterally could represent pulmonary edema and/or   infection. Left retrocardiac opacity could represent a layering pleural effusion,   atelectasis, and/or infiltrate.                All the pertinent images and reports for the current Hospitalization were reviewed by me     Scheduled Meds:   sennosides-docusate sodium  2 tablet Oral Daily    polyethylene glycol  17 g Oral Daily    cefepime  2,000 mg IntraVENous Q12H    empagliflozin  10 mg Oral Daily    potassium chloride  20 mEq Oral BID WC    metroNIDAZOLE  500 mg IntraVENous Q8H    amiodarone  200 mg Oral Daily    aspirin  81 mg Oral Daily    atorvastatin  40 mg Oral Nightly    carvedilol  6.25 mg Oral BID WC    gabapentin  600 mg Oral TID    pantoprazole  40 mg Oral QAM AC    tiotropium-olodaterol  2 puff Inhalation Daily    Vericiguat  5 mg Oral QAM    sodium chloride flush  5-40 mL IntraVENous 2 times per day    insulin lispro  0-6 Units SubCUTAneous TID WC    insulin lispro  0-3 Units SubCUTAneous Nightly    furosemide  40 mg IntraVENous BID    ipratropium-albuterol  1 ampule Inhalation Q4H    enoxaparin  30 mg SubCUTAneous BID       Continuous Infusions:   sodium chloride      dextrose         PRN Meds:  sodium chloride flush, sodium chloride, acetaminophen **OR** acetaminophen, ondansetron, glucose, dextrose bolus **OR** dextrose bolus, glucagon (rDNA), dextrose, albuterol sulfate HFA      Assessment:     Patient Active Problem List   Diagnosis    Essential hypertension    [facility]omyopathy [facility]    CHF (congestive heart failure) (HCC)    COPD (chronic obstructive pulmonary disease) (HCC)    Chest pain    NEGIN treated with BiPAP    DMII (diabetes mellitus, type 2) (HCC)    Iron deficiency anemia    Intestinal malabsorption    Iron deficiency anemia due to chronic blood loss    Treatment    Anemia    Acute on chronic systolic CHF (congestive heart failure), NYHA class 3 (HCC)    Hyperlipidemia    Morbid obesity due to excess calories (HCC)    Acute pulmonary edema (HCC)    Acute respiratory failure with hypercarbia (HCC)    Congestive heart failure with left ventricular dysfunction (HCC)    Acute decompensated heart failure (HCC)    Acute kidney injury superimposed on chronic kidney disease (HCC)    COPD exacerbation (HCC)    Demand ischemia of myocardium (HCC)    Non morbid obesity due to excess calories    Acute respiratory failure with hypoxia and hypercapnia (HCC)    Essential hypertension    Hyperlipidemia    DMII (diabetes mellitus, type 2) (HCC)    CAD (coronary artery disease)    Res[facility] failure [facility]    HCAP (healthcare-associated pneumonia)    Acute respiratory failure ([facility]    Acute respiratory distress    Acute on chronic respiratory failure (HCC)    Small cell lung cancer (HCC)    Dyspnea    History of lung cancer        Acute on chronic Hypoxic resp failure  On home OXYGEN   Left Lung cancer small cell cancer  S/p Chemo and immune therapy  Chest port in place  CAD  CHF  DM+  Obesity BMI at  34   AIDA  Pulmonary edema   NEGIN on Bi PAP  Left lower lobe Post obstructive PNA  Recurrent Admit for Resp failure from Edema and PNA  This is her 3rd admit per patient  CT chest abnormal from 6/16     Given recurrent admit with PNA and immune suppressed state will cont IV abX AT d/c - OPAT d/w pt and she has chest port in place    Labs, Microbiology, Radiology and all the pertinent results from current hospitalization and  care every where were reviewed  by me as a part of the evaluation   Plan:   1. Cont IV Cefepime 2 gm Q 12 hrs x 14 days at d/c stop 7/15  2. Has chest port in place we can complete IV abx at home  3. Immune suppressed from Cancer  4. Off IV Vancomycin as MRSA probe -ve  5. WBC reactive post Filgrastim   6. CT chest images from 6/16 reviewed  7. OPAT d/w pt   8. Oral Flagyl x 500 mg x q8 HR  X7 DAYS total  9. MATILDA done - d/c when ok with primary          Discussed with patient/Family and Nursing   Risk of Complications/Morbidity: High      · Illness(es)/ Infection present that pose threat to bodily function. · There is potential for severe exacerbation of infection/side effects of treatment. · Therapy requires intensive monitoring for antimicrobial agent toxicity. Discussed with patient/Family and Nursing staff     Thanks for allowing me to participate in your patient's care and please call me with any questions or concerns.     Gemini Torre MD  Infectious Disease  [facility] [place] ([facility]) Physician  Phone: [phone]   Fax : [phone]

## 2022-07-01 NOTE — CARE COORDINATION
[facility]    Demos and docs faxed to [facility] in anticipation of weekend DC. If patient discharges over the weekend, please fax order and MATILDA  to [facility] at [phone] upon discharge.     Thank you,      Reymundo Busch RN, BSN CTN  [facility] [phone]

## 2022-07-01 NOTE — PLAN OF CARE
Problem: Discharge Planning  Goal: Discharge to home or other facility with appropriate resources  Outcome: Progressing     Problem: Safety - Adult  Goal: Free from fall injury  Outcome: Progressing     Problem: ABCDS Injury Assessment  Goal: Absence of physical injury  Outcome: Progressing  Flowsheets (Taken 7/1/2022 1038)  Absence of Physical Injury: Implement safety measures based on patient assessment     Problem: Nutrition Deficit:  Goal: Optimize nutritional status  7/1/2022 1051 by Luba Morris RN  Outcome: Progressing     Problem: Skin/Tissue Integrity  Goal: Absence of new skin breakdown  Description: 1. Monitor for areas of redness and/or skin breakdown  2. Assess vascular access sites hourly  3. Every 4-6 hours minimum:  Change oxygen saturation probe site  4. Every 4-6 hours:  If on nasal continuous positive airway pressure, respiratory therapy assess nares and determine need for appliance change or resting period.   Outcome: Progressing

## 2022-07-01 NOTE — PROGRESS NOTES
[facility] PROGRESS NOTE     Patient name:Diane Ramirez    NZY:[id number] :1960  Room/Bed:X6P-6774/5262-01    LOS: 2 days        ASSESSMENT/RECOMMENDATIONS     65 y.o. female with hypoxia and debility.      Symptom Management:  1. Hypoxia: Patient was on 2 liters of oxygen via a nasal cannula. 2. Debility: Patient was again lethargic and pale today. Patient appeared to be more fatigued and weak while lying in the bed today. Patient appears to continue to require some ADL assistance. 3. Goals of Care: Again met the patient at her bedside. Patient was lethargic but again oriented x 4 today. Patient again appeared to be decisional today. Again reviewed the patient's health status, goals and code status. Patient again indicated she wishes to continue with aggressive treatment. Patient also would like to remain a Full Code.      Patient/Family Goals of Care :     - Again met the patient at her bedside. Patient was lethargic but again oriented x 4 today. Patient again appeared to be decisional today. Again reviewed the patient's health status, goals and code status. Patient again indicated she wishes to continue with aggressive treatment. Patient also would like to remain a Full Code.  - Met patient at her bedside. Patient was oriented x 4 and appeared to be decisional today. Reviewed patient's current health condition, goals of care and code status. Patient indicated she would like to pursue all aggressive treatment at this time and resume [facility] services post her hospital stay.   Code status was reviewed and the patient wishes to remain a Full Code.      Disposition/Discharge Plan:   Patient previously indicated she will be continuing with [facility] and meeting with [facility] once she returns home. Keren Kim in case an outpatient [facility] referral was ordered for post-discharge to followup with the patient once they return home.     Advance Directives:  · Surrogate Decision Maker: Donald Mendoza, daughter and POA. · Code status:  Full Code      Case discussed with: patient  Thank you for allowing us to participate in the care of this patient. SUBJECTIVE     Chief Complaint: Hypoxia. Last 24 hours:   Patient was lethargic but again oriented x 4 today. Patient again indicated she wishes to continue with aggressive treatment. Patient also would like to remain a Full Code. ROS:    Review of Systems   Constitutional: Positive for fatigue. HENT: Negative. Eyes: Negative. Respiratory: Positive for shortness of breath. Negative for choking. Cardiovascular: Negative. Gastrointestinal: Negative. Musculoskeletal: Negative. Skin: Positive for pallor. Neurological: Positive for weakness. Psychiatric/Behavioral: Negative. Negative for agitation, behavioral problems and confusion. All other systems reviewed and are negative. A complete 10 count ROS was obtained. Pertinent positives mentioned above in HPI/ROS. All others if not mentioned are negative. OBJECTIVE   BP (!) 102/54   Pulse 80   Temp 97.6 °F (36.4 °C) (Oral)   Resp 21   Ht 5' 7\" (1.702 m)   Wt 219 lb 9.3 oz (99.6 kg)   SpO2 99%   BMI 34.39 kg/m²   I/O last 3 completed shifts: In: 660 [P.O.:660]  Out: 2011 [IJEJB:3396]  I/O this shift: In: 440 [P.O.:440]  Out: 1150 [Urine:1150]      Physical Exam  Vitals reviewed. Constitutional:       Appearance: She is ill-appearing. Interventions: Nasal cannula in place. HENT:      Head: Normocephalic. Nose: Nose normal.   Eyes:      Pupils: Pupils are equal, round, and reactive to light. Cardiovascular:      Rate and Rhythm: Normal rate. Pulses: Normal pulses. Pulmonary:      Comments: Breath sounds again clear but diminished bilaterally. Abdominal:      General: Bowel sounds are normal.      Palpations: Abdomen is soft. Musculoskeletal:      Cervical back: Neck supple. Right lower leg: No edema.       Left lower leg: No edema. Skin:     General: Skin is warm and dry. Coloration: Skin is pale. Neurological:      Comments: Again oriented x4   Psychiatric:         Mood and Affect: Mood normal.         Thought Content:  Thought content normal.         Judgment: Judgment normal.         Total time: 36 minutes  >50% of time spent counseling patient at bedside or POA/family member if applicable , reviewing information and discussing care, coordinating with care team       Signed By: Electronically signed by AME Rosa CNP on 7/1/2022 at 3:05 PM   Palliative Medicine   [phone]    July 1, 2022

## 2022-07-01 NOTE — PROGRESS NOTES
Hospitalist Progress Note      PCP: Meggan Gonzalez MD    Date of Admission: 6/28/2022    Chief Complaint:   Chief Complaint   Patient presents with    Respiratory [address] Course: 50-year-old female with past medical history significant for stage IV lung cancer currently undergoing chemotherapy, chronic respiratory failure on home O2 3 L,systolic heart failure with EF less than 97%, grade 3 diastolic dysfunctio presents acute hypoxic respiratory failure possibly from a combination of acute on chronic CHF exacerbation and HCAP.    6/29  Patient feeling better today. She remains on 3 L O2 per nasal cannula, which is her baseline at home. 6/30  Patient feeling better today. O2 saturations are down to 2 L. WBC count went from 2.7-25,000 overnight, suspecting that this could be a lab error. She has been afebrile overnight. MRSA probe was negative. We will therefore discontinue vancomycin. Add  Flagyl to cover for anaerobes    7/1  Afebrile overnight. Patient continues to receive granulocyte stimulating agent. Filgrastim, continues to drive leukocytosis.   This was discussed with oncologist.  Plan to discontinue Filgrastim today     Subjective: as above      Medications:  Reviewed    Infusion Medications    sodium chloride      dextrose       Scheduled Medications    sennosides-docusate sodium  2 tablet Oral Daily    polyethylene glycol  17 g Oral Daily    metroNIDAZOLE  500 mg Oral 3 times per day    [START ON 7/2/2022] enoxaparin  40 mg SubCUTAneous Daily    cefepime  2,000 mg IntraVENous Q12H    empagliflozin  10 mg Oral Daily    potassium chloride  20 mEq Oral BID WC    amiodarone  200 mg Oral Daily    aspirin  81 mg Oral Daily    atorvastatin  40 mg Oral Nightly    carvedilol  6.25 mg Oral BID WC    gabapentin  600 mg Oral TID    pantoprazole  40 mg Oral QAM AC    tiotropium-olodaterol  2 puff Inhalation Daily    Vericiguat  5 mg Oral QAM    sodium chloride flush 5-40 mL IntraVENous 2 times per day    insulin lispro  0-6 Units SubCUTAneous TID     insulin lispro  0-3 Units SubCUTAneous Nightly    furosemide  40 mg IntraVENous BID    ipratropium-albuterol  1 ampule Inhalation Q4H     PRN Meds: sodium chloride flush, sodium chloride, acetaminophen **OR** acetaminophen, ondansetron, glucose, dextrose bolus **OR** dextrose bolus, glucagon (rDNA), dextrose, albuterol sulfate HFA      Intake/Output Summary (Last 24 hours) at 7/1/2022 1359  Last data filed at 7/1/2022 1130  Gross per 24 hour   Intake 570 ml   Output 2550 ml   Net -1980 ml       Physical Exam Performed:    BP (!) 102/54   Pulse 80   Temp 97.6 °F (36.4 °C) (Oral)   Resp 21   Ht 5' 7\" (1.702 m)   Wt 219 lb 9.3 oz (99.6 kg)   SpO2 99%   BMI 34.39 kg/m²     General appearance: No apparent distress, appears stated age and cooperative. HEENT: Pupils equal, round, and reactive to light. Conjunctivae/corneas clear. Neck: Supple, with full range of motion. No jugular venous distention. Trachea midline. Respiratory:  Normal respiratory effort. Clear to auscultation, bilaterally without Rales/Wheezes/Rhonchi. Cardiovascular: Regular rate and rhythm with normal S1/S2 without murmurs, rubs or gallops. Abdomen: Soft, non-tender, non-distended with normal bowel sounds. Musculoskeletal: No clubbing, cyanosis or edema bilaterally. Full range of motion without deformity. Skin: Skin color, texture, turgor normal.  No rashes or lesions. Neurologic:  Neurovascularly intact without any focal sensory/motor deficits.  Cranial nerves: II-XII intact, grossly non-focal.  Psychiatric: Alert and oriented, thought content appropriate, normal insight  Capillary Refill: Brisk,3 seconds, normal   Peripheral Pulses: +2 palpable, equal bilaterally       Labs:   Recent Labs     06/29/22  0622 06/30/22  0510 07/01/22  0535   WBC 2.7* 25.1* 25.6*   HGB 8.9* 7.9* 7.7*   HCT 27.7* 25.1* 23.9*    276 256     Recent Labs 06/29/22  0622 06/30/22  0510 07/01/22  0535    143 136   K 3.9 3.6 3.9    103 100   CO2 28 25 25   BUN 35* 43* 48*   CREATININE 1.5* 1.4* 1.3*   CALCIUM 9.3 8.8 8.8     Recent Labs     06/29/22  0622 06/30/22  0510 07/01/22  0535   AST 21 16 14*   ALT 16 14 14   BILITOT 0.4 0.4 0.5   ALKPHOS 76 61 62     No results for input(s): INR in the last 72 hours. Recent Labs     06/28/22  2205   TROPONINI <0.01       Urinalysis:      Lab Results   Component Value Date/Time    NITRU Negative 03/31/2022 11:51 AM    WBCUA 6 03/30/2022 09:28 PM    BACTERIA 4+ 03/30/2022 09:28 PM    RBCUA 1 03/30/2022 09:28 PM    BLOODU Negative 03/31/2022 11:51 AM    SPECGRAV 1.010 03/31/2022 11:51 AM    GLUCOSEU >=1000 03/31/2022 11:51 AM       Radiology:  XR CHEST PORTABLE   Final Result   Left retrocardiac opacity along with right basilar airspace opacities,   appearing slightly more prominent than the prior exam.  This could represent   atelectasis and/or infiltrate. Possible left pleural effusion. XR CHEST PORTABLE   Final Result   Interstitial opacities bilaterally could represent pulmonary edema and/or   infection. Left retrocardiac opacity could represent a layering pleural effusion,   atelectasis, and/or infiltrate.                  Assessment/Plan:    Active Hospital Problems    Diagnosis     Small cell lung cancer ([facility] [C34.90]      Priority: Medium    Dyspnea [R06.00]      Priority: Medium    History of lung cancer [Z85.118]      Priority: Medium    Acute on chronic respiratory failure (HCC) [J96.20]      Priority: Medium    Acute respiratory distress [R06.03]      Priority: Medium    HCAP (healthcare-associated pneumonia) [J18.9]      Priority: Medium    DMII (diabetes mellitus, type 2) ([facility] [E11.9]     Acute pulmonary edema ([facility] [J81.0]     DMII (diabetes mellitus, type 2) ([address] [E11.9]     NEGIN treated with BiPAP [G47.33]     COPD (chronic obstructive pulmonary disease) ([facility] [J44.9]

## 2022-07-01 NOTE — PROGRESS NOTES
Physical Therapy  Facility/Department: [facility] 5N PROGRESSIVE CARE  Physical Therapy Treatment Session    Name: Ladan Hunt  : 1960  MRN: [id number]  Date of Service: 2022    Discharge Recommendations:  Home with assist PRN,No therapy recommended at discharge   PT Equipment Recommendations  Equipment Needed: Rachna [facility] scored a 21/24 on the AM-PAC short mobility form. At this time, no further PT is recommended upon discharge due to patient functioning at baseline. Recommend patient returns to prior setting with prior services. This note serves as D/C summary if patient is discharged prior to next treatment session. Patient Diagnosis(es): The primary encounter diagnosis was Acute respiratory distress. Diagnoses of Dyspnea, unspecified type, History of lung cancer, and Elevated brain natriuretic peptide (BNP) level were also pertinent to this visit. Past Medical History:  has a past medical history of Asthma, Cardiomyopathy ([facility] CHF (congestive heart failure) ([facility] COPD (chronic obstructive pulmonary disease) ([facility] Diabetes mellitus ([facility] Essential hypertension, Hyperlipidemia, Hypertension, and Obesity. Past Surgical History:  has a past surgical history that includes Cholecystectomy; Carpal tunnel release; Incontinence surgery; Coronary angioplasty with stent (2013); IR PORT PLACEMENT > 5 YEARS (Right, 2022); and IR PORT PLACEMENT > 5 YEARS (5/3/2022). Assessment   Body Structures, Functions, Activity Limitations Requiring Skilled Therapeutic Intervention: Decreased functional mobility ; Decreased endurance  Assessment: 66 y/o female presenting to ED with respiratory distress. She has a history of COPD with emphysema, CHF and lung cancer. Pt is still functioning well on 2L O2. Today, pt stated chronic back pain 3/10. Pt was able sit<-> stand SBA. She was able to amb ~10' to attempt to use the restroom and then ~120' around the room with RW SBA.  Pt would benefit continued PT treatment during acute care stay to help with endurance but do not anticipate need for continued PT upon d/c. Therapy Prognosis: Good  Decision Making: Medium Complexity  Requires PT Follow-Up: Yes  Activity Tolerance  Activity Tolerance: Patient tolerated treatment well;Patient limited by endurance     Plan   Plan  Plan: 3-5 times per week  Plan weeks: during acute stay  Current Treatment Recommendations: Functional mobility training,Home exercise program,Endurance training,Gait training,Stair training,Transfer training  Safety Devices  Type of Devices: Call light within reach,Gait belt,Left in bed  Restraints  Restraints Initially in Place: No     Restrictions  Restrictions/Precautions  Restrictions/Precautions:  (Recently recieved chemo)  Implants present? : Pacemaker     Subjective   General  Chart Reviewed: Yes  Patient assessed for rehabilitation services?: Yes  Additional Pertinent Hx: Anthony Lopez is a 65y.o. year old female with a history of extensive stage small cell who presents with severe shortness of breath no acute onset. Patient states symptoms present for a couple of days. Progressively and acutely worsened. Response To Previous Treatment: Patient with no complaints from previous session. Family / Caregiver Present: No  Referring Practitioner: Bekah Tay MD  Referral Date : 06/29/22  Follows Commands: Within Functional Limits  Subjective  Subjective: Pt was sitting up in bed eating breakfast upon arrival. She was agreeable to ambulating in the room. Reports chronic back pain 3/10.          Social/Functional History  Social/Functional History  Lives With: Alone  Type of Home: Apartment  Home Layout: One level  Home Access: Stairs to enter with rails  Entrance Stairs - Number of Steps: 2 ARIS (uses wall and cane) into building then 5 steps to basement level with rail L ascending  Bathroom Shower/Tub: Tub/Shower unit  Bathroom Toilet:  (RTS without handles or uses BSC)  Bathroom Equipment: Tub transfer bench,Hand-held shower  Home Equipment: [employer], 4 wheeled,Reacher  ADL Assistance: Independent  Homemaking Assistance: Needs assistance (someone cleans, does laundry, and picks up prescription for her)  Ambulation Assistance: Independent  Transfer Assistance: Independent  Active : No  Occupation: On disability    Objective   Bed mobility  Bed Mobility Comments: Unable to assess, pt was sitting in bed at beginning and end of session. Transfers  Sit to Stand: Stand by assistance  Stand to sit: Stand by assistance     Ambulation  Surface: level tile  Device: Rolling Walker  Other Apparatus: O2  Assistance: Stand by assistance  Gait Deviations: Decreased step length;Decreased step height  Distance: ~10', ~120 within the room. Comments: Patient stated slight SOB with amb; SpO2 stayed within the high 90s throughout. Balance  Sitting - Static: Good  Sitting - Dynamic: Good  Standing - Static: Fair;+ (with RW)  Standing - Dynamic: Fair;+    AM-PAC Score  AM-PAC Inpatient Mobility Raw Score : 21 (07/01/22 0845)  AM-PAC Inpatient T-Scale Score : 50.25 (07/01/22 0845)  Mobility Inpatient CMS 0-100% Score: 28.97 (07/01/22 0845)  Mobility Inpatient CMS G-Code Modifier : CJ (07/01/22 0845)        Goals  Short Term Goals  Time Frame for Short term goals: Upon d/c  Short term goal 1: transfers mod-I  Short term goal 2: ambulate 48' mod-I  Short term goal 3: curb step with RW SBA  Additional Goals?: No  Patient Goals   Patient goals : None stated.        Education  Patient Education  Education Given To: Patient  Education Provided: Role of Therapy;Plan of Care  Education Method: Verbal  Barriers to Learning: None  Education Outcome: Verbalized understanding      Therapy Time   Individual Concurrent Group Co-treatment   Time In [phone]         Time Out 0845         Minutes 26         Timed Code Treatment Minutes: 26 Minutes     Electronically signed by Randell Bowling PAVEL Sheppard on 7/1/2022 at 8:47 AM  Therapist was present, directed the patient's care, made skilled judgement, and was responsible for assessment and treatment of the patient.       Electronically signed by Kailey Johnson, PT [id number] on 7/1/2022 at 12:01 PM

## 2022-07-01 NOTE — PROGRESS NOTES
Pulmonary Progress Note    Date of Admission: 6/28/2022   LOS: 2 days     Chief Complaint   Patient presents with    Respiratory Distress       Assessment/Plan:     chronic hypoxemic respiratory failure with SPO2 less than 90% on room air  -At or below home oxygen requirement    Acute on chronic systolic heart failure LVEF 25%  Severe mitral regurg  -Diuresis per primary     Postobstructive pneumonia  -ID following, plan for home IV cefepime through port    Extensive stage small cell lung cancer  -Currently on Tecentriq    No further inpatient recommendations, we will sign off at this time. Please let us know if we can be of any further assistance. 24 Hour Events/Subjective  No acute events overnight. Plan for home IV antibiotics. ROS:   No nausea  No Vomiting  No chest pain      Intake/Output Summary (Last 24 hours) at 7/1/2022 0821  Last data filed at 7/1/2022 0639  Gross per 24 hour   Intake 450 ml   Output 1400 ml   Net -950 ml         PHYSICAL EXAM:   Blood pressure (!) 116/59, pulse 74, temperature 98.2 °F (36.8 °C), temperature source Oral, resp. rate 17, height 5' 7\" (1.702 m), weight 219 lb 9.3 oz (99.6 kg), SpO2 98 %.'  Gen:  No acute distress. Eyes: PERRL. Anicteric sclera. No conjunctival injection. ENT: No discharge. Posterior oropharynx clear. External appearance of ears and nose normal.  Neck: Trachea midline. No mass   Resp:  No crackles. No wheezes. No rhonchi. No dullness on percussion. CV: Regular rate. Regular rhythm. No murmur or rub. No edema. GI: Soft, Non-tender. Non-distended. +BS  Skin: Warm, dry, w/o erythema. Lymph: No cervical or supraclavicular LAD. M/S: No cyanosis. No clubbing. Neuro:  no focal neurologic deficit. Moves all extremities  Psych: Awake and alert, Oriented x 3. Judgement and insight appropriate. Mood stable.       Medications:    Scheduled Meds:   cefepime  2,000 mg IntraVENous Q12H    empagliflozin  10 mg Oral Daily    potassium chloride  20 mEq Oral BID     metroNIDAZOLE  500 mg IntraVENous Q8H    amiodarone  200 mg Oral Daily    aspirin  81 mg Oral Daily    atorvastatin  40 mg Oral Nightly    carvedilol  6.25 mg Oral BID WC    gabapentin  600 mg Oral TID    pantoprazole  40 mg Oral QAM AC    tiotropium-olodaterol  2 puff Inhalation Daily    Vericiguat  5 mg Oral QAM    sodium chloride flush  5-40 mL IntraVENous 2 times per day    insulin lispro  0-6 Units SubCUTAneous TID WC    insulin lispro  0-3 Units SubCUTAneous Nightly    furosemide  40 mg IntraVENous BID    ipratropium-albuterol  1 ampule Inhalation Q4H    enoxaparin  30 mg SubCUTAneous BID    filgrastim-aafi  480 mcg SubCUTAneous Daily       Continuous Infusions:   sodium chloride      dextrose         PRN Meds:  sodium chloride flush, sodium chloride, acetaminophen **OR** acetaminophen, ondansetron, glucose, dextrose bolus **OR** dextrose bolus, glucagon (rDNA), dextrose, albuterol sulfate HFA    Labs reviewed:  CBC:   Recent Labs     06/29/22 0622 06/30/22  0510 07/01/22  0535   WBC 2.7* 25.1* 25.6*   HGB 8.9* 7.9* 7.7*   HCT 27.7* 25.1* 23.9*   MCV 76.8* 77.9* 76.5*    276 256     BMP:   Recent Labs     06/29/22 0622 06/30/22  0510 07/01/22  0535    143 136   K 3.9 3.6 3.9    103 100   CO2 28 25 25   BUN 35* 43* 48*   CREATININE 1.5* 1.4* 1.3*     LIVER PROFILE:   Recent Labs     06/29/22 0622 06/30/22  0510 07/01/22  0535   AST 21 16 14*   ALT 16 14 14   BILITOT 0.4 0.4 0.5   ALKPHOS 76 61 62     PT/INR: No results for input(s): PROTIME, INR in the last 72 hours. APTT: No results for input(s): APTT in the last 72 hours. UA:No results for input(s): NITRITE, COLORU, PHUR, LABCAST, WBCUA, RBCUA, MUCUS, TRICHOMONAS, YEAST, BACTERIA, CLARITYU, SPECGRAV, LEUKOCYTESUR, UROBILINOGEN, BILIRUBINUR, BLOODU, GLUCOSEU, AMORPHOUS in the last 72 hours.     Invalid input(s): Pichardo Collar  No results for input(s): PH, PCO2, PO2 in the last 72 hours.      Films:  Radiology Review:  Pertinent images / reports were reviewed as a part of this visit. XR CHEST PORTABLE  Narrative: EXAMINATION:  ONE XRAY VIEW OF THE CHEST    6/29/2022 5:05 am    COMPARISON:  06/28/2022    HISTORY:  ORDERING SYSTEM PROVIDED HISTORY: SOB  TECHNOLOGIST PROVIDED HISTORY:  Reason for exam:->SOB  Reason for Exam: SOB    FINDINGS:  Heart is similar in size to the prior study. There is a left retrocardiac  opacity. Right basilar airspace opacity appears slightly more prominent than  the prior exam.  No measurable pneumothorax is seen. Similar appearance of  the right-sided port catheter. There is a left-sided cardiac device with  multiple leads. Impression: Left retrocardiac opacity along with right basilar airspace opacities,  appearing slightly more prominent than the prior exam.  This could represent  atelectasis and/or infiltrate. Possible left pleural effusion. XR CHEST PORTABLE  Narrative: EXAMINATION:  ONE XRAY VIEW OF THE CHEST    6/28/2022 10:18 pm    COMPARISON:  06/17/2022    HISTORY:  ORDERING SYSTEM PROVIDED HISTORY: sob  TECHNOLOGIST PROVIDED HISTORY:  Reason for exam:->sob  Reason for Exam: Respiratory Distress    FINDINGS:  The heart is enlarged but is similar in size to the prior study. Possible  left pleural effusion. No pneumothorax is seen. There is a left  retrocardiac opacity. Improved aeration of the right lung base compared to  the prior exam.  Again seen are interstitial opacities bilaterally. Similar  appearance of the right-sided port catheter. There is a left-sided cardiac  device with multiple leads. Impression: Interstitial opacities bilaterally could represent pulmonary edema and/or  infection. Left retrocardiac opacity could represent a layering pleural effusion,  atelectasis, and/or infiltrate. This note was transcribed using [id number]. Please disregard any translational errors.     Thank you for this consult,    Bravo Pina MD  [facility] Pulmonary, Critical Care, and Sleep Medicine

## 2022-07-01 NOTE — CARE COORDINATION
Reviewed ID recs for outpatient IV antibiotics. Looks like this will be needed at discharge. Informed [facility] of need for IVAB at [place]. Left message with Krysten at [facility], await return call. Electronically signed by Gurpreet Macedo RN Case Management [phone] on 7/1/2022 at 9:35 AM     Messaged Dr Moo Chavez for [address] completion if anticipating discharge today or this weekend. Needs to be completed as early possible to facilitate home care coverage for holiday weekend. Electronically signed by Gurpreet Macedo RN Case Management [phone] on 7/1/2022 at 11:10 AM     Spoke to Mercy Moore with [facility]. Patient is covered at 100% for IV antibiotics at discharge.   Electronically signed by Gurpreet Macedo RN on 7/1/2022 at 2:22 PM

## 2022-07-02 VITALS
RESPIRATION RATE: 19 BRPM | BODY MASS INDEX: 34.67 KG/M2 | HEART RATE: 74 BPM | SYSTOLIC BLOOD PRESSURE: 105 MMHG | HEIGHT: 67 IN | OXYGEN SATURATION: 97 % | TEMPERATURE: 98.1 F | WEIGHT: 220.9 LBS | DIASTOLIC BLOOD PRESSURE: 56 MMHG

## 2022-07-02 LAB
A/G RATIO: 1.3 (ref 1.1–2.2)
ALBUMIN SERPL-MCNC: 3.1 G/DL (ref 3.4–5)
ALP BLD-CCNC: 69 U/L (ref 40–129)
ALT SERPL-CCNC: 16 U/L (ref 10–40)
ANION GAP SERPL CALCULATED.3IONS-SCNC: 12 MMOL/L (ref 3–16)
AST SERPL-CCNC: 17 U/L (ref 15–37)
BASOPHILS ABSOLUTE: 0 K/UL (ref 0–0.2)
BASOPHILS RELATIVE PERCENT: 0.2 %
BILIRUB SERPL-MCNC: 0.5 MG/DL (ref 0–1)
BUN BLDV-MCNC: 34 MG/DL (ref 7–20)
CALCIUM SERPL-MCNC: 8.8 MG/DL (ref 8.3–10.6)
CHLORIDE BLD-SCNC: 103 MMOL/L (ref 99–110)
CO2: 26 MMOL/L (ref 21–32)
CREAT SERPL-MCNC: 1.1 MG/DL (ref 0.6–1.2)
EOSINOPHILS ABSOLUTE: 0.1 K/UL (ref 0–0.6)
EOSINOPHILS RELATIVE PERCENT: 0.9 %
GFR AFRICAN AMERICAN: >60
GFR NON-AFRICAN AMERICAN: 50
GLUCOSE BLD-MCNC: 136 MG/DL (ref 70–99)
GLUCOSE BLD-MCNC: 94 MG/DL (ref 70–99)
GLUCOSE BLD-MCNC: 95 MG/DL (ref 70–99)
HCT VFR BLD CALC: 24.5 % (ref 36–48)
HEMOGLOBIN: 7.9 G/DL (ref 12–16)
LYMPHOCYTES ABSOLUTE: 0.5 K/UL (ref 1–5.1)
LYMPHOCYTES RELATIVE PERCENT: 4.7 %
MCH RBC QN AUTO: 24.6 PG (ref 26–34)
MCHC RBC AUTO-ENTMCNC: 32.1 G/DL (ref 31–36)
MCV RBC AUTO: 76.7 FL (ref 80–100)
MONOCYTES ABSOLUTE: 0 K/UL (ref 0–1.3)
MONOCYTES RELATIVE PERCENT: 0.3 %
NEUTROPHILS ABSOLUTE: 10.7 K/UL (ref 1.7–7.7)
NEUTROPHILS RELATIVE PERCENT: 93.9 %
PDW BLD-RTO: 27.5 % (ref 12.4–15.4)
PERFORMED ON: ABNORMAL
PERFORMED ON: NORMAL
PLATELET # BLD: 229 K/UL (ref 135–450)
PMV BLD AUTO: 9.1 FL (ref 5–10.5)
POTASSIUM SERPL-SCNC: 4.3 MMOL/L (ref 3.5–5.1)
RBC # BLD: 3.19 M/UL (ref 4–5.2)
SODIUM BLD-SCNC: 141 MMOL/L (ref 136–145)
TOTAL PROTEIN: 5.4 G/DL (ref 6.4–8.2)
WBC # BLD: 11.4 K/UL (ref 4–11)

## 2022-07-02 PROCEDURE — 80053 COMPREHEN METABOLIC PANEL: CPT

## 2022-07-02 PROCEDURE — 2580000003 HC RX 258: Performed by: INTERNAL MEDICINE

## 2022-07-02 PROCEDURE — 2700000000 HC OXYGEN THERAPY PER DAY

## 2022-07-02 PROCEDURE — 94761 N-INVAS EAR/PLS OXIMETRY MLT: CPT

## 2022-07-02 PROCEDURE — 2580000003 HC RX 258: Performed by: STUDENT IN AN ORGANIZED HEALTH CARE EDUCATION/TRAINING PROGRAM

## 2022-07-02 PROCEDURE — 6370000000 HC RX 637 (ALT 250 FOR IP): Performed by: STUDENT IN AN ORGANIZED HEALTH CARE EDUCATION/TRAINING PROGRAM

## 2022-07-02 PROCEDURE — 6370000000 HC RX 637 (ALT 250 FOR IP): Performed by: INTERNAL MEDICINE

## 2022-07-02 PROCEDURE — 94640 AIRWAY INHALATION TREATMENT: CPT

## 2022-07-02 PROCEDURE — 6360000002 HC RX W HCPCS: Performed by: INTERNAL MEDICINE

## 2022-07-02 PROCEDURE — 94669 MECHANICAL CHEST WALL OSCILL: CPT

## 2022-07-02 PROCEDURE — 85025 COMPLETE CBC W/AUTO DIFF WBC: CPT

## 2022-07-02 PROCEDURE — 6360000002 HC RX W HCPCS: Performed by: STUDENT IN AN ORGANIZED HEALTH CARE EDUCATION/TRAINING PROGRAM

## 2022-07-02 RX ORDER — SENNA AND DOCUSATE SODIUM 50; 8.6 MG/1; MG/1
1 TABLET, FILM COATED ORAL DAILY
Qty: 30 TABLET | Refills: 0 | Status: SHIPPED | OUTPATIENT
Start: 2022-07-03

## 2022-07-02 RX ORDER — METRONIDAZOLE 500 MG/1
500 TABLET ORAL EVERY 8 HOURS SCHEDULED
Qty: 18 TABLET | Refills: 0 | Status: SHIPPED | OUTPATIENT
Start: 2022-07-02 | End: 2022-07-08

## 2022-07-02 RX ADMIN — IPRATROPIUM BROMIDE AND ALBUTEROL SULFATE 1 AMPULE: .5; 3 SOLUTION RESPIRATORY (INHALATION) at 08:03

## 2022-07-02 RX ADMIN — ENOXAPARIN SODIUM 40 MG: 100 INJECTION SUBCUTANEOUS at 08:28

## 2022-07-02 RX ADMIN — EMPAGLIFLOZIN 10 MG: 10 TABLET, FILM COATED ORAL at 08:28

## 2022-07-02 RX ADMIN — IPRATROPIUM BROMIDE AND ALBUTEROL SULFATE 1 AMPULE: .5; 3 SOLUTION RESPIRATORY (INHALATION) at 04:07

## 2022-07-02 RX ADMIN — METRONIDAZOLE 500 MG: 500 TABLET ORAL at 06:02

## 2022-07-02 RX ADMIN — AMIODARONE HYDROCHLORIDE 200 MG: 200 TABLET ORAL at 08:29

## 2022-07-02 RX ADMIN — ASPIRIN 81 MG: 81 TABLET, COATED ORAL at 08:29

## 2022-07-02 RX ADMIN — IPRATROPIUM BROMIDE AND ALBUTEROL SULFATE 1 AMPULE: .5; 3 SOLUTION RESPIRATORY (INHALATION) at 15:08

## 2022-07-02 RX ADMIN — POTASSIUM CHLORIDE 20 MEQ: 20 TABLET, EXTENDED RELEASE ORAL at 08:29

## 2022-07-02 RX ADMIN — CEFEPIME HYDROCHLORIDE 2000 MG: 2 INJECTION, POWDER, FOR SOLUTION INTRAVENOUS at 10:32

## 2022-07-02 RX ADMIN — GABAPENTIN 600 MG: 300 CAPSULE ORAL at 08:29

## 2022-07-02 RX ADMIN — IPRATROPIUM BROMIDE AND ALBUTEROL SULFATE 1 AMPULE: .5; 3 SOLUTION RESPIRATORY (INHALATION) at 11:32

## 2022-07-02 RX ADMIN — GABAPENTIN 600 MG: 300 CAPSULE ORAL at 14:03

## 2022-07-02 RX ADMIN — POLYETHYLENE GLYCOL 3350 17 G: 17 POWDER, FOR SOLUTION ORAL at 08:28

## 2022-07-02 RX ADMIN — METRONIDAZOLE 500 MG: 500 TABLET ORAL at 14:03

## 2022-07-02 RX ADMIN — FUROSEMIDE 40 MG: 10 INJECTION, SOLUTION INTRAMUSCULAR; INTRAVENOUS at 08:30

## 2022-07-02 RX ADMIN — SODIUM CHLORIDE, PRESERVATIVE FREE 10 ML: 5 INJECTION INTRAVENOUS at 08:30

## 2022-07-02 RX ADMIN — TIOTROPIUM BROMIDE AND OLODATEROL 2 PUFF: 3.124; 2.736 SPRAY, METERED RESPIRATORY (INHALATION) at 08:03

## 2022-07-02 RX ADMIN — CARVEDILOL 6.25 MG: 6.25 TABLET, FILM COATED ORAL at 08:29

## 2022-07-02 RX ADMIN — PANTOPRAZOLE SODIUM 40 MG: 40 TABLET, DELAYED RELEASE ORAL at 06:02

## 2022-07-02 RX ADMIN — SENNOSIDES AND DOCUSATE SODIUM 2 TABLET: 50; 8.6 TABLET ORAL at 08:29

## 2022-07-02 ASSESSMENT — PAIN SCALES - GENERAL: PAINLEVEL_OUTOF10: 0

## 2022-07-02 NOTE — PROGRESS NOTES
Pt discharged home at [id number]. Discharge instructions given and explained. All questions explained. Port de-accessed. Dressing applied to site. Transportation to home provided by daughter.     Electronically signed by Layla Martinez RN on 7/2/2022 at 3:40 PM

## 2022-07-02 NOTE — CARE COORDINATION
DISCHARGE SUMMARY     DATE OF DISCHARGE: 7/2/2022    DISCHARGE DESTINATION: home    HOME CARE AGENCY:   Discharging to Facility/ Agency   · Name:  [facility]    · Address: [address] [place] [address]., [address] [place] [address]., [address]  · Phone: [phone]  · Fax: [phone]     HOME INFUSION PHARMACY:  · Name:     [facility]  · Address: [facility]. [facility]. [address]  · Phone:    [phone]  · Fax:        [phone]      TRANSPORTATION: family    COMMENTS: Informed Carolina Bloch at [facility] of patient's discharge today and need to start home Iv antibiotics. Spoke with Jessica Abbott at [facility]--she will pull referral from [facility]. Spoke with patient regarding above plan. She is in agreement with the plan. Rn aware.      Electronically signed by RAMSEY Goff, SHANNANW, Case Management on 7/2/2022 at 9:52 AM  [address] [place] [address] [phone]

## 2022-07-02 NOTE — DISCHARGE SUMMARY
Hospital Medicine Discharge Summary    Patient ID: Bailey Armenta      Patient's PCP: Serene Guerra MD    Admit Date: 6/28/2022     Discharge Date:   07/02/22    Admitting Provider: Tariq Nunez DO     Discharge Provider: Xiang Langston MD     Discharge Diagnoses: Active Hospital Problems    Diagnosis     Abnormal CT of the chest [R93.89]      Priority: Medium    [facility] [C34.90]      Priority: Medium    Dyspnea [R06.00]      Priority: Medium    History of lung cancer [Z85.118]      Priority: Medium    Acute on chronic respiratory failure (HCC) [J96.20]      Priority: Medium    Acute respiratory distress [R06.03]      Priority: Medium    HCAP (healthcare-associated pneumonia) [J18.9]      Priority: Medium    DMII (diabetes mellitus, type 2) (HCC) [E11.9]     Acute pulmonary edema ([facility] [J81.0]     DMII (diabetes mellitus, type 2) ([facility] [E11.9]     NEGIN treated with BiPAP [G47.33]     COPD (chronic obstructive pulmonary disease) ([facility] [J44.9]     Essential hypertension [I10]        The patient was seen and examined on day of discharge and this discharge summary is in conjunction with any daily progress note from day of discharge. Hospital Course: 60-year-old female with past medical history significant for stage IV lung cancer currently undergoing chemotherapy, chronic respiratory failure on home O2 3 L,systolic heart failure with EF less than 44%, grade 3 diastolic dysfunctio presents acute hypoxic respiratory failure possibly from a combination of acute on chronic CHF exacerbation and HCAP.     6/29  Patient feeling better today. She remains on 3 L O2 per nasal cannula, which is her baseline at home.     6/30  Patient feeling better today. O2 saturations are down to 2 L. WBC count went from 2.7-25,000 overnight, suspecting that this could be a lab error. She has been afebrile overnight. MRSA probe was negative.   We will therefore discontinue vancomycin. Add  Flagyl to cover for anaerobes     7/1  Afebrile overnight. Patient continues to receive granulocyte stimulating agent. Filgrastim, continues to drive leukocytosis. This was discussed with oncologist.  Plan to discontinue Filgrastim today. 7/2  Patient feeling better today. She is back to her baseline, saturating well on 3 L of O2 per nasal cannula. Infectious disease recommendations noted for discharge. Patient to complete 2 weeks worth of IV cefepime in addition to p.o. Flagyl. Physical Exam Performed:     /65   Pulse 77   Temp 98.6 °F (37 °C) (Oral)   Resp 17   Ht 5' 7\" (1.702 m)   Wt 220 lb 14.4 oz (100.2 kg)   SpO2 99%   BMI 34.60 kg/m²       General appearance:  No apparent distress, appears stated age and cooperative. HEENT:  Normal cephalic, atraumatic without obvious deformity. Pupils equal, round, and reactive to light. Extra ocular muscles intact. Conjunctivae/corneas clear. Neck: Supple, with full range of motion. No jugular venous distention. Trachea midline. Respiratory:  Normal respiratory effort. Clear to auscultation, bilaterally without Rales/Wheezes/Rhonchi. Cardiovascular:  Regular rate and rhythm with normal S1/S2 without murmurs, rubs or gallops. Abdomen: Soft, non-tender, non-distended with normal bowel sounds. Musculoskeletal:  No clubbing, cyanosis or edema bilaterally. Full range of motion without deformity. Skin: Skin color, texture, turgor normal.  No rashes or lesions. Neurologic:  Neurovascularly intact without any focal sensory/motor deficits. Cranial nerves: II-XII intact, grossly non-focal.  Psychiatric:  Alert and oriented, thought content appropriate, normal insight  Capillary Refill: Brisk,< 3 seconds   Peripheral Pulses: +2 palpable, equal bilaterally       Labs:  For convenience and continuity at follow-up the following most recent labs are provided:      CBC:    Lab Results   Component Value Date/Time    WBC 11.4 07/02/2022 06:10 AM    HGB 7.9 07/02/2022 06:10 AM    HCT 24.5 07/02/2022 06:10 AM     07/02/2022 06:10 AM       Renal:    Lab Results   Component Value Date/Time     07/02/2022 06:10 AM    K 4.3 07/02/2022 06:10 AM    K 3.9 06/15/2022 09:30 PM     07/02/2022 06:10 AM    CO2 26 07/02/2022 06:10 AM    BUN 34 07/02/2022 06:10 AM    CREATININE 1.1 07/02/2022 06:10 AM    CALCIUM 8.8 07/02/2022 06:10 AM    PHOS 4.0 12/20/2021 07:48 AM         Significant Diagnostic Studies    Radiology:   XR CHEST PORTABLE   Final Result   Left retrocardiac opacity along with right basilar airspace opacities,   appearing slightly more prominent than the prior exam.  This could represent   atelectasis and/or infiltrate. Possible left pleural effusion. XR CHEST PORTABLE   Final Result   Interstitial opacities bilaterally could represent pulmonary edema and/or   infection. Left retrocardiac opacity could represent a layering pleural effusion,   atelectasis, and/or infiltrate. Consults:     IP CONSULT TO PHARMACY  IP CONSULT TO PULMONOLOGY  IP CONSULT TO ONCOLOGY  IP CONSULT TO PALLIATIVE CARE  IP CONSULT TO INFECTIOUS DISEASES  IP CONSULT TO HEART FAILURE NURSE/COORDINATOR    Disposition:  home     Condition at Discharge: Stable    Discharge Instructions/Follow-up:  Follow-up with PCP within 7 days from discharge, not doing so could have life-threatening consequences. Take medication as instructed;  return to the emergency department if persistent symptoms, experiencing side effects from medication including nausea vomiting or unable to keep medications down.        Code Status:  Full Code     Activity: activity as tolerated    Diet: cardiac diet      Discharge Medications:     Current Discharge Medication List           Details   sennosides-docusate sodium (SENOKOT-S) 8.6-50 MG tablet Take 1 tablet by mouth daily  Qty: 30 tablet, Refills: 0      metroNIDAZOLE (FLAGYL) 500 MG tablet Take 1 tablet by mouth every 8 hours for 18 doses  Qty: 18 tablet, Refills: 0              Details   torsemide 40 MG TABS Take 40 mg by mouth 2 times daily  Qty: 30 tablet, Refills: 3      potassium chloride (KLOR-CON M) 20 MEQ extended release tablet Take 20 mEq by mouth daily      empagliflozin (JARDIANCE) 10 MG tablet Take 10 mg by mouth daily      umeclidinium-vilanterol (ANORO ELLIPTA) 62.5-25 MCG/INH AEPB inhaler Inhale 1 puff into the lungs daily      Vericiguat (VERQUVO) 5 MG TABS Take 5 mg by mouth every morning      ondansetron (ZOFRAN) 8 MG tablet Take 8 mg by mouth every 8 hours as needed for Nausea or Vomiting (POST CHEMO NAUSEA)      prochlorperazine (COMPAZINE) 10 MG tablet Take 10 mg by mouth every 8 hours as needed (POST CHEMO NAUSEA)      albuterol (PROVENTIL) (2.5 MG/3ML) 0.083% nebulizer solution 4 times daily       amiodarone (CORDARONE) 200 MG tablet TAKE 1 TABLET BY MOUTH EVERY DAY      ASPIRIN LOW DOSE 81 MG EC tablet TAKE 1 TABLET BY MOUTH EVERY DAY      carvedilol (COREG) 6.25 MG tablet TAKE 1 TABLET BY MOUTH TWICE A DAY WITH MEALS      pantoprazole (PROTONIX) 40 MG tablet TAKE 1 TABLET BY MOUTH EVERY DAY      atorvastatin (LIPITOR) 40 MG tablet Take 40 mg by mouth nightly       magnesium oxide (MAG-OX) 400 MG tablet Take 400 mg by mouth 2 times daily       gabapentin (NEURONTIN) 600 MG tablet Take 600 mg by mouth 3 times daily. albuterol (PROVENTIL HFA) 108 (90 BASE) MCG/ACT inhaler Inhale 2 puffs into the lungs 4 times daily              Time Spent on discharge is more than 30 minutes in the examination, evaluation, counseling and review of medications and discharge plan. Signed:    Jeovanny Valdez MD   7/2/2022      Thank you Mary Muniz MD for the opportunity to be involved in this patient's care. If you have any questions or concerns, please feel free to contact me at [phone].

## 2022-07-02 NOTE — PROGRESS NOTES
Pt prefer to wear  Hospital BIPAP due to shortness of breath. Pt`s Home BIPAP at bedside. Pt is on BIPAP settings 18/8, RR 14, FIO2 40%, pulse ox 100%. Pt is alanis our BIPAP well at this time. Will continue to monitor.   Electronically signed by Michelle Cain RCP on 7/2/22

## 2022-07-02 NOTE — PROGRESS NOTES
Pt says she now feels short of breath again after getting dressed to go home. Pt is 96% on 3L. Ashley Dhaliwal MD paged. Will continue to monitor.     Electronically signed by Ashlie Root RN on 7/2/2022 at 3:08 PM

## 2022-07-02 NOTE — PLAN OF CARE
Problem: Discharge Planning  Goal: Discharge to home or other facility with appropriate resources  Outcome: Completed     Problem: Safety - Adult  Goal: Free from fall injury  Outcome: Completed     Problem: ABCDS Injury Assessment  Goal: Absence of physical injury  Outcome: Completed     Problem: Nutrition Deficit:  Goal: Optimize nutritional status  Outcome: Completed     Problem: Skin/Tissue Integrity  Goal: Absence of new skin breakdown  Description: 1. Monitor for areas of redness and/or skin breakdown  2. Assess vascular access sites hourly  3. Every 4-6 hours minimum:  Change oxygen saturation probe site  4. Every 4-6 hours:  If on nasal continuous positive airway pressure, respiratory therapy assess nares and determine need for appliance change or resting period.   Outcome: Completed

## 2022-07-02 NOTE — PROGRESS NOTES
After breathing treatment, daughter wanted to take pt home. Bonita Lezama MD notified.     Electronically signed by Rebecca Wesley RN on 7/2/2022 at 3:39 PM

## 2022-07-03 LAB
BLOOD CULTURE, ROUTINE: NORMAL
CULTURE, BLOOD 2: NORMAL

## 2022-07-05 ENCOUNTER — PHARMACY VISIT (OUTPATIENT)
Dept: INFECTIOUS DISEASES | Age: 62
End: 2022-07-05

## 2022-07-05 DIAGNOSIS — R93.89 ABNORMAL CT OF THE CHEST: Primary | ICD-10-CM

## 2022-07-05 DIAGNOSIS — J18.9 HCAP (HEALTHCARE-ASSOCIATED PNEUMONIA): Primary | ICD-10-CM

## 2022-07-05 LAB
ALBUMIN SERPL-MCNC: NORMAL G/DL
ALP BLD-CCNC: NORMAL U/L
ALT SERPL-CCNC: NORMAL U/L
ANION GAP SERPL CALCULATED.3IONS-SCNC: 7 MMOL/L (ref 6–18)
ANION GAP SERPL CALCULATED.3IONS-SCNC: NORMAL MMOL/L
AST SERPL-CCNC: NORMAL U/L
BASOPHILS ABSOLUTE: 0 /ΜL
BASOPHILS ABSOLUTE: 0 THOU/MCL (ref 0–0.2)
BASOPHILS ABSOLUTE: 1 %
BASOPHILS RELATIVE PERCENT: 1 %
BILIRUB SERPL-MCNC: NORMAL MG/DL
BUN BLDV-MCNC: 22 MG/DL
BUN BLDV-MCNC: 22 MG/DL (ref 8–26)
CALCIUM SERPL-MCNC: 8.4 MG/DL
CALCIUM SERPL-MCNC: 8.4 MG/DL (ref 8.5–10.4)
CHLORIDE BLD-SCNC: 103 MEQ/L (ref 98–111)
CHLORIDE BLD-SCNC: 103 MMOL/L
CO2: 31 MMOL/L (ref 21–31)
CO2: NORMAL
CREAT SERPL-MCNC: 1.01 MG/DL
CREAT SERPL-MCNC: 1.01 MG/DL (ref 0.6–1.2)
EGFR (CKD-EPI): 63 ML/MIN/1.73 M2
EOSINOPHILS ABSOLUTE: 0.1 /ΜL
EOSINOPHILS ABSOLUTE: 0.1 THOU/MCL (ref 0.03–0.45)
EOSINOPHILS RELATIVE PERCENT: 3 %
EOSINOPHILS RELATIVE PERCENT: 3 %
GFR CALCULATED: NORMAL
GLUCOSE BLD-MCNC: 89 MG/DL
GLUCOSE BLD-MCNC: 89 MG/DL (ref 70–99)
HCT VFR BLD CALC: 24.4 % (ref 36–46)
HCT VFR BLD CALC: 24.4 % (ref 36–46)
HEMOGLOBIN: 7.6 G/DL (ref 12–15.2)
HEMOGLOBIN: 7.6 G/DL (ref 12–16)
LYMPHOCYTES ABSOLUTE: 0.4 /ΜL
LYMPHOCYTES ABSOLUTE: 0.4 THOU/MCL (ref 1–4)
LYMPHOCYTES RELATIVE PERCENT: 17 %
LYMPHOCYTES RELATIVE PERCENT: 17 %
MCH RBC QN AUTO: 24.2 PG (ref 27–33)
MCH RBC QN AUTO: 24.4 PG
MCHC RBC AUTO-ENTMCNC: 31.2 G/DL
MCHC RBC AUTO-ENTMCNC: 31.2 G/DL (ref 32–36)
MCV RBC AUTO: 77.4 FL
MCV RBC AUTO: 77.4 FL (ref 82–97)
MONOCYTES # BLD: 10 %
MONOCYTES ABSOLUTE: 0.3 /ΜL
MONOCYTES ABSOLUTE: 0.3 THOU/MCL (ref 0.2–0.9)
MONOCYTES RELATIVE PERCENT: 10 %
NEUTROPHILS ABSOLUTE: 1.7 /ΜL
NEUTROPHILS ABSOLUTE: 1.7 THOU/MCL (ref 1.8–7.7)
NEUTROPHILS RELATIVE PERCENT: 69 %
PDW BLD-RTO: 26.7 %
PDW BLD-RTO: 26.7 % (ref 12.3–17)
PLATELET # BLD: 226 K/ΜL
PLATELET # BLD: 226 THOU/MCL (ref 140–375)
PMV BLD AUTO: 9.9 FL
PMV BLD AUTO: 9.9 FL (ref 7.4–11.5)
POTASSIUM SERPL-SCNC: 3.3 MEQ/L (ref 3.6–5.1)
POTASSIUM SERPL-SCNC: 3.3 MMOL/L
RBC # BLD: 3.16 10^6/ΜL
RBC # BLD: 3.16 MIL/MCL (ref 3.8–5.2)
SEG NEUTROPHILS: 69 %
SODIUM BLD-SCNC: 141 MEQ/L (ref 135–145)
SODIUM BLD-SCNC: 22 MMOL/L
TOTAL PROTEIN: NORMAL
WBC # BLD: 2.5 10^3/ML
WBC # BLD: 2.5 THOU/MCL (ref 3.6–10.5)

## 2022-07-05 NOTE — PROGRESS NOTES
Dr. Gregor Fothergill has placed a referral order for pharmacist to manage Outpatient Parental Antimicrobial Therapy (OPAT) pursuant the ID Collaborative Practice Agreement. Patient and/or caregiver has verbally consented to have drug therapy managed by a pharmacist. The benefits and risks of complex antimicrobial therapy including drug-specific adverse reactions and necessary follow-up were discussed with patient and/or caregiver and they are amenable to OPAT and pharmacist management. Pertinent Objective Data:     Wt Readings from Last 1 Encounters:   07/02/22 220 lb 14.4 oz (100.2 kg)      BMI Readings from Last 1 Encounters:   07/02/22 34.60 kg/m²      Serum creatinine: 1.1 mg/dL 07/02/22 0610  Estimated creatinine clearance: 65 mL/min    Lab Results   Component Value Date     07/02/2022    K 4.3 07/02/2022     07/02/2022    CO2 26 07/02/2022    BUN 34 (H) 07/02/2022    CREATININE 1.1 07/02/2022    GLUCOSE 94 07/02/2022    CALCIUM 8.8 07/02/2022    PROT 5.4 (L) 07/02/2022    LABALBU 3.1 (L) 07/02/2022    BILITOT 0.5 07/02/2022    ALKPHOS 69 07/02/2022    AST 17 07/02/2022    ALT 16 07/02/2022    LABGLOM 50 (A) 07/02/2022    GFRAA >60 07/02/2022    AGRATIO 1.3 07/02/2022    GLOB 2.8 01/27/2014       Lab Results   Component Value Date    WBC 11.4 (H) 07/02/2022    HGB 7.9 (L) 07/02/2022    HCT 24.5 (L) 07/02/2022    MCV 76.7 (L) 07/02/2022     07/02/2022    LABLYMP 2.0 05/26/2017    MID 0.7 05/26/2017    GRAN 8.0 (H) 05/26/2017    LYMPHOPCT 4.7 07/02/2022    MIDPERCENT 6.6 05/26/2017    GRANULOCYTES 74.6 05/26/2017    RBC 3.19 (L) 07/02/2022    MCH 24.6 (L) 07/02/2022    MCHC 32.1 07/02/2022    RDW 27.5 (H) 07/02/2022       No results found for: CRP    No results found for: SEDRATE    Lab Results   Component Value Date    CKTOTAL 113 12/15/2012       Vancomycin Rm   Date Value Ref Range Status   06/29/2022 9.3 ug/mL Final     Comment:     Trough - 10-20  Toxic  - >20.0          OPAT

## 2022-07-06 DIAGNOSIS — J18.9 HCAP (HEALTHCARE-ASSOCIATED PNEUMONIA): ICD-10-CM

## 2022-07-11 ENCOUNTER — TELEPHONE (OUTPATIENT)
Dept: INFECTIOUS DISEASES | Age: 62
End: 2022-07-11

## 2022-07-11 LAB
ANION GAP SERPL CALCULATED.3IONS-SCNC: 12 MMOL/L (ref 3–16)
ANISOCYTOSIS: ABNORMAL
BASOPHILS ABSOLUTE: 0 K/UL (ref 0–0.2)
BASOPHILS RELATIVE PERCENT: 1 %
BUN BLDV-MCNC: 19 MG/DL (ref 7–20)
CALCIUM SERPL-MCNC: 8.7 MG/DL (ref 8.3–10.6)
CHLORIDE BLD-SCNC: 100 MMOL/L (ref 99–110)
CO2: 27 MMOL/L (ref 21–32)
CREAT SERPL-MCNC: 0.9 MG/DL (ref 0.6–1.2)
EOSINOPHILS ABSOLUTE: 0 K/UL (ref 0–0.6)
EOSINOPHILS RELATIVE PERCENT: 2 %
GFR AFRICAN AMERICAN: >60
GFR NON-AFRICAN AMERICAN: >60
GLUCOSE BLD-MCNC: 108 MG/DL (ref 70–99)
HCT VFR BLD CALC: 26.4 % (ref 36–48)
HEMATOLOGY PATH CONSULT: YES
HEMOGLOBIN: 8.3 G/DL (ref 12–16)
LYMPHOCYTES ABSOLUTE: 0.5 K/UL (ref 1–5.1)
LYMPHOCYTES RELATIVE PERCENT: 42 %
MCH RBC QN AUTO: 24 PG (ref 26–34)
MCHC RBC AUTO-ENTMCNC: 31.3 G/DL (ref 31–36)
MCV RBC AUTO: 76.6 FL (ref 80–100)
MICROCYTES: ABNORMAL
MONOCYTES ABSOLUTE: 0.1 K/UL (ref 0–1.3)
MONOCYTES RELATIVE PERCENT: 11 %
NEUTROPHILS ABSOLUTE: 0.6 K/UL (ref 1.7–7.7)
NEUTROPHILS RELATIVE PERCENT: 44 %
OVALOCYTES: ABNORMAL
PDW BLD-RTO: 25.6 % (ref 12.4–15.4)
PLATELET # BLD: 146 K/UL (ref 135–450)
PLATELET SLIDE REVIEW: ADEQUATE
PMV BLD AUTO: 9.8 FL (ref 5–10.5)
POIKILOCYTES: ABNORMAL
POLYCHROMASIA: ABNORMAL
POTASSIUM SERPL-SCNC: 3.2 MMOL/L (ref 3.5–5.1)
RBC # BLD: 3.44 M/UL (ref 4–5.2)
SLIDE REVIEW: ABNORMAL
SODIUM BLD-SCNC: 139 MMOL/L (ref 136–145)
TEAR DROP CELLS: ABNORMAL
WBC # BLD: 1.3 K/UL (ref 4–11)

## 2022-07-11 RX ORDER — POTASSIUM CHLORIDE 20 MEQ/1
20 TABLET, EXTENDED RELEASE ORAL 2 TIMES DAILY
Qty: 10 TABLET | Refills: 0 | Status: SHIPPED | OUTPATIENT
Start: 2022-07-11 | End: 2022-07-16

## 2022-07-11 NOTE — TELEPHONE ENCOUNTER
K+ LOW at 3.2  Needs replacement     KCL 20 meq, oral twice a day x5 days     Meds sent to her pharmacy

## 2022-07-11 NOTE — TELEPHONE ENCOUNTER
WBC low likely from chemo ask her to check with her Oncologist about her counts - she is on IV abx will be protected but may have to seek advise from her Oncologist about her CBC - THX         Component Ref Range & Units 7/11/22 1530 7/5/22 1130 7/5/22 1130 7/2/22 0610 7/1/22 0535 6/30/22 0510 6/29/22 0622   WBC 4.0 - 11.0 K/uL 1.3 Low Panic   2.5 Low  R  2.5 R  11.4 High   25.6 High   25.1 High   2.7 Low     RBC 4.00 - 5.20 M/uL 3.44 Low   3.16 Low  R  3.16 R  3.19 Low   3.13 Low   3.23 Low   3.60 Low     Hemoglobin 12.0 - 16.0 g/dL 8.3 Low   7.6 Low  R  7.6 Abnormal   7.9 Low   7.7 Low   7.9 Low   8.9 Low     Hematocrit 36.0 - 48.0 % 26.4 Low   24.4 Low  R  24.4 Abnormal  R  24.5 Low   23.9 Low   25.1 Low   27.7 Low     MCV 80.0 - 100.0 fL 76.6 Low   77.4 Low

## 2022-07-12 LAB — HEMATOLOGY PATH CONSULT: NORMAL

## 2022-07-12 NOTE — TELEPHONE ENCOUNTER
Spoke with pt and gave MD rec's.  Pt verbalized understanding to take potassium and contact Oncologist.

## 2022-07-14 ENCOUNTER — APPOINTMENT (OUTPATIENT)
Dept: CT IMAGING | Age: 62
DRG: 208 | End: 2022-07-14
Payer: MEDICARE

## 2022-07-14 ENCOUNTER — TELEPHONE (OUTPATIENT)
Dept: INFECTIOUS DISEASES | Age: 62
End: 2022-07-14

## 2022-07-14 ENCOUNTER — APPOINTMENT (OUTPATIENT)
Dept: GENERAL RADIOLOGY | Age: 62
DRG: 208 | End: 2022-07-14
Payer: MEDICARE

## 2022-07-14 ENCOUNTER — HOSPITAL ENCOUNTER (INPATIENT)
Age: 62
LOS: 2 days | Discharge: HOSPICE | DRG: 208 | End: 2022-07-16
Attending: STUDENT IN AN ORGANIZED HEALTH CARE EDUCATION/TRAINING PROGRAM | Admitting: INTERNAL MEDICINE
Payer: MEDICARE

## 2022-07-14 DIAGNOSIS — J96.21 ACUTE ON CHRONIC RESPIRATORY FAILURE WITH HYPOXIA AND HYPERCAPNIA (HCC): Primary | ICD-10-CM

## 2022-07-14 DIAGNOSIS — I50.43 ACUTE ON CHRONIC COMBINED SYSTOLIC AND DIASTOLIC CONGESTIVE HEART FAILURE (HCC): ICD-10-CM

## 2022-07-14 DIAGNOSIS — J90 PLEURAL EFFUSION: ICD-10-CM

## 2022-07-14 DIAGNOSIS — J96.22 ACUTE ON CHRONIC RESPIRATORY FAILURE WITH HYPOXIA AND HYPERCAPNIA (HCC): Primary | ICD-10-CM

## 2022-07-14 PROBLEM — J96.01 ACUTE RESPIRATORY FAILURE WITH HYPOXIA (HCC): Status: ACTIVE | Noted: 2022-07-14

## 2022-07-14 PROBLEM — J96.20 RESPIRATORY FAILURE, ACUTE-ON-CHRONIC (HCC): Status: ACTIVE | Noted: 2022-07-14

## 2022-07-14 LAB
A/G RATIO: 1.3 (ref 1.1–2.2)
ALBUMIN SERPL-MCNC: 3.9 G/DL (ref 3.4–5)
ALP BLD-CCNC: 80 U/L (ref 40–129)
ALT SERPL-CCNC: 9 U/L (ref 10–40)
ANION GAP SERPL CALCULATED.3IONS-SCNC: 9 MMOL/L (ref 3–16)
ANISOCYTOSIS: ABNORMAL
AST SERPL-CCNC: 23 U/L (ref 15–37)
BASE EXCESS ARTERIAL: -2.9 MMOL/L (ref -3–3)
BASE EXCESS ARTERIAL: 1.4 MMOL/L (ref -3–3)
BASE EXCESS VENOUS: -3.5 MMOL/L (ref -2–3)
BASE EXCESS VENOUS: 1.7 MMOL/L (ref -2–3)
BASOPHILS ABSOLUTE: 0.1 K/UL (ref 0–0.2)
BASOPHILS RELATIVE PERCENT: 1.1 %
BILIRUB SERPL-MCNC: 0.4 MG/DL (ref 0–1)
BILIRUBIN URINE: NEGATIVE
BLOOD, URINE: ABNORMAL
BUN BLDV-MCNC: 22 MG/DL (ref 7–20)
C-REACTIVE PROTEIN: 6.5 MG/L (ref 0–5.1)
CALCIUM SERPL-MCNC: 9.3 MG/DL (ref 8.3–10.6)
CARBOXYHEMOGLOBIN ARTERIAL: 2.5 % (ref 0–1.5)
CARBOXYHEMOGLOBIN ARTERIAL: 2.7 % (ref 0–1.5)
CARBOXYHEMOGLOBIN: 2.2 % (ref 0–1.5)
CARBOXYHEMOGLOBIN: 2.4 % (ref 0–1.5)
CHLORIDE BLD-SCNC: 103 MMOL/L (ref 99–110)
CLARITY: CLEAR
CO2: 26 MMOL/L (ref 21–32)
COLOR: YELLOW
CORTISOL TOTAL: 7 UG/DL
CREAT SERPL-MCNC: 1.4 MG/DL (ref 0.6–1.2)
EOSINOPHILS ABSOLUTE: 0.2 K/UL (ref 0–0.6)
EOSINOPHILS RELATIVE PERCENT: 3 %
GFR AFRICAN AMERICAN: 46
GFR NON-AFRICAN AMERICAN: 38
GLUCOSE BLD-MCNC: 101 MG/DL (ref 70–99)
GLUCOSE BLD-MCNC: 177 MG/DL (ref 70–99)
GLUCOSE BLD-MCNC: 226 MG/DL (ref 70–99)
GLUCOSE BLD-MCNC: 81 MG/DL (ref 70–99)
GLUCOSE URINE: NEGATIVE MG/DL
HCO3 ARTERIAL: 26 MMOL/L (ref 21–29)
HCO3 ARTERIAL: 28 MMOL/L (ref 21–29)
HCO3 VENOUS: 27.6 MMOL/L (ref 24–28)
HCO3 VENOUS: 28.7 MMOL/L (ref 24–28)
HCT VFR BLD CALC: 33.1 % (ref 36–48)
HEMOGLOBIN, ART, EXTENDED: 8.6 G/DL
HEMOGLOBIN, ART, EXTENDED: 9.5 G/DL
HEMOGLOBIN, VEN, REDUCED: 28 %
HEMOGLOBIN, VEN, REDUCED: 28.3 %
HEMOGLOBIN: 9.7 G/DL (ref 12–16)
KETONES, URINE: NEGATIVE MG/DL
LACTIC ACID: 0.7 MMOL/L (ref 0.4–2)
LACTIC ACID: 2.1 MMOL/L (ref 0.4–2)
LEUKOCYTE ESTERASE, URINE: NEGATIVE
LYMPHOCYTES ABSOLUTE: 1.5 K/UL (ref 1–5.1)
LYMPHOCYTES RELATIVE PERCENT: 28.4 %
MCH RBC QN AUTO: 23.9 PG (ref 26–34)
MCHC RBC AUTO-ENTMCNC: 29.4 G/DL (ref 31–36)
MCV RBC AUTO: 81.2 FL (ref 80–100)
METHEMOGLOBIN ARTERIAL: 0.1 % (ref 0–1.4)
METHEMOGLOBIN ARTERIAL: 0.2 % (ref 0–1.4)
METHEMOGLOBIN VENOUS: 0.1 % (ref 0–1.5)
METHEMOGLOBIN VENOUS: 0.4 % (ref 0–1.5)
MICROSCOPIC EXAMINATION: YES
MONOCYTES ABSOLUTE: 0.8 K/UL (ref 0–1.3)
MONOCYTES RELATIVE PERCENT: 14.9 %
NEUTROPHILS ABSOLUTE: 2.8 K/UL (ref 1.7–7.7)
NEUTROPHILS RELATIVE PERCENT: 52.6 %
NITRITE, URINE: NEGATIVE
O2 SAT, ARTERIAL: 92 % (ref 93–100)
O2 SAT, ARTERIAL: 94 % (ref 93–100)
O2 SAT, VEN: 71 %
O2 SAT, VEN: 71 %
OVALOCYTES: ABNORMAL
PCO2 ARTERIAL: 54.3 MMHG (ref 35–45)
PCO2 ARTERIAL: 69.6 MMHG (ref 35–45)
PCO2, VEN: 55.2 MMHG (ref 41–51)
PCO2, VEN: 93.1 MMHG (ref 41–51)
PDW BLD-RTO: 25.8 % (ref 12.4–15.4)
PERFORMED ON: ABNORMAL
PERFORMED ON: ABNORMAL
PERFORMED ON: NORMAL
PH ARTERIAL: 7.18 (ref 7.35–7.45)
PH ARTERIAL: 7.32 (ref 7.35–7.45)
PH UA: 6 (ref 5–8)
PH VENOUS: 7.08 (ref 7.35–7.45)
PH VENOUS: 7.32 (ref 7.35–7.45)
PLATELET # BLD: 265 K/UL (ref 135–450)
PLATELET SLIDE REVIEW: ADEQUATE
PMV BLD AUTO: 9.9 FL (ref 5–10.5)
PO2 ARTERIAL: 71.7 MMHG (ref 75–108)
PO2 ARTERIAL: 83 MMHG (ref 75–108)
PO2, VEN: 42.9 MMHG (ref 25–40)
PO2, VEN: 56.8 MMHG (ref 25–40)
POIKILOCYTES: ABNORMAL
POLYCHROMASIA: ABNORMAL
POTASSIUM REFLEX MAGNESIUM: 4.5 MMOL/L (ref 3.5–5.1)
PRO-BNP: ABNORMAL PG/ML (ref 0–124)
PROCALCITONIN: 0.26 NG/ML (ref 0–0.15)
PROTEIN UA: 100 MG/DL
RBC # BLD: 4.07 M/UL (ref 4–5.2)
RBC UA: NORMAL /HPF (ref 0–4)
SLIDE REVIEW: ABNORMAL
SODIUM BLD-SCNC: 138 MMOL/L (ref 136–145)
SPECIFIC GRAVITY UA: 1.02 (ref 1–1.03)
TCO2 ARTERIAL: 28 MMOL/L
TCO2 ARTERIAL: 30 MMOL/L
TCO2 CALC VENOUS: 30 MMOL/L
TCO2 CALC VENOUS: 31 MMOL/L
TEAR DROP CELLS: ABNORMAL
TOTAL PROTEIN: 6.9 G/DL (ref 6.4–8.2)
TROPONIN: 0.01 NG/ML
TROPONIN: 0.02 NG/ML
URINE REFLEX TO CULTURE: ABNORMAL
URINE TYPE: ABNORMAL
UROBILINOGEN, URINE: 0.2 E.U./DL
WBC # BLD: 5.3 K/UL (ref 4–11)
WBC UA: NORMAL /HPF (ref 0–5)

## 2022-07-14 PROCEDURE — 2500000003 HC RX 250 WO HCPCS: Performed by: STUDENT IN AN ORGANIZED HEALTH CARE EDUCATION/TRAINING PROGRAM

## 2022-07-14 PROCEDURE — 84484 ASSAY OF TROPONIN QUANT: CPT

## 2022-07-14 PROCEDURE — 0BH17EZ INSERTION OF ENDOTRACHEAL AIRWAY INTO TRACHEA, VIA NATURAL OR ARTIFICIAL OPENING: ICD-10-PCS | Performed by: INTERNAL MEDICINE

## 2022-07-14 PROCEDURE — 86140 C-REACTIVE PROTEIN: CPT

## 2022-07-14 PROCEDURE — 6360000002 HC RX W HCPCS: Performed by: STUDENT IN AN ORGANIZED HEALTH CARE EDUCATION/TRAINING PROGRAM

## 2022-07-14 PROCEDURE — 82533 TOTAL CORTISOL: CPT

## 2022-07-14 PROCEDURE — 82803 BLOOD GASES ANY COMBINATION: CPT

## 2022-07-14 PROCEDURE — C9113 INJ PANTOPRAZOLE SODIUM, VIA: HCPCS

## 2022-07-14 PROCEDURE — 70491 CT SOFT TISSUE NECK W/DYE: CPT

## 2022-07-14 PROCEDURE — 2580000003 HC RX 258

## 2022-07-14 PROCEDURE — 2000000000 HC ICU R&B

## 2022-07-14 PROCEDURE — 99285 EMERGENCY DEPT VISIT HI MDM: CPT

## 2022-07-14 PROCEDURE — 2580000003 HC RX 258: Performed by: STUDENT IN AN ORGANIZED HEALTH CARE EDUCATION/TRAINING PROGRAM

## 2022-07-14 PROCEDURE — 31500 INSERT EMERGENCY AIRWAY: CPT

## 2022-07-14 PROCEDURE — 36415 COLL VENOUS BLD VENIPUNCTURE: CPT

## 2022-07-14 PROCEDURE — 81001 URINALYSIS AUTO W/SCOPE: CPT

## 2022-07-14 PROCEDURE — 71045 X-RAY EXAM CHEST 1 VIEW: CPT

## 2022-07-14 PROCEDURE — 83880 ASSAY OF NATRIURETIC PEPTIDE: CPT

## 2022-07-14 PROCEDURE — 85025 COMPLETE CBC W/AUTO DIFF WBC: CPT

## 2022-07-14 PROCEDURE — 83605 ASSAY OF LACTIC ACID: CPT

## 2022-07-14 PROCEDURE — 94761 N-INVAS EAR/PLS OXIMETRY MLT: CPT

## 2022-07-14 PROCEDURE — 83036 HEMOGLOBIN GLYCOSYLATED A1C: CPT

## 2022-07-14 PROCEDURE — 80053 COMPREHEN METABOLIC PANEL: CPT

## 2022-07-14 PROCEDURE — 71275 CT ANGIOGRAPHY CHEST: CPT

## 2022-07-14 PROCEDURE — A4216 STERILE WATER/SALINE, 10 ML: HCPCS | Performed by: STUDENT IN AN ORGANIZED HEALTH CARE EDUCATION/TRAINING PROGRAM

## 2022-07-14 PROCEDURE — 94002 VENT MGMT INPAT INIT DAY: CPT

## 2022-07-14 PROCEDURE — 5A1945Z RESPIRATORY VENTILATION, 24-96 CONSECUTIVE HOURS: ICD-10-PCS | Performed by: INTERNAL MEDICINE

## 2022-07-14 PROCEDURE — 6360000002 HC RX W HCPCS

## 2022-07-14 PROCEDURE — 96366 THER/PROPH/DIAG IV INF ADDON: CPT

## 2022-07-14 PROCEDURE — 36600 WITHDRAWAL OF ARTERIAL BLOOD: CPT

## 2022-07-14 PROCEDURE — 87040 BLOOD CULTURE FOR BACTERIA: CPT

## 2022-07-14 PROCEDURE — 2700000000 HC OXYGEN THERAPY PER DAY

## 2022-07-14 PROCEDURE — 96375 TX/PRO/DX INJ NEW DRUG ADDON: CPT

## 2022-07-14 PROCEDURE — 99291 CRITICAL CARE FIRST HOUR: CPT | Performed by: INTERNAL MEDICINE

## 2022-07-14 PROCEDURE — 84145 PROCALCITONIN (PCT): CPT

## 2022-07-14 PROCEDURE — 70450 CT HEAD/BRAIN W/O DYE: CPT

## 2022-07-14 PROCEDURE — 6360000004 HC RX CONTRAST MEDICATION: Performed by: STUDENT IN AN ORGANIZED HEALTH CARE EDUCATION/TRAINING PROGRAM

## 2022-07-14 PROCEDURE — 96365 THER/PROPH/DIAG IV INF INIT: CPT

## 2022-07-14 PROCEDURE — 93005 ELECTROCARDIOGRAM TRACING: CPT | Performed by: INTERNAL MEDICINE

## 2022-07-14 RX ORDER — ONDANSETRON 2 MG/ML
4 INJECTION INTRAMUSCULAR; INTRAVENOUS EVERY 6 HOURS PRN
Status: DISCONTINUED | OUTPATIENT
Start: 2022-07-14 | End: 2022-07-14

## 2022-07-14 RX ORDER — INSULIN LISPRO 100 [IU]/ML
0-18 INJECTION, SOLUTION INTRAVENOUS; SUBCUTANEOUS EVERY 4 HOURS
Status: DISCONTINUED | OUTPATIENT
Start: 2022-07-14 | End: 2022-07-15

## 2022-07-14 RX ORDER — SODIUM CHLORIDE 9 MG/ML
INJECTION, SOLUTION INTRAVENOUS PRN
Status: DISCONTINUED | OUTPATIENT
Start: 2022-07-14 | End: 2022-07-16 | Stop reason: HOSPADM

## 2022-07-14 RX ORDER — PROPOFOL 10 MG/ML
5-50 INJECTION, EMULSION INTRAVENOUS CONTINUOUS
Status: DISCONTINUED | OUTPATIENT
Start: 2022-07-14 | End: 2022-07-16

## 2022-07-14 RX ORDER — SUCCINYLCHOLINE CHLORIDE 20 MG/ML
100 INJECTION INTRAMUSCULAR; INTRAVENOUS ONCE
Status: COMPLETED | OUTPATIENT
Start: 2022-07-14 | End: 2022-07-14

## 2022-07-14 RX ORDER — DOBUTAMINE HYDROCHLORIDE 200 MG/100ML
2.5-1 INJECTION INTRAVENOUS CONTINUOUS
Status: DISCONTINUED | OUTPATIENT
Start: 2022-07-14 | End: 2022-07-15

## 2022-07-14 RX ORDER — SODIUM CHLORIDE 0.9 % (FLUSH) 0.9 %
5-40 SYRINGE (ML) INJECTION EVERY 12 HOURS SCHEDULED
Status: DISCONTINUED | OUTPATIENT
Start: 2022-07-14 | End: 2022-07-16 | Stop reason: HOSPADM

## 2022-07-14 RX ORDER — PROCHLORPERAZINE EDISYLATE 5 MG/ML
10 INJECTION INTRAMUSCULAR; INTRAVENOUS EVERY 6 HOURS PRN
Status: DISCONTINUED | OUTPATIENT
Start: 2022-07-14 | End: 2022-07-16 | Stop reason: HOSPADM

## 2022-07-14 RX ORDER — FUROSEMIDE 10 MG/ML
100 INJECTION INTRAMUSCULAR; INTRAVENOUS ONCE
Status: DISCONTINUED | OUTPATIENT
Start: 2022-07-14 | End: 2022-07-14

## 2022-07-14 RX ORDER — PANTOPRAZOLE SODIUM 40 MG/10ML
40 INJECTION, POWDER, LYOPHILIZED, FOR SOLUTION INTRAVENOUS DAILY
Status: DISCONTINUED | OUTPATIENT
Start: 2022-07-14 | End: 2022-07-16

## 2022-07-14 RX ORDER — SODIUM CHLORIDE 0.9 % (FLUSH) 0.9 %
5-40 SYRINGE (ML) INJECTION PRN
Status: DISCONTINUED | OUTPATIENT
Start: 2022-07-14 | End: 2022-07-16 | Stop reason: HOSPADM

## 2022-07-14 RX ORDER — HEPARIN SODIUM 5000 [USP'U]/ML
5000 INJECTION, SOLUTION INTRAVENOUS; SUBCUTANEOUS EVERY 8 HOURS SCHEDULED
Status: DISCONTINUED | OUTPATIENT
Start: 2022-07-14 | End: 2022-07-16 | Stop reason: HOSPADM

## 2022-07-14 RX ORDER — ONDANSETRON 4 MG/1
4 TABLET, ORALLY DISINTEGRATING ORAL EVERY 8 HOURS PRN
Status: DISCONTINUED | OUTPATIENT
Start: 2022-07-14 | End: 2022-07-14

## 2022-07-14 RX ORDER — ETOMIDATE 2 MG/ML
0.3 INJECTION INTRAVENOUS ONCE
Status: COMPLETED | OUTPATIENT
Start: 2022-07-14 | End: 2022-07-14

## 2022-07-14 RX ORDER — FENTANYL CITRATE-0.9 % NACL/PF 10 MCG/ML
25-200 PLASTIC BAG, INJECTION (ML) INTRAVENOUS CONTINUOUS
Status: DISCONTINUED | OUTPATIENT
Start: 2022-07-14 | End: 2022-07-16 | Stop reason: HOSPADM

## 2022-07-14 RX ORDER — DEXTROSE MONOHYDRATE 50 MG/ML
100 INJECTION, SOLUTION INTRAVENOUS PRN
Status: DISCONTINUED | OUTPATIENT
Start: 2022-07-14 | End: 2022-07-16 | Stop reason: HOSPADM

## 2022-07-14 RX ADMIN — PANTOPRAZOLE SODIUM 40 MG: 40 INJECTION, POWDER, FOR SOLUTION INTRAVENOUS at 20:19

## 2022-07-14 RX ADMIN — Medication 50 MCG/HR: at 14:04

## 2022-07-14 RX ADMIN — PROPOFOL 35 MCG/KG/MIN: 10 INJECTION, EMULSION INTRAVENOUS at 15:09

## 2022-07-14 RX ADMIN — IOPAMIDOL 75 ML: 755 INJECTION, SOLUTION INTRAVENOUS at 14:46

## 2022-07-14 RX ADMIN — Medication 100 MCG/HR: at 15:41

## 2022-07-14 RX ADMIN — DOBUTAMINE IN DEXTROSE 2.5 MCG/KG/MIN: 200 INJECTION, SOLUTION INTRAVENOUS at 23:51

## 2022-07-14 RX ADMIN — PROPOFOL 30 MCG/KG/MIN: 10 INJECTION, EMULSION INTRAVENOUS at 13:51

## 2022-07-14 RX ADMIN — SODIUM CHLORIDE, PRESERVATIVE FREE 10 ML: 5 INJECTION INTRAVENOUS at 20:19

## 2022-07-14 RX ADMIN — PROPOFOL 25 MCG/KG/MIN: 10 INJECTION, EMULSION INTRAVENOUS at 17:05

## 2022-07-14 RX ADMIN — PROPOFOL 20 MCG/KG/MIN: 10 INJECTION, EMULSION INTRAVENOUS at 15:42

## 2022-07-14 RX ADMIN — HEPARIN SODIUM 5000 UNITS: 5000 INJECTION INTRAVENOUS; SUBCUTANEOUS at 21:28

## 2022-07-14 RX ADMIN — CEFEPIME 2000 MG: 2 INJECTION, POWDER, FOR SOLUTION INTRAVENOUS at 19:13

## 2022-07-14 RX ADMIN — FUROSEMIDE 5 MG/HR: 10 INJECTION, SOLUTION INTRAMUSCULAR; INTRAVENOUS at 19:06

## 2022-07-14 RX ADMIN — SUCCINYLCHOLINE CHLORIDE 100 MG: 20 INJECTION, SOLUTION INTRAMUSCULAR; INTRAVENOUS; PARENTERAL at 13:21

## 2022-07-14 RX ADMIN — EPINEPHRINE 20 MCG: 0.1 INJECTION, SOLUTION ENDOTRACHEAL; INTRACARDIAC; INTRAVENOUS at 13:26

## 2022-07-14 RX ADMIN — Medication 75 MCG/HR: at 15:08

## 2022-07-14 RX ADMIN — Medication 75 MCG/HR: at 17:05

## 2022-07-14 RX ADMIN — ETOMIDATE 30 MG: 2 INJECTION, SOLUTION INTRAVENOUS at 13:21

## 2022-07-14 ASSESSMENT — PULMONARY FUNCTION TESTS
PIF_VALUE: 17
PIF_VALUE: 16
PIF_VALUE: 16
PIF_VALUE: 21.7
PIF_VALUE: 19
PIF_VALUE: 22
PIF_VALUE: 19
PIF_VALUE: 20
PIF_VALUE: 17

## 2022-07-14 NOTE — DISCHARGE INSTRUCTIONS
Extra Heart Failure sites:     https://[employer].com/ --- this is American Heart Association interactive Healthier Living with Heart Failure guidebook. Please copy and paste link into search bar. Use your mouse to scroll through the pages. Lots and lots of info / tips    [address] [address] --- free smart phone rob- (icon will look like a lopsided pink heart) --Use your phone to track sodium / fluid intake, symptoms, weight, etc.    [facility]. org - website-- [facility] is a dialysis company. All dialysis patients follow a renal diet which IS low sodium!! This website offers free seasonal cookbooks.  Each quarter, they will release 25-30 new recipes with a breakdown of calories, sodium, glucose, etc     [email or web address] -- more free recipes

## 2022-07-14 NOTE — ED NOTES
Clinical Pharmacy Progress Note  Medication History     Admit Date: 7/14/2022    List of of current medications patient is taking is complete. Home Medication list in EPIC updated to reflect changes noted below. Source of information: patient's daughter who had a list on her cell phone    Changes made to medication list:   Medications removed:   Prochlorperazine prn - not used frequently   Ondansetron prn - not used frequently  Other notes:    Cefepime -- confirmed with daughter, who did confirm with [facility] RN. Last dose given by daughter today ~ 8 AM    Current Outpatient Medications   Medication Instructions    albuterol (PROVENTIL HFA) 108 (90 BASE) MCG/ACT inhaler 2 puffs, Inhalation, EVERY 4 HOURS PRN    albuterol (PROVENTIL) 2.5 mg, Nebulization, 4 TIMES DAILY    amiodarone (CORDARONE) 200 mg, Oral, DAILY    Aspirin Low Dose 81 mg, Oral, DAILY    atorvastatin (LIPITOR) 40 mg, Oral, NIGHTLY    carvedilol (COREG) 6.25 mg, Oral, 2 TIMES DAILY    dextrose 5 % SOLN 50 mL with ceFEPIme 2 g SOLR 2,000 mg 2,000 mg, IntraVENous, EVERY 12 HOURS SCHEDULED, Until 7/15 for PNA     empagliflozin (JARDIANCE) 10 mg, Oral, DAILY    gabapentin (NEURONTIN) 600 mg, Oral, 3 TIMES DAILY    magnesium oxide (MAG-OX) 400 mg, Oral, 2 TIMES DAILY    pantoprazole (PROTONIX) 40 mg, Oral, DAILY    potassium chloride (KLOR-CON M) 20 MEQ extended release tablet 20 mEq, Oral, DAILY    potassium chloride (KLOR-CON M) 20 MEQ extended release tablet 20 mEq, Oral, 2 TIMES DAILY    sennosides-docusate sodium (SENOKOT-S) 8.6-50 MG tablet 1 tablet, Oral, DAILY    Torsemide 40 mg, Oral, 2 TIMES DAILY    umeclidinium-vilanterol (ANORO ELLIPTA) 62.5-25 MCG/INH AEPB inhaler 1 puff, Inhalation, DAILY    Verquvo 5 mg, Oral, EVERY MORNING       Please call with any questions.   Kathryn Parkinson PharmD., BCPS   7/14/2022 1:37 PM  Wireless: [phone]

## 2022-07-14 NOTE — TELEPHONE ENCOUNTER
Term date is 7/15  Called pt to ask how she is feeling and if dyspnea has completely resolved but she did not answer. Left VM to call back. If pt feels back to baseline and is not requiring additional O2, okay to term as planned. Shirlene Majano, PharmD, [facility]  [facility] [place]  Infectious Disease  [address], [address], [address]  Phone: [phone] (also available on [employer])  Fax: [phone]    For Pharmacy [address] in place:   Yes   Time Spent (min): 10

## 2022-07-14 NOTE — ED PROVIDER NOTES
05/03/2022); and IR PORT PLACEMENT > 5 YEARS (5/3/2022). Her family history includes Cancer in her mother; Diabetes in her mother; High Blood Pressure in her mother; Stroke in her father. She reports that she has never smoked. She has never used smokeless tobacco. She reports previous alcohol use. She reports previous drug use. Medications     Previous Medications    ALBUTEROL (PROVENTIL HFA) 108 (90 BASE) MCG/ACT INHALER    Inhale 2 puffs into the lungs every 4 hours as needed for Wheezing or Shortness of Breath     ALBUTEROL (PROVENTIL) (2.5 MG/3ML) 0.083% NEBULIZER SOLUTION    Take 2.5 mg by nebulization 4 times daily     AMIODARONE (CORDARONE) 200 MG TABLET    Take 200 mg by mouth daily     ASPIRIN LOW DOSE 81 MG EC TABLET    Take 81 mg by mouth daily     ATORVASTATIN (LIPITOR) 40 MG TABLET    Take 40 mg by mouth nightly     CARVEDILOL (COREG) 6.25 MG TABLET    Take 6.25 mg by mouth 2 times daily     DEXTROSE 5 % SOLN 50 ML WITH CEFEPIME 2 G SOLR 2,000 MG    Infuse 2,000 mg intravenously every 12 hours Until 7/15 for PNA    EMPAGLIFLOZIN (JARDIANCE) 10 MG TABLET    Take 10 mg by mouth daily    GABAPENTIN (NEURONTIN) 600 MG TABLET    Take 600 mg by mouth 3 times daily.      MAGNESIUM OXIDE (MAG-OX) 400 MG TABLET    Take 400 mg by mouth 2 times daily     PANTOPRAZOLE (PROTONIX) 40 MG TABLET    Take 40 mg by mouth daily     POTASSIUM CHLORIDE (KLOR-CON M) 20 MEQ EXTENDED RELEASE TABLET    Take 20 mEq by mouth daily    POTASSIUM CHLORIDE (KLOR-CON M) 20 MEQ EXTENDED RELEASE TABLET    Take 1 tablet by mouth 2 times daily for 5 days    SENNOSIDES-DOCUSATE SODIUM (SENOKOT-S) 8.6-50 MG TABLET    Take 1 tablet by mouth daily    TORSEMIDE 40 MG TABS    Take 40 mg by mouth 2 times daily    UMECLIDINIUM-VILANTEROL (ANORO ELLIPTA) 62.5-25 MCG/INH AEPB INHALER    Inhale 1 puff into the lungs daily    VERICIGUAT (VERQUVO) 5 MG TABS    Take 5 mg by mouth every morning       Allergies     She is allergic to ace inhibitors, diclofenac, losartan, spironolactone, vicodin hp [hydrocodone-acetaminophen], and vicodin [hydrocodone-acetaminophen]. Physical Exam     INITIAL VITALS: BP: 118/64,  , Heart Rate: (!) 30, Resp: 16, SpO2: (!) 66 %   Physical Exam  Constitutional:       Comments: Appears both acutely and chronically ill, with respiratory distress and decreased responsiveness. HENT:      Head: Normocephalic and atraumatic. Right Ear: External ear normal.      Left Ear: External ear normal.      Mouth/Throat:      Mouth: Mucous membranes are dry. Pharynx: Oropharynx is clear. Eyes:      Pupils: Pupils are equal, round, and reactive to light. Comments: Unable to assess EOMs secondary to AMS. No gaze deviation noted. Neck:      Comments: Soft tissue neck swelling, left > right  Cardiovascular:      Rate and Rhythm: Bradycardia present. Rhythm irregular. Comments: Symmetric radial pulses  Pulmonary:      Comments: Poor respiratory effort. Breath sounds diminished throughout, no wheezes or rhonchi appreciated although auscultation is somewhat limited secondary to body habitus. Abdominal:      Comments: Soft, no distention. No apparent tenderness to palpation. Musculoskeletal:         General: No deformity or signs of injury. Comments: Bilateral 1-2+ lower extremity edema, symmetric. Skin:     General: Skin is warm. Comments: Diaphoretic   Neurological:      Comments: Eyes open, does not appear to be tracking. Not following commands. No verbal response. Unable to assess orientation.          DiagnosticResults     EKG   Interpreted in conjunction with emergencydepartment physician Daryle Rosales, MD  Rhythm: Ventricular paced rhythm  Rate: 66 bpm  Axis: left  Ectopy: none  Conduction: Paced rhythm  ST Segments: No obvious ST elevations or depressions  T Waves:no acute change  Q Waves: none  Clinical Impression: Paced rhythm  Comparison: Compared to prior EKG dated 6/28/2022, underlying sinus rhythm is no longer seen. No other changes noted. RADIOLOGY:  CTA PULMONARY W CONTRAST   Final Result   1. No pulmonary embolus. 2.  Large right and moderate left pleural effusions causing adjacent consolidation of the bilateral lower lobes. 3.  Endotracheal tube is seen in satisfactory position. 4.  Multiple thyroid nodules are present. CT SOFT TISSUE NECK W CONTRAST   Final Result   1. No pulmonary embolus. 2.  Large right and moderate left pleural effusions causing adjacent consolidation of the bilateral lower lobes. 3.  Endotracheal tube is seen in satisfactory position. 4.  Multiple thyroid nodules are present. CT Head WO Contrast   Final Result      No acute intracranial hemorrhage or mass effect. XR CHEST PORTABLE   Final Result      ET tube placed in proper position above the kristin. Persistent left retrocardiac, basilar airspace density, and small effusion.       Increased bilateral perihilar density, most consistent with congestive failure and pulmonary edema             LABS:   Results for orders placed or performed during the hospital encounter of 07/14/22   CBC with Auto Differential   Result Value Ref Range    WBC 5.3 4.0 - 11.0 K/uL    RBC 4.07 4.00 - 5.20 M/uL    Hemoglobin 9.7 (L) 12.0 - 16.0 g/dL    Hematocrit 33.1 (L) 36.0 - 48.0 %    MCV 81.2 80.0 - 100.0 fL    MCH 23.9 (L) 26.0 - 34.0 pg    MCHC 29.4 (L) 31.0 - 36.0 g/dL    RDW 25.8 (H) 12.4 - 15.4 %    Platelets 062 434 - 419 K/uL    MPV 9.9 5.0 - 10.5 fL    PLATELET SLIDE REVIEW Adequate     SLIDE REVIEW see below     Neutrophils % 52.6 %    Lymphocytes % 28.4 %    Monocytes % 14.9 %    Eosinophils % 3.0 %    Basophils % 1.1 %    Neutrophils Absolute 2.8 1.7 - 7.7 K/uL    Lymphocytes Absolute 1.5 1.0 - 5.1 K/uL    Monocytes Absolute 0.8 0.0 - 1.3 K/uL    Eosinophils Absolute 0.2 0.0 - 0.6 K/uL    Basophils Absolute 0.1 0.0 - 0.2 K/uL    Anisocytosis 1+ (A)     Polychromasia 1+ (A)     Poikilocytes 1+ (A)     Ovalocytes 1+ (A)     Tear Drop Cells Occasional (A)    Comprehensive Metabolic Panel w/ Reflex to MG   Result Value Ref Range    Sodium 138 136 - 145 mmol/L    Potassium reflex Magnesium 4.5 3.5 - 5.1 mmol/L    Chloride 103 99 - 110 mmol/L    CO2 26 21 - 32 mmol/L    Anion Gap 9 3 - 16    Glucose 226 (H) 70 - 99 mg/dL    BUN 22 (H) 7 - 20 mg/dL    CREATININE 1.4 (H) 0.6 - 1.2 mg/dL    GFR Non- 38 (A) >60    GFR  46 (A) >60    Calcium 9.3 8.3 - 10.6 mg/dL    Total Protein 6.9 6.4 - 8.2 g/dL    Albumin 3.9 3.4 - 5.0 g/dL    Albumin/Globulin Ratio 1.3 1.1 - 2.2    Total Bilirubin 0.4 0.0 - 1.0 mg/dL    Alkaline Phosphatase 80 40 - 129 U/L    ALT 9 (L) 10 - 40 U/L    AST 23 15 - 37 U/L   Troponin   Result Value Ref Range    Troponin 0.02 (H) <0.01 ng/mL   Brain Natriuretic Peptide   Result Value Ref Range    Pro-BNP 28,914 (H) 0 - 124 pg/mL   Lactic Acid   Result Value Ref Range    Lactic Acid 2.1 (H) 0.4 - 2.0 mmol/L   Blood Gas, Venous   Result Value Ref Range    pH, [place] 7.080 (LL) 7.350 - 7.450    pCO2, [place] 93.1 (H) 41.0 - 51.0 mmHg    pO2, [place] 56.8 (H) 25.0 - 40.0 mmHg    HCO3, Venous 27.6 24.0 - 28.0 mmol/L    Base Excess, [place] -3.5 (L) -2.0 - 3.0 mmol/L    O2 Sat, [place] 71 Not established %    Carboxyhemoglobin 2.2 (H) 0.0 - 1.5 %    MetHgb, [place] 0.4 0.0 - 1.5 %    TC02 (Calc), [place] 31 mmol/L    Hemoglobin, [place], Reduced 28.30 %   Urinalysis with Reflex to Culture    Specimen: Urine   Result Value Ref Range    Color, UA Yellow Straw/Yellow    Clarity, UA Clear Clear    Glucose, Ur Negative Negative mg/dL    Bilirubin Urine Negative Negative    Ketones, Urine Negative Negative mg/dL    Specific Gravity, UA 1.020 1.005 - 1.030    Blood, Urine SMALL (A) Negative    pH, UA 6.0 5.0 - 8.0    Protein,  (A) Negative mg/dL    Urobilinogen, Urine 0.2 <2.0 E.U./dL    Nitrite, Urine Negative Negative    Leukocyte Esterase, Urine Negative Negative    Microscopic Examination YES Urine Type NotGiven     Urine Reflex to Culture Not Indicated    Blood gas, arterial (Lab)   Result Value Ref Range    pH, Arterial 7.182 (LL) 7.350 - 7.450    pCO2, Arterial 69.6 (H) 35.0 - 45.0 mmHg    pO2, Arterial 83.0 75.0 - 108.0 mmHg    HCO3, Arterial 26 21 - 29 mmol/L    Base Excess, Arterial -2.9 -3.0 - 3.0 mmol/L    Hemoglobin, Art, Extended 9.50 g/dL    O2 Sat, Arterial 92 (L) 93 - 100 %    Carboxyhgb, Arterial 2.5 (H) 0.0 - 1.5 %    Methemoglobin, Arterial 0.2 0.0 - 1.4 %    TCO2, Arterial 28 mmol/L   Microscopic Urinalysis   Result Value Ref Range    WBC, UA 3-5 0 - 5 /HPF    RBC, UA None seen 0 - 4 /HPF   POCT Glucose   Result Value Ref Range    POC Glucose 177 (H) 70 - 99 mg/dl    Performed on ACCU-CHEK        ED BEDSIDE ULTRASOUND:  No results found. RECENT VITALS:  BP: (!) 105/44,  , Heart Rate: 60,Resp: 16, SpO2: 97 %     Procedures     Intubation    Date/Time: 7/14/2022 2:40 PM  Performed by: Brent rGeen MD  Authorized by: Matthew Caal MD     Consent:     Consent obtained:  Emergent situation  Pre-procedure details:     Patient status:  Altered mental status    Pretreatment meds: Etomidate. Paralytics:  Succinylcholine  Procedure details:     Preoxygenation: Apneic preoxygenation performed with NC and NRB at flush flow. , as well as BVM. Only able to achieve pre-intubation SpO2 of 88-89%    CPR in progress: no      Intubation method:  Oral    Oral intubation technique:  Video-assisted    Laryngoscope blade: Mac 3    Tube size (mm):  7.5    Tube type:  Cuffed    Number of attempts:  1 (CL grade 1 view achieved)    Cricoid pressure: yes      Tube visualized through cords: yes    Placement assessment:     ETT to lip:  21    Tube secured with:  ETT anne    Breath sounds:  Equal    Placement verification: chest rise, equal breath sounds and ETCO2 detector      CXR findings:  ETT in proper place  Post-procedure details:     Patient tolerance of procedure:   Tolerated well, no immediate complications    Secretions noted in the glottis. During intubation, noted to have left-sided neck swelling causing some lateral displacement of the cords/glottis. ED Course     Nursing Notes, Past Medical Hx, Past Surgical Hx, Social Hx, Allergies, and Family Hx were reviewed. The patient was given the followingmedications:  Orders Placed This Encounter   Medications    etomidate (AMIDATE) injection 30 mg    succinylcholine (ANECTINE) injection 100 mg    propofol injection     Order Specific Question:   Titrate Infusion? Answer:   Yes     Order Specific Question:   Initial Infusion Dose: Answer:   20 mcg/kg/min     Order Specific Question:   Goal of Therapy:     Answer:   RASS of -1 to 0     Order Specific Question:   Contact Provider if:     Answer:   New onset HR less than 50 bpm     Order Specific Question:   Contact Provider if:     Answer:   New onset SBP less than 90 mmHg     Order Specific Question:   Contact Provider if:     Answer:   Patient is receiving maximum dose and is not achieving the goal of therapy     Order Specific Question:   Contact Provider if:     Answer:   Triglycerides greater than 500 mg/dL    fentaNYL (SUBLIMAZE) 1,000 mcg in sodium chloride 0.9% 100 mL infusion     Order Specific Question:   Titrate Infusion? Answer:   Yes     Order Specific Question:   Initial Infusion Dose: Answer:   48 mcg/hr     Order Specific Question:   Goal of Therapy is: Answer:   RASS of -1 to 0     Order Specific Question:   Contact Provider if:     Answer:   Patient is receiving the maximum dose and is not achieving the goal of therapy    EPINEPHrine 100 mcg in 10 mL sodium chloride 0.9% (push dose)    EPINEPHrine (EPINEPHrine HCL) 5 mg in dextrose 5 % 250 mL infusion     Order Specific Question:   Titrate Infusion? Answer:   Yes     Order Specific Question:   Initial Infusion Dose:      Answer:   1 mcg/min     Order Specific Question:   Goal of Therapy is: Answer:   MAP greater than 65 mmHg     Order Specific Question:   Contact Provider if:     Answer:   Patient is receiving the maximum dose and is not achieving the goal of therapy    iopamidol (ISOVUE-370) 76 % injection 75 mL    furosemide (LASIX) injection 100 mg       CONSULTS:  IP CONSULT TO CRITICAL CARE  IP CONSULT TO HOSPITALIST    MEDICAL DECISION MAKING / ASSESSMENT / Riley Youssef is a 65 y.o. female with significant comorbidities as described above, including stage IV small cell lung cancer on chemotherapy, systolic and diastolic heart failure (EF less than 29%, grade 3 diastolic dysfunction), and chronic respiratory failure on 3 L of oxygen who presents to the emergency department with acute altered mental status and respiratory distress. Upon arrival to the ED she was critically ill-appearing. She was hypoxic to 60% despite 3 L nasal cannula, and while getting the patient on our monitors she was noted to become bradycardic to the 30s. Bag-valve-mask ventilation was initiated, with improvement in her hypoxia to the 80s-90s, and then her pacemaker was noted to initiate and she had improvement in her heart rate up to the 80s. The bradycardia was not captured on EKG, EKG obtained when her heart rate was in the 80s revealed a paced rhythm with no evidence of ACS. Her daughter was at bedside and reiterated that the patient was full code. She was significantly altered with poor respiratory effort, and thus was urgently intubated shortly after her arrival in the ED with improvement in her oxygenation, please see the procedure note for further details. She was started on fentanyl and propofol for postintubation sedation. Broad medical work-up was initiated to identify potential causes of her acute decompensation. VBG reveals acute hypercapnia with a PCO2 of 93 and a pH of 7.08, this is improving with ventilation and on repeat blood gas her pH was 7.18 and her PCO2 had improved to 69.   CMP significant for mild AIDA with creatinine of 1.4, up from her baseline of 0.9. Otherwise, no significant electrolyte derangements noted on her metabolic panel. Her BNP is significantly elevated to greater than 28,000. Troponin is 0.02, reassuring against ACS, repeat is currently pending. Her chest x-ray revealed evidence of pulmonary edema. We elected to obtain a CT scan of the neck to evaluate her soft tissues given the asymmetry that we observed during intubation, this revealed a multinodular thyroid but no other masses in the neck. Overall, I suspect volume overload is the most likely contributing factor to her acute hypoxic and hypercapnic respiratory failure, and I question if possibly her symptoms arose when she was laid flat for her PET scan today. CTA was negative for PE. Noncontrast head CT was negative for contributing intracranial process. I feel sepsis is unlikely given the acute onset of her symptoms, and because she has been on IV antibiotics with cefepime as recently as this morning. We are interrogating her [employer] pacemaker to evaluate for possible arrhythmic event, this is currently pending and will be followed up by the ICU team.    Since intubation, her fentanyl and propofol have been titrated to keep her comfortable, as she has been fighting the ET tube. MAPs have been in the high 50s, thus we elected to hold off on diuresis to avoid dropping her blood pressure further. We also elected to hold off on IV antibiotics, and she received a dose of IV cefepime this morning prior to arrival and her presentation is not consistent with sepsis. Her care was discussed with the ICU team and the hospitalist and she is excepted for admission, the patient's mother and daughter at bedside were updated and informed of the plan. This patient was also evaluated by the attending physician. All care plans were discussed and agreed upon. Clinical Impression     1.  Acute on chronic respiratory failure with hypoxia and hypercapnia ([facility]    2. Pleural effusion    3. Acute on chronic combined systolic and diastolic congestive heart failure (HCC)        Disposition     PATIENT REFERRED TO:  No follow-up provider specified.     DISCHARGE MEDICATIONS:  New Prescriptions    No medications on file       DISPOSITION   Admit to ICU        Saint Coats, MD  Resident  07/14/22 [id number]

## 2022-07-14 NOTE — CONSULTS
Initial Pulmonary & Critical Care Consult Note      Reason for Consult: Resp Failure  Requesting Physician:   Dr. Pepper Low:   [facility]:                The patient is a 65 y.o. female with significant past medical history of extensive stage cell lung cancer, HFrEF, COPD on 3 -5 L oxygen, CKD 3, HTN, DM was getting PET scan at [address] [place] [address] Way within [facility] where she became obtunded and was noted to be hypoxemic. She was brought to ER where her HR was 30 and she was noted to have agonal breathing. She was intubated immediately. patient had a recent hospitalization [facility] 6/28-7/2 for HCAP requiring intubation. She was started on cefepime, and today is day 9/10.  Unfortunately, since the patient currently intubated and sedated, further history was unable to be performed    Past Medical History:      Diagnosis Date    Asthma     Cardiomyopathy ([address]     CHF (congestive heart failure) ([address]     COPD (chronic obstructive pulmonary disease) ([address]     Diabetes mellitus ([address]     Essential hypertension     Hyperlipidemia     Hypertension     Obesity       Past Surgical History:        Procedure Laterality Date    CARPAL TUNNEL RELEASE      CHOLECYSTECTOMY      CORONARY ANGIOPLASTY WITH STENT PLACEMENT  01/07/2013    Stent LAD    INCONTINENCE SURGERY      IR PORT PLACEMENT EQUAL OR GREATER THAN 5 YEARS Right 05/03/2022    Power port, inserted by Dr. Hortensia Mascorro, cut at 22    IR [place] [facility] 5 YEARS  5/3/2022    IR PORT PLACEMENT EQUAL OR GREATER THAN 5 YEARS 5/3/2022 [facility] SPECIAL PROCEDURES     Current Medications:     sodium chloride flush  5-40 mL IntraVENous 2 times per day    heparin (porcine)  5,000 Units SubCUTAneous 3 times per day    [START ON 7/15/2022] cefepime  2,000 mg IntraVENous Q12H    insulin lispro  0-18 Units SubCUTAneous Q4H    pantoprazole  40 mg IntraVENous Daily        Social History:    Unobtainable - Patient intubated    Family History: Unobtainable - Patient intubated    REVIEW OF SYSTEMS:    Unobtainable - Patient intubated      Objective:   PHYSICAL EXAM:      VITALS:  BP (!) 109/48   Pulse 60   Temp (!) 96.3 °F (35.7 °C) (Bladder)   Resp 16   Ht 5' 7\" (1.702 m)   Wt 220 lb (99.8 kg)   SpO2 100%   BMI 34.46 kg/m²   24HR INTAKE/OUTPUT:      Intake/Output Summary (Last 24 hours) at 2022  Last data filed at 2022 1900  Gross per 24 hour   Intake --   Output 375 ml   Net -375 ml     CURRENT PULSE OXIMETRY:  SpO2: 100 %  24HR PULSE OXIMETRY RANGE:  SpO2  Av.5 %  Min: 66 %  Max: 100 %    CONSTITUTIONAL:  intubated  EYES: Pupils equal round and reactive to light. Normal conjunctiva  EARS, NOSE, MOUTH & THROAT: Normal oropharynx. Ears and nose appear normal  NECK:  Supple, symmetrical, trachea midline, no adenopathy, thyroid symmetric, not enlarged and no tenderness, skin normal  LUNGS:  no increased work of breathing and clear to auscultation. No accessory muscle use  CARDIOVASCULAR:  normal S1 and S2, no edema and no JVD  ABDOMEN:  normal bowel sounds, non-distended and no masses palpated, and no tenderness to palpation. No hepatospleenomegaly  LYMPHADENOPATHY:  no axillary or supraclavicular adenopathy. No cervical adnenopathy  MUSCULOSKELETAL: No obvious misalignment or effusion of the joints. No clubbing, cyanosis of the digits. SKIN:  normal skin color, texture, turgor and no redness, warmth, or swelling.  No palpable nodules    DATA:    Old records have been reviewed  CBC with Differential:    Lab Results   Component Value Date/Time    WBC 5.3 2022 01:33 PM    RBC 4.07 2022 01:33 PM    RBC 5.12 2017 02:05 PM    HGB 9.7 2022 01:33 PM    HCT 33.1 2022 01:33 PM     2022 01:33 PM    MCV 81.2 2022 01:33 PM    MCH 23.9 2022 01:33 PM    MCHC 29.4 2022 01:33 PM    RDW 25.8 2022 01:33 PM    SEGSPCT 73.7 2013 04:07 PM    BANDSPCT 3 2022 05:08 AM METASPCT 1 06/21/2022 05:08 AM    LYMPHOPCT 28.4 07/14/2022 01:33 PM    LYMPHOPCT 18.8 05/26/2017 02:05 PM    MONOPCT 14.9 07/14/2022 01:33 PM    EOSPCT 3 07/05/2022 11:30 AM    BASOPCT 1.1 07/14/2022 01:33 PM    MONOSABS 0.8 07/14/2022 01:33 PM    LYMPHSABS 1.5 07/14/2022 01:33 PM    EOSABS 0.2 07/14/2022 01:33 PM    BASOSABS 0.1 07/14/2022 01:33 PM    DIFFTYPE Auto 01/17/2013 04:07 PM     BMP:    Lab Results   Component Value Date/Time     07/14/2022 01:33 PM    K 4.5 07/14/2022 01:33 PM     07/14/2022 01:33 PM    CO2 26 07/14/2022 01:33 PM    BUN 22 07/14/2022 01:33 PM    CREATININE 1.4 07/14/2022 01:33 PM    CALCIUM 9.3 07/14/2022 01:33 PM    GFRAA 46 07/14/2022 01:33 PM    GFRAA >60 01/17/2013 04:07 PM    LABGLOM 38 07/14/2022 01:33 PM    GLUCOSE 226 07/14/2022 01:33 PM    GLUCOSE 154 06/05/2015 12:40 PM     Hepatic Function Panel:    Lab Results   Component Value Date/Time    ALKPHOS 80 07/14/2022 01:33 PM    ALT 9 07/14/2022 01:33 PM    AST 23 07/14/2022 01:33 PM    PROT 6.9 07/14/2022 01:33 PM    PROT 7.6 06/05/2015 12:40 PM    BILITOT 0.4 07/14/2022 01:33 PM    BILIDIR <0.2 12/02/2021 04:54 AM    IBILI see below 12/02/2021 04:54 AM     ABG:    Lab Results   Component Value Date/Time    CYT2GRK 26 07/14/2022 01:54 PM    BEART -2.9 07/14/2022 01:54 PM    A2GEJOEC 92 07/14/2022 01:54 PM    PHART 7.182 07/14/2022 01:54 PM    THGBART 14.7 12/15/2012 02:10 PM    SFS5EVZ 69.6 07/14/2022 01:54 PM    PO2ART 83.0 07/14/2022 01:54 PM    LRP9GDP 28 07/14/2022 01:54 PM     Pro-BNP 92891    Radiology Review:  All pertinent images / reports were reviewed as a part of this visit. CTPA 7/14/2022 reveals the following:  Impression   1.  No pulmonary embolus. 2.  Large right and moderate left pleural effusions causing adjacent consolidation of the bilateral lower lobes. 3.  Endotracheal tube is seen in satisfactory position. 4.  Multiple thyroid nodules are present.         PFTs:  None on file    Echo: Summary   Severely dilated LV with akinesis of the inferolateral and inferior walls. EF    30-35%   Moderate mitral regurgitation is present. Large bilateral pleural effusions seen. Assessment       1. Acute on Chronic Hypoxemic with acute hypercapnic Resp Failure  2. CHF exac  3. Extensive stage small cell lung cancer  4. Recent Pneumonia - on cefepime  5. Bilateral pleural effusion - R > L    Plan     1. Start lasix gtt at 5 mg/hr  2. Vent: PRVC, Vt 400, RR 16, FIO2 50%, PEEP 5  3. ABG now and daily  4. Fentanyl and propofol gtt for goal RASS -1  5. Cont cefepime  6. Will consider thora on right in AM  7. Palliative care consult  8. Full Code    Pt has a high probability of imminent or life-threatening deterioration requiring close monitoring, and highly complex decision-making and/or interventions of high intensity to assess, manipulate, and support his critical organ systems to prevent a likely inevitable decline which could occur if left untreated. A total critical care time 40 minutes was used. This includes but not limited to examining patient, collaborating with other physicians, monitoring vital signs, telemetry, continuous pulse oximetry, and clinical response to IV medications, documentation time, review and interpretation of laboratory and radiological data, review of nursing notes and old record review. This time excludes any time that may have been spent performing procedures for life threatening organ failure.       Elvin Brandt

## 2022-07-14 NOTE — ED NOTES
Report called to Sona Vieira RN, ICU. RT called and patient ready for ICU transport.       Flora Rich RN  07/14/22 [id number]

## 2022-07-14 NOTE — H&P
ICU HISTORY AND PHYSICAL       Hospital Day:   ICU Day:                                                          Code:Prior  Admit Date: 7/14/2022  PCP: Michael Villarreal MD                                  CC: AMS    HISTORY OF PRESENT ILLNESS:   Vanda Mcleod is a 65year old female with a past medical history significant for DM2, HTN, CKD III, CHF (EF <20% with , COPD (3L baseline), and stage IV small cell lung cancer (currently undergoing cisplatin and etoposide chemo, last dose 14 days ago) who presented for outpatient PET scan at which time she had AMS secondary to hypoxic event. She required BVM ventilation, and subsequently intubation. Of note the patient had a recent hospitalization at [facility] 6/28-7/2 for HCAP requiring intubation. She was started on cefepime, and today is day 9/10. Unfortunately, since the patient currently intubated and sedated, ROS was unable to be performed. The patient's rhythm did become paced during her resuscitation. At home, the patient has been using 3-5L O2, but was on 8L here. The patient undergoes treatment for her cancer, which was diagnosed earlier this year in the Spring, and follows with Dr. Katty Murillo. She has received cisplatin and etoposide with recent dose reductions, which she has not tolerated well. She received a dose of Tecentriq, but was taken off due to the myelosuppression.      PAST HISTORY:     Past Medical History:   Diagnosis Date    Asthma     Cardiomyopathy ([address]     CHF (congestive heart failure) (HCC)     COPD (chronic obstructive pulmonary disease) ([address]     Diabetes mellitus ([address]     Essential hypertension     Hyperlipidemia     Hypertension     Obesity        Past Surgical History:   Procedure Laterality Date    CARPAL TUNNEL RELEASE      CHOLECYSTECTOMY      CORONARY ANGIOPLASTY WITH STENT PLACEMENT  01/07/2013    Stent LAD    INCONTINENCE SURGERY      IR PORT PLACEMENT EQUAL OR GREATER THAN 5 YEARS Right 05/03/2022    Power port, inserted by Dr. Hortensia Mascorro, cut at 22    IR Bimble Posrclas 15 5 YEARS  5/3/2022    IR PORT PLACEMENT EQUAL OR GREATER THAN 5 YEARS 5/3/2022 [facility] SPECIAL PROCEDURES       SocialHistory:   The patient lives at home    Alcohol: none  Illicit drugs: no use  Tobacco:  Never    Family History:  Family History   Problem Relation Age of Onset    High Blood Pressure Mother     Diabetes Mother     Cancer Mother         thyroid    Stroke Father        MEDICATIONS:     No current facility-administered medications on file prior to encounter.      Current Outpatient Medications on File Prior to Encounter   Medication Sig Dispense Refill    potassium chloride (KLOR-CON M) 20 MEQ extended release tablet Take 1 tablet by mouth 2 times daily for 5 days 10 tablet 0    dextrose 5 % SOLN 50 mL with ceFEPIme 2 g SOLR 2,000 mg Infuse 2,000 mg intravenously every 12 hours Until 7/15 for PNA      sennosides-docusate sodium (SENOKOT-S) 8.6-50 MG tablet Take 1 tablet by mouth daily 30 tablet 0    torsemide 40 MG TABS Take 40 mg by mouth 2 times daily 30 tablet 3    potassium chloride (KLOR-CON M) 20 MEQ extended release tablet Take 20 mEq by mouth daily      empagliflozin (JARDIANCE) 10 MG tablet Take 10 mg by mouth daily      umeclidinium-vilanterol (ANORO ELLIPTA) 62.5-25 MCG/INH AEPB inhaler Inhale 1 puff into the lungs daily      Vericiguat (VERQUVO) 5 MG TABS Take 5 mg by mouth every morning      albuterol (PROVENTIL) (2.5 MG/3ML) 0.083% nebulizer solution Take 2.5 mg by nebulization 4 times daily       amiodarone (CORDARONE) 200 MG tablet Take 200 mg by mouth daily       ASPIRIN LOW DOSE 81 MG EC tablet Take 81 mg by mouth daily       carvedilol (COREG) 6.25 MG tablet Take 6.25 mg by mouth 2 times daily       pantoprazole (PROTONIX) 40 MG tablet Take 40 mg by mouth daily       atorvastatin (LIPITOR) 40 MG tablet Take 40 mg by mouth nightly       magnesium oxide (MAG-OX) 400 MG tablet Take 400 mg by mouth 2 times daily       gabapentin (NEURONTIN) 600 MG tablet Take 600 mg by mouth 3 times daily.  albuterol (PROVENTIL HFA) 108 (90 BASE) MCG/ACT inhaler Inhale 2 puffs into the lungs every 4 hours as needed for Wheezing or Shortness of Breath            Scheduled Meds:   furosemide  100 mg IntraVENous Once      Continuous Infusions:   propofol 20 mcg/kg/min (07/14/22 1542)    fentaNYL 100 mcg/hr (07/14/22 1541)    EPINEPHrine       PRN Meds:    Allergies: Allergies   Allergen Reactions    Ace Inhibitors     Diclofenac     Losartan     Spironolactone     Vicodin Hp [Hydrocodone-Acetaminophen]     Vicodin [Hydrocodone-Acetaminophen]        REVIEW OF SYSTEMS:       History obtained from child and chart review    Review of Systems   Unable to perform ROS: Acuity of condition   Genitourinary: Positive for vaginal bleeding. PHYSICAL EXAM:       Vitals: BP (!) 98/43   Pulse 60   Resp 16   Ht 5' 7\" (1.702 m)   Wt 220 lb (99.8 kg)   SpO2 97%   BMI 34.46 kg/m²     I/O:      Intake/Output Summary (Last 24 hours) at 7/14/2022 1650  Last data filed at 7/14/2022 1533  Gross per 24 hour   Intake --   Output 175 ml   Net -175 ml     I/O this shift:  In: -   Out: 175 [Urine:175]  No intake/output data recorded. Physical Examination:     Physical Exam  Constitutional:       Appearance: She is ill-appearing and toxic-appearing. Comments: Intubated and sedated   Eyes:      Pupils: Pupils are equal, round, and reactive to light. Cardiovascular:      Rate and Rhythm: Normal rate and regular rhythm. Heart sounds: Normal heart sounds. Comments: paced  Pulmonary:      Effort: No respiratory distress. Breath sounds: Normal breath sounds. Abdominal:      General: Abdomen is flat. Palpations: Abdomen is soft. Musculoskeletal:      Right lower leg: Edema present. Left lower leg: Edema present. Skin:     General: Skin is dry. Coloration: Skin is pale. Neurological:      Comments: sedated           Access:                                   ETT Day#:1  Vent Settings: Vent Mode: AC/VC Resp Rate (Set): 16 bmp/Vt (Set, mL): 400 mL/ /FiO2 : 50 %    Recent Labs     07/14/22  1354   PHART 7.182*   JTN7LUT 69.6*   PO2ART 83.0           DATA:       Labs:  CBC:   Recent Labs     07/14/22  1333   WBC 5.3   HGB 9.7*   HCT 33.1*          BMP:   Recent Labs     07/14/22  1333      K 4.5      CO2 26   BUN 22*   CREATININE 1.4*   GLUCOSE 226*     LFT's:   Recent Labs     07/14/22  1333   AST 23   ALT 9*   BILITOT 0.4   ALKPHOS 80     Troponin:   Recent Labs     07/14/22  1333   TROPONINI 0.02*     BNP:No results for input(s): BNP in the last 72 hours. ABGs:   Recent Labs     07/14/22  1354   PHART 7.182*   UGG4HFO 69.6*   PO2ART 83.0     INR: No results for input(s): INR in the last 72 hours. U/A:  Recent Labs     07/14/22  1525   COLORU Yellow   PHUR 6.0   WBCUA 3-5   RBCUA None seen   CLARITYU Clear   SPECGRAV 1.020   LEUKOCYTESUR Negative   UROBILINOGEN 0.2   BILIRUBINUR Negative   BLOODU SMALL*   GLUCOSEU Negative       CTA PULMONARY W CONTRAST   Final Result   1. No pulmonary embolus. 2.  Large right and moderate left pleural effusions causing adjacent consolidation of the bilateral lower lobes. 3.  Endotracheal tube is seen in satisfactory position. 4.  Multiple thyroid nodules are present. CT SOFT TISSUE NECK W CONTRAST   Final Result   1. No pulmonary embolus. 2.  Large right and moderate left pleural effusions causing adjacent consolidation of the bilateral lower lobes. 3.  Endotracheal tube is seen in satisfactory position. 4.  Multiple thyroid nodules are present. CT Head WO Contrast   Final Result      No acute intracranial hemorrhage or mass effect. XR CHEST PORTABLE   Final Result      ET tube placed in proper position above the kristin.       Persistent left retrocardiac, basilar airspace density, and small effusion. Increased bilateral perihilar density, most consistent with congestive failure and pulmonary edema             EKG: Paced rhtyhm  Echo: (6/28/22): Severely dilated LV with akinesis of the inferolateral and inferior walls. EF  30-35%. Moderate mitral regurgitation is present. Large bilateral pleural effusions seen. ASSESSMENT AND PLAN:   Ken Bedolla is a 65 y.o. female, with a past medical history significant for DM2, HTN, CKD III, CHF (EF 30-35% with grade III diastolic dysfunction), COPD (3L baseline), and stage IV small cell lung cancer (currently undergoing cisplatin and etoposide chemo, last dose 14 days ago) who presented for outpatient PET scan at which time she had AMS secondary to hypoxic event. She was intubated in the ED. Acute Hypoxic Respiratory Failure:  Pleural Effusion most likely etiology vs COPD exacerbation vs CHF  - Patient likely lied supine for the PET scan, the effusion distributed, and she decompensated  - Recent pneumonia, on cefepime, continue to complete dose, tomorrow last day   - WBC 5.3 but patient immunosuppressed, 1.3-2.5 baseline  - ABG in ED: 7.182; 69.6 CO2; 83 O2; 26 HCO3  - CTA pulm with large right and moderate left effusions causing adjacent consolidation of bilateral lower lobes. Compared to prior scan on 6/16, pneumonic type consolidations appear improved, but effusions are larger  - Lasix gtt to start at 5mg/hr, will increase to 10mg/hr in a few hours pending diuresis response, may go up from there  - Strict I/Os, maintain Roldan  - Will evaluate tomorrow with ultrasound, and possibly drain and test effusion with thoracentesis   - Vent settings decreased from initial: 400 / 16 / 5 / 40%   - Will recheck ABG    Small Cell Lung Cancer  - Stage IV, uncurable  - Under treatment with cisplatin and etoposide, did not tolerate immunotherapy with Tecentriq  - Heme/onc consulted, appreciate recs.   - Palliative consulted given poor prognosis, and failure to tolerate treatment. Patient still full code, appreciate assistance    CHF:  - Most recent EF 30-35% with grade III diastolic dysfunction  - On torsemide 40mg and vericiguat (Zaire Willingham) at home  - Upon improvement of respiratory status, will restart home meds      Chronic Problems:  - COPD: On 3-5L at home, will consider for vent wean, may benefit from extubation to bipap; on albuterol and Anoro Ellipta at home  - CKD: Stage III. Creatinine 1.4, Baseline creatinine 1.2-1.4; preserving right arm.  Sees Dr. Matt Peres with [facility]  - DM2: On Jardiance at home, placed on HDSSI here, q6hr glucose checks  - CAD: On Coreg  - HLD: On Lipitor at home      Code Status:Prior  FEN: NPO  PPX:  SQH, protonix 40mg QD  DISPO: ICU    This patient has been staffed and discussed with Uri Boggs MD.   -----------------------------  Carlita Mosqueda DO, PGY-1  7/14/2022  4:50 PM

## 2022-07-14 NOTE — ED PROVIDER NOTES
ED Attending Attestation Note     Date of evaluation: 7/14/2022    This patient was seen by the resident. I have seen and examined the patient, agree with the workup, evaluation, management and diagnosis. The care plan has been discussed. I have reviewed the ECG and concur with the resident's interpretation. This is a critically ill patient who presented from PET CT with concerns for acute change in mental status. She was noted to be minimally responsive and obtunded on arrival with a normal fingerstick glucose. She was profoundly hypoxic on her home 3 L of oxygen with O2 sats in the 60s and therefore bag-valve-mask respirations were initiated. Patient also notably significantly bradycardic on arrival however, within 60-90 seconds, it was noted that a paced rhythm took over and her initial hypotension improved to normotension. Due to the profound nature of her obtundation and respiratory failure, decision made to intubate. Please see separate procedure note for details of this intervention. Notably, this was a high risk procedure as the breast preinduction oxygen saturation was 88%; meter reached during procedure was 80%. The patient also became hypotensive during this and required 20 mcg of push dose epinephrine with appropriate response. Post intubation chest x-ray reveals endotracheal tube in the appropriate position. Her respiratory acidosis is improving with increased time on the ventilator. Her mental status has not changed. It consider her sepsis as etiology for her presentation however, given the relative acute onset as well as the fact that the patient is currently being treated with cefepime, this is felt to be relatively less likely and more aggressive broad-spectrum antibiotics were not initiated yet in anticipation of waiting for further diagnostic studies to result.   Given the acute onset and the associated risk factor of metastatic cancer, will obtain head CT to evaluate for acute intracranial hemorrhage. Additionally, again with the risk factor of metastatic cancer and her marked hypoxia and conjunction with possible syncope, will obtain CTPA to evaluate for PE. During intubation, it was noted that she had asymmetry of the vocal folds with concern for possible mass on the left vocal fold which correlates as well with asymmetric lymphadenopathy on the left neck and therefore cross-sectional imaging of this region will be obtained as well. We will plan for pacer interrogation given her bradycardia on presentation. She is not currently febrile; she is 2 weeks out from last chemotherapy therefore is at high risk for chemotherapy related neutropenia. If she is neutropenic, we will plan to escalate her antibiotic therapy. In discussion with the patient's daughter, the patient remains a full code. At the conclusion of her diagnostic testing here in the emergency department, anticipate patient will be admitted to the ICU. CRITICAL CARE TIME    I have seen and examined this patient and provided 62 minutes of critical care time exclusive of separately billed procedures and treating other patients. Critical care was necessary to treat or prevent imminent or life threatening deterioration of the following condition(s): Encephalopathy, bradycardia, respiratory acidosis, hypoxic and hypercarbic respiratory failure, hypertension    Critical care time was spent personally by me on the following activities: obtaining and interpretation of labs respiratory support via bag-valve-mask, chart review and medication reconciliation, management of vent settings, administration of push dose pressors, discussion of possible antibiotic therapy, reassessment after interventions.     MD Tracy Yuan MD  07/14/22 2724

## 2022-07-14 NOTE — Clinical Note
Discharge Plan[de-identified] Other/[facility])   Telemetry/Cardiac Monitoring Required?: Yes

## 2022-07-15 ENCOUNTER — APPOINTMENT (OUTPATIENT)
Dept: GENERAL RADIOLOGY | Age: 62
DRG: 208 | End: 2022-07-15
Payer: MEDICARE

## 2022-07-15 PROBLEM — Z98.61 CAD S/P PERCUTANEOUS CORONARY ANGIOPLASTY: Status: ACTIVE | Noted: 2022-03-31

## 2022-07-15 PROBLEM — I34.0 MITRAL VALVE INSUFFICIENCY: Status: ACTIVE | Noted: 2022-07-15

## 2022-07-15 LAB
ALBUMIN SERPL-MCNC: 3.3 G/DL (ref 3.4–5)
ALBUMIN SERPL-MCNC: 3.3 G/DL (ref 3.4–5)
ANION GAP SERPL CALCULATED.3IONS-SCNC: 12 MMOL/L (ref 3–16)
ANION GAP SERPL CALCULATED.3IONS-SCNC: 9 MMOL/L (ref 3–16)
APPEARANCE FLUID: CLEAR
BASE EXCESS ARTERIAL: 0.7 MMOL/L (ref -3–3)
BASE EXCESS VENOUS: 0.8 MMOL/L (ref -2–3)
BASOPHILS ABSOLUTE: 0 K/UL (ref 0–0.2)
BASOPHILS RELATIVE PERCENT: 1.5 %
BUN BLDV-MCNC: 25 MG/DL (ref 7–20)
BUN BLDV-MCNC: 27 MG/DL (ref 7–20)
CALCIUM SERPL-MCNC: 8.9 MG/DL (ref 8.3–10.6)
CALCIUM SERPL-MCNC: 9.3 MG/DL (ref 8.3–10.6)
CARBOXYHEMOGLOBIN ARTERIAL: 2.5 % (ref 0–1.5)
CARBOXYHEMOGLOBIN: 2.2 % (ref 0–1.5)
CELL COUNT FLUID TYPE: NORMAL
CHLORIDE BLD-SCNC: 104 MMOL/L (ref 99–110)
CHLORIDE BLD-SCNC: 104 MMOL/L (ref 99–110)
CLOT EVALUATION: NORMAL
CO2: 24 MMOL/L (ref 21–32)
CO2: 25 MMOL/L (ref 21–32)
COLOR FLUID: YELLOW
CREAT SERPL-MCNC: 1.4 MG/DL (ref 0.6–1.2)
CREAT SERPL-MCNC: 1.5 MG/DL (ref 0.6–1.2)
EKG ATRIAL RATE: 60 BPM
EKG DIAGNOSIS: NORMAL
EKG P AXIS: 103 DEGREES
EKG P-R INTERVAL: 164 MS
EKG Q-T INTERVAL: 540 MS
EKG QRS DURATION: 174 MS
EKG QTC CALCULATION (BAZETT): 540 MS
EKG R AXIS: -74 DEGREES
EKG T AXIS: 98 DEGREES
EKG VENTRICULAR RATE: 60 BPM
EOSINOPHILS ABSOLUTE: 0.1 K/UL (ref 0–0.6)
EOSINOPHILS RELATIVE PERCENT: 3.4 %
ESTIMATED AVERAGE GLUCOSE: 85.3 MG/DL
FLUID TYPE: NORMAL
GFR AFRICAN AMERICAN: 43
GFR AFRICAN AMERICAN: 46
GFR NON-AFRICAN AMERICAN: 35
GFR NON-AFRICAN AMERICAN: 38
GLUCOSE BLD-MCNC: 112 MG/DL (ref 70–99)
GLUCOSE BLD-MCNC: 58 MG/DL (ref 70–99)
GLUCOSE BLD-MCNC: 69 MG/DL (ref 70–99)
GLUCOSE BLD-MCNC: 73 MG/DL (ref 70–99)
GLUCOSE BLD-MCNC: 78 MG/DL (ref 70–99)
GLUCOSE BLD-MCNC: 79 MG/DL (ref 70–99)
GLUCOSE BLD-MCNC: 81 MG/DL (ref 70–99)
GLUCOSE BLD-MCNC: 83 MG/DL (ref 70–99)
GLUCOSE BLD-MCNC: 84 MG/DL (ref 70–99)
GLUCOSE BLD-MCNC: 89 MG/DL (ref 70–99)
GLUCOSE BLD-MCNC: 92 MG/DL (ref 70–99)
GLUCOSE, FLUID: 94 MG/DL
HBA1C MFR BLD: 4.6 %
HCO3 ARTERIAL: 27 MMOL/L (ref 21–29)
HCO3 VENOUS: 27.5 MMOL/L (ref 24–28)
HCT VFR BLD CALC: 27.9 % (ref 36–48)
HEMOGLOBIN, ART, EXTENDED: 9.1 G/DL
HEMOGLOBIN, VEN, REDUCED: 24.5 %
HEMOGLOBIN: 8.5 G/DL (ref 12–16)
INR BLD: 1.26 (ref 0.87–1.14)
LACTATE DEHYDROGENASE: 354 U/L (ref 100–190)
LD, FLUID: 65 U/L
LYMPHOCYTES ABSOLUTE: 0.4 K/UL (ref 1–5.1)
LYMPHOCYTES RELATIVE PERCENT: 17.3 %
LYMPHOCYTES, BODY FLUID: 24 %
MACROPHAGE FLUID: 14 %
MAGNESIUM: 2.3 MG/DL (ref 1.8–2.4)
MCH RBC QN AUTO: 23.7 PG (ref 26–34)
MCHC RBC AUTO-ENTMCNC: 30.4 G/DL (ref 31–36)
MCV RBC AUTO: 78 FL (ref 80–100)
METHEMOGLOBIN ARTERIAL: 0.1 % (ref 0–1.4)
METHEMOGLOBIN VENOUS: 0.2 % (ref 0–1.5)
MONOCYTE, FLUID: 61 %
MONOCYTES ABSOLUTE: 0.5 K/UL (ref 0–1.3)
MONOCYTES RELATIVE PERCENT: 21.3 %
NEUTROPHIL, FLUID: 1 %
NEUTROPHILS ABSOLUTE: 1.3 K/UL (ref 1.7–7.7)
NEUTROPHILS RELATIVE PERCENT: 56.5 %
NUCLEATED CELLS FLUID: 45 /CUMM
NUMBER OF CELLS COUNTED FLUID: 100
O2 SAT, ARTERIAL: 88 % (ref 93–100)
O2 SAT, VEN: 75 %
PCO2 ARTERIAL: 48.1 MMHG (ref 35–45)
PCO2, VEN: 51.2 MMHG (ref 41–51)
PDW BLD-RTO: 25.3 % (ref 12.4–15.4)
PERFORMED ON: ABNORMAL
PERFORMED ON: NORMAL
PH ARTERIAL: 7.35 (ref 7.35–7.45)
PH VENOUS: 7.34 (ref 7.35–7.45)
PHOSPHORUS: 3.8 MG/DL (ref 2.5–4.9)
PHOSPHORUS: 4 MG/DL (ref 2.5–4.9)
PLATELET # BLD: 186 K/UL (ref 135–450)
PMV BLD AUTO: 9.7 FL (ref 5–10.5)
PO2 ARTERIAL: 58.2 MMHG (ref 75–108)
PO2, VEN: 44.8 MMHG (ref 25–40)
POTASSIUM SERPL-SCNC: 3.5 MMOL/L (ref 3.5–5.1)
POTASSIUM SERPL-SCNC: 3.9 MMOL/L (ref 3.5–5.1)
PROTEIN FLUID: 1.5 G/DL
PROTHROMBIN TIME: 15.8 SEC (ref 11.7–14.5)
RBC # BLD: 3.58 M/UL (ref 4–5.2)
RBC FLUID: <1000 /CUMM
SODIUM BLD-SCNC: 138 MMOL/L (ref 136–145)
SODIUM BLD-SCNC: 140 MMOL/L (ref 136–145)
TCO2 ARTERIAL: 28 MMOL/L
TCO2 CALC VENOUS: 29 MMOL/L
TOTAL PROTEIN: 6.1 G/DL (ref 6.4–8.2)
WBC # BLD: 2.3 K/UL (ref 4–11)

## 2022-07-15 PROCEDURE — 99221 1ST HOSP IP/OBS SF/LOW 40: CPT | Performed by: NURSE PRACTITIONER

## 2022-07-15 PROCEDURE — 85610 PROTHROMBIN TIME: CPT

## 2022-07-15 PROCEDURE — 87102 FUNGUS ISOLATION CULTURE: CPT

## 2022-07-15 PROCEDURE — 87205 SMEAR GRAM STAIN: CPT

## 2022-07-15 PROCEDURE — 88342 IMHCHEM/IMCYTCHM 1ST ANTB: CPT

## 2022-07-15 PROCEDURE — 84155 ASSAY OF PROTEIN SERUM: CPT

## 2022-07-15 PROCEDURE — 2700000000 HC OXYGEN THERAPY PER DAY

## 2022-07-15 PROCEDURE — 32557 INSERT CATH PLEURA W/ IMAGE: CPT | Performed by: INTERNAL MEDICINE

## 2022-07-15 PROCEDURE — 87070 CULTURE OTHR SPECIMN AEROBIC: CPT

## 2022-07-15 PROCEDURE — 87206 SMEAR FLUORESCENT/ACID STAI: CPT

## 2022-07-15 PROCEDURE — 87116 MYCOBACTERIA CULTURE: CPT

## 2022-07-15 PROCEDURE — 71045 X-RAY EXAM CHEST 1 VIEW: CPT

## 2022-07-15 PROCEDURE — 83615 LACTATE (LD) (LDH) ENZYME: CPT

## 2022-07-15 PROCEDURE — 99291 CRITICAL CARE FIRST HOUR: CPT | Performed by: INTERNAL MEDICINE

## 2022-07-15 PROCEDURE — 82803 BLOOD GASES ANY COMBINATION: CPT

## 2022-07-15 PROCEDURE — 82945 GLUCOSE OTHER FLUID: CPT

## 2022-07-15 PROCEDURE — 6360000002 HC RX W HCPCS: Performed by: INTERNAL MEDICINE

## 2022-07-15 PROCEDURE — 93010 ELECTROCARDIOGRAM REPORT: CPT | Performed by: INTERNAL MEDICINE

## 2022-07-15 PROCEDURE — 87015 SPECIMEN INFECT AGNT CONCNTJ: CPT

## 2022-07-15 PROCEDURE — 88112 CYTOPATH CELL ENHANCE TECH: CPT

## 2022-07-15 PROCEDURE — 6360000002 HC RX W HCPCS: Performed by: STUDENT IN AN ORGANIZED HEALTH CARE EDUCATION/TRAINING PROGRAM

## 2022-07-15 PROCEDURE — 94003 VENT MGMT INPAT SUBQ DAY: CPT

## 2022-07-15 PROCEDURE — C9113 INJ PANTOPRAZOLE SODIUM, VIA: HCPCS

## 2022-07-15 PROCEDURE — 2500000003 HC RX 250 WO HCPCS: Performed by: STUDENT IN AN ORGANIZED HEALTH CARE EDUCATION/TRAINING PROGRAM

## 2022-07-15 PROCEDURE — 84157 ASSAY OF PROTEIN OTHER: CPT

## 2022-07-15 PROCEDURE — 88341 IMHCHEM/IMCYTCHM EA ADD ANTB: CPT

## 2022-07-15 PROCEDURE — 2580000003 HC RX 258

## 2022-07-15 PROCEDURE — 2500000003 HC RX 250 WO HCPCS

## 2022-07-15 PROCEDURE — 99223 1ST HOSP IP/OBS HIGH 75: CPT | Performed by: INTERNAL MEDICINE

## 2022-07-15 PROCEDURE — 6360000002 HC RX W HCPCS

## 2022-07-15 PROCEDURE — 80069 RENAL FUNCTION PANEL: CPT

## 2022-07-15 PROCEDURE — 85025 COMPLETE CBC W/AUTO DIFF WBC: CPT

## 2022-07-15 PROCEDURE — 89051 BODY FLUID CELL COUNT: CPT

## 2022-07-15 PROCEDURE — 2580000003 HC RX 258: Performed by: STUDENT IN AN ORGANIZED HEALTH CARE EDUCATION/TRAINING PROGRAM

## 2022-07-15 PROCEDURE — 36415 COLL VENOUS BLD VENIPUNCTURE: CPT

## 2022-07-15 PROCEDURE — 83735 ASSAY OF MAGNESIUM: CPT

## 2022-07-15 PROCEDURE — 2000000000 HC ICU R&B

## 2022-07-15 PROCEDURE — 36591 DRAW BLOOD OFF VENOUS DEVICE: CPT

## 2022-07-15 PROCEDURE — 93005 ELECTROCARDIOGRAM TRACING: CPT | Performed by: STUDENT IN AN ORGANIZED HEALTH CARE EDUCATION/TRAINING PROGRAM

## 2022-07-15 PROCEDURE — 94761 N-INVAS EAR/PLS OXIMETRY MLT: CPT

## 2022-07-15 PROCEDURE — 0W993ZZ DRAINAGE OF RIGHT PLEURAL CAVITY, PERCUTANEOUS APPROACH: ICD-10-PCS | Performed by: INTERNAL MEDICINE

## 2022-07-15 PROCEDURE — 88305 TISSUE EXAM BY PATHOLOGIST: CPT

## 2022-07-15 RX ORDER — LIDOCAINE HYDROCHLORIDE 20 MG/ML
5 INJECTION, SOLUTION INFILTRATION; PERINEURAL ONCE
Status: COMPLETED | OUTPATIENT
Start: 2022-07-15 | End: 2022-07-15

## 2022-07-15 RX ORDER — INSULIN LISPRO 100 [IU]/ML
0-6 INJECTION, SOLUTION INTRAVENOUS; SUBCUTANEOUS EVERY 4 HOURS
Status: DISCONTINUED | OUTPATIENT
Start: 2022-07-15 | End: 2022-07-16

## 2022-07-15 RX ORDER — DOBUTAMINE HYDROCHLORIDE 200 MG/100ML
2.5-1 INJECTION INTRAVENOUS CONTINUOUS
Status: DISCONTINUED | OUTPATIENT
Start: 2022-07-15 | End: 2022-07-15 | Stop reason: SDUPTHER

## 2022-07-15 RX ORDER — LIDOCAINE HYDROCHLORIDE 20 MG/ML
INJECTION, SOLUTION INFILTRATION; PERINEURAL
Status: COMPLETED
Start: 2022-07-15 | End: 2022-07-15

## 2022-07-15 RX ORDER — DOBUTAMINE HYDROCHLORIDE 200 MG/100ML
2.5 INJECTION INTRAVENOUS CONTINUOUS
Status: DISCONTINUED | OUTPATIENT
Start: 2022-07-15 | End: 2022-07-16

## 2022-07-15 RX ORDER — LIDOCAINE HYDROCHLORIDE 10 MG/ML
10 INJECTION, SOLUTION EPIDURAL; INFILTRATION; INTRACAUDAL; PERINEURAL ONCE
Status: DISCONTINUED | OUTPATIENT
Start: 2022-07-15 | End: 2022-07-15

## 2022-07-15 RX ADMIN — Medication 75 MCG/HR: at 12:55

## 2022-07-15 RX ADMIN — PROPOFOL 25 MCG/KG/MIN: 10 INJECTION, EMULSION INTRAVENOUS at 01:12

## 2022-07-15 RX ADMIN — CEFEPIME 2000 MG: 2 INJECTION, POWDER, FOR SOLUTION INTRAVENOUS at 18:50

## 2022-07-15 RX ADMIN — PROPOFOL 25 MCG/KG/MIN: 10 INJECTION, EMULSION INTRAVENOUS at 20:44

## 2022-07-15 RX ADMIN — HEPARIN SODIUM 5000 UNITS: 5000 INJECTION INTRAVENOUS; SUBCUTANEOUS at 06:22

## 2022-07-15 RX ADMIN — LIDOCAINE HYDROCHLORIDE 400 MG: 20 INJECTION, SOLUTION INFILTRATION; PERINEURAL at 14:18

## 2022-07-15 RX ADMIN — HEPARIN SODIUM 5000 UNITS: 5000 INJECTION INTRAVENOUS; SUBCUTANEOUS at 22:08

## 2022-07-15 RX ADMIN — DOBUTAMINE IN DEXTROSE 2.5 MCG/KG/MIN: 200 INJECTION, SOLUTION INTRAVENOUS at 18:21

## 2022-07-15 RX ADMIN — DEXTROSE MONOHYDRATE 125 ML: 100 INJECTION, SOLUTION INTRAVENOUS at 12:18

## 2022-07-15 RX ADMIN — CEFEPIME 2000 MG: 2 INJECTION, POWDER, FOR SOLUTION INTRAVENOUS at 06:22

## 2022-07-15 RX ADMIN — FUROSEMIDE 10 MG/HR: 10 INJECTION, SOLUTION INTRAMUSCULAR; INTRAVENOUS at 11:01

## 2022-07-15 RX ADMIN — PANTOPRAZOLE SODIUM 40 MG: 40 INJECTION, POWDER, FOR SOLUTION INTRAVENOUS at 08:48

## 2022-07-15 RX ADMIN — SODIUM CHLORIDE, PRESERVATIVE FREE 10 ML: 5 INJECTION INTRAVENOUS at 08:48

## 2022-07-15 RX ADMIN — PROPOFOL 25 MCG/KG/MIN: 10 INJECTION, EMULSION INTRAVENOUS at 08:47

## 2022-07-15 RX ADMIN — DEXTROSE MONOHYDRATE 125 ML: 100 INJECTION, SOLUTION INTRAVENOUS at 18:19

## 2022-07-15 RX ADMIN — FUROSEMIDE 15 MG/HR: 10 INJECTION, SOLUTION INTRAMUSCULAR; INTRAVENOUS at 17:54

## 2022-07-15 RX ADMIN — PROPOFOL 25 MCG/KG/MIN: 10 INJECTION, EMULSION INTRAVENOUS at 14:18

## 2022-07-15 RX ADMIN — Medication 75 MCG/HR: at 00:35

## 2022-07-15 RX ADMIN — SODIUM CHLORIDE, PRESERVATIVE FREE 10 ML: 5 INJECTION INTRAVENOUS at 20:38

## 2022-07-15 ASSESSMENT — PULMONARY FUNCTION TESTS
PIF_VALUE: 24
PIF_VALUE: 24
PIF_VALUE: 23
PIF_VALUE: 30
PIF_VALUE: 22
PIF_VALUE: 24
PIF_VALUE: 19
PIF_VALUE: 22
PIF_VALUE: 19
PIF_VALUE: 19
PIF_VALUE: 26
PIF_VALUE: 19
PIF_VALUE: 20
PIF_VALUE: 25
PIF_VALUE: 19
PIF_VALUE: 22
PIF_VALUE: 20
PIF_VALUE: 19
PIF_VALUE: 19
PIF_VALUE: 25
PIF_VALUE: 19
PIF_VALUE: 19
PIF_VALUE: 20

## 2022-07-15 ASSESSMENT — PAIN SCALES - GENERAL: PAINLEVEL_OUTOF10: 0

## 2022-07-15 NOTE — CONSULTS
[address]         Reason for Consultation/Chief Complaint: \"cardiomyopathy. \"       History of Present Illness:    Mookie Kerr is a 65year old female with a past medical history significant for DM2, HTN, CKD III, CHF (EF 30-35% with grade III diastolic dysfunction, COPD (3L baseline), and stage IV small cell lung cancer (currently undergoing cisplatin and etoposide chemo, last dose 14 days ago) who presented for outpatient PET scan at which time she had AMS secondary to hypoxic event. She was subsequently intubated and required epinephrine in the ER upon presentation as well. .   The patient had a recent hospitalization at [facility] 6/28-7/2 for HCAP requiring intubation. She was started on cefepime, and today is day 9/10. Unfortunately, since the patient currently intubated and sedated, ROS was unable to be performed. The patient's rhythm did become paced during her resuscitation. At home, the patient has been using 3-5L O2, but was on 8L here. Pt in paced rhythm presently. Pt had reduced LVEF on echo of 35% with akinesis of the inferior and inferolateral walls noted on 6/29/22 echo at [address] [place] [address],[address] with large bilateral pleural effusions. Creatinine from 0.9 to 1.5 today and pt had received lasix gtt and dobutrex for a time this admission. S/p thoracentesis. The patient undergoes treatment for her cancer, which was diagnosed earlier this year in the Spring, and follows with Dr. Jeniffer Buenrostro. She has received cisplatin and etoposide with recent dose reductions. History was gathered by extensive review of the EMR.    12/2021 at [facility]  The LV EF is 34%   There is moderate global hypokinesis of the left ventricle. There is a medium sized, reversible defect in the inferior wall consistent   with moderate ischemia. There is a small size, partially reversible defect in the anterior wall   consistent with mild keysha-infarct ischemia.    There is a small size, obstructive pulmonary disease) ([facility] Diabetes mellitus ([facility] [address] Essential hypertension, Hyperlipidemia, Hypertension, and Obesity. Surgical History:   has a past surgical history that includes Cholecystectomy; Carpal tunnel release; Incontinence surgery; Coronary angioplasty with stent (01/07/2013); IR PORT PLACEMENT > 5 YEARS (Right, 05/03/2022); and IR PORT PLACEMENT > 5 YEARS (5/3/2022). Social History:   reports that she has never smoked. She has never used smokeless tobacco. She reports that she does not currently use alcohol. She reports that she does not currently use drugs. Family History:  No evidence for sudden cardiac death or premature CAD    Home Medications:  Were reviewed and are listed in nursing record. and/or listed below  Prior to Admission medications    Medication Sig Start Date End Date Taking?  Authorizing Provider   potassium chloride (KLOR-CON M) 20 MEQ extended release tablet Take 1 tablet by mouth 2 times daily for 5 days 7/11/22 7/16/22  Wes Mena MD   dextrose 5 % SOLN 50 mL with ceFEPIme 2 g SOLR 2,000 mg Infuse 2,000 mg intravenously every 12 hours Until 7/15 for PNA    Historical Provider, MD   sennosides-docusate sodium (SENOKOT-S) 8.6-50 MG tablet Take 1 tablet by mouth daily 7/3/22   Gurpreet Ovalle MD   torsemide 40 MG TABS Take 40 mg by mouth 2 times daily 6/21/22   Hector Lujan MD   potassium chloride (KLOR-CON M) 20 MEQ extended release tablet Take 20 mEq by mouth daily    Historical Provider, MD   empagliflozin (JARDIANCE) 10 MG tablet Take 10 mg by mouth daily    Historical Provider, MD   umeclidinium-vilanterol (ANORO ELLIPTA) 62.5-25 MCG/INH AEPB inhaler Inhale 1 puff into the lungs daily    Historical Provider, MD   Vericiguat (VERQUVO) 5 MG TABS Take 5 mg by mouth every morning    Historical Provider, MD   albuterol (PROVENTIL) (2.5 MG/3ML) 0.083% nebulizer solution Take 2.5 mg by nebulization 4 times daily     Historical Provider, MD amiodarone (CORDARONE) 200 MG tablet Take 200 mg by mouth daily     Historical Provider, MD   ASPIRIN LOW DOSE 81 MG EC tablet Take 81 mg by mouth daily     Historical Provider, MD   carvedilol (COREG) 6.25 MG tablet Take 6.25 mg by mouth 2 times daily     Historical Provider, MD   pantoprazole (PROTONIX) 40 MG tablet Take 40 mg by mouth daily     Historical Provider, MD   atorvastatin (LIPITOR) 40 MG tablet Take 40 mg by mouth nightly     Historical Provider, MD   magnesium oxide (MAG-OX) 400 MG tablet Take 400 mg by mouth 2 times daily     Historical Provider, MD   gabapentin (NEURONTIN) 600 MG tablet Take 600 mg by mouth 3 times daily. Historical Provider, MD   albuterol (PROVENTIL HFA) 108 (90 BASE) MCG/ACT inhaler Inhale 2 puffs into the lungs every 4 hours as needed for Wheezing or Shortness of Breath     Historical Provider, MD        [facility] Medications:   insulin lispro  0-6 Units SubCUTAneous Q4H    sodium chloride flush  5-40 mL IntraVENous 2 times per day    heparin (porcine)  5,000 Units SubCUTAneous 3 times per day    cefepime  2,000 mg IntraVENous Q12H    pantoprazole  40 mg IntraVENous Daily     sodium chloride flush, sodium chloride, prochlorperazine, glucose, dextrose bolus **OR** dextrose bolus, glucagon (rDNA), dextrose   DOBUTamine Stopped (07/15/22 0615)    propofol 25 mcg/kg/min (07/15/22 1418)    fentaNYL 75 mcg/hr (07/15/22 1255)    furosemide (LASIX) 1mg/ml infusion 15 mg/hr (07/15/22 1219)    sodium chloride      dextrose         Allergies:  Ace inhibitors, Diclofenac, Losartan, Spironolactone, Vicodin hp [hydrocodone-acetaminophen], and Vicodin [hydrocodone-acetaminophen]     Review of Systems: Unable to be done pt is on ventilator.       Physical Examination:    Vitals:    07/15/22 1600   BP: (!) 129/58   Pulse: 60   Resp: 16   Temp: 99.5 °F (37.5 °C)   SpO2: 100%    Weight: 220 lb (99.8 kg)         General Appearance:  Sedated s/p thoracentesis   Head:  Normocephalic   Eyes: sleeping   Nose: Nares normal,   Neck: Supple, JVP elevated    Lungs:   Diminished anteriorly and laterally respirations unlabored on ventilator   Chest Wall:  Port in place right anterior chest   Heart:  Regular rate and rhythm, normal S1, S2 normal, no murmur, I/VI HSM No rub. Abdomen:   Soft, hypo bowel sounds   Extremities: no cyanosis, no clubbing ,  2+ diffuse LE  edema   Pulses: Symmetric extremities   Skin: no gross lesions or rashes   Pysch: sedated   Neurologic: Sedated unable to assess.         Labs  CBC:   Lab Results   Component Value Date/Time    WBC 2.3 07/15/2022 03:59 AM    RBC 3.58 07/15/2022 03:59 AM    RBC 5.12 05/26/2017 02:05 PM    HGB 8.5 07/15/2022 03:59 AM    HCT 27.9 07/15/2022 03:59 AM    MCV 78.0 07/15/2022 03:59 AM    RDW 25.3 07/15/2022 03:59 AM     07/15/2022 03:59 AM     CMP:    Lab Results   Component Value Date/Time     07/15/2022 03:38 PM    K 3.5 07/15/2022 03:38 PM    K 4.5 07/14/2022 01:33 PM     07/15/2022 03:38 PM    CO2 25 07/15/2022 03:38 PM    BUN 27 07/15/2022 03:38 PM    CREATININE 1.5 07/15/2022 03:38 PM    GFRAA 43 07/15/2022 03:38 PM    GFRAA >60 01/17/2013 04:07 PM    AGRATIO 1.3 07/14/2022 01:33 PM    LABGLOM 35 07/15/2022 03:38 PM    GLUCOSE 84 07/15/2022 03:38 PM    GLUCOSE 154 06/05/2015 12:40 PM    PROT 6.1 07/15/2022 03:59 AM    PROT 7.6 06/05/2015 12:40 PM    CALCIUM 8.9 07/15/2022 03:38 PM    BILITOT 0.4 07/14/2022 01:33 PM    ALKPHOS 80 07/14/2022 01:33 PM    AST 23 07/14/2022 01:33 PM    ALT 9 07/14/2022 01:33 PM     PT/INR:  No results found for: PTINR  Recent Labs     07/14/22  1333 07/14/22  2248   TROPONINI 0.02* 0.01       EKG:  AV sequentially paced 100%    Assessment  Patient Active Problem List   Diagnosis    Essential hypertension    Cardiomyopathy ([facility]    CHF (congestive heart failure) ([facility]    COPD (chronic obstructive pulmonary disease) ([facility]    Chest pain    NEGIN treated with BiPAP    DMII (diabetes mellitus, type 2) ([facility] Iron deficiency anemia    Intestinal malabsorption    Iron deficiency anemia due to chronic blood loss    Treatment    Anemia    Acute on chronic systolic CHF (congestive heart failure), NYHA class 3 (HCC)    Hyperlipidemia    Morbid obesity due to excess calories (HCC)    Acute pulmonary edema (HCC)    Acute respiratory failure with hypercarbia (HCC)    Congestive heart failure with left ventricular dysfunction (HCC)    Acute decompensated heart failure (HCC)    Acute kidney injury superimposed on chronic kidney disease (HCC)    COPD exacerbation (HCC)    Demand ischemia of myocardium (HCC)    Non morbid obesity due to excess calories    Acute respiratory failure with hypoxia and hypercapnia (HCC)    Essential hypertension    Hyperlipidemia    DMII (diabetes mellitus, type 2) ([facility] [address] [facility]    CAD (coronary artery disease)    Respiratory failure ([facility] [facility]    HCAP (healthcare-associated pneumonia)    Acute respiratory failure (HCC)    Acute respiratory distress    Acute on chronic respiratory failure ([facility] [facility]    Small cell lung cancer in adult [facility])    Dyspnea    History of lung cancer    Abnormal CT of the chest    Acute on chronic respiratory failure with hypoxia and hypercapnia (HCC)    Acute respiratory failure with hypoxia (HCC)    Bilateral pleural effusion       Impression:  ischemic cardiomyopathy. MR.  Small cell lung CA getting chemo. CRT-D with 100% pacing. Recent coronary stenting with troponin of 0.02 and 0.01 suggesting no gross cardiac injury presently. S/p thoracentesis with probably transudative appearance possibly from known chronic sHF  BNP 37441. Anemia. Recommendations:    I had the opportunity to review the clinical symptoms and presentation of [facility]. I recommend that the patient be treated conservatively presently. ARF would hold lasix for now as FiO2 requirement is only 30%. Can try low dose dobutrex and/or possibly milrinone.   The patient will require ICU managememnt and is

## 2022-07-15 NOTE — PROGRESS NOTES
ICU Progress Note    Admit Date: 7/14/2022  Day: 2  Vent Day: 2  IV Access:Peripheral  IV Fluids:None  Vasopressors:None                Antibiotics: Cefepime  Diet: Diet NPO    CC: AMS, acute hypoxic/hypercapnic respiratory failure    Interval history: Patient responsive this morning. Did not appropriately respond to diuresis overnight: 0.3-0.6 cc/kg/hr, this morning 0.5-0.8 on 10mg lasix/hr drip. Required dobutamine briefly overnight with lasix increase, off this morning. Afebrile. Vent settings increased. HPI:   Wilmer Kaur is a 65year old female with a past medical history significant for DM2, HTN, CKD III, CHF (EF 30-35% with grade III diastolic dysfunction, COPD (3L baseline), and stage IV small cell lung cancer (currently undergoing cisplatin and etoposide chemo, last dose 14 days ago) who presented for outpatient PET scan at which time she had AMS secondary to hypoxic event. She required BVM ventilation, and subsequently intubation. Of note the patient had a recent hospitalization at [facility] 6/28-7/2 for HCAP requiring intubation. She was started on cefepime, and today is day 9/10. Unfortunately, since the patient currently intubated and sedated, ROS was unable to be performed. The patient's rhythm did become paced during her resuscitation. At home, the patient has been using 3-5L O2, but was on 8L here. The patient undergoes treatment for her cancer, which was diagnosed earlier this year in the Spring, and follows with Dr. Gwendolyn Albarran. She has received cisplatin and etoposide with recent dose reductions, which she has not tolerated well. She received a dose of Tecentriq, but was taken off due to the myelosuppression.      Medications:     Scheduled Meds:   sodium chloride flush  5-40 mL IntraVENous 2 times per day    heparin (porcine)  5,000 Units SubCUTAneous 3 times per day    cefepime  2,000 mg IntraVENous Q12H    insulin lispro  0-18 Units SubCUTAneous Q4H    pantoprazole  40 mg IntraVENous placed in proper position above the kristin. Persistent left retrocardiac, basilar airspace density, and small effusion. Increased bilateral perihilar density, most consistent with congestive failure and pulmonary edema         XR CHEST PORTABLE    (Results Pending)         Assessment/Plan:   Raysa Rutherford is a 65 y.o. female, with a past medical history significant for DM2, HTN, CKD III, CHF (EF 30-35% with grade III diastolic dysfunction), COPD (3L baseline), and stage IV small cell lung cancer (currently undergoing cisplatin and etoposide chemo, last dose 14 days ago) who presented for outpatient PET scan at which time she had AMS secondary to hypoxic event. She was intubated in the ED. Acute Hypoxic Respiratory Failure:  Pleural Effusion most likely etiology vs COPD exacerbation vs CHF  - Patient likely lied supine for the PET scan, the effusion distributed, and she decompensated  - Recent pneumonia, on cefepime, continue to complete dose, tomorrow last day              - WBC 5.3 but patient immunosuppressed, 1.3-2.5 baseline  - ABG in ED: 7.182; 69.6 CO2; 83 O2; 26 HCO3  - CTA pulm with large right and moderate left effusions causing adjacent consolidation of bilateral lower lobes.  Compared to prior scan on 6/16, pneumonic type consolidations appear improved, but effusions are larger   - CXR this morning shows possible improvement of edema, but still significant effusion  - Strict I/Os, maintain Roldan  - Will evaluate today with ultrasound, and possibly drain and test effusion with thoracentesis   - Vent settings increased: 16 / 450 / 8 (increased from 5) / 50% due to AM ABG:   - 7.338; 51.2 CO2; 44.8 O2   - Lasix gtt to increased to 10mg/hr overnight: Did not appropriately respond to diuresis overnight: 0.3-0.6 cc/kg/hr, this morning 0.5-0.8, will continue to increase lasix and titrate per UOP response    Small Cell Lung Cancer  - Stage IV, uncurable  - Under treatment with cisplatin and etoposide, did not tolerate immunotherapy with Tecentriq  - Heme/onc consulted, appreciate recs. - Palliative consulted given poor prognosis, and failure to tolerate treatment. Patient still full code, appreciate assistance     CHF:  - Most recent EF 30-35% with grade III diastolic dysfunction  - On torsemide 40mg and vericiguat (Rodriguez Bless) at home  - Upon improvement of respiratory status, will restart home meds        Chronic Problems:  - COPD: On 3-5L at home, will consider for vent wean, may benefit from extubation to bipap; on albuterol and Anoro Ellipta at home  - CKD: Stage III. Creatinine 1.4, Baseline creatinine 1.2-1.4; preserving right arm. Sees Dr. Israel Ortega with [facility]  - DM2: On Jardiance at home, placed on HDSSI here, q6hr glucose checks:  over past 24 hours; will decrease to LDSSI  - CAD: On Coreg  - HLD: On Lipitor at home        Code Status:Prior  FEN: NPO  PPX:  SQH, protonix 40mg QD  DISPO: ICU     This patient has been staffed and discussed with Dr. Veras Route  -----------------------------  Thea Sen DO, PGY-1  07/15/22  10:35 AM    Pulm/CC    Patient seen and examined. I agree with Dr. Patricia Kuwaiti history, physical, lab findings, assessment and plan. Pt intubated and sedated. No significant diuresis overnight with increasing dosages of lasix gtt    Echo:      Summary   Severely dilated LV with akinesis of the inferolateral and inferior walls. EF   30-35%   Moderate mitral regurgitation is present. Large bilateral pleural effusions seen. Assessment       1. Acute on Chronic Hypoxemic with acute hypercapnic Resp Failure - ABG 7.35/48/58. Vent: PRVC, Vt 450, RR 16, FIO2 30%, PEEP 8  2. CHF exac  3. Extensive stage small cell lung cancer - PET yesterday shows positive response to treatment - some LAD resolved other parenchymal masses show partial improvement  4. Recent Pneumonia - on cefepime  5. Bilateral pleural effusion - R > L     Plan     Cont lasix gtt at 15 mg/hr  2. Dobutamine 2.5 mcg/kg/min started per Cardiology  3. Vent: no changes needed. Start SBT in AM  4. Fentanyl and propofol gtt for goal RASS -1  5. Cont cefepime  6. Thoracentesis on right performed with 950 ml pf clear yellow fluid send for lab analysis. CXR post shows no residual fluid  7. Palliative care following  8. Full Code     Pt has a high probability of imminent or life-threatening deterioration requiring close monitoring, and highly complex decision-making and/or interventions of high intensity to assess, manipulate, and support his critical organ systems to prevent a likely inevitable decline which could occur if left untreated. A total critical care time 34 minutes was used. This includes but not limited to examining patient, collaborating with other physicians, monitoring vital signs, telemetry, continuous pulse oximetry, and clinical response to IV medications, documentation time, review and interpretation of laboratory and radiological data, review of nursing notes and old record review. This time excludes any time that may have been spent performing procedures for life threatening organ failure.         Nat Brandt

## 2022-07-15 NOTE — PROCEDURES
Right Ultrasound guided thoracentesis    Informed consent was obtained from the patient's POA after explaining the risks and benefits, including, but not limited to bleeding, infection, pneumothorax and death. Time out was performed. Ultrasound was used to visualize and selina a large fluid pocket over the right lung. Sterile prep with cholrhexidine was used to cleanse the area. Topical anesthetic with lidocaine was used to anesthesize the subcutaneaous tissue and pleura. Procedure:  Under sterile conditions and using standard technique, a catheter was introduced into the pleural space and 950 straw colored fluid was removed. Patient tolerated procedure well and was terminated due to cessation of flow pf fluid. Hemostasis was obtained at conclusion of procedure. No immediate complications. O2 sat 100%  Chest X Ray pending.     EBL 5 ml    Fluid sent for cell count and differential, TP, LDH, glucose, culture and GS,  and cytology    Kareen Hernandez MD

## 2022-07-15 NOTE — DISCHARGE INSTR - COC
Continuity of Care Form    Patient Name: Milderd Kanner   :  1960  MRN:  [id number]    Admit date:  2022  Discharge date:  ***    Code Status Order: Full Code   Advance Directives:     Admitting Physician:  Anthony Garcia MD  PCP: Carlita Fisher MD    Discharging Nurse: [facility] Unit/Room#: 1780/4254-29  Discharging Unit Phone Number: ***    Emergency Contact:   Extended Emergency Contact Information  Primary Emergency Contact: Anabel Segura  Home Phone: [phone]  Relation: Healthcare Decision Maker  Secondary Emergency Contact: Silvana [address] Phone: [phone]  Relation: Child    Past Surgical History:  Past Surgical History:   Procedure Laterality Date    CARPAL TUNNEL RELEASE      CHOLECYSTECTOMY      CORONARY ANGIOPLASTY WITH STENT PLACEMENT  2013    Stent LAD    INCONTINENCE SURGERY      IR PORT PLACEMENT EQUAL OR GREATER THAN 5 YEARS Right 2022    Power port, inserted by Dr. Jesus Arias, cut at 22    IR [place] Posrclas 15 5 YEARS  5/3/2022    IR PORT PLACEMENT EQUAL OR GREATER THAN 5 YEARS 5/3/2022 [facility] SPECIAL PROCEDURES       Immunization History:   Immunization History   Administered Date(s) Administered    Influenza Whole 2013    Influenza, Quadv, IM, PF (6 mo and older Fluzone, Flulaval, Fluarix, and 3 yrs and older Afluria) 10/13/2021       Active Problems:  Patient Active Problem List   Diagnosis Code    Essential hypertension I10    Cardiomyopathy ([facility] [address] I42.9    CHF (congestive heart failure) ([facility] [address] I50.9    COPD (chronic obstructive pulmonary disease) ([facility] [address] J44.9    Chest pain R07.9    NEGIN treated with BiPAP G47.33    DMII (diabetes mellitus, type 2) ([facility] [address] E11.9    Iron deficiency anemia D50.9    Intestinal malabsorption K90.9    Iron deficiency anemia due to chronic blood loss D50.0    Treatment Z51.89    Anemia D64.9    Acute on chronic systolic CHF (congestive heart failure), NYHA class 3 ([facility] [address] I50.23    Hyperlipidemia E78.5    Morbid obesity due to excess calories (HCC) E66.01    Acute pulmonary edema (HCC) J81.0    Acute respiratory failure with hypercarbia (HCC) J96.01, J96.02    Congestive heart failure with left ventricular dysfunction (HCC) I50.9    Acute decompensated heart failure (HCC) I50.9    Acute kidney injury superimposed on chronic kidney disease (HCC) N17.9, N18.9    COPD exacerbation (HCC) J44.1    Demand ischemia of myocardium (HCC) I24.8    Non morbid obesity due to excess calories E66.09    Acute respiratory failure with hypoxia and hypercapnia (HCC) J96.01, J96.02    Essential hypertension I10    Hyperlipidemia E78.5    DMII (diabetes mellitus, type 2) (HCC) E11.9    CAD (coronary artery disease) I25.10    Respiratory failure (HCC) J96.90    HCAP (healthcare-associated pneumonia) J18.9    Acute respiratory failure (HCC) J96.00    Acute respiratory distress R06.03    Acute on chronic respiratory failure (HCC) J96.20    Small cell lung cancer in adult [facility]) C34.90    Dyspnea R06.00    History of lung cancer Z85.118    Abnormal CT of the chest R93.89    Acute on chronic respiratory failure with hypoxia and hypercapnia (HCC) J96.21, J96.22    Acute respiratory failure with hypoxia (HCC) J96.01    Bilateral pleural effusion J90       Isolation/Infection:   Isolation            No Isolation          Patient Infection Status       Infection Onset Added Last Indicated Last Indicated By Review Planned Expiration Resolved Resolved By    None active    Resolved    COVID-19 (Rule Out) 06/28/22 06/28/22 06/28/22 COVID-19, Rapid (Ordered)   06/29/22 Rule-Out Test Resulted    COVID-19 (Rule Out) 12/01/21 12/01/21 12/01/21 COVID-19, Rapid (Ordered)   12/01/21 Rule-Out Test Resulted    COVID-19 (Rule Out) 10/11/21 10/11/21 10/11/21 COVID-19, Rapid (Ordered)   10/11/21 Rule-Out Test Resulted            Nurse Assessment:  Last Vital Signs: BP (!) 118/41   Pulse 60   Temp 99 °F (37.2 °C) (Bladder) Resp 16   Ht 5' 7\" (1.702 m)   Wt 220 lb (99.8 kg)   SpO2 98%   BMI 34.46 kg/m²     Last documented pain score (0-10 scale): Pain Level: 0  Last Weight:   Wt Readings from Last 1 Encounters:   22 220 lb (99.8 kg)     Mental Status:  {IP PT MENTAL STATUS:52804}    IV Access:  {[facility] IV ACCESS:578590322}    Nursing Mobility/ADLs:  Walking   {[facility] DME TKNW:704427276}  Transfer  {[facility] DME APQT:398172791}  Bathing  {[facility] DME UYHL:935522569}  Dressing  {[facility] DME FLFL:383636479}  Toileting  {[facility] DME RCUC:271365461}  Feeding  {[facility] DME PKHK:104803902}  Med Admin  {[facility] DME OYYE:788468545}  Med Delivery   {[facility] MED Delivery:629947288}    Wound Care Documentation and Therapy:  Wound 22 Sacrum (Active)   Wound Etiology Pressure Stage 2 07/15/22 1200   Dressing Status Clean;Dry; Intact 07/15/22 1200   Wound Cleansed Soap and water 07/15/22 0400   Dressing/Treatment Zinc paste 07/15/22 0400   Wound Assessment Pink/red 07/15/22 1200   Drainage Amount None 07/15/22 0400   Odor None 07/15/22 0400   Alexandra-wound Assessment Blanchable erythema 07/15/22 1200   Number of days: 16        Elimination:  Continence: Bowel: {YES / WB:33994}  Bladder: {YES / LD:27388}  Urinary Catheter: {Urinary Catheter:335300906}   Colostomy/Ileostomy/Ileal Conduit: {YES / A}       Date of Last BM: ***    Intake/Output Summary (Last 24 hours) at 7/15/2022 1603  Last data filed at 7/15/2022 1255  Gross per 24 hour   Intake 602.41 ml   Output 778 ml   Net -175.59 ml     I/O last 3 completed shifts:   In: 449.3 [I.V.:449.3]  Out: 720 [Urine:720]    Safety Concerns:     [address] [facility] Safety Concerns:593411554}    Impairments/Disabilities:      {MH MATILDA Impairments/Disabilities:521176321}    Nutrition Therapy:  Current Nutrition Therapy:   [address] MATILDA Diet List:475266182}    Routes of Feeding: {CHP DME Other Feedings:909137341}  Liquids: {Slp liquid thickness:70636}  Daily Fluid Restriction: {CHP DME Yes amt example:052253966}  Last Modified Barium Swallow with Video (Video Swallowing Test): {Done Not Done [facility]:038198035}    Treatments at the Time of Hospital Discharge:   Respiratory Treatments: ***  Oxygen Therapy:  {Therapy; copd oxygen:12392}  Ventilator:    {MH CC Vent MLCS:297163860}    Heart Failure Instructions for Daily Management  Patient was treated for acute on chronic combined systolic and diastolic heart failure. she  will require the following:    Please weigh daily on the same scale and approximately the same time of day. Report weight gain of 3 pounds/day or 5 pounds/week to : [facility] [phone] and Gian Barnes [phone]. Please use hospital discharge weight as baseline reference. Please monitor for signs and symptoms of and report to MD:  Worsening Heart Failure: sudden weight gain, shortness of breath, lower extremity or general edema/swelling, abdominal bloating/swelling, inability to lie flat, intolerance to usual activity, or cough (especially at night). Report these finding even if no increase in weight. Dehydration:  having difficulty or a decrease in urination, dizziness, worsening fatigue, or new onset/worsening of generalized weakness. Please continue a LOW SODIUM diet and LIMIT fluid intake to 48 - 64 ounces ( 1.5 - 2 liters) per day. Call [facility] [phone] and Gian Barnes [phone] with any questions or concerns. Please continue heart failure education to patient and family/support system. See After Visit Summary for hospital follow up appointment details. Consider spiritual care referral for support and/or completion of advance directives . Consider: [place] [address] telehealth program if patient agreeable and able to participate and palliative care consult for ongoing goals of care, end of life, and/or chronic disease management discussions. Patient's primary cardiologist is Dr Kailee Tapia.          Rehab Therapies: {THERAPEUTIC INTERVENTION:8788561664}  Weight Bearing Status/Restrictions: [address] [facility] Weight Bearin}  Other Medical Equipment (for information only, NOT a DME order):  {EQUIPMENT:664210777}  Other Treatments: ***    Patient's personal belongings (please select all that are sent with patient):  {CHP DME Belongings:893136442}    RN SIGNATURE:  {Esignature:153882912}    CASE MANAGEMENT/SOCIAL WORK SECTION    Inpatient Status Date: ***    Readmission Risk Assessment Score:  Readmission Risk              Risk of Unplanned Readmission:  [id number]           Discharging to Facility/ Agency   Name:   Address:  Phone:  Fax:    Dialysis Facility (if applicable)   Name:  Address:  Dialysis Schedule:  Phone:  Fax:    / signature: {Esignature:985084627}    PHYSICIAN SECTION    Prognosis: {Prognosis:8370978144}    Condition at Discharge: [address] Patient Condition:566757903}    Rehab Potential (if transferring to Rehab): {Prognosis:5222014413}    Recommended Labs or Other Treatments After Discharge: ***    Physician Certification: I certify the above information and transfer of Leila Magallon  is necessary for the continuing treatment of the diagnosis listed and that she requires {Admit to Appropriate Level of Care:62603} for {GREATER/LESS:941693502} 30 days.      Update Admission H&P: {CHP DME Changes in [facility]:817712187}    PHYSICIAN SIGNATURE:  {Esignature:946995749}

## 2022-07-15 NOTE — PROGRESS NOTES
Nutrition Note    RECOMMENDATIONS  PO Diet: npo  ONS: n/a  Nutrition Support:   IF enteral nutrition is desired:  Recommend order \"Diet: Adult Tube Feed\". Initiate Vital High Protein (peptide based, high protein formula) at 20 mL/hr and as tolerated, increase by 20 mL/hr q 4 hours until goal of 40 mL/hr. Recommend 30 mL H20 q 4 hours. Increase flush if Na+ increases greater than 145 mEq/L. Recommend  1 Bottle Proteinex P2Go daily via syringe. Flush with 30 mL H20 before and after. Proteinex P2Go should not be mixed directly with the tube feeding formula. NUTRITION ASSESSMENT   Pt intubated and sedated with fentanyl and propofol. If unable to extubate over next 24 - 48 hr recommend initiation of nutrition support. Pt with increased protein need r/t wound on sacrum. Nutrition Related Findings: lasix infusion; Na+ 140; MAP 81  Wounds: Stage II (sacrum)  Nutrition Education:  Education not indicated   Nutrition Goals: other (specify) (initiate nutrition within next 24 - 48 hr)     MALNUTRITION ASSESSMENT      Malnutrition Status: Insufficient data      NUTRITION DIAGNOSIS   Inadequate oral intake related to impaired respiratory function as evidenced by NPO or clear liquid status due to medical condition, intubation      CURRENT NUTRITION THERAPIES  Diet NPO         Additional Calorie Sources:  propofol at 15 mL/hr provides 396 kcal from lipids daily    ANTHROPOMETRICS  Current Height: 5' 7\" (170.2 cm)  Current Weight: 220 lb (99.8 kg)    Ideal Body Weight (IBW): 135 lbs  (61 kg)        BMI: 34.4      COMPARATIVE STANDARDS  Energy (kcal):  1100 - 1400     Protein (g):  122       Fluid (mL/day):  1100 - 1400    The patient will be monitored per nutrition standards of care. Consult dietitian if additional nutrition interventions are needed prior to RD reassessment.      Charmaine Schuler RD, LD    Contact: [phone]

## 2022-07-15 NOTE — CONSULTS
[facility]  Palliative Medicine Consultation Note      Date Of Admission:7/14/2022  Date of consult: 07/15/22  Seen by [facility] in the past:  No    Recommendations:        Reviewed pt's HCPOA on the chart which names pt's daughter Karin Rosario as her agent, daughter Rehan Gunter as alternate agent, and mother Caryn Silverio as second alternate. Spoke with Tolu Sommers and Noreen Felty at the bedside. Introduced palliative care and had a voluntary discussion about advance care planning. We discussed code status including potential burdens/risks of CPR. The family says the pt has stated in the past that she wants to be resuscitated and they'd like to honor her wishes. They hope she'll be able to be weaned from the ventilator and come back home where they're providing 24/7 care. We discussed the hospice philosophy and services. The daughter would like to have input about whether her cancer has progressed and is disappointed she wasn't able to get the PET scan. Offered emotional support. 1. Goals of Care/Advanced Care planning/Code status: Full Code, discussed with pt's family today. They hope she can be weaned from the ventilator and return home. 2. Pain: pt appears comfortable on current sedation. 3. SOB: pt was intubated in ED when she arrived with a hypoxic episode and altered mental status. She was admitted with acute hypoxic respiratory failure, with most likely etiology pleural effusion.   4. Disposition: TBD    Reason for Consult:         [x]  Goals of Care  [x]  Code Status Discussion/Advanced Care Planning   []  Psychosocial/Family Support  []  Symptom Management  []  Other (Specify)    Requesting Physician: Dr. Lu Conde:  AMS    History Obtained From:  electronic medical record    History of Present Illness:         Saranya Weber is a 65 y.o. female with PMH of DM2, HTN, CKD III, CHF (EF 30-35% ), COPD (3L baseline), and stage IV small cell lung cancer (currently undergoing cisplatin and etoposide chemo, last dose 14 days ago) who presented with altered mental status secondary to a hypoxic event while presenting for outpatient PET scan. She was intubated. CTA pulm with large right and moderate left effusions causing adjacent consolidation of bilateral lower lobes. Compared to prior scan on 6/16, pneumonic type consolidations appear improved, but effusions are larger. She's admitted with acute hypoxic respiratory failure, with most likely etiology pleural effusion vs COPD exacerbation vs CHF exacerbation. She was started on a lasix drip. Of note the patient had a recent hospitalization at [address] [place] [address] 6/28-7/2 for HCAP requiring intubation.      Subjective:         Past Medical History:        Diagnosis Date    Asthma     Cardiomyopathy ([facility] [address]     CHF (congestive heart failure) (HCC)     COPD (chronic obstructive pulmonary disease) ([facility] [address]     Diabetes mellitus ([facility] [address]     Essential hypertension     Hyperlipidemia     Hypertension     Obesity        Past Surgical History:        Procedure Laterality Date    CARPAL TUNNEL RELEASE      CHOLECYSTECTOMY      CORONARY ANGIOPLASTY WITH STENT PLACEMENT  01/07/2013    Stent LAD    INCONTINENCE SURGERY      IR PORT PLACEMENT EQUAL OR GREATER THAN 5 YEARS Right 05/03/2022    Power port, inserted by Dr. Delmer Guardado, cut at 22    IR [place] Posrclas 15 5 YEARS  5/3/2022    IR PORT PLACEMENT EQUAL OR GREATER THAN 5 YEARS 5/3/2022 [facility] SPECIAL PROCEDURES       Current Medications:    Medications Prior to Admission: potassium chloride (KLOR-CON M) 20 MEQ extended release tablet, Take 1 tablet by mouth 2 times daily for 5 days  dextrose 5 % SOLN 50 mL with ceFEPIme 2 g SOLR 2,000 mg, Infuse 2,000 mg intravenously every 12 hours Until 7/15 for PNA  sennosides-docusate sodium (SENOKOT-S) 8.6-50 MG tablet, Take 1 tablet by mouth daily  torsemide 40 MG TABS, Take 40 mg by mouth 2 times daily  potassium chloride (KLOR-CON M) 20 MEQ extended release tablet, Take 20 mEq by mouth daily  empagliflozin (JARDIANCE) 10 MG tablet, Take 10 mg by mouth daily  umeclidinium-vilanterol (ANORO ELLIPTA) 62.5-25 MCG/INH AEPB inhaler, Inhale 1 puff into the lungs daily  Vericiguat (VERQUVO) 5 MG TABS, Take 5 mg by mouth every morning  albuterol (PROVENTIL) (2.5 MG/3ML) 0.083% nebulizer solution, Take 2.5 mg by nebulization 4 times daily   amiodarone (CORDARONE) 200 MG tablet, Take 200 mg by mouth daily   ASPIRIN LOW DOSE 81 MG EC tablet, Take 81 mg by mouth daily   carvedilol (COREG) 6.25 MG tablet, Take 6.25 mg by mouth 2 times daily   pantoprazole (PROTONIX) 40 MG tablet, Take 40 mg by mouth daily   atorvastatin (LIPITOR) 40 MG tablet, Take 40 mg by mouth nightly   magnesium oxide (MAG-OX) 400 MG tablet, Take 400 mg by mouth 2 times daily   gabapentin (NEURONTIN) 600 MG tablet, Take 600 mg by mouth 3 times daily. albuterol (PROVENTIL HFA) 108 (90 BASE) MCG/ACT inhaler, Inhale 2 puffs into the lungs every 4 hours as needed for Wheezing or Shortness of Breath     Allergies:  Ace inhibitors, Diclofenac, Losartan, Spironolactone, Vicodin hp [hydrocodone-acetaminophen], and Vicodin [hydrocodone-acetaminophen]    Social History:    TOBACCO: reports that she has never smoked. She has never used smokeless tobacco.  ETOH:   reports that she does not currently use alcohol. Patient currently lives with family - mother    Review of Systems -   Review of Systems: Unable to obtain due to intubation/sedation    Objective:        Physical Exam  Constitutional:       General: She is not in acute distress. HENT:      Head: Normocephalic and atraumatic. Cardiovascular:      Rate and Rhythm: Normal rate and regular rhythm. Pulmonary:      Comments: Intubated  Abdominal:      General: Bowel sounds are normal.      Palpations: Abdomen is soft. Musculoskeletal:      Right lower leg: Edema present. Left lower leg: Edema present. Skin:     General: Skin is warm and dry. Neurological:      Comments: Sedated        Palliative Performance Scale:  [] 60% Ambulation reduced; Significant disease; Can't do hobbies/housework; intake normal or reduced; occasional assist; LOC full/confusion  [] 50% Mainly sit/lie; Extensive disease; Can't do any work; Considerable assist; intake normal  Or reduced; LOC full/confusion  [] 40% Mainly in bed; Extensive disease; Mainly assist; intake normal or reduced; occasional assist; LOC full/confusion  [] 30% Bed Bound; Extensive disease; Total care; intake reduced; LOC full/confusion  [] 20% Bed Bound; Extensive disease; Total care; intake minimal; Drowsy/coma  [] 10% Bed Bound; Extensive disease; Total care; Mouth care only; Drowsy/coma  [] 0% Death    PPS: (intubated and sedated)    Vitals:    BP (!) 123/45   Pulse 64   Temp 98.6 °F (37 °C) (Bladder)   Resp 13   Ht 5' 7\" (1.702 m)   Wt 220 lb (99.8 kg)   SpO2 100%   BMI 34.46 kg/m²     Labs:    BMP:   Recent Labs     07/14/22  1333 07/15/22  0359    140   K 4.5 3.9    104   CO2 26 24   BUN 22* 25*   CREATININE 1.4* 1.4*   GLUCOSE 226* 78     CBC:   Recent Labs     07/14/22  1333 07/15/22  0359   WBC 5.3 2.3*   HGB 9.7* 8.5*   HCT 33.1* 27.9*    186       LFT's:   Recent Labs     07/14/22  1333   AST 23   ALT 9*   BILITOT 0.4   ALKPHOS 80     Troponin:   Recent Labs     07/14/22  1333 07/14/22  2248   TROPONINI 0.02* 0.01     BNP: No results for input(s): BNP in the last 72 hours. ABGs:   Recent Labs     07/14/22  1354 07/14/22 2025   PHART 7.182* 7.321*   SZR5JDT 69.6* 54.3*   PO2ART 83.0 71.7*     INR:   Recent Labs     07/15/22  0359   INR 1.26*       U/A:  Recent Labs     07/14/22  1525   COLORU Yellow   PHUR 6.0   WBCUA 3-5   RBCUA None seen   CLARITYU Clear   SPECGRAV 1.020   LEUKOCYTESUR Negative   UROBILINOGEN 0.2   BILIRUBINUR Negative   BLOODU SMALL*   GLUCOSEU Negative       CTA PULMONARY W CONTRAST   Final Result   1. No pulmonary embolus.    2.  Large right and moderate left pleural effusions causing adjacent consolidation of the bilateral lower lobes. 3.  Endotracheal tube is seen in satisfactory position. 4.  Multiple thyroid nodules are present. CT SOFT TISSUE NECK W CONTRAST   Final Result   1. No pulmonary embolus. 2.  Large right and moderate left pleural effusions causing adjacent consolidation of the bilateral lower lobes. 3.  Endotracheal tube is seen in satisfactory position. 4.  Multiple thyroid nodules are present. CT Head WO Contrast   Final Result      No acute intracranial hemorrhage or mass effect. XR CHEST PORTABLE   Final Result      ET tube placed in proper position above the kristin. Persistent left retrocardiac, basilar airspace density, and small effusion. Increased bilateral perihilar density, most consistent with congestive failure and pulmonary edema               Conclusion/Time spent:         Recommendations see above    Pt/family [phone]  Time spent with patient and/or family: 20 min  Time reviewing records: 10 min   Time communicating with staff: 5 min     A total of 35 minutes spent with the patient and family on unit greater than 50% in counseling regarding palliative care and in goals of care for the patient. Thank you to Dr. Brai Westbrook for this consultation. We will continue to follow Ms. Va's care as needed.     Isidro Milan, NP  [address]

## 2022-07-15 NOTE — CARE COORDINATION
Case Management Assessment           Initial Evaluation                Date / Time of Evaluation: 7/15/2022 12:03 PM                 Assessment Completed by: Nataly Wing RN    Patient Name: Mookie Kerr     YOB: 1960  Diagnosis: Pleural effusion [J90]  Acute on chronic combined systolic and diastolic congestive heart failure ([facility] [address] [I50.43]  Respiratory failure, acute-on-chronic ([address] [J96.20]  Acute on chronic respiratory failure with hypoxia and hypercapnia ([address] [J96.21, J96.22]  Acute respiratory failure with hypoxia ([address] [J96.01]     Date / Time: 7/14/2022  1:20 PM    Patient Admission Status: Inpatient    If patient is discharged prior to next notation, then this note serves as note for discharge by case management. Current PCP: Yasmin Nur MD    Chart Reviewed: Yes  Patient/ Family Interviewed: No  Pt is intubated and sedated. This assessment was  completed via chart review. Emergency Contacts:  Extended Emergency Contact Information  Primary Emergency Contact: Ren Lopez  [place] Phone: [phone]  Relation: [address]  Secondary Emergency Contact: [address] Phone: [phone]  Relation: Child    Advance Directives:   Code Status: Full [address]: Yes  Agent: Makayla Lara (daughter)      Financial  Payor: MEDICARE / Plan: MEDICARE PART A AND B / Product Type: *No Product type* /     Pre-cert required for SNF: No    Pharmacy    [facility] [address], [address] [facility] [phone] - f [phone]  [address] [place] [address]  Phone: [phone] Fax: [address] [place] [address]. Demetrius Krause [phone] - F [phone]  [address] [place] [address]. [facility] [address]  Phone: [phone] Fax: [phone]    ADLS Needs assistance w ADL's.  Family provides 24h supervision and supprt at home   Support Systems: family    HOUSING  Home Environment: basement level apartment with 5 steps down and 2 steps into the building    Plans to RETURN to current housing: Yes    [address]  Currently ACTIVE with [address]: Yes  [address]: Lillie Xiong  Phone: [phone]  Fax: [phone]     HOME INFUSION PHARMACY:  Name:     [facility]  Address: [facility]. [facility] [address]  Phone:    [phone]  Fax:        [phone]       Currently ACTIVE with [place] on Aging: Yes  Aide services assist with housekeeping and provide a [address]: wheelchair, walker, bath bench, and rollator, BSC,     Home Oxygen and [address] prior to admission: Yes  [address]: [facility]  Phone: [phone]   Other Respiratory Equipment: BiPAP, nebulizer    DISCHARGE PLAN:  Disposition: TBD. The family is hopeful for a recovery and return home with resumption of previous services    Transportation PLAN for discharge: EMS transportation     The Patient and/or patient representative Macrina Mayer and her family were provided with a choice of provider and agrees with the discharge plan Yes    Freedom of choice list was provided with basic dialogue that supports the patient's individualized plan of care/goals and shares the quality data associated with the providers.  Yes      Yosef Jean RN  [facility] [employer]  Case Management Department  [phone]

## 2022-07-16 VITALS
TEMPERATURE: 99.1 F | WEIGHT: 211.86 LBS | SYSTOLIC BLOOD PRESSURE: 139 MMHG | HEIGHT: 67 IN | BODY MASS INDEX: 33.25 KG/M2 | OXYGEN SATURATION: 96 % | DIASTOLIC BLOOD PRESSURE: 49 MMHG | HEART RATE: 65 BPM | RESPIRATION RATE: 26 BRPM

## 2022-07-16 LAB
ALBUMIN SERPL-MCNC: 3.1 G/DL (ref 3.4–5)
ALBUMIN SERPL-MCNC: 3.1 G/DL (ref 3.4–5)
ANION GAP SERPL CALCULATED.3IONS-SCNC: 13 MMOL/L (ref 3–16)
ANION GAP SERPL CALCULATED.3IONS-SCNC: 15 MMOL/L (ref 3–16)
BASE EXCESS ARTERIAL: 0 MMOL/L (ref -3–3)
BASE EXCESS VENOUS: 0.3 MMOL/L (ref -2–3)
BASOPHILS ABSOLUTE: 0 K/UL (ref 0–0.2)
BASOPHILS RELATIVE PERCENT: 0.6 %
BUN BLDV-MCNC: 29 MG/DL (ref 7–20)
BUN BLDV-MCNC: 29 MG/DL (ref 7–20)
CALCIUM SERPL-MCNC: 9 MG/DL (ref 8.3–10.6)
CALCIUM SERPL-MCNC: 9.1 MG/DL (ref 8.3–10.6)
CARBOXYHEMOGLOBIN ARTERIAL: 2.7 % (ref 0–1.5)
CARBOXYHEMOGLOBIN: 2.5 % (ref 0–1.5)
CHLORIDE BLD-SCNC: 103 MMOL/L (ref 99–110)
CHLORIDE BLD-SCNC: 104 MMOL/L (ref 99–110)
CO2: 21 MMOL/L (ref 21–32)
CO2: 23 MMOL/L (ref 21–32)
CREAT SERPL-MCNC: 1.6 MG/DL (ref 0.6–1.2)
CREAT SERPL-MCNC: 1.6 MG/DL (ref 0.6–1.2)
EKG ATRIAL RATE: 66 BPM
EKG DIAGNOSIS: NORMAL
EKG P AXIS: 62 DEGREES
EKG P-R INTERVAL: 98 MS
EKG Q-T INTERVAL: 536 MS
EKG QRS DURATION: 204 MS
EKG QTC CALCULATION (BAZETT): 561 MS
EKG R AXIS: -62 DEGREES
EKG T AXIS: 113 DEGREES
EKG VENTRICULAR RATE: 66 BPM
EOSINOPHILS ABSOLUTE: 0.1 K/UL (ref 0–0.6)
EOSINOPHILS RELATIVE PERCENT: 2.7 %
GFR AFRICAN AMERICAN: 40
GFR AFRICAN AMERICAN: 40
GFR NON-AFRICAN AMERICAN: 33
GFR NON-AFRICAN AMERICAN: 33
GLUCOSE BLD-MCNC: 102 MG/DL (ref 70–99)
GLUCOSE BLD-MCNC: 79 MG/DL (ref 70–99)
GLUCOSE BLD-MCNC: 80 MG/DL (ref 70–99)
GLUCOSE BLD-MCNC: 86 MG/DL (ref 70–99)
GLUCOSE BLD-MCNC: 91 MG/DL (ref 70–99)
HCO3 ARTERIAL: 26 MMOL/L (ref 21–29)
HCO3 VENOUS: 25.6 MMOL/L (ref 24–28)
HCT VFR BLD CALC: 28 % (ref 36–48)
HEMOGLOBIN, ART, EXTENDED: 9.7 G/DL
HEMOGLOBIN, VEN, REDUCED: 34.4 %
HEMOGLOBIN: 8.6 G/DL (ref 12–16)
LYMPHOCYTES ABSOLUTE: 0.5 K/UL (ref 1–5.1)
LYMPHOCYTES RELATIVE PERCENT: 14 %
MAGNESIUM: 2.2 MG/DL (ref 1.8–2.4)
MCH RBC QN AUTO: 23.8 PG (ref 26–34)
MCHC RBC AUTO-ENTMCNC: 30.8 G/DL (ref 31–36)
MCV RBC AUTO: 77.4 FL (ref 80–100)
METHEMOGLOBIN ARTERIAL: 0.2 % (ref 0–1.4)
METHEMOGLOBIN VENOUS: 0.3 % (ref 0–1.5)
MONOCYTES ABSOLUTE: 0.8 K/UL (ref 0–1.3)
MONOCYTES RELATIVE PERCENT: 19.9 %
NEUTROPHILS ABSOLUTE: 2.4 K/UL (ref 1.7–7.7)
NEUTROPHILS RELATIVE PERCENT: 62.8 %
O2 SAT, ARTERIAL: 89 % (ref 93–100)
O2 SAT, VEN: 65 %
PCO2 ARTERIAL: 47 MMHG (ref 35–45)
PCO2, VEN: 43.8 MMHG (ref 41–51)
PDW BLD-RTO: 24.1 % (ref 12.4–15.4)
PERFORMED ON: ABNORMAL
PERFORMED ON: NORMAL
PERFORMED ON: NORMAL
PH ARTERIAL: 7.35 (ref 7.35–7.45)
PH VENOUS: 7.38 (ref 7.35–7.45)
PHOSPHORUS: 3.9 MG/DL (ref 2.5–4.9)
PHOSPHORUS: 3.9 MG/DL (ref 2.5–4.9)
PLATELET # BLD: 188 K/UL (ref 135–450)
PMV BLD AUTO: 10 FL (ref 5–10.5)
PO2 ARTERIAL: 59.5 MMHG (ref 75–108)
PO2, VEN: 36.2 MMHG (ref 25–40)
POTASSIUM SERPL-SCNC: 3.3 MMOL/L (ref 3.5–5.1)
POTASSIUM SERPL-SCNC: 3.4 MMOL/L (ref 3.5–5.1)
POTASSIUM SERPL-SCNC: 3.8 MMOL/L (ref 3.5–5.1)
PRO-BNP: ABNORMAL PG/ML (ref 0–124)
RBC # BLD: 3.63 M/UL (ref 4–5.2)
SODIUM BLD-SCNC: 139 MMOL/L (ref 136–145)
SODIUM BLD-SCNC: 140 MMOL/L (ref 136–145)
TCO2 ARTERIAL: 27 MMOL/L
TCO2 CALC VENOUS: 27 MMOL/L
WBC # BLD: 3.8 K/UL (ref 4–11)

## 2022-07-16 PROCEDURE — C9113 INJ PANTOPRAZOLE SODIUM, VIA: HCPCS

## 2022-07-16 PROCEDURE — 6360000002 HC RX W HCPCS: Performed by: INTERNAL MEDICINE

## 2022-07-16 PROCEDURE — 6360000002 HC RX W HCPCS

## 2022-07-16 PROCEDURE — 6360000002 HC RX W HCPCS: Performed by: STUDENT IN AN ORGANIZED HEALTH CARE EDUCATION/TRAINING PROGRAM

## 2022-07-16 PROCEDURE — 94799 UNLISTED PULMONARY SVC/PX: CPT

## 2022-07-16 PROCEDURE — 83735 ASSAY OF MAGNESIUM: CPT

## 2022-07-16 PROCEDURE — 2700000000 HC OXYGEN THERAPY PER DAY

## 2022-07-16 PROCEDURE — 99291 CRITICAL CARE FIRST HOUR: CPT | Performed by: INTERNAL MEDICINE

## 2022-07-16 PROCEDURE — 82803 BLOOD GASES ANY COMBINATION: CPT

## 2022-07-16 PROCEDURE — 36591 DRAW BLOOD OFF VENOUS DEVICE: CPT

## 2022-07-16 PROCEDURE — 94761 N-INVAS EAR/PLS OXIMETRY MLT: CPT

## 2022-07-16 PROCEDURE — 80069 RENAL FUNCTION PANEL: CPT

## 2022-07-16 PROCEDURE — 94640 AIRWAY INHALATION TREATMENT: CPT

## 2022-07-16 PROCEDURE — 2580000003 HC RX 258: Performed by: STUDENT IN AN ORGANIZED HEALTH CARE EDUCATION/TRAINING PROGRAM

## 2022-07-16 PROCEDURE — 36600 WITHDRAWAL OF ARTERIAL BLOOD: CPT

## 2022-07-16 PROCEDURE — 36415 COLL VENOUS BLD VENIPUNCTURE: CPT

## 2022-07-16 PROCEDURE — 85025 COMPLETE CBC W/AUTO DIFF WBC: CPT

## 2022-07-16 PROCEDURE — 99233 SBSQ HOSP IP/OBS HIGH 50: CPT | Performed by: INTERNAL MEDICINE

## 2022-07-16 PROCEDURE — 94003 VENT MGMT INPAT SUBQ DAY: CPT

## 2022-07-16 PROCEDURE — 2580000003 HC RX 258

## 2022-07-16 PROCEDURE — 84132 ASSAY OF SERUM POTASSIUM: CPT

## 2022-07-16 PROCEDURE — 83880 ASSAY OF NATRIURETIC PEPTIDE: CPT

## 2022-07-16 PROCEDURE — 94660 CPAP INITIATION&MGMT: CPT

## 2022-07-16 PROCEDURE — 6370000000 HC RX 637 (ALT 250 FOR IP): Performed by: STUDENT IN AN ORGANIZED HEALTH CARE EDUCATION/TRAINING PROGRAM

## 2022-07-16 PROCEDURE — 2500000003 HC RX 250 WO HCPCS: Performed by: STUDENT IN AN ORGANIZED HEALTH CARE EDUCATION/TRAINING PROGRAM

## 2022-07-16 RX ORDER — POTASSIUM CHLORIDE 7.45 MG/ML
10 INJECTION INTRAVENOUS
Status: DISCONTINUED | OUTPATIENT
Start: 2022-07-16 | End: 2022-07-16

## 2022-07-16 RX ORDER — MIDAZOLAM HYDROCHLORIDE 1 MG/ML
2 INJECTION, SOLUTION INTRAMUSCULAR; INTRAVENOUS
Status: DISCONTINUED | OUTPATIENT
Start: 2022-07-16 | End: 2022-07-16

## 2022-07-16 RX ORDER — MORPHINE SULFATE 2 MG/ML
2 INJECTION, SOLUTION INTRAMUSCULAR; INTRAVENOUS EVERY 4 HOURS PRN
Status: DISCONTINUED | OUTPATIENT
Start: 2022-07-16 | End: 2022-07-16

## 2022-07-16 RX ORDER — POTASSIUM CHLORIDE 29.8 MG/ML
20 INJECTION INTRAVENOUS
Status: COMPLETED | OUTPATIENT
Start: 2022-07-16 | End: 2022-07-16

## 2022-07-16 RX ORDER — IPRATROPIUM BROMIDE AND ALBUTEROL SULFATE 2.5; .5 MG/3ML; MG/3ML
1 SOLUTION RESPIRATORY (INHALATION) EVERY 4 HOURS
Status: DISCONTINUED | OUTPATIENT
Start: 2022-07-16 | End: 2022-07-16 | Stop reason: HOSPADM

## 2022-07-16 RX ORDER — MORPHINE SULFATE 4 MG/ML
4 INJECTION, SOLUTION INTRAMUSCULAR; INTRAVENOUS
Status: DISCONTINUED | OUTPATIENT
Start: 2022-07-16 | End: 2022-07-16 | Stop reason: HOSPADM

## 2022-07-16 RX ORDER — MIDAZOLAM HYDROCHLORIDE 1 MG/ML
1 INJECTION, SOLUTION INTRAMUSCULAR; INTRAVENOUS
Status: DISCONTINUED | OUTPATIENT
Start: 2022-07-16 | End: 2022-07-16 | Stop reason: HOSPADM

## 2022-07-16 RX ADMIN — Medication 75 MCG/HR: at 02:38

## 2022-07-16 RX ADMIN — MORPHINE SULFATE 4 MG: 4 INJECTION INTRAVENOUS at 18:23

## 2022-07-16 RX ADMIN — IPRATROPIUM BROMIDE AND ALBUTEROL SULFATE 1 AMPULE: 2.5; .5 SOLUTION RESPIRATORY (INHALATION) at 16:32

## 2022-07-16 RX ADMIN — PROPOFOL 25 MCG/KG/MIN: 10 INJECTION, EMULSION INTRAVENOUS at 02:08

## 2022-07-16 RX ADMIN — POTASSIUM CHLORIDE 20 MEQ: 400 INJECTION, SOLUTION INTRAVENOUS at 07:43

## 2022-07-16 RX ADMIN — SODIUM CHLORIDE, PRESERVATIVE FREE 10 ML: 5 INJECTION INTRAVENOUS at 08:18

## 2022-07-16 RX ADMIN — MIDAZOLAM HYDROCHLORIDE 2 MG: 1 INJECTION, SOLUTION INTRAMUSCULAR; INTRAVENOUS at 15:12

## 2022-07-16 RX ADMIN — MIDAZOLAM HYDROCHLORIDE 1 MG: 1 INJECTION, SOLUTION INTRAMUSCULAR; INTRAVENOUS at 16:34

## 2022-07-16 RX ADMIN — FUROSEMIDE 15 MG/HR: 10 INJECTION, SOLUTION INTRAMUSCULAR; INTRAVENOUS at 01:05

## 2022-07-16 RX ADMIN — SODIUM CHLORIDE 500 MG: 9 INJECTION, SOLUTION INTRAVENOUS at 10:36

## 2022-07-16 RX ADMIN — HEPARIN SODIUM 5000 UNITS: 5000 INJECTION INTRAVENOUS; SUBCUTANEOUS at 06:25

## 2022-07-16 RX ADMIN — MIDAZOLAM HYDROCHLORIDE 1 MG: 1 INJECTION, SOLUTION INTRAMUSCULAR; INTRAVENOUS at 17:38

## 2022-07-16 RX ADMIN — PANTOPRAZOLE SODIUM 40 MG: 40 INJECTION, POWDER, FOR SOLUTION INTRAVENOUS at 08:18

## 2022-07-16 RX ADMIN — POTASSIUM CHLORIDE 20 MEQ: 400 INJECTION, SOLUTION INTRAVENOUS at 08:50

## 2022-07-16 RX ADMIN — FUROSEMIDE 20 MG/HR: 10 INJECTION, SOLUTION INTRAMUSCULAR; INTRAVENOUS at 12:49

## 2022-07-16 RX ADMIN — FUROSEMIDE 15 MG/HR: 10 INJECTION, SOLUTION INTRAMUSCULAR; INTRAVENOUS at 07:26

## 2022-07-16 RX ADMIN — IPRATROPIUM BROMIDE AND ALBUTEROL SULFATE 1 AMPULE: 2.5; .5 SOLUTION RESPIRATORY (INHALATION) at 10:13

## 2022-07-16 RX ADMIN — MIDAZOLAM HYDROCHLORIDE 1 MG: 1 INJECTION, SOLUTION INTRAMUSCULAR; INTRAVENOUS at 19:01

## 2022-07-16 RX ADMIN — MORPHINE SULFATE 2 MG: 2 INJECTION, SOLUTION INTRAMUSCULAR; INTRAVENOUS at 11:09

## 2022-07-16 RX ADMIN — MIDAZOLAM HYDROCHLORIDE 2 MG: 1 INJECTION, SOLUTION INTRAMUSCULAR; INTRAVENOUS at 12:21

## 2022-07-16 ASSESSMENT — PULMONARY FUNCTION TESTS
PIF_VALUE: 25
PIF_VALUE: 20
PIF_VALUE: 25
PIF_VALUE: 16
PIF_VALUE: 15
PIF_VALUE: 20
PIF_VALUE: 23
PIF_VALUE: 25
PIF_VALUE: 20
PIF_VALUE: 13
PIF_VALUE: 19
PIF_VALUE: 14
PIF_VALUE: 28
PIF_VALUE: 20
PIF_VALUE: 20
PIF_VALUE: 24
PIF_VALUE: 20
PIF_VALUE: 20
PIF_VALUE: 22

## 2022-07-16 NOTE — PROGRESS NOTES
Patient has been successfully weaned from Mechanical Ventilation. RSBI before extubation was 17 with EtCO2 of 39 and SpO2 of 90 on 30% FiO2. Patient extubated and placed on 5 liters/min via nasal cannula. Post extubation SpO2 is 93% with HR  84 bpm and RR 15 breaths/min. Patient had mild cough that was non-productive. Extubation Well tolerated by patient. .  No stridor noted.

## 2022-07-16 NOTE — PROGRESS NOTES
settings here):       1 Minute SBT Respiratory Parameters:   VE: 8.6 L   RR: 8 b/m   VT: 1.075 mL (average VT = VE/RR)   RSBI: 7 (RR/VT in liters)   ETCO2: 42 cmH2O   SPO2: 99 %   If on sedation, amount and type Fentanyl 75    [x]   RR is less than 35, RSBI is less than 100, patient's vitals signs are stable, and patient is in no apparent distress; therefore, patient is being left on SBT for up to 1 hour. []   RR is greater than 35, RSBI is greater than 100, VS's are unstable, or patient is in distress; therefore, patient is being placed back on previous settings. SBT - Concluded at  0231. Weaning Parameters/VS's at conclusion of SBT:   VE: 9.8 L   RR: 13 b/m   VT: 754 mL (average VT = VE/RR)   RSBI: 17 (RR/VT in liters)   ETCO2: 39 cmH2O   SPO2: 90 %    If on sedation, amount and type none    [x]   Patient tolerated SBT for full 60 minutes with acceptable weaning parameters and vital signs and showed no signs or distress. []   Patient tolerated SBT for full 60 minutes, but had unacceptable weaning parameters or vital signs, and/or signs of distress. []   Patient was unable to tolerate SBT for 60 minutes and was placed back on previous settings.             COMMENTS:

## 2022-07-16 NOTE — PLAN OF CARE
Problem: Discharge Planning  Goal: Discharge to home or other facility with appropriate resources  Outcome: Not Progressing  Flowsheets (Taken 7/16/2022 0555)  Discharge to home or other facility with appropriate resources:   Identify barriers to discharge with patient and caregiver   Identify discharge learning needs (meds, wound care, etc)   Refer to discharge planning if patient needs post-hospital services based on physician order or complex needs related to functional status, cognitive ability or social support system   Arrange for needed discharge resources and transportation as appropriate   Arrange for interpreters to assist at discharge as needed     Problem: Chronic Conditions and Co-morbidities  Goal: Patient's chronic conditions and co-morbidity symptoms are monitored and maintained or improved  Outcome: Not Progressing  Flowsheets (Taken 7/16/2022 0555)  Care Plan - Patient's Chronic Conditions and Co-Morbidity Symptoms are Monitored and Maintained or Improved:   Monitor and assess patient's chronic conditions and comorbid symptoms for stability, deterioration, or improvement   Collaborate with multidisciplinary team to address chronic and comorbid conditions and prevent exacerbation or deterioration   Update acute care plan with appropriate goals if chronic or comorbid symptoms are exacerbated and prevent overall improvement and discharge     Problem: Nutrition Deficit:  Goal: Optimize nutritional status  Outcome: Not Progressing  Flowsheets (Taken 7/16/2022 0555)  Nutrient intake appropriate for improving, restoring, or maintaining nutritional needs:   Assess nutritional status and recommend course of action   Recommend appropriate diets, oral nutritional supplements, and vitamin/mineral supplements   Recommend, monitor, and adjust tube feedings and TPN/PPN based on assessed needs   Monitor oral intake, labs, and treatment plans   Order, calculate, and assess calorie counts as needed   Provide specific nutrition education to patient or family as appropriate     Problem: Safety - Medical Restraint  Goal: Remains free of injury from restraints (Restraint for Interference with Medical Device)  Outcome: Progressing  Flowsheets (Taken 7/15/2022 2200)  Remains free of injury from restraints (restraint for interference with medical device):   Determine that other, less restrictive measures have been tried or would not be effective before applying the restraint   Evaluate the patient's condition at the time of restraint application   Inform patient/family regarding the reason for restraint   Every 2 hours: Monitor safety, psychosocial status, comfort, nutrition and hydration     Problem: Pain  Goal: Verbalizes/displays adequate comfort level or baseline comfort level  Outcome: Progressing  Flowsheets (Taken 7/15/2022 2016)  Verbalizes/displays adequate comfort level or baseline comfort level:   Encourage patient to monitor pain and request assistance   Assess pain using appropriate pain scale   Administer analgesics based on type and severity of pain and evaluate response   Implement non-pharmacological measures as appropriate and evaluate response   Consider cultural and social influences on pain and pain management   Notify Licensed Independent Practitioner if interventions unsuccessful or patient reports new pain     Problem: Skin/Tissue Integrity  Goal: Absence of new skin breakdown  Outcome: Progressing  Note: Pt at risk for skin breakdown. See Filemon score. Pt remains on bedrest. Unable to reposition self in bed. Heels elevated off bed. Sacral heart mepilex intact to protect,  site inspected and intact underneath. Will continue to turn and reposition patient every two hours and as needed. Will continue to keep patient clean and dry, applying skin care cream as needed. Pillows used for repositioning q2hs. Will continue to monitor and assess for skin breakdown.        Problem: Safety - Adult  Goal: Free from fall injury  Outcome: Progressing  Flowsheets (Taken 7/16/2022 0120)  Free From Fall Injury:   Instruct family/caregiver on patient safety   Based on caregiver fall risk screen, instruct family/caregiver to ask for assistance with transferring infant if caregiver noted to have fall risk factors     Problem: ABCDS Injury Assessment  Goal: Absence of physical injury  Outcome: Progressing  Flowsheets (Taken 7/16/2022 0120)  Absence of Physical Injury: Implement safety measures based on patient assessment     Problem: Respiratory - Adult  Goal: Achieves optimal ventilation and oxygenation  Outcome: Progressing  Flowsheets (Taken 7/16/2022 0555)  Achieves optimal ventilation and oxygenation:   Assess for changes in respiratory status   Position to facilitate oxygenation and minimize respiratory effort   Initiate smoking cessation protocol as indicated   Assess the need for suctioning and aspirate as needed   Respiratory therapy support as indicated   Assess for changes in mentation and behavior   Oxygen supplementation based on oxygen saturation or arterial blood gases   Encourage broncho-pulmonary hygiene including cough, deep breathe, incentive spirometry   Assess and instruct to report shortness of breath or any respiratory difficulty     Problem: Cardiovascular - Adult  Goal: Maintains optimal cardiac output and hemodynamic stability  Outcome: Progressing  Flowsheets (Taken 7/16/2022 0555)  Maintains optimal cardiac output and hemodynamic stability:   Monitor blood pressure and heart rate   Monitor urine output and notify Licensed Independent Practitioner for values outside of normal range   Assess for signs of decreased cardiac output   Administer fluid and/or volume expanders as ordered   Administer vasoactive medications as ordered  Goal: Absence of cardiac dysrhythmias or at baseline  Outcome: Progressing  Flowsheets (Taken 7/16/2022 0555)  Absence of cardiac dysrhythmias or at baseline:   Monitor cardiac rate and rhythm   Administer antiarrhythmia medication and electrolyte replacement as ordered   Assess for signs of decreased cardiac output     Problem: Metabolic/Fluid and Electrolytes - Adult  Goal: Electrolytes maintained within normal limits  Outcome: Progressing  Flowsheets (Taken 7/16/2022 0555)  Electrolytes maintained within normal limits:   Monitor labs and assess patient for signs and symptoms of electrolyte imbalances   Monitor response to electrolyte replacements, including repeat lab results as appropriate   Instruct patient on fluid and nutrition restrictions as appropriate   Administer electrolyte replacement as ordered   Fluid restriction as ordered  Goal: Hemodynamic stability and optimal renal function maintained  Outcome: Progressing  Flowsheets (Taken 7/16/2022 0555)  Hemodynamic stability and optimal renal function maintained:   Monitor labs and assess for signs and symptoms of volume excess or deficit   Monitor intake, output and patient weight   Monitor urine specific gravity, serum osmolarity and serum sodium as indicated or ordered   Monitor response to interventions for patient's volume status, including labs, urine output, blood pressure (other measures as available)   Encourage oral intake as appropriate   Instruct patient on fluid and nutrition restrictions as appropriate

## 2022-07-16 NOTE — PROGRESS NOTES
Patient remains on the ventilator, tolerating current vent settings well. Sats 96-99% on 0.30 FIO2. Lungs clear, bilateral. On Propofol and Fentanyl drip for sedation and pain management, maintaining CPOT 0 and RASS -1. Patient opens eyes to name calling and follows simple commands. Shakes/nods head with yes or no questions. AV paced on the monitor, set at 60. BP stable. Dobutamine drip at 2.5 mcg/kg/min and Lasix drip for CHF exacerbation.

## 2022-07-16 NOTE — PROGRESS NOTES
[facility] took patient to transport to Hospice. Paperwork for ttransfer complete and given to EMT. Placed on BIPAP, RT Gross at bedside to transition on BIPAP, settings verified, patient's O2 Sat 100%. Patient awake, but appears weak. All belongings brought home by daughter, per Joie Franklin. [place] intact.

## 2022-07-16 NOTE — PROGRESS NOTES
ICU Progress Note    Admit Date: 7/14/2022  Day: 3  Vent Day: None  IV Access:Peripheral  IV Fluids:None  Vasopressors:None                Antibiotics: None  Diet: Diet NPO    CC: AMS, acute hypoxic/hypercapnic respiratory failure    Interval history:  NAEO. Intubated and mechanically ventilated. She is responsive to commands. Continues on dobutamine gtt @ 2.5mcg/kg/min, lasix gtt @ 15 mg/hr, fentanyl gtt, and propofol gtt. K 3.3, replaced this AM.      HPI:  Alberto Obregon is a 65year old female with a past medical history significant for DM2, HTN, CKD III, CHF (EF 30-35% with grade III diastolic dysfunction, COPD (3L baseline), and stage IV small cell lung cancer (currently undergoing cisplatin and etoposide chemo, last dose 14 days ago) who presented for outpatient PET scan at which time she had AMS secondary to hypoxic event. She required BVM ventilation, and subsequently intubation. Of note the patient had a recent hospitalization at [facility] 6/28-7/2 for HCAP requiring intubation. She was started on cefepime, and today is day 9/10. Unfortunately, since the patient currently intubated and sedated, ROS was unable to be performed. The patient's rhythm did become paced during her resuscitation. At home, the patient has been using 3-5L O2, but was on 8L here. The patient undergoes treatment for her cancer, which was diagnosed earlier this year in the Spring, and follows with Dr. Shantal Jones. She has received cisplatin and etoposide with recent dose reductions, which she has not tolerated well. She received a dose of Tecentriq, but was taken off due to the myelosuppression.      Medications:     Scheduled Meds:   insulin lispro  0-6 Units SubCUTAneous Q4H    sodium chloride flush  5-40 mL IntraVENous 2 times per day    heparin (porcine)  5,000 Units SubCUTAneous 3 times per day    pantoprazole  40 mg IntraVENous Daily     Continuous Infusions:   DOBUTamine 2.5 mcg/kg/min (07/15/22 2020)    propofol 25 mcg/kg/min (07/16/22 9256)    fentaNYL 100 mcg/hr (07/16/22 0445)    furosemide (LASIX) 1mg/ml infusion 15 mg/hr (07/16/22 0105)    sodium chloride      dextrose       PRN Meds:sodium chloride flush, sodium chloride, prochlorperazine, glucose, dextrose bolus **OR** dextrose bolus, glucagon (rDNA), dextrose    Objective:   Vitals:   T-max:  Patient Vitals for the past 8 hrs:   BP Temp Temp src Pulse Resp SpO2   07/16/22 0500 (!) 135/52 -- -- 66 17 99 %   07/16/22 0430 (!) 148/58 -- -- 64 16 98 %   07/16/22 0400 (!) 146/45 -- -- 65 15 96 %   07/16/22 0350 -- -- -- 62 16 96 %   07/16/22 0330 (!) 145/44 -- -- 60 17 --   07/16/22 0300 (!) 144/43 -- -- 60 16 --   07/16/22 0230 (!) 146/41 -- -- 60 16 --   07/16/22 0200 (!) 143/40 -- -- 60 16 --   07/16/22 0147 -- -- -- -- -- 93 %   07/16/22 0143 -- -- -- 63 19 100 %   07/16/22 0130 (!) 146/40 -- -- 60 16 --   07/16/22 0100 (!) 141/42 -- -- 60 16 --   07/16/22 0030 (!) 142/44 -- -- 60 16 99 %   07/16/22 0000 (!) 153/39 99.7 °F (37.6 °C) Bladder 65 17 98 %   07/15/22 2330 (!) 148/49 -- -- 61 16 99 %   07/15/22 2300 (!) 143/64 -- -- 65 23 100 %   07/15/22 2230 (!) 152/53 -- -- 63 16 98 %       Intake/Output Summary (Last 24 hours) at 7/16/2022 0606  Last data filed at 7/15/2022 1800  Gross per 24 hour   Intake 153.08 ml   Output 645 ml   Net -491.92 ml       Review of Systems  Unable to obtain d/t pt intubated. Physical Exam  Physical Exam  Constitutional:       Appearance: She is ill-appearing. Comments: Intubated, sedated   Eyes:     Extraocular Movements: Extraocular movements intact. Pupils: Pupils are equal, round, and reactive to light. Cardiovascular:     Rate and Rhythm: Normal rate and regular rhythm. Pulses: Normal pulses. Heart sounds: Normal heart sounds. Comments: paced  Pulmonary:     Breath sounds: No wheezing or rhonchi. Comments: Mechanical breath sounds  Abdominal:     Palpations: Abdomen is soft.   Musculoskeletal:     Right lower leg: Edema present. Left lower leg: Edema present. Skin:     Capillary Refill: Capillary refill takes less than 2 seconds. Neurological:     Comments: Sedated, opens eyes to command, minimal squeeze in bilateral hands. LABS:    CBC:   Recent Labs     07/14/22  1333 07/15/22  0359 07/16/22  0433   WBC 5.3 2.3* 3.8*   HGB 9.7* 8.5* 8.6*   HCT 33.1* 27.9* 28.0*    186 188   MCV 81.2 78.0* 77.4*     Renal:    Recent Labs     07/15/22  0359 07/15/22  1538 07/16/22  0433    138 140   K 3.9 3.5 3.3*    104 104   CO2 24 25 23   BUN 25* 27* 29*   CREATININE 1.4* 1.5* 1.6*   GLUCOSE 78 84 91   CALCIUM 9.3 8.9 9.1   MG 2.30  --  2.20   PHOS 4.0 3.8 3.9   ANIONGAP 12 9 13     Hepatic:   Recent Labs     07/14/22  1333 07/15/22  0359 07/15/22  1538 07/16/22  0433   AST 23  --   --   --    ALT 9*  --   --   --    BILITOT 0.4  --   --   --    PROT 6.9 6.1*  --   --    LABALBU 3.9 3.3* 3.3* 3.1*   ALKPHOS 80  --   --   --      Troponin:   Recent Labs     07/14/22  1333 07/14/22  2248   TROPONINI 0.02* 0.01     BNP: No results for input(s): BNP in the last 72 hours. Lipids: No results for input(s): CHOL, HDL in the last 72 hours. Invalid input(s): LDLCALCU, TRIGLYCERIDE  ABGs:    Recent Labs     07/14/22  1354 07/14/22  2025 07/15/22  1321   PHART 7.182* 7.321* 7.352   GWR7KQY 69.6* 54.3* 48.1*   PO2ART 83.0 71.7* 58.2*   OKQ4KGF 26 28 27   BEART -2.9 1.4 0.7   X2NITBKV 92* 94 88*   OES3YJI 28 30 28       INR:   Recent Labs     07/15/22  0359   INR 1.26*     Lactate: No results for input(s): LACTATE in the last 72 hours. Cultures:  -----------------------------------------------------------------  RAD:   XR CHEST PORTABLE   Final Result   1. Decreased right-sided effusion with no pneumothorax. XR CHEST PORTABLE   Final Result   1. Persistent bilateral pleural effusions. 2. Improvement in airspace disease within the bilateral upper and midlung zones.       CTA PULMONARY W CONTRAST   Final Result   1. No pulmonary embolus. 2.  Large right and moderate left pleural effusions causing adjacent consolidation of the bilateral lower lobes. 3.  Endotracheal tube is seen in satisfactory position. 4.  Multiple thyroid nodules are present. CT SOFT TISSUE NECK W CONTRAST   Final Result   1. No pulmonary embolus. 2.  Large right and moderate left pleural effusions causing adjacent consolidation of the bilateral lower lobes. 3.  Endotracheal tube is seen in satisfactory position. 4.  Multiple thyroid nodules are present. CT Head WO Contrast   Final Result      No acute intracranial hemorrhage or mass effect. XR CHEST PORTABLE   Final Result      ET tube placed in proper position above the kristin. Persistent left retrocardiac, basilar airspace density, and small effusion. Increased bilateral perihilar density, most consistent with congestive failure and pulmonary edema               Assessment/Plan:   Wilmer Kaur is a 65 y.o. female, with a past medical history significant for DM2, HTN, CKD III, CHF (EF 30-35% with grade III diastolic dysfunction), COPD (3L baseline), and stage IV small cell lung cancer (currently undergoing cisplatin and etoposide chemo, last dose 14 days ago) who presented for outpatient PET scan at which time she had AMS secondary to hypoxic event. She was intubated in the ED. Acute Hypoxic Respiratory Failure:  Pleural Effusion most likely etiology vs COPD exacerbation vs CHF  Patient likely lied supine for the PET scan, the effusion distributed, and she decompensated. Recent pneumonia, on cefepime, continue to complete dose, tomorrow last day. WBC 5.3 but patient immunosuppressed, 1.3-2.5 baseline. ABG in ED: 7.182; 69.6 CO2; 83 O2; 26 HCO3. CTA pulm with large right and moderate left effusions causing adjacent consolidation of bilateral lower lobes.  Compared to prior scan on 6/16, pneumonic type consolidations appear improved, but effusions are larger. CXR this morning shows possible improvement of edema, but still significant effusion.  - Strict I/Os, maintain Roldan  - Vent settings @ RR 16, , PEEP 8, FiO2 30%. - Lasix gtt @ 15mg/hr overnight  - dobutamine gtt @ 2.5mcg/kg/min  - start diuril 500mg  - fluid goal net negative 2L per day  - extubate today     CHF:  - Most recent EF 30-35% with grade III diastolic dysfunction  - On torsemide 40mg and vericiguat (Verquvo) at home  - Upon improvement of respiratory status, will restart home meds     Small Cell Lung Cancer  - Stage IV, uncurable  - Under treatment with cisplatin and etoposide, did not tolerate immunotherapy with Tecentriq  - Heme/onc consulted, appreciate recs. - Palliative consulted given poor prognosis, and failure to tolerate treatment. Patient still full code, appreciate assistance     Chronic Problems:  - COPD: On 3-5L at home, will consider for vent wean, may benefit from extubation to bipap; on albuterol and Anoro Ellipta at home  - CKD: Stage III. Creatinine 1.4, Baseline creatinine 1.2-1.4; preserving right arm. Sees Dr. Amy Britton with [facility]  - DM2: On Jardiance at home, LDSSI  - CAD: On Coreg  - HLD: On Lipitor at home       Code Status: Prior  FEN: NPO  PPX:  SQH, protonix 40mg QD  DISPO: ICU    Yehuda Tai MD, PGY-1  07/16/22  6:06 AM    This patient has been staffed and discussed with Chin Walker MD.     [facility] [place] [facility] [place]     Patient seen and examined. I agree with Dr. Adán Oconnor history, physical, lab findings, assessment and plan. Pleural fluid studies from right-sided pleural effusion yesterday shows a transudate consistent with her CHF. She did not diurese very well with Lasix 15 mg/h and dobutamine. Lasix increased to 20 mg/h and Diuril 500 mg IV x1 given. Patient was alert and followed commands on ventilator. She had SBT with following results:  SBT - Concluded at  3333.      Weaning Parameters/VS's at conclusion of SBT:        VE:        9.8 L        RR:        13 b/m        VT:        754 mL (average VT = VE/RR)        RSBI:    17 (RR/VT in liters)        ETCO2: 39 cmH2O        SPO2:   30 %        If on sedation, amount and type none  Post wean ABG 7.35/47/60   Patient with decent cough and no significant secretion    Echo:      Summary   Severely dilated LV with akinesis of the inferolateral and inferior walls. EF   30-35%   Moderate mitral regurgitation is present. Large bilateral pleural effusions seen. Assessment       1. Acute on Chronic Hypoxemic with acute hypercapnic Resp Failure -patient passed SBT and was extubated. However within an hour she had increased work of breathing and decreased mentation. She was placed on NIV with a minute ventilation of 12. Follow-up VBG was 7.38/44  2. CHF exac  3. Extensive stage small cell lung cancer - PET yesterday shows positive response to treatment - some LAD resolved other parenchymal masses show partial improvement  4. Recent Pneumonia - on cefepime  5. Bilateral pleural effusion - R > L     Plan     Dr. Esmer Perez and I went to see patient post extubation when she was doing poorly. Her daughter Radhika Knight was present and we had a conference call with her other daughter, Nilesh Bello. They both were in agreement that Aleja Quinn has 2 end-stage diagnosis, extensive stage small cell lung cancer and CHF. They initially had discussed once her mom improve enough to go home that they would seek hospice care. Dr. Esmer Perez and I both thought that hospice care would be most appropriate now. Daughters and patient's mother were all in agreement. They would like to see Aleja Quinn be made comfortable and not to pursue reintubation or resuscitation in case of a cardiac arrest.  I have made her DNR CC. They have asked that we consult hosp[facility] of [place] which I have done. I have stopped her dobutamine and will make morphine and Versed available on an as-needed basis to treat pain, dyspnea, and anxiety/agitation.   I will keep NIV in place for comfort at this time     Pt has a high probability of imminent or life-threatening deterioration requiring close monitoring, and highly complex decision-making and/or interventions of high intensity to assess, manipulate, and support his critical organ systems to prevent a likely inevitable decline which could occur if left untreated. A total critical care time 39 minutes was used. This includes but not limited to examining patient, collaborating with other physicians, monitoring vital signs, telemetry, continuous pulse oximetry, and clinical response to IV medications, documentation time, review and interpretation of laboratory and radiological data, review of nursing notes and old record review. This time excludes any time that may have been spent performing procedures for life threatening organ failure.         Emerald Parra

## 2022-07-16 NOTE — PROGRESS NOTES
[address] Daily Progress Note      Admit Date:  7/14/2022    CC:  follow up S HF    Subjective:  Ms. Manjula Moctezuma is awake and on SBT    Objective:   BP (!) 147/77   Pulse 87   Temp 98.8 °F (37.1 °C) (Bladder)   Resp 15   Ht 5' 7\" (1.702 m)   Wt 211 lb 13.8 oz (96.1 kg)   SpO2 94%   BMI 33.18 kg/m²     Intake/Output Summary (Last 24 hours) at 7/16/2022 1837  Last data filed at 7/16/2022 0730  Gross per 24 hour   Intake 1279.79 ml   Output 1125 ml   Net 154.79 ml       TELEMETRY: AV paced    Physical Exam:  General:  Awake, alert, NAD  Eyes:  EOMI PERRL anicteric  Skin:  Warm and dry. Neck:  JVP elevated  Chest:  Diminshed. No Rales. Cardiovascular:   RRR, Normal S1S2. No Murmur. No Rub. No Gallops. Abdomen:  Soft NT  + bs  Extremities:  +++  edema  Neuro:  CN II-XII intact. No focal deficits. No weakness. No lateralizing findings. Psych:  Normal thought and affect. MSK:  No Cyanosis nor Clubbing.   Symmetrical strength upper and lower extremities    Medications:    chlorothiazide (DIURIL) IVPB  500 mg IntraVENous Once    insulin lispro  0-6 Units SubCUTAneous Q4H    sodium chloride flush  5-40 mL IntraVENous 2 times per day    heparin (porcine)  5,000 Units SubCUTAneous 3 times per day    pantoprazole  40 mg IntraVENous Daily      DOBUTamine 2.5 mcg/kg/min (07/16/22 0620)    propofol Stopped (07/16/22 0730)    fentaNYL 75 mcg/hr (07/16/22 0645)    furosemide (LASIX) 1mg/ml infusion 20 mg/hr (07/16/22 0816)    sodium chloride      dextrose       sodium chloride flush, sodium chloride, prochlorperazine, glucose, dextrose bolus **OR** dextrose bolus, glucagon (rDNA), dextrose    Lab Data:  CBC:   Recent Labs     07/14/22  1333 07/15/22  0359 07/16/22  0433   WBC 5.3 2.3* 3.8*   HGB 9.7* 8.5* 8.6*   HCT 33.1* 27.9* 28.0*   MCV 81.2 78.0* 77.4*    186 188     BMP:   Recent Labs     07/15/22  0359 07/15/22  1538 07/16/22  0433    138 140   K 3.9 3.5 3.3*    104 104   CO2 24 25 23 PHOS 4.0 3.8 3.9   BUN 25* 27* 29*   CREATININE 1.4* 1.5* 1.6*     LIVER PROFILE:   Recent Labs     07/14/22  1333   AST 23   ALT 9*   BILITOT 0.4   ALKPHOS 80     PT/INR:   Recent Labs     07/15/22  0359   PROTIME 15.8*   INR 1.26*         Assessment:  Patient Active Problem List    Diagnosis Date Noted    Mitral valve insufficiency 07/15/2022    Acute on chronic respiratory failure with hypoxia and hypercapnia (HCC) 07/14/2022    Acute respiratory failure with hypoxia (HCC) 07/14/2022    Pleural effusion 07/14/2022    Abnormal CT of the chest     Small cell lung cancer in adult [facility]) 06/30/2022    Dyspnea     History of lung cancer     Acute on chronic respiratory failure ([address] 06/29/2022    Acute respiratory distress     Acute respiratory failure ([address] 06/15/2022    HCAP (healthcare-associated pneumonia) 05/13/2022    Respiratory failure ([address] 05/11/2022    Demand ischemia of myocardium ([address] 03/31/2022    Non morbid obesity due to excess calories 03/31/2022    Acute respiratory failure with hypoxia and hypercapnia ([address] 03/31/2022    Essential hypertension 03/31/2022    Hyperlipidemia 03/31/2022    DMII (diabetes mellitus, type 2) ([address] 03/31/2022    CAD S/P percutaneous coronary angioplasty 03/31/2022    COPD exacerbation ([address] 03/30/2022    Acute kidney injury superimposed on chronic kidney disease ([address]     Acute decompensated heart failure ([address] 12/10/2021    Congestive heart failure with left ventricular dysfunction ([address] 12/01/2021    Hyperlipidemia 10/12/2021    Morbid obesity due to excess calories ([address] 10/12/2021    Acute pulmonary edema ([address] 10/12/2021    Acute respiratory failure with hypercarbia ([address] 10/12/2021    Acute on chronic systolic CHF (congestive heart failure), NYHA class 3 ([address] 10/11/2021    Anemia 01/17/2020    Treatment 06/29/2016    Iron deficiency anemia due to chronic blood loss 11/20/2015    Intestinal malabsorption 01/16/2015    Iron deficiency anemia 01/09/2015    Chest pain 01/06/2013    NEGIN treated with BiPAP 01/06/2013    DMII (diabetes mellitus, type 2) ([facility] 01/06/2013    Essential hypertension     Cardiomyopathy ([facility]     CHF (congestive heart failure) (HCC)     COPD (chronic obstructive pulmonary disease) ([facility]        Plan:  CAD: chronic. CHF:  systolic  continue diuresis. D/w Dr. Apryl barclay gtt restarted and plan for diuril. Continue dobutamine. HR AV paced at 99 bpm.  CRT-D in place.       Core Measures:  Discharge instructions:   LVEF documented:   ACEI for LV dysfunction:   Smoking Cessation:    Elias Villegas MD, MD 7/16/2022 9:27 AM

## 2022-07-16 NOTE — CONSULTS
[facility]  Consult Note      Requesting Physician: Socrates Moser MD    CHIEF COMPLAINT:  Shortness of breath    HISTORY OF PRESENT ILLNESS:  Ms. Aury Tim  is a 65 y.o. female we are seeing in consultation for extensive stage small cell lung cancer for which she is receiving carboplatin/etoposide/atezolizumab chemotherapy under the care of Dr Kenn Gilbert. Last cycle was June 27, 2022. Cycle 4 due July 18, 2022. She is receiving Neulasta growth factor support. Her chemotherapy course has been complicated by chronic respiratory failure and declining performance status. She was at [facility] undergoing an outpt PET scan and became increasingly short of breath. She was found to be quite hypoxemic and was therefore intubated and admitted to the ICU. CTPA showed bilt lower lobe consolidations and pleural effusions. Of note, she has had multiple recent hospitalizations for heart failure and pneumonia. Medical oncology is consulted for recommendations.     Past Medical History:        Diagnosis Date    Asthma     Cardiomyopathy ([facility]     CHF (congestive heart failure) (HCC)     COPD (chronic obstructive pulmonary disease) ([facility]     Diabetes mellitus ([facility]     Essential hypertension     Hyperlipidemia     Hypertension     Obesity        Past Surgical History:        Procedure Laterality Date    CARPAL TUNNEL RELEASE      CHOLECYSTECTOMY      CORONARY ANGIOPLASTY WITH STENT PLACEMENT  01/07/2013    Stent LAD    INCONTINENCE SURGERY      IR PORT PLACEMENT EQUAL OR GREATER THAN 5 YEARS Right 05/03/2022    Power port, inserted by Dr. Jesus Arias, cut at 22    IR [place] [facility]cl[facility] 15 5 YEARS  5/3/2022    IR PORT PLACEMENT EQUAL OR GREATER THAN 5 YEARS 5/3/2022 [facility] SPECIAL PROCEDURES       Current Medications:  Current Facility-Administered Medications: chlorothiazide (DIURIL) 500 mg in sodium chloride 0.9 % 50 mL IVPB, 500 mg, IntraVENous, Once  insulin lispro (1 Unit Dial) 0-6 Units, 0-6 Units, SubCUTAneous, Q4H  DOBUTamine (DOBUTREX) 500 mg in dextrose 5 % 250 mL infusion, 2.5 mcg/kg/min (Adjusted), IntraVENous, Continuous  propofol injection, 5-50 mcg/kg/min, IntraVENous, Continuous  fentaNYL (SUBLIMAZE) 1,000 mcg in sodium chloride 0.9% 100 mL infusion,  mcg/hr, IntraVENous, Continuous  furosemide (LASIX) 100 mg in dextrose 5 % 100 mL infusion, 20 mg/hr, IntraVENous, Continuous  sodium chloride flush 0.9 % injection 5-40 mL, 5-40 mL, IntraVENous, 2 times per day  sodium chloride flush 0.9 % injection 5-40 mL, 5-40 mL, IntraVENous, PRN  0.9 % sodium chloride infusion, , IntraVENous, PRN  heparin (porcine) injection 5,000 Units, 5,000 Units, SubCUTAneous, 3 times per day  prochlorperazine (COMPAZINE) injection 10 mg, 10 mg, IntraVENous, Q6H PRN  glucose chewable tablet 16 g, 4 tablet, Oral, PRN  dextrose bolus 10% 125 mL, 125 mL, IntraVENous, PRN **OR** dextrose bolus 10% 250 mL, 250 mL, IntraVENous, PRN  glucagon (rDNA) injection 1 mg, 1 mg, IntraMUSCular, PRN  dextrose 5 % solution, 100 mL/hr, IntraVENous, PRN  pantoprazole (PROTONIX) injection 40 mg, 40 mg, IntraVENous, Daily    Allergies:  Ace inhibitors, Diclofenac, Losartan, Spironolactone, Vicodin hp [hydrocodone-acetaminophen], and Vicodin [hydrocodone-acetaminophen]    Social History:  Social History     Socioeconomic History    Marital status:       Spouse name: Not on file    Number of children: Not on file    Years of education: Not on file    Highest education level: Not on file   Occupational History    Not on file   Tobacco Use    Smoking status: Never    Smokeless tobacco: Never   Vaping Use    Vaping Use: Never used   Substance and Sexual Activity    Alcohol use: Not Currently    Drug use: Not Currently    Sexual activity: Not Currently     Partners: Male     Comment:    Other Topics Concern    Not on file   Social History Narrative    ** Merged History Encounter **          Social Determinants of Health     Financial Resource Strain: Not on file   Food Insecurity: Not on file   Transportation Needs: Not on file   Physical Activity: Not on file   Stress: Not on file   Social Connections: Not on file   Intimate Partner Violence: Not on file   Housing Stability: Not on file       Family History:         Problem Relation Age of Onset    High Blood Pressure Mother     Diabetes Mother     Cancer Mother         thyroid    Stroke Father        REVIEW OF SYSTEMS:    Review of Systems   Unable to perform ROS: Acuity of condition      PHYSICAL EXAM:      Vitals:  BP (!) 147/77   Pulse 87   Temp 98.8 °F (37.1 °C) (Bladder)   Resp 15   Ht 5' 7\" (1.702 m)   Wt 211 lb 13.8 oz (96.1 kg)   SpO2 94%   BMI 33.18 kg/m²       Intake/Output Summary (Last 24 hours) at 7/16/2022 0932  Last data filed at 7/16/2022 0730  Gross per 24 hour   Intake 1279.79 ml   Output 1125 ml   Net 154.79 ml       Physical Exam  Constitutional:       General: She is in acute distress. Appearance: She is ill-appearing. Eyes:      General: No scleral icterus. Cardiovascular:      Rate and Rhythm: Normal rate and regular rhythm. Heart sounds: No murmur heard. No gallop. Pulmonary:      Effort: Respiratory distress present. Comments: Decr BS bilat bases  Abdominal:      Tenderness: There is no abdominal tenderness. Musculoskeletal:         General: Swelling present. Lymphadenopathy:      Cervical: No cervical adenopathy. Skin:     Coloration: Skin is pale. Findings: No rash.        DATA:  Recent Labs     07/16/22  0433 07/15/22  0359 07/14/22  1333   WBC 3.8* 2.3* 5.3   NEUTROABS 2.4 1.3* 2.8   LYMPHOPCT 14.0 17.3 28.4   RBC 3.63* 3.58* 4.07   HGB 8.6* 8.5* 9.7*   HCT 28.0* 27.9* 33.1*   MCV 77.4* 78.0* 81.2   MCH 23.8* 23.7* 23.9*   MCHC 30.8* 30.4* 29.4*   RDW 24.1* 25.3* 25.8*    186 265       Lab Results   Component Value Date     07/16/2022    K 3.3 (L) 07/16/2022     07/16/2022    CO2 23 07/16/2022    GLUCOSE 91 07/16/2022    BUN 29 (H) 07/16/2022    CREATININE 1.6 (H) 07/16/2022    LABGLOM 33 (A) 07/16/2022    GFRAA 40 (A) 07/16/2022    CALCIUM 9.1 07/16/2022    PROT 6.1 (L) 07/15/2022    LABALBU 3.1 (L) 07/16/2022    AGRATIO 1.3 07/14/2022    BILITOT 0.4 07/14/2022    ALKPHOS 80 07/14/2022    ALT 9 (L) 07/14/2022    AST 23 07/14/2022    GLOB 2.8 01/27/2014    MG 2.20 07/16/2022       Lab Results   Component Value Date    PROT 6.1 (L) 07/15/2022    PROT 6.9 07/14/2022    PROT 5.4 (L) 07/02/2022    INR 1.26 (H) 07/15/2022    INR 1.03 05/10/2022    INR 1.14 (H) 05/03/2022     Lab Results   Component Value Date    APTT 21.3 (L) 12/15/2012       TUMOR MARKERS: No results found for: PSA, CEA, , HE5025,       Radiology Review:  Echo Limited    Result Date: 6/29/2022  Transthoracic Echocardiography Report (TTE)  Demographics   Patient Name       [facility]   Date of Study      06/29/2022         Gender              Female   Patient Number     [id number]         Date of Birth       1960   Visit Number       [id number]          Age                 65 year(s)   Accession Number   [id number]         Room Number         5262   Corporate ID       [id number]            Sonographer         Winston Musa   Ordering Physician Laura Stewart,     Jhony Russ MD       Physician           Whitney Parish MD  Procedure Type of Study   TTE procedure:ECHOCARDIOGRAM LIMITED. Procedure Date Date: 06/29/2022 Start: 02:31 PM Study Location: [facility] - Echo Lab Technical Quality: Adequate visualization Indications:Congestive heart failure. Patient Status: Routine BP: 100/48 mmHg  Conclusions   Summary  Severely dilated LV with akinesis of the inferolateral and inferior walls. EF  30-35%  Moderate mitral regurgitation is present. Large bilateral pleural effusions seen.    Signature ------------------------------------------------------------------  Electronically signed by Anita Marte MD (Interpreting  physician) on 06/29/2022 at 05:19 PM  ------------------------------------------------------------------   Findings   Left Ventricle  Severely dilated LV with akinesis of the inferolateral and inferior walls. EF  30-35%   Mitral Valve  Moderate mitral regurgitation is present. Pericardial Effusion  No pericardial effusion noted. Pleural Effusion  Large bilateral pleural effusions  M-Mode/2D Measurements (cm)   LV Diastolic Dimension: 6.7 cm LV Systolic Dimension: 6.22 cm      CT Head WO Contrast    Result Date: 7/14/2022  CT HEAD WITHOUT CONTRAST HISTORY: Altered mental status PROCEDURE: Noncontrast CT the brain performed with 5 mm contiguous sections. Individualized dose optimization technique was used in order to meet ALARA standards for radiation dose reduction. In addition to vendor specific dose reduction algorithms, the  dose reduction techniques vary based on the specific scanner utilized but frequently include automated exposure control, adjustment of the mA and/or kV according to patient size, and use of iterative reconstruction technique. COMPARISON: None FINDINGS: There is no acute hemorrhage or mass effect. The ventricles are normal in size and position. The grey-white matter differentiation is preserved. No calvarial abnormality is noted. The sinus are pneumatized. The globes and orbits are normal.  No additional abnormalities are seen in the structures of the skull base. No acute intracranial hemorrhage or mass effect. CT SOFT TISSUE NECK W CONTRAST    Result Date: 7/14/2022  EXAM: CTA PULMONARY W CONTRAST, CT SOFT TISSUE NECK W CONTRAST INDICATION: Acute respiratory failure, hx malignancy, ? PE COMPARISON: May 10, 2022 TECHNIQUE:  CT of the chest was performed after the administration of intravenous contrast using a pulmonary angiography protocol.  Multiplanar MIP reformations were created. Coronal and sagittal reformations were created. Up-to-date CT equipment and radiation dose reduction techniques were employed. FINDINGS: Neck: Nasopharynx: Normal.  Suprahyoid neck: Normal oropharynx, oral cavity, parapharyngeal space, and retropharyngeal space. Normal  space, infratemporal fossa, and buccal space. The tonsils appear normal. The salivary glands appear normal. Infrahyoid neck: Normal larynx, hypopharynx, and supraglottis. An endotracheal tube is seen within the trachea. Multiple thyroid nodules are identified. Lymph nodes: No lymphadenopathy. Vascular structures: Normal. Bones: Normal. Brain: The visualized portions of the brain appear normal. Orbits, paranasal sinuses, and skull base: Complete opacification of the left maxillary sinus is present. Chest: Lower neck and axilla: No lymphadenopathy. Heart and Vessels: The heart is enlarged. A left chest wall cardiac device is seen with leads in unchanged position. The thoracic aorta is normal in caliber. The main pulmonary artery is normal in caliber. No pulmonary embolism. Mediastinum and Charline: No mass or lymphadenopathy. Lungs and airways: There is bilateral lower lobe consolidation, worse on the left than the right. The major airways are patent. No focal mass lesion is identified. A few areas of ground glass opacities are seen in the right upper lobe likely due to atelectasis. Pleura: There is a large right and a moderate left pleural effusion. Chest wall and soft tissues: Unremarkable. Upper Abdomen: No acute abnormality. Bones: No acute or suspicious abnormality. 1.  No pulmonary embolus. 2.  Large right and moderate left pleural effusions causing adjacent consolidation of the bilateral lower lobes. 3.  Endotracheal tube is seen in satisfactory position. 4.  Multiple thyroid nodules are present. XR CHEST PORTABLE    Result Date: 7/15/2022  History: Status post procedure. Status post thoracentesis.  AP chest. COMPARISON: 7/15/2022. FINDINGS: Right-sided power injectable port in stable position. Left-sided pacemaker and endotracheal tube also in stable position. Cardiomegaly with pulmonary venous congestion not appreciably changed. There appears to be a decrease in the right-sided pleural effusion in the interval. No pneumothorax identified. 1. Decreased right-sided effusion with no pneumothorax. XR CHEST PORTABLE    Result Date: 7/15/2022  Portable chest HISTORY: Shortness of breath. Pleural effusion evaluation. COMPARISON: July 14, 2022. FINDINGS: The heart size is similar to prior exam. A cardiac conduction device is detected. An endotracheal tube is evident. The right IJ approach PowerPort catheter is stable. There is bilateral airspace disease, improved in the mid and upper lung zones. There are persistent pleural effusions which are likely similar to the prior study. 1. Persistent bilateral pleural effusions. 2. Improvement in airspace disease within the bilateral upper and midlung zones. XR CHEST PORTABLE    Result Date: 7/14/2022  PORTABLE AP CHEST AT 1340 HOURS:   HISTORY: Intubated. COMPARISON: 6/29/2022. FINDINGS: Endotracheal tube has been placed, projects well above the kristin. Right-sided port and bipolar left-sided pacer device are again noted. The cardiopericardial silhouette is enlarged. Pulmonary vasculature is diffusely prominent. There is central, bilateral perihilar increased density. Airspace density in consolidation are seen in the left lower lung, similar to the prior study. There is a  probable left effusion. ET tube placed in proper position above the kristin. Persistent left retrocardiac, basilar airspace density, and small effusion.  Increased bilateral perihilar density, most consistent with congestive failure and pulmonary edema     XR CHEST PORTABLE    Result Date: 6/30/2022  EXAMINATION: ONE XRAY VIEW OF THE CHEST 6/29/2022 5:05 am COMPARISON: 06/28/2022 HISTORY: ORDERING SYSTEM PROVIDED HISTORY: SOB TECHNOLOGIST PROVIDED HISTORY: Reason for exam:->SOB Reason for Exam: SOB FINDINGS: Heart is similar in size to the prior study. There is a left retrocardiac opacity. Right basilar airspace opacity appears slightly more prominent than the prior exam.  No measurable pneumothorax is seen. Similar appearance of the right-sided port catheter. There is a left-sided cardiac device with multiple leads. Left retrocardiac opacity along with right basilar airspace opacities, appearing slightly more prominent than the prior exam.  This could represent atelectasis and/or infiltrate. Possible left pleural effusion. XR CHEST PORTABLE    Result Date: 6/30/2022  EXAMINATION: ONE XRAY VIEW OF THE CHEST 6/28/2022 10:18 pm COMPARISON: 06/17/2022 HISTORY: ORDERING SYSTEM PROVIDED HISTORY: sob TECHNOLOGIST PROVIDED HISTORY: Reason for exam:->sob Reason for Exam: Respiratory Distress FINDINGS: The heart is enlarged but is similar in size to the prior study. Possible left pleural effusion. No pneumothorax is seen. There is a left retrocardiac opacity. Improved aeration of the right lung base compared to the prior exam.  Again seen are interstitial opacities bilaterally. Similar appearance of the right-sided port catheter. There is a left-sided cardiac device with multiple leads. Interstitial opacities bilaterally could represent pulmonary edema and/or infection. Left retrocardiac opacity could represent a layering pleural effusion, atelectasis, and/or infiltrate. XR CHEST PORTABLE    Result Date: 6/17/2022  EXAMINATION: ONE XRAY VIEW OF THE CHEST 6/17/2022 2:05 pm COMPARISON: Chest x-ray dated 16 June 2022 HISTORY: ORDERING SYSTEM PROVIDED HISTORY: pneumonia TECHNOLOGIST PROVIDED HISTORY: Reason for exam:->pneumonia Reason for Exam: pneumonia FINDINGS: Right-sided chest port catheter and left-sided ICD in stable position.  Interval worsening of bibasilar airspace disease. There is now also a small right pleural effusion. No pneumothorax     Interval worsening of bibasilar airspace opacities consistent with pneumonia. Small right pleural effusion. CTA PULMONARY W CONTRAST    Result Date: 7/14/2022  EXAM: CTA PULMONARY W CONTRAST, CT SOFT TISSUE NECK W CONTRAST INDICATION: Acute respiratory failure, hx malignancy, ? PE COMPARISON: May 10, 2022 TECHNIQUE:  CT of the chest was performed after the administration of intravenous contrast using a pulmonary angiography protocol. Multiplanar MIP reformations were created. Coronal and sagittal reformations were created. Up-to-date CT equipment and radiation dose reduction techniques were employed. FINDINGS: Neck: Nasopharynx: Normal.  Suprahyoid neck: Normal oropharynx, oral cavity, parapharyngeal space, and retropharyngeal space. Normal  space, infratemporal fossa, and buccal space. The tonsils appear normal. The salivary glands appear normal. Infrahyoid neck: Normal larynx, hypopharynx, and supraglottis. An endotracheal tube is seen within the trachea. Multiple thyroid nodules are identified. Lymph nodes: No lymphadenopathy. Vascular structures: Normal. Bones: Normal. Brain: The visualized portions of the brain appear normal. Orbits, paranasal sinuses, and skull base: Complete opacification of the left maxillary sinus is present. Chest: Lower neck and axilla: No lymphadenopathy. Heart and Vessels: The heart is enlarged. A left chest wall cardiac device is seen with leads in unchanged position. The thoracic aorta is normal in caliber. The main pulmonary artery is normal in caliber. No pulmonary embolism. Mediastinum and Charline: No mass or lymphadenopathy. Lungs and airways: There is bilateral lower lobe consolidation, worse on the left than the right. The major airways are patent. No focal mass lesion is identified. A few areas of ground glass opacities are seen in the right upper lobe likely due to atelectasis.  Pleura: hypoxia (HCC)    Pleural effusion    Mitral valve insufficiency           IMPRESSION/RECOMMENDATIONS:    Extensive Stage Small Cell Lung Cancer  - S/P 3 cycles of carboplatin/etoposide/atazolizumab chemotherapy  - Her course has been complicated by respiratory failure, poor performance status  - In her current condition, she is not a candidate for further chemotherapy    Respiratory failure  - Extubated this morning, now dyspneic on BiPAP  - Bilat pleural effusions  - Thoracentesis yielded a transudative fluid c/w CHF  - She is receiving a Lasix gtt and diuril in an effort to mobilize fluid    CHF  - Underlying CAD  - June 29 echo showed 30-35% EF  - On dobutamine gtt  - On Lasix gtt; diuril; home torsemide 40mg and vericiguat (Verquvo) on hold    COPD  - Chronic resp failure  - Receiving albuterl  - Takes Anoro Elipta at home    CKD stage III  - Avoiding nephrotoxic meds as much as possible    DMII  - Sliding scale insulin, home Jardiance is on hold    Discussed with patient and daughter. D/W Dr Bebe Ding    Thank you for the consultation.     Jos Angeles MD  7/16/2022  9:32 AM

## 2022-07-16 NOTE — CARE COORDINATION
A hospice order is noted for Ms. Sandhu. BRYAN placed a call to her daughter/medical decision-maker, Micaela Carrillo. Ramesh Garcia requested a referral to [facility] [place]. The family is not sure if they would prefer to take Ms. Sandhu home or go to an [address]. Ms. Andrew Leblanc was extubated today and her code status is now a [facility]. Spoke with Kiesha Later in intake [phone] to place the referral and faxed a copy of the facesheet to [phone]. HOC will reach out to the family to arrange a meeting and determine a plan of care.     Electronically signed by Zachary Carrillo, MSN RN-[facility]  Case Management  [phone]

## 2022-07-17 NOTE — PROGRESS NOTES
Linda Guerra, daughter and POA, called to inform her that patient was picked up by [facility] and will be transporting to Hospice.

## 2022-07-17 NOTE — DISCHARGE SUMMARY
team and dr goss spoke to family regarding disposition and patient was made dnr cc. Patient was transitioned to [facility] [place].       Discharge Diagnoses:   Patient Active Problem List   Diagnosis    Essential hypertension    Cardiomyopathy ([address]    CHF (congestive heart failure) (HCC)    COPD (chronic obstructive pulmonary disease) (HCC)    Chest pain    NEGIN treated with BiPAP    DMII (diabetes mellitus, type 2) (HCC)    Iron deficiency anemia    Intestinal malabsorption    Iron deficiency anemia due to chronic blood loss    Treatment    Anemia    Acute on chronic systolic CHF (congestive heart failure), NYHA class 3 (HCC)    Hyperlipidemia    Morbid obesity due to excess calories (HCC)    Acute pulmonary edema (HCC)    Acute respiratory failure with hypercarbia (HCC)    Congestive heart failure with left ventricular dysfunction (HCC)    Acute decompensated heart failure (HCC)    Acute kidney injury superimposed on chronic kidney disease (HCC)    COPD exacerbation (HCC)    Demand ischemia of myocardium (HCC)    Non morbid obesity due to excess calories    Acute respiratory failure with hypoxia and hypercapnia ([address]    Essential hypertension    Hyperlipidemia    DMII (diabetes mellitus, type 2) (HCC)    CAD S/P percutaneous coronary angioplasty    Respiratory failure ([address]    HCAP (healthcare-associated pneumonia)    Acute respiratory failure ([address]    Acute respiratory distress    Acute on chronic respiratory failure ([address]    Small cell lung cancer in adult [facility])    Dyspnea    History of lung cancer    Abnormal CT of the chest    Acute on chronic respiratory failure with hypoxia and hypercapnia (HCC)    Acute respiratory failure with hypoxia (HCC)    Pleural effusion    Mitral valve insufficiency       Physical Exam: BP (!) 139/49   Pulse 65   Temp 99.1 °F (37.3 °C) (Bladder)   Resp 26   Ht 5' 7\" (1.702 m)   Wt 211 lb 13.8 oz (96.1 kg)   SpO2 96%   BMI 33.18 kg/m²     Recent Labs     07/15/22  8060 07/15/22  2259 07/16/22  0353 07/16/22  0733 07/16/22  1032   POCGLU 89 83 80 79 102*           LABS:  Recent Labs     07/16/22 0433   WBC 3.8*   HGB 8.6*         Recent Labs     07/16/22  0437 07/16/22  1042     --    K 3.4* 3.8     --    CO2 21  --    BUN 29*  --    CREATININE 1.6*  --    GLUCOSE 86  --      Recent Labs     07/15/22  0359   INR 1.26*       Discharge Medications:     Medication List        ASK your doctor about these medications      amiodarone 200 MG tablet  Commonly known as: CORDARONE     Anoro Ellipta 62.5-25 MCG/INH Aepb inhaler  Generic drug: umeclidinium-vilanterol     Aspirin Low Dose 81 MG EC tablet  Generic drug: aspirin     atorvastatin 40 MG tablet  Commonly known as: LIPITOR     carvedilol 6.25 MG tablet  Commonly known as: COREG     dextrose 5 % SOLN 50 mL with ceFEPIme 2 g SOLR 2,000 mg     gabapentin 600 MG tablet  Commonly known as: NEURONTIN     Jardiance 10 MG tablet  Generic drug: empagliflozin     magnesium oxide 400 MG tablet  Commonly known as: MAG-OX     pantoprazole 40 MG tablet  Commonly known as: PROTONIX     * potassium chloride 20 MEQ extended release tablet  Commonly known as: KLOR-CON M     * potassium chloride 20 MEQ extended release tablet  Commonly known as: KLOR-CON M  Take 1 tablet by mouth 2 times daily for 5 days     * albuterol (2.5 MG/3ML) 0.083% nebulizer solution  Commonly known as: PROVENTIL     * Proventil  (90 Base) MCG/ACT inhaler  Generic drug: albuterol sulfate HFA     sennosides-docusate sodium 8.6-50 MG tablet  Commonly known as: SENOKOT-S  Take 1 tablet by mouth daily     Torsemide 40 MG Tabs  Take 40 mg by mouth 2 times daily     Verquvo 5 MG Tabs  Generic drug: Vericiguat           * This list has 4 medication(s) that are the same as other medications prescribed for you. Read the directions carefully, and ask your doctor or other care provider to review them with you.                  Thank you Dr. Grace Womack MD for the opportunity to be involved in this patients care. If you have any questions or concerns please feel free to contact me at [phone].

## 2022-07-18 LAB
BLOOD CULTURE, ROUTINE: NORMAL
BODY FLUID CULTURE, STERILE: NORMAL
GRAM STAIN RESULT: NORMAL

## 2022-08-15 LAB
FUNGUS (MYCOLOGY) CULTURE: NORMAL
FUNGUS STAIN: NORMAL

## 2022-08-30 LAB
AFB CULTURE (MYCOBACTERIA): NORMAL
AFB SMEAR: NORMAL

## 2022-12-22 NOTE — PROGRESS NOTES
Hospitalist Progress Note      PCP: Sylvia Weber MD    Date of Admission: 6/28/2022    Chief Complaint:   Chief Complaint   Patient presents with    Respiratory [address] Course: 70-year-old female with past medical history significant for stage IV lung cancer currently undergoing chemotherapy, chronic respiratory failure on home O2 3 L,systolic heart failure with EF less than 15%, grade 3 diastolic dysfunctio presents acute hypoxic respiratory failure possibly from a combination of acute on chronic CHF exacerbation and HCAP.    6/29  Patient feeling better today. She remains on 3 L O2 per nasal cannula, which is her baseline at home. 6/30  Patient feeling better today. O2 saturations are down to 2 L. WBC count went from 2.7-25,000 overnight, suspecting that this could be a lab error. She has been afebrile overnight. MRSA probe was negative. We will therefore discontinue vancomycin.  Add  Flagyl to cover for anaerobes    Subjective: as above      Medications:  Reviewed    Infusion Medications    sodium chloride      dextrose       Scheduled Medications    cefepime  2,000 mg IntraVENous Q12H    empagliflozin  10 mg Oral Daily    potassium chloride  20 mEq Oral BID WC    metroNIDAZOLE  500 mg IntraVENous Q8H    amiodarone  200 mg Oral Daily    aspirin  81 mg Oral Daily    atorvastatin  40 mg Oral Nightly    carvedilol  6.25 mg Oral BID WC    gabapentin  600 mg Oral TID    pantoprazole  40 mg Oral QAM AC    tiotropium-olodaterol  2 puff Inhalation Daily    Vericiguat  5 mg Oral QAM    sodium chloride flush  5-40 mL IntraVENous 2 times per day    insulin lispro  0-6 Units SubCUTAneous TID WC    insulin lispro  0-3 Units SubCUTAneous Nightly    furosemide  40 mg IntraVENous BID    ipratropium-albuterol  1 ampule Inhalation Q4H    enoxaparin  30 mg SubCUTAneous BID    filgrastim-aafi  480 mcg SubCUTAneous Daily     PRN Meds: sodium chloride flush, sodium chloride, acetaminophen **OR** acetaminophen, ondansetron, glucose, dextrose bolus **OR** dextrose bolus, glucagon (rDNA), dextrose, albuterol sulfate HFA      Intake/Output Summary (Last 24 hours) at 6/30/2022 1113  Last data filed at 6/30/2022 0809  Gross per 24 hour   Intake 210 ml   Output 300 ml   Net -90 ml       Physical Exam Performed:    BP (!) 108/56   Pulse 73   Temp (!) 96.5 °F (35.8 °C) (Oral)   Resp 14   Ht 5' 7\" (1.702 m)   Wt 222 lb 10.6 oz (101 kg)   SpO2 98%   BMI 34.87 kg/m²     General appearance: No apparent distress, appears stated age and cooperative. HEENT: Pupils equal, round, and reactive to light. Conjunctivae/corneas clear. Neck: Supple, with full range of motion. No jugular venous distention. Trachea midline. Respiratory:  Normal respiratory effort. Clear to auscultation, bilaterally without Rales/Wheezes/Rhonchi. Cardiovascular: Regular rate and rhythm with normal S1/S2 without murmurs, rubs or gallops. Abdomen: Soft, non-tender, non-distended with normal bowel sounds. Musculoskeletal: No clubbing, cyanosis or edema bilaterally. Full range of motion without deformity. Skin: Skin color, texture, turgor normal.  No rashes or lesions. Neurologic:  Neurovascularly intact without any focal sensory/motor deficits.  Cranial nerves: II-XII intact, grossly non-focal.  Psychiatric: Alert and oriented, thought content appropriate, normal insight  Capillary Refill: Brisk,3 seconds, normal   Peripheral Pulses: +2 palpable, equal bilaterally       Labs:   Recent Labs     06/28/22 2205 06/29/22 0622 06/30/22  0510   WBC 5.8 2.7* 25.1*   HGB 10.0* 8.9* 7.9*   HCT 32.5* 27.7* 25.1*    286 276     Recent Labs     06/28/22 2205 06/29/22  0622 06/30/22  0510    143 143   K 5.2* 3.9 3.6    103 103   CO2 20* 28 25   BUN 36* 35* 43*   CREATININE 1.5* 1.5* 1.4*   CALCIUM 9.4 9.3 8.8     Recent Labs     06/28/22  2205 06/29/22  0622 06/30/22  0510   AST 28 21 16   ALT 15 16 14 BILITOT 0.4 0.4 0.4   ALKPHOS 75 76 61     No results for input(s): INR in the last 72 hours. Recent Labs     06/28/22  2205   TROPONINI <0.01       Urinalysis:      Lab Results   Component Value Date/Time    NITRU Negative 03/31/2022 11:51 AM    WBCUA 6 03/30/2022 09:28 PM    BACTERIA 4+ 03/30/2022 09:28 PM    RBCUA 1 03/30/2022 09:28 PM    BLOODU Negative 03/31/2022 11:51 AM    SPECGRAV 1.010 03/31/2022 11:51 AM    GLUCOSEU >=1000 03/31/2022 11:51 AM       Radiology:  XR CHEST PORTABLE   Preliminary Result   Left retrocardiac opacity along with right basilar airspace opacities,   appearing slightly more prominent than the prior exam.  This could represent   atelectasis and/or infiltrate. Possible left pleural effusion. XR CHEST PORTABLE   Preliminary Result   Interstitial opacities bilaterally could represent pulmonary edema and/or   infection. Left retrocardiac opacity could represent a layering pleural effusion,   atelectasis, and/or infiltrate. Assessment/Plan:    Active Hospital Problems    Diagnosis     Small cell lung cancer ([facility] [C34.90]      Priority: Medium    Acute on chronic respiratory failure (HCC) [J96.20]      Priority: Medium    DMII [facility] mellitus, type 2) [facility] [E11.9]     Acute pulmonary edema (HCC) [J81.0]     DMII (diabetes mellitus, type 2) ([facility] [E11.9]     NEGIN treated with BiPAP [G47.33]     COPD (chronic obstructive pulmonary disease) (HCC) [J44.9]     Essential hypertension [I10]      Problem list    Leukocytosis reactive to filgrastim administration on 6/29? Chronic respiratory failure  Acute on chronic systolic/diastolic heart failure exacerbation-repeat echocardiogram 6/29 with EF of 30%  HCAP  Stage IV lung cancer-currently undergoing chemotherapy    Plan    Continue IV Lasix 40 mg twice daily  Continue cefepime, Flagyl, discontinue vancomycin.   MRSA probe negative  Heme-onc and pulm recommendations noted  Monitor wbc counts     DVT Prophylaxis: Lovenox  Diet: ADULT DIET; Regular; 4 carb choices (60 gm/meal); Low Sodium (2 gm); 1500 ml  Code Status: Full Code    PT/OT Eval Status: Requested    Dispo -?     Joce MD Rachelle 0 = swallows foods/liquids without difficulty

## 2023-05-09 ENCOUNTER — CLINICAL DOCUMENTATION ONLY (OUTPATIENT)
Facility: CLINIC | Age: 63
End: 2023-05-09